# Patient Record
Sex: FEMALE | Race: WHITE | Employment: UNEMPLOYED | URBAN - METROPOLITAN AREA
[De-identification: names, ages, dates, MRNs, and addresses within clinical notes are randomized per-mention and may not be internally consistent; named-entity substitution may affect disease eponyms.]

---

## 2017-01-06 ENCOUNTER — ANTI-COAG VISIT (OUTPATIENT)
Dept: PHARMACY | Facility: CLINIC | Age: 47
End: 2017-01-06

## 2017-01-06 DIAGNOSIS — D68.51 FACTOR V LEIDEN (HCC): ICD-10-CM

## 2017-01-06 LAB — INR BLD: 2.8

## 2017-02-03 ENCOUNTER — ANTI-COAG VISIT (OUTPATIENT)
Dept: PHARMACY | Facility: CLINIC | Age: 47
End: 2017-02-03

## 2017-02-03 DIAGNOSIS — D68.51 FACTOR V LEIDEN (HCC): ICD-10-CM

## 2017-02-03 LAB — INR BLD: 2.4

## 2017-02-03 RX ORDER — DULOXETIN HYDROCHLORIDE 30 MG/1
30 CAPSULE, DELAYED RELEASE ORAL DAILY
COMMUNITY
End: 2017-09-14 | Stop reason: ALTCHOICE

## 2017-02-03 RX ORDER — BUDESONIDE AND FORMOTEROL FUMARATE DIHYDRATE 160; 4.5 UG/1; UG/1
2 AEROSOL RESPIRATORY (INHALATION) 2 TIMES DAILY
COMMUNITY
End: 2017-07-19 | Stop reason: ALTCHOICE

## 2017-02-17 ENCOUNTER — ANTI-COAG VISIT (OUTPATIENT)
Dept: PHARMACY | Facility: CLINIC | Age: 47
End: 2017-02-17

## 2017-02-17 DIAGNOSIS — D68.51 FACTOR V LEIDEN (HCC): ICD-10-CM

## 2017-02-17 LAB — INR BLD: 2.7

## 2017-03-17 ENCOUNTER — ANTI-COAG VISIT (OUTPATIENT)
Dept: PHARMACY | Facility: CLINIC | Age: 47
End: 2017-03-17

## 2017-03-17 DIAGNOSIS — D68.51 FACTOR V LEIDEN (HCC): ICD-10-CM

## 2017-03-17 LAB — INR BLD: 2.2

## 2017-03-31 ENCOUNTER — HOSPITAL ENCOUNTER (OUTPATIENT)
Dept: ULTRASOUND IMAGING | Age: 47
Discharge: HOME | End: 2017-03-31
Attending: NURSE PRACTITIONER | Admitting: NURSE PRACTITIONER

## 2017-03-31 DIAGNOSIS — R10.12 ABDOMINAL PAIN, LEFT UPPER QUADRANT: ICD-10-CM

## 2017-04-24 ENCOUNTER — ANTI-COAG VISIT (OUTPATIENT)
Dept: PHARMACY | Facility: CLINIC | Age: 47
End: 2017-04-24

## 2017-04-24 DIAGNOSIS — D68.51 FACTOR V LEIDEN (HCC): ICD-10-CM

## 2017-04-24 LAB — INR BLD: 2.1

## 2017-05-22 ENCOUNTER — ANTI-COAG VISIT (OUTPATIENT)
Dept: PHARMACY | Facility: CLINIC | Age: 47
End: 2017-05-22

## 2017-05-22 DIAGNOSIS — D68.51 FACTOR V LEIDEN (HCC): ICD-10-CM

## 2017-05-22 LAB — INR BLD: 1.8

## 2017-06-23 ENCOUNTER — TELEPHONE (OUTPATIENT)
Dept: PHARMACY | Facility: CLINIC | Age: 47
End: 2017-06-23

## 2017-07-07 ENCOUNTER — ANTI-COAG VISIT (OUTPATIENT)
Dept: PHARMACY | Facility: CLINIC | Age: 47
End: 2017-07-07

## 2017-07-07 DIAGNOSIS — D68.51 FACTOR V LEIDEN (HCC): ICD-10-CM

## 2017-07-07 LAB — INR BLD: 3.9

## 2017-07-19 ENCOUNTER — ANTI-COAG VISIT (OUTPATIENT)
Dept: PHARMACY | Facility: CLINIC | Age: 47
End: 2017-07-19

## 2017-07-19 LAB — INR BLD: 1.8

## 2017-07-19 RX ORDER — FLUTICASONE FUROATE AND VILANTEROL 200; 25 UG/1; UG/1
1 POWDER RESPIRATORY (INHALATION) DAILY
COMMUNITY
End: 2017-09-14 | Stop reason: SDUPTHER

## 2017-08-08 ENCOUNTER — TELEPHONE (OUTPATIENT)
Dept: PHARMACY | Facility: CLINIC | Age: 47
End: 2017-08-08

## 2017-08-10 ENCOUNTER — ANTI-COAG VISIT (OUTPATIENT)
Dept: INTERNAL MEDICINE CLINIC | Age: 47
End: 2017-08-10

## 2017-08-10 ENCOUNTER — OFFICE VISIT (OUTPATIENT)
Dept: INTERNAL MEDICINE CLINIC | Age: 47
End: 2017-08-10

## 2017-08-10 VITALS
HEIGHT: 66 IN | OXYGEN SATURATION: 98 % | BODY MASS INDEX: 32.3 KG/M2 | HEART RATE: 75 BPM | DIASTOLIC BLOOD PRESSURE: 86 MMHG | TEMPERATURE: 98.4 F | WEIGHT: 201 LBS | SYSTOLIC BLOOD PRESSURE: 158 MMHG

## 2017-08-10 DIAGNOSIS — J45.909 UNCOMPLICATED ASTHMA, UNSPECIFIED ASTHMA SEVERITY: ICD-10-CM

## 2017-08-10 DIAGNOSIS — D68.51 FACTOR V LEIDEN (HCC): ICD-10-CM

## 2017-08-10 DIAGNOSIS — E78.5 HYPERLIPIDEMIA, UNSPECIFIED HYPERLIPIDEMIA TYPE: ICD-10-CM

## 2017-08-10 DIAGNOSIS — F17.200 SMOKING: ICD-10-CM

## 2017-08-10 DIAGNOSIS — K21.9 GASTROESOPHAGEAL REFLUX DISEASE WITHOUT ESOPHAGITIS: ICD-10-CM

## 2017-08-10 DIAGNOSIS — M19.039 ARTHRITIS, WRIST: Primary | ICD-10-CM

## 2017-08-10 DIAGNOSIS — F41.8 ANXIETY WITH DEPRESSION: ICD-10-CM

## 2017-08-10 LAB
CHOLESTEROL, TOTAL: 154 MG/DL (ref 0–199)
HCT VFR BLD CALC: 41.7 % (ref 36–48)
HDLC SERPL-MCNC: 52 MG/DL (ref 40–60)
HEMOGLOBIN: 13.9 G/DL (ref 12–16)
INR BLD: 2.91 (ref 0.85–1.15)
LDL CHOLESTEROL CALCULATED: 86 MG/DL
MCH RBC QN AUTO: 30.3 PG (ref 26–34)
MCHC RBC AUTO-ENTMCNC: 33.4 G/DL (ref 31–36)
MCV RBC AUTO: 90.9 FL (ref 80–100)
PDW BLD-RTO: 15.2 % (ref 12.4–15.4)
PLATELET # BLD: 192 K/UL (ref 135–450)
PMV BLD AUTO: 9.1 FL (ref 5–10.5)
PROTHROMBIN TIME: 32.9 SEC (ref 9.6–13)
RBC # BLD: 4.58 M/UL (ref 4–5.2)
TRIGL SERPL-MCNC: 79 MG/DL (ref 0–150)
VLDLC SERPL CALC-MCNC: 16 MG/DL
WBC # BLD: 6.2 K/UL (ref 4–11)

## 2017-08-10 PROCEDURE — 99204 OFFICE O/P NEW MOD 45 MIN: CPT | Performed by: INTERNAL MEDICINE

## 2017-08-10 RX ORDER — OMEPRAZOLE 20 MG/1
20 CAPSULE, DELAYED RELEASE ORAL DAILY
Qty: 30 CAPSULE | Refills: 5 | Status: SHIPPED | OUTPATIENT
Start: 2017-08-10 | End: 2017-09-14 | Stop reason: SDUPTHER

## 2017-08-10 RX ORDER — VARENICLINE TARTRATE 25 MG
KIT ORAL
Qty: 1 EACH | Refills: 0 | Status: SHIPPED | OUTPATIENT
Start: 2017-08-10 | End: 2017-09-14 | Stop reason: ALTCHOICE

## 2017-08-10 RX ORDER — ALBUTEROL SULFATE 90 UG/1
2 AEROSOL, METERED RESPIRATORY (INHALATION) EVERY 6 HOURS PRN
COMMUNITY
End: 2017-09-14 | Stop reason: SDUPTHER

## 2017-08-10 ASSESSMENT — ENCOUNTER SYMPTOMS
TROUBLE SWALLOWING: 0
WHEEZING: 0
SORE THROAT: 0
SHORTNESS OF BREATH: 0
ABDOMINAL PAIN: 0
NAUSEA: 0
VOMITING: 0
DIARRHEA: 0

## 2017-08-15 ENCOUNTER — ANTI-COAG VISIT (OUTPATIENT)
Dept: PHARMACY | Facility: CLINIC | Age: 47
End: 2017-08-15

## 2017-08-15 ENCOUNTER — OFFICE VISIT (OUTPATIENT)
Dept: ORTHOPEDIC SURGERY | Age: 47
End: 2017-08-15

## 2017-08-15 VITALS
HEIGHT: 66 IN | TEMPERATURE: 98.3 F | BODY MASS INDEX: 32.3 KG/M2 | HEART RATE: 56 BPM | WEIGHT: 201 LBS | DIASTOLIC BLOOD PRESSURE: 80 MMHG | SYSTOLIC BLOOD PRESSURE: 147 MMHG

## 2017-08-15 DIAGNOSIS — G56.21 CUBITAL TUNNEL SYNDROME, RIGHT: ICD-10-CM

## 2017-08-15 DIAGNOSIS — M18.11 ARTHRITIS OF CARPOMETACARPAL (CMC) JOINT OF RIGHT THUMB: Primary | ICD-10-CM

## 2017-08-15 DIAGNOSIS — M17.12 PRIMARY OSTEOARTHRITIS OF LEFT KNEE: ICD-10-CM

## 2017-08-15 DIAGNOSIS — D68.51 FACTOR V LEIDEN (HCC): ICD-10-CM

## 2017-08-15 LAB — INR BLD: 3.1

## 2017-08-15 PROCEDURE — L3908 WHO COCK-UP NONMOLDE PRE OTS: HCPCS | Performed by: ORTHOPAEDIC SURGERY

## 2017-08-15 PROCEDURE — L3762 EO RIGID W/O JOINTS PRE OTS: HCPCS | Performed by: ORTHOPAEDIC SURGERY

## 2017-08-15 PROCEDURE — 99244 OFF/OP CNSLTJ NEW/EST MOD 40: CPT | Performed by: ORTHOPAEDIC SURGERY

## 2017-08-15 PROCEDURE — 20600 DRAIN/INJ JOINT/BURSA W/O US: CPT | Performed by: ORTHOPAEDIC SURGERY

## 2017-09-07 ENCOUNTER — TELEPHONE (OUTPATIENT)
Dept: PHARMACY | Facility: CLINIC | Age: 47
End: 2017-09-07

## 2017-09-14 ENCOUNTER — OFFICE VISIT (OUTPATIENT)
Dept: INTERNAL MEDICINE CLINIC | Age: 47
End: 2017-09-14

## 2017-09-14 VITALS
WEIGHT: 207 LBS | HEART RATE: 61 BPM | DIASTOLIC BLOOD PRESSURE: 78 MMHG | SYSTOLIC BLOOD PRESSURE: 138 MMHG | BODY MASS INDEX: 33.41 KG/M2 | OXYGEN SATURATION: 99 % | TEMPERATURE: 99 F

## 2017-09-14 DIAGNOSIS — R09.81 SINUS CONGESTION: ICD-10-CM

## 2017-09-14 DIAGNOSIS — Z13.1 DIABETES MELLITUS SCREENING: ICD-10-CM

## 2017-09-14 DIAGNOSIS — F32.A DEPRESSION, UNSPECIFIED DEPRESSION TYPE: ICD-10-CM

## 2017-09-14 DIAGNOSIS — F17.200 SMOKING ADDICTION: ICD-10-CM

## 2017-09-14 DIAGNOSIS — K21.9 GASTROESOPHAGEAL REFLUX DISEASE WITHOUT ESOPHAGITIS: ICD-10-CM

## 2017-09-14 DIAGNOSIS — J45.909 UNCOMPLICATED ASTHMA, UNSPECIFIED ASTHMA SEVERITY: ICD-10-CM

## 2017-09-14 DIAGNOSIS — D68.51 FACTOR V LEIDEN MUTATION (HCC): Primary | ICD-10-CM

## 2017-09-14 DIAGNOSIS — Z12.39 BREAST CANCER SCREENING: ICD-10-CM

## 2017-09-14 LAB
ESTIMATED AVERAGE GLUCOSE: 111.2 MG/DL
HBA1C MFR BLD: 5.5 %

## 2017-09-14 PROCEDURE — 99214 OFFICE O/P EST MOD 30 MIN: CPT | Performed by: INTERNAL MEDICINE

## 2017-09-14 RX ORDER — ALBUTEROL SULFATE 90 UG/1
2 AEROSOL, METERED RESPIRATORY (INHALATION) EVERY 6 HOURS PRN
Qty: 1 INHALER | Refills: 5 | Status: SHIPPED | OUTPATIENT
Start: 2017-09-14 | End: 2018-09-20 | Stop reason: SDUPTHER

## 2017-09-14 RX ORDER — DULOXETIN HYDROCHLORIDE 60 MG/1
60 CAPSULE, DELAYED RELEASE ORAL DAILY
Qty: 30 CAPSULE | Refills: 3 | Status: SHIPPED | OUTPATIENT
Start: 2017-09-14 | End: 2018-01-10 | Stop reason: SDUPTHER

## 2017-09-14 RX ORDER — WARFARIN SODIUM 7.5 MG/1
7.5 TABLET ORAL DAILY
Qty: 30 TABLET | Refills: 5 | Status: SHIPPED | OUTPATIENT
Start: 2017-09-14 | End: 2018-08-07 | Stop reason: SDUPTHER

## 2017-09-14 RX ORDER — FLUTICASONE FUROATE AND VILANTEROL 200; 25 UG/1; UG/1
1 POWDER RESPIRATORY (INHALATION) DAILY
Qty: 1 EACH | Refills: 5 | Status: SHIPPED | OUTPATIENT
Start: 2017-09-14 | End: 2018-10-06 | Stop reason: SDUPTHER

## 2017-09-14 RX ORDER — FLUTICASONE PROPIONATE 50 MCG
1 SPRAY, SUSPENSION (ML) NASAL 2 TIMES DAILY
Qty: 1 BOTTLE | Refills: 0 | Status: SHIPPED | OUTPATIENT
Start: 2017-09-14 | End: 2018-05-01 | Stop reason: SDUPTHER

## 2017-09-14 RX ORDER — OMEPRAZOLE 20 MG/1
20 CAPSULE, DELAYED RELEASE ORAL DAILY
Qty: 30 CAPSULE | Refills: 5 | Status: SHIPPED | OUTPATIENT
Start: 2017-09-14 | End: 2020-09-10

## 2017-09-14 RX ORDER — POLYETHYLENE GLYCOL 3350 17 G
4 POWDER IN PACKET (EA) ORAL PRN
Qty: 100 EACH | Refills: 3 | Status: SHIPPED | OUTPATIENT
Start: 2017-09-14 | End: 2018-12-17

## 2017-09-14 ASSESSMENT — ENCOUNTER SYMPTOMS
WHEEZING: 0
VOMITING: 0
SHORTNESS OF BREATH: 1
NAUSEA: 0
ABDOMINAL PAIN: 0
SORE THROAT: 0
DIARRHEA: 0
TROUBLE SWALLOWING: 0
RHINORRHEA: 1

## 2017-09-15 ENCOUNTER — TELEPHONE (OUTPATIENT)
Dept: PHARMACY | Facility: CLINIC | Age: 47
End: 2017-09-15

## 2017-09-15 ENCOUNTER — TELEPHONE (OUTPATIENT)
Dept: INTERNAL MEDICINE CLINIC | Age: 47
End: 2017-09-15

## 2017-09-19 ENCOUNTER — TELEPHONE (OUTPATIENT)
Dept: PHARMACY | Facility: CLINIC | Age: 47
End: 2017-09-19

## 2017-10-01 ENCOUNTER — HOSPITAL ENCOUNTER (OUTPATIENT)
Dept: OTHER | Age: 47
Discharge: HOME | End: 2017-10-31
Attending: NURSE PRACTITIONER | Admitting: NURSE PRACTITIONER

## 2017-10-24 ENCOUNTER — ANTI-COAG VISIT (OUTPATIENT)
Dept: PHARMACY | Facility: CLINIC | Age: 47
End: 2017-10-24

## 2017-10-24 DIAGNOSIS — D68.51 FACTOR V LEIDEN (HCC): ICD-10-CM

## 2017-10-24 LAB — INR BLD: 3.5

## 2017-10-24 NOTE — PROGRESS NOTES
Ms. Ange Enciso is a 56 y.o.  female with history of Factor V Leiden who presents today for anticoagulation monitoring and adjustment. Patient verifies current dosing regimen. Patient denies s/s bleeding/bruising/swelling/SOB. No blood in urine or stool. No dietary changes. No changes in medication/OTC agents/Herbals. No change in alcohol use. No missed doses. No Procedures scheduled in the future at this time. Lab Results   Component Value Date    INR 3.5 10/24/2017    INR 3.1 08/15/2017    INR 2.91 (H) 08/10/2017       Patient appears well. No changes, no problems. Coumadin dose: HOLD Warfarin Today Only. Then decrease dose . .... NEW DOSE : Take 7.5 mg daily Except 3.75 mg on Mondays, Wednesday and Fridays. Recheck INR in 2 weeks. Patient reminded to call the Anticoagulation Clinic with any medication changes. Patient given instructions utilizing the teach back method. After visit summary printed and reviewed with patient. Medications reviewed and Warfarin dose updated.

## 2017-11-01 ENCOUNTER — HOSPITAL ENCOUNTER (OUTPATIENT)
Dept: OTHER | Age: 47
Discharge: HOME | End: 2017-11-30
Attending: NURSE PRACTITIONER | Admitting: NURSE PRACTITIONER

## 2018-01-10 DIAGNOSIS — F32.A DEPRESSION, UNSPECIFIED DEPRESSION TYPE: ICD-10-CM

## 2018-01-10 RX ORDER — DULOXETIN HYDROCHLORIDE 60 MG/1
CAPSULE, DELAYED RELEASE ORAL
Qty: 30 CAPSULE | Refills: 2 | Status: SHIPPED | OUTPATIENT
Start: 2018-01-10 | End: 2018-09-08 | Stop reason: SDUPTHER

## 2018-04-26 ENCOUNTER — TELEPHONE (OUTPATIENT)
Dept: PHARMACY | Facility: CLINIC | Age: 48
End: 2018-04-26

## 2018-05-01 DIAGNOSIS — R09.81 SINUS CONGESTION: ICD-10-CM

## 2018-05-02 RX ORDER — FLUTICASONE PROPIONATE 50 MCG
SPRAY, SUSPENSION (ML) NASAL
Qty: 1 BOTTLE | Refills: 5 | Status: SHIPPED | OUTPATIENT
Start: 2018-05-02 | End: 2018-12-17

## 2018-06-06 ENCOUNTER — TELEPHONE (OUTPATIENT)
Dept: PHARMACY | Facility: CLINIC | Age: 48
End: 2018-06-06

## 2018-07-13 ENCOUNTER — ANTI-COAG VISIT (OUTPATIENT)
Dept: PHARMACY | Facility: CLINIC | Age: 48
End: 2018-07-13

## 2018-07-16 DIAGNOSIS — R09.81 SINUS CONGESTION: ICD-10-CM

## 2018-07-18 RX ORDER — FLUTICASONE PROPIONATE 50 MCG
SPRAY, SUSPENSION (ML) NASAL
Qty: 1 BOTTLE | Refills: 5 | Status: SHIPPED | OUTPATIENT
Start: 2018-07-18 | End: 2018-12-14

## 2018-08-07 DIAGNOSIS — D68.51 FACTOR V LEIDEN MUTATION (HCC): ICD-10-CM

## 2018-08-07 RX ORDER — WARFARIN SODIUM 7.5 MG/1
TABLET ORAL
Qty: 21 TABLET | Refills: 4 | Status: SHIPPED | OUTPATIENT
Start: 2018-08-07 | End: 2018-12-14

## 2018-08-10 DIAGNOSIS — D68.51 FACTOR V LEIDEN MUTATION (HCC): ICD-10-CM

## 2018-08-10 RX ORDER — WARFARIN SODIUM 7.5 MG/1
TABLET ORAL
Qty: 21 TABLET | Refills: 4 | Status: SHIPPED | OUTPATIENT
Start: 2018-08-10 | End: 2018-12-17 | Stop reason: ALTCHOICE

## 2018-09-08 DIAGNOSIS — F32.A DEPRESSION, UNSPECIFIED DEPRESSION TYPE: ICD-10-CM

## 2018-09-08 RX ORDER — DULOXETIN HYDROCHLORIDE 60 MG/1
CAPSULE, DELAYED RELEASE ORAL
Qty: 30 CAPSULE | Refills: 11 | Status: SHIPPED | OUTPATIENT
Start: 2018-09-08 | End: 2018-09-10 | Stop reason: SDUPTHER

## 2018-09-10 DIAGNOSIS — F32.A DEPRESSION, UNSPECIFIED DEPRESSION TYPE: ICD-10-CM

## 2018-09-11 RX ORDER — DULOXETIN HYDROCHLORIDE 60 MG/1
CAPSULE, DELAYED RELEASE ORAL
Qty: 30 CAPSULE | Refills: 11 | Status: SHIPPED | OUTPATIENT
Start: 2018-09-11 | End: 2019-09-09 | Stop reason: SDUPTHER

## 2018-09-20 DIAGNOSIS — J45.909 UNCOMPLICATED ASTHMA: ICD-10-CM

## 2018-10-06 DIAGNOSIS — J45.909 UNCOMPLICATED ASTHMA: ICD-10-CM

## 2018-12-14 ENCOUNTER — HOSPITAL ENCOUNTER (EMERGENCY)
Age: 48
Discharge: HOME | End: 2018-12-14
Payer: COMMERCIAL

## 2018-12-14 ENCOUNTER — APPOINTMENT (OUTPATIENT)
Dept: GENERAL RADIOLOGY | Age: 48
End: 2018-12-14
Payer: COMMERCIAL

## 2018-12-14 VITALS
DIASTOLIC BLOOD PRESSURE: 100 MMHG | TEMPERATURE: 97.8 F | OXYGEN SATURATION: 98 % | RESPIRATION RATE: 16 BRPM | BODY MASS INDEX: 35.64 KG/M2 | SYSTOLIC BLOOD PRESSURE: 151 MMHG | HEIGHT: 67 IN | WEIGHT: 227.07 LBS | HEART RATE: 74 BPM

## 2018-12-14 DIAGNOSIS — J06.9 UPPER RESPIRATORY TRACT INFECTION, UNSPECIFIED TYPE: Primary | ICD-10-CM

## 2018-12-14 LAB
INR BLD: 4.09 (ref 0.86–1.14)
PROTHROMBIN TIME: 46.6 SEC (ref 9.8–13)
RAPID INFLUENZA  B AGN: NEGATIVE
RAPID INFLUENZA A AGN: NEGATIVE

## 2018-12-14 PROCEDURE — 94640 AIRWAY INHALATION TREATMENT: CPT

## 2018-12-14 PROCEDURE — 87804 INFLUENZA ASSAY W/OPTIC: CPT

## 2018-12-14 PROCEDURE — 71046 X-RAY EXAM CHEST 2 VIEWS: CPT

## 2018-12-14 PROCEDURE — 6370000000 HC RX 637 (ALT 250 FOR IP): Performed by: NURSE PRACTITIONER

## 2018-12-14 PROCEDURE — 99284 EMERGENCY DEPT VISIT MOD MDM: CPT

## 2018-12-14 PROCEDURE — 94760 N-INVAS EAR/PLS OXIMETRY 1: CPT

## 2018-12-14 PROCEDURE — 85610 PROTHROMBIN TIME: CPT

## 2018-12-14 PROCEDURE — 94664 DEMO&/EVAL PT USE INHALER: CPT

## 2018-12-14 RX ORDER — AMOXICILLIN 250 MG/1
500 CAPSULE ORAL 3 TIMES DAILY
Qty: 42 CAPSULE | Refills: 0 | Status: SHIPPED | OUTPATIENT
Start: 2018-12-14 | End: 2018-12-21

## 2018-12-14 RX ORDER — PREDNISONE 20 MG/1
60 TABLET ORAL ONCE
Status: COMPLETED | OUTPATIENT
Start: 2018-12-14 | End: 2018-12-14

## 2018-12-14 RX ORDER — PREDNISONE 20 MG/1
TABLET ORAL
Qty: 18 TABLET | Refills: 0 | Status: SHIPPED | OUTPATIENT
Start: 2018-12-14 | End: 2018-12-24

## 2018-12-14 RX ORDER — IPRATROPIUM BROMIDE AND ALBUTEROL SULFATE 2.5; .5 MG/3ML; MG/3ML
1 SOLUTION RESPIRATORY (INHALATION) ONCE
Status: COMPLETED | OUTPATIENT
Start: 2018-12-14 | End: 2018-12-14

## 2018-12-14 RX ADMIN — IPRATROPIUM BROMIDE AND ALBUTEROL SULFATE 1 AMPULE: .5; 3 SOLUTION RESPIRATORY (INHALATION) at 12:58

## 2018-12-14 RX ADMIN — PREDNISONE 60 MG: 20 TABLET ORAL at 13:45

## 2018-12-14 ASSESSMENT — ENCOUNTER SYMPTOMS
ABDOMINAL PAIN: 0
SHORTNESS OF BREATH: 1
DIARRHEA: 1
COUGH: 1

## 2018-12-14 NOTE — ED NOTES
Dc instructions completed with pt. Pt verbalized understanding. rx x2. Pt was ambulatory to exit.       Yonatan Williamson RN  12/14/18 1400

## 2018-12-14 NOTE — ED PROVIDER NOTES
Father      Family Status   Relation Status    Mother Alive    Father         SOCIAL HISTORY      reports that she has been smoking Cigarettes. She has been smoking about 0.50 packs per day. She has never used smokeless tobacco. She reports that she uses drugs, including Marijuana, about 14 times per week. She reports that she does not drink alcohol. PHYSICAL EXAM    (up to 7 for level 4, 8 or more for level 5)     ED Triage Vitals   BP Temp Temp Source Pulse Resp SpO2 Height Weight   18 1216 18 1215 18 1215 18 1216 18 1216 18 1216 18 1216 18 1216   (!) 162/91 97.8 °F (36.6 °C) Oral 77 18 96 % 5' 7\" (1.702 m) 227 lb 1.2 oz (103 kg)       Physical Exam   Constitutional: She is oriented to person, place, and time. Vital signs are normal. She appears well-developed and well-nourished. She is cooperative. Non-toxic appearance. She does not have a sickly appearance. She does not appear ill. No distress. HENT:   Head: Normocephalic. Nose: Nose normal.   Mouth/Throat: Oropharynx is clear and moist. No oropharyngeal exudate. Eyes: Pupils are equal, round, and reactive to light. Neck: Normal range of motion. Cardiovascular: Normal rate and regular rhythm. Pulmonary/Chest: Effort normal. She has wheezes. Abdominal: Soft. Bowel sounds are normal. She exhibits no mass. There is no tenderness. There is no rebound and no guarding. Neurological: She is alert and oriented to person, place, and time. Skin: Skin is warm and dry. She is not diaphoretic. Psychiatric: She has a normal mood and affect. Nursing note and vitals reviewed.         DIAGNOSTIC RESULTS     RADIOLOGY:   Non-plain film images such as CT, Ultrasound and MRI are read by the radiologist. Plain radiographic images are visualized and preliminarily interpreted by AGUSTIN Vazquez CNP with the below findings:        Interpretation per the Radiologist below, if available at the time of this note:    XR CHEST STANDARD (2 VW)   Final Result   Retrocardiac atelectasis or pneumonitis. LABS:  Labs Reviewed   PROTIME-INR - Abnormal; Notable for the following:        Result Value    Protime 46.6 (*)     INR 4.09 (*)     All other components within normal limits    Narrative:     Performed at:  [facility]  [address] [place], [place] [address]   Phone [phone]   RAPID INFLUENZA A/B ANTIGENS    Narrative:     Performed at:  [facility]  [address] [place], [place] [address]   Phone [phone]       All other labs were within normal range or not returned as of this dictation. EMERGENCY DEPARTMENT COURSE and DIFFERENTIAL DIAGNOSIS/MDM:   Vitals:    Vitals:    12/14/18 1215 12/14/18 1216 12/14/18 1258 12/14/18 1452   BP:  (!) 162/91  (!) 151/100   Pulse:  77  74   Resp:  18 18 16   Temp: 97.8 °F (36.6 °C)      TempSrc: Oral      SpO2:  96% 97% 98%   Weight:  227 lb 1.2 oz (103 kg)     Height:  5' 7\" (1.702 m)       Medications   predniSONE (DELTASONE) tablet 60 mg (60 mg Oral Given 12/14/18 1345)   ipratropium-albuterol (DUONEB) nebulizer solution 1 ampule (1 ampule Inhalation Given 12/14/18 1258)       MDM  Patient and evaluated per myself. Dr. Denisse Osullivan present available for consultation as needed. Clinically no evidence of distress. No evidence of meningismus. No evidence of otitis media or externa, exudative pharyngitis. Scattered wheezing throughout lung fields without evidence of fever, tachycardia, hypoxia or tachypnea. Influenza A and B -. Chest x-ray indicating changes consistent with pneumonitis. No consolidation otherwise. INR: 4.09.      1420: I spoke with Dr. Vasquez Weathers. We discussed the patient ED visit, an INR level. He recommends having the hold the Coumadin for 2 days.  Have the patient be seen at their office/clinic on Monday to determine long-term management for INR monitoring and

## 2019-02-21 DIAGNOSIS — D68.51 FACTOR V LEIDEN MUTATION (HCC): ICD-10-CM

## 2019-02-22 RX ORDER — WARFARIN SODIUM 7.5 MG/1
TABLET ORAL
Qty: 21 TABLET | Refills: 3 | Status: SHIPPED | OUTPATIENT
Start: 2019-02-22 | End: 2019-06-09 | Stop reason: SDUPTHER

## 2019-03-22 DIAGNOSIS — J45.909 UNCOMPLICATED ASTHMA: ICD-10-CM

## 2019-06-09 DIAGNOSIS — D68.51 FACTOR V LEIDEN MUTATION (HCC): ICD-10-CM

## 2019-06-10 RX ORDER — WARFARIN SODIUM 7.5 MG/1
TABLET ORAL
Qty: 21 TABLET | Refills: 2 | Status: SHIPPED | OUTPATIENT
Start: 2019-06-10 | End: 2020-09-10

## 2019-06-28 DIAGNOSIS — D68.51 FACTOR V LEIDEN MUTATION (HCC): ICD-10-CM

## 2019-06-28 RX ORDER — WARFARIN SODIUM 7.5 MG/1
TABLET ORAL
Qty: 21 TABLET | Refills: 2 | Status: SHIPPED | OUTPATIENT
Start: 2019-06-28 | End: 2020-04-22

## 2019-07-13 DIAGNOSIS — K21.9 GASTROESOPHAGEAL REFLUX DISEASE WITHOUT ESOPHAGITIS: ICD-10-CM

## 2019-07-16 RX ORDER — OMEPRAZOLE 20 MG/1
CAPSULE, DELAYED RELEASE ORAL
Qty: 30 CAPSULE | Refills: 10 | Status: SHIPPED | OUTPATIENT
Start: 2019-07-16 | End: 2020-06-10

## 2019-08-13 DIAGNOSIS — J45.909 UNCOMPLICATED ASTHMA: ICD-10-CM

## 2019-08-13 RX ORDER — ALBUTEROL SULFATE 90 UG/1
AEROSOL, METERED RESPIRATORY (INHALATION)
Qty: 1 INHALER | Refills: 3 | Status: SHIPPED | OUTPATIENT
Start: 2019-08-13 | End: 2020-02-17

## 2020-09-10 ENCOUNTER — HOSPITAL ENCOUNTER (INPATIENT)
Age: 50
LOS: 24 days | Discharge: NURSING, REHAB OR LONG-TERM CARE | DRG: 130 | End: 2020-10-04
Attending: EMERGENCY MEDICINE | Admitting: STUDENT IN AN ORGANIZED HEALTH CARE EDUCATION/TRAINING PROGRAM
Payer: COMMERCIAL

## 2020-09-10 ENCOUNTER — APPOINTMENT (OUTPATIENT)
Dept: CT IMAGING | Age: 50
DRG: 130 | End: 2020-09-10
Payer: COMMERCIAL

## 2020-09-10 PROBLEM — I50.9 ACUTE CHF (CONGESTIVE HEART FAILURE) (HCC): Status: ACTIVE | Noted: 2020-09-10

## 2020-09-10 PROBLEM — J18.9 CAP (COMMUNITY ACQUIRED PNEUMONIA): Status: ACTIVE | Noted: 2020-09-10

## 2020-09-10 PROBLEM — I26.99 PULMONARY EMBOLUS (HCC): Status: ACTIVE | Noted: 2020-09-10

## 2020-09-10 PROBLEM — E87.20 LACTIC ACIDOSIS: Status: ACTIVE | Noted: 2020-09-10

## 2020-09-10 PROBLEM — I26.99 BILATERAL PULMONARY EMBOLISM (HCC): Status: ACTIVE | Noted: 2020-09-10

## 2020-09-10 LAB
A/G RATIO: 1.1 (ref 1.1–2.2)
ALBUMIN SERPL-MCNC: 3.5 G/DL (ref 3.4–5)
ALP BLD-CCNC: 123 U/L (ref 40–129)
ALT SERPL-CCNC: 374 U/L (ref 10–40)
ANION GAP SERPL CALCULATED.3IONS-SCNC: 16 MMOL/L (ref 3–16)
APTT: 27.9 SEC (ref 24.2–36.2)
AST SERPL-CCNC: 171 U/L (ref 15–37)
BACTERIA: ABNORMAL /HPF
BASOPHILS ABSOLUTE: 0.1 K/UL (ref 0–0.2)
BASOPHILS RELATIVE PERCENT: 0.7 %
BILIRUB SERPL-MCNC: 1 MG/DL (ref 0–1)
BILIRUBIN URINE: ABNORMAL
BLOOD, URINE: ABNORMAL
BUN BLDV-MCNC: 28 MG/DL (ref 7–20)
CALCIUM SERPL-MCNC: 8.5 MG/DL (ref 8.3–10.6)
CHLORIDE BLD-SCNC: 100 MMOL/L (ref 99–110)
CLARITY: ABNORMAL
CO2: 20 MMOL/L (ref 21–32)
COLOR: ABNORMAL
COMMENT UA: ABNORMAL
CREAT SERPL-MCNC: 0.9 MG/DL (ref 0.6–1.1)
EOSINOPHILS ABSOLUTE: 0 K/UL (ref 0–0.6)
EOSINOPHILS RELATIVE PERCENT: 0.4 %
EPITHELIAL CELLS, UA: ABNORMAL /HPF (ref 0–5)
GFR AFRICAN AMERICAN: >60
GFR NON-AFRICAN AMERICAN: >60
GLOBULIN: 3.1 G/DL
GLUCOSE BLD-MCNC: 106 MG/DL (ref 70–99)
GLUCOSE URINE: NEGATIVE MG/DL
HCT VFR BLD CALC: 42.3 % (ref 36–48)
HEMOGLOBIN: 13 G/DL (ref 12–16)
INR BLD: 2.21 (ref 0.86–1.14)
KETONES, URINE: NEGATIVE MG/DL
LACTIC ACID: 4.2 MMOL/L (ref 0.4–2)
LEUKOCYTE ESTERASE, URINE: NEGATIVE
LYMPHOCYTES ABSOLUTE: 2 K/UL (ref 1–5.1)
LYMPHOCYTES RELATIVE PERCENT: 16.8 %
MCH RBC QN AUTO: 26.4 PG (ref 26–34)
MCHC RBC AUTO-ENTMCNC: 30.6 G/DL (ref 31–36)
MCV RBC AUTO: 86.2 FL (ref 80–100)
MICROSCOPIC EXAMINATION: YES
MONOCYTES ABSOLUTE: 0.8 K/UL (ref 0–1.3)
MONOCYTES RELATIVE PERCENT: 6.4 %
NEUTROPHILS ABSOLUTE: 9 K/UL (ref 1.7–7.7)
NEUTROPHILS RELATIVE PERCENT: 75.7 %
NITRITE, URINE: NEGATIVE
PDW BLD-RTO: 19.1 % (ref 12.4–15.4)
PH UA: 5.5 (ref 5–8)
PLATELET # BLD: 272 K/UL (ref 135–450)
PMV BLD AUTO: 9.3 FL (ref 5–10.5)
POTASSIUM REFLEX MAGNESIUM: 4.1 MMOL/L (ref 3.5–5.1)
PRO-BNP: 4851 PG/ML (ref 0–124)
PROTEIN UA: >=300 MG/DL
PROTHROMBIN TIME: 25.8 SEC (ref 10–13.2)
RBC # BLD: 4.9 M/UL (ref 4–5.2)
RBC UA: ABNORMAL /HPF (ref 0–4)
SODIUM BLD-SCNC: 136 MMOL/L (ref 136–145)
SPECIFIC GRAVITY UA: >1.03 (ref 1–1.03)
TOTAL PROTEIN: 6.6 G/DL (ref 6.4–8.2)
TROPONIN: <0.01 NG/ML
TSH REFLEX FT4: 3.79 UIU/ML (ref 0.27–4.2)
URINE REFLEX TO CULTURE: ABNORMAL
URINE TYPE: ABNORMAL
UROBILINOGEN, URINE: 1 E.U./DL
WBC # BLD: 11.9 K/UL (ref 4–11)
WBC UA: ABNORMAL /HPF (ref 0–5)
YEAST: PRESENT /HPF

## 2020-09-10 PROCEDURE — 2500000003 HC RX 250 WO HCPCS: Performed by: EMERGENCY MEDICINE

## 2020-09-10 PROCEDURE — 85730 THROMBOPLASTIN TIME PARTIAL: CPT

## 2020-09-10 PROCEDURE — 80053 COMPREHEN METABOLIC PANEL: CPT

## 2020-09-10 PROCEDURE — 2500000003 HC RX 250 WO HCPCS: Performed by: STUDENT IN AN ORGANIZED HEALTH CARE EDUCATION/TRAINING PROGRAM

## 2020-09-10 PROCEDURE — 2580000003 HC RX 258: Performed by: EMERGENCY MEDICINE

## 2020-09-10 PROCEDURE — 94761 N-INVAS EAR/PLS OXIMETRY MLT: CPT

## 2020-09-10 PROCEDURE — 96365 THER/PROPH/DIAG IV INF INIT: CPT

## 2020-09-10 PROCEDURE — 36415 COLL VENOUS BLD VENIPUNCTURE: CPT

## 2020-09-10 PROCEDURE — 6360000004 HC RX CONTRAST MEDICATION: Performed by: EMERGENCY MEDICINE

## 2020-09-10 PROCEDURE — 84443 ASSAY THYROID STIM HORMONE: CPT

## 2020-09-10 PROCEDURE — 87186 SC STD MICRODIL/AGAR DIL: CPT

## 2020-09-10 PROCEDURE — 36556 INSERT NON-TUNNEL CV CATH: CPT

## 2020-09-10 PROCEDURE — 83605 ASSAY OF LACTIC ACID: CPT

## 2020-09-10 PROCEDURE — 99291 CRITICAL CARE FIRST HOUR: CPT

## 2020-09-10 PROCEDURE — 83880 ASSAY OF NATRIURETIC PEPTIDE: CPT

## 2020-09-10 PROCEDURE — 6360000002 HC RX W HCPCS: Performed by: EMERGENCY MEDICINE

## 2020-09-10 PROCEDURE — 2000000000 HC ICU R&B

## 2020-09-10 PROCEDURE — 93005 ELECTROCARDIOGRAM TRACING: CPT | Performed by: EMERGENCY MEDICINE

## 2020-09-10 PROCEDURE — 85610 PROTHROMBIN TIME: CPT

## 2020-09-10 PROCEDURE — 81001 URINALYSIS AUTO W/SCOPE: CPT

## 2020-09-10 PROCEDURE — 85025 COMPLETE CBC W/AUTO DIFF WBC: CPT

## 2020-09-10 PROCEDURE — 96375 TX/PRO/DX INJ NEW DRUG ADDON: CPT

## 2020-09-10 PROCEDURE — 96376 TX/PRO/DX INJ SAME DRUG ADON: CPT

## 2020-09-10 PROCEDURE — 84484 ASSAY OF TROPONIN QUANT: CPT

## 2020-09-10 PROCEDURE — 87040 BLOOD CULTURE FOR BACTERIA: CPT

## 2020-09-10 PROCEDURE — 87449 NOS EACH ORGANISM AG IA: CPT

## 2020-09-10 PROCEDURE — 71260 CT THORAX DX C+: CPT

## 2020-09-10 PROCEDURE — 87150 DNA/RNA AMPLIFIED PROBE: CPT

## 2020-09-10 RX ORDER — METOPROLOL TARTRATE 5 MG/5ML
5 INJECTION INTRAVENOUS ONCE
Status: COMPLETED | OUTPATIENT
Start: 2020-09-10 | End: 2020-09-10

## 2020-09-10 RX ORDER — HEPARIN SODIUM 1000 [USP'U]/ML
40 INJECTION, SOLUTION INTRAVENOUS; SUBCUTANEOUS PRN
Status: DISCONTINUED | OUTPATIENT
Start: 2020-09-10 | End: 2020-09-10

## 2020-09-10 RX ORDER — FUROSEMIDE 10 MG/ML
40 INJECTION INTRAMUSCULAR; INTRAVENOUS ONCE
Status: COMPLETED | OUTPATIENT
Start: 2020-09-10 | End: 2020-09-10

## 2020-09-10 RX ORDER — DILTIAZEM HYDROCHLORIDE 5 MG/ML
20 INJECTION INTRAVENOUS ONCE
Status: COMPLETED | OUTPATIENT
Start: 2020-09-11 | End: 2020-09-10

## 2020-09-10 RX ORDER — 0.9 % SODIUM CHLORIDE 0.9 %
1000 INTRAVENOUS SOLUTION INTRAVENOUS ONCE
Status: COMPLETED | OUTPATIENT
Start: 2020-09-10 | End: 2020-09-10

## 2020-09-10 RX ORDER — HEPARIN SODIUM 10000 [USP'U]/100ML
9.9 INJECTION, SOLUTION INTRAVENOUS CONTINUOUS
Status: DISCONTINUED | OUTPATIENT
Start: 2020-09-10 | End: 2020-09-11 | Stop reason: ALTCHOICE

## 2020-09-10 RX ORDER — HEPARIN SODIUM 1000 [USP'U]/ML
80 INJECTION, SOLUTION INTRAVENOUS; SUBCUTANEOUS PRN
Status: DISCONTINUED | OUTPATIENT
Start: 2020-09-10 | End: 2020-09-10

## 2020-09-10 RX ORDER — DILTIAZEM HYDROCHLORIDE 5 MG/ML
10 INJECTION INTRAVENOUS ONCE
Status: DISCONTINUED | OUTPATIENT
Start: 2020-09-10 | End: 2020-09-10

## 2020-09-10 RX ORDER — HEPARIN SODIUM 1000 [USP'U]/ML
80 INJECTION, SOLUTION INTRAVENOUS; SUBCUTANEOUS ONCE
Status: DISCONTINUED | OUTPATIENT
Start: 2020-09-10 | End: 2020-09-10

## 2020-09-10 RX ORDER — DILTIAZEM HYDROCHLORIDE 5 MG/ML
5 INJECTION INTRAVENOUS ONCE
Status: COMPLETED | OUTPATIENT
Start: 2020-09-10 | End: 2020-09-10

## 2020-09-10 RX ORDER — FLUCONAZOLE 100 MG/1
200 TABLET ORAL ONCE
Status: DISCONTINUED | OUTPATIENT
Start: 2020-09-10 | End: 2020-09-10

## 2020-09-10 RX ADMIN — CEFTRIAXONE 1 G: 1 INJECTION, POWDER, FOR SOLUTION INTRAMUSCULAR; INTRAVENOUS at 23:36

## 2020-09-10 RX ADMIN — FUROSEMIDE 40 MG: 10 INJECTION, SOLUTION INTRAMUSCULAR; INTRAVENOUS at 23:34

## 2020-09-10 RX ADMIN — DILTIAZEM HYDROCHLORIDE 20 MG: 5 INJECTION INTRAVENOUS at 23:55

## 2020-09-10 RX ADMIN — METOPROLOL TARTRATE 5 MG: 5 INJECTION INTRAVENOUS at 20:58

## 2020-09-10 RX ADMIN — DILTIAZEM HYDROCHLORIDE 5 MG: 5 INJECTION INTRAVENOUS at 23:33

## 2020-09-10 RX ADMIN — METOPROLOL TARTRATE 5 MG: 5 INJECTION INTRAVENOUS at 21:32

## 2020-09-10 RX ADMIN — IOPAMIDOL 75 ML: 755 INJECTION, SOLUTION INTRAVENOUS at 21:54

## 2020-09-10 RX ADMIN — SODIUM CHLORIDE 1000 ML: 9 INJECTION, SOLUTION INTRAVENOUS at 20:58

## 2020-09-10 RX ADMIN — DILTIAZEM HYDROCHLORIDE 5 MG/HR: 5 INJECTION INTRAVENOUS at 23:52

## 2020-09-10 ASSESSMENT — PAIN SCALES - GENERAL: PAINLEVEL_OUTOF10: 0

## 2020-09-11 ENCOUNTER — APPOINTMENT (OUTPATIENT)
Dept: GENERAL RADIOLOGY | Age: 50
DRG: 130 | End: 2020-09-11
Payer: COMMERCIAL

## 2020-09-11 ENCOUNTER — APPOINTMENT (OUTPATIENT)
Dept: CT IMAGING | Age: 50
DRG: 130 | End: 2020-09-11
Payer: COMMERCIAL

## 2020-09-11 PROBLEM — R56.9 SEIZURES (HCC): Status: ACTIVE | Noted: 2020-09-11

## 2020-09-11 PROBLEM — I46.9 CARDIAC ARREST (HCC): Status: ACTIVE | Noted: 2020-09-11

## 2020-09-11 PROBLEM — J96.01 ACUTE RESPIRATORY FAILURE WITH HYPOXIA (HCC): Status: ACTIVE | Noted: 2020-09-11

## 2020-09-11 LAB
ALBUMIN SERPL-MCNC: 2.5 G/DL (ref 3.4–5)
ANION GAP SERPL CALCULATED.3IONS-SCNC: 13 MMOL/L (ref 3–16)
ANION GAP SERPL CALCULATED.3IONS-SCNC: 24 MMOL/L (ref 3–16)
ANION GAP SERPL CALCULATED.3IONS-SCNC: 24 MMOL/L (ref 3–16)
APTT: 117.5 SEC (ref 24.2–36.2)
APTT: 46.5 SEC (ref 24.2–36.2)
APTT: 69.7 SEC (ref 24.2–36.2)
BASE EXCESS ARTERIAL: -11.2 MMOL/L (ref -3–3)
BASE EXCESS ARTERIAL: -19 (ref -3–3)
BASE EXCESS ARTERIAL: -20 (ref -3–3)
BASOPHILS ABSOLUTE: 0 K/UL (ref 0–0.2)
BASOPHILS ABSOLUTE: 0.1 K/UL (ref 0–0.2)
BASOPHILS ABSOLUTE: 0.1 K/UL (ref 0–0.2)
BASOPHILS RELATIVE PERCENT: 0.2 %
BASOPHILS RELATIVE PERCENT: 0.4 %
BASOPHILS RELATIVE PERCENT: 0.7 %
BUN BLDV-MCNC: 27 MG/DL (ref 7–20)
BUN BLDV-MCNC: 33 MG/DL (ref 7–20)
BUN BLDV-MCNC: 43 MG/DL (ref 7–20)
C3 COMPLEMENT: 54.9 MG/DL (ref 90–180)
C4 COMPLEMENT: 5.7 MG/DL (ref 10–40)
CALCIUM IONIZED: 0.79 MMOL/L (ref 1.12–1.32)
CALCIUM SERPL-MCNC: 7.2 MG/DL (ref 8.3–10.6)
CALCIUM SERPL-MCNC: 7.4 MG/DL (ref 8.3–10.6)
CALCIUM SERPL-MCNC: 7.8 MG/DL (ref 8.3–10.6)
CARBOXYHEMOGLOBIN ARTERIAL: 1 % (ref 0–1.5)
CHLORIDE BLD-SCNC: 102 MMOL/L (ref 99–110)
CHLORIDE BLD-SCNC: 104 MMOL/L (ref 99–110)
CHLORIDE BLD-SCNC: 104 MMOL/L (ref 99–110)
CO2: 11 MMOL/L (ref 21–32)
CO2: 20 MMOL/L (ref 21–32)
CO2: 7 MMOL/L (ref 21–32)
CREAT SERPL-MCNC: 1.1 MG/DL (ref 0.6–1.1)
CREAT SERPL-MCNC: 1.3 MG/DL (ref 0.6–1.1)
CREAT SERPL-MCNC: 2.1 MG/DL (ref 0.6–1.1)
EKG ATRIAL RATE: 167 BPM
EKG DIAGNOSIS: NORMAL
EKG P-R INTERVAL: 120 MS
EKG Q-T INTERVAL: 292 MS
EKG QRS DURATION: 72 MS
EKG QTC CALCULATION (BAZETT): 487 MS
EKG R AXIS: 93 DEGREES
EKG T AXIS: -79 DEGREES
EKG VENTRICULAR RATE: 167 BPM
EOSINOPHILS ABSOLUTE: 0 K/UL (ref 0–0.6)
EOSINOPHILS RELATIVE PERCENT: 0 %
EOSINOPHILS RELATIVE PERCENT: 0 %
EOSINOPHILS RELATIVE PERCENT: 0.3 %
FIBRINOGEN: 106 MG/DL (ref 200–397)
FIBRINOGEN: 117 MG/DL (ref 200–397)
FIBRINOGEN: 131 MG/DL (ref 200–397)
GFR AFRICAN AMERICAN: 30
GFR AFRICAN AMERICAN: 53
GFR AFRICAN AMERICAN: >60
GFR NON-AFRICAN AMERICAN: 25
GFR NON-AFRICAN AMERICAN: 43
GFR NON-AFRICAN AMERICAN: 53
GLUCOSE BLD-MCNC: 112 MG/DL (ref 70–99)
GLUCOSE BLD-MCNC: 119 MG/DL (ref 70–99)
GLUCOSE BLD-MCNC: 126 MG/DL (ref 70–99)
GLUCOSE BLD-MCNC: 55 MG/DL (ref 70–99)
GLUCOSE BLD-MCNC: 75 MG/DL (ref 70–99)
GLUCOSE BLD-MCNC: 90 MG/DL (ref 70–99)
GLUCOSE BLD-MCNC: 93 MG/DL (ref 70–99)
HAPTOGLOBIN: 29 MG/DL (ref 30–200)
HAPTOGLOBIN: 34 MG/DL (ref 30–200)
HAPTOGLOBIN: 57 MG/DL (ref 30–200)
HCO3 ARTERIAL: 10.5 MMOL/L (ref 21–29)
HCO3 ARTERIAL: 13 MMOL/L (ref 21–29)
HCO3 ARTERIAL: 8.9 MMOL/L (ref 21–29)
HCT VFR BLD CALC: 34.8 % (ref 36–48)
HCT VFR BLD CALC: 39.7 % (ref 36–48)
HCT VFR BLD CALC: 40.9 % (ref 36–48)
HEMOGLOBIN, ART, EXTENDED: 12.5 G/DL (ref 12–16)
HEMOGLOBIN: 10.7 G/DL (ref 12–16)
HEMOGLOBIN: 12.1 G/DL (ref 12–16)
HEMOGLOBIN: 12.3 G/DL (ref 12–16)
INR BLD: 2.88 (ref 0.86–1.14)
INR BLD: 3.23 (ref 0.86–1.14)
INR BLD: 4.18 (ref 0.86–1.14)
L. PNEUMOPHILA SEROGP 1 UR AG: NORMAL
LACTIC ACID: 10.9 MMOL/L (ref 0.4–2)
LACTIC ACID: 2.6 MMOL/L (ref 0.4–2)
LACTIC ACID: 3.3 MMOL/L (ref 0.4–2)
LACTIC ACID: 6.8 MMOL/L (ref 0.4–2)
LV EF: 33 %
LVEF MODALITY: NORMAL
LYMPHOCYTES ABSOLUTE: 0.8 K/UL (ref 1–5.1)
LYMPHOCYTES ABSOLUTE: 1.2 K/UL (ref 1–5.1)
LYMPHOCYTES ABSOLUTE: 2.1 K/UL (ref 1–5.1)
LYMPHOCYTES RELATIVE PERCENT: 16.1 %
LYMPHOCYTES RELATIVE PERCENT: 3.3 %
LYMPHOCYTES RELATIVE PERCENT: 5 %
MAGNESIUM: 2.2 MG/DL (ref 1.8–2.4)
MCH RBC QN AUTO: 26.4 PG (ref 26–34)
MCH RBC QN AUTO: 26.5 PG (ref 26–34)
MCH RBC QN AUTO: 26.8 PG (ref 26–34)
MCHC RBC AUTO-ENTMCNC: 30.2 G/DL (ref 31–36)
MCHC RBC AUTO-ENTMCNC: 30.4 G/DL (ref 31–36)
MCHC RBC AUTO-ENTMCNC: 30.7 G/DL (ref 31–36)
MCV RBC AUTO: 85.9 FL (ref 80–100)
MCV RBC AUTO: 87.2 FL (ref 80–100)
MCV RBC AUTO: 88.8 FL (ref 80–100)
METHEMOGLOBIN ARTERIAL: 0.4 %
MONOCYTES ABSOLUTE: 0.4 K/UL (ref 0–1.3)
MONOCYTES ABSOLUTE: 0.9 K/UL (ref 0–1.3)
MONOCYTES ABSOLUTE: 1.1 K/UL (ref 0–1.3)
MONOCYTES RELATIVE PERCENT: 3.4 %
MONOCYTES RELATIVE PERCENT: 3.7 %
MONOCYTES RELATIVE PERCENT: 4.5 %
NEUTROPHILS ABSOLUTE: 10.3 K/UL (ref 1.7–7.7)
NEUTROPHILS ABSOLUTE: 22.3 K/UL (ref 1.7–7.7)
NEUTROPHILS ABSOLUTE: 22.3 K/UL (ref 1.7–7.7)
NEUTROPHILS RELATIVE PERCENT: 79.5 %
NEUTROPHILS RELATIVE PERCENT: 90.1 %
NEUTROPHILS RELATIVE PERCENT: 92.8 %
O2 CONTENT ARTERIAL: 18 ML/DL
O2 SAT, ARTERIAL: 100 %
O2 SAT, ARTERIAL: 16 % (ref 93–100)
O2 SAT, ARTERIAL: 99 % (ref 93–100)
O2 THERAPY: ABNORMAL
PCO2 ARTERIAL: 21.3 MM HG (ref 35–45)
PCO2 ARTERIAL: 24.6 MMHG (ref 35–45)
PCO2 ARTERIAL: 36.6 MM HG (ref 35–45)
PDW BLD-RTO: 19 % (ref 12.4–15.4)
PDW BLD-RTO: 19.1 % (ref 12.4–15.4)
PDW BLD-RTO: 19.5 % (ref 12.4–15.4)
PERFORMED ON: ABNORMAL
PERFORMED ON: NORMAL
PH ARTERIAL: 7.07 (ref 7.35–7.45)
PH ARTERIAL: 7.23 (ref 7.35–7.45)
PH ARTERIAL: 7.33 (ref 7.35–7.45)
PHOSPHORUS: 5.3 MG/DL (ref 2.5–4.9)
PHOSPHORUS: 7.1 MG/DL (ref 2.5–4.9)
PLATELET # BLD: 110 K/UL (ref 135–450)
PLATELET # BLD: 133 K/UL (ref 135–450)
PLATELET # BLD: 166 K/UL (ref 135–450)
PMV BLD AUTO: 8.4 FL (ref 5–10.5)
PMV BLD AUTO: 8.4 FL (ref 5–10.5)
PMV BLD AUTO: 9.3 FL (ref 5–10.5)
PO2 ARTERIAL: 156.6 MM HG (ref 75–108)
PO2 ARTERIAL: 18.6 MM HG (ref 75–108)
PO2 ARTERIAL: 363 MMHG (ref 75–108)
POC POTASSIUM: 3.1 MMOL/L (ref 3.5–5.1)
POC SAMPLE TYPE: ABNORMAL
POC SAMPLE TYPE: ABNORMAL
POC SODIUM: 150 MMOL/L (ref 136–145)
POTASSIUM SERPL-SCNC: 4.2 MMOL/L (ref 3.5–5.1)
POTASSIUM SERPL-SCNC: 5.4 MMOL/L (ref 3.5–5.1)
POTASSIUM SERPL-SCNC: 5.6 MMOL/L (ref 3.5–5.1)
PROTHROMBIN TIME: 33.8 SEC (ref 10–13.2)
PROTHROMBIN TIME: 37.9 SEC (ref 10–13.2)
PROTHROMBIN TIME: 49.2 SEC (ref 10–13.2)
RBC # BLD: 4.06 M/UL (ref 4–5.2)
RBC # BLD: 4.55 M/UL (ref 4–5.2)
RBC # BLD: 4.6 M/UL (ref 4–5.2)
REASON FOR REJECTION: NORMAL
REJECTED TEST: NORMAL
REPORT: NORMAL
SARS-COV-2, NAAT: NOT DETECTED
SODIUM BLD-SCNC: 135 MMOL/L (ref 136–145)
SODIUM BLD-SCNC: 137 MMOL/L (ref 136–145)
SODIUM BLD-SCNC: 137 MMOL/L (ref 136–145)
STREP PNEUMONIAE ANTIGEN, URINE: NORMAL
TCO2 ARTERIAL: 10 MMOL/L
TCO2 ARTERIAL: 12 MMOL/L
TCO2 ARTERIAL: 13.8 MMOL/L
TOTAL CK: 449 U/L (ref 26–192)
TROPONIN: 0.02 NG/ML
WBC # BLD: 13 K/UL (ref 4–11)
WBC # BLD: 24 K/UL (ref 4–11)
WBC # BLD: 24.8 K/UL (ref 4–11)

## 2020-09-11 PROCEDURE — 70450 CT HEAD/BRAIN W/O DYE: CPT

## 2020-09-11 PROCEDURE — 87070 CULTURE OTHR SPECIMN AEROBIC: CPT

## 2020-09-11 PROCEDURE — 6360000002 HC RX W HCPCS: Performed by: STUDENT IN AN ORGANIZED HEALTH CARE EDUCATION/TRAINING PROGRAM

## 2020-09-11 PROCEDURE — 2700000000 HC OXYGEN THERAPY PER DAY

## 2020-09-11 PROCEDURE — 2500000003 HC RX 250 WO HCPCS: Performed by: STUDENT IN AN ORGANIZED HEALTH CARE EDUCATION/TRAINING PROGRAM

## 2020-09-11 PROCEDURE — 86160 COMPLEMENT ANTIGEN: CPT

## 2020-09-11 PROCEDURE — 71045 X-RAY EXAM CHEST 1 VIEW: CPT

## 2020-09-11 PROCEDURE — 92950 HEART/LUNG RESUSCITATION CPR: CPT

## 2020-09-11 PROCEDURE — 90935 HEMODIALYSIS ONE EVALUATION: CPT

## 2020-09-11 PROCEDURE — 85730 THROMBOPLASTIN TIME PARTIAL: CPT

## 2020-09-11 PROCEDURE — 86403 PARTICLE AGGLUT ANTBDY SCRN: CPT

## 2020-09-11 PROCEDURE — 94003 VENT MGMT INPAT SUBQ DAY: CPT

## 2020-09-11 PROCEDURE — 2580000003 HC RX 258: Performed by: EMERGENCY MEDICINE

## 2020-09-11 PROCEDURE — 5A1D70Z PERFORMANCE OF URINARY FILTRATION, INTERMITTENT, LESS THAN 6 HOURS PER DAY: ICD-10-PCS | Performed by: INTERNAL MEDICINE

## 2020-09-11 PROCEDURE — 85025 COMPLETE CBC W/AUTO DIFF WBC: CPT

## 2020-09-11 PROCEDURE — 06HM33Z INSERTION OF INFUSION DEVICE INTO RIGHT FEMORAL VEIN, PERCUTANEOUS APPROACH: ICD-10-PCS | Performed by: STUDENT IN AN ORGANIZED HEALTH CARE EDUCATION/TRAINING PROGRAM

## 2020-09-11 PROCEDURE — 83010 ASSAY OF HAPTOGLOBIN QUANT: CPT

## 2020-09-11 PROCEDURE — 2580000003 HC RX 258: Performed by: STUDENT IN AN ORGANIZED HEALTH CARE EDUCATION/TRAINING PROGRAM

## 2020-09-11 PROCEDURE — 6360000002 HC RX W HCPCS: Performed by: INTERNAL MEDICINE

## 2020-09-11 PROCEDURE — 85384 FIBRINOGEN ACTIVITY: CPT

## 2020-09-11 PROCEDURE — 87077 CULTURE AEROBIC IDENTIFY: CPT

## 2020-09-11 PROCEDURE — 82803 BLOOD GASES ANY COMBINATION: CPT

## 2020-09-11 PROCEDURE — 85610 PROTHROMBIN TIME: CPT

## 2020-09-11 PROCEDURE — 36415 COLL VENOUS BLD VENIPUNCTURE: CPT

## 2020-09-11 PROCEDURE — 94760 N-INVAS EAR/PLS OXIMETRY 1: CPT

## 2020-09-11 PROCEDURE — 0BH18EZ INSERTION OF ENDOTRACHEAL AIRWAY INTO TRACHEA, VIA NATURAL OR ARTIFICIAL OPENING ENDOSCOPIC: ICD-10-PCS | Performed by: STUDENT IN AN ORGANIZED HEALTH CARE EDUCATION/TRAINING PROGRAM

## 2020-09-11 PROCEDURE — 93306 TTE W/DOPPLER COMPLETE: CPT

## 2020-09-11 PROCEDURE — 86147 CARDIOLIPIN ANTIBODY EA IG: CPT

## 2020-09-11 PROCEDURE — 94750 HC PULMONARY COMPLIANCE STUDY: CPT

## 2020-09-11 PROCEDURE — 93010 ELECTROCARDIOGRAM REPORT: CPT | Performed by: INTERNAL MEDICINE

## 2020-09-11 PROCEDURE — 99255 IP/OBS CONSLTJ NEW/EST HI 80: CPT | Performed by: INTERNAL MEDICINE

## 2020-09-11 PROCEDURE — 2500000003 HC RX 250 WO HCPCS: Performed by: EMERGENCY MEDICINE

## 2020-09-11 PROCEDURE — 82947 ASSAY GLUCOSE BLOOD QUANT: CPT

## 2020-09-11 PROCEDURE — 6360000002 HC RX W HCPCS: Performed by: EMERGENCY MEDICINE

## 2020-09-11 PROCEDURE — 84295 ASSAY OF SERUM SODIUM: CPT

## 2020-09-11 PROCEDURE — 2000000000 HC ICU R&B

## 2020-09-11 PROCEDURE — 95819 EEG AWAKE AND ASLEEP: CPT

## 2020-09-11 PROCEDURE — 83735 ASSAY OF MAGNESIUM: CPT

## 2020-09-11 PROCEDURE — 99291 CRITICAL CARE FIRST HOUR: CPT | Performed by: INTERNAL MEDICINE

## 2020-09-11 PROCEDURE — 5A12012 PERFORMANCE OF CARDIAC OUTPUT, SINGLE, MANUAL: ICD-10-PCS | Performed by: STUDENT IN AN ORGANIZED HEALTH CARE EDUCATION/TRAINING PROGRAM

## 2020-09-11 PROCEDURE — 87205 SMEAR GRAM STAIN: CPT

## 2020-09-11 PROCEDURE — 80069 RENAL FUNCTION PANEL: CPT

## 2020-09-11 PROCEDURE — 84132 ASSAY OF SERUM POTASSIUM: CPT

## 2020-09-11 PROCEDURE — 86255 FLUORESCENT ANTIBODY SCREEN: CPT

## 2020-09-11 PROCEDURE — 2500000003 HC RX 250 WO HCPCS: Performed by: INTERNAL MEDICINE

## 2020-09-11 PROCEDURE — 84484 ASSAY OF TROPONIN QUANT: CPT

## 2020-09-11 PROCEDURE — 87186 SC STD MICRODIL/AGAR DIL: CPT

## 2020-09-11 PROCEDURE — 86706 HEP B SURFACE ANTIBODY: CPT

## 2020-09-11 PROCEDURE — 83605 ASSAY OF LACTIC ACID: CPT

## 2020-09-11 PROCEDURE — 82550 ASSAY OF CK (CPK): CPT

## 2020-09-11 PROCEDURE — 99292 CRITICAL CARE ADDL 30 MIN: CPT | Performed by: INTERNAL MEDICINE

## 2020-09-11 PROCEDURE — 6360000002 HC RX W HCPCS: Performed by: NURSE PRACTITIONER

## 2020-09-11 PROCEDURE — 2500000003 HC RX 250 WO HCPCS

## 2020-09-11 PROCEDURE — 87340 HEPATITIS B SURFACE AG IA: CPT

## 2020-09-11 PROCEDURE — 36556 INSERT NON-TUNNEL CV CATH: CPT

## 2020-09-11 PROCEDURE — 94002 VENT MGMT INPAT INIT DAY: CPT

## 2020-09-11 PROCEDURE — 5A1955Z RESPIRATORY VENTILATION, GREATER THAN 96 CONSECUTIVE HOURS: ICD-10-PCS | Performed by: INTERNAL MEDICINE

## 2020-09-11 PROCEDURE — U0002 COVID-19 LAB TEST NON-CDC: HCPCS

## 2020-09-11 PROCEDURE — 84100 ASSAY OF PHOSPHORUS: CPT

## 2020-09-11 PROCEDURE — 2580000003 HC RX 258: Performed by: INTERNAL MEDICINE

## 2020-09-11 PROCEDURE — 86038 ANTINUCLEAR ANTIBODIES: CPT

## 2020-09-11 PROCEDURE — 82330 ASSAY OF CALCIUM: CPT

## 2020-09-11 PROCEDURE — C9113 INJ PANTOPRAZOLE SODIUM, VIA: HCPCS | Performed by: INTERNAL MEDICINE

## 2020-09-11 RX ORDER — SODIUM CHLORIDE 0.9 % (FLUSH) 0.9 %
10 SYRINGE (ML) INJECTION EVERY 12 HOURS SCHEDULED
Status: DISCONTINUED | OUTPATIENT
Start: 2020-09-11 | End: 2020-09-22 | Stop reason: SDUPTHER

## 2020-09-11 RX ORDER — DEXTROSE MONOHYDRATE 50 MG/ML
100 INJECTION, SOLUTION INTRAVENOUS PRN
Status: DISCONTINUED | OUTPATIENT
Start: 2020-09-11 | End: 2020-10-04 | Stop reason: HOSPADM

## 2020-09-11 RX ORDER — HEPARIN SODIUM 10000 [USP'U]/100ML
18 INJECTION, SOLUTION INTRAVENOUS CONTINUOUS
Status: DISCONTINUED | OUTPATIENT
Start: 2020-09-11 | End: 2020-09-11 | Stop reason: SDUPTHER

## 2020-09-11 RX ORDER — PROPOFOL 10 MG/ML
12 INJECTION, EMULSION INTRAVENOUS CONTINUOUS PRN
Status: DISCONTINUED | OUTPATIENT
Start: 2020-09-11 | End: 2020-09-15

## 2020-09-11 RX ORDER — PROPOFOL 10 MG/ML
10 INJECTION, EMULSION INTRAVENOUS
Status: DISCONTINUED | OUTPATIENT
Start: 2020-09-11 | End: 2020-09-15

## 2020-09-11 RX ORDER — LORAZEPAM 2 MG/ML
2 INJECTION INTRAMUSCULAR ONCE
Status: COMPLETED | OUTPATIENT
Start: 2020-09-11 | End: 2020-09-11

## 2020-09-11 RX ORDER — POLYETHYLENE GLYCOL 3350 17 G/17G
17 POWDER, FOR SOLUTION ORAL DAILY PRN
Status: DISCONTINUED | OUTPATIENT
Start: 2020-09-11 | End: 2020-09-11

## 2020-09-11 RX ORDER — FENTANYL CITRATE 50 UG/ML
INJECTION, SOLUTION INTRAMUSCULAR; INTRAVENOUS
Status: DISPENSED
Start: 2020-09-11 | End: 2020-09-11

## 2020-09-11 RX ORDER — LISINOPRIL 5 MG/1
5 TABLET ORAL DAILY
Status: DISCONTINUED | OUTPATIENT
Start: 2020-09-11 | End: 2020-09-11

## 2020-09-11 RX ORDER — DEXMEDETOMIDINE HYDROCHLORIDE 4 UG/ML
0.2 INJECTION, SOLUTION INTRAVENOUS CONTINUOUS
Status: DISCONTINUED | OUTPATIENT
Start: 2020-09-11 | End: 2020-09-11

## 2020-09-11 RX ORDER — LEVETIRACETAM 5 MG/ML
500 INJECTION INTRAVASCULAR EVERY 12 HOURS
Status: DISCONTINUED | OUTPATIENT
Start: 2020-09-12 | End: 2020-09-11

## 2020-09-11 RX ORDER — DEXTROSE MONOHYDRATE 25 G/50ML
12.5 INJECTION, SOLUTION INTRAVENOUS PRN
Status: DISCONTINUED | OUTPATIENT
Start: 2020-09-11 | End: 2020-10-04 | Stop reason: HOSPADM

## 2020-09-11 RX ORDER — CARVEDILOL 3.12 MG/1
3.12 TABLET ORAL 2 TIMES DAILY WITH MEALS
Status: DISCONTINUED | OUTPATIENT
Start: 2020-09-11 | End: 2020-09-11

## 2020-09-11 RX ORDER — 0.9 % SODIUM CHLORIDE 0.9 %
500 INTRAVENOUS SOLUTION INTRAVENOUS ONCE
Status: COMPLETED | OUTPATIENT
Start: 2020-09-11 | End: 2020-09-11

## 2020-09-11 RX ORDER — FENTANYL CITRATE 50 UG/ML
INJECTION, SOLUTION INTRAMUSCULAR; INTRAVENOUS
Status: COMPLETED | OUTPATIENT
Start: 2020-09-11 | End: 2020-09-11

## 2020-09-11 RX ORDER — LEVETIRACETAM 10 MG/ML
1000 INJECTION INTRAVASCULAR ONCE
Status: COMPLETED | OUTPATIENT
Start: 2020-09-11 | End: 2020-09-11

## 2020-09-11 RX ORDER — ONDANSETRON 2 MG/ML
4 INJECTION INTRAMUSCULAR; INTRAVENOUS EVERY 6 HOURS PRN
Status: DISCONTINUED | OUTPATIENT
Start: 2020-09-11 | End: 2020-09-17

## 2020-09-11 RX ORDER — LEVETIRACETAM 10 MG/ML
1000 INJECTION INTRAVASCULAR EVERY 12 HOURS
Status: DISCONTINUED | OUTPATIENT
Start: 2020-09-12 | End: 2020-09-11

## 2020-09-11 RX ORDER — PANTOPRAZOLE SODIUM 40 MG/10ML
40 INJECTION, POWDER, LYOPHILIZED, FOR SOLUTION INTRAVENOUS DAILY
Status: DISCONTINUED | OUTPATIENT
Start: 2020-09-11 | End: 2020-10-04 | Stop reason: HOSPADM

## 2020-09-11 RX ORDER — FUROSEMIDE 10 MG/ML
40 INJECTION INTRAMUSCULAR; INTRAVENOUS 2 TIMES DAILY
Status: DISCONTINUED | OUTPATIENT
Start: 2020-09-11 | End: 2020-09-11

## 2020-09-11 RX ORDER — NICOTINE POLACRILEX 4 MG
15 LOZENGE BUCCAL PRN
Status: DISCONTINUED | OUTPATIENT
Start: 2020-09-11 | End: 2020-10-04 | Stop reason: HOSPADM

## 2020-09-11 RX ORDER — DEXMEDETOMIDINE HYDROCHLORIDE 4 UG/ML
INJECTION, SOLUTION INTRAVENOUS CONTINUOUS PRN
Status: COMPLETED | OUTPATIENT
Start: 2020-09-11 | End: 2020-09-11

## 2020-09-11 RX ORDER — ACETAMINOPHEN 325 MG/1
650 TABLET ORAL EVERY 6 HOURS PRN
Status: DISCONTINUED | OUTPATIENT
Start: 2020-09-11 | End: 2020-09-26 | Stop reason: SDUPTHER

## 2020-09-11 RX ORDER — CHLORHEXIDINE GLUCONATE 0.12 MG/ML
15 RINSE ORAL 2 TIMES DAILY
Status: DISCONTINUED | OUTPATIENT
Start: 2020-09-11 | End: 2020-09-19

## 2020-09-11 RX ORDER — PROPOFOL 10 MG/ML
INJECTION, EMULSION INTRAVENOUS
Status: DISPENSED
Start: 2020-09-11 | End: 2020-09-11

## 2020-09-11 RX ORDER — ACETAMINOPHEN 650 MG/1
650 SUPPOSITORY RECTAL EVERY 6 HOURS PRN
Status: DISCONTINUED | OUTPATIENT
Start: 2020-09-11 | End: 2020-09-26 | Stop reason: SDUPTHER

## 2020-09-11 RX ORDER — LORAZEPAM 2 MG/ML
2 INJECTION INTRAMUSCULAR
Status: DISCONTINUED | OUTPATIENT
Start: 2020-09-11 | End: 2020-09-15

## 2020-09-11 RX ORDER — HEPARIN SODIUM 10000 [USP'U]/100ML
8.3 INJECTION, SOLUTION INTRAVENOUS CONTINUOUS
Status: DISCONTINUED | OUTPATIENT
Start: 2020-09-11 | End: 2020-09-25 | Stop reason: SDUPTHER

## 2020-09-11 RX ORDER — SODIUM CHLORIDE 9 MG/ML
10 INJECTION INTRAVENOUS DAILY
Status: DISCONTINUED | OUTPATIENT
Start: 2020-09-11 | End: 2020-10-04 | Stop reason: HOSPADM

## 2020-09-11 RX ORDER — LORAZEPAM 2 MG/ML
INJECTION INTRAMUSCULAR
Status: DISPENSED
Start: 2020-09-11 | End: 2020-09-11

## 2020-09-11 RX ORDER — HEPARIN SODIUM 1000 [USP'U]/ML
80 INJECTION, SOLUTION INTRAVENOUS; SUBCUTANEOUS PRN
Status: DISCONTINUED | OUTPATIENT
Start: 2020-09-11 | End: 2020-09-11

## 2020-09-11 RX ORDER — SODIUM CHLORIDE 0.9 % (FLUSH) 0.9 %
10 SYRINGE (ML) INJECTION PRN
Status: DISCONTINUED | OUTPATIENT
Start: 2020-09-11 | End: 2020-09-22 | Stop reason: SDUPTHER

## 2020-09-11 RX ORDER — HEPARIN SODIUM 1000 [USP'U]/ML
40 INJECTION, SOLUTION INTRAVENOUS; SUBCUTANEOUS PRN
Status: DISCONTINUED | OUTPATIENT
Start: 2020-09-11 | End: 2020-09-11

## 2020-09-11 RX ORDER — LEVETIRACETAM 10 MG/ML
1000 INJECTION INTRAVASCULAR EVERY 12 HOURS
Status: DISCONTINUED | OUTPATIENT
Start: 2020-09-11 | End: 2020-09-22

## 2020-09-11 RX ORDER — FUROSEMIDE 10 MG/ML
80 INJECTION INTRAMUSCULAR; INTRAVENOUS ONCE
Status: COMPLETED | OUTPATIENT
Start: 2020-09-11 | End: 2020-09-11

## 2020-09-11 RX ORDER — LORAZEPAM 2 MG/ML
1 INJECTION INTRAMUSCULAR ONCE
Status: COMPLETED | OUTPATIENT
Start: 2020-09-11 | End: 2020-09-11

## 2020-09-11 RX ADMIN — PANTOPRAZOLE SODIUM 40 MG: 40 INJECTION, POWDER, FOR SOLUTION INTRAVENOUS at 10:49

## 2020-09-11 RX ADMIN — PROPOFOL 30 MCG/KG/MIN: 10 INJECTION, EMULSION INTRAVENOUS at 13:55

## 2020-09-11 RX ADMIN — LORAZEPAM 2 MG: 2 INJECTION INTRAMUSCULAR; INTRAVENOUS at 06:10

## 2020-09-11 RX ADMIN — CHLORHEXIDINE GLUCONATE 15 ML: 0.12 RINSE ORAL at 11:22

## 2020-09-11 RX ADMIN — SODIUM CHLORIDE 0.6 MCG/KG/HR: 9 INJECTION, SOLUTION INTRAVENOUS at 08:20

## 2020-09-11 RX ADMIN — SODIUM CHLORIDE, PRESERVATIVE FREE 10 ML: 5 INJECTION INTRAVENOUS at 20:51

## 2020-09-11 RX ADMIN — FENTANYL CITRATE 25 MCG: 50 INJECTION INTRAMUSCULAR; INTRAVENOUS at 01:11

## 2020-09-11 RX ADMIN — Medication 10 ML: at 10:48

## 2020-09-11 RX ADMIN — HEPARIN SODIUM 10 ML/HR: 10000 INJECTION, SOLUTION INTRAVENOUS at 18:52

## 2020-09-11 RX ADMIN — DEXTROSE MONOHYDRATE 12.5 G: 25 INJECTION, SOLUTION INTRAVENOUS at 09:00

## 2020-09-11 RX ADMIN — NOREPINEPHRINE BITARTRATE 7 MCG/MIN: 1 INJECTION INTRAVENOUS at 01:25

## 2020-09-11 RX ADMIN — AZITHROMYCIN MONOHYDRATE 500 MG: 500 INJECTION, POWDER, LYOPHILIZED, FOR SOLUTION INTRAVENOUS at 00:08

## 2020-09-11 RX ADMIN — SODIUM CHLORIDE: 9 INJECTION, SOLUTION INTRAVENOUS at 02:10

## 2020-09-11 RX ADMIN — MEROPENEM 500 MG: 500 INJECTION, POWDER, FOR SOLUTION INTRAVENOUS at 15:51

## 2020-09-11 RX ADMIN — LEVETIRACETAM 1000 MG: 10 INJECTION INTRAVENOUS at 11:00

## 2020-09-11 RX ADMIN — SODIUM BICARBONATE: 84 INJECTION, SOLUTION INTRAVENOUS at 03:09

## 2020-09-11 RX ADMIN — FUROSEMIDE 80 MG: 10 INJECTION, SOLUTION INTRAMUSCULAR; INTRAVENOUS at 12:58

## 2020-09-11 RX ADMIN — Medication 30 MCG/MIN: at 03:45

## 2020-09-11 RX ADMIN — HEPARIN SODIUM 14.9 ML/HR: 10000 INJECTION, SOLUTION INTRAVENOUS at 00:50

## 2020-09-11 RX ADMIN — LORAZEPAM 2 MG: 2 INJECTION INTRAMUSCULAR; INTRAVENOUS at 06:09

## 2020-09-11 RX ADMIN — DEXMEDETOMIDINE HYDROCHLORIDE 0.5 MCG/KG/HR: 4 INJECTION, SOLUTION INTRAVENOUS at 01:38

## 2020-09-11 RX ADMIN — LEVETIRACETAM 1000 MG: 10 INJECTION, SOLUTION INTRAVENOUS at 06:32

## 2020-09-11 RX ADMIN — SODIUM CHLORIDE 0.5 MCG/KG/HR: 9 INJECTION, SOLUTION INTRAVENOUS at 01:38

## 2020-09-11 RX ADMIN — VANCOMYCIN HYDROCHLORIDE 1250 MG: 10 INJECTION, POWDER, LYOPHILIZED, FOR SOLUTION INTRAVENOUS at 17:04

## 2020-09-11 RX ADMIN — SODIUM CHLORIDE, PRESERVATIVE FREE 10 ML: 5 INJECTION INTRAVENOUS at 10:54

## 2020-09-11 RX ADMIN — LEVETIRACETAM 1000 MG: 10 INJECTION INTRAVENOUS at 22:58

## 2020-09-11 RX ADMIN — LORAZEPAM 1 MG: 2 INJECTION INTRAMUSCULAR; INTRAVENOUS at 01:30

## 2020-09-11 RX ADMIN — SODIUM BICARBONATE: 84 INJECTION, SOLUTION INTRAVENOUS at 10:43

## 2020-09-11 RX ADMIN — PROPOFOL 25 MCG/KG/MIN: 10 INJECTION, EMULSION INTRAVENOUS at 18:49

## 2020-09-11 RX ADMIN — PROPOFOL 30 MCG/KG/MIN: 10 INJECTION, EMULSION INTRAVENOUS at 09:45

## 2020-09-11 RX ADMIN — CHLORHEXIDINE GLUCONATE 15 ML: 0.12 RINSE ORAL at 20:55

## 2020-09-11 ASSESSMENT — PULMONARY FUNCTION TESTS
PIF_VALUE: 20
PIF_VALUE: 19
PIF_VALUE: 26
PIF_VALUE: 22
PIF_VALUE: 20
PIF_VALUE: 20
PIF_VALUE: 21
PIF_VALUE: 23
PIF_VALUE: 30
PIF_VALUE: 26
PIF_VALUE: 22
PIF_VALUE: 15
PIF_VALUE: 20
PIF_VALUE: 24
PIF_VALUE: 27
PIF_VALUE: 18
PIF_VALUE: 20
PIF_VALUE: 21
PIF_VALUE: 23
PIF_VALUE: 22
PIF_VALUE: 20
PIF_VALUE: 30
PIF_VALUE: 31
PIF_VALUE: 22
PIF_VALUE: 20
PIF_VALUE: 21
PIF_VALUE: 29
PIF_VALUE: 21
PIF_VALUE: 22
PIF_VALUE: 19
PIF_VALUE: 19
PIF_VALUE: 20
PIF_VALUE: 27
PIF_VALUE: 18
PIF_VALUE: 23
PIF_VALUE: 22
PIF_VALUE: 0
PIF_VALUE: 18
PIF_VALUE: 21
PIF_VALUE: 18
PIF_VALUE: 21
PIF_VALUE: 21
PIF_VALUE: 22
PIF_VALUE: 21
PIF_VALUE: 19
PIF_VALUE: 19
PIF_VALUE: 22
PIF_VALUE: 20
PIF_VALUE: 14
PIF_VALUE: 21

## 2020-09-11 ASSESSMENT — PAIN SCALES - GENERAL
PAINLEVEL_OUTOF10: 0

## 2020-09-11 NOTE — CONSULTS
[facility]  Consult Note           Reason for Consult:  MORELIA Oliguria Hyperkalemia  Requesting Physician:  Dr. Nathaniel Darden    History Obtained From:   [facility] record    History of Present Ilness:    54 y/o WF with h/o factor V Leiden deficiency who presented with c/o SOB and and abdominal and LE swelling   She was tachycardic in ER with HR of 170 BP was not low She was started on Cardizem gtt  CTA showed evidence of PE in R middle and LLL  Also large bilateral pleural effusions and ASD in LLL  She developed PEA arrest after being admitted and required CPR  Initiated on Levophed   Cr was 0.9 mg on admission and has increased to 1.3 mg today associated with oliguria  K= increased to 5.6 meq Lactic Acid was 10.9  Currently intubated Off of Levophed  On BS ABX Has seizure like activity prior to arrest  On Heparin and HCO3 gtts now    Past Medical History:        Diagnosis Date    Anticoagulant long-term use     Arthritis     Asthma     Baker's cyst     Depression     [facility] [address]     [facility] Leiden ([facility] [address]     H/O blood clots     Heartburn     Hyperlipidemia     Osteoarthritis        Past Surgical History:    History reviewed. No pertinent surgical history. Home Medications:    No current facility-administered medications on file prior to encounter.       Current Outpatient Medications on File Prior to Encounter   Medication Sig Dispense Refill    albuterol sulfate  (90 Base) MCG/ACT inhaler INHALE TWO PUFFS BY MOUTH EVERY 6 HOURS AS NEEDED FOR WHEEZING 18 g 0    vitamin D (ERGOCALCIFEROL) 1.25 MG (00275 UT) CAPS capsule TAKE ONE CAPSULE BY MOUTH ONCE WEEKLY 12 capsule 0    omeprazole (PRILOSEC) 20 MG delayed release capsule TAKE ONE CAPSULE BY MOUTH DAILY 90 capsule 6    warfarin (COUMADIN) 7.5 MG tablet TAKE ONE TABLET BY MOUTH DAILY EXCEPT ON MONDAY AND THURSDAY TAKE ONE-HALF TABLET BY MOUTH AS DIRECTED 21 tablet 5    atorvastatin (LIPITOR) 10 MG tablet TAKE ONE TABLET BY MOUTH DAILY 90 tablet 3    DULoxetine (CYMBALTA) 60 MG extended release capsule TAKE ONE CAPSULE BY MOUTH DAILY 30 capsule 11    BREO ELLIPTA 200-25 MCG/INH AEPB INHALE ONE DOSE BY MOUTH DAILY 1 each 11       Allergies:  Ibuprofen    Social History:    Social History     Socioeconomic History    Marital status:      Spouse name: Not on file    Number of children: Not on file    Years of education: Not on file    Highest education level: Not on file   Occupational History    Not on file   Social Needs    Financial resource strain: Not on file    Food insecurity     Worry: Not on file     Inability: Not on file    Transportation needs     Medical: Not on file     Non-medical: Not on file   Tobacco Use    Smoking status: Former Smoker     Packs/day: 0.50     Types: Cigarettes     Last attempt to quit: 9/8/2020    Smokeless tobacco: Never Used   Substance and Sexual Activity    Alcohol use: No     Alcohol/week: 0.0 standard drinks    Drug use: Yes     Frequency: 14.0 times per week     Types: Marijuana    Sexual activity: Not on file   Lifestyle    Physical activity     Days per week: Not on file     Minutes per session: Not on file    Stress: Not on file   Relationships    Social connections     Talks on phone: Not on file     Gets together: Not on file     Attends Oriental orthodox service: Not on file     Active member of club or organization: Not on file     Attends meetings of clubs or organizations: Not on file     Relationship status: Not on file    Intimate partner violence     Fear of current or ex partner: Not on file     Emotionally abused: Not on file     Physically abused: Not on file     Forced sexual activity: Not on file   Other Topics Concern    Not on file   Social History Narrative    Not on file       Family History:   Family History   Problem Relation Age of Onset    Heart Attack Father        Review of Systems:   Pertinent positives stated above in HPI. Patient intubated    Physical exam:   Constitutional:  VITALS:  BP 99/65   Pulse 76   Temp 100.3 °F (37.9 °C) (Axillary)   Resp 23   Ht 5' 7\" (1.702 m)   Wt 250 lb 3.6 oz (113.5 kg)   LMP 12/05/2018 (Exact Date)   SpO2 100%   BMI 39.19 kg/m²   Gen: intubated  Skin: mottling noted LE's  Heent: ETT in place OGT in place  Neck: no bruits or jvd noted, thyroid normal  Cardiovascular:  S1, S2 without m/r/g  Respiratory: CTA B anteriorly  Abdomen:  Obese +bs, soft, nt, nd, no hepatosplenomegaly  Ext: + lower extremity edema ext cold to touch   Psychiatric: intubated      Data/  CBC:   Lab Results   Component Value Date    WBC 24.0 09/11/2020    RBC 4.06 09/11/2020    HGB 10.7 09/11/2020    HCT 34.8 09/11/2020    MCV 85.9 09/11/2020    MCH 26.4 09/11/2020    MCHC 30.7 09/11/2020    RDW 19.0 09/11/2020     09/11/2020    MPV 8.4 09/11/2020     BMP:    Lab Results   Component Value Date     09/11/2020    K 5.4 09/11/2020    K 4.1 09/10/2020     09/11/2020    CO2 11 09/11/2020    BUN 33 09/11/2020    LABALBU 3.5 09/10/2020    CREATININE 1.3 09/11/2020    CALCIUM 7.8 09/11/2020    GFRAA 53 09/11/2020    GFRAA >60 07/25/2012    LABGLOM 43 09/11/2020    GLUCOSE 55 09/11/2020         Assessment/  1-MORELIA suspect ischemic  ATN post arrest in the setting of prolonged hypotension r/o Rhabdomyolysis  Proteinuria noted on U/A with microscopic hematuria r/o underlying GN She remains oliguric  2-Hyperkalemia in the setting of MORELIA and severe Met Acidosis  3-Met Acidosis anion gap, Lactic  4 Volume overload  5 Shock improved, cannot r/o Pneumonia  6 Bilateral PE on Heparin gtt  7 Factor V Leiden deficiency  8 Hyperphosphatemia  9 S/P PEA arrest Plan for Cardiac cath noted. Will get exposure to IV Contrast        Plan/  1-Continue IVF's with Nahco3  2-Trial of Lasix to convert to non oliguric state.  If remains oliguric, hyperkalemic and acidotic will need RRT   3-Check CPK level  4 Recheck labs this afternoon Monitor K+

## 2020-09-11 NOTE — PLAN OF CARE
Problem: Pain:  Goal: Pain level will decrease  Description: Pain level will decrease  Outcome: Ongoing  Note: Continuing to monitor pain and discomfort. Monitoring pain level on scale of 0-10. Non- pharmacological measures encouraged to reduce discomfort/pain. PRN pain meds administeration continues when/if applicable as ordered by physician. Problem: Restraint Use - Nonviolent/Non-Self-Destructive Behavior:  Goal: Absence of restraint indications  Description: Absence of restraint indications  Outcome: Ongoing  Note: Upper extremity soft wrist restraints intact. No S/S of restraint related injury . ROM performed Q2HR. Problem: Falls - Risk of:  Goal: Will remain free from falls  Description: Will remain free from falls  Outcome: Ongoing  Note: Falling star program remains in place. Call light and personal belongings within reach. Frequent visual monitoring continues. Toileting program inplace. Patient assisted in turning/repositioning at least once every 2 hours, and on a prn basis. Problem: Skin Integrity:  Goal: Will show no infection signs and symptoms  Description: Will show no infection signs and symptoms  Outcome: Ongoing  Note: Monitoring patient skin integrity for skin breakdown, turning and repositioning q2h per protocol.  P

## 2020-09-11 NOTE — CONSULTS
[address]  Cardiology Consult    Vicenta Henriquez  1970 September 11, 2020      Reason for Referral: Pulmonary embolism    CC: Increasing shortness of breath      Subjective:     History of Present Illness:    Vicenta Henriquez is a 53 y.o. patient with a PMH significant for factor V Leiden deficiency, history of deep vein thrombosis, hyperlipidemia presented with complaints of increasing shortness of breath. When I saw her today patient is on ventilator intubated. Apparently she came to [facility] with increasing shortness of breath and some chest tightness. She is also complaining of swelling of her lower extremity. She had a PEA arrest and was intubated. CTA chest showed pulmonary embolism. No other medical history is available to me  No history of any cardiac disorders available to me. Past Medical History:   has a past medical history of Anticoagulant long-term use, Arthritis, Asthma, Baker's cyst, Depression, Factor V Leiden ([facility] [address] Factor V Leiden ([facility] [address] H/O blood clots, Heartburn, Hyperlipidemia, and Osteoarthritis. Surgical History:   has no past surgical history on file. Social History:   reports that she quit smoking 3 days ago. Her smoking use included cigarettes. She smoked 0.50 packs per day. She has never used smokeless tobacco. She reports current drug use. Frequency: 14.00 times per week. Drug: Marijuana. She reports that she does not drink alcohol. Family History:  family history includes Heart Attack in her father. Home Medications:  Were reviewed and are listed in nursing record and/or below  Prior to Admission medications    Medication Sig Start Date End Date Taking?  Authorizing Provider   albuterol sulfate  (90 Base) MCG/ACT inhaler INHALE TWO PUFFS BY MOUTH EVERY 6 HOURS AS NEEDED FOR WHEEZING 8/25/20  Yes AGUSTIN Mann Ala, CNP   vitamin D (ERGOCALCIFEROL) 1.25 MG (27787 UT) CAPS capsule TAKE ONE CAPSULE BY MOUTH ONCE WEEKLY 8/13/20  Yes Nahomy Farias,    omeprazole (PRILOSEC) 20 MG delayed release capsule TAKE ONE CAPSULE BY MOUTH DAILY 6/10/20  Yes Steven Wiggins MD   warfarin (COUMADIN) 7.5 MG tablet TAKE ONE TABLET BY MOUTH DAILY EXCEPT ON MONDAY AND THURSDAY TAKE ONE-HALF TABLET BY MOUTH AS DIRECTED 4/22/20  Yes Steven Wiggins MD   atorvastatin (LIPITOR) 10 MG tablet TAKE ONE TABLET BY MOUTH DAILY 1/8/20  Yes Steven Wiggins MD   DULoxetine (CYMBALTA) 60 MG extended release capsule TAKE ONE CAPSULE BY MOUTH DAILY 9/9/19  Yes Steven Wiggins MD   BREO ELLIPTA 200-25 MCG/INH AEPB INHALE ONE DOSE BY MOUTH DAILY 3/22/19  Yes Steven Wiggins MD        CURRENT Medications:  sodium chloride flush 0.9 % injection 10 mL, 2 times per day  sodium chloride flush 0.9 % injection 10 mL, PRN  acetaminophen (TYLENOL) tablet 650 mg, Q6H PRN    Or  acetaminophen (TYLENOL) suppository 650 mg, Q6H PRN  perflutren lipid microspheres (DEFINITY) injection 1.65 mg, ONCE PRN  ondansetron (ZOFRAN) injection 4 mg, Q6H PRN  [START ON 9/12/2020] azithromycin (ZITHROMAX) 500 mg in D5W 250ml addavial, Q24H    And  cefTRIAXone (ROCEPHIN) 1 g IVPB in 50 mL D5W minibag, Q24H  LORazepam (ATIVAN) 2 MG/ML injection,   fentaNYL (SUBLIMAZE) 100 MCG/2ML injection,   propofol 1000 MG/100ML injection,   fentaNYL 10 mcg/mL infusion, Continuous  norepinephrine (LEVOPHED) 16 mg in dextrose 5% 250 mL infusion, Continuous  LORazepam (ATIVAN) injection 2 mg, Q2H PRN  [START ON 9/12/2020] levetiracetam (KEPPRA) 500 mg/100 mL IVPB, Q12H  perflutren lipid microspheres (DEFINITY) injection 1.65 mg, ONCE PRN  pantoprazole (PROTONIX) injection 40 mg, Daily    And  sodium chloride (PF) 0.9 % injection 10 mL, Daily  chlorhexidine (PERIDEX) 0.12 % solution 15 mL, BID  glucose (GLUTOSE) 40 % oral gel 15 g, PRN  dextrose 50 % IV solution, PRN  glucagon (rDNA) injection 1 mg, PRN  dextrose 5 % solution, PRN  sodium bicarbonate 150 mEq in dextrose 5 % 1,000 mL infusion, Continuous  heparin 25,000 unit in sodium chloride 0.45% 250 mL infusion, Continuous        Allergies:  Ibuprofen           Review of Systems: SEE HPI   · This is not available as she is intubated      Objective:     PHYSICAL EXAM:      Vitals:    09/11/20 0734   BP:    Pulse: 85   Resp: 25   Temp:    SpO2: 98%    Weight: 250 lb 3.6 oz (113.5 kg)       General Appearance:   Intubated response to pain   Head:  Normocephalic, without obvious abnormality, atraumatic. Eyes:  Pupils equal and round. No scleral icterus. Mouth: Moist mucosa, no pharyngeal erythema. Nose: Nares normal. No drainage or sinus tenderness. Neck: Supple, symmetrical, trachea midline. No adenopathy. No tenderness/mass/nodules. No carotid bruit or elevated JVD. Lungs:   Respiratory Effort: Normal   Auscultation: Clear to auscultation bilaterally, respirations unlabored. No wheeze, rales   Chest Wall:  No tenderness or deformity. Cardiovascular:    Pulses  Palpation: normal   Ascultation: Regular rate, S1/ S2 normal. No murmur, rub, or gallop. Distal Doppler pulses not palpable   Abdomen and Gastrointestinal:   Soft, non-tender, bowel sounds active. Liver and Spleen  Masses   Musculoskeletal: No muscle wasting  Back  Gait   Extremities:  Extremities are cool to touch with bluish discoloration bilaterally both feet       Skin: Inspection and palpation performed, no rashes or lesions. Pysch:  Not tested   Neurologic:  On ventilator sedated.        Labs     All labs have been reviewed    Lab Results   Component Value Date    WBC 24.8 09/11/2020    RBC 4.55 09/11/2020    HGB 12.1 09/11/2020    HCT 39.7 09/11/2020    MCV 87.2 09/11/2020    RDW 19.1 09/11/2020     09/11/2020     Lab Results   Component Value Date     09/11/2020    K 5.4 09/11/2020    K 4.1 09/10/2020     09/11/2020    CO2 11 09/11/2020    BUN 33 09/11/2020    CREATININE 1.3 09/11/2020    GFRAA 53 09/11/2020    GFRAA >60 07/25/2012    AGRATIO 1.1 09/10/2020    LABGLOM 43 09/11/2020    GLUCOSE 55      Findings are again suggestive of pulmonary edema with small pleural    effusions.  Pneumonia is not excluded in the appropriate clinical setting.         Findings were called by the radiology call center. Assessment and Plan     -Acute pulmonary embolism  Acute hypoxemic respiratory failure  Initial ECG suggestive of atrial flutter 2-1. Acute systolic heart failure  LV ejection fraction is reduced at 30 to 35%  Severe mitral regurgitation  Complicated medical course with multiple problems  PEA cardiac arrest.  Metabolic acidosis. Now significant issues with her coagulation with increasing INR PTT and liver function tests  Concerns for shock liver. Low urine output. Prognosis remains guarded  Will need a transesophageal echo and a left and right heart catheterization. We were going to do a cardiac catheterization by way to hold off secondary to concerns for DIC. Hold off heparin. Nephrology consultation    Thank you for allowing us to participate in the care of Jud Lund. Please do not hesitate to contact me if you have any questions.     Elias Tineo MD, MPH    [facility], [address] [facility]  Ph: [phone]  Fax: [phone]    9/11/2020 9:12 AM

## 2020-09-11 NOTE — ED PROVIDER NOTES
CHIEF COMPLAINT  Shortness of Breath (HX of asthma. Short of breath for around 1 month. Patient states she just refilled her inhaler today. )      HISTORY OF PRESENT ILLNESS  Mingo Enriquez is a 53 y.o. female who presents to the ED complaining of shortness of breath over the past month. Patient states she has been having some chest tightness but denies any chest pain. Patient states she has known history of factor V Leiden and is supposed to be taking warfarin but has been off of it for the past 3 days. Patient states she was feeling sick and did not want to take it. States she is noticed some lower extremity swelling as well as swelling in her abdomen. Denies any known history of liver disease. Denies any history of chronic alcohol use. States she does have history of asthma and got her asthma inhalers refilled today but still feeling short of breath. No fevers or chills. No nausea or vomiting. No recent travel. Denies any known sick contacts. No other complaints, modifying factors or associated symptoms. Nursing notes reviewed. Past Medical History:   Diagnosis Date    Anticoagulant long-term use     Arthritis     Asthma     Baker's cyst     Depression     Factor V Leiden ([address]     [facility] [address] [address]     H/O blood clots     Heartburn     Hyperlipidemia     Osteoarthritis      History reviewed. No pertinent surgical history.   Family History   Problem Relation Age of Onset    Heart Attack Father      Social History     Socioeconomic History    Marital status:      Spouse name: Not on file    Number of children: Not on file    Years of education: Not on file    Highest education level: Not on file   Occupational History    Not on file   Social Needs    Financial resource strain: Not on file    Food insecurity     Worry: Not on file     Inability: Not on file    Transportation needs     Medical: Not on file     Non-medical: Not on file   Tobacco Use    Smoking status: Former Smoker     Packs/day: 0.50     Types: Cigarettes     Last attempt to quit: 9/8/2020    Smokeless tobacco: Never Used   Substance and Sexual Activity    Alcohol use: No     Alcohol/week: 0.0 standard drinks    Drug use: Yes     Frequency: 14.0 times per week     Types: Marijuana    Sexual activity: Not on file   Lifestyle    Physical activity     Days per week: Not on file     Minutes per session: Not on file    Stress: Not on file   Relationships    Social connections     Talks on phone: Not on file     Gets together: Not on file     Attends Anabaptist service: Not on file     Active member of club or organization: Not on file     Attends meetings of clubs or organizations: Not on file     Relationship status: Not on file    Intimate partner violence     Fear of current or ex partner: Not on file     Emotionally abused: Not on file     Physically abused: Not on file     Forced sexual activity: Not on file   Other Topics Concern    Not on file   Social History Narrative    Not on file     Current Facility-Administered Medications   Medication Dose Route Frequency Provider Last Rate Last Dose    cefTRIAXone (ROCEPHIN) 1 g IVPB in 50 mL D5W minibag  1 g Intravenous Once Erich Severin, MD        azithromycin (ZITHROMAX) 500 mg in D5W 250ml addavial  500 mg Intravenous Once Erich Severin, MD        dilTIAZem injection 10 mg  10 mg Intravenous Once Erich Severin, MD        Followed by   [facility] dilTIAZem 125 mg in dextrose 5 % 125 mL infusion  5 mg/hr Intravenous Continuous Erich Severin, MD        heparin (porcine) injection 9,140 Units  80 Units/kg Intravenous Once Erich Severin, MD        heparin 25,000 units in dextrose 5% 250 mL infusion  18 Units/kg/hr Intravenous Continuous Erich Severin, MD        furosemide (LASIX) injection 40 mg  40 mg Intravenous Once Erich Severin, MD         Current Outpatient Medications   Medication Sig Dispense Refill    albuterol sulfate  (90 Base) MCG/ACT inhaler INHALE TWO PUFFS BY MOUTH EVERY 6 HOURS AS NEEDED FOR WHEEZING 18 g 0    vitamin D (ERGOCALCIFEROL) 1.25 MG (36091 UT) CAPS capsule TAKE ONE CAPSULE BY MOUTH ONCE WEEKLY 12 capsule 0    omeprazole (PRILOSEC) 20 MG delayed release capsule TAKE ONE CAPSULE BY MOUTH DAILY 90 capsule 6    warfarin (COUMADIN) 7.5 MG tablet TAKE ONE TABLET BY MOUTH DAILY EXCEPT ON MONDAY AND THURSDAY TAKE ONE-HALF TABLET BY MOUTH AS DIRECTED 21 tablet 5    atorvastatin (LIPITOR) 10 MG tablet TAKE ONE TABLET BY MOUTH DAILY 90 tablet 3    DULoxetine (CYMBALTA) 60 MG extended release capsule TAKE ONE CAPSULE BY MOUTH DAILY 30 capsule 11    BREO ELLIPTA 200-25 MCG/INH AEPB INHALE ONE DOSE BY MOUTH DAILY 1 each 11     Allergies   Allergen Reactions    Ibuprofen          REVIEW OF SYSTEMS  10 systems reviewed, pertinent positives per HPI otherwise noted to be negative    PHYSICAL EXAM  /84   Pulse 155   Temp 98.6 °F (37 °C) (Oral)   Resp 26   Wt 251 lb 12.3 oz (114.2 kg)   LMP 12/05/2018 (Exact Date)   SpO2 95%   BMI 39.43 kg/m²      CONSTITUTIONAL: AOx4, cooperative with exam, afebrile   HEAD: normocephalic, atraumatic   EYES: PERRL, EOMI, anicteric sclera   ENT: Moist mucous membranes, uvula midline   LUNGS: Bilateral breath sounds, fine crackles in the bases bilaterally, no wheezing, speaking in full sentences   CARDIOVASCULAR: Tachycardic, regular rhythm, normal S1/S2, no m/r/g, 2+ pulses throughout   ABDOMEN: Soft, obese abdomen, anasarca of the lower abdomen, no tenderness, no palpable masses, non-distended, +BS   NEUROLOGIC:  MAEx4, 5/5 strength throughout; fine touch sensation intact throughout; GCS 15, cranial nerves II through XII intact   MUSCULOSKELETAL:  2+ edema bilateral lower extremities, no calf tenderness bilaterally   SKIN: No rash, pallor or wounds on exposed surfaces         RADIOLOGY  X-RAYS:  I have reviewed radiologic plain film image(s).   ALL OTHER NON-PLAIN FILM IMAGES SUCH AS CT, ULTRASOUND AND MRI HAVE BEEN READ BY THE RADIOLOGIST. CT CHEST PULMONARY EMBOLISM W CONTRAST   Final Result   Addendum 1 of 1   ADDENDUM:   Findings were discussed with Dr. Loyde Cockayne at 10:49 pm on 9/10/2020. Final             EKG INTERPRETATION  Sinus tachycardia with a rate of 167, right axis deviation, , QRS 72, QTc 47, no ST elevations, nonspecific ST changes, no prior EKG on file    PROCEDURES    ED COURSE/MDM  CHF, ACS/MI, PE, pneumonia, URI, effusion, fluid overload    Patient seen and evaluated. History and physical as above. Nontoxic and afebrile. Does arrive tachycardic at 160 complaining of shortness of breath. No chest pain. Patient's blood pressure stable on arrival at 141/100. Does have anasarca bilateral lower extremities up to the mid abdomen. Does have history of factor V Leiden has been noncompliant with her warfarin over the past 3 to 5 days. Concern would be for possible PE. Patient treated with Lopressor on arrival for tachycardia as EKG shows sinus tach without any ST elevations or evidence of SVT. IV fluids started. Plan for cardiac labs, urinalysis, lactic acid, blood cultures, EKG, CT chest rule out PE and likely admission. ED Course as of Sep 10 2318   Thu Sep 10, 2020   2246 Spoke with Dr. Reema Macdonald, radiology, regarding PE study. He states that patient does have evidence of effusions bilaterally, cardiomegaly and 2 small left sided PEs. Also states she has some infiltrates in the base concerning for possible pneumonia. With patient's lactic acid of 4.2, will treat with Rocephin and azithromycin for antibiotic coverage. Also patient started on Cardizem for rate control.    [DS]   5829 TSH added on and is within normal limits at 3.79. Consult placed to hospitalist for admission. [DS]      ED Course User Index  [DS] Jennifer Montoya MD       Patient was given scripts for the following medications. I counseled patient how to take these medications.    New Prescriptions No medications on file     CRITICAL CARE TIME   Total Critical Care time was 40 minutes, excluding separately reportable procedures. There was a high probability of clinically significant/life threatening deterioration in the patient's condition which required my urgent intervention. CLINICAL IMPRESSION  1. Pneumonia due to organism    2. Tachycardia    3. Pleural effusion, bilateral    4. Elevated lactic acid level    5. Congestive heart failure, unspecified HF chronicity, unspecified heart failure type ([address]    6. Multiple subsegmental pulmonary emboli without acute cor pulmonale        Blood pressure 134/84, pulse 155, temperature 98.6 °F (37 °C), temperature source Oral, resp. rate 26, weight 251 lb 12.3 oz (114.2 kg), last menstrual period 12/05/2018, SpO2 95 %. DISPOSITION  Admitted      Disclaimer: All medical record entries made by [address] [place] [address] [address]       (Please note that this note was completed with a voice recognition program. Every attempt was made to edit the dictations, but inevitably there remain words that are mis-transcribed.)            Hira York MD  09/10/20 8228

## 2020-09-11 NOTE — CONSULTS
Patient is seen at the request of Dr. Mauricio Martinez for respiratory failure    PCP: No primary care provider on file. Please note that history is obtained from chart. Patient is unable to provide a history due to intubation and clinical condition. HISTORY OF PRESENT ILLNESS: 53y.o. year old female who was admitted on 9/11/20 for shortness of breath. She presented with a 1 month history of shortness of breath and chest tightness. She was found to be tachycardic to 170. She was diagnosed with pneumonia and bilateral pulmonary emboli and admitted to the ICU. Shortly after arrival to the ICU she suffered a PEA arrest. ROSC was achieved after 4 minutes. She was intubated. She became hypotensive and was started on levophed. PAST MEDICAL HISTORY:  Past Medical History:   Diagnosis Date    Anticoagulant long-term use     Arthritis     Asthma     Baker's cyst     Depression     [facility] [address]     Factor V Leiden ([facility] [address]     H/O blood clots     Heartburn     Hyperlipidemia     Osteoarthritis        PAST SURGICAL HISTORY:  History reviewed. No pertinent surgical history.       MEDICATIONS:  Current Facility-Administered Medications: sodium chloride flush 0.9 % injection 10 mL, 10 mL, Intravenous, 2 times per day  sodium chloride flush 0.9 % injection 10 mL, 10 mL, Intravenous, PRN  acetaminophen (TYLENOL) tablet 650 mg, 650 mg, Oral, Q6H PRN **OR** acetaminophen (TYLENOL) suppository 650 mg, 650 mg, Rectal, Q6H PRN  perflutren lipid microspheres (DEFINITY) injection 1.65 mg, 1.5 mL, Intravenous, ONCE PRN  ondansetron (ZOFRAN) injection 4 mg, 4 mg, Intravenous, Q6H PRN  [START ON 9/12/2020] azithromycin (ZITHROMAX) 500 mg in D5W 250ml addavial, 500 mg, Intravenous, Q24H **AND** cefTRIAXone (ROCEPHIN) 1 g IVPB in 50 mL D5W minibag, 1 g, Intravenous, Q24H  dilTIAZem 125 mg in dextrose 5 % 125 mL infusion, 5 mg/hr, Intravenous, Continuous  LORazepam (ATIVAN) 2 MG/ML injection, , ,   fentaNYL (SUBLIMAZE) 100 MCG/2ML injection, , ,   propofol 1000 MG/100ML injection, , ,   fentaNYL 10 mcg/mL infusion, 25 mcg/hr, Intravenous, Continuous  dexmedetomidine (PRECEDEX) 400 mcg in sodium chloride 0.9 % 100 mL infusion, 0.2 mcg/kg/hr, Intravenous, Continuous  sodium bicarbonate 50 mEq in dextrose 5 % 1,000 mL infusion, , Intravenous, Continuous  norepinephrine (LEVOPHED) 16 mg in dextrose 5% 250 mL infusion, 2 mcg/min, Intravenous, Continuous  LORazepam (ATIVAN) injection 2 mg, 2 mg, Intravenous, Q2H PRN  [START ON 9/12/2020] levetiracetam (KEPPRA) 500 mg/100 mL IVPB, 500 mg, Intravenous, Q12H  heparin 25,000 unit in sodium chloride 0.45% 250 mL infusion, 14.9 mL/hr, Intravenous, Continuous      ALLERGIES:  Patient is allergic to ibuprofen. FAMILY HISTORY:  family history includes Heart Attack in her father.     SOCIAL HISTORY:  Social History     Socioeconomic History    Marital status:      Spouse name: Not on file    Number of children: Not on file    Years of education: Not on file    Highest education level: Not on file   Occupational History    Not on file   Social Needs    Financial resource strain: Not on file    Food insecurity     Worry: Not on file     Inability: Not on file    Transportation needs     Medical: Not on file     Non-medical: Not on file   Tobacco Use    Smoking status: Former Smoker     Packs/day: 0.50     Types: Cigarettes     Last attempt to quit: 9/8/2020    Smokeless tobacco: Never Used   Substance and Sexual Activity    Alcohol use: No     Alcohol/week: 0.0 standard drinks    Drug use: Yes     Frequency: 14.0 times per week     Types: Marijuana    Sexual activity: Not on file   Lifestyle    Physical activity     Days per week: Not on file     Minutes per session: Not on file    Stress: Not on file   Relationships    Social connections     Talks on phone: Not on file     Gets together: Not on file     Attends Jainism service: Not on file     Active member of club or organization: Not on file     Attends meetings of clubs or organizations: Not on file     Relationship status: Not on file    Intimate partner violence     Fear of current or ex partner: Not on file     Emotionally abused: Not on file     Physically abused: Not on file     Forced sexual activity: Not on file   Other Topics Concern    Not on file   Social History Narrative    Not on file      reports that she quit smoking 3 days ago. Her smoking use included cigarettes. She smoked 0.50 packs per day. She has never used smokeless tobacco.    REVIEW OF SYSTEMS:  Unable to obtain due to intubation and clinical condition     Objective:   PHYSICAL EXAM:  Blood pressure 99/65, pulse 87, temperature 98.2 °F (36.8 °C), temperature source Axillary, resp. rate 27, weight 250 lb 3.6 oz (113.5 kg), last menstrual period 12/05/2018, SpO2 90 %. on VC+ 20/400/90%/5    Gen: Well developed; well nourished  Eyes: No scleral icterus. No conjunctival injection. Pupils are equal and reactive   ENT:  Oropharynx with ETT. External appearance of ears and nose normal.  Neck: Trachea midline. No obvious mass. No visible thyroid enlargement    Respiratory: Expiratory wheezes bilaterally, no accessory muscle use  Cardiovascular: Regular rate and rhythm, no appreciable murmurs. BLE edema. Gastrointestinal: Soft, non-tender. No hernia  Skin: Warm and dry. Rose rash on bilateral lower extremities. Normal texture and turgor  Lymphatic: No cervical LAD. No supraclavicular LAD. Musculoskeletal: No clubbing or joint deformity.   Bluish discoloration of toes bilaterally  Psychiatric: Intubated and sedated     Data Reviewed:   LABS:  CBC:   Recent Labs     09/10/20  2052 09/11/20  0225   WBC 11.9* 13.0*   HGB 13.0 12.3   HCT 42.3 40.9   MCV 86.2 88.8    110*     BMP:   Recent Labs     09/10/20  2052 09/11/20  0225 09/11/20  0621    135* 137   K 4.1 5.6* 5.4*    104 102   CO2 20* 7* 11*   PHOS  --   --  7.1*   BUN 28* 27* 33* CREATININE 0.9 1.1 1.3*     LIVER PROFILE:   Recent Labs     09/10/20  2052   *   *   BILITOT 1.0   ALKPHOS 123     PT/INR:   Recent Labs     09/10/20  2052   PROTIME 25.8*   INR 2.21*     APTT:   Recent Labs     09/10/20  2052   APTT 27.9     Images and reports from chest imaging were reviewed by me. My interpretation is:  CXR (9/11/20): Poorly penetrated film; retrocardiac opacity; ETT in place  Chest CT (9/10/20): No LAD; bilateral pleural effusions; possible PE in RML and LLL; areas of consolidation in LLL and lingula       ECHO (9/11/20)  Summary   Ejection fraction is visually estimated to be 30%-35%. Severe tricuspid regurgitation. moderate pulmonary HTN   There is severe diffuse hypokinesis. Diastolic filling parameters suggest grade III diastolic dysfunction. Moderate calcification of the posterior leaflet of the mitral valve. Mitral annular calcification is present. The mitral valve leaflets appear myxomatous. No evidence of mitral valve stenosis. Severe mitral regurgitation is present. Systolic flow reversal in pulmonary veins. The right ventricle is enlarged. Right ventricular systolic function is moderate to severely reduced.       Assessment:     Cardiac arrest  Bilateral pulmonary emboli  Acute kidney injury  Metabolic acidosis  Congestive heart failure  Pulmonary hypertension  Multifocal pneumonia  Septic shock  Lactic acidosis  Elevated LFTs  Thrombocytopenia  Coagulopathy  Disseminated intravascular coagulation   Acute encephalopathy     Plan:      Cardiac arrest  - In the setting of pulmonary emboli and septic shock  - Cardiology following    Bilateral pulmonary emboli  - Heparin drip on hold due to DIC and active bleeding    Acute kidney injury  - Likely due to poor forward flow due to biventricular failure  - Challenge with lasix  - Strict I/Os  - Nephrology following    Metabolic acidosis  - Bicarbonate drip    Congestive heart failure  - Challenge with lasix  - Strict I/Os    Pulmonary hypertension  - Based on echocardiogram   - Lasix   - Keep oxygen saturation>90%    Multifocal pneumonia  - Broaden antibiotics to meropenem and vancomycin   - Send sputum culture    Septic shock  - In the setting of pneumonia  - Broaden antibiotics to meropenem and vancomycin   - Levophed to keep MAP>65    Lactic acidosis  - Challenge with lasix  - Cycle every 6 hours    Elevated LFTs  - May be due to hepatic congestion versus shock liver  - Keep MAP>65  - Check labs in a.m. Thrombocytopenia  - Likely due to sepsis induced DIC  - Check labs in a.m. Coagulopathy  - On anticoagulation at home and now with DIC. Will not reverse due to bilateral pulmonary emboli    Disseminated intravascular coagulation   - She is clotting and bleeding simultaneously. Will not reverse due to bilateral pulmonary emboli  - Check coagulation profile and fibrinogen every 6 hours  - Check protein C  - Consult hematology    Acute encephalopathy   - May be due to infection, DIC or anoxia   - Antibiotics as above      Prophylaxis  DVT- heparin drip on hold  GI- protonix   VAP- peridex    I spent 100 minutes of critical care time with this patient excluding procedures.     Merritt Gruber MD  [facility] Pulmonology, Critical Care and Sleep

## 2020-09-11 NOTE — PROGRESS NOTES
0113: Called pt's emergency contact, Maxi Schilling ([phone]) and informed of pt coding twice, requiring intubation, and vasoactive medications. She stated she wants the patient to remain a full code, but asked where her  is. Oj Simon was the only emergency contact listed, but she gave the name of Franklyn Mayer as Breanne's . Oj Simon does not know his phone number. Will try to find Kwan's contact information.

## 2020-09-11 NOTE — ED NOTES
Verbal permission from patient to provide patient's spouse with patient status update via phone. Update provided and spouse call transferred to room phone with patient permission.      Cyrilla Boast, RN  09/11/20 6122

## 2020-09-11 NOTE — PROGRESS NOTES
26- received report from nightshift RN, handoff complete. 0830- Dr. Abelardo Wakefield in to see pt, updated on status, see new orders. 46- techs in room to do bilateral venous & arterial doppler studies of bilateral lower extremities as well as stat ECHO.  0900- critical care in to assess patient, Dr. Pa Alston updated on pt status, see new orders. Precedex gtt turned off per order to assess pts neuro function. Gurdeep neuro NP in room to assess pt, updated on status, updated that patient was having possible seizure like movement. 0930- call placed to patient's , updated him on pt status, he will not be able to visit with patient during the day due to work schedule, however he states he will visit later on. He has been updated on the critical condition of his wife, he is aware that she is in the ICU on the ventilator and not stable. 0940- Pt had large bowel movement, all linens and gown changed, pt repositioned, R groin central line dressing changed, holding pressure d/t site bleeding. Dr. Pa Alston aware  3783- patient's mother called and asked how she was doing. RN explained to mother that patient is critically ill and on the ventilator. RN also explained that patient has blood clots in her lungs and that she is being checked for blood clots else where. 200- Dr. Pa Alston in to re- suture pts central line. [phone]- call placed to pts  to get consent for possible right and left heart catheterization with Dr. Abelardo Wakefield.  has given verbal consent to RN over the phone. 1235- pt will not be going to cath lab at this time, however will be getting a LEVY @ bedside. 12- Dr. Art Garcia, nephrology in to see pt, updated on status, see new orders   [id number]- covid test sent d/t patient possibly getting LEVY today. Covid test sent. 1505- call placed to [facility] NP, pt can stay on propofol at this time, no need to pause it for neurology assessment. MRI has been ordered, pt is okay to go tomorrow due to being unstable. Neuro aware. [facility] 694 neurologist Dr. Shakira Sorto in to see pt, updated on status. 1600- report from micro on positive blood culture, will notify DrColin Rooney [facility] 1640- Dr. Silvia Cho in room to place vascath  1740- xray done for vas cath placement  1800- Dr. Tash Card, hematology in to see pt, updated on status, see new orders. Heparin gtt to be restarted @ low dose per MD. Pharmacy aware.     Electronically signed by Fred Montano RN on 9/11/2020 at 6:31 PM

## 2020-09-11 NOTE — ED NOTES
Dr. Daksha Rodriguez made aware of pt BP. Per him stop cardizem drip, give 500ml fluid bolus.      Yuli Solitario RN  09/11/20 1080

## 2020-09-11 NOTE — CONSULTS
Social History     Tobacco Use   Smoking Status Former Smoker    Packs/day: 0.50    Types: Cigarettes    Last attempt to quit: 9/8/2020   Smokeless Tobacco Never Used     Social History     Substance and Sexual Activity   Drug Use Yes    Frequency: 14.0 times per week    Types: Marijuana     Social History     Substance and Sexual Activity   Alcohol Use No    Alcohol/week: 0.0 standard drinks     ROS: afebrile. Unable to be obtained from the patient at this time. Chart reviewed. Exam:  Blood pressure 99/65, pulse 85, temperature 98.2 °F (36.8 °C), temperature source Axillary, resp. rate 25, weight 250 lb 3.6 oz (113.5 kg), last menstrual period 12/05/2018, SpO2 98 %. Constitutional    Vital signs: BP, HR, and RR reviewed   General depressed mental status, no distress  Eyes: fundoscopic exam difficult given inability to cooperate  Cardiovascular: + peripheral edema. Psychiatric: limited exam given encephalopathy but not agitated and no signs of hallucinations  Neurologic  Mental status:   orientation marisol due to encephalopathy. General fund of knowledge marisol due to encephalopathy. Memory marisol due to encephalopathy. Attention poor  Language marisol due to encephalopathy. Intubated. Comprehension not following any commands  CN2: corneal reflexes absent at this time. CN 3,4,6: no forced gaze deviation. A bit of roving eye movements. No forced gaze deviation. Pupils are 2 mm, round and reactive bilaterally. CN5: facial sensation marisol. CN7: face symmetric but exam limited by ET tube  CN8: hearing exam limited given encephalopathy. CN9: gag not personally tested at the moment. CN11: limited exam given encephalopathy  CN12: limited exam given encephalopathy  Strength: marisol due to encephalopathy at this time. Deep tendon reflexes: marisol at this time given diagnostic testing currently underway. Sensory: turns head toward trapezius pressure.   No response to nailbed pressure in any of her 4 limbs. Cerebellar/coordination:limited exam given encephalopathy  Tone: no increased tone or rigidity. Gait: limited exam given encephalopathy and intubated with ventilator. Labs  Glucose 106  Na 136  K 5.4  BUN 33  Cr 1.3  Mg 2.20  Ca 7.8  Lactic acid 6.8          BNP 4851    WBC 24.8K  Hg 12.1  Platelets 381  INR 0.46    UA 2+ bacteria  Blood cultures pending    Studies  CT head w/o 9/11/20, independently reviewed  Motion limited. No convincing evidence of an acute intracranial abnormality. CT chest 9/10/20  Evaluation is somewhat limited due to respiratory motion artifact.  There are    suspected pulmonary arterial filling defects within subsegmental branches to    the right middle lobe and left lower lobe.  No right heart strain.         Large bilateral pleural effusions and reflux of contrast into the IVC/hepatic    veins.  Findings most compatible with volume overload/CHF.         Patchy airspace disease in the left lower lobe adjacent to the large    effusion.  Differential considerations include atelectasis, infectious    process or, less likely, infarction. EKG 9/10/20  ST.     Impression  1. The patient presented w/ dyspnea and tachycardia, subsequently documented to demonstrate some seizure like behavior just prior to PEA arrest w/ ROSC achieved after ~1 round of ACLS. Her workup revealed 2 small probable PEs and pulmonary edema versus pneumonia. After precedex was stopped this morning, she experienced an approximate 20 minute episode of general convulsive behavior, abating on its own. I was able to witness another episode of intermittent/sporadic LLE movements, episodic (3-5 sec) RUE rhythmic twitching movements, head turning from side to side, and roving eye movements. Recommendations  1. Increased Keppra to 1000 mg BID. Give dose now. 2.  Lorazepam PRN for seizure. 3.  Propofol to be initiated per ICU team.    4.  EEG, ordered.     5. Would also recommend MRI brain w/wo when stable. If renal function deteriorates, do w/o contrast.    6.  Supportive care. Management of other acute medical issues per appropriate clinician.       Calos Houser NP  [address] [place] [address] [id number] Neurology    A copy of this note was provided for Dr Emilie Ortiz MD

## 2020-09-11 NOTE — PROGRESS NOTES
In an attempt to locate patients , the cell number listed on most recent face sheet called. Patients mother, Vero Avila, as listed in the chart  answered stating \"I was looking for her husbands number while the other nurse was speaking with my wife\". Patients , Aiyana Seaman # obtained and called. Amrik Reyes was updated on recent code blue and need for intubation.  called back stating \"I will be up to visit tomorrow after I get off work because my boss is unable to replace me\". Informed we would keep him posted of any change in status.

## 2020-09-11 NOTE — PLAN OF CARE
Problem: Pain:  Description: Pain management should include both nonpharmacologic and pharmacologic interventions. Goal: Control of acute pain  Outcome: Ongoing  Note: Pain/discomfort being managed with PRN analgesics per MD orders. Pt able to express presence and absence of pain and rate pain appropriately using numerical scale. n       Problem: Restraint Use - Nonviolent/Non-Self-Destructive Behavior:  Goal: Absence of restraint indications    Outcome: Ongoing     Problem: Restraint Use - Nonviolent/Non-Self-Destructive Behavior:  Goal: Absence of restraint-related injury  Outcome: Ongoing     Problem: Skin Integrity:  Goal: Will show no infection signs and symptoms    Outcome: Ongoing  Note: Skin assessment completed every shift. Pt assessed for incontinence, appropriate barrier cream applied prn. Pt encouraged to turn/rotate every 2 hours. Assistance provided if pt unable to do so themselves. Problem: OXYGENATION/RESPIRATORY FUNCTION  Goal: Patient will maintain patent airway  Outcome: Ongoing  Note: Assess breath sounds, oxygen requirements and saturations, and activity tolerance. Monitor intake and output and daily weights and lab values.

## 2020-09-11 NOTE — CONSULTS
Comprehensive Nutrition Assessment    Type and Reason for Visit:  Initial, Consult, Patient Education    Nutrition Recommendations/Plan:   Monitor need for nutrition education when appropriate  Start nutrition when appropriate    Nutrition Assessment:  Pt with pmh of HLD, Factor V Leiden, marijuana use 14 times per week, adm r/t sob, fatigue, increased sweeling of abd & BLE. Noted work up for possible CHF. Diet adv to 2 gm Na / 1500 ml per day. During the course of the evening, pt became more agitated & code blue was called. Noted seizure like activity per MD. Pt now intubated. Pressor support off & no propofol on board. Diet orders are now npo. Malnutrition Assessment:  Malnutrition Status:  Insufficient data    Context:  Acute Illness     Due to current CDC guidelines recommending 6-ft distancing for social isolation for COVID19 prevention, NFPE/malnutrition assessment was deferred at this time. Estimated Daily Nutrient Needs:  Energy (kcal):  2990-7301 (15-18 X abw 114 KG); Weight Used for Energy Requirements:        Protein (g):   (1.2-2 x IBW 61 kg); Weight Used for Protein Requirements:           Fluid (ml/day):  per provider; Weight Used for Fluid Requirements:         Nutrition Related Findings:  BM 9/11.  Noted +2 edema to BLE, renal labs oor      Wounds:  None       Current Nutrition Therapies:    Diet NPO Effective Now    Anthropometric Measures:  · Height: 5' 7\" (170.2 cm)  · Current Body Weight: 250 lb (113.4 kg)   · Admission Body Weight: 252 lb (114.3 kg)    · Ideal Body Weight: 135 lbs; % Ideal Body Weight 185.2 %   · BMI: 39.1  · BMI Categories: Obese Class 2 (BMI 35.0 -39.9)       Nutrition Diagnosis:   · Inadequate oral intake related to impaired respiratory function as evidenced by intubation      Nutrition Interventions:   Food and/or Nutrient Delivery:  Continue NPO(start nutrition when appropriate)  Nutrition Education/Counseling:  No recommendation at this time   Coordination of Nutrition Care:  Continued Inpatient Monitoring    Goals:  tolerate most appropriate form of nutrition       Nutrition Monitoring and Evaluation:   Food/Nutrient Intake Outcomes:  (tolerance of most appropriate form of nutrition)  Physical Signs/Symptoms Outcomes:  Biochemical Data, Constipation, Diarrhea, Fluid Status or Edema, Weight, Skin, Nutrition Focused Physical Findings, Hemodynamic Status     Discharge Planning:     Too soon to determine     Electronically signed by Nitin Gonzalez RD, LD on 9/11/20 at 11:18 AM EDT    Contact: [phone]

## 2020-09-11 NOTE — PLAN OF CARE
Nutrition Problem #1: Inadequate oral intake  Intervention: Food and/or Nutrient Delivery: Continue NPO(start nutrition when appropriate)  Nutritional Goals: tolerate most appropriate form of nutrition

## 2020-09-11 NOTE — H&P
Hospital Medicine History & Physical      PCP: No primary care provider on file. Date of Admission: 9/10/2020    Date of Service: Pt seen/examined on 9/10/2020 and Admitted to Inpatient     Chief Complaint: Fatigue, shortness of breath, extensive abdominal and leg swelling      History Of Present Illness: The patient is a 53 y.o. female with past medical history of factor V Leiden deficiency on chronic warfarin, history of blood clots, hyperlipidemia, arthritis, asthma who presents to [facility] with worsening above symptoms of fatigue, intermittent shortness of breath both at rest and on exertion, and extensive of abdominal and leg swelling. She notes that the fatigue and shortness of breath have been progressively worsening for the past month but she felt that it may have been just secondary to a viral illness, was not in contact with anyone COVID positive nor anyone sick recently. He does note that in the past week week and a half she has been developing more swelling to her legs and abdomen and it is become very unbearable. She denies any fever, chills, nausea/vomiting/diarrhea/abdominal pain, poor appetite, poor fluid intake, overindulgence of food or fluid, chest pain, back pain, headache, dizziness/syncope, dysuria, rashes. Of note, in the ED patient was significantly tachycardic, showing some combination of sinus tachycardia versus SVT, uncertain if may be underlying atrial fibrillation with rapid ventricular rate. Labs showed signs of infection as well as elevated proBNP. CTA with contrast was done on the chest and demonstrated left and right lobe PEs in certain segments, active findings suggestive of early patchy pneumonia bilaterally, and moderate bilateral pleural effusions suggestive of heart failure/volume overload.   She was given metoprolol prior to being placed on a diltiazem drip for rate control, given Lasix x1 dose, given antibiotics. Subsequent to the above interview, patient was in a CODE BLUE arrest event with pulseless electrical activity. Please see sequent CODE BLUE event note for further details. Past Medical History:        Diagnosis Date    Anticoagulant long-term use     Arthritis     Asthma     Baker's cyst     Depression     [facility] Le[facility] [address]     Factor V Leiden ([facility] [address]     H/O blood clots     Heartburn     Hyperlipidemia     Osteoarthritis        Past Surgical History:    History reviewed. No pertinent surgical history. Medications Prior to Admission:    Prior to Admission medications    Medication Sig Start Date End Date Taking? Authorizing Provider   albuterol sulfate  (90 Base) MCG/ACT inhaler INHALE TWO PUFFS BY MOUTH EVERY 6 HOURS AS NEEDED FOR WHEEZING 8/25/20  Yes Delpha Osgood, APRN - CNP   vitamin D (ERGOCALCIFEROL) 1.25 MG (44540 UT) CAPS capsule TAKE ONE CAPSULE BY MOUTH ONCE WEEKLY 8/13/20  Yes Nahomy Farias DO   omeprazole (PRILOSEC) 20 MG delayed release capsule TAKE ONE CAPSULE BY MOUTH DAILY 6/10/20  Yes Félix Gramajo MD   warfarin (COUMADIN) 7.5 MG tablet TAKE ONE TABLET BY MOUTH DAILY EXCEPT ON MONDAY AND THURSDAY TAKE ONE-HALF TABLET BY MOUTH AS DIRECTED 4/22/20  Yes Félix Gramajo MD   atorvastatin (LIPITOR) 10 MG tablet TAKE ONE TABLET BY MOUTH DAILY 1/8/20  Yes Félix Gramajo MD   DULoxetine (CYMBALTA) 60 MG extended release capsule TAKE ONE CAPSULE BY MOUTH DAILY 9/9/19  Yes Félix Gramajo MD   BREO ELLIPTA 200-25 MCG/INH AEPB INHALE ONE DOSE BY MOUTH DAILY 3/22/19  Yes Félix Gramajo MD       Allergies:  Ibuprofen    Social History:  The patient currently lives home,  is somewhat debilitated as well, not able to accompany her here to the ED    TOBACCO:   reports that she quit smoking 3 days ago. Her smoking use included cigarettes. She smoked 0.50 packs per day.  She has never used smokeless tobacco.  ETOH:   reports no history of alcohol use. Substance abuse: Negative    Family History:  Reviewed in detail and negative for DM, Early CAD, Cancer, CVA. Positive as follows:        Problem Relation Age of Onset    Heart Attack Father        REVIEW OF SYSTEMS:   noted in the HPI. All other systems reviewed and negative. PHYSICAL EXAM:    BP 99/65   Pulse 85   Temp 98.2 °F (36.8 °C) (Axillary)   Resp 25   Wt 250 lb 3.6 oz (113.5 kg)   LMP 12/05/2018 (Exact Date)   SpO2 98%   BMI 39.19 kg/m²   Initial interview in the ED. General appearance: On oxygen but in no respiratory distress, alert and oriented x4 chronically ill-appearing  HEENT Normal cephalic, atraumatic without obvious deformity. Pupils equal, round, and reactive to light. Extra ocular muscles intact. Conjunctivae/corneas clear. Moist mucous membranes  Neck: Supple, no JVD, no gross lymphadenopathy  Lungs: Crackles throughout all lung fields, no wheezes  Heart: Tachycardic rate, felt to be regular rhythm, no murmurs  Abdomen: Soft although somewhat distended abdomen, uncertain if anasarca versus fluid wave but definitely feels to be more anasarca, unable to determine bowel sounds  Extremities: 2+ bilateral lower extremity edema  Skin: No rashes, although some slight mottling noted throughout the lower extremities and her skin  Neurologic: Alert and oriented X 3, neurovascularly intact with sensory/motor intact upper extremities/lower extremities, bilaterally. Cranial nerves: II-XII intact, grossly non-focal.   Mental status: Alert, oriented, thought content appropriate.   Capillary Refill: Acceptable  < 3 seconds  Peripheral Pulses: +3 Easily felt, not easily obliterated with pressure      CTA chest:  Evaluation is somewhat limited due to respiratory motion artifact.  There are    suspected pulmonary arterial filling defects within subsegmental branches to    the right middle lobe and left lower lobe.  No right heart pulmonary embolism ([facility] [address] [I26.99] 09/10/2020    Factor V Leiden mutation [facility]) [D68.51] 11/13/2014         PHYSICIANS CERTIFICATION:    I certify that Jud Lund is expected to be hospitalized for greater than 2 midnights based on the following assessment and plan:      ASSESSMENT/PLAN:  · Post CODE BLUE event, treating patient for seizure activity after patient was resuscitated from cardiac arrest event-given Ativan 2 mg, more Ativan ordered as needed, Keppra 1000 mg loading dose given, Keppra 500 twice daily IV started for tomorrow, neurology consult for the morning. CT noncontrast of the head was done and did not show any acute findings, no bleeding. · Patient is intubated, sedated currently on Precedex, fentanyl IV infusion, Ativan  · Patient on Levophed currently for hypotension and likely post cardiac arrest, maybe septic shock versus cardiogenic. Continue Levophed, may consider adding another pressor  · Treating pneumonia with Rocephin and Zithromax, labs pending for further work-up there  · Heparin IV infusion to treat for pulmonary embolus  · Initially patient was given IV Lasix to treat for the fluid overload, currently holding all diuretics at this time, patient did receive some IV fluids after the CODE BLUE event, and decided to place patient on a bicarbonate IV infusion  · Cardiology consulted for heart failure as well as for post CODE BLUE event  · Prior to event as above, patient discussion yielded patient did want to be full code,  will soon decision making status but was unable to be available at bedside last night. DVT Prophylaxis: Heparin IV infusion  Diet: No diet orders on file  Code Status: Full Code  PT/OT Eval Status: Ambulatory    Dispo -critically ill, uncertain prognosis at this time, treating pneumonia/pulmonary emboli/seizures/post  Cardiac arrest, unable to fully treat for heart failure given patient's hypotension and need for IV fluids for bicarbonate infusion. Lonny Atkinson, DO    Thank you No primary care provider on file. for the opportunity to be involved in this patient's care. If you have any questions or concerns please feel free to contact me at [phone].

## 2020-09-11 NOTE — CONSULTS
[facility]    Consult Note      Requesting Physician: Dr Mena Him: UNobtainable chart review      HISTORY OF PRESENT ILLNESS:      Ms. Joelle Bergman  is a 53 y.o. female we are seeing in consultation for complications related to coagulopathy in a setting of a pea arrest, potential home coumadin use and possible dic  Upon chart review the pt is on warfarin but has not displayed compliance.  SHE has not had INR checked in coag clinic in a very long time-there are notes to attempt to get her in-in care everywhere I dont see labs  Per the chart she had worsening sob and had held her coumadin -but this cant be confirmed  She was found to have PE, pneumonia, mrsa in blood cultures and pleural effusions   She then went into a pea arrest intubated on pressors abx    Upon eval there plts have been 272-110=last was 133   She had an elevated inr in the 2 range at first check with a normal ptt at that time prior to heparin   HEparin was started-there was some PTT sent that came back very high-not sure if it was a lab error but more or less her ptt levels were very maranda whether that is dic or heparin related will be hard to say   THe first fibrinogen was after heparin was started and 2 levels were 106 and 117   She has not received any cryo  She was bleeding while on heparin but not before-was oozing lines  Currently not oozing   She also has some dusky appearance to her skin -in her legs   HEparin due to oozing has been stopped for now  We are asked to assist in mgmt of pe, potential dic and a concern for purpura fulminans was raised   We dont really have good data on her coumadin use /compliance but she does not have a reported history of protein c def           Past Medical History:        Diagnosis Date    Anticoagulant long-term use     Arthritis     Asthma     Baker's cyst     Depression     Factor V Leiden ([address]     Factor V Leiden ([facility] [facility]     H/O blood clots     Heartburn  Hyperlipidemia     Osteoarthritis      Past Surgical History:    History reviewed. No pertinent surgical history.     Current Medications:    Current Facility-Administered Medications: sodium chloride flush 0.9 % injection 10 mL, 10 mL, Intravenous, 2 times per day  sodium chloride flush 0.9 % injection 10 mL, 10 mL, Intravenous, PRN  acetaminophen (TYLENOL) tablet 650 mg, 650 mg, Oral, Q6H PRN **OR** acetaminophen (TYLENOL) suppository 650 mg, 650 mg, Rectal, Q6H PRN  perflutren lipid microspheres (DEFINITY) injection 1.65 mg, 1.5 mL, Intravenous, ONCE PRN  ondansetron (ZOFRAN) injection 4 mg, 4 mg, Intravenous, Q6H PRN  norepinephrine (LEVOPHED) 16 mg in dextrose 5% 250 mL infusion, 2 mcg/min, Intravenous, Continuous  LORazepam (ATIVAN) injection 2 mg, 2 mg, Intravenous, Q2H PRN  perflutren lipid microspheres (DEFINITY) injection 1.65 mg, 1.5 mL, Intravenous, ONCE PRN  pantoprazole (PROTONIX) injection 40 mg, 40 mg, Intravenous, Daily **AND** sodium chloride (PF) 0.9 % injection 10 mL, 10 mL, Intravenous, Daily  chlorhexidine (PERIDEX) 0.12 % solution 15 mL, 15 mL, Mouth/Throat, BID  glucose (GLUTOSE) 40 % oral gel 15 g, 15 g, Oral, PRN  dextrose 50 % IV solution, 12.5 g, Intravenous, PRN  glucagon (rDNA) injection 1 mg, 1 mg, Intramuscular, PRN  dextrose 5 % solution, 100 mL/hr, Intravenous, PRN  sodium bicarbonate 150 mEq in dextrose 5 % 1,000 mL infusion, , Intravenous, Continuous  propofol injection, 10 mcg/kg/min, Intravenous, Titrated  levetiracetam (KEPPRA) 1000 mg/100 mL IVPB, 1,000 mg, Intravenous, Q12H  vancomycin (VANCOCIN) 1250 mg in dextrose 5 % 250 mL IVPB, 1,250 mg, Intravenous, Once  propofol injection 120 mg, 12 mL, Intravenous, Continuous PRN  vancomycin (VANCOCIN) intermittent dosing (placeholder), , Other, RX Placeholder  [START ON 9/12/2020] meropenem (MERREM) 500 mg IVPB (mini-bag), 500 mg, Intravenous, Q12H  heparin 25,000 unit in sodium chloride 0.45% 250 mL infusion, 9.9 mL/hr, Intravenous, Continuous  Allergies:  Ibuprofen    Social History:      Social History     Socioeconomic History    Marital status:      Spouse name: Not on file    Number of children: Not on file    Years of education: Not on file    Highest education level: Not on file   Occupational History    Not on file   Social Needs    Financial resource strain: Not on file    Food insecurity     Worry: Not on file     Inability: Not on file    Transportation needs     Medical: Not on file     Non-medical: Not on file   Tobacco Use    Smoking status: Former Smoker     Packs/day: 0.50     Types: Cigarettes     Last attempt to quit: 9/8/2020    Smokeless tobacco: Never Used   Substance and Sexual Activity    Alcohol use: No     Alcohol/week: 0.0 standard drinks    Drug use: Yes     Frequency: 14.0 times per week     Types: Marijuana    Sexual activity: Not on file   Lifestyle    Physical activity     Days per week: Not on file     Minutes per session: Not on file    Stress: Not on file   Relationships    Social connections     Talks on phone: Not on file     Gets together: Not on file     Attends Evangelical service: Not on file     Active member of club or organization: Not on file     Attends meetings of clubs or organizations: Not on file     Relationship status: Not on file    Intimate partner violence     Fear of current or ex partner: Not on file     Emotionally abused: Not on file     Physically abused: Not on file     Forced sexual activity: Not on file   Other Topics Concern    Not on file   Social History Narrative    Not on file          Family History:         Problem Relation Age of Onset    Heart Attack Father      REVIEW OF SYSTEMS:    ROS per the HPI   Otherwise  10 point ROS negative     PHYSICAL EXAM:      Vitals:  BP 99/65   Pulse 75   Temp 99.3 °F (37.4 °C) (Axillary)   Resp 20   Ht 5' 7\" (1.702 m)   Wt 250 lb 3.6 oz (113.5 kg)   LMP 12/05/2018 (Exact Date)   SpO2 98%   BMI 39.19 kg/m²     CONSTITUTIONAL: intubated     HEMATOLOGIC/LYMPHATICS:  no cervical lymphadenopathy, no supraclavicular lymphadenopathy,  LUNGS:  Some rhonchi   CARDIOVASCULAR:  , regular rate and rhythm, normal S1 and S2, no S3 or S4, and no murmur noted  ABDOMEN:  No scars, normal bowel sounds, soft, non-distended, non-tender, no masses palpated, no hepatosplenomegally    y. SKIN has some dusky -appearance to her skin -some darkening patches   But faint         DATA:    PT/INR:    Recent Labs     09/10/20  2052 09/11/20  1030 09/11/20  1550   PROT 6.6  --   --    INR 2.21* 4.18* 3.23*     PTT:    Recent Labs     09/10/20  2052 09/11/20  1030 09/11/20  1550   APTT 27.9 117.5* 46.5*     CMP:    Lab Results   Component Value Date     09/11/2020    K 4.2 09/11/2020    K 4.1 09/10/2020     09/11/2020    CO2 20 09/11/2020    BUN 43 09/11/2020    PROT 6.6 09/10/2020    PROT 7.0 10/19/2011     Magnesium:    Lab Results   Component Value Date    MG 2.20 09/11/2020     Phosphorus:  No components found for: PO4  Calcium:  No components found for: CA  CBC:    Lab Results   Component Value Date    WBC 24.0 09/11/2020    RBC 4.06 09/11/2020    HGB 10.7 09/11/2020    HCT 34.8 09/11/2020    MCV 85.9 09/11/2020    RDW 19.0 09/11/2020     09/11/2020     DIFF:    Lab Results   Component Value Date    MCV 85.9 09/11/2020    RDW 19.0 09/11/2020      LDH:  @labcrnt(LDH)@  Uric Acid:  @labcrnt(URIC)@    Radiology Review: Ct Head Wo Contrast    Result Date: 9/11/2020  EXAMINATION: CT OF THE HEAD WITHOUT CONTRAST  9/11/2020 3:33 am TECHNIQUE: CT of the head was performed without the administration of intravenous contrast. Dose modulation, iterative reconstruction, and/or weight based adjustment of the mA/kV was utilized to reduce the radiation dose to as low as reasonably achievable. COMPARISON: None.  HISTORY: ORDERING SYSTEM PROVIDED HISTORY: post cardiac arrest- once pt stable TECHNOLOGIST PROVIDED HISTORY: Reason for exam:->post cardiac arrest- once pt stable Has a \"code stroke\" or \"stroke alert\" been called? ->No Is the patient pregnant?->No Reason for Exam: post cardiac arrest Acuity: Unknown Type of Exam: Ongoing FINDINGS: Motion limited study, despite repeat acquisition. BRAIN/VENTRICLES: Within the limitations of motion artifact, there is no convincing evidence for acute intracranial pathology. Gray/white matter differentiation is grossly preserved. There is remote left thalamic lacunar infarction. No definite intracranial hemorrhage, mass effect, or midline shift. ORBITS: The visualized portion of the orbits demonstrate no acute abnormality. SINUSES: The visualized paranasal sinuses and mastoid air cells demonstrate no acute abnormality. SOFT TISSUES/SKULL:  No acute abnormality of the visualized skull or soft tissues. Motion limited study. No convincing evidence for acute intracranial abnormality identified within the limitations of motion artifact. If there is persistent clinical suspicion for intracranial abnormality, repeat study should be considered. Xr Chest Portable    Result Date: 9/11/2020  EXAMINATION: ONE XRAY VIEW OF THE CHEST 9/11/2020 9:46 am COMPARISON: Prior study(s) most recent 1 hour earlier. HISTORY: ORDERING SYSTEM PROVIDED HISTORY: Check ETT placement TECHNOLOGIST PROVIDED HISTORY: Reason for exam:->Check ETT placement Reason for Exam: ETT position. pulled back Acuity: Acute Type of Exam: Initial FINDINGS: The endotracheal tube is in satisfactory position approximately 3 cm above the rahat. The heart is upper normal as are pulmonary vessels. This is accentuated with portable technique and low lung volume. The left lung base is poorly seen with portable technique and overlying monitor or defibrillator device. Right lung is clear. Satisfactory position of the endotracheal tube 3 cm above the rahat. The heart is upper normal size and vessels are borderline congested.      Xr Chest Portable    Result Date: 9/11/2020  EXAMINATION: ONE XRAY VIEW OF THE CHEST 9/11/2020 7:42 am COMPARISON: 09/11/2020 at 02:24 HISTORY: ORDERING SYSTEM PROVIDED HISTORY: post intubation/ follow up TECHNOLOGIST PROVIDED HISTORY: Reason for exam:->post intubation/ follow up Reason for Exam: worsening sob, fighting ventilation Acuity: Acute Type of Exam: Initial FINDINGS: Tip of endotracheal tube is approximately 1.3 cm proximal to the rahat. Cardiac leads project over the chest.  Pad is seen projecting over the left chest.  Diffuse bilateral heterogeneous pulmonary opacity is again seen. Costophrenic angles are not well visualized. No gross pneumothorax. Cardiac and mediastinal silhouettes are similar to prior. Endotracheal tube has tip approximately 1.3 cm proximal to the rahat and should be retracted approximately 2 cm. Findings are again suggestive of pulmonary edema with small pleural effusions. Pneumonia is not excluded in the appropriate clinical setting. Findings were called by the radiology call center. Xr Chest Portable    Result Date: 9/11/2020  EXAMINATION: ONE XRAY VIEW OF THE CHEST 9/11/2020 2:06 am COMPARISON: CT 4 hours prior HISTORY: ORDERING SYSTEM PROVIDED HISTORY: Et tube placement TECHNOLOGIST PROVIDED HISTORY: Reason for exam:->Et tube placement Reason for Exam: Et tube placement Acuity: Acute Type of Exam: Initial FINDINGS: Endotracheal tube terminates approximately 2 cm above the rahat. Cardiomegaly, pulmonary vascular congestion, and moderate bilateral effusions remain unchanged. Osseous structures and soft tissues are grossly intact. Cardiomegaly, pulmonary vascular congestion, and bilateral pleural effusions. Endotracheal tube terminates approximately 2 cm above the rahat. Ct Chest Pulmonary Embolism W Contrast    Addendum Date: 9/10/2020    ADDENDUM: Findings were discussed with Dr. Deandra Hernandez at 10:49 pm on 9/10/2020.      Result Date: 9/10/2020  EXAMINATION: CTA OF THE CHEST 9/10/2020 9:36 pm TECHNIQUE: CTA of the chest was performed after the administration of intravenous contrast.  Multiplanar reformatted images are provided for review. MIP images are provided for review. Dose modulation, iterative reconstruction, and/or weight based adjustment of the mA/kV was utilized to reduce the radiation dose to as low as reasonably achievable. COMPARISON: None. HISTORY: ORDERING SYSTEM PROVIDED HISTORY: tachy, SOB TECHNOLOGIST PROVIDED HISTORY: Reason for exam:->tachy, SOB Reason for Exam: tachy, SOB Acuity: Acute Type of Exam: Initial FINDINGS: Pulmonary Arteries: Pulmonary arteries are adequately opacified for evaluation. However, evaluation is limited due to respiratory motion artifact. There is suspected subsegmental filling defect within the right middle lobe and left lower lobe. Mediastinum: The heart is enlarged without pericardial fluid collection. No definite right heart strain. Large bilateral pleural effusions. Interlobular septal thickening. Multifocal ground-glass opacities in the left lower lobe noted. No evidence for pneumothorax. Reflux of contrast into the IVC and hepatic veins. Lungs/pleura: The lungs are without acute process. No focal consolidation or pulmonary edema. No evidence of pleural effusion or pneumothorax. Upper Abdomen: Limited images of the upper abdomen are unremarkable. Soft Tissues/Bones: No acute bone or soft tissue abnormality. Evaluation is somewhat limited due to respiratory motion artifact. There are suspected pulmonary arterial filling defects within subsegmental branches to the right middle lobe and left lower lobe. No right heart strain. Large bilateral pleural effusions and reflux of contrast into the IVC/hepatic veins. Findings most compatible with volume overload/CHF. Patchy airspace disease in the left lower lobe adjacent to the large effusion.   Differential considerations include atelectasis, infectious process or, less likely, infarction. Vl Dup Lower Extremity Arteries Bilateral    Result Date: 9/11/2020  Lower Extremities Arterial Duplex  Demographics   Patient Name       Lorena Michel   Date of Study      09/11/2020       Gender               Female   Patient Number     [id number]       Date of Birth        1970   Visit Number       [id number]        Age                  53 year(s)   Accession Number   [id number]       Room Number          2104   Corporate ID       [id number]          Sonographer          Saida Fowler RVT   Ordering Physician Vladimir Hoffman MD  Interpreting         [facility]                                      Physician            Rosario Richardson MD  Procedure Type of Study:   Extremities Arteries:Lower Extremities Arterial Duplex, VL LOWER EXTREMITY  ARTERIES DUPLEX BILATERAL. Vascular Sonographer Report  Additional Indications:Possible thrombus Impressions Right Impression Limited study. Patient was seizing during the scan and has an arterial line in the right groin that was actively bleeding at the time of the scan. The common femoral, profunda femoral and peroneal arteries could not be visualized. The majority of the waveforms are triphasic throughout the right lower extremity. There is no evidence of significant stenosis or thrombus in the visualized vessels. Left Impression Limited study. Patient was seizing during the scan. The common femoral, , mid to distal femoral, profunda femoral and peroneal arteries could not be visualized. The majority of the waveforms are triphasic throughout the left lower extremity. There is no evidence of significant stenosis or thrombus in the visualized vessels. Conclusions   Summary   Limited study. Patient was seizing during the scan. The right common femoral, profunda femoral and peroneal arteries could not  be visualized. The left common femoral, mid to distal superficial femoral,  profunda femoral and peroneal arteries could not be visualized.   There is no evidence of significant stenosis or thrombus in the visualized  vessels. Signature   ------------------------------------------------------------------  Electronically signed by Cecelia Hurst MD (Interpreting  physician) on 09/11/2020 at 12:10 PM  ------------------------------------------------------------------  Patient Status:Routine. Study [facility]  - Vascular Lab. Technical Quality:Limited visualization. Risk Factors History +---------+----+---------------------------------+ ! Diagnosis! Date! Comments                         ! +---------+----+---------------------------------+ ! Other    ! ! Factor V Leiden Clotting Disorder! +---------+----+---------------------------------+ ! Other    ! !DVT/SVT in left leg in 1999      ! +---------+----+---------------------------------+ Velocities are measured in cm/s ; Diameters are measured in mm LE Duplex Measurements +--------------++-----+-----+----------++----+-----+----------+ ! ! !Right! ! Left      !!    !     !          ! +--------------++-----+-----+----------++----+-----+----------+ ! Location      ! !PSV  ! Ratio! Wave Desc. !!PSV ! Ratio! Wave Desc.! +--------------++-----+-----+----------++----+-----+----------+ ! Prox SFA      !!97.9 ! ! Triphasic !!172 ! ! Triphasic ! +--------------++-----+-----+----------++----+-----+----------+ ! Mid SFA       !!77.4 !0.79 ! Triphasic !!    !     !          ! +--------------++-----+-----+----------++----+-----+----------+ ! Dist SFA      !!57.5 !0.74 ! Triphasic !!    !     !          ! +--------------++-----+-----+----------++----+-----+----------+ ! Prox Popliteal!!46   !0.8  ! Triphasic !!42.8!0.25 ! Triphasic ! +--------------++-----+-----+----------++----+-----+----------+ ! Dist Popliteal!!79.7 !1.73 ! Triphasic !!    !     !          ! +--------------++-----+-----+----------++----+-----+----------+ ! Mid PTA       !!65.1 !0.82 ! Triphasic !!44.4!1.04 ! Triphasic ! +--------------++-----+-----+----------++----+-----+----------+ ! Mid YAZ       !!65.1 !0.82 ! Triphasic ! !48.7!1.14 ! Triphasic ! +--------------++-----+-----+----------++----+-----+----------+    Vl Extremity Venous Bilateral    Result Date: 9/11/2020  Lower Extremities DVT Study  Demographics   Patient Name       Dheeraj Goode   Date of Study      09/11/2020       Gender               Female   Patient Number     [id number]       Date of Birth        1970   Visit Number       [id number]        Age                  53 year(s)   Accession Number   [id number]       Room Number          2104   Corporate ID       [id number]          Sonographer          Reggie Dee RVT   Ordering Physician Tanya Willingham MD  Interpreting         [facility]                                      Physician            Johanny Lin MD  Procedure Type of Study:   Veins:Lower Extremities DVT Study, VASC EXTREMITY VENOUS DUPLEX BILATERAL. Vascular Sonographer Report  Impressions Right Impression Limited study. Patient was seizing during the scan and had an arterial line in the right groin that was actively bleeding. No evidence of deep vein or superficial vein thrombosis in the visualized vessels in the right leg. Left Impression Limited study. Patient was seizing during the scan. No evidence of deep vein or superficial vein thrombosis in the visualized vessels in the left leg. Conclusions   Summary   Limited study. Patient was seizing during the scan. No evidence of deep vein or superficial vein thrombosis in the visualized  vessels in bilateral legs. Signature   ------------------------------------------------------------------  Electronically signed by Johanny Lin MD (Interpreting  physician) on 09/11/2020 at 05:31 PM  ------------------------------------------------------------------  Patient Status:Routine. Study [facility] - Vascular Lab. Technical Quality:Limited visualization.  Risk Factors History +---------+----+---------------------------------+ ! Diagnosis! Date! Comments                         ! +---------+----+---------------------------------+ ! Other    ! ! Factor V Leiden Clotting Disorder! +---------+----+---------------------------------+ ! Other    ! !DVT/SVT in left leg in 1999      ! +---------+----+---------------------------------+ Velocities are measured in cm/s ; Diameters are measured in mm Right Lower Extremities DVT Study Measurements Right 2D Measurements +--------------+----------+---------------+----------+ ! Location      ! Visualized! Compressibility! Thrombosis! +--------------+----------+---------------+----------+ ! Prox Femoral  !Yes       ! Yes            ! None      ! +--------------+----------+---------------+----------+ ! Mid Femoral   !Yes       ! Yes            ! None      ! +--------------+----------+---------------+----------+ ! Dist Femoral  !Yes       ! Yes            ! None      ! +--------------+----------+---------------+----------+ ! Popliteal     !Yes       ! Yes            ! None      ! +--------------+----------+---------------+----------+ ! GSV Below Knee! Yes       ! Yes            ! None      ! +--------------+----------+---------------+----------+ ! Soleal        !Yes       ! Yes            ! None      ! +--------------+----------+---------------+----------+ ! PTV           ! Partial   !Yes            ! None      ! +--------------+----------+---------------+----------+ ! ATV           ! Yes       ! Yes            ! None      ! +--------------+----------+---------------+----------+ ! Peroneal      !Partial   !Yes            ! None      ! +--------------+----------+---------------+----------+ ! GSV Calf      ! Yes       ! Yes            ! None      ! +--------------+----------+---------------+----------+ Right Doppler Measurements +---------+------+------+------------+ ! Location ! Signal!Reflux! Reflux (sec)! +---------+------+------+------------+ ! Popliteal!Phasic!      !            ! +---------+------+------+------------+ Left Lower Extremities DVT Study Measurements Left 2D Measurements +---------+----------+---------------+----------+ ! Location ! Visualized! Compressibility! Thrombosis! +---------+----------+---------------+----------+ ! Popliteal!Yes       ! Yes            ! None      ! +---------+----------+---------------+----------+ ! Gastroc  ! Yes       ! Yes            ! None      ! +---------+----------+---------------+----------+ ! Soleal   !Yes       ! Yes            ! None      ! +---------+----------+---------------+----------+ ! PTV      ! Partial   !Yes            ! None      ! +---------+----------+---------------+----------+ ! ATV      ! Yes       ! Yes            ! None      ! +---------+----------+---------------+----------+ ! Peroneal !Partial   !Yes            ! None      ! +---------+----------+---------------+----------+ ! GSV Calf ! Yes       ! Yes            ! None      ! +---------+----------+---------------+----------+ ! SSV      ! Partial   !Yes            ! None      ! +---------+----------+---------------+----------+ Left Doppler Measurements +---------+------+------+------------+ ! Location ! Signal!Reflux! Reflux (sec)! +---------+------+------+------------+ ! Popliteal!Phasic!      !            ! +---------+------+------+------------+        Problem List  Patient Active Problem List   Diagnosis    Factor V Leiden mutation ([facility]    Pulmonary embolus (HCC)    Lactic acidosis    CAP (community acquired pneumonia)    Acute CHF (congestive heart failure) ([address]    Bilateral pulmonary embolism (HCC)    Cardiac arrest ([address]    Seizures ([address]    Acute respiratory failure with hypoxia (HCC)    Acute kidney injury ([address]    Metabolic acidosis    Biventricular failure ([address]    Pulmonary hypertension (HCC)    Multifocal pneumonia    Septic shock (HCC)    Elevated LFTs    Thrombocytopenia (HCC)    Coagulopathy (HCC)    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) ([address]  Acute encephalopathy       IMPRESSION/RECOMMENDATIONS:  COmplicated coagulopathy in setting of PEA arrest, PE< MRSA bacteremia severe sepsis critically ill pt   Upon admit-first set of coags would reflect coumadin use as ptt normal (we dont have a fibrinogen)   THen her next set suggests low fibrinogen with worsening ptt/pt-which could coincide with dic  (and heparin effect in the ptt )   Since heparin got turned off -her ptt is improved-there may have been some accurracy issues with the lab perhaps in the ptt eval when they came back very high     THe problem is she has a PE and she had a PEA arrest which leans me to try heparin ongoing -I have recommend we restart at low dose protocol without a bolus     Shse may have dic on top of this due to sepsis etc thus with treating the underlying illness this may improve on its own-with abx supportive care-plts are at least in good shape -    IF she starts bleeding on the heparin I would like her inr and her fibrinogen to e repeated as I may opt to give cryo for fibrinogen     The inr may go up from dic or shock liver or vit k def from the coumadin/abx use     Which brings this back around to potential purpura fulminans-I do not think she has protein c def as a diagnosis for her skin issues-this is a rare issue in pts with protein c def   ONe can acquire purpura fulminans but its usually from menigococcus  If this were a true case of this-typically one should give IV Vit k , but give heparin and protein c concentrate (we dont have that here ) but could give ffp if needed    AT this point I want to try to give heparin alone at a low dose to anticoagulate the PE   If we start running into bleeding I will consider giving cryo and maybe vit k=this may be a controversial decision but hopefully she will do fine on heparin overnight  I have been asked to be paged if she bleeds on the heparin     She is critically ill and there is a potential downside either way thus will retry anticoagulation and see how this goes     Thank you for the consultation.   Will follow with you    Marisela Oliveira MD  \

## 2020-09-11 NOTE — ED TRIAGE NOTES
Patient presents to ED for shortness of breath per EMS. Patient reports hx of asthma. Denies cardiac hx, denies chest pain. Reports increased swelling of belly and lower extremities x3 weeks. On arrival patient found to have HR of 170 via monitor. MD at bedside. Patient denies chest pain, denies palpitations.

## 2020-09-11 NOTE — ED NOTES
Bed: B-10  Expected date:   Expected time:   Means of arrival:   Comments:  Medic 28. .. Tali Meneses 51., RN  09/10/20 2033

## 2020-09-11 NOTE — PROGRESS NOTES
Hospitalist Progress Note      PCP: No primary care provider on file. Date of Admission: 9/10/2020    Chief Complaint: s/p cardiac arrest    Hospital Course:     Subjective: s/p cardiac arrest,        Medications:  Reviewed    Infusion Medications    norepinephrine 2 mcg/min (09/11/20 1618)    dextrose      sodium bicarbonate infusion 75 mL/hr at 09/11/20 1043    propofol 25 mcg/kg/min (09/11/20 1556)    propofol      heparin (Porcine) Stopped (09/11/20 1258)     Scheduled Medications    sodium chloride flush  10 mL Intravenous 2 times per day    pantoprazole  40 mg Intravenous Daily    And    sodium chloride (PF)  10 mL Intravenous Daily    chlorhexidine  15 mL Mouth/Throat BID    levetiracetam  1,000 mg Intravenous Q12H    vancomycin  1,250 mg Intravenous Once    vancomycin (VANCOCIN) intermittent dosing (placeholder)   Other RX Placeholder    [START ON 9/12/2020] meropenem  500 mg Intravenous Q12H     PRN Meds: sodium chloride flush, acetaminophen **OR** acetaminophen, perflutren lipid microspheres, ondansetron, LORazepam, perflutren lipid microspheres, glucose, dextrose, glucagon (rDNA), dextrose, propofol      Intake/Output Summary (Last 24 hours) at 9/11/2020 1811  Last data filed at 9/11/2020 1613  Gross per 24 hour   Intake 3580 ml   Output 355 ml   Net 3225 ml       Exam:    BP 99/65   Pulse 73   Temp 99.3 °F (37.4 °C) (Axillary)   Resp 20   Ht 5' 7\" (1.702 m)   Wt 250 lb 3.6 oz (113.5 kg)   LMP 12/05/2018 (Exact Date)   SpO2 98%   BMI 39.19 kg/m²     General appearance: Sedated intubated appears stated age and cooperative. HEENT: Pupils equal, round, and reactive to light. Conjunctivae/corneas clear. Neck: Supple, with full range of motion. No jugular venous distention. Trachea midline. Respiratory:  Normal respiratory effort. Clear to auscultation, bilaterally without Rales/Wheezes/Rhonchi.   Cardiovascular: Regular rate and rhythm with normal S1/S2 without murmurs, rubs or gallops. Abdomen: Soft, non-tender, non-distended with normal bowel sounds. Musculoskeletal: No clubbing, cyanosis or edema bilaterally. Full range of motion without deformity. Skin: Skin color, texture, turgor normal.  No rashes or lesions. Neurologic:  Sedated/intubated. Cranial nerves: II-XII intact, grossly non-focal.  Capillary Refill: Brisk,< 3 seconds   Peripheral Pulses: +2 palpable, equal bilaterally       Labs:   Recent Labs     09/11/20 0225 09/11/20 0621 09/11/20  1030   WBC 13.0* 24.8* 24.0*   HGB 12.3 12.1 10.7*   HCT 40.9 39.7 34.8*   * 166 133*     Recent Labs     09/11/20 0225 09/11/20 0621 09/11/20  1552   * 137 137   K 5.6* 5.4* 4.2    102 104   CO2 7* 11* 20*   BUN 27* 33* 43*   CREATININE 1.1 1.3* 2.1*   CALCIUM 7.2* 7.8* 7.4*   PHOS  --  7.1* 5.3*     Recent Labs     09/10/20  2052   *   *   BILITOT 1.0   ALKPHOS 123     Recent Labs     09/10/20  2052 09/11/20  1030 09/11/20  1550   INR 2.21* 4.18* 3.23*     Recent Labs     09/10/20  2052 09/11/20  0225   TROPONINI <0.01 0.02*       Urinalysis:      Lab Results   Component Value Date    NITRU Negative 09/10/2020    WBCUA 3-5 09/10/2020    BACTERIA 2+ 09/10/2020    RBCUA 21-50 09/10/2020    BLOODU MODERATE 09/10/2020    SPECGRAV >1.030 09/10/2020    GLUCOSEU Negative 09/10/2020       Radiology:  XR CHEST PORTABLE   Final Result   Satisfactory position of the endotracheal tube 3 cm above the rahat. The   heart is upper normal size and vessels are borderline congested. VL Extremity Venous Bilateral   Final Result      VL DUP LOWER EXTREMITY ARTERIES BILATERAL   Final Result      XR CHEST PORTABLE   Final Result   Endotracheal tube has tip approximately 1.3 cm proximal to the rahat and   should be retracted approximately 2 cm. Findings are again suggestive of pulmonary edema with small pleural   effusions. Pneumonia is not excluded in the appropriate clinical setting.       Findings were called by the radiology call center. CT HEAD WO CONTRAST   Final Result   Motion limited study. No convincing evidence for acute intracranial   abnormality identified within the limitations of motion artifact. If there is persistent clinical suspicion for intracranial abnormality,   repeat study should be considered. XR CHEST PORTABLE   Final Result   Cardiomegaly, pulmonary vascular congestion, and bilateral pleural effusions. Endotracheal tube terminates approximately 2 cm above the rahat. CT CHEST PULMONARY EMBOLISM W CONTRAST   Final Result   Addendum 1 of 1   ADDENDUM:   Findings were discussed with Dr. Loyde Cockayne at 10:49 pm on 9/10/2020.          Final      MRI BRAIN WO CONTRAST    (Results Pending)   XR CHEST PORTABLE    (Results Pending)   XR CHEST PORTABLE    (Results Pending)           Assessment/Plan:    Active Hospital Problems    Diagnosis Date Noted    Cardiac arrest ([address] [I46.9] 09/11/2020    Seizures ([address] [R56.9] 09/11/2020    Acute respiratory failure with hypoxia (HCC) [J96.01] 09/11/2020    Acute kidney injury ([address] [C17.5]     Metabolic acidosis [Y92.3]     Biventricular failure (HCC) [I50.82]     Pulmonary hypertension (HCC) [I27.20]     Multifocal pneumonia [J18.9]     Septic shock (HCC) [A41.9, R65.21]     Elevated LFTs [R94.5]     Thrombocytopenia ([address] [D69.6]     Coagulopathy ([address] [D68.9]     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) ([address] [D65]     Acute encephalopathy [G93.40]     Pulmonary embolus ([address] [I26.99] 09/10/2020    Lactic acidosis [E87.2] 09/10/2020    CAP (community acquired pneumonia) [J18.9] 09/10/2020    Acute CHF (congestive heart failure) ([address] [I50.9] 09/10/2020    Bilateral pulmonary embolism ([address] [I26.99] 09/10/2020    Factor V Leiden mutation [facility]) [D68.51] 11/13/2014       Shock:  Septic vs cardiogenic  - severe mitral and tricuspid regurgitation on TTE, moderate pulmonary HTN, large bilateral pleural effusions and hepatic congestion on CT   - MRSA bacteremia, skin mottling, concern for potential coagulopathy with DIC  - cont Levophed, wean as tolerated  - cont vanc and broad spectrum with meropenem, diflucan    Cardiomypathy:  - severe diffuse hypokinesis with severely reduced rt ventricular function  - severe tricuspid regurgitation and mitral regurgitation  - grade 3 diastolic dysfunction  - trialing lasix    MRSA bacteremia:  - cont vanc and broad spectrum with meropenem, diflucan    PEA arrest:  - intubated on 9/10 shortly after admission  - cont ventilator management per pulm/critical care    Bilateral pulmonary emboli:  - 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  - heparin drip per heme/onc    Coagulopathy:  Acute and chronic  - chronically has Factor V Leiden, on coumadin  - has had oozing on heparin drip, sepsis with bacteremia --> potential DIC or shock liver or vitamin K deficiency from coumadin/antibiotic use  - heme/onc recs:  if bleeding on heparin, check INR and fibrinogen for potential cryo therapy or fibrinogen    Oliguric MORELIA:  - trial lasix in attempts to make non-oliguric  - ZENON, ANCA, complement levels, antiphospholipid antibodies per nephro  - low threshold to dialyse if     DVT Prophylaxis: heparin drip  Diet: Diet NPO Effective Now  Code Status: Full Code    PT/OT Eval Status: pending improvement    Dispo - ICU    Benigno Andino MD

## 2020-09-11 NOTE — CODE DOCUMENTATION
Pt screamed out having hard time breathing, HR dropped into the 40s. Possible seziure.  Prior to code

## 2020-09-11 NOTE — PROCEDURES
Intubation Procedure Note    Indication: impending respiratory failure    Consent: Unable to be obtained due to the emergent nature of this procedure. Medications Used: Unable to premedicate due to the emergent nature of the procedure    Procedure: The patient was placed in the appropriate position. Cricoid pressure was not required. Intubation was performed by direct laryngoscopy using a laryngoscope and a 8.0 cuffed endotracheal tube. The cuff was then inflated and the tube was secured appropriately at a distance of 24 cm to the dental ridge. Initial confirmation of placement included bilateral breath sounds. A chest x-ray to verify correct placement of the tube has been ordered but is still pending. The patient tolerated the procedure well.      Complications: None

## 2020-09-11 NOTE — PROGRESS NOTES
1602- Levophed restarted. See eMAR. 36- Dr. Hannah Weiner at bedside to place dialysis catheter. 1720- Verbal order to transduce CVP pressure through vas cath per Dr. Hannah Weiner.

## 2020-09-11 NOTE — PROGRESS NOTES
4 Eyes Skin Assessment     The patient is being assess for  Admission    I agree that 2 RN's have performed a thorough Head to Toe Skin Assessment on the patient. ALL assessment sites listed below have been assessed. Areas assessed by both nurses:   [x]   Head, Face, and Ears   [x]   Shoulders, Back, and Chest  [x]   Arms, Elbows, and Hands   [x]   Coccyx, Sacrum, and IschIum  [x]   Legs, Feet, and Heels        Does the Patient have Skin Breakdown?   No , BLE mottled and feet with purple color        Cash Prevention initiated:  Yes   Wound Care Orders initiated:  No      WOC nurse consulted for Pressure Injury (Stage 3,4, Unstageable, DTI, NWPT, and Complex wounds), New and Established Ostomies:  No      Nurse 1 eSignature: Electronically signed by Lelo Castaneda RN on 9/11/20 at 5:07 AM EDT    **SHARE this note so that the co-signing nurse is able to place an eSignature**    Nurse 2 eSignature: Electronically signed by Angelina Major RN on 9/11/20 at 5:42 AM EDT

## 2020-09-11 NOTE — PROGRESS NOTES
Aware of HF RN consult received from Dr Lalit Morrell for pt with new diagnosis CHF. Chart reviewed and events noted. Pt remains intubated at this time and is having HD initiated. HF RNs will continue to follow peripherally until appropriate time for education. HF measures have been added: daily weights, I/O, will monitor diet as she is currently NPO. HF careplan is current.   HF instructions have been added to AVS.

## 2020-09-11 NOTE — ED NOTES
ED SBAR report provider to MITCHEL, RN at bedside. Patient to be transported to Room 2104 via stretcher by this RN and Manisha López. Patient transported with bedside cardiac monitor and with IV medications infusing. IV site clean, dry, and intact. MEWS score and pain assessed as 0/10 and documented. Updated patient on plan of care. Patient contacted  and notified him of admission plan.        Kell Denney RN  09/11/20 5837

## 2020-09-11 NOTE — CODE DOCUMENTATION
Hospital Medicine Code Blue/RRT Note    Patient: Virgil Walsh  MRN: [id number]    Gender: female : 1970 Age: 53 y.o. Code Status: Full Code    Brief Note: Called urgently to bedside for acute loss of consciousness. In brief, patient was admitted for tachycardia and placed on diltiazem drip and notably had bilateral pulmonary emboli, bilateral pleural effusions suggestive of heart failure/volume overload, and findings of kidney acquired pneumonia and received antibiotics. Patient was brought up from the ED on heparin and diltiazem drips, CODE BLUE was called initially in error but I was at bedside and witnessed patient was alert but not oriented and was attempting to fight with staff in order to take off her oxygen but was clearly not reasonable nor at her original baseline mental status as I had seen her in in the ED. Attempts to calm patient down, her heart rate was still skyrocketing in the 160s showing sinus tach versus SVT. Blood pressures were notably doing fine. Attempted to give patient some oxygen but then she slumped over and started bubbling at the mouth but not bleeding or vomiting. Patient was notably very minimally responsive and not even really responsive to pain. Breathing was initially intact but concern for patient then having some shaking activity and pupils becoming dilated, gave 1 mg of IV Ativan initially. Attempted to then lower patient down and reposition her to the head of the bed. Pulses were checked thought initially to be felt but later very soon pulses were lost and patient's heart rate seemingly dropped down into the 40s to 60s. ASHKAN DELGADO was recalled and patient was already on telemetry pads and was having chest compressions done for 2 minutes. Pulse was rechecked and was not felt and was still in a pulseless electrical activity rhythm. Decision given to give 1 dose of epinephrine. And resume compressions.   I proceeded to then intubate the patient without any sedative medications, initially had intended to intubate patient with sedatives prior to the code event in and of itself. Intubated with a glide scope without much difficulty. Patient was notably felt to have a pulse after intubation. Vital signs still noted blood pressures now dropped into the low 70s with poor maps, decision to stop diltiazem drip and start Precedex, fentanyl IV infusion, Levophed. Called down to ED for ER physician Dr. Mel Augustin to perform arterial line as well as a femoral central line. Pressures were maintained on Levophed although at borderline levels. Decision made to get stat labs, notable ABG showed new acidosis as well as significant metabolic derangement with low bicarb, elevated lactic acid, and leukocytosis. Stat chest x-ray showed placement of the ET tube, cardiomegaly, pulmonary vascular congestion, bilateral pleural effusions. Once stable, took patient down for a stat CT head without contrast that showed no evidence for any intracranial abnormality and no bleeding. Patient has been maintained on heparin drip, bicarbonate infusion, sedative medications as above, antibiotics. Later while patient was intubated did note patient was showing more signs of seizure-like activity even while being sedated on Precedex. Decided to give 2 mg of IV Ativan and start Keppra loading dose 1000 mg. Neurology and cardiology have both been consulted. ACLS protocol utilized: Yes    Intubated :  yes   Central line or IV placed:  yes  Please see separate note for any procedures done.         Vitals:    Vitals:    09/11/20 0421   BP:    Pulse: 87   Resp: 27   Temp:    SpO2: 90%       Pertinent exam:    Gen: Agitated, then proceeded to lose consciousness  Neck: supple, no JVD  Heart: Tachycardic, felt to be regular  Lungs: Diminished, crackles to all fields  Abd: Soft but distended belly, possible anasarca versus fluid wave  Extrem: Bilateral lower extremity

## 2020-09-11 NOTE — CONSULTS
Clinical Pharmacy Note  Vancomycin Consult    Jud Lund is a 53 y.o. female ordered Vancomycin for MRSA bacteremia; consult received from Dr. Reyes Quan to manage therapy. Also receiving meropenem. Patient Active Problem List   Diagnosis    [facility] Le[facility]    Pulmonary embolus (HCC)    Lactic acidosis    CAP (community acquired pneumonia)    Acute CHF (congestive heart failure) (HCC)    Bilateral pulmonary embolism (HCC)    Cardiac arrest ([facility]    Seizures (HCC)    Acute respiratory failure with hypoxia (HCC)    [facility]    Metabolic acidosis    Biventricular failure (HCC)    Pulmonary hypertension (HCC)    Multifocal pneumonia    Septic shock (HCC)    Elevated LFTs    Thrombocytopenia (HCC)    Coagulopathy (HCC)    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)    Acute encephalopathy       Allergies:  Ibuprofen     Temp max:  Temp (24hrs), Av.9 °F (37.2 °C), Min:98 °F (36.7 °C), Max:100.3 °F (37.9 °C)      Recent Labs     20  0225 20  0621 20  1030   WBC 13.0* 24.8* 24.0*       Recent Labs     09/10/20  2052 20  0225 20  0621   BUN 28* 27* 33*   CREATININE 0.9 1.1 1.3*         Intake/Output Summary (Last 24 hours) at 2020 1618  Last data filed at 2020 1613  Gross per 24 hour   Intake 3580 ml   Output 205 ml   Net 3375 ml       Culture Results:  09/10/20 Blood: MRSA   09/10/20 Respiratory: pending     Ht Readings from Last 1 Encounters:   20 5' 7\" (1.702 m)        Wt Readings from Last 1 Encounters:   20 250 lb 3.6 oz (113.5 kg)         Estimated Creatinine Clearance: 68 mL/min (A) (based on SCr of 1.3 mg/dL (H)). Assessment/Plan:  Day # 1 of vancomycin. Plan for intermittent dosing based on levels due to MORELIA. Vancomycin 1250 mg IV x 1 today. Random vancomycin level in AM.    Thank you for the consult.      Stefany Cox, PharmD, [facility]

## 2020-09-11 NOTE — PROGRESS NOTES
4701: Pt in ICU and handoff report with ED, RNS. Very anxious and restless, complaining of SOB; HOB elevated and waited until pt calmed down. zitromax and 500ml NS bolus running at this time. 0055: Pt stop responding while the HOB elevated and sternal rubs done but still not responding; HR dropped from 160's to 40's. Code blue activated. RT and hospitalist at bedside. 0100: pt started to respond ; 's; RT attempted nonrebreather and NC , pt took off and screaming for SOB; Dr. Trini Bacon attempted to calm her down and put NC but unsuccessful.   0105: Pt again stop responding and PEA; chest compression started; see code notes. 0307: hasband called; plan of care updated and all questions answered  0340: Pt to CT scan with two RNs and RT.   0355: Pt back from CT and tolerated well. Incontinent of BM and cleaned. 2601: Critical care perfectserved and upated. No new orders. 0175: pt has seizure like activity; hospitalist at bedside. 2mg of Ativan given Per MD order.

## 2020-09-11 NOTE — PROGRESS NOTES
Clinical Pharmacy Note  Heparin Dosing       Lab Results   Component Value Date    APTT 27.9 09/10/2020     Lab Results   Component Value Date    HGB 13.0 09/10/2020    HCT 42.3 09/10/2020     09/10/2020    INR 2.21 09/10/2020       Plan:  No initial bolus  Initial infusion rate: 14.9 mL/hr  Next aPTT: 0700 9/11/20    Pharmacy will continue to monitor and adjust based on aPTT results.   Alyssa Diamond, PharmD

## 2020-09-11 NOTE — PROGRESS NOTES
Late entry: This RN; Gerard Rojas, RN; Mika, RN; MITCHEL, RN; Tiffanie Emmanuel, RN; Ever Salmeron, RN; Adebayo, RN; Maralee Litten, RN; Malu Ballard, RT; Dylon Viramontes RT; Riki [facility] Chente; Vale RT; and Dr. Karen Lora at bedside. 0105: Pt in PEA. CPR started. 0107: Pulse check. PEA. Compressions continued. 0108: 1mg epi given. Compressions continued. 0109: Pulse check. . ROSC achieved.     Electronically signed by Jazmine Bolden RN on 9/11/2020 at 3:16 AM

## 2020-09-11 NOTE — ED NOTES
20mg cardizem bolus given with verbal orders from Dr. Sheyla Sweeney.  Double checked by Charanjit Pantoja RN  09/10/20 6848

## 2020-09-12 ENCOUNTER — APPOINTMENT (OUTPATIENT)
Dept: GENERAL RADIOLOGY | Age: 50
DRG: 130 | End: 2020-09-12
Payer: COMMERCIAL

## 2020-09-12 LAB
A/G RATIO: 1.1 (ref 1.1–2.2)
ALBUMIN SERPL-MCNC: 2.2 G/DL (ref 3.4–5)
ALP BLD-CCNC: 108 U/L (ref 40–129)
ALT SERPL-CCNC: 1486 U/L (ref 10–40)
ANION GAP SERPL CALCULATED.3IONS-SCNC: 12 MMOL/L (ref 3–16)
ANION GAP SERPL CALCULATED.3IONS-SCNC: 4 MMOL/L (ref 3–16)
ANION GAP SERPL CALCULATED.3IONS-SCNC: 7 MMOL/L (ref 3–16)
ANTI-NUCLEAR ANTIBODY (ANA): NEGATIVE
APTT: 127.5 SEC (ref 24.2–36.2)
APTT: 42.8 SEC (ref 24.2–36.2)
APTT: 50.7 SEC (ref 24.2–36.2)
AST SERPL-CCNC: 1963 U/L (ref 15–37)
BASE EXCESS ARTERIAL: 4.6 MMOL/L (ref -3–3)
BASE EXCESS ARTERIAL: 5.5 MMOL/L (ref -3–3)
BASOPHILS ABSOLUTE: 0 K/UL (ref 0–0.2)
BASOPHILS RELATIVE PERCENT: 0.3 %
BILIRUB SERPL-MCNC: 1.3 MG/DL (ref 0–1)
BUN BLDV-MCNC: 22 MG/DL (ref 7–20)
BUN BLDV-MCNC: 26 MG/DL (ref 7–20)
BUN BLDV-MCNC: 32 MG/DL (ref 7–20)
CALCIUM IONIZED: 0.99 MMOL/L (ref 1.12–1.32)
CALCIUM IONIZED: 1.05 MMOL/L (ref 1.12–1.32)
CALCIUM SERPL-MCNC: 6.8 MG/DL (ref 8.3–10.6)
CALCIUM SERPL-MCNC: 7 MG/DL (ref 8.3–10.6)
CALCIUM SERPL-MCNC: 7.2 MG/DL (ref 8.3–10.6)
CARBOXYHEMOGLOBIN ARTERIAL: 1 % (ref 0–1.5)
CARBOXYHEMOGLOBIN ARTERIAL: 1.4 % (ref 0–1.5)
CHLORIDE BLD-SCNC: 100 MMOL/L (ref 99–110)
CHLORIDE BLD-SCNC: 101 MMOL/L (ref 99–110)
CHLORIDE BLD-SCNC: 104 MMOL/L (ref 99–110)
CO2: 24 MMOL/L (ref 21–32)
CO2: 28 MMOL/L (ref 21–32)
CO2: 29 MMOL/L (ref 21–32)
CREAT SERPL-MCNC: 1.2 MG/DL (ref 0.6–1.1)
CREAT SERPL-MCNC: 1.5 MG/DL (ref 0.6–1.1)
CREAT SERPL-MCNC: 1.8 MG/DL (ref 0.6–1.1)
EOSINOPHILS ABSOLUTE: 0 K/UL (ref 0–0.6)
EOSINOPHILS RELATIVE PERCENT: 0 %
FIBRINOGEN: 121 MG/DL (ref 200–397)
FIBRINOGEN: 121 MG/DL (ref 200–397)
FIBRINOGEN: 122 MG/DL (ref 200–397)
FIBRINOGEN: 127 MG/DL (ref 200–397)
GFR AFRICAN AMERICAN: 36
GFR AFRICAN AMERICAN: 45
GFR AFRICAN AMERICAN: 58
GFR NON-AFRICAN AMERICAN: 30
GFR NON-AFRICAN AMERICAN: 37
GFR NON-AFRICAN AMERICAN: 48
GLOBULIN: 2 G/DL
GLUCOSE BLD-MCNC: 102 MG/DL (ref 70–99)
GLUCOSE BLD-MCNC: 109 MG/DL (ref 70–99)
GLUCOSE BLD-MCNC: 117 MG/DL (ref 70–99)
GLUCOSE BLD-MCNC: 125 MG/DL (ref 70–99)
GLUCOSE BLD-MCNC: 134 MG/DL (ref 70–99)
GLUCOSE BLD-MCNC: 98 MG/DL (ref 70–99)
HAPTOGLOBIN: 28 MG/DL (ref 30–200)
HAPTOGLOBIN: 28 MG/DL (ref 30–200)
HAPTOGLOBIN: 29 MG/DL (ref 30–200)
HAPTOGLOBIN: 31 MG/DL (ref 30–200)
HBV SURFACE AB TITR SER: 9.16 MIU/ML
HCO3 ARTERIAL: 28.4 MMOL/L (ref 21–29)
HCO3 ARTERIAL: 28.6 MMOL/L (ref 21–29)
HCT VFR BLD CALC: 31.3 % (ref 36–48)
HEMOGLOBIN, ART, EXTENDED: 11.4 G/DL (ref 12–16)
HEMOGLOBIN, ART, EXTENDED: 9.4 G/DL (ref 12–16)
HEMOGLOBIN: 9.9 G/DL (ref 12–16)
HEPATITIS B SURFACE ANTIGEN INTERPRETATION: NORMAL
INR BLD: 2.26 (ref 0.86–1.14)
INR BLD: 2.55 (ref 0.86–1.14)
INR BLD: 3.18 (ref 0.86–1.14)
INR BLD: 3.24 (ref 0.86–1.14)
LACTIC ACID: 1.7 MMOL/L (ref 0.4–2)
LACTIC ACID: 2 MMOL/L (ref 0.4–2)
LYMPHOCYTES ABSOLUTE: 0.8 K/UL (ref 1–5.1)
LYMPHOCYTES RELATIVE PERCENT: 4.8 %
MAGNESIUM: 1.8 MG/DL (ref 1.8–2.4)
MAGNESIUM: 2.2 MG/DL (ref 1.8–2.4)
MCH RBC QN AUTO: 26.6 PG (ref 26–34)
MCHC RBC AUTO-ENTMCNC: 31.7 G/DL (ref 31–36)
MCV RBC AUTO: 83.8 FL (ref 80–100)
METHEMOGLOBIN ARTERIAL: 0.4 %
METHEMOGLOBIN ARTERIAL: 0.9 %
MONOCYTES ABSOLUTE: 0.5 K/UL (ref 0–1.3)
MONOCYTES RELATIVE PERCENT: 2.9 %
NEUTROPHILS ABSOLUTE: 14.7 K/UL (ref 1.7–7.7)
NEUTROPHILS RELATIVE PERCENT: 92 %
O2 CONTENT ARTERIAL: 13 ML/DL
O2 CONTENT ARTERIAL: 15 ML/DL
O2 SAT, ARTERIAL: 95.2 %
O2 SAT, ARTERIAL: 98.8 %
O2 THERAPY: ABNORMAL
O2 THERAPY: ABNORMAL
PCO2 ARTERIAL: 35.4 MMHG (ref 35–45)
PCO2 ARTERIAL: 37.8 MMHG (ref 35–45)
PDW BLD-RTO: 18.4 % (ref 12.4–15.4)
PERFORMED ON: ABNORMAL
PERFORMED ON: ABNORMAL
PERFORMED ON: NORMAL
PH ARTERIAL: 7.48 (ref 7.35–7.45)
PH ARTERIAL: 7.51 (ref 7.35–7.45)
PH VENOUS: 7.42 (ref 7.35–7.45)
PH VENOUS: 7.43 (ref 7.35–7.45)
PHOSPHORUS: 2.6 MG/DL (ref 2.5–4.9)
PHOSPHORUS: 3.6 MG/DL (ref 2.5–4.9)
PLATELET # BLD: 101 K/UL (ref 135–450)
PMV BLD AUTO: 8.8 FL (ref 5–10.5)
PO2 ARTERIAL: 100 MMHG (ref 75–108)
PO2 ARTERIAL: 75.1 MMHG (ref 75–108)
POTASSIUM SERPL-SCNC: 3.3 MMOL/L (ref 3.5–5.1)
POTASSIUM SERPL-SCNC: 3.7 MMOL/L (ref 3.5–5.1)
POTASSIUM SERPL-SCNC: 3.9 MMOL/L (ref 3.5–5.1)
PROTHROMBIN TIME: 26.4 SEC (ref 10–13.2)
PROTHROMBIN TIME: 29.9 SEC (ref 10–13.2)
PROTHROMBIN TIME: 37.3 SEC (ref 10–13.2)
PROTHROMBIN TIME: 38 SEC (ref 10–13.2)
RBC # BLD: 3.74 M/UL (ref 4–5.2)
SODIUM BLD-SCNC: 134 MMOL/L (ref 136–145)
SODIUM BLD-SCNC: 136 MMOL/L (ref 136–145)
SODIUM BLD-SCNC: 139 MMOL/L (ref 136–145)
TCO2 ARTERIAL: 29.5 MMOL/L
TCO2 ARTERIAL: 29.7 MMOL/L
TOTAL PROTEIN: 4.2 G/DL (ref 6.4–8.2)
VANCOMYCIN RANDOM: 11.2 UG/ML
VANCOMYCIN RANDOM: 11.4 UG/ML
WBC # BLD: 16 K/UL (ref 4–11)

## 2020-09-12 PROCEDURE — 6360000002 HC RX W HCPCS: Performed by: INTERNAL MEDICINE

## 2020-09-12 PROCEDURE — 94761 N-INVAS EAR/PLS OXIMETRY MLT: CPT

## 2020-09-12 PROCEDURE — 83735 ASSAY OF MAGNESIUM: CPT

## 2020-09-12 PROCEDURE — 6360000002 HC RX W HCPCS: Performed by: NURSE PRACTITIONER

## 2020-09-12 PROCEDURE — 2500000003 HC RX 250 WO HCPCS: Performed by: INTERNAL MEDICINE

## 2020-09-12 PROCEDURE — 99291 CRITICAL CARE FIRST HOUR: CPT | Performed by: INTERNAL MEDICINE

## 2020-09-12 PROCEDURE — 2580000003 HC RX 258: Performed by: INTERNAL MEDICINE

## 2020-09-12 PROCEDURE — 36592 COLLECT BLOOD FROM PICC: CPT

## 2020-09-12 PROCEDURE — 5A1D90Z PERFORMANCE OF URINARY FILTRATION, CONTINUOUS, GREATER THAN 18 HOURS PER DAY: ICD-10-PCS | Performed by: INTERNAL MEDICINE

## 2020-09-12 PROCEDURE — 99233 SBSQ HOSP IP/OBS HIGH 50: CPT | Performed by: INTERNAL MEDICINE

## 2020-09-12 PROCEDURE — 85730 THROMBOPLASTIN TIME PARTIAL: CPT

## 2020-09-12 PROCEDURE — 2700000000 HC OXYGEN THERAPY PER DAY

## 2020-09-12 PROCEDURE — 2000000000 HC ICU R&B

## 2020-09-12 PROCEDURE — 80053 COMPREHEN METABOLIC PANEL: CPT

## 2020-09-12 PROCEDURE — 83010 ASSAY OF HAPTOGLOBIN QUANT: CPT

## 2020-09-12 PROCEDURE — 94750 HC PULMONARY COMPLIANCE STUDY: CPT

## 2020-09-12 PROCEDURE — 2580000003 HC RX 258: Performed by: STUDENT IN AN ORGANIZED HEALTH CARE EDUCATION/TRAINING PROGRAM

## 2020-09-12 PROCEDURE — 85384 FIBRINOGEN ACTIVITY: CPT

## 2020-09-12 PROCEDURE — 85025 COMPLETE CBC W/AUTO DIFF WBC: CPT

## 2020-09-12 PROCEDURE — APPNB15 APP NON BILLABLE TIME 0-15 MINS: Performed by: NURSE PRACTITIONER

## 2020-09-12 PROCEDURE — 82330 ASSAY OF CALCIUM: CPT

## 2020-09-12 PROCEDURE — 94003 VENT MGMT INPAT SUBQ DAY: CPT

## 2020-09-12 PROCEDURE — 90945 DIALYSIS ONE EVALUATION: CPT

## 2020-09-12 PROCEDURE — 2500000003 HC RX 250 WO HCPCS

## 2020-09-12 PROCEDURE — 71045 X-RAY EXAM CHEST 1 VIEW: CPT

## 2020-09-12 PROCEDURE — 87040 BLOOD CULTURE FOR BACTERIA: CPT

## 2020-09-12 PROCEDURE — 80202 ASSAY OF VANCOMYCIN: CPT

## 2020-09-12 PROCEDURE — 85610 PROTHROMBIN TIME: CPT

## 2020-09-12 PROCEDURE — 83605 ASSAY OF LACTIC ACID: CPT

## 2020-09-12 PROCEDURE — 36415 COLL VENOUS BLD VENIPUNCTURE: CPT

## 2020-09-12 PROCEDURE — C9113 INJ PANTOPRAZOLE SODIUM, VIA: HCPCS | Performed by: INTERNAL MEDICINE

## 2020-09-12 PROCEDURE — 84100 ASSAY OF PHOSPHORUS: CPT

## 2020-09-12 PROCEDURE — 82803 BLOOD GASES ANY COMBINATION: CPT

## 2020-09-12 RX ORDER — MAGNESIUM SULFATE IN WATER 40 MG/ML
2 INJECTION, SOLUTION INTRAVENOUS ONCE
Status: COMPLETED | OUTPATIENT
Start: 2020-09-12 | End: 2020-09-12

## 2020-09-12 RX ORDER — CALCIUM GLUCONATE 20 MG/ML
1 INJECTION, SOLUTION INTRAVENOUS PRN
Status: DISCONTINUED | OUTPATIENT
Start: 2020-09-12 | End: 2020-09-15

## 2020-09-12 RX ORDER — MAGNESIUM SULFATE 1 G/100ML
1 INJECTION INTRAVENOUS PRN
Status: DISCONTINUED | OUTPATIENT
Start: 2020-09-12 | End: 2020-09-18

## 2020-09-12 RX ORDER — POTASSIUM CHLORIDE 29.8 MG/ML
20 INJECTION INTRAVENOUS PRN
Status: DISCONTINUED | OUTPATIENT
Start: 2020-09-12 | End: 2020-09-18

## 2020-09-12 RX ORDER — HEPARIN SODIUM 1000 [USP'U]/ML
2000 INJECTION, SOLUTION INTRAVENOUS; SUBCUTANEOUS ONCE
Status: COMPLETED | OUTPATIENT
Start: 2020-09-12 | End: 2020-09-12

## 2020-09-12 RX ADMIN — Medication 1500 ML/HR: at 23:27

## 2020-09-12 RX ADMIN — SODIUM CHLORIDE, PRESERVATIVE FREE 10 ML: 5 INJECTION INTRAVENOUS at 21:08

## 2020-09-12 RX ADMIN — VANCOMYCIN HYDROCHLORIDE 1250 MG: 10 INJECTION, POWDER, LYOPHILIZED, FOR SOLUTION INTRAVENOUS at 08:25

## 2020-09-12 RX ADMIN — POTASSIUM CHLORIDE 20 MEQ: 29.8 INJECTION, SOLUTION INTRAVENOUS at 09:03

## 2020-09-12 RX ADMIN — Medication 10 ML: at 08:29

## 2020-09-12 RX ADMIN — Medication 500 ML/HR: at 20:16

## 2020-09-12 RX ADMIN — Medication 1000 ML/HR: at 15:30

## 2020-09-12 RX ADMIN — Medication 1500 ML/HR: at 13:00

## 2020-09-12 RX ADMIN — HEPARIN SODIUM 2000 UNITS: 1000 INJECTION INTRAVENOUS; SUBCUTANEOUS at 21:45

## 2020-09-12 RX ADMIN — Medication 1500 ML/HR: at 20:01

## 2020-09-12 RX ADMIN — MEROPENEM 500 MG: 500 INJECTION, POWDER, FOR SOLUTION INTRAVENOUS at 20:39

## 2020-09-12 RX ADMIN — Medication 1500 ML/HR: at 10:14

## 2020-09-12 RX ADMIN — HEPARIN SODIUM 3.2 ML/HR: 10000 INJECTION, SOLUTION INTRAVENOUS at 08:56

## 2020-09-12 RX ADMIN — LEVETIRACETAM 1000 MG: 10 INJECTION INTRAVENOUS at 12:53

## 2020-09-12 RX ADMIN — Medication 500 ML/HR: at 10:15

## 2020-09-12 RX ADMIN — PROPOFOL 25 MCG/KG/MIN: 10 INJECTION, EMULSION INTRAVENOUS at 04:52

## 2020-09-12 RX ADMIN — LEVETIRACETAM 1000 MG: 10 INJECTION INTRAVENOUS at 23:10

## 2020-09-12 RX ADMIN — MEROPENEM 500 MG: 500 INJECTION, POWDER, FOR SOLUTION INTRAVENOUS at 17:03

## 2020-09-12 RX ADMIN — PROPOFOL 25 MCG/KG/MIN: 10 INJECTION, EMULSION INTRAVENOUS at 10:15

## 2020-09-12 RX ADMIN — PANTOPRAZOLE SODIUM 40 MG: 40 INJECTION, POWDER, FOR SOLUTION INTRAVENOUS at 09:03

## 2020-09-12 RX ADMIN — MEROPENEM 500 MG: 500 INJECTION, POWDER, FOR SOLUTION INTRAVENOUS at 04:14

## 2020-09-12 RX ADMIN — SODIUM CHLORIDE, PRESERVATIVE FREE 10 ML: 5 INJECTION INTRAVENOUS at 08:27

## 2020-09-12 RX ADMIN — CHLORHEXIDINE GLUCONATE 15 ML: 0.12 RINSE ORAL at 08:29

## 2020-09-12 RX ADMIN — Medication 1000 ML/HR: at 10:14

## 2020-09-12 RX ADMIN — CHLORHEXIDINE GLUCONATE 15 ML: 0.12 RINSE ORAL at 21:08

## 2020-09-12 RX ADMIN — POTASSIUM CHLORIDE 20 MEQ: 29.8 INJECTION, SOLUTION INTRAVENOUS at 10:08

## 2020-09-12 RX ADMIN — CALCIUM GLUCONATE 1 G: 20 INJECTION, SOLUTION INTRAVENOUS at 18:51

## 2020-09-12 RX ADMIN — PROPOFOL 25 MCG/KG/MIN: 10 INJECTION, EMULSION INTRAVENOUS at 00:22

## 2020-09-12 RX ADMIN — PROPOFOL 25 MCG/KG/MIN: 10 INJECTION, EMULSION INTRAVENOUS at 21:30

## 2020-09-12 RX ADMIN — SODIUM BICARBONATE: 84 INJECTION, SOLUTION INTRAVENOUS at 02:24

## 2020-09-12 RX ADMIN — CALCIUM GLUCONATE 1 G: 20 INJECTION, SOLUTION INTRAVENOUS at 14:42

## 2020-09-12 RX ADMIN — Medication 1000 ML/HR: at 20:16

## 2020-09-12 RX ADMIN — MAGNESIUM SULFATE IN WATER 2 G: 40 INJECTION, SOLUTION INTRAVENOUS at 08:56

## 2020-09-12 RX ADMIN — PROPOFOL 120 MG: 10 INJECTION, EMULSION INTRAVENOUS at 16:24

## 2020-09-12 RX ADMIN — Medication 1500 ML/HR: at 16:36

## 2020-09-12 ASSESSMENT — PULMONARY FUNCTION TESTS
PIF_VALUE: 22
PIF_VALUE: 20
PIF_VALUE: 21
PIF_VALUE: 18
PIF_VALUE: 20
PIF_VALUE: 19
PIF_VALUE: 21
PIF_VALUE: 20
PIF_VALUE: 22
PIF_VALUE: 21
PIF_VALUE: 20
PIF_VALUE: 21
PIF_VALUE: 19
PIF_VALUE: 21
PIF_VALUE: 20
PIF_VALUE: 19
PIF_VALUE: 19
PIF_VALUE: 22
PIF_VALUE: 21
PIF_VALUE: 20
PIF_VALUE: 19
PIF_VALUE: 20
PIF_VALUE: 21
PIF_VALUE: 19
PIF_VALUE: 20
PIF_VALUE: 29
PIF_VALUE: 21
PIF_VALUE: 19

## 2020-09-12 ASSESSMENT — PAIN SCALES - GENERAL
PAINLEVEL_OUTOF10: 0

## 2020-09-12 NOTE — PROGRESS NOTES
[facility]  [facility]  Progress Note      SUBJECTIVE:   PT sedated /intubated  She remains on heparin since last night-  She has no obvious bleeding from iV lines  INR in the 3 range   FIbrinogen at 122  PTT monitered  Cbc pending  TO me her skin looks a bit better-  NO hematuria   OBJECTIVE      Medications    Current Facility-Administered Medications: sodium chloride flush 0.9 % injection 10 mL, 10 mL, Intravenous, 2 times per day  sodium chloride flush 0.9 % injection 10 mL, 10 mL, Intravenous, PRN  acetaminophen (TYLENOL) tablet 650 mg, 650 mg, Oral, Q6H PRN **OR** acetaminophen (TYLENOL) suppository 650 mg, 650 mg, Rectal, Q6H PRN  perflutren lipid microspheres (DEFINITY) injection 1.65 mg, 1.5 mL, Intravenous, ONCE PRN  ondansetron (ZOFRAN) injection 4 mg, 4 mg, Intravenous, Q6H PRN  norepinephrine (LEVOPHED) 16 mg in dextrose 5% 250 mL infusion, 2 mcg/min, Intravenous, Continuous  LORazepam (ATIVAN) injection 2 mg, 2 mg, Intravenous, Q2H PRN  perflutren lipid microspheres (DEFINITY) injection 1.65 mg, 1.5 mL, Intravenous, ONCE PRN  pantoprazole (PROTONIX) injection 40 mg, 40 mg, Intravenous, Daily **AND** sodium chloride (PF) 0.9 % injection 10 mL, 10 mL, Intravenous, Daily  chlorhexidine (PERIDEX) 0.12 % solution 15 mL, 15 mL, Mouth/Throat, BID  glucose (GLUTOSE) 40 % oral gel 15 g, 15 g, Oral, PRN  dextrose 50 % IV solution, 12.5 g, Intravenous, PRN  glucagon (rDNA) injection 1 mg, 1 mg, Intramuscular, PRN  dextrose 5 % solution, 100 mL/hr, Intravenous, PRN  sodium bicarbonate 150 mEq in dextrose 5 % 1,000 mL infusion, , Intravenous, Continuous  propofol injection, 10 mcg/kg/min, Intravenous, Titrated  levetiracetam (KEPPRA) 1000 mg/100 mL IVPB, 1,000 mg, Intravenous, Q12H  propofol injection 120 mg, 12 mL, Intravenous, Continuous PRN  vancomycin (VANCOCIN) intermittent dosing (placeholder), , Other, RX Placeholder  meropenem (MERREM) 500 mg IVPB (mini-bag), 500 mg, Intravenous, Q12H  heparin 25,000 unit in sodium chloride 0.45% 250 mL infusion, 10 mL/hr, Intravenous, Continuous  Physical    VITALS:  /72   Pulse 67   Temp 95.4 °F (35.2 °C) (Rectal)   Resp 20   Ht 5' 7\" (1.702 m)   Wt 250 lb 3.6 oz (113.5 kg)   LMP 2018 (Exact Date)   SpO2 96%   BMI 39.19 kg/m²   TEMPERATURE:  Current - Temp: 95.4 °F (35.2 °C); Max - Temp  Av.3 °F (36.3 °C)  Min: 95.1 °F (35.1 °C)  Max: 100.3 °F (37.9 °C)  PULSE OXIMETRY RANGE: SpO2  Av.3 %  Min: 86 %  Max: 100 %  24HR INTAKE/OUTPUT:      Intake/Output Summary (Last 24 hours) at 2020 4091  Last data filed at 2020 0688  Gross per 24 hour   Intake 1718 ml   Output 3290 ml   Net -1572 ml       CONSTITUTIONAL:  Intubated sedated    HEMATOLOGIC/LYMPHATICS:  no cervical lymphadenopathy, no supraclavicular lymphadenopathy, LUNGS diminished  CARDIOVASCULAR:  , regular rate and rhythm, normal S1 and S2, no S3 or S4, and no murmur noted  ABDOMEN: soft no masses   MUSCULOSKELETAL:  There is no redness, warmth, or swelling of the joints.   EXTREMETIES:skin seems to be less dusky , less blotchy-  Compared to last night       Data      Recent Labs     20  1030   WBC 13.0* 24.8* 24.0*   HGB 12.3 12.1 10.7*   HCT 40.9 39.7 34.8*   * 166 133*   MCV 88.8 87.2 85.9        Recent Labs     20  1552   * 137 137   K 5.6* 5.4* 4.2    102 104   CO2 7* 11* 20*   PHOS  --  7.1* 5.3*   BUN 27* 33* 43*   CREATININE 1.1 1.3* 2.1*     Recent Labs     09/10/20  2052   *   *   BILITOT 1.0   ALKPHOS 123       Magnesium:    Lab Results   Component Value Date    MG 2.20 2020       Imaging Ct Head Wo Contrast    Result Date: 2020  EXAMINATION: CT OF THE HEAD WITHOUT CONTRAST  2020 3:33 am TECHNIQUE: CT of the head was performed without the administration of intravenous contrast. Dose modulation, iterative reconstruction, and/or weight based adjustment of the mA/kV was utilized to reduce the radiation dose to as low as reasonably achievable. COMPARISON: None. HISTORY: ORDERING SYSTEM PROVIDED HISTORY: post cardiac arrest- once pt stable TECHNOLOGIST PROVIDED HISTORY: Reason for exam:->post cardiac arrest- once pt stable Has a \"code stroke\" or \"stroke alert\" been called? ->No Is the patient pregnant?->No Reason for Exam: post cardiac arrest Acuity: Unknown Type of Exam: Ongoing FINDINGS: Motion limited study, despite repeat acquisition. BRAIN/VENTRICLES: Within the limitations of motion artifact, there is no convincing evidence for acute intracranial pathology. Gray/white matter differentiation is grossly preserved. There is remote left thalamic lacunar infarction. No definite intracranial hemorrhage, mass effect, or midline shift. ORBITS: The visualized portion of the orbits demonstrate no acute abnormality. SINUSES: The visualized paranasal sinuses and mastoid air cells demonstrate no acute abnormality. SOFT TISSUES/SKULL:  No acute abnormality of the visualized skull or soft tissues. Motion limited study. No convincing evidence for acute intracranial abnormality identified within the limitations of motion artifact. If there is persistent clinical suspicion for intracranial abnormality, repeat study should be considered. Xr Chest Portable    Result Date: 9/12/2020  EXAMINATION: ONE XRAY VIEW OF THE CHEST 9/12/2020 2:11 am COMPARISON: None. HISTORY: ORDERING SYSTEM PROVIDED HISTORY: respiratory failure TECHNOLOGIST PROVIDED HISTORY: Reason for exam:->respiratory failure Reason for Exam: respiratory failure Acuity: Acute Relevant Medical/Surgical History: hx asthma FINDINGS: Endotracheal tube is noted in place with the distal tip located 3.8 cm superior to the rahat. The heart is mildly to moderately enlarged with otherwise unremarkable configuration. The mediastinal contours are within normal limits.  Suspected mild to moderate bilateral pleural effusions are seen with adjacent bibasilar atelectasis and/or airspace disease. . Bones and soft tissues are unremarkable. 1. Suspected mild to moderate bilateral pleural effusions with adjacent bibasilar atelectasis and/or airspace disease. 2. Mild to moderate cardiomegaly. 3. Recommend retraction of endotracheal tube 0.7 cm. Xr Chest Portable    Result Date: 9/11/2020  EXAMINATION: ONE XRAY VIEW OF THE CHEST 9/11/2020 5:35 pm COMPARISON: 09/11/2020 HISTORY: ORDERING SYSTEM PROVIDED HISTORY: verify vas cath placement TECHNOLOGIST PROVIDED HISTORY: Reason for exam:->verify vas cath placement Reason for Exam: line placement Acuity: Acute Type of Exam: Initial FINDINGS: A right jugular triple-lumen catheter terminates over the superior aspect of the right atrium. Endotracheal tube terminates 2 cm cephalad to the rahat. The cardiac silhouette remains prominent. There are hazy opacities in the lung bases, greater on the right. Right jugular venous catheter and endotracheal tube project in normal positions. No pneumothorax. Bilateral airspace disease and possible layered pleural effusions, consistent with pulmonary edema and atelectasis. Pneumonia is a differential possibility. Xr Chest Portable    Result Date: 9/11/2020  EXAMINATION: ONE XRAY VIEW OF THE CHEST 9/11/2020 9:46 am COMPARISON: Prior study(s) most recent 1 hour earlier. HISTORY: ORDERING SYSTEM PROVIDED HISTORY: Check ETT placement TECHNOLOGIST PROVIDED HISTORY: Reason for exam:->Check ETT placement Reason for Exam: ETT position. pulled back Acuity: Acute Type of Exam: Initial FINDINGS: The endotracheal tube is in satisfactory position approximately 3 cm above the rahat. The heart is upper normal as are pulmonary vessels. This is accentuated with portable technique and low lung volume. The left lung base is poorly seen with portable technique and overlying monitor or defibrillator device. Right lung is clear.      Satisfactory position ADDENDUM: Findings were discussed with Dr. Rhoda Victoria at 10:49 pm on 9/10/2020. Result Date: 9/10/2020  EXAMINATION: CTA OF THE CHEST 9/10/2020 9:36 pm TECHNIQUE: CTA of the chest was performed after the administration of intravenous contrast.  Multiplanar reformatted images are provided for review. MIP images are provided for review. Dose modulation, iterative reconstruction, and/or weight based adjustment of the mA/kV was utilized to reduce the radiation dose to as low as reasonably achievable. COMPARISON: None. HISTORY: ORDERING SYSTEM PROVIDED HISTORY: tachy, SOB TECHNOLOGIST PROVIDED HISTORY: Reason for exam:->tachy, SOB Reason for Exam: tachy, SOB Acuity: Acute Type of Exam: Initial FINDINGS: Pulmonary Arteries: Pulmonary arteries are adequately opacified for evaluation. However, evaluation is limited due to respiratory motion artifact. There is suspected subsegmental filling defect within the right middle lobe and left lower lobe. Mediastinum: The heart is enlarged without pericardial fluid collection. No definite right heart strain. Large bilateral pleural effusions. Interlobular septal thickening. Multifocal ground-glass opacities in the left lower lobe noted. No evidence for pneumothorax. Reflux of contrast into the IVC and hepatic veins. Lungs/pleura: The lungs are without acute process. No focal consolidation or pulmonary edema. No evidence of pleural effusion or pneumothorax. Upper Abdomen: Limited images of the upper abdomen are unremarkable. Soft Tissues/Bones: No acute bone or soft tissue abnormality. Evaluation is somewhat limited due to respiratory motion artifact. There are suspected pulmonary arterial filling defects within subsegmental branches to the right middle lobe and left lower lobe. No right heart strain. Large bilateral pleural effusions and reflux of contrast into the IVC/hepatic veins. Findings most compatible with volume overload/CHF.  Patchy airspace disease in the left lower lobe adjacent to the large effusion. Differential considerations include atelectasis, infectious process or, less likely, infarction. Vl Dup Lower Extremity Arteries Bilateral    Result Date: 9/11/2020  Lower Extremities Arterial Duplex  Demographics   Patient Name       Nicole Andino   Date of Study      09/11/2020       Gender               Female   Patient Number     [id number]       Date of Birth        1970   Visit Number       [id number]        Age                  53 year(s)   Accession Number   [id number]       Room Number          2104   Corporate ID       [id number]          Sonographer          Riky Astorga RVT   Ordering Physician Arsenio Owens MD  Interpreting         [facility]                                      Physician            Umm Huber MD  Procedure Type of Study:   Extremities Arteries:Lower Extremities Arterial Duplex, VL LOWER EXTREMITY  ARTERIES DUPLEX BILATERAL. Vascular Sonographer Report  Additional Indications:Possible thrombus Impressions Right Impression Limited study. Patient was seizing during the scan and has an arterial line in the right groin that was actively bleeding at the time of the scan. The common femoral, profunda femoral and peroneal arteries could not be visualized. The majority of the waveforms are triphasic throughout the right lower extremity. There is no evidence of significant stenosis or thrombus in the visualized vessels. Left Impression Limited study. Patient was seizing during the scan. The common femoral, , mid to distal femoral, profunda femoral and peroneal arteries could not be visualized. The majority of the waveforms are triphasic throughout the left lower extremity. There is no evidence of significant stenosis or thrombus in the visualized vessels. Conclusions   Summary   Limited study. Patient was seizing during the scan. The right common femoral, profunda femoral and peroneal arteries could not  be visualized.  The +--------------++-----+-----+----------++----+-----+----------+ ! Mid PTA       !!65.1 !0.82 ! Triphasic !!44.4!1.04 ! Triphasic ! +--------------++-----+-----+----------++----+-----+----------+ ! Mid YAZ       !!65.1 !0.82 ! Triphasic ! !48.7!1.14 ! Triphasic ! +--------------++-----+-----+----------++----+-----+----------+    Vl Extremity Venous Bilateral    Result Date: 9/11/2020  Lower Extremities DVT Study  Demographics   Patient Name       Olinda Garcia   Date of Study      09/11/2020       Gender               Female   Patient Number     [id number]       Date of Birth        1970   Visit Number       [id number]        Age                  53 year(s)   Accession Number   [id number]       Room Number          2104   Corporate ID       [id number]          Sonographer          Jeni Robertson RVT   Ordering Physician Aristeo Elizabeth MD  Interpreting         [facility]                                      Physician            Levi Mon MD  Procedure Type of Study:   Veins:Lower Extremities DVT Study, VASC EXTREMITY VENOUS DUPLEX BILATERAL. Vascular Sonographer Report  Impressions Right Impression Limited study. Patient was seizing during the scan and had an arterial line in the right groin that was actively bleeding. No evidence of deep vein or superficial vein thrombosis in the visualized vessels in the right leg. Left Impression Limited study. Patient was seizing during the scan. No evidence of deep vein or superficial vein thrombosis in the visualized vessels in the left leg. Conclusions   Summary   Limited study. Patient was seizing during the scan. No evidence of deep vein or superficial vein thrombosis in the visualized  vessels in bilateral legs.    Signature   ------------------------------------------------------------------  Electronically signed by Levi Mon MD ([facility]  physician) on 09/11/2020 at 05:31 PM  ------------------------------------------------------------------  Patient Status:Routine. Study [address] [facility]. Technical Quality:Limited visualization. Risk Factors History +---------+----+---------------------------------+ ! Diagnosis! Date! Comments                         ! +---------+----+---------------------------------+ ! Other    ! ! Factor V Leiden Clotting Disorder! +---------+----+---------------------------------+ ! Other    ! !DVT/SVT in left leg in 1999      ! +---------+----+---------------------------------+ Velocities are measured in cm/s ; Diameters are measured in mm Right Lower Extremities DVT Study Measurements Right 2D Measurements +--------------+----------+---------------+----------+ ! Location      ! Visualized! Compressibility! Thrombosis! +--------------+----------+---------------+----------+ ! Prox Femoral  !Yes       ! Yes            ! None      ! +--------------+----------+---------------+----------+ ! Mid Femoral   !Yes       ! Yes            ! None      ! +--------------+----------+---------------+----------+ ! Dist Femoral  !Yes       ! Yes            ! None      ! +--------------+----------+---------------+----------+ ! Popliteal     !Yes       ! Yes            ! None      ! +--------------+----------+---------------+----------+ ! GSV Below Knee! Yes       ! Yes            ! None      ! +--------------+----------+---------------+----------+ ! Soleal        !Yes       ! Yes            ! None      ! +--------------+----------+---------------+----------+ ! PTV           ! Partial   !Yes            ! None      ! +--------------+----------+---------------+----------+ ! ATV           ! Yes       ! Yes            ! None      ! +--------------+----------+---------------+----------+ ! Peroneal      !Partial   !Yes            ! None      ! +--------------+----------+---------------+----------+ ! GSV Calf      ! Yes       ! Yes            ! None      ! +--------------+----------+---------------+----------+ Right Doppler Measurements +---------+------+------+------------+ !Location ! Signal!Reflux! Reflux (sec)! +---------+------+------+------------+ ! Popliteal!Phasic!      !            ! +---------+------+------+------------+ Left Lower Extremities DVT Study Measurements Left 2D Measurements +---------+----------+---------------+----------+ ! Location ! Visualized! Compressibility! Thrombosis! +---------+----------+---------------+----------+ ! Popliteal!Yes       ! Yes            ! None      ! +---------+----------+---------------+----------+ ! Gastroc  ! Yes       ! Yes            ! None      ! +---------+----------+---------------+----------+ ! Soleal   !Yes       ! Yes            ! None      ! +---------+----------+---------------+----------+ ! PTV      ! Partial   !Yes            ! None      ! +---------+----------+---------------+----------+ ! ATV      ! Yes       ! Yes            ! None      ! +---------+----------+---------------+----------+ ! Peroneal !Partial   !Yes            ! None      ! +---------+----------+---------------+----------+ ! GSV Calf ! Yes       ! Yes            ! None      ! +---------+----------+---------------+----------+ ! SSV      ! Partial   !Yes            ! None      ! +---------+----------+---------------+----------+ Left Doppler Measurements +---------+------+------+------------+ ! Location ! Signal!Reflux! Reflux (sec)! +---------+------+------+------------+ ! Popliteal!Phasic!      !            ! +---------+------+------+------------+      Problem List  Patient Active Problem List   Diagnosis    Factor V Leiden mutation ([address]    Pulmonary embolus (HCC)    Lactic acidosis    CAP (community acquired pneumonia)    Acute CHF (congestive heart failure) ([address]    Bilateral pulmonary embolism (HCC)    Cardiac arrest ([address]    Seizures ([address]    Acute respiratory failure with hypoxia (HCC)    Acute kidney injury ([address]    Metabolic acidosis    Biventricular failure ([address]    Pulmonary hypertension (HCC)    Multifocal pneumonia    Septic shock (HCC)    Elevated LFTs    Thrombocytopenia (HCC)    Coagulopathy (HCC)    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)    Acute encephalopathy       ASSESSMENT AND PLAN    Complicated coagulopathy in setting of acute PE/sepsis/dic vs shock liver   On admit-had an elevated INR_coumadin compliance seemed erratic but if pt was getting ill-inr may have been up from poor po intake etc   SHe currently has an inr in the 3 range-which is likley related to baseline coumadin use complicated by PEA arrest/hypotension and potentially shock liver or dic-    She also has a mildly low fibrinogen but it is over 100 (also potentially related to sepsis/dic and or shock liver)     Resumption of heparin at low dose has not led to worsening of her bleeding in the last 12 hours-I would keep this going for now   CBC pending today     There was a question of purpura fulmnians-the issue will be that her protein c will come back low as she was on coumadin which will lower it anyway  I do not think her skin looks worse thus I do not think she has this and thus I dont anticipate having to treat this (usually if this were going on one gives IV vit k with IV heparin and if available protein c concentrates -which we dont have but if needed FFP-which all seems counterintuitive -but I do not think we will go this route)     THe question will be what do we do if she starts bleeding  -I would lean to give 10 units of cryo to get her fibrinogen over 150--  -I would also lean to give IV vit k if she bleeds-first-not ffp and then see if we can continue  Heparin alone-  -if above measures would occur and she still bleeds then I would hold heparin -    Upon recovery-if she recovers--the issue will be to decide long term anticoagulation goals --  Clearly there has to be investigation as to why she was not following up   And not getting inr checked-I do not think coumadin should be the dc med at this point for various reasons- ?  Possible failure /compliance etc-will discuss this later    Need to moniter be aware of potential for HIT_but not for 5-14 days   Her plts may continue to drop in the setting of sepsis dic  I would NOT check a hit test if her plts drop in the short run  because we would likely get a false postive irish- its unlikely to develop hit this soon so I would not consider this for a few days if plts drop later--if we check now and postiive it will cause 5 days of unclear issues that would lead to other complications with other anticoagulants in the setting of both renal and liver dysfunction-it would be a challenge to pick a drug to cover for type II hit in a setting where it would not be yet clinically applicable       Magaly Major MD

## 2020-09-12 NOTE — PROGRESS NOTES
mEq, Intravenous, PRN  magnesium sulfate 1 g in dextrose 5% 100 mL IVPB, 1 g, Intravenous, PRN  calcium gluconate 1 g in sodium chloride 50 mL, 1 g, Intravenous, PRN **OR** calcium gluconate 2 g in dextrose 5 % 100 mL IVPB, 2 g, Intravenous, PRN **OR** calcium gluconate 3 g in dextrose 5 % 100 mL IVPB, 3 g, Intravenous, PRN **OR** calcium gluconate 4 g in dextrose 5 % 100 mL IVPB, 4 g, Intravenous, PRN  sodium phosphate 6 mmol in dextrose 5 % 250 mL IVPB, 6 mmol, Intravenous, PRN **OR** sodium phosphate 12 mmol in dextrose 5 % 250 mL IVPB, 12 mmol, Intravenous, PRN **OR** sodium phosphate 18 mmol in dextrose 5 % 500 mL IVPB, 18 mmol, Intravenous, PRN **OR** sodium phosphate 24 mmol in dextrose 5 % 500 mL IVPB, 24 mmol, Intravenous, PRN  vancomycin (VANCOCIN) 1250 mg in dextrose 5 % 250 mL IVPB, 1,250 mg, Intravenous, Once  sodium chloride flush 0.9 % injection 10 mL, 10 mL, Intravenous, 2 times per day  sodium chloride flush 0.9 % injection 10 mL, 10 mL, Intravenous, PRN  acetaminophen (TYLENOL) tablet 650 mg, 650 mg, Oral, Q6H PRN **OR** acetaminophen (TYLENOL) suppository 650 mg, 650 mg, Rectal, Q6H PRN  perflutren lipid microspheres (DEFINITY) injection 1.65 mg, 1.5 mL, Intravenous, ONCE PRN  ondansetron (ZOFRAN) injection 4 mg, 4 mg, Intravenous, Q6H PRN  norepinephrine (LEVOPHED) 16 mg in dextrose 5% 250 mL infusion, 2 mcg/min, Intravenous, Continuous  LORazepam (ATIVAN) injection 2 mg, 2 mg, Intravenous, Q2H PRN  perflutren lipid microspheres (DEFINITY) injection 1.65 mg, 1.5 mL, Intravenous, ONCE PRN  pantoprazole (PROTONIX) injection 40 mg, 40 mg, Intravenous, Daily **AND** sodium chloride (PF) 0.9 % injection 10 mL, 10 mL, Intravenous, Daily  chlorhexidine (PERIDEX) 0.12 % solution 15 mL, 15 mL, Mouth/Throat, BID  glucose (GLUTOSE) 40 % oral gel 15 g, 15 g, Oral, PRN  dextrose 50 % IV solution, 12.5 g, Intravenous, PRN  glucagon (rDNA) injection 1 mg, 1 mg, Intramuscular, PRN  dextrose 5 % solution, 100 mL/hr, Intravenous, PRN  sodium bicarbonate 150 mEq in dextrose 5 % 1,000 mL infusion, , Intravenous, Continuous  propofol injection, 10 mcg/kg/min, Intravenous, Titrated  levetiracetam (KEPPRA) 1000 mg/100 mL IVPB, 1,000 mg, Intravenous, Q12H  propofol injection 120 mg, 12 mL, Intravenous, Continuous PRN  vancomycin (VANCOCIN) intermittent dosing (placeholder), , Other, RX Placeholder  meropenem (MERREM) 500 mg IVPB (mini-bag), 500 mg, Intravenous, Q12H  heparin 25,000 unit in sodium chloride 0.45% 250 mL infusion, 10 mL/hr, Intravenous, Continuous    Data reviewed:  Labs:  CBC:   Recent Labs     20  1030 20  0545   WBC 24.8* 24.0* 16.0*   HGB 12.1 10.7* 9.9*   HCT 39.7 34.8* 31.3*   MCV 87.2 85.9 83.8    133* 101*     BMP:   Recent Labs     20  0545    137 136   K 5.4* 4.2 3.3*    104 100   CO2 11* 20* 24   PHOS 7.1* 5.3* 3.6   BUN 33* 43* 32*   CREATININE 1.3* 2.1* 1.8*     LIVER PROFILE:   Recent Labs     09/10/20  2052 09/12/20  0545   * 1,963*   * 1,486*   BILITOT 1.0 1.3*   ALKPHOS 123 108     PT/INR:   Recent Labs     20  0345 20  0545   PROTIME 33.8* 38.0* 37.3*   INR 2.88* 3.24* 3.18*     APTT:   Recent Labs     20  0545   APTT 46.5* 69.7* 127.5*       AB/12- 7.52/35/75 (on 40%)    Cultures:   Blood culture (9/10): MRSA  Blood culture (): pending   Sputum culture (): 4+WBCs; 1+GPC: 1+GPR  Urine strep and legionella antigens: Negative      Films:  Chest images and reports were reviewed by me. My interpretation is:  CXR (20):  Stable bilateral pleuor-parenchymal disease; ETT and HD catheter in place      Assessment:     Cardiac arrest  Bilateral pulmonary emboli  Acute kidney injury  Metabolic acidosis  Congestive heart failure  Pulmonary hypertension  Multifocal pneumonia  Septic shock  Lactic acidosis  Elevated LFTs  Thrombocytopenia  Coagulopathy  Disseminated intravascular coagulation   Acute encephalopathy   Bacteremia  Acute hypoxic respiratory failure      Plan:    Cardiac arrest  - In the setting of pulmonary emboli and septic shock  - Supportive care     Bilateral pulmonary emboli  - Heparin drip   - Hematology following      Acute kidney injury  - Likely due to poor forward flow due to biventricular failure  - Nephrology plans for CRRT today      Metabolic acidosis  - Resolved. Discontinue bicarbonate drip     Congestive heart failure  - Nephrology plans for CRRT today for fluid removal  - Cardiology also following      Pulmonary hypertension  - Based on echocardiogram   - Fluid removal with CRRT  - Keep oxygen saturation>90%     Multifocal pneumonia  - Continue meropenem and vancomycin   - Follow up sputum culture     Septic shock  - In the setting of pneumonia and bacteremia  - Continue meropenem and vancomycin   - Levophed to keep MAP>65     Lactic acidosis  - Resolved      Elevated LFTs  - Likely due to shock liver  - Keep MAP>65  - Check labs in a.m.     Thrombocytopenia  - Likely due to sepsis induced DIC  - Check daily labs      Coagulopathy  - Due to anticoagulation and DIC  - On heparin drip     Disseminated intravascular coagulation   - On heparin drip for PE  - Check coagulation profile and fibrinogen every 6 hours  - Hematology following      Acute encephalopathy   - May be due to infection, DIC or anoxia   - Antibiotics as above     Bacteremia  - Has MRSA in blood cultures  - Continue vancomycin  - ID consulted    Acute hypoxic respiratory failure  - Nephrology plans for fluid removal with CRRT  - Continue mechanical ventilation pending clinical improvement        Prophylaxis  DVT- heparin drip  GI- protonix   VAP- peridex     I spent 50 minutes of critical care time with this patient excluding procedures.     Tika Li MD  [facility]

## 2020-09-12 NOTE — PROCEDURES
[facility]           [address]                          ELECTROENCEPHALOGRAM REPORT    PATIENT NAME: Ricardo Palmer                     :        1970  MED REC NO:   [id number]                          ROOM:       2104  ACCOUNT NO:   [de-identified]                           ADMIT DATE: 09/10/2020  PROVIDER:     Gabriela Naik DO    DATE OF EE2020    REFERRING PROVIDER:  Rosana Mohs, NP    REASON FOR STUDY:  Seizure. BRIEF HISTORY AND NEUROLOGIC FINDINGS:  The patient is a 43-year-old  female being evaluated for possible seizure. She is reportedly  unresponsive during the recording. MEDICATIONS:  The patient's centrally acting medications listed include  Keppra, fentanyl and Ativan. EEG FINDINGS:  This is a 20-channel digital EEG performed utilizing  bipolar and referential montages. The patient was unresponsive  throughout the entire recording and appeared to be asleep during the  recording. The background consisted of intermixed bursts of theta and  delta frequencies with generalized background suppression with  superimposed sleep spindles throughout much of the recording. No  definite K-complexes were noted, however. There was no significant  reactivity of the background to external stimuli. No wakefulness was  noted. Photic stimulation was performed at various flash frequencies and did  not evoke any significant posterior background response. Hyperventilation exercise was not performed due to the patient's  clinical history. A 1-channel EKG rhythm strip was reviewed and showed occasional  irregular cardiac rhythm though for the most part appeared to be in  sinus rhythm. EEG DIAGNOSIS:  This EEG is abnormal due to the presence of what appears  to be a burst suppression pattern with superimposed sleep spindles and  beta activity.     CLINICAL INTERPRETATION:  Burst suppression patterns are nonspecific  though typically seen in the setting of severe encephalopathies and may  be due to significant sedation. The beta activity is also nonspecific  and typically seen in the setting of benzodiazepine and/or barbiturate  usage. No definite epileptiform activity was present during this  recording. If seizures remain a clinical concern, then a repeat EEG,  preferably without heavy sedation, could be of further benefit in the  appropriate clinical scenario. Clinical correlation is advised.         France Abbott DO    D: 09/11/2020 13:09:38       T: 09/11/2020 13:18:59     [email or web address]  Job#: [id number]     Doc#: [id number]    CC:

## 2020-09-12 NOTE — PROGRESS NOTES
0730: Handoff with GT, plan for CRRT, waiting for bags. Assessment done, VSS, lines and tubes intact  0900:  critical care rounds done, updated see new orders. 36: [facility] cardiology in to see patient, plan for LEVY/Cath next week sometime  0950:Juan AMEZCUA neurology in to see patient, no new orders, [facility] by to see patient, update this afternoon on patient status. 1200: VSS, assessment unchanged, CRRT running smoothly  1517: PTT results back no change  1540: Updated  see new orders  1600: Assessment unchanged, VSS increasing fluid removal  1800: Tolerating fluid removal.  1900: Handoff with Jeremy SORENSEN.

## 2020-09-12 NOTE — PROGRESS NOTES
4 Eyes Skin Assessment     The patient is being assess for  Shift Handoff    I agree that 2 RN's have performed a thorough Head to Toe Skin Assessment on the patient. ALL assessment sites listed below have been assessed. Areas assessed by both nurses:   [x]   Head, Face, and Ears   [x]   Shoulders, Back, and Chest  [x]   Arms, Elbows, and Hands   [x]   Coccyx, Sacrum, and IschIum  [x]   Legs, Feet, and Heels        Does the Patient have Skin Breakdown?   No;  Redness and mottled BLE         Cash Prevention initiated:  Yes   Wound Care Orders initiated:  No      WOC nurse consulted for Pressure Injury (Stage 3,4, Unstageable, DTI, NWPT, and Complex wounds), New and Established Ostomies:  No      Nurse 1 eSignature: Electronically signed by Reinier Conklin RN on 9/12/20 at 7:41 AM EDT    **SHARE this note so that the co-signing nurse is able to place an eSignature**    Nurse 2 eSignature: Electronically signed by Murtaza Redding RN on 9/12/20 at 6:58 PM EDT

## 2020-09-12 NOTE — PROGRESS NOTES
NEPHROLOGY PROGRESS NOTE    Patient: Saul Chacon MRN: [id number]     YOB: 1970  Age: 53 y.o. Sex: female    Unit: [address] [facility] ICU Room/Bed: V4W-6156/2104-01 Location: [address] [place] [address]     Admitting Physician: Kandice Fuller    Primary Care Physician: No primary care provider on file. LOS: 2 days       Reason for evaluation:   Milly      SUBJECTIVE:      The patient was seen and examined. Notes and labs reviewed. There were no complications over night. Patient's review of systems: she remains intubated, on the vent, sedated      OBJECTIVE:     Vitals:    09/12/20 0349 09/12/20 0400 09/12/20 0520 09/12/20 0554   BP:  132/72     Pulse: 65 67 71 67   Resp: 20 20 11 20   Temp:  95.1 °F (35.1 °C) 95.1 °F (35.1 °C) 95.4 °F (35.2 °C)   TempSrc:  Oral Rectal Rectal   SpO2: 98% 100% 97% 96%   Weight:       Height:           Intake and Output:      Intake/Output Summary (Last 24 hours) at 9/12/2020 0625  Last data filed at 9/12/2020 0227  Gross per 24 hour   Intake 1718 ml   Output 3290 ml   Net -1572 ml       Continuous Infusions:   norepinephrine 1 mcg/min (09/11/20 2258)    dextrose      sodium bicarbonate infusion 75 mL/hr at 09/12/20 0224    propofol 25 mcg/kg/min (09/12/20 0452)    propofol      heparin (Porcine) 10 mL/hr (09/11/20 1852)       Exam:   CONSTITUTIONAL/PSYCHIATRY: intubated, on the vent, anasarcic. Not in acute distress   RESPIRATORY: Respiratory effort: normal on the vent. Auscultation: EBBS  CARDIOVASCULAR: Auscultation: RRR. Edema: massive anasarca with edema from feet all the way up to her chest  GASTROINTESTINAL: Soft, nontender, has significant soft tissue edema  EXTREMITIES:  No cyanosis or clubbing. SKIN: Warm and dry.  Diffuse erythroderma, namely in LE      LABS:   RFP:   Recent Labs     09/11/20  0225 09/11/20  0621 09/11/20  1552   * 137 137   K 5.6* 5.4* 4.2    102 104   CO2 7* 11* 20*   BUN 27* 33* 43*   CREATININE 1.1 1.3* 2.1*   CALCIUM 7. 2* 7.8* 7.4*   MG  --  2.20  --    PHOS  --  7.1* 5.3*   GFRAA >60 53* 30*       Liver panel:  Recent Labs     09/10/20  2052   *   *       Endocrine:   @[id number](VitD,PTH,TSH,aldosterone,renin activity,cortisol,metanephrine)@    CBC:   Recent Labs     09/11/20  0225 09/11/20  0621 09/11/20  1030   WBC 13.0* 24.8* 24.0*   HGB 12.3 12.1 10.7*   HCT 40.9 39.7 34.8*   MCV 88.8 87.2 85.9   * 166 133*         ASSESSMENT and PLAN:     1. MORELIA: likely secondary to ischemia ATI. She had additional exposure to radiocontrast  Initiated on IHD on 9/11/20. UO is marginal.  She is anasarcic and massively fluid overloaded. - Will switch to CRRT and will target net fluid removal at 100mL/hour, will increase slowly to 200mL/hour if tolerated. - Will monitor UO, urine microscopy, proteinuria and determine whether further investigation is deemed necessary. Follow up on limited serology w/u (ZENON, ANCA, C', APLA)    2. Hyperkalemia/metabolic acidosis: improved    3. S/P PEA arrest 9/11/20    4. Acute hypoxic respiratory failure: secondary to 2. Also has bilateral pleural effusions, PE and possible Pnia. 5. MRSA bacteremia: on Vanco and meropenem    6. Severe CMP with reduced RV function/severe TR/MR, Grade 3 diastolic dysfunction, hepatic congestion and anasarca   - Will plan on fluid removal with CRRT per above    7. Factor V leiden deficiency with PE/coagulopathy/DIC: per hematology    8.  Elevated liver enzymes: likely ischemic injury    I spent 35minutes of critical care, reviewing the records/labs, examining the patient, writing orders and communicating the plan of care with the ICU staff        Signed By: Yasmine Saunders MD

## 2020-09-12 NOTE — PROGRESS NOTES
Progress Note    Updates  Sedated. On CRRT. No further shaking episodes. Past Medical History:   Diagnosis Date    Anticoagulant long-term use     Arthritis     Asthma     Baker's cyst     Depression     [facility]     Factor V Leiden ([facility]     H/O blood clots     Heartburn     Hyperlipidemia     Osteoarthritis      History reviewed. No pertinent surgical history.     Current Facility-Administered Medications:     prismaSATE BGK 4/2.5 dialysis solution, , Dialysis, Continuous, Alex Lamar MD    prismaSATE BGK 4/2.5 dialysis solution, , Dialysis, Continuous, Alex Lamar MD    prismaSATE BGK 4/2.5 dialysis solution, , Dialysis, Continuous, Alex Lamar MD    potassium chloride 20 mEq/50 mL IVPB (Central Line), 20 mEq, Intravenous, PRN, Alex Lamar MD    magnesium sulfate 1 g in dextrose 5% 100 mL IVPB, 1 g, Intravenous, PRN, Alex Lamar MD    calcium gluconate 1 g in sodium chloride 50 mL, 1 g, Intravenous, PRN **OR** calcium gluconate 2 g in dextrose 5 % 100 mL IVPB, 2 g, Intravenous, PRN **OR** calcium gluconate 3 g in dextrose 5 % 100 mL IVPB, 3 g, Intravenous, PRN **OR** calcium gluconate 4 g in dextrose 5 % 100 mL IVPB, 4 g, Intravenous, PRN, Alex Lamar MD    sodium phosphate 6 mmol in dextrose 5 % 250 mL IVPB, 6 mmol, Intravenous, PRN **OR** sodium phosphate 12 mmol in dextrose 5 % 250 mL IVPB, 12 mmol, Intravenous, PRN **OR** sodium phosphate 18 mmol in dextrose 5 % 500 mL IVPB, 18 mmol, Intravenous, PRN **OR** sodium phosphate 24 mmol in dextrose 5 % 500 mL IVPB, 24 mmol, Intravenous, PRN, Alex Lamar MD    vancomycin (VANCOCIN) 1250 mg in dextrose 5 % 250 mL IVPB, 1,250 mg, Intravenous, Once, Deena Ray MD, Last Rate: 166.7 mL/hr at 09/12/20 0825, 1,250 mg at 09/12/20 0825    magnesium sulfate 2 g in 50 mL IVPB premix, 2 g, Intravenous, Once, Simin E Bryson, APRN - CNP    sodium chloride flush 0.9 % injection 10 mL, 10 mL, Intravenous, 2 times per day, [facility], DO, 10 mL at 09/12/20 0827    sodium chloride flush 0.9 % injection 10 mL, 10 mL, Intravenous, PRN, [facility], DO    acetaminophen (TYLENOL) tablet 650 mg, 650 mg, Oral, Q6H PRN **OR** acetaminophen (TYLENOL) suppository 650 mg, 650 mg, Rectal, Q6H PRN, [facility], DO    perflutren lipid microspheres (DEFINITY) injection 1.65 mg, 1.5 mL, Intravenous, ONCE PRN, [facility], DO    ondansetron (ZOFRAN) injection 4 mg, 4 mg, Intravenous, Q6H PRN, [facility], DO    norepinephrine (LEVOPHED) 16 mg in dextrose 5% 250 mL infusion, 2 mcg/min, Intravenous, Continuous, [facility], DO, Last Rate: 0.9 mL/hr at 09/11/20 2258, 1 mcg/min at 09/11/20 2258    LORazepam (ATIVAN) injection 2 mg, 2 mg, Intravenous, Q2H PRN, [facility], DO, 2 mg at 09/11/20 0610    perflutren lipid microspheres (DEFINITY) injection 1.65 mg, 1.5 mL, Intravenous, ONCE PRN, Jami Cox MD    pantoprazole (PROTONIX) injection 40 mg, 40 mg, Intravenous, Daily, 40 mg at 09/11/20 1049 **AND** sodium chloride (PF) 0.9 % injection 10 mL, 10 mL, Intravenous, Daily, Zbigniew Bosch MD, 10 mL at 09/12/20 0829    chlorhexidine (PERIDEX) 0.12 % solution 15 mL, 15 mL, Mouth/Throat, BID, Zbigniew Bosch MD, 15 mL at 09/12/20 0829    glucose (GLUTOSE) 40 % oral gel 15 g, 15 g, Oral, PRN, Zbigniew Bosch MD    dextrose 50 % IV solution, 12.5 g, Intravenous, PRN, Zbigniew Bosch MD, 12.5 g at 09/11/20 0900    glucagon (rDNA) injection 1 mg, 1 mg, Intramuscular, PRN, Zbigniew Bosch MD    dextrose 5 % solution, 100 mL/hr, Intravenous, PRN, Zbigniew Bosch MD    sodium bicarbonate 150 mEq in dextrose 5 % 1,000 mL infusion, , Intravenous, Continuous, Zbigniew Bosch MD, Last Rate: 75 mL/hr at 09/12/20 0224    propofol injection, 10 mcg/kg/min, Intravenous, Titrated, Zbigniew Bosch MD, Last Rate: 17 mL/hr at 09/12/20 0452, 25 mcg/kg/min at 09/12/20 0452   levetiracetam (KEPPRA) 1000 mg/100 mL IVPB, 1,000 mg, Intravenous, Q12H, Beto Alcocer APRN - CNP, Stopped at 09/11/20 2314    propofol injection 120 mg, 12 mL, Intravenous, Continuous PRN, Daiana Jeter MD    vancomycin Blaze Arriola) intermittent dosing (placeholder), , Other, RX Placeholder, Daiana Jeter MD    meropenem (MERREM) 500 mg IVPB (mini-bag), 500 mg, Intravenous, Q12H, Daiana Jeter MD, Stopped at 09/12/20 0452    heparin 25,000 unit in sodium chloride 0.45% 250 mL infusion, 3.2 mL/hr, Intravenous, Continuous, Destinee Hummel RN, Stopped at 09/12/20 0735    Exam  Blood pressure (!) 110/54, pulse 73, temperature 96.1 °F (35.6 °C), temperature source Rectal, resp. rate 20, height 5' 7\" (1.702 m), weight 256 lb 6.3 oz (116.3 kg), last menstrual period 12/05/2018, SpO2 96 %. Constitutional    Vital signs: BP, HR, and RR reviewed   General depressed mental status, no distress  Eyes: unable to visualize the fundi. Cardiovascular: + peripheral edema. Psychiatric: limited exam given encephalopathy  Neurologic  Mental status:   orientation marisol due to encephalopathy/sedation. Attention poor  Language limited exam given encephalopathy  Comprehension not following any commands  CN2: no corneal reflexes at this time. CN 3,4,6: dysconjugate gaze. Pupils are 2-3 mm, round, reactive bilaterally. CN7: face symmetric but exam limited by ET tube  CN9: gag not personally tested at the moment. CN11: limited exam given encephalopathy  CN12: limited exam given encephalopathy  Strength: gag not personally tested at the moment. Sensory: withdraw to nailbed pressure in both feet, less so in UE. Cerebellar/coordination:limited exam given encephalopathy  Tone: no increased tone or rigidity. Gait: limited exam given encephalopathy and intubated with ventilator. ROS - unable to obtain from the patient at this time. Chart reviewed.       Labs  Glucose 125  Na 136  K 3.3  BUN 32  Cr 1.8  Mg 1.80  Lactic acid 1.7    ALT 1486  AST 1963    WBC 16.0K  Hg 9.9  Platelets 729  INR 5.95    COVID negative  UA 2+ bacteria, yeast  + blood cultures - staph aureus MRSA    Studies  EEG 9/11/20  Burst suppression w/ superimposed sleep spindles and beta. No definite epileptiform discharges. CT head w/o 9/11/20  Motion limited. No convincing evidence of an acute intracranial abnormality.       CT chest 9/10/20  Evaluation is somewhat limited due to respiratory motion artifact.  There are    suspected pulmonary arterial filling defects within subsegmental branches to    the right middle lobe and left lower lobe.  No right heart strain.         Large bilateral pleural effusions and reflux of contrast into the IVC/hepatic    veins.  Findings most compatible with volume overload/CHF.         Patchy airspace disease in the left lower lobe adjacent to the large    effusion.  Differential considerations include atelectasis, infectious    process or, less likely, infarction.       EKG 9/10/20  Atrial flutter    Impression  1. S/p PEA arrest w/ relative pickering achievement of ROSC. She had reported \"shaking behavior\" just prior to her arrest, as well as having two other separate episodes of LLE sporadic movements, head turning from side to side, roving eye movements, and intermittent almost rhythmic twitching (duratino 3-5 sec) of her RUE. These have been suppressed w/ propofol. Initial CT head w/o was w/out any acute abnormalities. EEG done yesterday while on propofol showed burst suppression, nothing definitively epileptiform. The patient is critically ill w/ multiple other acute medical issues (PE, coagulopathy, MOREILA/CRRT, cardiomyopathy, bacteremia, etc.). Recommendations  1. Keppra 1000 mg BID. 2.  MRI brain w/o pending. 3.  Continue supportive care.       Wesley Franklin NP  [address] [place] [address] [id number] Neurology    A copy of this note was provided for Dr Ester Farmer MD

## 2020-09-12 NOTE — PROGRESS NOTES
[address]  Cardiology Consult    Delmis Love  1970 September 12, 2020        CC: No complaints      Subjective:     History of Present Illness:    Delmis Love is a 53 y.o. patient with a PMH significant for factor V Leiden deficiency, history of deep vein thrombosis, hyperlipidemia presented with complaints of increasing shortness of breath. When I saw her today patient is on ventilator intubated. Apparently she came to [facility] with increasing shortness of breath and some chest tightness. She is also complaining of swelling of her lower extremity. She had a PEA arrest and was intubated. CTA chest showed pulmonary embolism. No other medical history is available to me  No history of any cardiac disorders available to me. Interval history        Past Medical History:   has a past medical history of Anticoagulant long-term use, Arthritis, Asthma, Baker's cyst, Depression, Factor V Leiden ([address] Factor V Leiden ([facility] [address] H/O blood clots, Heartburn, Hyperlipidemia, and Osteoarthritis. Surgical History:   has no past surgical history on file. Social History:   reports that she quit smoking 4 days ago. Her smoking use included cigarettes. She smoked 0.50 packs per day. She has never used smokeless tobacco. She reports current drug use. Frequency: 14.00 times per week. Drug: Marijuana. She reports that she does not drink alcohol. Family History:  family history includes Heart Attack in her father. Home Medications:  Were reviewed and are listed in nursing record and/or below  Prior to Admission medications    Medication Sig Start Date End Date Taking?  Authorizing Provider   albuterol sulfate  (90 Base) MCG/ACT inhaler INHALE TWO PUFFS BY MOUTH EVERY 6 HOURS AS NEEDED FOR WHEEZING 8/25/20  Yes Latanya Souza APRN - CNP   vitamin D (ERGOCALCIFEROL) 1.25 MG (64883 UT) CAPS capsule TAKE ONE CAPSULE BY MOUTH ONCE WEEKLY 8/13/20  Yes Nahomy Farias,    omeprazole (PRILOSEC) 20 MG delayed release capsule TAKE ONE CAPSULE BY MOUTH DAILY 6/10/20  Yes Artur Sears MD   warfarin (COUMADIN) 7.5 MG tablet TAKE ONE TABLET BY MOUTH DAILY EXCEPT ON MONDAY AND THURSDAY TAKE ONE-HALF TABLET BY MOUTH AS DIRECTED 4/22/20  Yes Artur Sears MD   atorvastatin (LIPITOR) 10 MG tablet TAKE ONE TABLET BY MOUTH DAILY 1/8/20  Yes Artur Sears MD   DULoxetine (CYMBALTA) 60 MG extended release capsule TAKE ONE CAPSULE BY MOUTH DAILY 9/9/19  Yes Artur Sears MD   BREO ELLIPTA 200-25 MCG/INH AEPB INHALE ONE DOSE BY MOUTH DAILY 3/22/19  Yes Artur Sears MD        CURRENT Medications:  Alyssa Tj 4/2.5 dialysis solution, Continuous  prismaSATE BGK 4/2.5 dialysis solution, Continuous  prismaSATE BGK 4/2.5 dialysis solution, Continuous  potassium chloride 20 mEq/50 mL IVPB (Central Line), PRN  magnesium sulfate 1 g in dextrose 5% 100 mL IVPB, PRN  calcium gluconate 1 g in sodium chloride 50 mL, PRN    Or  calcium gluconate 2 g in dextrose 5 % 100 mL IVPB, PRN    Or  calcium gluconate 3 g in dextrose 5 % 100 mL IVPB, PRN    Or  calcium gluconate 4 g in dextrose 5 % 100 mL IVPB, PRN  sodium phosphate 6 mmol in dextrose 5 % 250 mL IVPB, PRN    Or  sodium phosphate 12 mmol in dextrose 5 % 250 mL IVPB, PRN    Or  sodium phosphate 18 mmol in dextrose 5 % 500 mL IVPB, PRN    Or  sodium phosphate 24 mmol in dextrose 5 % 500 mL IVPB, PRN  vancomycin (VANCOCIN) 1250 mg in dextrose 5 % 250 mL IVPB, Once  magnesium sulfate 2 g in 50 mL IVPB premix, Once  sodium chloride flush 0.9 % injection 10 mL, 2 times per day  sodium chloride flush 0.9 % injection 10 mL, PRN  acetaminophen (TYLENOL) tablet 650 mg, Q6H PRN    Or  acetaminophen (TYLENOL) suppository 650 mg, Q6H PRN  perflutren lipid microspheres (DEFINITY) injection 1.65 mg, ONCE PRN  ondansetron (ZOFRAN) injection 4 mg, Q6H PRN  norepinephrine (LEVOPHED) 16 mg in dextrose 5% 250 mL infusion, Continuous  LORazepam (ATIVAN) injection 2 mg, Q2H PRN  perflutren lipid microspheres (DEFINITY) injection 1.65 mg, ONCE PRN  pantoprazole (PROTONIX) injection 40 mg, Daily    And  sodium chloride (PF) 0.9 % injection 10 mL, Daily  chlorhexidine (PERIDEX) 0.12 % solution 15 mL, BID  glucose (GLUTOSE) 40 % oral gel 15 g, PRN  dextrose 50 % IV solution, PRN  glucagon (rDNA) injection 1 mg, PRN  dextrose 5 % solution, PRN  sodium bicarbonate 150 mEq in dextrose 5 % 1,000 mL infusion, Continuous  propofol injection, Titrated  levetiracetam (KEPPRA) 1000 mg/100 mL IVPB, Q12H  propofol injection 120 mg, Continuous PRN  vancomycin (VANCOCIN) intermittent dosing (placeholder), RX Placeholder  meropenem (MERREM) 500 mg IVPB (mini-bag), Q12H  heparin 25,000 unit in sodium chloride 0.45% 250 mL infusion, Continuous        Allergies:  Ibuprofen           Review of Systems: SEE HPI   · Not available      Objective:     PHYSICAL EXAM:      Vitals:    09/12/20 0802   BP:    Pulse:    Resp:    Temp: 96.1 °F (35.6 °C)   SpO2:     Weight: 256 lb 6.3 oz (116.3 kg)       General Appearance:  Alert, cooperative, no distress, appears stated age. Head:  Normocephalic, without obvious abnormality, atraumatic. Eyes:  Pupils equal and round. No scleral icterus. Mouth: Moist mucosa, no pharyngeal erythema. Nose: Nares normal. No drainage or sinus tenderness. Neck: Supple, symmetrical, trachea midline. No adenopathy. No tenderness/mass/nodules. No carotid bruit or elevated JVD. Lungs:   Respiratory Effort: Normal   Auscultation: Clear to auscultation bilaterally, respirations unlabored. No wheeze, rales   Chest Wall:  No tenderness or deformity. Cardiovascular:    Pulses  Palpation: normal   Ascultation: Regular rate, S1/ S2 normal. No murmur, rub, or gallop. 2+ radial and pedal pulses, symmetric  Carotid  Femoral   Abdomen and Gastrointestinal:   Soft, non-tender, bowel sounds active.   Liver and Spleen  Masses   Musculoskeletal: No muscle wasting  Back  Gait   Extremities: Extremities normal, atraumatic. No cyanosis or edema. No cyanosis clubbing       Skin: Inspection and palpation performed, no rashes or lesions. Pysch: Normal mood and affect.  Alert and oriented to time place person   Neurologic: Normal gross motor and sensory exam.       Labs     All labs have been reviewed    Lab Results   Component Value Date    WBC 16.0 09/12/2020    RBC 3.74 09/12/2020    HGB 9.9 09/12/2020    HCT 31.3 09/12/2020    MCV 83.8 09/12/2020    RDW 18.4 09/12/2020     09/12/2020     Lab Results   Component Value Date     09/12/2020    K 3.3 09/12/2020    K 4.1 09/10/2020     09/12/2020    CO2 24 09/12/2020    BUN 32 09/12/2020    CREATININE 1.8 09/12/2020    GFRAA 36 09/12/2020    GFRAA >60 07/25/2012    AGRATIO 1.1 09/12/2020    LABGLOM 30 09/12/2020    GLUCOSE 125 09/12/2020    PROT 4.2 09/12/2020    PROT 7.0 10/19/2011    CALCIUM 7.2 09/12/2020    BILITOT 1.3 09/12/2020    ALKPHOS 108 09/12/2020    AST 1,963 09/12/2020    ALT 1,486 09/12/2020     No results found for: PTINR  Lab Results   Component Value Date    LABA1C 5.6 12/17/2018     Lab Results   Component Value Date    CKTOTAL 449 (H) 09/11/2020    TROPONINI 0.02 (H) 09/11/2020       Cardiac, Vascular and Imaging Data all Personally Reviewed in Detail by Myself      EKG: Sinus tachycardiaRightward axisST & T wave abnormality, consider inferior ischemiaAbnormal ECGNo previous ECGs available     Echocardiogram: Summary   Ejection fraction is visually estimated to be 30%-35%.   Severe tricuspid regurgitation.   moderate pulmonary HTN   There is severe diffuse hypokinesis.   Diastolic filling parameters suggest grade III diastolic dysfunction.   Moderate calcification of the posterior leaflet of the mitral valve.   Mitral annular calcification is present.   The mitral valve leaflets appear myxomatous.   No evidence of mitral valve stenosis.   Severe mitral regurgitation is present.   Systolic flow reversal in pulmonary veins.   The right ventricle is enlarged.   Right ventricular systolic function is moderate to severely reduced.        Other imaging:      CT chest  Evaluation is somewhat limited due to respiratory motion artifact.  There are    suspected pulmonary arterial filling defects within subsegmental branches to    the right middle lobe and left lower lobe.  No right heart strain.         Large bilateral pleural effusions and reflux of contrast into the IVC/hepatic    veins.  Findings most compatible with volume overload/CHF.         Patchy airspace disease in the left lower lobe adjacent to the large    effusion.  Differential considerations include atelectasis, infectious    process or, less likely, infarction.             Chest x-ray  Endotracheal tube has tip approximately 1.3 cm proximal to the rahat and    should be retracted approximately 2 cm.         Findings are again suggestive of pulmonary edema with small pleural    effusions.  Pneumonia is not excluded in the appropriate clinical setting.         Findings were called by the radiology call center. Assessment and Plan     -Acute pulmonary edema,  Acute pulmonary embolism  Acute hypoxemic respiratory failure  Initial ECG suggestive of atrial flutter  Acute systolic heart failure with ejection fraction of 30 to 35% with severe mitral regurgitation. Complicating factor with factors with acute progressive renal failure. Status post placement of dialysis catheter and she is on dialysis. Prognosis remain guarded. She will need left and right heart catheterization once stable  Hematological disturbance consult hematology. On CRRT now. Continue correction of acidosis and electrolyte imbalance. Continue ventilator support for now. I will leave anticoagulation management to hematology. Thank you for allowing us to participate in the care of Brynn Ramirez. Please do not hesitate to contact me if you have any questions.     Andrea Greenwood MD, MPH    [facility], [address] [place] [address] [address]  Ph: [phone]  Fax: [phone]    9/12/2020 9:24 AM

## 2020-09-12 NOTE — PROGRESS NOTES
Clinical Pharmacy Note  Heparin Dosing       Lab Results   Component Value Date    APTT 50.7 09/12/2020     Lab Results   Component Value Date    HGB 9.9 09/12/2020    HCT 31.3 09/12/2020     09/12/2020    INR 2.55 09/12/2020       Current Infusion Rate: 3.2 mL/hr    Plan:    Rate: continue at 3.2 mL/hr  Next aPTT: 2100 9/12/20    Pharmacy will continue to monitor and adjust based on aPTT results.

## 2020-09-12 NOTE — PLAN OF CARE
Problem: Pain:  Goal: Pain level will decrease  Description: Pain level will decrease  9/12/2020 0730 by Joslyn Chan RN  Outcome: Ongoing  Note: Continuing to monitor pain and discomfort. Monitoring pain level on scale of 0-10. Non- pharmacological measures encouraged to reduce discomfort/pain. PRN pain meds administeration continues when/if applicable as ordered by physician. Problem: Pain:  Goal: Control of acute pain  Description: Control of acute pain  9/11/2020 1754 by Charu William RN  Outcome: Ongoing  Note: Pain/discomfort being managed with PRN analgesics per MD orders. Pt able to express presence and absence of pain and rate pain appropriately using numerical scale. n       Problem: Pain:  Goal: Control of chronic pain  Description: Control of chronic pain  9/11/2020 1754 by Charu William RN  Outcome: Ongoing     Problem: Restraint Use - Nonviolent/Non-Self-Destructive Behavior:  Goal: Absence of restraint indications  Description: Absence of restraint indications  9/12/2020 0730 by Joslyn Chan RN  Outcome: Ongoing  Note: Upper extremity soft wrist restraints intact. No S/S of restraint related injury . ROM performed Q2HR. Problem: Falls - Risk of:  Goal: Will remain free from falls  Description: Will remain free from falls  9/12/2020 0730 by Joslyn Chan RN  Outcome: Ongoing  Note: Falling star program remains in place. Call light and personal belongings within reach. Frequent visual monitoring continues. Toileting program inplace. Patient assisted in turning/repositioning at least once every 2 hours, and on a prn basis. Problem: Skin Integrity:  Goal: Will show no infection signs and symptoms  Description: Will show no infection signs and symptoms  9/12/2020 0730 by Joslyn Chan RN  Outcome: Ongoing  Note: Monitoring patient skin integrity for skin breakdown, turning and repositioning q2h per protocol.       Problem: OXYGENATION/RESPIRATORY FUNCTION  Goal: Patient will maintain patent airway  9/12/2020 0730 by Tyler Hollins RN    Outcome: Ongoing  Note: Assess breath sounds, oxygen requirements and saturations, and activity tolerance. Monitor intake and output and daily weights and lab values.        Problem: HEMODYNAMIC STATUS  Goal: Patient has stable vital signs and fluid balance  9/12/2020 0730 by Tyler Hollins RN  Outcome: Ongoing     Problem: FLUID AND ELECTROLYTE IMBALANCE  Goal: Fluid and electrolyte balance are achieved/maintained  9/12/2020 0730 by Tyler Hollins RN  Outcome: Ongoing       Problem: ACTIVITY INTOLERANCE/IMPAIRED MOBILITY  Goal: Mobility/activity is maintained at optimum level for patient  9/12/2020 0730 by Tyler Hollins RN  Outcome: Ongoing

## 2020-09-12 NOTE — FLOWSHEET NOTE
Treatment time: 3 hrs     Net UF: 2.2 kg     Pre weight: 118.1 kg   Post weight: 115.3 kg   EDW: TBD     Access used: RIJ   Access function: Well     Medications or blood products given: None     Regular outpatient schedule: TBD (new MORELIA)      Summary of response to treatment: Pt tolerated tx well     Copy of dialysis treatment record placed in chart, to be scanned into EMR.       09/11/20 2142   Post-Hemodialysis Assessment   Post-Treatment Procedures Blood returned;Catheter capped, clamped and heparinized x 2 ports   Machine Disinfection Process Acid/Vinegar Clean;Bleach; Bicarb Jug Disinfection;Machine Absence of Bleach Machine; Exterior Machine Disinfection   Rinseback Volume (ml) 300 ml   Total Liters Processed (l/min) 49.1 l/min   Dialyzer Clearance Lightly streaked   Duration of Treatment (minutes) 180 minutes   Hemodialysis Intake (ml) 300 ml   Hemodialysis Output (ml) 2500 ml   NET Removed (ml) 2200 ml   Tolerated Treatment Good   Patient Response to Treatment Well    Physician Notified?  Yes

## 2020-09-12 NOTE — PROGRESS NOTES
1900:Handoff report with Lisa Simms, Daljit. HD in progress. Pt in bicarb; heparin; levo and propofol gtts. 1910: Pt's spouse at bedside and updated. 1925: Pt's spouse left and stated he will be back tomorrow to visit pt again. 2110: Evening lab drawn and sent. 2200: HD stopped at this time, 2.2L fluid removed. 2330: pt bathed; complete linen and gown change.   Electronically signed by Lelo Castaneda RN on 9/12/2020 at 1:06 AM

## 2020-09-12 NOTE — PROGRESS NOTES
Pt's temp reading 96.1 axiliary, two warm blankets in place; attempted to recheck temp but no reading; tried with different thermometer still no reading. Pt place in Ana hugger and rectal temp in place. Rectal temp 95. 1. will continue to monitor.    Electronically signed by Manoj Schultz RN on 9/12/2020 at 7:02 AM

## 2020-09-13 ENCOUNTER — APPOINTMENT (OUTPATIENT)
Dept: GENERAL RADIOLOGY | Age: 50
DRG: 130 | End: 2020-09-13
Payer: COMMERCIAL

## 2020-09-13 LAB
ALBUMIN SERPL-MCNC: 2.1 G/DL (ref 3.4–5)
ALP BLD-CCNC: 98 U/L (ref 40–129)
ALT SERPL-CCNC: 1209 U/L (ref 10–40)
ANION GAP SERPL CALCULATED.3IONS-SCNC: 5 MMOL/L (ref 3–16)
ANION GAP SERPL CALCULATED.3IONS-SCNC: 7 MMOL/L (ref 3–16)
APTT: 46.4 SEC (ref 24.2–36.2)
APTT: 59.2 SEC (ref 24.2–36.2)
APTT: 78.9 SEC (ref 24.2–36.2)
AST SERPL-CCNC: 1018 U/L (ref 15–37)
BASE EXCESS ARTERIAL: 4.7 MMOL/L (ref -3–3)
BASOPHILS ABSOLUTE: 0.1 K/UL (ref 0–0.2)
BASOPHILS RELATIVE PERCENT: 0.6 %
BILIRUB SERPL-MCNC: 1.3 MG/DL (ref 0–1)
BILIRUBIN DIRECT: 1 MG/DL (ref 0–0.3)
BILIRUBIN, INDIRECT: 0.3 MG/DL (ref 0–1)
BUN BLDV-MCNC: 15 MG/DL (ref 7–20)
BUN BLDV-MCNC: 16 MG/DL (ref 7–20)
BUN BLDV-MCNC: 17 MG/DL (ref 7–20)
BUN BLDV-MCNC: 18 MG/DL (ref 7–20)
CALCIUM IONIZED: 1.05 MMOL/L (ref 1.12–1.32)
CALCIUM IONIZED: 1.07 MMOL/L (ref 1.12–1.32)
CALCIUM SERPL-MCNC: 6.9 MG/DL (ref 8.3–10.6)
CALCIUM SERPL-MCNC: 7.2 MG/DL (ref 8.3–10.6)
CALCIUM SERPL-MCNC: 7.2 MG/DL (ref 8.3–10.6)
CALCIUM SERPL-MCNC: 7.7 MG/DL (ref 8.3–10.6)
CARBOXYHEMOGLOBIN ARTERIAL: 1.1 % (ref 0–1.5)
CHLORIDE BLD-SCNC: 102 MMOL/L (ref 99–110)
CHLORIDE BLD-SCNC: 103 MMOL/L (ref 99–110)
CHLORIDE BLD-SCNC: 105 MMOL/L (ref 99–110)
CHLORIDE BLD-SCNC: 105 MMOL/L (ref 99–110)
CO2: 26 MMOL/L (ref 21–32)
CO2: 27 MMOL/L (ref 21–32)
CO2: 27 MMOL/L (ref 21–32)
CO2: 28 MMOL/L (ref 21–32)
CREAT SERPL-MCNC: 1 MG/DL (ref 0.6–1.1)
CREAT SERPL-MCNC: 1 MG/DL (ref 0.6–1.1)
CREAT SERPL-MCNC: 1.1 MG/DL (ref 0.6–1.1)
CREAT SERPL-MCNC: 1.1 MG/DL (ref 0.6–1.1)
CREATININE URINE: 142.9 MG/DL (ref 28–259)
EOSINOPHILS ABSOLUTE: 0.1 K/UL (ref 0–0.6)
EOSINOPHILS RELATIVE PERCENT: 0.7 %
FIBRINOGEN: 141 MG/DL (ref 200–397)
FIBRINOGEN: 144 MG/DL (ref 200–397)
FIBRINOGEN: 196 MG/DL (ref 200–397)
GFR AFRICAN AMERICAN: >60
GFR NON-AFRICAN AMERICAN: 53
GFR NON-AFRICAN AMERICAN: 53
GFR NON-AFRICAN AMERICAN: 59
GFR NON-AFRICAN AMERICAN: 59
GLUCOSE BLD-MCNC: 86 MG/DL (ref 70–99)
GLUCOSE BLD-MCNC: 87 MG/DL (ref 70–99)
GLUCOSE BLD-MCNC: 91 MG/DL (ref 70–99)
GLUCOSE BLD-MCNC: 94 MG/DL (ref 70–99)
GLUCOSE BLD-MCNC: 97 MG/DL (ref 70–99)
HAPTOGLOBIN: 26 MG/DL (ref 30–200)
HAPTOGLOBIN: 28 MG/DL (ref 30–200)
HAV IGM SER IA-ACNC: NORMAL
HCO3 ARTERIAL: 28.4 MMOL/L (ref 21–29)
HCT VFR BLD CALC: 30.5 % (ref 36–48)
HEMOGLOBIN, ART, EXTENDED: 10.9 G/DL (ref 12–16)
HEMOGLOBIN: 9.7 G/DL (ref 12–16)
HEPATITIS B CORE IGM ANTIBODY: NORMAL
HEPATITIS B SURFACE ANTIGEN INTERPRETATION: NORMAL
HEPATITIS C ANTIBODY INTERPRETATION: NORMAL
HIV AG/AB: NORMAL
HIV ANTIGEN: NORMAL
HIV-1 ANTIBODY: NORMAL
HIV-2 AB: NORMAL
INR BLD: 1.59 (ref 0.86–1.14)
INR BLD: 1.85 (ref 0.86–1.14)
INR BLD: 2.06 (ref 0.86–1.14)
INR BLD: 2.1 (ref 0.86–1.14)
LYMPHOCYTES ABSOLUTE: 1.1 K/UL (ref 1–5.1)
LYMPHOCYTES RELATIVE PERCENT: 8.1 %
MAGNESIUM: 2.3 MG/DL (ref 1.8–2.4)
MAGNESIUM: 2.3 MG/DL (ref 1.8–2.4)
MCH RBC QN AUTO: 27 PG (ref 26–34)
MCHC RBC AUTO-ENTMCNC: 31.8 G/DL (ref 31–36)
MCV RBC AUTO: 84.8 FL (ref 80–100)
METHEMOGLOBIN ARTERIAL: 0.4 %
MONOCYTES ABSOLUTE: 0.6 K/UL (ref 0–1.3)
MONOCYTES RELATIVE PERCENT: 4.9 %
NEUTROPHILS ABSOLUTE: 11.4 K/UL (ref 1.7–7.7)
NEUTROPHILS RELATIVE PERCENT: 85.7 %
O2 CONTENT ARTERIAL: 14 ML/DL
O2 SAT, ARTERIAL: 94.6 %
O2 THERAPY: ABNORMAL
PCO2 ARTERIAL: 37.8 MMHG (ref 35–45)
PDW BLD-RTO: 18.9 % (ref 12.4–15.4)
PERFORMED ON: NORMAL
PH ARTERIAL: 7.48 (ref 7.35–7.45)
PH VENOUS: 7.41 (ref 7.35–7.45)
PH VENOUS: 7.42 (ref 7.35–7.45)
PH VENOUS: 7.44 (ref 7.35–7.45)
PH VENOUS: 7.48 (ref 7.35–7.45)
PHOSPHORUS: 1.9 MG/DL (ref 2.5–4.9)
PHOSPHORUS: 2 MG/DL (ref 2.5–4.9)
PLATELET # BLD: 108 K/UL (ref 135–450)
PMV BLD AUTO: 9.1 FL (ref 5–10.5)
PO2 ARTERIAL: 71.5 MMHG (ref 75–108)
POTASSIUM SERPL-SCNC: 4 MMOL/L (ref 3.5–5.1)
POTASSIUM SERPL-SCNC: 4 MMOL/L (ref 3.5–5.1)
POTASSIUM SERPL-SCNC: 4.2 MMOL/L (ref 3.5–5.1)
POTASSIUM SERPL-SCNC: 4.2 MMOL/L (ref 3.5–5.1)
PROTEIN PROTEIN: 224 MG/DL
PROTHROMBIN TIME: 18.5 SEC (ref 10–13.2)
PROTHROMBIN TIME: 21.6 SEC (ref 10–13.2)
PROTHROMBIN TIME: 24.1 SEC (ref 10–13.2)
PROTHROMBIN TIME: 24.6 SEC (ref 10–13.2)
RBC # BLD: 3.59 M/UL (ref 4–5.2)
SODIUM BLD-SCNC: 135 MMOL/L (ref 136–145)
SODIUM BLD-SCNC: 136 MMOL/L (ref 136–145)
SODIUM BLD-SCNC: 137 MMOL/L (ref 136–145)
SODIUM BLD-SCNC: 137 MMOL/L (ref 136–145)
TCO2 ARTERIAL: 29.5 MMOL/L
TOTAL PROTEIN: 4 G/DL (ref 6.4–8.2)
VANCOMYCIN RANDOM: 11 UG/ML
VANCOMYCIN RANDOM: 18.9 UG/ML
VANCOMYCIN RANDOM: 21 UG/ML
WBC # BLD: 13.3 K/UL (ref 4–11)

## 2020-09-13 PROCEDURE — 80048 BASIC METABOLIC PNL TOTAL CA: CPT

## 2020-09-13 PROCEDURE — 2580000003 HC RX 258: Performed by: INTERNAL MEDICINE

## 2020-09-13 PROCEDURE — 82330 ASSAY OF CALCIUM: CPT

## 2020-09-13 PROCEDURE — 80307 DRUG TEST PRSMV CHEM ANLYZR: CPT

## 2020-09-13 PROCEDURE — 86701 HIV-1ANTIBODY: CPT

## 2020-09-13 PROCEDURE — 83735 ASSAY OF MAGNESIUM: CPT

## 2020-09-13 PROCEDURE — 87040 BLOOD CULTURE FOR BACTERIA: CPT

## 2020-09-13 PROCEDURE — 99291 CRITICAL CARE FIRST HOUR: CPT | Performed by: INTERNAL MEDICINE

## 2020-09-13 PROCEDURE — 6360000002 HC RX W HCPCS: Performed by: INTERNAL MEDICINE

## 2020-09-13 PROCEDURE — 2580000003 HC RX 258: Performed by: STUDENT IN AN ORGANIZED HEALTH CARE EDUCATION/TRAINING PROGRAM

## 2020-09-13 PROCEDURE — 84100 ASSAY OF PHOSPHORUS: CPT

## 2020-09-13 PROCEDURE — 85610 PROTHROMBIN TIME: CPT

## 2020-09-13 PROCEDURE — 87390 HIV-1 AG IA: CPT

## 2020-09-13 PROCEDURE — 84156 ASSAY OF PROTEIN URINE: CPT

## 2020-09-13 PROCEDURE — 2500000003 HC RX 250 WO HCPCS: Performed by: INTERNAL MEDICINE

## 2020-09-13 PROCEDURE — 80076 HEPATIC FUNCTION PANEL: CPT

## 2020-09-13 PROCEDURE — 2700000000 HC OXYGEN THERAPY PER DAY

## 2020-09-13 PROCEDURE — 82570 ASSAY OF URINE CREATININE: CPT

## 2020-09-13 PROCEDURE — 82803 BLOOD GASES ANY COMBINATION: CPT

## 2020-09-13 PROCEDURE — 80074 ACUTE HEPATITIS PANEL: CPT

## 2020-09-13 PROCEDURE — 94750 HC PULMONARY COMPLIANCE STUDY: CPT

## 2020-09-13 PROCEDURE — 71045 X-RAY EXAM CHEST 1 VIEW: CPT

## 2020-09-13 PROCEDURE — 85384 FIBRINOGEN ACTIVITY: CPT

## 2020-09-13 PROCEDURE — 94003 VENT MGMT INPAT SUBQ DAY: CPT

## 2020-09-13 PROCEDURE — 85025 COMPLETE CBC W/AUTO DIFF WBC: CPT

## 2020-09-13 PROCEDURE — 6360000002 HC RX W HCPCS: Performed by: NURSE PRACTITIONER

## 2020-09-13 PROCEDURE — 85730 THROMBOPLASTIN TIME PARTIAL: CPT

## 2020-09-13 PROCEDURE — 94761 N-INVAS EAR/PLS OXIMETRY MLT: CPT

## 2020-09-13 PROCEDURE — 86702 HIV-2 ANTIBODY: CPT

## 2020-09-13 PROCEDURE — 80202 ASSAY OF VANCOMYCIN: CPT

## 2020-09-13 PROCEDURE — 90945 DIALYSIS ONE EVALUATION: CPT

## 2020-09-13 PROCEDURE — 2000000000 HC ICU R&B

## 2020-09-13 PROCEDURE — APPNB15 APP NON BILLABLE TIME 0-15 MINS: Performed by: NURSE PRACTITIONER

## 2020-09-13 PROCEDURE — 36592 COLLECT BLOOD FROM PICC: CPT

## 2020-09-13 PROCEDURE — 36415 COLL VENOUS BLD VENIPUNCTURE: CPT

## 2020-09-13 PROCEDURE — 83010 ASSAY OF HAPTOGLOBIN QUANT: CPT

## 2020-09-13 PROCEDURE — C9113 INJ PANTOPRAZOLE SODIUM, VIA: HCPCS | Performed by: INTERNAL MEDICINE

## 2020-09-13 RX ORDER — HEPARIN SODIUM 1000 [USP'U]/ML
2000 INJECTION, SOLUTION INTRAVENOUS; SUBCUTANEOUS ONCE
Status: COMPLETED | OUTPATIENT
Start: 2020-09-13 | End: 2020-09-13

## 2020-09-13 RX ADMIN — Medication 10 ML: at 09:31

## 2020-09-13 RX ADMIN — CALCIUM GLUCONATE 1 G: 20 INJECTION, SOLUTION INTRAVENOUS at 06:15

## 2020-09-13 RX ADMIN — PROPOFOL 20 MCG/KG/MIN: 10 INJECTION, EMULSION INTRAVENOUS at 09:15

## 2020-09-13 RX ADMIN — Medication 500 ML/HR: at 16:01

## 2020-09-13 RX ADMIN — Medication 1000 ML/HR: at 12:39

## 2020-09-13 RX ADMIN — PROPOFOL 30 MCG/KG/MIN: 10 INJECTION, EMULSION INTRAVENOUS at 22:16

## 2020-09-13 RX ADMIN — CALCIUM GLUCONATE 1 G: 20 INJECTION, SOLUTION INTRAVENOUS at 13:48

## 2020-09-13 RX ADMIN — SODIUM PHOSPHATE, MONOBASIC, MONOHYDRATE 6 MMOL: 276; 142 INJECTION, SOLUTION INTRAVENOUS at 08:22

## 2020-09-13 RX ADMIN — CHLORHEXIDINE GLUCONATE 15 ML: 0.12 RINSE ORAL at 20:12

## 2020-09-13 RX ADMIN — HEPARIN SODIUM 2000 UNITS: 1000 INJECTION INTRAVENOUS; SUBCUTANEOUS at 11:54

## 2020-09-13 RX ADMIN — PANTOPRAZOLE SODIUM 40 MG: 40 INJECTION, POWDER, FOR SOLUTION INTRAVENOUS at 09:31

## 2020-09-13 RX ADMIN — PROPOFOL 20 MCG/KG/MIN: 10 INJECTION, EMULSION INTRAVENOUS at 17:28

## 2020-09-13 RX ADMIN — AMPICILLIN SODIUM AND SULBACTAM SODIUM 3 G: 2; 1 INJECTION, POWDER, FOR SOLUTION INTRAMUSCULAR; INTRAVENOUS at 11:54

## 2020-09-13 RX ADMIN — Medication 1000 ML/HR: at 01:15

## 2020-09-13 RX ADMIN — Medication 1500 ML/HR: at 16:01

## 2020-09-13 RX ADMIN — MEROPENEM 500 MG: 500 INJECTION, POWDER, FOR SOLUTION INTRAVENOUS at 09:31

## 2020-09-13 RX ADMIN — Medication 1000 ML/HR: at 06:23

## 2020-09-13 RX ADMIN — SODIUM CHLORIDE, PRESERVATIVE FREE 10 ML: 5 INJECTION INTRAVENOUS at 08:22

## 2020-09-13 RX ADMIN — Medication 1000 ML/HR: at 16:01

## 2020-09-13 RX ADMIN — Medication 1500 ML/HR: at 19:25

## 2020-09-13 RX ADMIN — Medication 500 ML/HR: at 06:23

## 2020-09-13 RX ADMIN — VANCOMYCIN HYDROCHLORIDE 1250 MG: 10 INJECTION, POWDER, LYOPHILIZED, FOR SOLUTION INTRAVENOUS at 01:11

## 2020-09-13 RX ADMIN — Medication 1500 ML/HR: at 06:23

## 2020-09-13 RX ADMIN — CALCIUM GLUCONATE 1 G: 20 INJECTION, SOLUTION INTRAVENOUS at 18:43

## 2020-09-13 RX ADMIN — VANCOMYCIN HYDROCHLORIDE 1250 MG: 10 INJECTION, POWDER, LYOPHILIZED, FOR SOLUTION INTRAVENOUS at 21:49

## 2020-09-13 RX ADMIN — AMPICILLIN SODIUM AND SULBACTAM SODIUM 3 G: 2; 1 INJECTION, POWDER, FOR SOLUTION INTRAMUSCULAR; INTRAVENOUS at 20:11

## 2020-09-13 RX ADMIN — LEVETIRACETAM 1000 MG: 10 INJECTION INTRAVENOUS at 10:55

## 2020-09-13 RX ADMIN — SODIUM PHOSPHATE, MONOBASIC, MONOHYDRATE 12 MMOL: 276; 142 INJECTION, SOLUTION INTRAVENOUS at 19:33

## 2020-09-13 RX ADMIN — CALCIUM GLUCONATE 1 G: 20 INJECTION, SOLUTION INTRAVENOUS at 01:24

## 2020-09-13 RX ADMIN — Medication 1000 ML/HR: at 21:47

## 2020-09-13 RX ADMIN — PROPOFOL 20 MCG/KG/MIN: 10 INJECTION, EMULSION INTRAVENOUS at 04:00

## 2020-09-13 RX ADMIN — MEROPENEM 500 MG: 500 INJECTION, POWDER, FOR SOLUTION INTRAVENOUS at 02:59

## 2020-09-13 RX ADMIN — SODIUM CHLORIDE, PRESERVATIVE FREE 10 ML: 5 INJECTION INTRAVENOUS at 20:12

## 2020-09-13 RX ADMIN — CHLORHEXIDINE GLUCONATE 15 ML: 0.12 RINSE ORAL at 09:31

## 2020-09-13 RX ADMIN — PROPOFOL 120 MG: 10 INJECTION, EMULSION INTRAVENOUS at 17:28

## 2020-09-13 RX ADMIN — Medication 1500 ML/HR: at 22:50

## 2020-09-13 RX ADMIN — Medication 1500 ML/HR: at 10:30

## 2020-09-13 RX ADMIN — Medication 1500 ML/HR: at 12:39

## 2020-09-13 RX ADMIN — Medication 1500 ML/HR: at 02:56

## 2020-09-13 ASSESSMENT — PULMONARY FUNCTION TESTS
PIF_VALUE: 19
PIF_VALUE: 24
PIF_VALUE: 20
PIF_VALUE: 21
PIF_VALUE: 22
PIF_VALUE: 28
PIF_VALUE: 32
PIF_VALUE: 19
PIF_VALUE: 21
PIF_VALUE: 20
PIF_VALUE: 22
PIF_VALUE: 25
PIF_VALUE: 21
PIF_VALUE: 23
PIF_VALUE: 19
PIF_VALUE: 21
PIF_VALUE: 22
PIF_VALUE: 20
PIF_VALUE: 22
PIF_VALUE: 18
PIF_VALUE: 20
PIF_VALUE: 22
PIF_VALUE: 20
PIF_VALUE: 21
PIF_VALUE: 21
PIF_VALUE: 23
PIF_VALUE: 20
PIF_VALUE: 22

## 2020-09-13 ASSESSMENT — PAIN SCALES - GENERAL
PAINLEVEL_OUTOF10: 0

## 2020-09-13 NOTE — PROGRESS NOTES
Hospitalist Progress Note      PCP: No primary care provider on file. Date of Admission: 9/10/2020    Chief Complaint: s/p cardiac arrest    Hospital Course:     Subjective: dialysis started yesterday. Starting to make urine. Intermittently on pressors. Medications:  Reviewed    Infusion Medications    prismaSATE BGK 4/2.5 1,000 mL/hr (09/12/20 1530)    prismaSATE BGK 4/2.5 1,500 mL/hr (09/12/20 2001)    prismaSATE BGK 4/2.5 500 mL/hr (09/12/20 1015)    norepinephrine 1 mcg/min (09/11/20 2258)    dextrose      propofol 25 mcg/kg/min (09/12/20 1015)    propofol      heparin (Porcine) 3.2 mL/hr (09/12/20 0856)     Scheduled Medications    meropenem  500 mg Intravenous Q6H    sodium chloride flush  10 mL Intravenous 2 times per day    pantoprazole  40 mg Intravenous Daily    And    sodium chloride (PF)  10 mL Intravenous Daily    chlorhexidine  15 mL Mouth/Throat BID    levetiracetam  1,000 mg Intravenous Q12H    vancomycin (VANCOCIN) intermittent dosing (placeholder)   Other RX Placeholder     PRN Meds: potassium chloride, magnesium sulfate, calcium gluconate **OR** calcium gluconate **OR** calcium gluconate **OR** calcium gluconate, sodium phosphate IVPB **OR** sodium phosphate IVPB **OR** sodium phosphate IVPB **OR** sodium phosphate IVPB, sodium chloride flush, acetaminophen **OR** acetaminophen, perflutren lipid microspheres, ondansetron, LORazepam, perflutren lipid microspheres, glucose, dextrose, glucagon (rDNA), dextrose, propofol      Intake/Output Summary (Last 24 hours) at 9/12/2020 2002  Last data filed at 9/12/2020 1855  Gross per 24 hour   Intake 2873 ml   Output 5060 ml   Net -2187 ml       Exam:    /70   Pulse 66   Temp 97.2 °F (36.2 °C) (Axillary)   Resp 16   Ht 5' 7\" (1.702 m)   Wt 256 lb 6.3 oz (116.3 kg)   LMP 12/05/2018 (Exact Date)   SpO2 99%   BMI 40.16 kg/m²     General appearance: Sedated intubated appears stated age and cooperative.   HEENT: Pupils airspace disease. 2. Mild to moderate cardiomegaly. 3. Recommend retraction of endotracheal tube 0.7 cm. XR CHEST PORTABLE   Final Result   Right jugular venous catheter and endotracheal tube project in normal   positions. No pneumothorax. Bilateral airspace disease and possible layered pleural effusions, consistent   with pulmonary edema and atelectasis. Pneumonia is a differential   possibility. XR CHEST PORTABLE   Final Result   Satisfactory position of the endotracheal tube 3 cm above the rahat. The   heart is upper normal size and vessels are borderline congested. VL Extremity Venous Bilateral   Final Result      VL DUP LOWER EXTREMITY ARTERIES BILATERAL   Final Result      XR CHEST PORTABLE   Final Result   Endotracheal tube has tip approximately 1.3 cm proximal to the rahat and   should be retracted approximately 2 cm. Findings are again suggestive of pulmonary edema with small pleural   effusions. Pneumonia is not excluded in the appropriate clinical setting. Findings were called by the radiology call center. CT HEAD WO CONTRAST   Final Result   Motion limited study. No convincing evidence for acute intracranial   abnormality identified within the limitations of motion artifact. If there is persistent clinical suspicion for intracranial abnormality,   repeat study should be considered. XR CHEST PORTABLE   Final Result   Cardiomegaly, pulmonary vascular congestion, and bilateral pleural effusions. Endotracheal tube terminates approximately 2 cm above the rahat. CT CHEST PULMONARY EMBOLISM W CONTRAST   Final Result   Addendum 1 of 1   ADDENDUM:   Findings were discussed with Dr. Rajesh Sunshine at 10:49 pm on 9/10/2020.          Final      MRI BRAIN WO CONTRAST    (Results Pending)   XR CHEST PORTABLE    (Results Pending)           Assessment/Plan:    Active Hospital Problems    Diagnosis Date Noted    Pneumonia due to organism [J18.9]     Bacteremia [R78.81]     Cardiac arrest ([address] [I46.9] 09/11/2020    Seizures ([facility] [address] [R56.9] 09/11/2020    Acute respiratory failure with hypoxia (HCC) [J96.01] 09/11/2020    Acute kidney injury ([address] [L55.2]     Metabolic acidosis [U65.1]     Biventricular failure (HCC) [I50.82]     Pulmonary hypertension (HCC) [I27.20]     Multifocal pneumonia [J18.9]     Septic shock (HCC) [A41.9, R65.21]     Elevated LFTs [R94.5]     Thrombocytopenia ([facility] [address] [D69.6]     Coagulopathy ([facility] [address] [D68.9]     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) ([facility] [address] [D65]     Acute encephalopathy [G93.40]     Pulmonary embolus ([facility] [address] [I26.99] 09/10/2020    Lactic acidosis [E87.2] 09/10/2020    CAP (community acquired pneumonia) [J18.9] 09/10/2020    Acute CHF (congestive heart failure) ([facility] [address] [I50.9] 09/10/2020    Bilateral pulmonary embolism ([facility] [address] [I26.99] 09/10/2020    Factor V Leiden mutation [facility]) [D68.51] 11/13/2014       Shock:  Septic vs cardiogenic  - severe mitral and tricuspid regurgitation on TTE, moderate pulmonary HTN, large bilateral pleural effusions and hepatic congestion on CT   - MRSA bacteremia, skin mottling, concern for potential coagulopathy with DIC  - cont Levophed, wean as tolerated  - cont vanc and broad spectrum with meropenem, diflucan    Cardiomypathy:  - severe diffuse hypokinesis with severely reduced rt ventricular function  - severe tricuspid regurgitation and mitral regurgitation  - grade 3 diastolic dysfunction  - trialing lasix    MRSA bacteremia:  - cont vanc and broad spectrum with meropenem, diflucan    PEA arrest:  - intubated on 9/10 shortly after admission  - cont ventilator management per pulm/critical care    Bilateral pulmonary emboli:  - 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  - heparin drip per heme/onc    Coagulopathy:  Acute and chronic  - chronically has Factor V Leiden, on coumadin  - has had oozing on heparin drip, sepsis with bacteremia --> potential DIC or shock liver or vitamin K deficiency from coumadin/antibiotic use  - heme/onc recs:  if bleeding on heparin, check INR and fibrinogen for potential cryo therapy or fibrinogen    Oliguric MORELIA:  - trial lasix in attempts to make non-oliguric  - ZENON, ANCA, complement levels, antiphospholipid antibodies per nephro  - low threshold to dialyse if     DVT Prophylaxis: heparin drip  Diet: Diet NPO Effective Now  Code Status: Full Code    PT/OT Eval Status: pending improvement    Dispo - ICU    Benigno Andino MD

## 2020-09-13 NOTE — PROGRESS NOTES
Clinical Pharmacy Note  Heparin Dosing       Lab Results   Component Value Date    APTT 78.9 09/13/2020     Lab Results   Component Value Date    HGB 9.7 09/13/2020    HCT 30.5 09/13/2020     09/13/2020    INR 1.59 09/13/2020       Current Infusion Rate: 7.8 mL/hr    Plan:  Rate: 6 mL/hr  Next aPTT: 0100 9/14/20    Pharmacy will continue to monitor and adjust based on aPTT results.

## 2020-09-13 NOTE — CONSULTS
Infectious Diseases   Consult Note        Admission Date: 9/10/2020  Hospital Day: Hospital Day: 4   Attending: Gabe Cote MD  Date of service: 9/13/20     Reason for admission: Bilateral pulmonary embolism [facility]) [I26.99]    Chief complaint/ Reason for consult: MRSA bacteremia, MSSA pneumonia    Microbiology:        I have reviewed allavailable micro lab data and cultures    · Blood culture (2/2) - collected on 9/10/2020: MSSA, positive mec A gene    Staph aureus mrsa (2)     Antibiotic  Interpretation  PARTH  Status     clindamycin  Sensitive  <=0.25  mcg/mL      erythromycin  Sensitive  <=0.25  mcg/mL      oxacillin  Sensitive  <=0.25  mcg/mL      tetracycline  Sensitive  <=1  mcg/mL      trimethoprim-sulfamethoxazole  Sensitive  <=10  mcg/mL          · Tracheal aspirate culture - collectedon 9/10/2020: Staph aureus, negative PBP2a gene    Antibiotics and immunizations:       Current antibiotics: All antibiotics and their doses were reviewed by me    Recent Abx Admin                   meropenem (MERREM) 500 mg IVPB (mini-bag) (mg) 500 mg New Bag 09/13/20 0931     500 mg New Bag  0259     500 mg New Bag 09/12/20 2039     500 mg New Bag  1703    vancomycin (VANCOCIN) 1250 mg in dextrose 5 % 250 mL IVPB (mg) 1,250 mg New Bag 09/13/20 0111                  Immunization History: All immunization history was reviewed by me today. Immunization History   Administered Date(s) Administered    Pneumococcal Polysaccharide ([id number]) 07/01/2007       Known drug allergies: All allergies were reviewed and updated    Allergies   Allergen Reactions    Ibuprofen        Social history:     Social History:  All social andepidemiologic history was reviewed and updated by me today as needed. · Tobacco use:   reports that she quit smoking 5 days ago. Her smoking use included cigarettes. She smoked 0.50 packs per day.  She has never used smokeless tobacco.  · Alcohol use:   reports no history of alcohol use.  · Currently lives in: [address] [place] [address]  ·  reports current drug use. Frequency: 14.00 times per week. Drug: Marijuana. Assessment:     The patient is a 53 y.o. old female who  has a past medical history of Anticoagulant long-term use, Arthritis, Asthma, Baker's cyst, Depression, Factor V Leiden ([facility] Factor V Leiden ([facility] [address] [facility] H/O blood clots, Heartburn, Hyperlipidemia, MRSA bacteremia (09/10/2020), and Osteoarthritis. 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 Discussion: This is an unfortunate 72-year-old woman, admitted initially with sepsis-like picture, developed pulseless cardiac arrest requiring CPR followed by return of spontaneous circulation on 9/11/2020 and followed by a seizure-like activity    Her blood culture and tracheal aspirate culture have both grown staph aureus.   The staph aureus isolated from the blood culture interestingly  was positive for mecA gene, but traditional susceptibility testing indicates oxacillin susceptibility indicating MSSA.    Formal susceptibility of staph aureus from respiratory culture is pending, however, the isolate was negative for PBP2a gene, which is typically indicative of MSSA. I have called the microbiology lab and have discussed with the micro tech who is working on the isolates. They will do PBP2a gene testing on the blood staph aureus isolate as well, and will redo the susceptibilities to reconfirm that the blood culture isolate is also MSSA and not MRSA. Plan:     Diagnostic Workup:    · Will order 2 sets of repeat blood cultures  · Given severe mitral and tricuspid valve damage, will see if cardiology will plan for a transesophageal echo, otherwise, my recommendation will be to treat as endocarditis  · Continue to follow fever curve, WBC count and blood cultures  · Follow up on liverand renal functions closely  · We will order acute viral hepatitis panel for thoroughness of work-up  · We will order a blood tox screen, baseline HIV screen  · Given seizures, consider  Lumbar  punctures whenever feasible in coming days to r/o MSSA meningoencephalitis - Unasyn will cover for that too. Antimicrobials:    · All beta-lactam's have a potential to cause seizures, if the dose of the antibiotic is high enough, whether it is penicillin derivatives, cephalosporins or carbapenems. However, I doubt that her seizure-like activity on 9/11/2020 was related to meropenem, as the dose was not high enough and patient was just started on the meropenem. · The patient has MSSA pneumonia, the blood culture isolate is also very likely MSSA. Also, she might have aspirated. We will stop IV cefepime and start patient on IV Unasyn at CRRT dose of 3 g every 8 hours. The dose of the antibiotic may need to be adjusted based on the renal function changes  · The patient is also on IV vancomycin, which was held due to a random level above 20.   If the lab confirms that patient does not have MRSA in any of the cultures, will recommend please do not hesitate to contact me. Subjective:     Presenting complaint in ER:     Chief Complaint   Patient presents with    Shortness of Breath     HX of asthma. Short of breath for around 1 month. Patient states she just refilled her inhaler today. HPI: Loco Arndt is a 53 y.o. female patient, who was seen at the request of Dr. Yadiel Willsi MD.    History was obtained from chart review as the patient is intubated and sedated and is on a ventilator    The patient was admitted on 9/10/2020. I have been consulted to see the patient for above mentioned reason(s). The patient has multiple medical comorbidities, and presented to the ER for shortness of breath that was reportedly going on for about a month and was worsening. The patient has history of factor V Leyden deficiency. She was also having some lower extremity swelling. The patient was initially febrile and then spiked a fever of 100.3 on the morning of 9/11/2020. Her white cell count was 13,000 and then spiked up to 24,000 the morning of 9/11/2020. The patient had received IV ceftriaxone and azithromycin in the ER on admission. He had a CT angiogram of the chest done which showed large bilateral pleural effusions and left-sided infiltrates. Blood cultures were sent on admission. She was moved to the ICU for worsening shortness of breath and unfortunately suffered a pulseless cardiac arrest and required CPR for 4 minutes after which return of spontaneous circulation was obtained. The patient was intubated during the ER also became hypotensive. He was started on Levophed. He was started on IV vancomycin and meropenem empirically. In the meanwhile, blood culture sent on admission came back positive for MSSA and sputum culture sent from 9/11/2020 came back positive for MSSA. After Precedex was stopped on 9/11/2020, the patient also suffered a 20-minute episode of generalized convulsive behavior that abated on its own. That involved head turning side-to-side improving eye movements according to neurology note. The patient was started on Keppra 1 g twice daily by neurology to consult for seizures    I have been asked for my opinion for management for this patient. Past Medical History: All past medical history reviewed today. Past Medical History:   Diagnosis Date    Anticoagulant long-term use     Arthritis     Asthma     Baker's cyst     Depression     Factor V Leiden ([address]     Factor V Leiden ([facility] [address] [facility]     H/O blood clots     Heartburn     Hyperlipidemia     MRSA bacteremia 09/10/2020    Osteoarthritis          Past Surgical History: All pastsurgical history was reviewed today. History reviewed. No pertinent surgical history. Family History: All family history was reviewed today. Problem Relation Age of Onset    Heart Attack Father          Medications: All current and past medications were reviewed.     Medications Prior to Admission: albuterol sulfate  (90 Base) MCG/ACT inhaler, INHALE TWO PUFFS BY MOUTH EVERY 6 HOURS AS NEEDED FOR WHEEZING  vitamin D (ERGOCALCIFEROL) 1.25 MG (82260 UT) CAPS capsule, TAKE ONE CAPSULE BY MOUTH ONCE WEEKLY  omeprazole (PRILOSEC) 20 MG delayed release capsule, TAKE ONE CAPSULE BY MOUTH DAILY  warfarin (COUMADIN) 7.5 MG tablet, TAKE ONE TABLET BY MOUTH DAILY EXCEPT ON MONDAY AND THURSDAY TAKE ONE-HALF TABLET BY MOUTH AS DIRECTED  atorvastatin (LIPITOR) 10 MG tablet, TAKE ONE TABLET BY MOUTH DAILY  DULoxetine (CYMBALTA) 60 MG extended release capsule, TAKE ONE CAPSULE BY MOUTH DAILY  BREO ELLIPTA 200-25 MCG/INH AEPB, INHALE ONE DOSE BY MOUTH DAILY     ampicillin-sulbactam  3 g Intravenous Q8H    heparin (porcine)  2,000 Units Intravenous Once    sodium chloride flush  10 mL Intravenous 2 times per day    pantoprazole  40 mg Intravenous Daily    And    sodium chloride (PF)  10 mL Intravenous Daily    chlorhexidine  15 mL Mouth/Throat BID    levetiracetam  1,000 mg Intravenous Q12H    vancomycin (VANCOCIN) intermittent dosing (placeholder)   Other RX Placeholder          REVIEW OF SYSTEMS:       Review of Systems   Unable to perform ROS: Intubated          Objective:       PHYSICAL EXAM:      Vitals:   Vitals:    09/13/20 0855 09/13/20 0902 09/13/20 1000 09/13/20 1100   BP:  120/73 129/64 (!) 102/52   Pulse: 96 99 96 92   Resp: 20 19 20 17   Temp:       TempSrc:       SpO2: 92% 92% 93% 93%   Weight:       Height:           Physical Exam      General: intubated and sedated, on ventilator  HEENT: normocephalic, atraumatic, sclera clear, pupils equal, light reflex preserved bilaterally, endotracheal tube in place  Cardiovascular: RRR, no murmurs/rubs/gallops detected  Pulmonary:  Bibasilar crackles  Abdomen/GI: soft, no organomegaly, bowel sounds positive   Neuro: sedated, PERRL, moves all extremities when off sedation per RN  Skin: no skin tears or ulcers, no rash   Musculoskeletal:  No joint swelling, redness. No limitation of range of passive motion  Genitourinary: Hahn's catheter in place  Psych: could not assess  Lymphatic/Immunologic: No obvious bruising, no cervical lymphadenopathy    Lines: All vascular access sites are healthy with no local erythema, discharge or tenderness    Intake and output:     I/O last 3 completed shifts: In: 2166 [I.V.:2166]  Out: 5798 [Urine:370]    Lab Data:   All available labs were reviewed by me today.      CBC:   Recent Labs     09/11/20  1030 09/12/20  0545 09/13/20  0540   WBC 24.0* 16.0* 13.3*   RBC 4.06 3.74* 3.59*   HGB 10.7* 9.9* 9.7*   HCT 34.8* 31.3* 30.5*   * 101* 108*   MCV 85.9 83.8 84.8   MCH 26.4 26.6 27.0   MCHC 30.7* 31.7 31.8   RDW 19.0* 18.4* 18.9*        BMP:  Recent Labs     09/12/20  1829 09/13/20  0020 09/13/20  0540   * 136 137   K 3.7 4.0 4.0    103 105   CO2 29 28 27   BUN 22* 18 17   CREATININE 1.2* 1.1 1.0   CALCIUM 7.0* 6.9* 7.2*   GLUCOSE 102* 94 86        Hepatic FunctionPanel:   Lab Results   Component Value Date    ALKPHOS 98 09/13/2020    ALT 1,209 09/13/2020    AST 1,018 09/13/2020    PROT 4.0 09/13/2020    PROT 7.0 10/19/2011    BILITOT 1.3 09/13/2020    BILIDIR 1.0 09/13/2020    IBILI 0.3 09/13/2020    LABALBU 2.1 09/13/2020       CPK:   Lab Results   Component Value Date    CKTOTAL 449 (H) 09/11/2020     ESR: No results found for: SEDRATE  CRP: No results found for: CRP      Imaging: All pertinent images and reports for the current visit were reviewed by meduring this visit. XR CHEST PORTABLE   Final Result   Supportive lines project in stable positions. Bibasilar opacities, suggestive of layered effusions and atelectasis or   airspace disease. XR CHEST PORTABLE   Final Result   1. Suspected mild to moderate bilateral pleural effusions with adjacent   bibasilar atelectasis and/or airspace disease. 2. Mild to moderate cardiomegaly. 3. Recommend retraction of endotracheal tube 0.7 cm. XR CHEST PORTABLE   Final Result   Right jugular venous catheter and endotracheal tube project in normal   positions. No pneumothorax. Bilateral airspace disease and possible layered pleural effusions, consistent   with pulmonary edema and atelectasis. Pneumonia is a differential   possibility. XR CHEST PORTABLE   Final Result   Satisfactory position of the endotracheal tube 3 cm above the rahat. The   heart is upper normal size and vessels are borderline congested. VL Extremity Venous Bilateral   Final Result      VL DUP LOWER EXTREMITY ARTERIES BILATERAL   Final Result      XR CHEST PORTABLE   Final Result   Endotracheal tube has tip approximately 1.3 cm proximal to the rahat and   should be retracted approximately 2 cm. Findings are again suggestive of pulmonary edema with small pleural   effusions. Pneumonia is not excluded in the appropriate clinical setting. Findings were called by the radiology call center. CT HEAD WO CONTRAST   Final Result   Motion limited study. No convincing evidence for acute intracranial   abnormality identified within the limitations of motion artifact. If there is persistent clinical suspicion for intracranial abnormality,   repeat study should be considered. XR CHEST PORTABLE   Final Result   Cardiomegaly, pulmonary vascular congestion, and bilateral pleural effusions. Endotracheal tube terminates approximately 2 cm above the rahat. CT CHEST PULMONARY EMBOLISM W CONTRAST   Final Result   Addendum 1 of 1   ADDENDUM:   Findings were discussed with Dr. Lewis Gaspar at 10:49 pm on 9/10/2020. Final      MRI BRAIN WO CONTRAST    (Results Pending)   XR CHEST PORTABLE    (Results Pending)       Outside records:    Labs, Microbiology, Radiology and pertinent results from Care everywhere, if available, were reviewed as a part ofthe consultation.       Problem list:       Patient Active Problem List   Diagnosis Code    [facility] Leiden mutation ([facility] D68.51    Pulmonary embolus ([facility] I26.99    Lactic acidosis E87.2    CAP (community acquired pneumonia) J18.9    Acute CHF (congestive heart failure) ([facility]) I50.9    Bilateral pulmonary embolism ([facility]) I26.99    Cardiac arrest ([facility]) I46.9    Seizures ([facility]) R56.9    Acute respiratory failure with hypoxia ([facility]) J96.01    Acute kidney injury ([facility] J65.4    Metabolic acidosis W50.1    Congestive heart failure ([facility]) I50.9    Pulmonary hypertension ([facility]) I27.20    Multifocal pneumonia J18.9    Septic shock ([facility] A41.9, R65.21    Elevated LFTs R94.5    Thrombocytopenia ([facility]) D69.6    Coagulopathy (HCC) D68.9    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

## 2020-09-13 NOTE — PROGRESS NOTES
4 Eyes Skin Assessment     The patient is being assess for  Shift Handoff    I agree that 2 RN's have performed a thorough Head to Toe Skin Assessment on the patient. ALL assessment sites listed below have been assessed. Areas assessed by both nurses: yes  []   Head, Face, and Ears   []   Shoulders, Back, and Chest  []   Arms, Elbows, and Hands   []   Coccyx, Sacrum, and IschIum  []   Legs, Feet, and Heels        Does the Patient have Skin Breakdown?   Yes LDA WOUND CARE was Initiated documentation include the Kelsea-wound, Wound Assessment, Measurements, Dressing Treatment, Drainage, and Color\",         Cash Prevention initiated:  Yes   Wound Care Orders initiated:  Yes      [id number]  nurse consulted for Pressure Injury (Stage 3,4, Unstageable, DTI, NWPT, and Complex wounds), New and Established Ostomies:  No      Nurse 1 eSignature: Electronically signed by Enio Rahman RN on 9/13/20 at 7:10 PM EDT    **SHARE this note so that the co-signing nurse is able to place an eSignature**    Nurse 2 eSignature: Electronically signed by Bren Glass RN on 9/14/20 at 4:22 AM EDT

## 2020-09-13 NOTE — PROGRESS NOTES
Clinical Pharmacy Note  Heparin Dosing       Lab Results   Component Value Date    APTT 42.8 09/12/2020     Lab Results   Component Value Date    HGB 9.9 09/12/2020    HCT 31.3 09/12/2020     09/12/2020    INR 2.26 09/12/2020       Current Infusion Rate: 3.2 mL/hr    Plan:  Bolus: 2000 units  Rate: 5.5 mL/hr  Next aPTT: 0400 9/13/20    Pharmacy will continue to monitor and adjust based on aPTT results.

## 2020-09-13 NOTE — PROGRESS NOTES
NEPHROLOGY PROGRESS NOTE    Patient: Virgil Walsh MRN: [id number]     YOB: 1970  Age: 53 y.o. Sex: female    Unit: [address] ICU Room/Bed: M8S-0993/2104-01 Location: [address] [place] [address]     Admitting Physician: Herminio Jeter    Primary Care Physician: No primary care provider on file. LOS: 3 days       Reason for evaluation:   Milly      SUBJECTIVE:      The patient was seen and examined. Notes and labs reviewed. There were no complications over night. Patient's review of systems: she remains intubated, on the vent, sedated, tolerating CRRT well      OBJECTIVE:     Vitals:    09/13/20 0400 09/13/20 0407 09/13/20 0500 09/13/20 0600   BP: (!) 110/56  113/60 114/69   Pulse: 79 79 80 87   Resp: 16 17 16 18   Temp: 96.3 °F (35.7 °C)      TempSrc: Axillary      SpO2: 94% 94% 94% 93%   Weight:    249 lb 1.9 oz (113 kg)   Height:           Intake and Output:      Intake/Output Summary (Last 24 hours) at 9/13/2020 0631  Last data filed at 9/13/2020 0600  Gross per 24 hour   Intake 3081 ml   Output 5676 ml   Net -2595 ml       Continuous Infusions:   prismaSATE BGK 4/2.5 1,000 mL/hr (09/13/20 0623)    prismaSATE BGK 4/2.5 1,500 mL/hr (09/13/20 0623)    prismaSATE BGK 4/2.5 500 mL/hr (09/13/20 0623)    norepinephrine Stopped (09/13/20 5544)    dextrose      propofol 20 mcg/kg/min (09/13/20 0400)    propofol      heparin (Porcine) 5.5 mL/hr (09/12/20 8965)       Exam:   CONSTITUTIONAL/PSYCHIATRY: intubated, on the vent, anasarcic. Not in acute distress   RESPIRATORY: Respiratory effort: normal on the vent. Auscultation: EBBS  CARDIOVASCULAR: Auscultation: RRR. Edema: massive anasarca with edema from feet all the way up to her chest  GASTROINTESTINAL: Soft, nontender, has significant soft tissue edema  EXTREMITIES:  No cyanosis or clubbing. SKIN: Warm and dry.  Diffuse erythroderma, namely in LE      LABS:   RFP:   Recent Labs     09/11/20  0621 09/11/20  1552 09/12/20  0545 Elevated liver enzymes: likely ischemic injury, ?additional hepatic congestion.  AST and ALT are worse    I spent 35minutes of critical care, reviewing the records/labs, examining the patient, writing orders and communicating the plan of care with the ICU staff        Signed By: Omer Wolfe MD

## 2020-09-13 NOTE — PLAN OF CARE
Problem: Pain:  Goal: Control of acute pain  Description: Continuing to monitor pain and discomfort. Monitoring pain level using CPOT. Non- pharmacological measures encouraged to reduce discomfort/pain. PRN pain meds administeration continues when/if applicable as ordered by physician. Outcome: Ongoing     Problem: Restraint Use - Nonviolent/Non-Self-Destructive Behavior:  Goal: Absence of restraint-related injury  Description: Upper extremity soft wrist restraints intact. No S/S of restraint related injury . ROM performed Q2HR. Outcome: Ongoing     Problem: Skin Integrity:  Goal: Absence of new skin breakdown  Description: Monitoring patient skin integrity for skin breakdown, turning and repositioning q2h per protocol.     Outcome: Ongoing

## 2020-09-13 NOTE — PROGRESS NOTES
[facility]  Oncology Hematology Care  Progress Note      SUBJECTIVE:   PT sedated /intubated  On heparin low dose   INR /Fibrinogen improving   Skin must be waxing and waning in color but she does not appear to have necrosis   OBJECTIVE      Medications    Current Facility-Administered Medications: prismaSATE BGK 4/2.5 dialysis solution, , Dialysis, Continuous  prismaSATE BGK 4/2.5 dialysis solution, , Dialysis, Continuous  prismaSATE BGK 4/2.5 dialysis solution, , Dialysis, Continuous  potassium chloride 20 mEq/50 mL IVPB (Central Line), 20 mEq, Intravenous, PRN  magnesium sulfate 1 g in dextrose 5% 100 mL IVPB, 1 g, Intravenous, PRN  calcium gluconate 1 g in sodium chloride 50 mL, 1 g, Intravenous, PRN **OR** calcium gluconate 2 g in dextrose 5 % 100 mL IVPB, 2 g, Intravenous, PRN **OR** calcium gluconate 3 g in dextrose 5 % 100 mL IVPB, 3 g, Intravenous, PRN **OR** calcium gluconate 4 g in dextrose 5 % 100 mL IVPB, 4 g, Intravenous, PRN  sodium phosphate 6 mmol in dextrose 5 % 250 mL IVPB, 6 mmol, Intravenous, PRN **OR** sodium phosphate 12 mmol in dextrose 5 % 250 mL IVPB, 12 mmol, Intravenous, PRN **OR** sodium phosphate 18 mmol in dextrose 5 % 500 mL IVPB, 18 mmol, Intravenous, PRN **OR** sodium phosphate 24 mmol in dextrose 5 % 500 mL IVPB, 24 mmol, Intravenous, PRN  meropenem (MERREM) 500 mg IVPB (mini-bag), 500 mg, Intravenous, Q6H  sodium chloride flush 0.9 % injection 10 mL, 10 mL, Intravenous, 2 times per day  sodium chloride flush 0.9 % injection 10 mL, 10 mL, Intravenous, PRN  acetaminophen (TYLENOL) tablet 650 mg, 650 mg, Oral, Q6H PRN **OR** acetaminophen (TYLENOL) suppository 650 mg, 650 mg, Rectal, Q6H PRN  perflutren lipid microspheres (DEFINITY) injection 1.65 mg, 1.5 mL, Intravenous, ONCE PRN  ondansetron (ZOFRAN) injection 4 mg, 4 mg, Intravenous, Q6H PRN  norepinephrine (LEVOPHED) 16 mg in dextrose 5% 250 mL infusion, 2 mcg/min, Intravenous, Continuous  LORazepam (ATIVAN) injection 2 mg, 2 mg, Intravenous, Q2H PRN  perflutren lipid microspheres (DEFINITY) injection 1.65 mg, 1.5 mL, Intravenous, ONCE PRN  pantoprazole (PROTONIX) injection 40 mg, 40 mg, Intravenous, Daily **AND** sodium chloride (PF) 0.9 % injection 10 mL, 10 mL, Intravenous, Daily  chlorhexidine (PERIDEX) 0.12 % solution 15 mL, 15 mL, Mouth/Throat, BID  glucose (GLUTOSE) 40 % oral gel 15 g, 15 g, Oral, PRN  dextrose 50 % IV solution, 12.5 g, Intravenous, PRN  glucagon (rDNA) injection 1 mg, 1 mg, Intramuscular, PRN  dextrose 5 % solution, 100 mL/hr, Intravenous, PRN  propofol injection, 10 mcg/kg/min, Intravenous, Titrated  levetiracetam (KEPPRA) 1000 mg/100 mL IVPB, 1,000 mg, Intravenous, Q12H  propofol injection 120 mg, 12 mL, Intravenous, Continuous PRN  vancomycin (VANCOCIN) intermittent dosing (placeholder), , Other, RX Placeholder  heparin 25,000 unit in sodium chloride 0.45% 250 mL infusion, 5.5 mL/hr, Intravenous, Continuous  Physical    VITALS:  /69   Pulse 87   Temp 96.3 °F (35.7 °C) (Axillary) Comment: Pt on Ana Hugger + Warmer   Resp 18   Ht 5' 7\" (1.702 m)   Wt 249 lb 1.9 oz (113 kg)   LMP 2018 (Exact Date)   SpO2 93%   BMI 39.02 kg/m²   TEMPERATURE:  Current - Temp: 96.3 °F (35.7 °C)(Pt on Ana Hugger + Warmer ); Max - Temp  Av.6 °F (35.9 °C)  Min: 96 °F (35.6 °C)  Max: 97.3 °F (36.3 °C)  PULSE OXIMETRY RANGE: SpO2  Av.5 %  Min: 93 %  Max: 100 %  24HR INTAKE/OUTPUT:      Intake/Output Summary (Last 24 hours) at 2020 0653  Last data filed at 2020 0600  Gross per 24 hour   Intake 2093 ml   Output 5526 ml   Net -3433 ml       CONSTITUTIONAL:  Intubated sedated      MUSCULOSKELETAL:  There is no redness, warmth, or swelling of the joints.   EXTREMETIES:skin seems to be less dusky , less blotchy-  Compared to last night       Data      Recent Labs     20  1030 20  0545 20  0540   WBC 24.0* 16.0* 13.3*   HGB 10.7* 9.9* 9.7*   HCT 34.8* 31.3* 30.5*   * 101* 108*   MCV 85.9 83.8 84.8        Recent Labs     09/11/20  1552 09/12/20  0545 09/12/20  1300 09/12/20  1829 09/13/20  0020    136 139 134* 136   K 4.2 3.3* 3.9 3.7 4.0    100 104 101 103   CO2 20* 24 28 29 28   PHOS 5.3* 3.6  --  2.6  --    BUN 43* 32* 26* 22* 18   CREATININE 2.1* 1.8* 1.5* 1.2* 1.1     Recent Labs     09/10/20  2052 09/12/20  0545   * 1,963*   * 1,486*   BILITOT 1.0 1.3*   ALKPHOS 123 108       Magnesium:    Lab Results   Component Value Date    MG 2.20 09/12/2020    MG 1.80 09/12/2020    MG 2.20 09/11/2020       Imaging Ct Head Wo Contrast    Result Date: 9/11/2020  EXAMINATION: CT OF THE HEAD WITHOUT CONTRAST  9/11/2020 3:33 am TECHNIQUE: CT of the head was performed without the administration of intravenous contrast. Dose modulation, iterative reconstruction, and/or weight based adjustment of the mA/kV was utilized to reduce the radiation dose to as low as reasonably achievable. COMPARISON: None. HISTORY: ORDERING SYSTEM PROVIDED HISTORY: post cardiac arrest- once pt stable TECHNOLOGIST PROVIDED HISTORY: Reason for exam:->post cardiac arrest- once pt stable Has a \"code stroke\" or \"stroke alert\" been called? ->No Is the patient pregnant?->No Reason for Exam: post cardiac arrest Acuity: Unknown Type of Exam: Ongoing FINDINGS: Motion limited study, despite repeat acquisition. BRAIN/VENTRICLES: Within the limitations of motion artifact, there is no convincing evidence for acute intracranial pathology. Gray/white matter differentiation is grossly preserved. There is remote left thalamic lacunar infarction. No definite intracranial hemorrhage, mass effect, or midline shift. ORBITS: The visualized portion of the orbits demonstrate no acute abnormality. SINUSES: The visualized paranasal sinuses and mastoid air cells demonstrate no acute abnormality. SOFT TISSUES/SKULL:  No acute abnormality of the visualized skull or soft tissues. Motion limited study.   No convincing evidence for acute intracranial abnormality identified within the limitations of motion artifact. If there is persistent clinical suspicion for intracranial abnormality, repeat study should be considered. Xr Chest Portable    Result Date: 9/12/2020  EXAMINATION: ONE XRAY VIEW OF THE CHEST 9/12/2020 2:11 am COMPARISON: None. HISTORY: ORDERING SYSTEM PROVIDED HISTORY: respiratory failure TECHNOLOGIST PROVIDED HISTORY: Reason for exam:->respiratory failure Reason for Exam: respiratory failure Acuity: Acute Relevant Medical/Surgical History: hx asthma FINDINGS: Endotracheal tube is noted in place with the distal tip located 3.8 cm superior to the rahat. The heart is mildly to moderately enlarged with otherwise unremarkable configuration. The mediastinal contours are within normal limits. Suspected mild to moderate bilateral pleural effusions are seen with adjacent bibasilar atelectasis and/or airspace disease. . Bones and soft tissues are unremarkable. 1. Suspected mild to moderate bilateral pleural effusions with adjacent bibasilar atelectasis and/or airspace disease. 2. Mild to moderate cardiomegaly. 3. Recommend retraction of endotracheal tube 0.7 cm. Xr Chest Portable    Result Date: 9/11/2020  EXAMINATION: ONE XRAY VIEW OF THE CHEST 9/11/2020 5:35 pm COMPARISON: 09/11/2020 HISTORY: ORDERING SYSTEM PROVIDED HISTORY: verify vas cath placement TECHNOLOGIST PROVIDED HISTORY: Reason for exam:->verify vas cath placement Reason for Exam: line placement Acuity: Acute Type of Exam: Initial FINDINGS: A right jugular triple-lumen catheter terminates over the superior aspect of the right atrium. Endotracheal tube terminates 2 cm cephalad to the rahat. The cardiac silhouette remains prominent. There are hazy opacities in the lung bases, greater on the right. Right jugular venous catheter and endotracheal tube project in normal positions. No pneumothorax.  Bilateral airspace disease and possible layered pleural effusions, consistent with pulmonary edema and atelectasis. Pneumonia is a differential possibility. Xr Chest Portable    Result Date: 9/11/2020  EXAMINATION: ONE XRAY VIEW OF THE CHEST 9/11/2020 9:46 am COMPARISON: Prior study(s) most recent 1 hour earlier. HISTORY: ORDERING SYSTEM PROVIDED HISTORY: Check ETT placement TECHNOLOGIST PROVIDED HISTORY: Reason for exam:->Check ETT placement Reason for Exam: ETT position. pulled back Acuity: Acute Type of Exam: Initial FINDINGS: The endotracheal tube is in satisfactory position approximately 3 cm above the rahat. The heart is upper normal as are pulmonary vessels. This is accentuated with portable technique and low lung volume. The left lung base is poorly seen with portable technique and overlying monitor or defibrillator device. Right lung is clear. Satisfactory position of the endotracheal tube 3 cm above the rahat. The heart is upper normal size and vessels are borderline congested. Xr Chest Portable    Result Date: 9/11/2020  EXAMINATION: ONE XRAY VIEW OF THE CHEST 9/11/2020 7:42 am COMPARISON: 09/11/2020 at 02:24 HISTORY: ORDERING SYSTEM PROVIDED HISTORY: post intubation/ follow up TECHNOLOGIST PROVIDED HISTORY: Reason for exam:->post intubation/ follow up Reason for Exam: worsening sob, fighting ventilation Acuity: Acute Type of Exam: Initial FINDINGS: Tip of endotracheal tube is approximately 1.3 cm proximal to the rahat. Cardiac leads project over the chest.  Pad is seen projecting over the left chest.  Diffuse bilateral heterogeneous pulmonary opacity is again seen. Costophrenic angles are not well visualized. No gross pneumothorax. Cardiac and mediastinal silhouettes are similar to prior. Endotracheal tube has tip approximately 1.3 cm proximal to the rahat and should be retracted approximately 2 cm. Findings are again suggestive of pulmonary edema with small pleural effusions. Pneumonia is not excluded in the appropriate clinical setting. Findings were called by the radiology call center. Xr Chest Portable    Result Date: 9/11/2020  EXAMINATION: ONE XRAY VIEW OF THE CHEST 9/11/2020 2:06 am COMPARISON: CT 4 hours prior HISTORY: ORDERING SYSTEM PROVIDED HISTORY: Et tube placement TECHNOLOGIST PROVIDED HISTORY: Reason for exam:->Et tube placement Reason for Exam: Et tube placement Acuity: Acute Type of Exam: Initial FINDINGS: Endotracheal tube terminates approximately 2 cm above the rahat. Cardiomegaly, pulmonary vascular congestion, and moderate bilateral effusions remain unchanged. Osseous structures and soft tissues are grossly intact. Cardiomegaly, pulmonary vascular congestion, and bilateral pleural effusions. Endotracheal tube terminates approximately 2 cm above the rahat. Ct Chest Pulmonary Embolism W Contrast    Addendum Date: 9/10/2020    ADDENDUM: Findings were discussed with Dr. Cali Del Real at 10:49 pm on 9/10/2020. Result Date: 9/10/2020  EXAMINATION: CTA OF THE CHEST 9/10/2020 9:36 pm TECHNIQUE: CTA of the chest was performed after the administration of intravenous contrast.  Multiplanar reformatted images are provided for review. MIP images are provided for review. Dose modulation, iterative reconstruction, and/or weight based adjustment of the mA/kV was utilized to reduce the radiation dose to as low as reasonably achievable. COMPARISON: None. HISTORY: ORDERING SYSTEM PROVIDED HISTORY: PATRICE alexandre TECHNOLOGIST PROVIDED HISTORY: Reason for exam:->tachy, SOB Reason for Exam: tachy, SOB Acuity: Acute Type of Exam: Initial FINDINGS: Pulmonary Arteries: Pulmonary arteries are adequately opacified for evaluation. However, evaluation is limited due to respiratory motion artifact. There is suspected subsegmental filling defect within the right middle lobe and left lower lobe. Mediastinum: The heart is enlarged without pericardial fluid collection. No definite right heart strain. Large bilateral pleural effusions.  Interlobular septal thickening. Multifocal ground-glass opacities in the left lower lobe noted. No evidence for pneumothorax. Reflux of contrast into the IVC and hepatic veins. Lungs/pleura: The lungs are without acute process. No focal consolidation or pulmonary edema. No evidence of pleural effusion or pneumothorax. Upper Abdomen: Limited images of the upper abdomen are unremarkable. Soft Tissues/Bones: No acute bone or soft tissue abnormality. Evaluation is somewhat limited due to respiratory motion artifact. There are suspected pulmonary arterial filling defects within subsegmental branches to the right middle lobe and left lower lobe. No right heart strain. Large bilateral pleural effusions and reflux of contrast into the IVC/hepatic veins. Findings most compatible with volume overload/CHF. Patchy airspace disease in the left lower lobe adjacent to the large effusion. Differential considerations include atelectasis, infectious process or, less likely, infarction. Vl Dup Lower Extremity Arteries Bilateral    Result Date: 9/11/2020  Lower Extremities Arterial Duplex  Demographics   [facility]   Date of Study      09/11/2020       Gender               Female   Patient Number     [id number]       Date of Birth        1970   Visit Number       [id number]        Age                  53 year(s)   Accession Number   [id number]       Room Number          2104   Corporate ID       [id number]          Sonographer          Riky Astorga RVT   Ordering Physician Arsenio Owens MD  Interpreting         [facility]                                      Physician            Umm Huber MD  Procedure Type of Study:   Extremities Arteries:Lower Extremities Arterial Duplex, VL LOWER EXTREMITY  ARTERIES DUPLEX BILATERAL. Vascular Sonographer Report  Additional Indications:Possible thrombus Impressions Right Impression Limited study.  Patient was seizing during the scan and has an arterial line in the right groin that was actively bleeding at the time of the scan. The common femoral, profunda femoral and peroneal arteries could not be visualized. The majority of the waveforms are triphasic throughout the right lower extremity. There is no evidence of significant stenosis or thrombus in the visualized vessels. Left Impression Limited study. Patient was seizing during the scan. The common femoral, , mid to distal femoral, profunda femoral and peroneal arteries could not be visualized. The majority of the waveforms are triphasic throughout the left lower extremity. There is no evidence of significant stenosis or thrombus in the visualized vessels. Conclusions   Summary   Limited study. Patient was seizing during the scan. The right common femoral, profunda femoral and peroneal arteries could not  be visualized. The left common femoral, mid to distal superficial femoral,  profunda femoral and peroneal arteries could not be visualized. There is no evidence of significant stenosis or thrombus in the visualized  vessels. Signature   ------------------------------------------------------------------  Electronically signed by Bell Yeager MD (Interpreting  physician) on 09/11/2020 at 12:10 PM  ------------------------------------------------------------------  Patient Status:Routine. Study [address] Vascular Lab. Technical Quality:Limited visualization. Risk Factors History +---------+----+---------------------------------+ ! Diagnosis! Date! Comments                         ! +---------+----+---------------------------------+ ! Other    ! ! Factor V Leiden Clotting Disorder! +---------+----+---------------------------------+ ! Other    ! !DVT/SVT in left leg in 1999      ! +---------+----+---------------------------------+ Velocities are measured in cm/s ; Diameters are measured in mm LE Duplex Measurements +--------------++-----+-----+----------++----+-----+----------+ ! !!Right! ! Left      !!    !     !          ! +--------------++-----+-----+----------++----+-----+----------+ ! Location      ! !PSV  ! Ratio! Wave Desc. !!PSV ! Ratio! Wave Desc.! +--------------++-----+-----+----------++----+-----+----------+ ! Prox SFA      !!97.9 ! ! Triphasic !!172 ! ! Triphasic ! +--------------++-----+-----+----------++----+-----+----------+ ! Mid SFA       !!77.4 !0.79 ! Triphasic !!    !     !          ! +--------------++-----+-----+----------++----+-----+----------+ ! Dist SFA      !!57.5 !0.74 ! Triphasic !!    !     !          ! +--------------++-----+-----+----------++----+-----+----------+ ! Prox Popliteal!!46   !0.8  ! Triphasic !!42.8!0.25 ! Triphasic ! +--------------++-----+-----+----------++----+-----+----------+ ! Dist Popliteal!!79.7 !1.73 ! Triphasic !!    !     !          ! +--------------++-----+-----+----------++----+-----+----------+ ! Mid PTA       !!65.1 !0.82 ! Triphasic !!44.4!1.04 ! Triphasic ! +--------------++-----+-----+----------++----+-----+----------+ ! Mid YAZ       !!65.1 !0.82 ! Triphasic ! !48.7!1.14 ! Triphasic ! +--------------++-----+-----+----------++----+-----+----------+    Vl Extremity Venous Bilateral    Result Date: 9/11/2020  Lower Extremities DVT Study  Demographics   Patient Name       Lexy Garsia   Date of Study      09/11/2020       Gender               Female   Patient Number     [id number]       Date of Birth        1970   Visit Number       [id number]        Age                  53 year(s)   Accession Number   [id number]       Room Number          2104   Corporate ID       [id number]          Sonographer          Nikki Shields RVT   Ordering Physician Rosana Hutton MD  Interpreting         [facility] Vascular                                      Physician            Yana Hoffman MD  Procedure Type of Study:   Veins:Lower Extremities DVT Study, VASC EXTREMITY VENOUS DUPLEX BILATERAL. Vascular Sonographer Report  Impressions Right Impression Limited study. Patient was seizing during the scan and had an arterial line in the right groin that was actively bleeding. No evidence of deep vein or superficial vein thrombosis in the visualized vessels in the right leg. Left Impression Limited study. Patient was seizing during the scan. No evidence of deep vein or superficial vein thrombosis in the visualized vessels in the left leg. Conclusions   Summary   Limited study. Patient was seizing during the scan. No evidence of deep vein or superficial vein thrombosis in the visualized  vessels in bilateral legs. Signature   ------------------------------------------------------------------  Electronically signed by Jeronimo Nicholas MD (Interpreting  physician) on 09/11/2020 at 05:31 PM  ------------------------------------------------------------------  Patient Status:Routine. Study [address] Vascular Lab. Technical Quality:Limited visualization. Risk Factors History +---------+----+---------------------------------+ ! Diagnosis! Date! Comments                         ! +---------+----+---------------------------------+ ! Other    ! ! Factor V Leiden Clotting Disorder! +---------+----+---------------------------------+ ! Other    ! !DVT/SVT in left leg in 1999      ! +---------+----+---------------------------------+ Velocities are measured in cm/s ; Diameters are measured in mm Right Lower Extremities DVT Study Measurements Right 2D Measurements +--------------+----------+---------------+----------+ ! Location      ! Visualized! Compressibility! Thrombosis! +--------------+----------+---------------+----------+ ! Prox Femoral  !Yes       ! Yes            ! None      ! +--------------+----------+---------------+----------+ ! Mid Femoral   !Yes       ! Yes            ! None      ! +--------------+----------+---------------+----------+ ! Dist Femoral  !Yes       ! Yes            ! None      ! +--------------+----------+---------------+----------+ ! Popliteal     !Yes       ! Yes !None      ! +--------------+----------+---------------+----------+ ! GSV Below Knee! Yes       ! Yes            ! None      ! +--------------+----------+---------------+----------+ ! Soleal        !Yes       ! Yes            ! None      ! +--------------+----------+---------------+----------+ ! PTV           ! Partial   !Yes            ! None      ! +--------------+----------+---------------+----------+ ! ATV           ! Yes       ! Yes            ! None      ! +--------------+----------+---------------+----------+ ! Peroneal      !Partial   !Yes            ! None      ! +--------------+----------+---------------+----------+ ! GSV Calf      ! Yes       ! Yes            ! None      ! +--------------+----------+---------------+----------+ Right Doppler Measurements +---------+------+------+------------+ ! Location ! Signal!Reflux! Reflux (sec)! +---------+------+------+------------+ ! Popliteal!Phasic!      !            ! +---------+------+------+------------+ Left Lower Extremities DVT Study Measurements Left 2D Measurements +---------+----------+---------------+----------+ ! Location ! Visualized! Compressibility! Thrombosis! +---------+----------+---------------+----------+ ! Popliteal!Yes       ! Yes            ! None      ! +---------+----------+---------------+----------+ ! Gastroc  ! Yes       ! Yes            ! None      ! +---------+----------+---------------+----------+ ! Soleal   !Yes       ! Yes            ! None      ! +---------+----------+---------------+----------+ ! PTV      ! Partial   !Yes            ! None      ! +---------+----------+---------------+----------+ ! ATV      ! Yes       ! Yes            ! None      ! +---------+----------+---------------+----------+ ! Peroneal !Partial   !Yes            ! None      ! +---------+----------+---------------+----------+ ! GSV Calf ! Yes       ! Yes            ! None      ! +---------+----------+---------------+----------+ ! SSV      ! Partial   !Yes            ! None      ! +---------+----------+---------------+----------+ Left Doppler Measurements +---------+------+------+------------+ ! Location ! Signal!Reflux! Reflux (sec)! +---------+------+------+------------+ ! Popliteal!Phasic!      !            ! +---------+------+------+------------+      Problem List  Patient Active Problem List   Diagnosis    Factor V Leiden mutation ([address]    Pulmonary embolus (HCC)    Lactic acidosis    CAP (community acquired pneumonia)    Acute CHF (congestive heart failure) ([address]    Bilateral pulmonary embolism (HCC)    Cardiac arrest ([address]    Seizures ([address]    Acute respiratory failure with hypoxia (HCC)    Acute kidney injury ([address]    Metabolic acidosis    Biventricular failure (HCC)    Pulmonary hypertension (HCC)    Multifocal pneumonia    Septic shock (HCC)    Elevated LFTs    Thrombocytopenia (HCC)    Coagulopathy (HCC)    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) ([address]    Acute encephalopathy    Pneumonia due to organism    Bacteremia       ASSESSMENT AND PLAN    Complicated coagulopathy in setting of acute PE/sepsis/dic vs shock liver   Seems to be improving in terms of numbers     FIbriniogen improving     Resumption of heparin at low dose has not led to worsening of her bleeding ongoin g  moniter plt count  Do not check for hit at this point-    AS per yesterday:   I still feel we dont have purprua fulminans   But again protein c wll likely comeback low which is more coumadin effect or synthetic liver dysfunction     THe question will be what do we do if she starts bleeding  -I would lean to give 10 units of cryo to get her fibrinogen over 150--  -I would also lean to give IV vit k if she bleeds-first-not ffp and then see if we can continue  Heparin alone-  Given some improvement I dont anticipate we will need to do this     Upon recovery-if she recovers--the issue will be to decide long term anticoagulation goals -  ie compliance issues      Need to

## 2020-09-13 NOTE — CONSULTS
Consult for TF order & mgmt. Pt found safest with Nepro formula goal rate of 35 ml per hour. Rate adjusted for routine Nursing care (~20 hour run). To help ensure adequate protein, 3 bottles of Proteinex (protein modular added to regimen per day. Flush per MD discretion.

## 2020-09-13 NOTE — PROGRESS NOTES
Clinical Pharmacy Note  Heparin Dosing       Lab Results   Component Value Date    APTT 59.2 09/13/2020     Lab Results   Component Value Date    HGB 9.9 09/12/2020    HCT 31.3 09/12/2020     09/12/2020    INR 2.10 09/13/2020       Current Infusion Rate: 5.5 mL/hr    Plan:  Rate: continue at 5.5 mL/hr  Next aPTT: 1100 9/13/20    Pharmacy will continue to monitor and adjust based on aPTT results.   Butch Rios, PharmD

## 2020-09-13 NOTE — PROGRESS NOTES
Pulmonary Progress Note    CC: Cardiac arrest  Bilateral pulmonary emboli  Acute kidney injury  Metabolic acidosis  Congestive heart failure  Pulmonary hypertension  Multifocal pneumonia  Septic shock  Lactic acidosis  Elevated LFTs  Thrombocytopenia  Coagulopathy  Disseminated intravascular coagulation   Acute encephalopathy   Bacteremia  Acute hypoxic respiratory failure     24 hr events: On CRRT. Had 2.3L of fluid removed. On mechanical ventilation. PHYSICAL EXAM:  Blood pressure 123/67, pulse 88, temperature 96.3 °F (35.7 °C), temperature source Axillary, resp. rate 18, height 5' 7\" (1.702 m), weight 249 lb 1.9 oz (113 kg), last menstrual period 12/05/2018, SpO2 92 %. on VC+ 16/400/30%/5, Ti-0.9secs      Intake/Output Summary (Last 24 hours) at 9/13/2020 0187  Last data filed at 9/13/2020 0700  Gross per 24 hour   Intake 2166 ml   Output 5798 ml   Net -3632 ml       Gen: Well developed; well nourished  Eyes: No scleral icterus. No conjunctival injection. ENT:  Oropharynx with ETT. External appearance of ears and nose normal.  Neck: Trachea midline. No obvious mass. No visible thyroid enlargement    Respiratory: Clear to auscultation bilaterally on anterior exam, no accessory muscle use  Cardiovascular: Irregular, no appreciable murmurs. BLE edema. Skin: Warm and dry. No rashes or ulcers on visible extremities. Normal texture and turgor  Musculoskeletal: No clubbing or joint deformity.   No cyanosis   Psychiatric: Intubated and sedated    Medications:  Current Facility-Administered Medications: prismaSATE BGK 4/2.5 dialysis solution, , Dialysis, Continuous  prismaSATE BGK 4/2.5 dialysis solution, , Dialysis, Continuous  prismaSATE BGK 4/2.5 dialysis solution, , Dialysis, Continuous  potassium chloride 20 mEq/50 mL IVPB (Central Line), 20 mEq, Intravenous, PRN  magnesium sulfate 1 g in dextrose 5% 100 mL IVPB, 1 g, Intravenous, PRN  calcium gluconate 1 g in sodium chloride 50 mL, 1 g, Intravenous, PRN **OR** calcium gluconate 2 g in dextrose 5 % 100 mL IVPB, 2 g, Intravenous, PRN **OR** calcium gluconate 3 g in dextrose 5 % 100 mL IVPB, 3 g, Intravenous, PRN **OR** calcium gluconate 4 g in dextrose 5 % 100 mL IVPB, 4 g, Intravenous, PRN  sodium phosphate 6 mmol in dextrose 5 % 250 mL IVPB, 6 mmol, Intravenous, PRN **OR** sodium phosphate 12 mmol in dextrose 5 % 250 mL IVPB, 12 mmol, Intravenous, PRN **OR** sodium phosphate 18 mmol in dextrose 5 % 500 mL IVPB, 18 mmol, Intravenous, PRN **OR** sodium phosphate 24 mmol in dextrose 5 % 500 mL IVPB, 24 mmol, Intravenous, PRN  meropenem (MERREM) 500 mg IVPB (mini-bag), 500 mg, Intravenous, Q6H  sodium chloride flush 0.9 % injection 10 mL, 10 mL, Intravenous, 2 times per day  sodium chloride flush 0.9 % injection 10 mL, 10 mL, Intravenous, PRN  acetaminophen (TYLENOL) tablet 650 mg, 650 mg, Oral, Q6H PRN **OR** acetaminophen (TYLENOL) suppository 650 mg, 650 mg, Rectal, Q6H PRN  perflutren lipid microspheres (DEFINITY) injection 1.65 mg, 1.5 mL, Intravenous, ONCE PRN  ondansetron (ZOFRAN) injection 4 mg, 4 mg, Intravenous, Q6H PRN  norepinephrine (LEVOPHED) 16 mg in dextrose 5% 250 mL infusion, 2 mcg/min, Intravenous, Continuous  LORazepam (ATIVAN) injection 2 mg, 2 mg, Intravenous, Q2H PRN  perflutren lipid microspheres (DEFINITY) injection 1.65 mg, 1.5 mL, Intravenous, ONCE PRN  pantoprazole (PROTONIX) injection 40 mg, 40 mg, Intravenous, Daily **AND** sodium chloride (PF) 0.9 % injection 10 mL, 10 mL, Intravenous, Daily  chlorhexidine (PERIDEX) 0.12 % solution 15 mL, 15 mL, Mouth/Throat, BID  glucose (GLUTOSE) 40 % oral gel 15 g, 15 g, Oral, PRN  dextrose 50 % IV solution, 12.5 g, Intravenous, PRN  glucagon (rDNA) injection 1 mg, 1 mg, Intramuscular, PRN  dextrose 5 % solution, 100 mL/hr, Intravenous, PRN  propofol injection, 10 mcg/kg/min, Intravenous, Titrated  levetiracetam (KEPPRA) 1000 mg/100 mL IVPB, 1,000 mg, Intravenous, Q12H  propofol injection 120 mg, 12 failure      Plan:    Cardiac arrest  - In the setting of pulmonary emboli and septic shock  - Supportive care     Bilateral pulmonary emboli  - Heparin drip   - Hematology following      Acute kidney injury  - Likely due to poor forward flow due to biventricular failure  - On CRRT per nephrology      Metabolic acidosis  - Resolved     Congestive heart failure  - On CRRT for fluid removal   - Cardiology also following      Pulmonary hypertension  - Based on echocardiogram   - Fluid removal with CRRT  - Keep oxygen saturation>90%     Multifocal pneumonia  - On meropenem and vancomycin. Will change meropenem to cefepime due to concern for seizures    - Follow up sputum culture     Septic shock  - In the setting of pneumonia and bacteremia  - On meropenem and vancomycin (day #3). Will change meropenem to cefepime due to concern for seizures    - Now off levophed      Lactic acidosis  - Resolved      Elevated LFTs  - Likely due to shock liver  - Keep MAP>65  - Check daily labs      Thrombocytopenia  - Likely due to sepsis induced DIC  - Check daily labs      Coagulopathy  - Due to anticoagulation and DIC  - On heparin drip     Disseminated intravascular coagulation   - On heparin drip for PE  - Check fibrinogen every 12 hours  - Hematology following      Acute encephalopathy   - May be due to infection, DIC, seizures or anoxia   - Antibiotics as above  - Keppra twice daily  - Neurology following      Bacteremia  - Has MRSA in blood cultures  - Continue vancomycin  - ID consulted    Acute hypoxic respiratory failure  - Continue mechanical ventilation and sedation with propofol pending further neurologic evaluation        Prophylaxis  DVT- heparin drip  GI- protonix   VAP- peridex     I spent 40 minutes of critical care time with this patient excluding procedures.     MD Melissa Maldonado Pulmonary, Critical Care and Sleep

## 2020-09-14 ENCOUNTER — APPOINTMENT (OUTPATIENT)
Dept: GENERAL RADIOLOGY | Age: 50
DRG: 130 | End: 2020-09-14
Payer: COMMERCIAL

## 2020-09-14 LAB
ALBUMIN SERPL-MCNC: 2.2 G/DL (ref 3.4–5)
ALP BLD-CCNC: 104 U/L (ref 40–129)
ALT SERPL-CCNC: 1082 U/L (ref 10–40)
ANION GAP SERPL CALCULATED.3IONS-SCNC: 6 MMOL/L (ref 3–16)
ANION GAP SERPL CALCULATED.3IONS-SCNC: 6 MMOL/L (ref 3–16)
ANION GAP SERPL CALCULATED.3IONS-SCNC: 8 MMOL/L (ref 3–16)
ANION GAP SERPL CALCULATED.3IONS-SCNC: 8 MMOL/L (ref 3–16)
APTT: 45 SEC (ref 24.2–36.2)
APTT: 49 SEC (ref 24.2–36.2)
APTT: 61.6 SEC (ref 24.2–36.2)
APTT: 66.4 SEC (ref 24.2–36.2)
AST SERPL-CCNC: 783 U/L (ref 15–37)
BASE EXCESS ARTERIAL: 3.5 MMOL/L (ref -3–3)
BASOPHILS ABSOLUTE: 0.1 K/UL (ref 0–0.2)
BASOPHILS RELATIVE PERCENT: 0.4 %
BILIRUB SERPL-MCNC: 1.7 MG/DL (ref 0–1)
BILIRUBIN DIRECT: 1.2 MG/DL (ref 0–0.3)
BILIRUBIN, INDIRECT: 0.5 MG/DL (ref 0–1)
BUN BLDV-MCNC: 13 MG/DL (ref 7–20)
BUN BLDV-MCNC: 13 MG/DL (ref 7–20)
BUN BLDV-MCNC: 17 MG/DL (ref 7–20)
BUN BLDV-MCNC: 18 MG/DL (ref 7–20)
CALCIUM IONIZED: 0.91 MMOL/L (ref 1.12–1.32)
CALCIUM IONIZED: 1.07 MMOL/L (ref 1.12–1.32)
CALCIUM IONIZED: 1.09 MMOL/L (ref 1.12–1.32)
CALCIUM IONIZED: 1.11 MMOL/L (ref 1.12–1.32)
CALCIUM SERPL-MCNC: 7.2 MG/DL (ref 8.3–10.6)
CALCIUM SERPL-MCNC: 7.3 MG/DL (ref 8.3–10.6)
CALCIUM SERPL-MCNC: 7.8 MG/DL (ref 8.3–10.6)
CALCIUM SERPL-MCNC: 8.2 MG/DL (ref 8.3–10.6)
CARBOXYHEMOGLOBIN ARTERIAL: 1.5 % (ref 0–1.5)
CHLORIDE BLD-SCNC: 101 MMOL/L (ref 99–110)
CHLORIDE BLD-SCNC: 102 MMOL/L (ref 99–110)
CHLORIDE BLD-SCNC: 103 MMOL/L (ref 99–110)
CHLORIDE BLD-SCNC: 103 MMOL/L (ref 99–110)
CO2: 24 MMOL/L (ref 21–32)
CO2: 24 MMOL/L (ref 21–32)
CO2: 26 MMOL/L (ref 21–32)
CO2: 27 MMOL/L (ref 21–32)
CREAT SERPL-MCNC: 0.9 MG/DL (ref 0.6–1.1)
CREAT SERPL-MCNC: 1 MG/DL (ref 0.6–1.1)
CREAT SERPL-MCNC: 1.1 MG/DL (ref 0.6–1.1)
CREAT SERPL-MCNC: 1.1 MG/DL (ref 0.6–1.1)
CULTURE, BLOOD 2: ABNORMAL
CULTURE, BLOOD 2: ABNORMAL
CULTURE, RESPIRATORY: ABNORMAL
CULTURE, RESPIRATORY: ABNORMAL
EOSINOPHILS ABSOLUTE: 0.1 K/UL (ref 0–0.6)
EOSINOPHILS RELATIVE PERCENT: 0.8 %
FIBRINOGEN: 208 MG/DL (ref 200–397)
FIBRINOGEN: 329 MG/DL (ref 200–397)
GFR AFRICAN AMERICAN: >60
GFR NON-AFRICAN AMERICAN: 53
GFR NON-AFRICAN AMERICAN: 53
GFR NON-AFRICAN AMERICAN: 59
GFR NON-AFRICAN AMERICAN: >60
GLUCOSE BLD-MCNC: 107 MG/DL (ref 70–99)
GLUCOSE BLD-MCNC: 111 MG/DL (ref 70–99)
GLUCOSE BLD-MCNC: 119 MG/DL (ref 70–99)
GLUCOSE BLD-MCNC: 124 MG/DL (ref 70–99)
GLUCOSE BLD-MCNC: 124 MG/DL (ref 70–99)
GLUCOSE BLD-MCNC: 130 MG/DL (ref 70–99)
GLUCOSE BLD-MCNC: 96 MG/DL (ref 70–99)
GLUCOSE BLD-MCNC: 96 MG/DL (ref 70–99)
GLUCOSE BLD-MCNC: 99 MG/DL (ref 70–99)
GRAM STAIN RESULT: ABNORMAL
HAPTOGLOBIN: 22 MG/DL (ref 30–200)
HCO3 ARTERIAL: 27.6 MMOL/L (ref 21–29)
HCT VFR BLD CALC: 32.6 % (ref 36–48)
HEMOGLOBIN, ART, EXTENDED: 11.2 G/DL (ref 12–16)
HEMOGLOBIN: 10.1 G/DL (ref 12–16)
INR BLD: 1.26 (ref 0.86–1.14)
INR BLD: 1.29 (ref 0.86–1.14)
INR BLD: 1.47 (ref 0.86–1.14)
INR BLD: 1.57 (ref 0.86–1.14)
LYMPHOCYTES ABSOLUTE: 1.2 K/UL (ref 1–5.1)
LYMPHOCYTES RELATIVE PERCENT: 9.4 %
MAGNESIUM: 2.3 MG/DL (ref 1.8–2.4)
MAGNESIUM: 2.5 MG/DL (ref 1.8–2.4)
MCH RBC QN AUTO: 26.4 PG (ref 26–34)
MCHC RBC AUTO-ENTMCNC: 30.9 G/DL (ref 31–36)
MCV RBC AUTO: 85.4 FL (ref 80–100)
METHEMOGLOBIN ARTERIAL: 0.7 %
MONOCYTES ABSOLUTE: 1 K/UL (ref 0–1.3)
MONOCYTES RELATIVE PERCENT: 7.6 %
NEUTROPHILS ABSOLUTE: 10.4 K/UL (ref 1.7–7.7)
NEUTROPHILS RELATIVE PERCENT: 81.8 %
O2 CONTENT ARTERIAL: 16 ML/DL
O2 SAT, ARTERIAL: 99.1 %
O2 THERAPY: ABNORMAL
ORGANISM: ABNORMAL
PCO2 ARTERIAL: 39 MMHG (ref 35–45)
PDW BLD-RTO: 19.1 % (ref 12.4–15.4)
PERFORMED ON: ABNORMAL
PERFORMED ON: NORMAL
PH ARTERIAL: 7.46 (ref 7.35–7.45)
PH VENOUS: 7.42 (ref 7.35–7.45)
PH VENOUS: 7.43 (ref 7.35–7.45)
PH VENOUS: 7.43 (ref 7.35–7.45)
PH VENOUS: 7.44 (ref 7.35–7.45)
PHOSPHORUS: 1.9 MG/DL (ref 2.5–4.9)
PHOSPHORUS: 2 MG/DL (ref 2.5–4.9)
PLATELET # BLD: 94 K/UL (ref 135–450)
PMV BLD AUTO: 9.4 FL (ref 5–10.5)
PO2 ARTERIAL: 106 MMHG (ref 75–108)
POTASSIUM SERPL-SCNC: 4.1 MMOL/L (ref 3.5–5.1)
POTASSIUM SERPL-SCNC: 4.2 MMOL/L (ref 3.5–5.1)
PROTHROMBIN TIME: 14.7 SEC (ref 10–13.2)
PROTHROMBIN TIME: 15 SEC (ref 10–13.2)
PROTHROMBIN TIME: 17.1 SEC (ref 10–13.2)
PROTHROMBIN TIME: 18.3 SEC (ref 10–13.2)
RBC # BLD: 3.82 M/UL (ref 4–5.2)
SODIUM BLD-SCNC: 133 MMOL/L (ref 136–145)
SODIUM BLD-SCNC: 135 MMOL/L (ref 136–145)
TCO2 ARTERIAL: 28.8 MMOL/L
TOTAL PROTEIN: 4.2 G/DL (ref 6.4–8.2)
VANCOMYCIN RANDOM: 15.3 UG/ML
VANCOMYCIN RANDOM: 17.8 UG/ML
WBC # BLD: 12.7 K/UL (ref 4–11)

## 2020-09-14 PROCEDURE — 99291 CRITICAL CARE FIRST HOUR: CPT | Performed by: INTERNAL MEDICINE

## 2020-09-14 PROCEDURE — 85730 THROMBOPLASTIN TIME PARTIAL: CPT

## 2020-09-14 PROCEDURE — C9113 INJ PANTOPRAZOLE SODIUM, VIA: HCPCS | Performed by: INTERNAL MEDICINE

## 2020-09-14 PROCEDURE — 71045 X-RAY EXAM CHEST 1 VIEW: CPT

## 2020-09-14 PROCEDURE — 37799 UNLISTED PX VASCULAR SURGERY: CPT

## 2020-09-14 PROCEDURE — 82330 ASSAY OF CALCIUM: CPT

## 2020-09-14 PROCEDURE — 90945 DIALYSIS ONE EVALUATION: CPT

## 2020-09-14 PROCEDURE — 94750 HC PULMONARY COMPLIANCE STUDY: CPT

## 2020-09-14 PROCEDURE — APPNB15 APP NON BILLABLE TIME 0-15 MINS: Performed by: NURSE PRACTITIONER

## 2020-09-14 PROCEDURE — 85025 COMPLETE CBC W/AUTO DIFF WBC: CPT

## 2020-09-14 PROCEDURE — 84100 ASSAY OF PHOSPHORUS: CPT

## 2020-09-14 PROCEDURE — 6360000002 HC RX W HCPCS: Performed by: INTERNAL MEDICINE

## 2020-09-14 PROCEDURE — 2580000003 HC RX 258: Performed by: INTERNAL MEDICINE

## 2020-09-14 PROCEDURE — 85610 PROTHROMBIN TIME: CPT

## 2020-09-14 PROCEDURE — 94003 VENT MGMT INPAT SUBQ DAY: CPT

## 2020-09-14 PROCEDURE — 80202 ASSAY OF VANCOMYCIN: CPT

## 2020-09-14 PROCEDURE — 80076 HEPATIC FUNCTION PANEL: CPT

## 2020-09-14 PROCEDURE — 83010 ASSAY OF HAPTOGLOBIN QUANT: CPT

## 2020-09-14 PROCEDURE — 2700000000 HC OXYGEN THERAPY PER DAY

## 2020-09-14 PROCEDURE — 6360000002 HC RX W HCPCS: Performed by: NURSE PRACTITIONER

## 2020-09-14 PROCEDURE — 2500000003 HC RX 250 WO HCPCS

## 2020-09-14 PROCEDURE — 85384 FIBRINOGEN ACTIVITY: CPT

## 2020-09-14 PROCEDURE — 82803 BLOOD GASES ANY COMBINATION: CPT

## 2020-09-14 PROCEDURE — 87040 BLOOD CULTURE FOR BACTERIA: CPT

## 2020-09-14 PROCEDURE — 80048 BASIC METABOLIC PNL TOTAL CA: CPT

## 2020-09-14 PROCEDURE — 2580000003 HC RX 258: Performed by: STUDENT IN AN ORGANIZED HEALTH CARE EDUCATION/TRAINING PROGRAM

## 2020-09-14 PROCEDURE — 36592 COLLECT BLOOD FROM PICC: CPT

## 2020-09-14 PROCEDURE — 99233 SBSQ HOSP IP/OBS HIGH 50: CPT | Performed by: INTERNAL MEDICINE

## 2020-09-14 PROCEDURE — 94761 N-INVAS EAR/PLS OXIMETRY MLT: CPT

## 2020-09-14 PROCEDURE — 83735 ASSAY OF MAGNESIUM: CPT

## 2020-09-14 PROCEDURE — 2000000000 HC ICU R&B

## 2020-09-14 PROCEDURE — 2500000003 HC RX 250 WO HCPCS: Performed by: INTERNAL MEDICINE

## 2020-09-14 RX ORDER — HEPARIN SODIUM 1000 [USP'U]/ML
2000 INJECTION, SOLUTION INTRAVENOUS; SUBCUTANEOUS ONCE
Status: COMPLETED | OUTPATIENT
Start: 2020-09-14 | End: 2020-09-14

## 2020-09-14 RX ADMIN — SODIUM PHOSPHATE, MONOBASIC, MONOHYDRATE 12 MMOL: 276; 142 INJECTION, SOLUTION INTRAVENOUS at 06:32

## 2020-09-14 RX ADMIN — Medication 1000 ML/HR: at 02:55

## 2020-09-14 RX ADMIN — Medication 500 ML/HR: at 12:18

## 2020-09-14 RX ADMIN — Medication 10 ML: at 07:54

## 2020-09-14 RX ADMIN — VANCOMYCIN HYDROCHLORIDE 1250 MG: 10 INJECTION, POWDER, LYOPHILIZED, FOR SOLUTION INTRAVENOUS at 15:10

## 2020-09-14 RX ADMIN — Medication 1500 ML/HR: at 02:11

## 2020-09-14 RX ADMIN — LEVETIRACETAM 1000 MG: 10 INJECTION INTRAVENOUS at 00:00

## 2020-09-14 RX ADMIN — SODIUM CHLORIDE, PRESERVATIVE FREE 10 ML: 5 INJECTION INTRAVENOUS at 20:07

## 2020-09-14 RX ADMIN — Medication 1500 ML/HR: at 09:00

## 2020-09-14 RX ADMIN — Medication 500 ML/HR: at 02:13

## 2020-09-14 RX ADMIN — HEPARIN SODIUM 6 ML/HR: 10000 INJECTION, SOLUTION INTRAVENOUS at 08:26

## 2020-09-14 RX ADMIN — AMPICILLIN SODIUM AND SULBACTAM SODIUM 3 G: 2; 1 INJECTION, POWDER, FOR SOLUTION INTRAMUSCULAR; INTRAVENOUS at 12:09

## 2020-09-14 RX ADMIN — HEPARIN SODIUM 2000 UNITS: 1000 INJECTION INTRAVENOUS; SUBCUTANEOUS at 14:58

## 2020-09-14 RX ADMIN — CALCIUM GLUCONATE 1 G: 20 INJECTION, SOLUTION INTRAVENOUS at 20:24

## 2020-09-14 RX ADMIN — PROPOFOL 20 MCG/KG/MIN: 10 INJECTION, EMULSION INTRAVENOUS at 15:00

## 2020-09-14 RX ADMIN — Medication 1000 ML/HR: at 23:23

## 2020-09-14 RX ADMIN — CALCIUM GLUCONATE 1 G: 20 INJECTION, SOLUTION INTRAVENOUS at 01:19

## 2020-09-14 RX ADMIN — Medication 1500 ML/HR: at 05:35

## 2020-09-14 RX ADMIN — Medication 1500 ML/HR: at 18:56

## 2020-09-14 RX ADMIN — PROPOFOL 20 MCG/KG/MIN: 10 INJECTION, EMULSION INTRAVENOUS at 21:08

## 2020-09-14 RX ADMIN — PANTOPRAZOLE SODIUM 40 MG: 40 INJECTION, POWDER, FOR SOLUTION INTRAVENOUS at 07:55

## 2020-09-14 RX ADMIN — CALCIUM GLUCONATE 1 G: 20 INJECTION, SOLUTION INTRAVENOUS at 12:31

## 2020-09-14 RX ADMIN — LEVETIRACETAM 1000 MG: 10 INJECTION INTRAVENOUS at 23:05

## 2020-09-14 RX ADMIN — CHLORHEXIDINE GLUCONATE 15 ML: 0.12 RINSE ORAL at 08:02

## 2020-09-14 RX ADMIN — Medication 1000 ML/HR: at 12:44

## 2020-09-14 RX ADMIN — Medication 1000 ML/HR: at 18:08

## 2020-09-14 RX ADMIN — Medication 1000 ML/HR: at 07:53

## 2020-09-14 RX ADMIN — Medication 1500 ML/HR: at 12:18

## 2020-09-14 RX ADMIN — CHLORHEXIDINE GLUCONATE 15 ML: 0.12 RINSE ORAL at 20:08

## 2020-09-14 RX ADMIN — SODIUM CHLORIDE, PRESERVATIVE FREE 10 ML: 5 INJECTION INTRAVENOUS at 07:54

## 2020-09-14 RX ADMIN — AMPICILLIN SODIUM AND SULBACTAM SODIUM 3 G: 2; 1 INJECTION, POWDER, FOR SOLUTION INTRAMUSCULAR; INTRAVENOUS at 04:05

## 2020-09-14 RX ADMIN — LEVETIRACETAM 1000 MG: 10 INJECTION INTRAVENOUS at 10:34

## 2020-09-14 RX ADMIN — CALCIUM GLUCONATE 1 G: 20 INJECTION, SOLUTION INTRAVENOUS at 06:05

## 2020-09-14 RX ADMIN — Medication 500 ML/HR: at 22:41

## 2020-09-14 RX ADMIN — AMPICILLIN SODIUM AND SULBACTAM SODIUM 3 G: 2; 1 INJECTION, POWDER, FOR SOLUTION INTRAMUSCULAR; INTRAVENOUS at 20:07

## 2020-09-14 RX ADMIN — PROPOFOL 30 MCG/KG/MIN: 10 INJECTION, EMULSION INTRAVENOUS at 03:57

## 2020-09-14 RX ADMIN — PROPOFOL 20 MCG/KG/MIN: 10 INJECTION, EMULSION INTRAVENOUS at 08:26

## 2020-09-14 RX ADMIN — Medication 1500 ML/HR: at 15:41

## 2020-09-14 RX ADMIN — Medication 1500 ML/HR: at 22:21

## 2020-09-14 ASSESSMENT — PULMONARY FUNCTION TESTS
PIF_VALUE: 29
PIF_VALUE: 20
PIF_VALUE: 20
PIF_VALUE: 21
PIF_VALUE: 21
PIF_VALUE: 22
PIF_VALUE: 19
PIF_VALUE: 27
PIF_VALUE: 20
PIF_VALUE: 23
PIF_VALUE: 22
PIF_VALUE: 20
PIF_VALUE: 20
PIF_VALUE: 21
PIF_VALUE: 21
PIF_VALUE: 40
PIF_VALUE: 21
PIF_VALUE: 20
PIF_VALUE: 22
PIF_VALUE: 20
PIF_VALUE: 21
PIF_VALUE: 23
PIF_VALUE: 21
PIF_VALUE: 20
PIF_VALUE: 19

## 2020-09-14 ASSESSMENT — PAIN SCALES - GENERAL
PAINLEVEL_OUTOF10: 0

## 2020-09-14 NOTE — PROGRESS NOTES
Hospitalist Progress Note      PCP: No primary care provider on file. Date of Admission: 9/10/2020    Chief Complaint:   Chief Complaint   Patient presents with    Shortness of Breath     HX of asthma. Short of breath for around 1 month. Patient states she just refilled her inhaler today. Hospital Course:  53 y.o. female with past medical history of factor V Leiden deficiency on chronic warfarin, history of blood clots, hyperlipidemia, arthritis, asthma who presents to [facility] with worsening above symptoms of fatigue, intermittent shortness of breath both at rest and on exertion, and extensive of abdominal and leg swelling. In the ED patient was significantly tachycardic, showing some combination of sinus tachycardia versus SVT, uncertain if may be underlying atrial fibrillation with rapid ventricular rate. Labs showed signs of infection as well as elevated proBNP. CTA with contrast was done on the chest and demonstrated left and right lobe PEs in certain segments, active findings suggestive of early patchy pneumonia bilaterally, and moderate bilateral pleural effusions suggestive of heart failure/volume overload. PEA arrest shortly after admission. Subjective: Intubated, unable to complete ROS. Off pressors. Myoclonus off sedation.      Medications:  Reviewed    Infusion Medications    prismaSATE BGK 4/2.5 1,000 mL/hr (09/14/20 0255)    prismaSATE BGK 4/2.5 1,500 mL/hr (09/14/20 0535)    prismaSATE BGK 4/2.5 500 mL/hr (09/14/20 7073)    dextrose      propofol 20 mcg/kg/min (09/14/20 9010)    propofol      heparin (Porcine) 6 mL/hr (09/13/20 2522)     Scheduled Medications    ampicillin-sulbactam  3 g Intravenous Q8H    sodium chloride flush  10 mL Intravenous 2 times per day    pantoprazole  40 mg Intravenous Daily    And    sodium chloride (PF)  10 mL Intravenous Daily    chlorhexidine  15 mL Mouth/Throat BID    levetiracetam  1,000 mg Intravenous Q12H    vancomycin (VANCOCIN) intermittent dosing (placeholder)   Other RX Placeholder     PRN Meds: potassium chloride, magnesium sulfate, calcium gluconate **OR** calcium gluconate **OR** calcium gluconate **OR** calcium gluconate, sodium phosphate IVPB **OR** sodium phosphate IVPB **OR** sodium phosphate IVPB **OR** sodium phosphate IVPB, sodium chloride flush, acetaminophen **OR** acetaminophen, perflutren lipid microspheres, ondansetron, LORazepam, perflutren lipid microspheres, glucose, dextrose, glucagon (rDNA), dextrose, propofol      Intake/Output Summary (Last 24 hours) at 9/14/2020 0747  Last data filed at 9/14/2020 0700  Gross per 24 hour   Intake 2835 ml   Output 8070 ml   Net -5235 ml       Physical Exam Performed:    /85   Pulse 78   Temp 97.6 °F (36.4 °C) (Rectal)   Resp 16   Ht 5' 7\" (1.702 m)   Wt 231 lb 7.7 oz (105 kg)   LMP 12/05/2018 (Exact Date)   SpO2 97%   BMI 36.26 kg/m²     General appearance: No apparent distress, appears stated age  HEENT: Pupils equal, round, and reactive to light. Conjunctivae/corneas clear. Neck: Supple, with full range of motion. Trachea midline. Respiratory:  Normal respiratory effort. Coarse breath sounds bilaterally   Cardiovascular: Regular rate and rhythm with normal S1/S2 without murmurs, rubs or gallops. Abdomen: Soft, non-tender, non-distended with normal bowel sounds. Musculoskeletal: No clubbing, cyanosis or edema bilaterally. Full range of motion without deformity. Skin: Skin color, texture, turgor normal.  No rashes or lesions.   Neurologic:  Intubated, sedated  Psychiatric: Alert and oriented, thought content appropriate, normal insight  Capillary Refill: Brisk,< 3 seconds   Peripheral Pulses: +2 palpable, equal bilaterally       Labs:   Recent Labs     09/12/20  0545 09/13/20  0540 09/14/20  0455   WBC 16.0* 13.3* 12.7*   HGB 9.9* 9.7* 10.1*   HCT 31.3* 30.5* 32.6*   * 108* 94*     Recent Labs     09/13/20  0540  09/13/20  1815 09/14/20  0015 09/14/20  0455      < > 135* 135* 135*   K 4.0   < > 4.2 4.1 4.1      < > 102 102 103   CO2 27   < > 26 27 26   BUN 17   < > 16 13 13   CREATININE 1.0   < > 1.1 0.9 1.0   CALCIUM 7.2*   < > 7.7* 7.2* 7.3*   PHOS 2.0*  --  1.9*  --  1.9*    < > = values in this interval not displayed. Recent Labs     09/12/20  0545 09/13/20  0540 09/14/20  0455   AST 1,963* 1,018* 783*   ALT 1,486* 1,209* 1,082*   BILIDIR  --  1.0* 1.2*   BILITOT 1.3* 1.3* 1.7*   ALKPHOS 108 98 104     Recent Labs     09/13/20  1815 09/14/20  0015 09/14/20  0455   INR 1.59* 1.57* 1.47*     Recent Labs     09/11/20  1300   CKTOTAL 449*       Urinalysis:      Lab Results   Component Value Date    NITRU Negative 09/10/2020    WBCUA 3-5 09/10/2020    BACTERIA 2+ 09/10/2020    RBCUA 21-50 09/10/2020    BLOODU MODERATE 09/10/2020    SPECGRAV >1.030 09/10/2020    GLUCOSEU Negative 09/10/2020       Radiology:  XR CHEST PORTABLE   Final Result   Low-lying endotracheal tube. Recommend retracting 1-2 cm. No substantial change in layering effusions and basilar opacities. The findings were sent to the Radiology Results [address] at 3:53   am on 9/14/2020to be communicated to a licensed caregiver. XR CHEST PORTABLE   Preliminary Result   1. Interval placement of an NG tube, likely within the stomach. 2. Stable appropriate positions of endotracheal tube and right-sided dialysis   catheter. 3. No significant change in appearance of small to moderate bilateral pleural   effusions and bibasilar airspace disease. XR CHEST PORTABLE   Final Result   Supportive lines project in stable positions. Bibasilar opacities, suggestive of layered effusions and atelectasis or   airspace disease. XR CHEST PORTABLE   Final Result   1. Suspected mild to moderate bilateral pleural effusions with adjacent   bibasilar atelectasis and/or airspace disease. 2. Mild to moderate cardiomegaly.    3. Recommend retraction of endotracheal tube 0.7 cm. XR CHEST PORTABLE   Final Result   Right jugular venous catheter and endotracheal tube project in normal   positions. No pneumothorax. Bilateral airspace disease and possible layered pleural effusions, consistent   with pulmonary edema and atelectasis. Pneumonia is a differential   possibility. XR CHEST PORTABLE   Final Result   Satisfactory position of the endotracheal tube 3 cm above the rahat. The   heart is upper normal size and vessels are borderline congested. VL Extremity Venous Bilateral   Final Result      VL DUP LOWER EXTREMITY ARTERIES BILATERAL   Final Result      XR CHEST PORTABLE   Final Result   Endotracheal tube has tip approximately 1.3 cm proximal to the rahat and   should be retracted approximately 2 cm. Findings are again suggestive of pulmonary edema with small pleural   effusions. Pneumonia is not excluded in the appropriate clinical setting. Findings were called by the radiology call center. CT HEAD WO CONTRAST   Final Result   Motion limited study. No convincing evidence for acute intracranial   abnormality identified within the limitations of motion artifact. If there is persistent clinical suspicion for intracranial abnormality,   repeat study should be considered. XR CHEST PORTABLE   Final Result   Cardiomegaly, pulmonary vascular congestion, and bilateral pleural effusions. Endotracheal tube terminates approximately 2 cm above the rahat. CT CHEST PULMONARY EMBOLISM W CONTRAST   Final Result   Addendum 1 of 1   ADDENDUM:   Findings were discussed with Dr. Michael Nolan at 10:49 pm on 9/10/2020.          Final      MRI BRAIN WO CONTRAST    (Results Pending)   XR CHEST PORTABLE    (Results Pending)         Assessment/Plan:    Active Hospital Problems    Diagnosis    Pleural effusion, bilateral [J90]    Elevated lactic acid level [R79.89]    Bacteremia due to methicillin susceptible Staphylococcus aureus (MSSA) [R78.81]    Pneumonia due to methicillin susceptible Staphylococcus aureus (MSSA) ([facility] [address] [J15.211]    Anemia [D64.9]    Acute renal failure (HCC) [N17.9]    Bilateral pleural effusion [J90]    Mitral valve insufficiency [I34.0]    Tricuspid valve insufficiency [I07.1]    Class 2 obesity due to excess calories with body mass index (BMI) of 39.0 to 39.9 in adult [E66.09, Z68.39]    Pneumonia due to organism [J18.9]    Bacteremia [R78.81]    Cardiac arrest ([facility] [address] [I46.9]    Seizures (HCC) [R56.9]    Acute respiratory failure with hypoxia (HCC) [J96.01]    Acute kidney injury ([facility] [address] [N31.8]    Metabolic acidosis [A35.3]    Congestive heart failure (HCC) [I50.9]    Pulmonary hypertension (HCC) [I27.20]    Multifocal pneumonia [J18.9]    Septic shock (HCC) [A41.9, R65.21]    Elevated LFTs [R94.5]    Thrombocytopenia (HCC) [D69.6]    Coagulopathy (HCC) [D68.9]    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) [D65]    Acute encephalopathy [G93.40]    Pulmonary embolus (HCC) [I26.99]    Lactic acidosis [E87.2]    CAP (community acquired pneumonia) [J18.9]    Acute CHF (congestive heart failure) (HCC) [I50.9]    Bilateral pulmonary embolism (HCC) [I26.99]    Factor V Leiden mutation ([facility] [address] [D68.51]     Shock, septic vs cardiogenic  Severe mitral and tricuspid regurgitation on TTE, moderate pulmonary HTN, large bilateral pleural effusions and hepatic congestion on CT. MSSA bacteremia. Off pressors  - ID consulted  - cardiology following    MSSA bacteremia, PNA  Lab clarifying susceptibilities.    - ID following, switched to Unasyn, continue vanc pending final susceptibilities   - treat as endocarditis    Cardiomyopathy, acute on chronic systolic HF  Severe diffuse hypokinesis with severely reduced rt ventricular function, severe tricuspid regurgitation and mitral regurgitation, grade 3 diastolic dysfunction  - cardiology following  - RHC/LHC when able    S/p PEA arrest  Intubated 9/10 shortly after admission  - vent management per CC    Bilateral PE  Ho DVT/PE, on coumadin chronically  - heparin drip    Coagulapathy  Hx Factor V Leiden on coumadin. Has had oozing on heparin drip, sepsis with bacteremia, DIC versus shock liver versus vitamin K deficiency  -Heme-onc following, fibrinogen and INR improving, no need for vit K or cryo now    Oliguric/anuric MORELIA  Suboptimal response to Lasix. -CRRT started  -Checking ZENON (neg), ANCA, complement (c3 low), antiphospholipid antibodies  -Nephrology following    Seizure like activity  Prior to PEA arrest, episodic rhythmic twitching. EEG negative  - neuro following  - keppra 1000 mg BID  - MRI brain  - EEG on lighter sedation. With myoclonus off sedation may need cEEG    Obesity  Body mass index is 36.26 kg/m². Counseled on effects of weight on overall health and chronic medical conditions and discussed weight loss. DVT Prophylaxis: heparin  Diet: DIET TUBE FEED CONTINUOUS/CYCLIC NPO; Renal Formula; Nasogastric; Continuous; 20; 35  Code Status: Full Code    PT/OT Eval Status: deferred    Dispo - pending    Sterling Diamond MD    This note was transcribed using [id number] [employer]. Please disregard any translational errors.

## 2020-09-14 NOTE — PROGRESS NOTES
[address]  Cardiology Consult    Tosha Caal  1970 September 13, 2020        CC: No complaints      Subjective:     History of Present Illness:    Tosha Caal is a 53 y.o. patient with a PMH significant for factor V Leiden deficiency, history of deep vein thrombosis, hyperlipidemia presented with complaints of increasing shortness of breath. When I saw her today patient is on ventilator intubated. Apparently she came to [facility] with increasing shortness of breath and some chest tightness. She is also complaining of swelling of her lower extremity. She had a PEA arrest and was intubated. CTA chest showed pulmonary embolism. No other medical history is available to me  No history of any cardiac disorders available to me. Interval history        Past Medical History:   has a past medical history of Anticoagulant long-term use, Arthritis, Asthma, Baker's cyst, Depression, Factor V Leiden ([address] Factor V Leiden ([facility] [address] H/O blood clots, Heartburn, Hyperlipidemia, MRSA bacteremia, and Osteoarthritis. Surgical History:   has no past surgical history on file. Social History:   reports that she quit smoking 5 days ago. Her smoking use included cigarettes. She smoked 0.50 packs per day. She has never used smokeless tobacco. She reports current drug use. Frequency: 14.00 times per week. Drug: Marijuana. She reports that she does not drink alcohol. Family History:  family history includes Heart Attack in her father. Home Medications:  Were reviewed and are listed in nursing record and/or below  Prior to Admission medications    Medication Sig Start Date End Date Taking?  Authorizing Provider   albuterol sulfate  (90 Base) MCG/ACT inhaler INHALE TWO PUFFS BY MOUTH EVERY 6 HOURS AS NEEDED FOR WHEEZING 8/25/20  Yes AGUSTIN Owens CNP   vitamin D (ERGOCALCIFEROL) 1.25 MG (50477 UT) CAPS capsule TAKE ONE CAPSULE BY MOUTH ONCE WEEKLY 8/13/20  Yes Everette Sims,  and sensory exam.       Labs     All labs have been reviewed    Lab Results   Component Value Date    WBC 13.3 09/13/2020    RBC 3.59 09/13/2020    HGB 9.7 09/13/2020    HCT 30.5 09/13/2020    MCV 84.8 09/13/2020    RDW 18.9 09/13/2020     09/13/2020     Lab Results   Component Value Date     09/13/2020    K 4.2 09/13/2020    K 4.1 09/10/2020     09/13/2020    CO2 26 09/13/2020    BUN 16 09/13/2020    CREATININE 1.1 09/13/2020    GFRAA >60 09/13/2020    GFRAA >60 07/25/2012    AGRATIO 1.1 09/12/2020    LABGLOM 53 09/13/2020    GLUCOSE 87 09/13/2020    PROT 4.0 09/13/2020    PROT 7.0 10/19/2011    CALCIUM 7.7 09/13/2020    BILITOT 1.3 09/13/2020    ALKPHOS 98 09/13/2020    AST 1,018 09/13/2020    ALT 1,209 09/13/2020     No results found for: PTINR  Lab Results   Component Value Date    LABA1C 5.6 12/17/2018     Lab Results   Component Value Date    CKTOTAL 449 (H) 09/11/2020    TROPONINI 0.02 (H) 09/11/2020       Cardiac, Vascular and Imaging Data all Personally Reviewed in Detail by Myself      EKG: Sinus tachycardiaRightward axisST & T wave abnormality, consider inferior ischemiaAbnormal ECGNo previous ECGs available     Echocardiogram: Summary   Ejection fraction is visually estimated to be 30%-35%.   Severe tricuspid regurgitation.   moderate pulmonary HTN   There is severe diffuse hypokinesis.   Diastolic filling parameters suggest grade III diastolic dysfunction.   Moderate calcification of the posterior leaflet of the mitral valve.   Mitral annular calcification is present.   The mitral valve leaflets appear myxomatous.   No evidence of mitral valve stenosis.   Severe mitral regurgitation is present.   Systolic flow reversal in pulmonary veins.   The right ventricle is enlarged.   Right ventricular systolic function is moderate to severely reduced.        Other imaging:      CT chest  Evaluation is somewhat limited due to respiratory motion artifact.  There are    suspected pulmonary arterial filling defects within subsegmental branches to    the right middle lobe and left lower lobe.  No right heart strain.         Large bilateral pleural effusions and reflux of contrast into the IVC/hepatic    veins.  Findings most compatible with volume overload/CHF.         Patchy airspace disease in the left lower lobe adjacent to the large    effusion.  Differential considerations include atelectasis, infectious    process or, less likely, infarction.             Chest x-ray  Endotracheal tube has tip approximately 1.3 cm proximal to the rahat and    should be retracted approximately 2 cm.         Findings are again suggestive of pulmonary edema with small pleural    effusions.  Pneumonia is not excluded in the appropriate clinical setting.         Findings were called by the radiology call center. Assessment and Plan     -Acute pulmonary edema,  Acute pulmonary embolism  Acute hypoxemic respiratory failure  Initial ECG suggestive of atrial flutter  Acute systolic heart failure with ejection fraction of 30 to 35% with severe mitral regurgitation. Complicating factor with factors with acute progressive renal failure. Status post placement of dialysis catheter and she is on dialysis. Prognosis remain guarded. She will need left and right heart catheterization once stable  Hematological disturbance consult hematology. On CRRT now. Continue correction of acidosis and electrolyte imbalance. Continue ventilator support for now. I will leave anticoagulation management to hematology. Thank you for allowing us to participate in the care of Eusebio Baker. Please do not hesitate to contact me if you have any questions.     Gracia Rick MD, MPH    [facility], [facility]  Ph: [phone]  Fax: [phone]    9/13/2020 8:20 PM

## 2020-09-14 NOTE — PROGRESS NOTES
Pulmonary Progress Note    CC:  Follow up cardiac arrest, Pulmonary emboli, Shock    Subjective:  Remains on the ventilator with FIO2 at 30% PEEP of 5  Off pressors   Myoclonus when off sedation   EEG with burst suppression       Intake/Output Summary (Last 24 hours) at 9/14/2020 0706  Last data filed at 9/14/2020 0700  Gross per 24 hour   Intake 2835 ml   Output 8070 ml   Net -5235 ml         PHYSICAL EXAM:  Blood pressure 134/85, pulse 80, temperature 97.6 °F (36.4 °C), temperature source Rectal, resp. rate 14, height 5' 7\" (1.702 m), weight 231 lb 7.7 oz (105 kg), last menstrual period 12/05/2018, SpO2 98 %.'  Gen: No distress. Sedated, moves head to her name  Eyes: PERRL. No sclera icterus. No conjunctival injection. ENT: No discharge. Pharynx with ETT and NG  Neck: Trachea midline. No obvious mass. Resp: Rhonchi, bloody secretions   CV: Regular rate. Regular rhythm. No murmur or rub. GI: Non-tender. Non-distended. No hernia. Skin: Warm, dry, normal texture and turgor. No nodule on exposed extremities. Lymph: No cervical LAD. No supraclavicular LAD. M/S: No cyanosis. No clubbing. No joint deformity. Neuro:  Moves head to verbal, grimaces to noxious stimuli   Ext:   ++ edema    Medications:    Scheduled Meds:   ampicillin-sulbactam  3 g Intravenous Q8H    sodium chloride flush  10 mL Intravenous 2 times per day    pantoprazole  40 mg Intravenous Daily    And    sodium chloride (PF)  10 mL Intravenous Daily    chlorhexidine  15 mL Mouth/Throat BID    levetiracetam  1,000 mg Intravenous Q12H    vancomycin (VANCOCIN) intermittent dosing (placeholder)   Other RX Placeholder       Continuous Infusions:   prismaSATE BGK 4/2.5 1,000 mL/hr (09/14/20 0255)    prismaSATE BGK 4/2.5 1,500 mL/hr (09/14/20 0535)    prismaSATE BGK 4/2.5 500 mL/hr (09/14/20 0213)    dextrose      propofol 20 mcg/kg/min (09/14/20 0510)    propofol      heparin (Porcine) 6 mL/hr (09/13/20 1902)       PRN Meds:  potassium chloride, magnesium sulfate, calcium gluconate **OR** calcium gluconate **OR** calcium gluconate **OR** calcium gluconate, sodium phosphate IVPB **OR** sodium phosphate IVPB **OR** sodium phosphate IVPB **OR** sodium phosphate IVPB, sodium chloride flush, acetaminophen **OR** acetaminophen, perflutren lipid microspheres, ondansetron, LORazepam, perflutren lipid microspheres, glucose, dextrose, glucagon (rDNA), dextrose, propofol    Labs:  CBC:   Recent Labs     20   WBC 16.0* 13.3* 12.7*   HGB 9.9* 9.7* 10.1*   HCT 31.3* 30.5* 32.6*   MCV 83.8 84.8 85.4   * 108* 94*     BMP:   Recent Labs     20      < > 135* 135* 135*   K 4.0   < > 4.2 4.1 4.1      < > 102 102 103   CO2 27   < > 26 27 26   PHOS 2.0*  --  1.9*  --  1.9*   BUN 17   < > 16 13 13   CREATININE 1.0   < > 1.1 0.9 1.0    < > = values in this interval not displayed. LIVER PROFILE:   Recent Labs     20   AST 1,963* 1,018* 783*   ALT 1,486* 1,209* 1,082*   BILIDIR  --  1.0* 1.2*   BILITOT 1.3* 1.3* 1.7*   ALKPHOS 108 98 104     PT/INR:   Recent Labs     205 20   PROTIME 18.5* 18.3* 17.1*   INR 1.59* 1.57* 1.47*     APTT:   Recent Labs     20  1108 20  001   APTT 46.4* 78.9* 61.6*     UA:No results for input(s): NITRITE, COLORU, PHUR, LABCAST, WBCUA, RBCUA, MUCUS, TRICHOMONAS, YEAST, BACTERIA, CLARITYU, SPECGRAV, LEUKOCYTESUR, UROBILINOGEN, BILIRUBINUR, BLOODU, GLUCOSEU, AMORPHOUS in the last 72 hours. Invalid input(s): Hayley Ballesteros  No results for input(s): PH, PCO2, PO2 in the last 72 hours. Films:  Chest imaging reports were reviewed and imaging was reviewed by me and showed ETT, Vascath and NG in place. ETT just above rahat.   Bilateral pleural effusions    AB.45/39/106    Cultures:  Sputum: MSSA  Blood:  MRSA     I reviewed the labs and images listed above    Assessment:   · Cardiac Arrest:  PEA  · Cardiogenic Shock  · Metabolic Encephalopathy   · Bilateral Pulmonary Emboli  · Staph Pneumonia   · MRSA bacteremia   · MORELIA with RRT   · Bilateral Pleural Effusions       Plan:  · Full vent support, vent day #3  · Sedation for myoclonus. May need to stop sedation completely to get a better assessment of the neurological function   · MRI today if CRRT stops. Needs volume off due to CXR findings   · Retract ETT by 1 cm   · Antibiotics per ID  · GI/DVT prophylaxis:  Protonix, Heparin  · Glycemic control  · Enteral feeding        Critical care time of 31 minutes. This does not include procedural time. Please see procedural notes for details.     Mingo Perry DO  [facility]

## 2020-09-14 NOTE — PROGRESS NOTES
Comprehensive Nutrition Assessment    Type and Reason for Visit:  Reassess    Nutrition Recommendations/Plan:   Continue TF regimen (Nepro formula, goal rate of 35 ml per hour + 3 bottles of Proteinex per feeding tube per day. Flush per provider)  Monitor propofol dose for need to adjust TF regimen    Nutrition Assessment:  Follow-up. Pt remains intubated. Pressor support remains off but Propofol on board, for seizure activity, at 13.6 adding 359 kcals per day. CRRT started for fluid removal with plan to transition to intermittent HD. Neuro status currently being evaluated. TF started, Nepro formula goal rate of 35 ml per hour with 3 bottles of Proteinex per feeding tube per day. Flush noted at 30 ml every 4 hours. Pt tolerating current regimen. Malnutrition Assessment:  Malnutrition Status:  Insufficient data    Context:  Acute Illness       Estimated Daily Nutrient Needs:  Energy (kcal):  8157-1524 (15-18 X abw 114 KG); Weight Used for Energy Requirements:        Protein (g):   (1.2-2 x IBW 61 kg); Weight Used for Protein Requirements:           Fluid (ml/day):  per provider; Weight Used for Fluid Requirements:         Nutrition Related Findings:  loose BMs per rectal tube. Noted pt -5 liters. Wounds:  None       Current Nutrition Therapies:    DIET TUBE FEED CONTINUOUS/CYCLIC NPO; Renal Formula; Nasogastric; Continuous; 20; 35  Current Tube Feeding (TF) Orders:  · Feeding Route: Nasogastric  · Formula: Renal(Nepro formula, goal rate of 35 ml per hour)  · Schedule: Continuous  · Additives/Modulars: Protein(3 bottles of Proteinex per feeding tube per day.  Do not mix directly with TF.)  · Water Flushes: per provider  · Current TF & Flush Orders Provides: 700 ml TV / 4281 (1260 (TF) + 312 (Proteinex) / 135 (57 (TF) + 78 (Proteinex) / 509 ml free water per day      Anthropometric Measures:  · Height: 5' 7\" (170.2 cm)  · Current Body Weight: 231 lb (104.8 kg)   · Admission Body Weight: 252 lb (114.3 kg)     · Ideal Body Weight: 135 lbs; % Ideal Body Weight 171.1 %   · BMI: 36.2  · BMI Categories: Obese Class 2 (BMI 35.0 -39.9)       Nutrition Diagnosis:   · Inadequate oral intake related to impaired respiratory function as evidenced by intubation      Nutrition Interventions:   Food and/or Nutrient Delivery:  Continue Current Tube Feeding  Nutrition Education/Counseling:  No recommendation at this time   Coordination of Nutrition Care:  Continued Inpatient Monitoring    Goals:  tolerate most appropriate form of nutrition       Nutrition Monitoring and Evaluation:   Behavioral-Environmental Outcomes:      Food/Nutrient Intake Outcomes:  Enteral Nutrition Intake/Tolerance  Physical Signs/Symptoms Outcomes:  Biochemical Data, Constipation, Diarrhea, Nausea or Vomiting, Fluid Status or Edema, Weight, Skin, Nutrition Focused Physical Findings     Discharge Planning:     Too soon to determine     Electronically signed by Lori Betancourt RD, LD on 9/14/20 at 1:42 PM EDT    Contact: [phone]

## 2020-09-14 NOTE — ADT AUTH CERT
*cardiology note         Assessment and Plan         -Acute pulmonary edema,    Acute pulmonary embolism    Acute hypoxemic respiratory failure    Initial ECG suggestive of atrial flutter    Acute systolic heart failure with ejection fraction of 30 to 35% with severe mitral regurgitation. Complicating factor with factors with acute progressive renal failure. Status post placement of dialysis catheter and she is on dialysis. Prognosis remain guarded.  She will need left and right heart catheterization once stable    Hematological disturbance consult hematology.         On CRRT now. Continue correction of acidosis and electrolyte imbalance.         Continue ventilator support for now. I will leave anticoagulation management to hematology.           *IM NOTE**  Shock:  Septic vs cardiogenic    - severe mitral and tricuspid regurgitation on TTE, moderate pulmonary HTN, large bilateral pleural effusions and hepatic congestion on CT    - MRSA bacteremia, skin mottling, concern for potential coagulopathy with DIC    - s/p Levophed    - vanc and meropennem started initialled.  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    - heparin drip per heme/onc         Coagulopathy:  Acute and chronic    - chronically has Factor V Leiden, on coumadin    - has had oozing on heparin drip, sepsis with bacteremia --> potential DIC or shock liver or vitamin K deficiency from coumadin/antibiotic use    - heme/onc recs:  if bleeding on heparin, check INR and fibrinogen for potential cryo therapy or fibrinogen         Oliguric/Anuric MORELIA:    - suboptimal response to lasix in attempts to make non-oliguric    - CRRT started    - ZENON, ANCA, complement levels, antiphospholipid antibodies per nephro         DVT Prophylaxis: heparin drip    Diet: DIET TUBE FEED CONTINUOUS/CYCLIC NPO; Renal Formula; Nasogastric; Continuous; 20; 35    Code Status: Full Code         PT/OT Eval Status: pending improvement         Dispo - ICU          *ID NOTE         Plan:         Diagnostic Workup:         · Will order 2 sets of repeat blood cultures    · Given severe mitral and tricuspid valve damage, will see if cardiology will plan for a transesophageal echo, otherwise, my recommendation will be to treat as endocarditis    · Continue to follow fever curve, WBC count and blood cultures    · Follow up on liverand renal functions closely    · We will order acute viral hepatitis panel for thoroughness of work-up    · We will order a blood tox screen, baseline HIV screen    · Given seizures, consider  Lumbar  punctures whenever feasible in coming days to r/o MSSA meningoencephalitis - Unasyn will cover for that too.         Antimicrobials:         · All beta-lactam's have a potential to cause seizures, if the dose of the antibiotic is high enough, whether it is penicillin derivatives, cephalosporins or carbapenems.  However, I doubt that her seizure-like activity on 9/11/2020 was related to meropenem, as the dose was not high enough and patient was just started on the meropenem. · The patient has MSSA pneumonia, the blood culture isolate is also very likely MSSA.  Also, she might have aspirated.  We will stop IV cefepime and start patient on IV Unasyn at CRRT dose of 3 g every 8 hours.  The dose of the antibiotic may need to be adjusted based on the renal function changes    · The patient is also on IV vancomycin, which was held due to a random level above 20.  If the lab confirms that patient does not have MRSA in any of the cultures, will recommend stopping IV vancomycin altogether and continuing only Unasyn    · Hold off on PICC line or a tunneled central line placement until cleared by ID    · Endotracheal tube care and pulmonary toileting    · Ventilator support to maintain oxygen saturation above 92%    · Anticoagulation and factor V Leyden deficiency -hematology managing    · Pressure ulcer prevention precautions    · Nephrology note reviewed    · Critically ill patient.  High risk of morbidity and mortality    · Continue close monitoring in ICU setting              Drug Monitoring:         · Continue serial monitoring for antibiotic toxicity as follows: Vancomycin level, CBC, CMP    · Continue to watch for following: new or worsening fever, hypotension, hives, lip swelling and redness or purulence at vascular access sites.         I/v access Management:         · Continue to monitor i.v access sites for erythema, induration, discharge or tenderness. · As always, continue efforts to minimizetubes/lines/drains as clinically appropriate to reduce chances of line associated infections.            *PULMONOLOGY NOTE*         Plan:         Cardiac arrest    - In the setting of pulmonary emboli and septic shock    - Supportive care         Bilateral pulmonary emboli    - Heparin drip    - Hematology following         Acute kidney injury    - Likely due to poor forward flow due to biventricular failure    - On CRRT per nephrology         Metabolic acidosis    - Resolved         Congestive heart failure    - On CRRT for fluid removal    - Cardiology also following         Pulmonary hypertension    - Based on echocardiogram    - Fluid removal with CRRT    - Keep oxygen saturation>90%         Multifocal pneumonia    - On meropenem and vancomycin.  Will change meropenem to cefepime due to concern for seizures      - Follow up sputum culture         Septic shock    - In the setting of pneumonia and bacteremia    - On meropenem and vancomycin (day #3).  Will change meropenem to cefepime due to concern for seizures      - Now off levophed         Lactic acidosis    - Resolved         Elevated LFTs    - Likely due to shock liver    - Keep MAP>65    - Check daily labs         Thrombocytopenia    - Likely due to sepsis induced DIC    - Check daily labs         Coagulopathy    - Due to anticoagulation and DIC    - On heparin drip         Disseminated intravascular coagulation    - On heparin drip for PE    - Check fibrinogen every 12 hours    - Hematology following         Acute encephalopathy    - May be due to infection, DIC, seizures or anoxia    - Antibiotics as above    - Keppra twice daily    - Neurology following         Bacteremia    - Has MRSA in blood cultures    - Continue vancomycin    - ID consulted         Acute hypoxic respiratory failure    - Continue mechanical ventilation and sedation with propofol pending further neurologic evaluation              Prophylaxis    DVT- heparin drip    GI- protonix    VAP- peridex          Hem/onc note**         ASSESSMENT AND PLAN      Complicated coagulopathy in setting of acute PE/sepsis/dic vs shock liver    Seems to be improving in terms of numbers         FIbriniogen improving         Resumption of heparin at low dose has not led to worsening of her bleeding ongoin g    moniter plt count    Do not check for hit at this point-         AS per yesterday:    I still feel we dont have purprua fulminans    But again protein c wll likely comeback low which is more coumadin effect or synthetic liver dysfunction         THe question will be what do we do if she starts bleeding    -I would lean to give 10 units of cryo to get her fibrinogen over 150--    -I would also lean to give IV vit k if she bleeds-first-not ffp and then see if we can continue  Heparin alone-    Given some improvement I dont anticipate we will need to do this         Upon recovery-if she recovers--the issue will be to decide long term anticoagulation goals -    ie compliance issues              Need to moniter be aware of potential for HIT_but not for 5-14 days    See note from yesterday    If she drops wed or after then could consider HIT type 2 but not before then             *nephrology note    ASSESSMENT and PLAN:         1. MORELIA: likely secondary to ischemia ATI. She had additional exposure to radiocontrast    Initiated on IHD on 9/11/20. Transitioned to CRRT on 9/12/20    She is anasarcic and massively fluid overloaded. She is oligoanuric. Wt peaked at 256Lbs and down to 249Lbs today. She is at >20% above her target weight. ZENON is neg, C3 is low at 54                - Continue CRRT with target net fluid removal at -200mL/hour.                - She will likely not respond to lasix drip. Hence, fluid removal with CRRT is more reliable.                - Will monitor UO, urine microscopy, proteinuria and determine whether further investigation is deemed necessary. Follow up on limited serology w/u (ANCA, APLA)                - Check UPCR         2. Hyperkalemia/metabolic acidosis: improved         3. S/P PEA arrest 9/11/20         4. Acute hypoxic respiratory failure: secondary to 2. Also has bilateral pleural effusions, PE and possible Pnia.         5. MRSA bacteremia: on Vanco and meropenem         6. Severe CMP with reduced RV function/severe TR/MR, Grade 3 diastolic dysfunction, hepatic congestion and anasarca                - Plan on fluid removal with CRRT per above         7. Factor V leiden deficiency with PE/coagulopathy/DIC: per hematology         8. Elevated liver enzymes: likely ischemic injury, ?additional hepatic congestion.  AST and ALT are worse                    Pulmonary Embolism - Care Day 3 (9/12/2020) by Evin Montenegro RN         Review Status  Review Entered    Completed  9/14/2020 10:28        Criteria Review       Care Day: 3 Care Date: 9/12/2020 Level of Care: ICU    Guideline Day 2    Clinical Status    (X) * Hemodynamic stability    ( ) * Oxygenation stable or improved    9/14/2020 10:28 AM EDT by Bobby Mccauley remains on vent    ( ) * Dyspnea absent or improved    9/14/2020 10:28 AM EDT by Astrid Loop      on vent    (X) * Tachypnea absent or improved    9/14/2020 10:28 AM EDT by Astrid Loop      resp rate 20    (X) * PTT in therapeutic range or not indicated [Q]    9/14/2020 10:28 AM EDT by Astrid Loop      42. 8    Activity    ( ) * Limited ambulation    Interventions    (X) Possible PTT    9/14/2020 10:28 AM EDT by Astrid Loop      42.8    (X) Pulse oximetry    9/14/2020 10:28 AM EDT by Astrid Loop      continous    (X) Possible cardiac monitoring    9/14/2020 10:28 AM EDT by Astrid Loop      telemetry    Medications    (X) Anticoagulants [I]    9/14/2020 10:28 AM EDT by Astrid Loop      heparin drip    * Milestone    Additional Notes    09/12/2020 care day 3    VS T 98.2 P 78 R 20 BP 98/58 spo2 96% on vent FiO2 40%       Labs    Potassium: 3.3 (L)    BUN: 32 (H)    Creatinine: 1.8 (H)    GFR Non-: 30 (A)    GFR : 36 (A)    Glucose: 125 (H)    Calcium: 7.2 (L)    Total Protein: 4.2 (L)    Albumin: 2.2 (L)    ALT: 1,486 (H)    AST: 1,963 (H)    Bilirubin: 1.3 (H)    WBC: 16.0 (H)    RBC: 3.74 (L)    Hemoglobin Quant: 9.9 (L)    Hematocrit: 31.3 (L)          Meds: heparin 2000 units IV BOLUS x1, keppra 1000mg IV Q12hrs, magnesium sulfate 2gm IV x1, meropenem 500mg IV Q6hrs, protonix 40mg IV daily, vnacomycin 1250mg IV x1, heparin drip IV @ 5.5ml/hr, diprivan drip IV @ 10mcg/khg/min, calcium gluconate 1gm IV PRN x2, potassium chloride 20meq IV PRN x2,          IM NOTE         Shock:  Septic vs cardiogenic    - severe mitral and tricuspid regurgitation on TTE, moderate pulmonary HTN, large bilateral pleural effusions and hepatic congestion on CT    - MRSA bacteremia, skin mottling, concern for potential coagulopathy with DIC    - cont Levophed, wean as tolerated    - cont vanc and broad spectrum with meropenem, diflucan         Cardiomypathy:    - severe diffuse hypokinesis with severely reduced rt ventricular function    - severe tricuspid regurgitation and mitral regurgitation    - grade 3 diastolic dysfunction    - trialing lasix         MRSA bacteremia:    - cont vanc and broad spectrum with meropenem, diflucan         PEA arrest:    - intubated on 9/10 shortly after admission    - cont ventilator management per pulm/critical care         Bilateral pulmonary emboli:    - 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    - heparin drip per heme/onc         Coagulopathy:  Acute and chronic    - chronically has Factor V Leiden, on coumadin    - has had oozing on heparin drip, sepsis with bacteremia --> potential DIC or shock liver or vitamin K deficiency from coumadin/antibiotic use    - heme/onc recs:  if bleeding on heparin, check INR and fibrinogen for potential cryo therapy or fibrinogen         Oliguric MORELIA:    - trial lasix in attempts to make non-oliguric    - ZENON, ANCA, complement levels, antiphospholipid antibodies per nephro    - low threshold to dialyse if         DVT Prophylaxis: heparin drip    Diet: Diet NPO Effective Now    Code Status: Full Code         PT/OT Eval Status: pending improvement         Dispo - ICU            *pulmonology note**    CC: Cardiac arrest    Bilateral pulmonary emboli    Acute kidney injury    Metabolic acidosis    Congestive heart failure    Pulmonary hypertension    Multifocal pneumonia    Septic shock    Lactic acidosis    Elevated LFTs    Thrombocytopenia    Coagulopathy    Disseminated intravascular coagulation    Acute encephalopathy     Bacteremia    Acute hypoxic respiratory failure         24 hr events:    Received HD yesterday with removal of 2.2L of fluid. On levophed. Hypothermic and is on maribel hugger. On mechanical ventilation.  Had 940cc of urine output in the past 24 hours.              Plan:         Cardiac arrest    - In the setting of pulmonary emboli and septic shock    - Supportive care         Bilateral pulmonary emboli    - Heparin drip    - Hematology following         Acute kidney injury    - Likely due to poor forward flow due to biventricular failure    - Nephrology plans for CRRT today         Metabolic acidosis    - Resolved. Discontinue bicarbonate drip         Congestive heart failure    - Nephrology plans for CRRT today for fluid removal    - Cardiology also following         Pulmonary hypertension    - Based on echocardiogram    - Fluid removal with CRRT    - Keep oxygen saturation>90%         Multifocal pneumonia    - Continue meropenem and vancomycin    - Follow up sputum culture         Septic shock    - In the setting of pneumonia and bacteremia    - Continue meropenem and vancomycin    - Levophed to keep MAP>65         Lactic acidosis    - Resolved         Elevated LFTs    - Likely due to shock liver    - Keep MAP>65    - Check labs in a.m.         Thrombocytopenia    - Likely due to sepsis induced DIC    - Check daily labs         Coagulopathy    - Due to anticoagulation and DIC    - On heparin drip         Disseminated intravascular coagulation    - On heparin drip for PE    - Check coagulation profile and fibrinogen every 6 hours    - Hematology following         Acute encephalopathy    - May be due to infection, DIC or anoxia    - Antibiotics as above         Bacteremia    - Has MRSA in blood cultures    - Continue vancomycin    - ID consulted         Acute hypoxic respiratory failure    - Nephrology plans for fluid removal with CRRT    - Continue mechanical ventilation pending clinical improvement              Prophylaxis    DVT- heparin drip    GI- protonix    VAP- peridex            **nephrology note*         ASSESSMENT and PLAN:         1. MORELIA: likely secondary to ischemia ATI.  She had additional exposure to radiocontrast    Initiated on IHD on 9/11/20. UO is marginal.    She is anasarcic and massively fluid overloaded.                - Will switch to CRRT and will target net fluid removal at 100mL/hour, will increase slowly to 200mL/hour if tolerated.                - Will monitor UO, urine microscopy, proteinuria and determine whether further investigation is deemed necessary. Follow up on limited serology w/u (ZENON, ANCA, C', APLA)         2. Hyperkalemia/metabolic acidosis: improved         3. S/P PEA arrest 9/11/20         4. Acute hypoxic respiratory failure: secondary to 2. Also has bilateral pleural effusions, PE and possible Pnia.         5. MRSA bacteremia: on Vanco and meropenem         6. Severe CMP with reduced RV function/severe TR/MR, Grade 3 diastolic dysfunction, hepatic congestion and anasarca                - Will plan on fluid removal with CRRT per above         7.  Factor V leiden deficiency with PE/coagulopathy/DIC: per hematology         8. Elevated liver enzymes: likely ischemic injury           Pulmonary Embolism - Care Day 2 (9/11/2020) by Elisa Salomon RN         Review Status  Review Entered    Completed  9/14/2020 10:26        Criteria Review       Care Day: 2 Care Date: 9/11/2020 Level of Care: ICU    Guideline Day 2    Clinical Status    ( ) * Hemodynamic stability    9/14/2020 10:26 AM EDT by Dennis Jasso      BP 72/45     ( ) * Oxygenation stable or improved    9/14/2020 10:26 AM EDT by Dennis Jasso      on vent FiO2 90%    ( ) * Dyspnea absent or improved    9/14/2020 10:26 AM EDT by Dennis Jasso      on vent    (X) * Tachypnea absent or improved    9/14/2020 10:26 AM EDT by Dennisivon Jasso      resp rate 37    ( ) * PTT in therapeutic range or not indicated [Q]    Activity    ( ) * Limited ambulation    Interventions    (X) Possible PTT    (X) Pulse oximetry    9/14/2020 10:26 AM EDT by Dennis Jasso      continuous    (X) Possible cardiac monitoring    9/14/2020 10:26 AM EDT by Salena Wood      telemetry    Medications    (X) Anticoagulants [I]    9/14/2020 10:26 AM EDT by Salena Wood      heparin drip    * Milestone    Additional Notes    09/11/2020 care day 2 - ICU    VS T 100.3 P 167 R 39 BP 71/27 spo2 86% on vent FiO2 90%       Labs    WBC: 24.0 (H)    Hemoglobin Quant: 10.7 (L)    Hematocrit: 34.8 (L)    MCHC: 30.7 (L)    RDW: 19.0 (H)    Platelet Count: 878 (L)          Meds: 500mL NS IV BOLUS x1, zithromax 500mg IV x1, lasix 80mg IV x1, keppra 1000mg IV Q12hrs, keppra 1000mg IV x1, ativan 1mg IV x1, ativan 2mg IV x1, meropenem 500mg IV x1, protonix 40mg IV daily, vancomycin 1250mg IV x1, precedex drip IV @ 0.2mcg/kg/hr, heparin drip IV @ 6ml/hr, levophed drip IV @ 2mcg/min, diprivan drip IV @ 10mcg/kg/min, D5 +150meq sodium bicarbonate IV @ 75ml/hr, D50% 12.5gm IV PRN x1, ativan 2mg IV PRN x1,       hem/onc consult**    IMPRESSION/RECOMMENDATIONS:    COmplicated coagulopathy in setting of PEA arrest, PE< MRSA bacteremia severe sepsis critically ill pt    Upon admit-first set of coags would reflect coumadin use as ptt normal (we dont have a fibrinogen)    THen her next set suggests low fibrinogen with worsening ptt/pt-which could coincide with dic  (and heparin effect in the ptt )    Since heparin got turned off -her ptt is improved-there may have been some accurracy issues with the lab perhaps in the ptt eval when they came back very high         THe problem is she has a PE and she had a PEA arrest which leans me to try heparin ongoing -I have recommend we restart at low dose protocol without a bolus         Shse may have dic on top of this due to sepsis etc thus with treating the underlying illness this may improve on its own-with abx supportive care-plts are at least in good shape -         IF she starts bleeding on the heparin I would like her inr and her fibrinogen to e repeated as I may opt to give cryo for fibrinogen      The inr may go up from dic or shock liver or vit k def from the coumadin/abx use         Which brings this back around to potential purpura fulminans-I do not think she has protein c def as a diagnosis for her skin issues-this is a rare issue in pts with protein c def    ONe can acquire purpura fulminans but its usually from menigococcus    If this were a true case of this-typically one should give IV Vit k , but give heparin and protein c concentrate (we dont have that here ) but could give ffp if needed         AT this point I want to try to give heparin alone at a low dose to anticoagulate the PE    If we start running into bleeding I will consider giving cryo and maybe vit k=this may be a controversial decision but hopefully she will do fine on heparin overnight    I have been asked to be paged if she bleeds on the heparin         She is critically ill and there is a potential downside either way thus will retry anticoagulation and see how this goes               **IM NOTE         Shock:  Septic vs cardiogenic    - severe mitral and tricuspid regurgitation on TTE, moderate pulmonary HTN, large bilateral pleural effusions and hepatic congestion on CT    - MRSA bacteremia, skin mottling, concern for potential coagulopathy with DIC    - cont Levophed, wean as tolerated    - cont vanc and broad spectrum with meropenem, diflucan         Cardiomypathy:    - severe diffuse hypokinesis with severely reduced rt ventricular function    - severe tricuspid regurgitation and mitral regurgitation    - grade 3 diastolic dysfunction    - trialing lasix         MRSA bacteremia:    - cont vanc and broad spectrum with meropenem, diflucan         PEA arrest:    - intubated on 9/10 shortly after admission    - cont ventilator management per pulm/critical care         Bilateral pulmonary emboli:    - 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    - heparin drip per heme/onc         Coagulopathy:  Acute and chronic    - chronically has Factor V Leiden, on coumadin    - has had oozing on heparin drip, sepsis with bacteremia --> potential DIC or shock liver or vitamin K deficiency from coumadin/antibiotic use    - heme/onc recs:  if bleeding on heparin, check INR and fibrinogen for potential cryo therapy or fibrinogen         Oliguric MORELIA:    - trial lasix in attempts to make non-oliguric    - ZENON, ANCA, complement levels, antiphospholipid antibodies per nephro    - low threshold to dialyse if         DVT Prophylaxis: heparin drip    Diet: Diet NPO Effective Now    Code Status: Full Code         PT/OT Eval Status: pending improvement         Dispo - ICU               Nephrology note**    Plan/    1-Continue IVF's with Nahco3    2-Trial of Lasix to convert to non oliguric state. If remains oliguric, hyperkalemic and acidotic will need RRT    3-Check CPK level    4 Recheck labs this afternoon Monitor K+ closely    5 Check ZENON, ANCA Complements and Anti phospholipid AB's              Thank you for the consultation. Please do not hesitate to call with questions. D/W RN         Elo Echeverria MD, FACP         Addendum    5:35 pm    Pt progress reviewed    Labs reviewed    Remains oliguric tae[ite Lasix challenge    Restarted on Levophed Now at 2 mcg only    K+ better              PLan HD today     fluid removal with HD            neurology note**    Assessment    - Spells- She had some seizure like behavior just prior to PEA arrest w/ ROSC achieved after about 1 round of ACLS.  After precedex was stopped this morning, she had about a 20 minutes episode of general convulsive behavior, abating on own.  NP was able to witness another episode of intermittent/sporatic LLE movements, episodic (3-5 sec) RUE rhythmic twitching movements, head turning side to side, and roving eye movements.  She is now on propofol.  There is concern for seizures vs other.  Spot EEG (NP talked with EEG reader) that showed burst suppression.  When talked with ICU physician there was no plan to take her off propofol over the weekend.  If she has any abnormal activity concerning for seizure over the weekend, she may need to be transferred for cEEG.  When she is taken off propofol, there would be more concern for her having a seizure.  Currently she is on keppra 1 g BID.  Can use ativan PRN for seizure. When able, would recommend MRI B W/WO.           Recommendations    - continue keppra 1 g BID    - MRI B W/WO when able    - lorazepam PRN for seizure    - CNP note, to which this attestation is attached, contains remainder of assessment details & recommendations.            pulmonology note*    Plan:         Cardiac arrest    - In the setting of pulmonary emboli and septic shock    - Cardiology following         Bilateral pulmonary emboli    - Heparin drip on hold due to DIC and active bleeding         Acute kidney injury    - Likely due to poor forward flow due to biventricular failure    - Challenge with lasix    - Strict I/Os    - Nephrology following         Metabolic acidosis    - Bicarbonate drip         Congestive heart failure    - Challenge with lasix    - Strict I/Os         Pulmonary hypertension    - Based on echocardiogram    - Lasix    - Keep oxygen saturation>90%         Multifocal pneumonia    - Broaden antibiotics to meropenem and vancomycin    - Send sputum culture         Septic shock    - In the setting of pneumonia    - Broaden antibiotics to meropenem and vancomycin    - Levophed to keep MAP>65         Lactic acidosis    - Challenge with lasix    - Cycle every 6 hours         Elevated LFTs    - May be due to hepatic congestion versus shock liver    - Keep MAP>65    - Check labs in a.m.         Thrombocytopenia    - Likely due to sepsis induced DIC    - Check labs in a.m.         Coagulopathy    - On anticoagulation at home and now with DIC.  Will not reverse due to bilateral pulmonary emboli         Disseminated intravascular coagulation    - She is clotting and bleeding simultaneously.  Will not reverse due to bilateral pulmonary emboli    - Check coagulation profile and fibrinogen every 6 hours    - Check protein C    - Consult hematology         Acute encephalopathy    - May be due to infection, DIC or anoxia    - Antibiotics as above              Prophylaxis    DVT- heparin drip on hold    GI- protonix    VAP- peridex

## 2020-09-14 NOTE — PROGRESS NOTES
Clinical Pharmacy Note  Heparin Dosing       Lab Results   Component Value Date    APTT 61.6 09/14/2020     Lab Results   Component Value Date    HGB 9.7 09/13/2020    HCT 30.5 09/13/2020     09/13/2020    INR 1.57 09/14/2020       Current Infusion Rate: 6 mL/hr    Plan:  Rate: continue at 6 mL/hr  Next aPTT: 0800 9/14/20    Pharmacy will continue to monitor and adjust based on aPTT results.   Michelle Taylor, PharmD

## 2020-09-14 NOTE — PROGRESS NOTES
Hospitalist Progress Note      PCP: No primary care provider on file. Date of Admission: 9/10/2020    Chief Complaint: s/p cardiac arrest    Hospital Course:     Subjective: Still limited urine output. Remains weaned off pressors. Medications:  Reviewed    Infusion Medications    prismaSATE BGK 4/2.5 1,000 mL/hr (09/13/20 2147)    prismaSATE BGK 4/2.5 1,500 mL/hr (09/13/20 2250)    prismaSATE BGK 4/2.5 500 mL/hr (09/13/20 1601)    dextrose      propofol 30 mcg/kg/min (09/13/20 2216)    propofol      heparin (Porcine) 6 mL/hr (09/13/20 1902)     Scheduled Medications    ampicillin-sulbactam  3 g Intravenous Q8H    sodium chloride flush  10 mL Intravenous 2 times per day    pantoprazole  40 mg Intravenous Daily    And    sodium chloride (PF)  10 mL Intravenous Daily    chlorhexidine  15 mL Mouth/Throat BID    levetiracetam  1,000 mg Intravenous Q12H    vancomycin (VANCOCIN) intermittent dosing (placeholder)   Other RX Placeholder     PRN Meds: potassium chloride, magnesium sulfate, calcium gluconate **OR** calcium gluconate **OR** calcium gluconate **OR** calcium gluconate, sodium phosphate IVPB **OR** sodium phosphate IVPB **OR** sodium phosphate IVPB **OR** sodium phosphate IVPB, sodium chloride flush, acetaminophen **OR** acetaminophen, perflutren lipid microspheres, ondansetron, LORazepam, perflutren lipid microspheres, glucose, dextrose, glucagon (rDNA), dextrose, propofol      Intake/Output Summary (Last 24 hours) at 9/14/2020 0042  Last data filed at 9/14/2020 0000  Gross per 24 hour   Intake 2612 ml   Output 7425 ml   Net -4813 ml       Exam:    /73   Pulse 92   Temp 98 °F (36.7 °C) (Rectal)   Resp 17   Ht 5' 7\" (1.702 m)   Wt 249 lb 1.9 oz (113 kg)   LMP 12/05/2018 (Exact Date)   SpO2 95%   BMI 39.02 kg/m²     General appearance: Sedated intubated appears stated age and cooperative. HEENT: Pupils equal, round, and reactive to light.  Conjunctivae/corneas clear.  Neck: Supple, with full range of motion. No jugular venous distention. Trachea midline. Respiratory:  Normal respiratory effort. Clear to auscultation, bilaterally without Rales/Wheezes/Rhonchi. Cardiovascular: Regular rate and rhythm with normal S1/S2 without murmurs, rubs or gallops. Abdomen: Soft, non-tender, non-distended with normal bowel sounds. Musculoskeletal: No clubbing, cyanosis or edema bilaterally. Full range of motion without deformity. Skin: Skin color, texture, turgor normal.  No rashes or lesions. Neurologic:  Sedated/intubated. Cranial nerves: II-XII intact, grossly non-focal.  Capillary Refill: Brisk,< 3 seconds   Peripheral Pulses: +2 palpable, equal bilaterally       Labs:   Recent Labs     09/11/20  1030 09/12/20  0545 09/13/20  0540   WBC 24.0* 16.0* 13.3*   HGB 10.7* 9.9* 9.7*   HCT 34.8* 31.3* 30.5*   * 101* 108*     Recent Labs     09/12/20  1829  09/13/20  0540 09/13/20  1235 09/13/20  1815 09/14/20  0015   *   < > 137 137 135* 135*   K 3.7   < > 4.0 4.2 4.2 4.1      < > 105 105 102 102   CO2 29   < > 27 27 26 27   BUN 22*   < > 17 15 16 13   CREATININE 1.2*   < > 1.0 1.0 1.1 0.9   CALCIUM 7.0*   < > 7.2* 7.2* 7.7* 7.2*   PHOS 2.6  --  2.0*  --  1.9*  --     < > = values in this interval not displayed. Recent Labs     09/12/20  0545 09/13/20  0540   AST 1,963* 1,018*   ALT 1,486* 1,209*   BILIDIR  --  1.0*   BILITOT 1.3* 1.3*   ALKPHOS 108 98     Recent Labs     09/13/20  0540 09/13/20  1235 09/13/20  1815   INR 1.85* 2.06* 1.59*     Recent Labs     09/11/20  0225 09/11/20  1300   CKTOTAL  --  449*   TROPONINI 0.02*  --        Urinalysis:      Lab Results   Component Value Date    NITRU Negative 09/10/2020    WBCUA 3-5 09/10/2020    BACTERIA 2+ 09/10/2020    RBCUA 21-50 09/10/2020    BLOODU MODERATE 09/10/2020    SPECGRAV >1.030 09/10/2020    GLUCOSEU Negative 09/10/2020       Radiology:  XR CHEST PORTABLE   Preliminary Result   1.  Interval placement of an NG tube, likely within the stomach. 2. Stable appropriate positions of endotracheal tube and right-sided dialysis   catheter. 3. No significant change in appearance of small to moderate bilateral pleural   effusions and bibasilar airspace disease. XR CHEST PORTABLE   Final Result   Supportive lines project in stable positions. Bibasilar opacities, suggestive of layered effusions and atelectasis or   airspace disease. XR CHEST PORTABLE   Final Result   1. Suspected mild to moderate bilateral pleural effusions with adjacent   bibasilar atelectasis and/or airspace disease. 2. Mild to moderate cardiomegaly. 3. Recommend retraction of endotracheal tube 0.7 cm. XR CHEST PORTABLE   Final Result   Right jugular venous catheter and endotracheal tube project in normal   positions. No pneumothorax. Bilateral airspace disease and possible layered pleural effusions, consistent   with pulmonary edema and atelectasis. Pneumonia is a differential   possibility. XR CHEST PORTABLE   Final Result   Satisfactory position of the endotracheal tube 3 cm above the rahat. The   heart is upper normal size and vessels are borderline congested. VL Extremity Venous Bilateral   Final Result      VL DUP LOWER EXTREMITY ARTERIES BILATERAL   Final Result      XR CHEST PORTABLE   Final Result   Endotracheal tube has tip approximately 1.3 cm proximal to the rahat and   should be retracted approximately 2 cm. Findings are again suggestive of pulmonary edema with small pleural   effusions. Pneumonia is not excluded in the appropriate clinical setting. Findings were called by the radiology call center. CT HEAD WO CONTRAST   Final Result   Motion limited study. No convincing evidence for acute intracranial   abnormality identified within the limitations of motion artifact.       If there is persistent clinical suspicion for intracranial abnormality,   repeat study should be considered. XR CHEST PORTABLE   Final Result   Cardiomegaly, pulmonary vascular congestion, and bilateral pleural effusions. Endotracheal tube terminates approximately 2 cm above the rahat. CT CHEST PULMONARY EMBOLISM W CONTRAST   Final Result   Addendum 1 of 1   ADDENDUM:   Findings were discussed with Dr. Sunil Amezquita at 10:49 pm on 9/10/2020.          Final      MRI BRAIN WO CONTRAST    (Results Pending)   XR CHEST PORTABLE    (Results Pending)   XR CHEST PORTABLE    (Results Pending)           Assessment/Plan:    Active Hospital Problems    Diagnosis Date Noted    Pleural effusion, bilateral [J90]     Elevated lactic acid level [R79.89]     Bacteremia due to methicillin susceptible Staphylococcus aureus (MSSA) [R78.81]     Pneumonia due to methicillin susceptible Staphylococcus aureus (MSSA) (HCC) [J15.211]     Anemia [D64.9]     Acute renal failure ([facility] [N17.9]     Bilateral pleural effusion [J90]     Mitral valve insufficiency [I34.0]     Tricuspid valve insufficiency [I07.1]     Class 2 obesity due to excess calories with body mass index (BMI) of 39.0 to 39.9 in adult [E66.09, Z68.39]     Pneumonia due to organism [J18.9]     Bacteremia [R78.81]     Cardiac arrest ([facility] [I46.9] 09/11/2020    Seizures ([facility] [R56.9] 09/11/2020    Acute respiratory failure with hypoxia (HCC) [J96.01] 09/11/2020    Acute kidney injury ([facility] [U06.7]     Metabolic acidosis [L77.5]     Congestive heart failure (HCC) [I50.9]     Pulmonary hypertension (HCC) [I27.20]     Multifocal pneumonia [J18.9]     Septic shock (HCC) [A41.9, R65.21]     Elevated LFTs [R94.5]     Thrombocytopenia (HCC) [D69.6]     Coagulopathy (HCC) [D68.9]     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) [D65]     Acute encephalopathy [G93.40]     Pulmonary embolus (HCC) [I26.99] 09/10/2020    Lactic acidosis [E87.2] 09/10/2020    CAP (community acquired pneumonia) [J18.9] 09/10/2020    Acute CHF (congestive heart failure) ([facility] [I50.9] 09/10/2020    Bilateral pulmonary embolism ([facility] [I26.99] 09/10/2020    Factor V Leiden mutation [facility]) [D68.51] 11/13/2014       Shock:  Septic vs cardiogenic  - severe mitral and tricuspid regurgitation on TTE, moderate pulmonary HTN, large bilateral pleural effusions and hepatic congestion on CT   - MRSA bacteremia, skin mottling, concern for potential coagulopathy with DIC  - s/p Levophed  - vanc and meropennem started initialled.   ID managin adjustment currently    Cardiomypathy:  - severe diffuse hypokinesis with severely reduced rt ventricular function  - severe tricuspid regurgitation and mitral regurgitation  - grade 3 diastolic dysfunction  - trialing lasix    MRSA bacteremia:  - cont antibiotic management per ID    PEA arrest:  - intubated on 9/10 shortly after admission  - cont ventilator management per pulm/critical care    Bilateral pulmonary emboli:  - 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  - heparin drip per heme/onc    Coagulopathy:  Acute and chronic  - chronically has Factor V Leiden, on coumadin  - has had oozing on heparin drip, sepsis with bacteremia --> potential DIC or shock liver or vitamin K deficiency from coumadin/antibiotic use  - heme/onc recs:  if bleeding on heparin, check INR and fibrinogen for potential cryo therapy or fibrinogen    Oliguric/Anuric MORELIA:  - suboptimal response to lasix in attempts to make non-oliguric  - CRRT started  - ZENON, ANCA, complement levels, antiphospholipid antibodies per nephro    DVT Prophylaxis: heparin drip  Diet: DIET TUBE FEED CONTINUOUS/CYCLIC NPO; Renal Formula; Nasogastric; Continuous; 20; 35  Code Status: Full Code    PT/OT Eval Status: pending improvement    Dispo - ICU    Marielena Clark MD

## 2020-09-14 NOTE — PROGRESS NOTES
Progress Note    Updates  Intubated, sedated. On CRRT. Reportedly continues to have some myoclonus when off sedation. Past Medical History:   Diagnosis Date    Anticoagulant long-term use     Arthritis     Asthma     Baker's cyst     Depression     Factor V Leiden ([facility]     Factor V Leiden ([facility]     H/O blood clots     Heartburn     Hyperlipidemia     MRSA bacteremia 09/10/2020    Osteoarthritis      History reviewed. No pertinent surgical history.     Current Facility-Administered Medications:     ampicillin-sulbactam (UNASYN) 3 g ivpb minibag, 3 g, Intravenous, Q8H, Jeremie Canela MD, Stopped at 09/14/20 0440    prismaSATE BGK 4/2.5 dialysis solution, , Dialysis, Continuous, Mejia Atkinson MD, Last Rate: 1,000 mL/hr at 09/14/20 0753, 1,000 mL/hr at 09/14/20 0753    prismaSATE BGK 4/2.5 dialysis solution, , Dialysis, Continuous, Mejia Atkinson MD, Last Rate: 1,500 mL/hr at 09/14/20 0900, 1,500 mL/hr at 09/14/20 0900    prismaSATE BGK 4/2.5 dialysis solution, , Dialysis, Continuous, Mejia Atkinson MD, Last Rate: 500 mL/hr at 09/14/20 0213, 500 mL/hr at 09/14/20 0213    potassium chloride 20 mEq/50 mL IVPB (Central Line), 20 mEq, Intravenous, PRN, Mejia Atkinson MD, Last Rate: 50 mL/hr at 09/12/20 1008, 20 mEq at 09/12/20 1008    magnesium sulfate 1 g in dextrose 5% 100 mL IVPB, 1 g, Intravenous, PRN, Mejia Atkinson MD    calcium gluconate 1 g in sodium chloride 50 mL, 1 g, Intravenous, PRN, 1 g at 09/14/20 0605 **OR** calcium gluconate 2 g in dextrose 5 % 100 mL IVPB, 2 g, Intravenous, PRN **OR** calcium gluconate 3 g in dextrose 5 % 100 mL IVPB, 3 g, Intravenous, PRN **OR** calcium gluconate 4 g in dextrose 5 % 100 mL IVPB, 4 g, Intravenous, PRN, Mejia Atkinson MD    sodium phosphate 6 mmol in dextrose 5 % 250 mL IVPB, 6 mmol, Intravenous, PRN, Stopped at 09/13/20 1051 **OR** sodium phosphate 12 mmol in dextrose 5 % 250 mL IVPB, 12 mmol, Intravenous, PRN, Stopped at APRN - CNP, Stopped at 09/14/20 1050    propofol injection 120 mg, 12 mL, Intravenous, Continuous PRN, Lyubov Perez MD, 120 mg at 09/13/20 1728    vancomycin (VANCOCIN) intermittent dosing (placeholder), , Other, RX Placeholder, Lyubov Perez MD    heparin 25,000 unit in sodium chloride 0.45% 250 mL infusion, 6 mL/hr, Intravenous, Continuous, Vilma Gonzalez RN, Last Rate: 6 mL/hr at 09/14/20 0826, 6 mL/hr at 09/14/20 0826    Exam  Blood pressure 120/68, pulse 85, temperature 97 °F (36.1 °C), temperature source Rectal, resp. rate 19, height 5' 7\" (1.702 m), weight 231 lb 7.7 oz (105 kg), last menstrual period 12/05/2018, SpO2 95 %. Constitutional                          Vital signs: BP, HR, and RR reviewed            General depressed mental status, no distress. Moves head from side to side. Appears to be biting ventilator tube from time to time. Eyes: unable to visualize the fundi. Cardiovascular: + peripheral edema. Psychiatric: limited exam given encephalopathy  Neurologic  Mental status:   orientation marisol due to encephalopathy/sedation. Attention poor  Language limited exam given encephalopathy  Comprehension not following any commands  CN2: no corneal reflexes at this time. CN 3,4,6: dysconjugate gaze. Pupils are 2-3 mm, round, reactive bilaterally. CN7: face symmetric but exam limited by ET tube  CN9: gag present per RN. CN11: limited exam given encephalopathy  CN12: limited exam given encephalopathy  Strength: unable to assess at this time. Sensory: withdraw to nailbed pressure in both feet, less so in UE. Grimace to trapezius pressure. Cerebellar/coordination:limited exam given encephalopathy  Tone: no increased tone or rigidity. Gait: limited exam given encephalopathy and intubated with ventilator. ROS - unable to obtain from the patient at this time. Chart reviewed. Afebrile.      Labs  Glucose 107  Na 135  K 4.1  BUN 13  Cr 1.0  Mg 2.30    ALT 1082  AST movements, and intermittent almost rhythmic twitching (duration 3-5 sec) of her RUE. Initial CT head was w/out any acute findings. EEG w/ burst suppression though on sedation at the time. Medically complex patient - on CRRT, PE/coaguloapthy, bacteremia/?endocarditis, PNA. Recommendations  1. MRI brain w/o, hopefully today. 2.  Continue Keppra 1000 mg BID. 3.  Would attempt to wean sedation as tolerated. Would recommend EEG w/ lighter sedation or preferably off.    4.  If she continues w/ myoclonic jerking off propofol, there may be a role for cvEEG. 5.  ID covering for possible CNS infection. 6.  Additional medical management per appropriate clinician.       Anna Aponte NP  [address] [place] [address] [id number] Neurology    A copy of this note was provided for Dr Jorgito Scott MD

## 2020-09-14 NOTE — PROGRESS NOTES
Clinical Pharmacy Note  Heparin Dosing       Lab Results   Component Value Date    APTT 45.0 09/14/2020     Lab Results   Component Value Date    HGB 10.1 09/14/2020    HCT 32.6 09/14/2020    PLT 94 09/14/2020    INR 1.29 09/14/2020       Current Infusion Rate: 6 mL/hr    Plan:  Bolus: 2000 units  Rate: 7.3 mL/hr  Next aPTT: 2100 9/14    Pharmacy will continue to monitor and adjust based on aPTT results.   Prem Boyle RPh 9/14/2020 2:58 PM

## 2020-09-14 NOTE — PLAN OF CARE
Nutrition Problem #1: Inadequate oral intake  Intervention: Food and/or Nutrient Delivery: Continue Current Tube Feeding  Nutritional Goals: tolerate most appropriate form of nutrition

## 2020-09-14 NOTE — PLAN OF CARE
Problem: Restraint Use - Nonviolent/Non-Self-Destructive Behavior:  Goal: Absence of restraint-related injury  Description: Upper extremity soft wrist restraints intact. No S/S of restraint related injury . ROM performed Q2HR. Outcome: Ongoing     Problem: Skin Integrity:  Goal: Absence of new skin breakdown  Description: Monitoring patient skin integrity for skin breakdown, turning and repositioning q2h per protocol.    Outcome: Ongoing

## 2020-09-14 NOTE — PROGRESS NOTES
NEPHROLOGY PROGRESS NOTE    Patient: Ronen Holder MRN: [id number]     YOB: 1970  Age: 53 y.o. Sex: female    Unit: [address] [facility] 2 ICU Room/Bed: Y1P-0360/2104-01 Location: [address] [place] [address]     Admitting Physician: Brett Chacon    Primary Care Physician: No primary care provider on file. LOS: 4 days       Reason for evaluation:   Milly      SUBJECTIVE:      The patient was seen and examined. Notes and labs reviewed. There were no complications over night. Patient's review of systems: she remains intubated, on the vent, sedated, tolerating CRRT well  UF tolerating 200 ml/hr  Having seizure like activity    OBJECTIVE:     Vitals:    09/14/20 0742 09/14/20 0800 09/14/20 0900 09/14/20 1000   BP:  137/83 131/75 112/67   Pulse: 78 79 81 81   Resp: 16 16 16 16   Temp:  97 °F (36.1 °C)     TempSrc:  Rectal     SpO2: 97% 97% 96% 97%   Weight:       Height:           Intake and Output:      Intake/Output Summary (Last 24 hours) at 9/14/2020 1102  Last data filed at 9/14/2020 1000  Gross per 24 hour   Intake 2913.6 ml   Output 8033 ml   Net -5119.4 ml       Continuous Infusions:   prismaSATE BGK 4/2.5 1,000 mL/hr (09/14/20 0753)    prismaSATE BGK 4/2.5 1,500 mL/hr (09/14/20 0900)    prismaSATE BGK 4/2.5 500 mL/hr (09/14/20 0213)    dextrose      propofol 20 mcg/kg/min (09/14/20 0826)    propofol      heparin (Porcine) 6 mL/hr (09/14/20 3395)       Exam:   CONSTITUTIONAL/PSYCHIATRY: intubated, on the vent  RESPIRATORY: on the vent. Auscultation: EBBS  CARDIOVASCULAR: Auscultation: RRR. Edema: 1+  GASTROINTESTINAL: Soft, nontender, has significant soft tissue edema  EXTREMITIES:  No cyanosis or clubbing. SKIN: Warm and dry.       LABS:   RFP:   Recent Labs     09/13/20  0540  09/13/20  1815 09/14/20  0015 09/14/20  0455      < > 135* 135* 135*   K 4.0   < > 4.2 4.1 4.1      < > 102 102 103   CO2 27   < > 26 27 26   BUN 17   < > 16 13 13   CREATININE 1.0   < > 1.1 0.9 1.0 CALCIUM 7.2*   < > 7.7* 7.2* 7.3*   MG 2.30  --  2.30  --  2.30   PHOS 2.0*  --  1.9*  --  1.9*   GFRAA >60   < > >60 >60 >60    < > = values in this interval not displayed. Liver panel:  Recent Labs     09/12/20  0545 09/13/20  0540 09/14/20  0455   AST 1,963* 1,018* 783*   ALT 1,486* 1,209* 1,082*       Endocrine:   @[id number](VitD,PTH,TSH,aldosterone,renin activity,cortisol,metanephrine)@    CBC:   Recent Labs     09/12/20 0545 09/13/20  0540 09/14/20  0455   WBC 16.0* 13.3* 12.7*   HGB 9.9* 9.7* 10.1*   HCT 31.3* 30.5* 32.6*   MCV 83.8 84.8 85.4   * 108* 94*         ASSESSMENT and PLAN:     1. MORELIA: likely secondary to ischemia ATI. She had additional exposure to radiocontrast  Initiated on IHD on 9/11/20. Transitioned to CRRT on 9/12/20  Tolerating UF goal 200 ml/hr Wt down to 231 lbs  Electrolytes replaced per protocol (Phos)    ZENON is neg, C3 is low at 54   - Will monitor UO, urine microscopy, proteinuria and determine whether further investigation is deemed necessary. Follow up on limited serology w/u (ANCA, APLA)  Continue CRRT today until system goes down, will transition to iHD then    2. Hyperkalemia/metabolic acidosis: improved    3. S/P PEA arrest 9/11/20    4. Acute hypoxic respiratory failure: secondary to 2. Also has bilateral pleural effusions, PE and possible Pnia. 5. MRSA bacteremia: on Vancomycin Pharmacy dosing    6. Severe CMP with reduced RV function/severe TR/MR, Grade 3 diastolic dysfunction, hepatic congestion and anasarca   - Plan on fluid removal with CRRT per above    7. Factor V leiden deficiency with PE/coagulopathy/DIC: per hematology    8. Elevated liver enzymes: likely ischemic injury, ?additional hepatic congestion.  AST and ALT improving today    8 Hypophosphatemia replaced        Signed By: Emily Driver MD

## 2020-09-14 NOTE — PROGRESS NOTES
ADDENDUM:    In follow-up of my request to microbiology lab from yesterday, I received a call from [facility] micro lab today that patient's blood culture isolate of staph aureus is still indicating presence of mecA gene on [employer] and traditional susceptibility testing keeps on  showing oxacillin susceptibility. They think that the blood culture staph aureus isolate likely does have mec A gene but is just not expressing it. Dr. Citlali Hartley is back today on ID service and will be taking over the care. Discussed above with him.       Cleo Osorio MD, MPH  9/14/2020, 11:30 AM  [facility]   [address] [place] [address], [address] [place] [address]  Office: [phone]  Fax: [phone]  Clinic days:  Tuesday & Thursday

## 2020-09-14 NOTE — PROGRESS NOTES
4 Eyes Skin Assessment     The patient is being assess for  Shift Handoff    I agree that 2 RN's have performed a thorough Head to Toe Skin Assessment on the patient. ALL assessment sites listed below have been assessed. Areas assessed by both nurses: Yes  [x]   Head, Face, and Ears   [x]   Shoulders, Back, and Chest  [x]   Arms, Elbows, and Hands   [x]   Coccyx, Sacrum, and IschIum  [x]   Legs, Feet, and Heels        Does the Patient have Skin Breakdown?   No         Cash Prevention initiated:  Yes   Wound Care Orders initiated:  NA      [facility] nurse consulted for Pressure Injury (Stage 3,4, Unstageable, DTI, NWPT, and Complex wounds), New and Established Ostomies:  NA      Nurse 1 eSignature: Electronically signed by Nestor Croft RN on 9/14/20 at 7:16 AM EDT    **SHARE this note so that the co-signing nurse is able to place an eSignature**    Nurse 2 eSignature: Electronically signed by Rosamaria Nelson RN on 9/14/20 at 7:25 AM EDT

## 2020-09-14 NOTE — PROGRESS NOTES
Clinical Pharmacy Note  Vancomycin Consult    Pop Tracey is a 53 y.o. female ordered Vancomycin for MRSA bacteremia; consult received from Dr. Rossi Pollock to manage therapy. Also receiving Unasyn.     Patient Active Problem List   Diagnosis    [facility] Leiden mutation ([facility]    Pulmonary embolus (HCC)    Lactic acidosis    CAP (community acquired pneumonia)    Acute CHF (congestive heart failure) ([facility]    Bilateral pulmonary embolism (HCC)    Cardiac arrest ([facility]    Seizures ([facility]    Acute respiratory failure with hypoxia (HCC)    Acute kidney injury ([facility]    Metabolic acidosis    Congestive heart failure (HCC)    Pulmonary hypertension (HCC)    Multifocal pneumonia    Septic shock (HCC)    Elevated LFTs    Thrombocytopenia (HCC)    Coagulopathy (HCC)    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)    Acute encephalopathy    Pneumonia due to organism    Bacteremia    Pleural effusion, bilateral    Elevated lactic acid level    Bacteremia due to methicillin susceptible Staphylococcus aureus (MSSA)    Pneumonia due to methicillin susceptible Staphylococcus aureus (MSSA) (HCC)    Anemia    Acute renal failure (HCC)    Bilateral pleural effusion    Mitral valve insufficiency    Tricuspid valve insufficiency    Class 2 obesity due to excess calories with body mass index (BMI) of 39.0 to 39.9 in adult    Cardiogenic shock (HCC)    Staphylococcal pneumonia (HCC)       Allergies:  Ibuprofen     Temp max:  Temp (24hrs), Av.8 °F (36.6 °C), Min:97 °F (36.1 °C), Max:98.6 °F (37 °C)      Recent Labs     20  0545 20  0540 20  0455   WBC 16.0* 13.3* 12.7*       Recent Labs     20  0015 20  0455 20  1211   BUN 13 13 17   CREATININE 0.9 1.0 1.1         Intake/Output Summary (Last 24 hours) at 2020 1358  Last data filed at 2020 1300  Gross per 24 hour   Intake 3060.1 ml   Output 8710 ml   Net -5649.9 ml       Culture Results:  09/10/20 Blood: MRSA   09/11/20 Respiratory: Staph aureus   09/12/20 Blood: No growth to date     Ht Readings from Last 1 Encounters:   09/14/20 5' 7\" (1.702 m)        Wt Readings from Last 1 Encounters:   09/14/20 231 lb 7.7 oz (105 kg)       Assessment:  Day # 4 of vancomycin. Current Regimen: Intermittent dosing based on levels due to CRRT  Random level: 15.3 mg/L. Plan:  Vancomycin 1250 mg IV x 1 today. Random vancomycin level in AM. Plan to d/c CRRT when set expires or goes down per nephrology. Thank you for the consult.      Najma Francis, PharmD, [email or web address]

## 2020-09-14 NOTE — PROGRESS NOTES
Clinical Pharmacy Note  Heparin Dosing       Lab Results   Component Value Date    APTT 49.0 09/14/2020     Lab Results   Component Value Date    HGB 10.1 09/14/2020    HCT 32.6 09/14/2020    PLT 94 09/14/2020    INR 1.47 09/14/2020       Current Infusion Rate: 6 mL/hr    Plan:  Rate: continue at 6 mL/hr  Next aPTT: 1400 9/14/20    Pharmacy will continue to monitor and adjust based on aPTT results.   Janett Sheppard RPh 9/14/2020 8:43 AM

## 2020-09-14 NOTE — PROGRESS NOTES
or tenderness  Neurologic: reflexes normal and symmetric, no cranial nerve deficit  Lines:  IJ+   Arizmendi     Data Review:    Lab Results   Component Value Date    WBC 12.7 (H) 09/14/2020    HGB 10.1 (L) 09/14/2020    HCT 32.6 (L) 09/14/2020    MCV 85.4 09/14/2020    PLT 94 (L) 09/14/2020     Lab Results   Component Value Date    CREATININE 1.0 09/14/2020    BUN 13 09/14/2020     (L) 09/14/2020    K 4.1 09/14/2020     09/14/2020    CO2 26 09/14/2020       Hepatic Function Panel:   Lab Results   Component Value Date    ALKPHOS 104 09/14/2020    ALT 1,082 09/14/2020     09/14/2020    PROT 4.2 09/14/2020    PROT 7.0 10/19/2011    BILITOT 1.7 09/14/2020    BILIDIR 1.2 09/14/2020    IBILI 0.5 09/14/2020    LABALBU 2.2 09/14/2020       UA:  Lab Results   Component Value Date    COLORU DK YELLOW 09/10/2020    CLARITYU CLOUDY 09/10/2020    GLUCOSEU Negative 09/10/2020    BILIRUBINUR SMALL 09/10/2020    KETUA Negative 09/10/2020    SPECGRAV >1.030 09/10/2020    BLOODU MODERATE 09/10/2020    PHUR 5.5 09/10/2020    PROTEINU >=300 09/10/2020    UROBILINOGEN 1.0 09/10/2020    NITRU Negative 09/10/2020    LEUKOCYTESUR Negative 09/10/2020    LABMICR YES 09/10/2020    URINETYPE Cleancatch 09/10/2020      Urine Microscopic:   Lab Results   Component Value Date    BACTERIA 2+ 09/10/2020    COMU see below 09/10/2020    HYALCAST 3 03/16/2016    WBCUA 3-5 09/10/2020    RBCUA 21-50 09/10/2020    EPIU 11-20 09/10/2020     Vanco  17.8       WBC  24.8  DOWN TO 12.7    Ref Range & Units  09/13/20 1303   HIV Ag/Ab  Non-reactive  Non-Reactive    HIV-1 Antibody  Non-reactive  Non-Reactive    HIV ANTIGEN  Non-reactive  Non-Reactive    HIV-2 Ab  Non-reactive  Non-Reactive    Resulting Agency   [facility]      Ref Range & Units  09/13/20 1303   Hep A IgM  Non-reactive  Non-reactive    Hep B Core Ab, IgM  Non-reactive  Non-reactive    Hep B S Ag Interp  Non-reactive  Non-reactive    Hep C Ab Interp  Non-reactive  Non-reactive Resulting Agency   [address]        TTE :  9/10     Summary   Ejection fraction is visually estimated to be 30%-35%. Severe tricuspid regurgitation. moderate pulmonary HTN   There is severe diffuse hypokinesis. Diastolic filling parameters suggest grade III diastolic dysfunction. Moderate calcification of the posterior leaflet of the mitral valve. Mitral annular calcification is present. The mitral valve leaflets appear myxomatous. No evidence of mitral valve stenosis. Severe mitral regurgitation is present. Systolic flow reversal in pulmonary veins. The right ventricle is enlarged. Right ventricular systolic function is moderate to severely reduced. Signature      ------------------------------------------------------------------   Electronically signed by Nestor Billy MD (Interpreting   physician) on 09/11/2020 at 10:45 AM   ------------------------------------------------------------------    MICRO: cultures reviewed and updated by me     20 [id number]        Order Status: Completed  Specimen: Sputum, Suctioned  Updated: 09/14/20 0531     CULTURE, RESPIRATORY  Normal respiratory benjamin absentAbnormal       Gram Stain Result  4+ WBC's (Polymorphonuclear)   2+ Epithelial Cells   1+ Gram positive cocci   1+ Gram positive rods     Organism  Staphylococcus aureusAbnormal       CULTURE, RESPIRATORY  --     Light growth   PBP2= Negative    Narrative:      ORDER#: [id number]                          ORDERED BY: LISA BRIGHT   SOURCE: Sputum Suctioned                   COLLECTED:  09/11/20 12:02   [facility]. :                      RECEIVED :  09/11/20 12:02   Performed at:   [address] [place] [address]   Phone [phone]    Culture, Blood 1 [[id number]]   Collected: 09/12/20 7868    Order Status: Completed  Specimen: Blood  Updated: 09/13/20 1915     Blood Culture, Routine  No Growth to date.  Any change in status will be called. Narrative:      ORDER#: [id number]                          ORDERED BY: Mohsen Ribera   SOURCE: Blood                              COLLECTED:  09/12/20 16:57   [facility]. :                      RECEIVED :  09/12/20 16:57   If child <=2 yrs old please draw pediatric bottle. ~Blood Culture #1   Performed at:   [facility]   [address] [place] [address] [facility] [address]   Phone [phone]    Culture, Blood 1 [[id number]]   Collected: 09/13/20 1815    Order Status: Sent  Specimen: Blood  Updated: 09/13/20 1831    Culture, Blood 2 [[id number]]   Collected: 09/12/20 1300    Order Status: Completed  Specimen: Blood  Updated: 09/13/20 1515     Culture, Blood 2  No Growth to date.  Any change in status will be called. Narrative:      ORDER#: [id number]                          ORDERED BY: Mohsen Ribera   SOURCE: Blood                              COLLECTED:  09/12/20 13:00   [facility]. :                      RECEIVED :  09/12/20 13:06   If child <=2 yrs old please draw pediatric bottle. ~Blood Culture #2   Performed at:   [facility]   [address] [place] [address] [facility] [address]   Phone [phone]    Culture, Blood 2 [[id number]]  (Abnormal)  Collected: 09/10/20 2314    Order Status: Completed  Specimen: Blood  Updated: 09/13/20 1344     Culture, Blood 2  --Abnormal       Gram stain Aerobic bottle:   Gram positive cocci in clusters   resembling Staphylococcus   Information to follow   Abnormal       Organism  Staph aureus MRSA DNA DetectedAbnormal       Culture, Blood 2  --Abnormal       CONTACT PRECAUTIONS INDICATED   See additional report for complete BCID panel.    Isolated one of two sets   Abnormal       Organism  Staphylococcus aureusAbnormal       Culture, Blood 2  --     POSITIVE for   Sensitivity to follow   Repeating sensitivity    Narrative:      ORDER#: [id number]                          ORDERED BY: JENISE LONNIE   SOURCE: Blood                              COLLECTED:  09/10/20 23:14   [facility] :                      RECEIVED :  09/10/20 23:18   CALL Riaz Overton  [id number] tel. [phone],   ., 09/13/2020 06:47, by [facility]   Microbiology results called to and read back by Danielle Lewis,   09/11/2020 16:00, by MUM   Microbiology results called to and read back by FRANCHESCA Quinones, 09/11/2020   15:59, by Alex Cárdenas   If child <=2 yrs old please draw pediatric bottle. ~Blood Culture #2   Performed at:   [facility]   [address] [facility] [employer] [address]   Phone [phone]    Culture, Blood 2 [[id number]]      Order Status: No result  Specimen: Peripheral, site unknown from Blood     Culture, Blood 1 [[id number]]   Collected: 09/10/20 2303    Order Status: Completed  Specimen: Blood  Updated: 09/12/20 0115     Blood Culture, Routine  No Growth to date.  Any change in status will be called. Narrative:      ORDER#: [id number]                          ORDERED BY: Raul Martinez   SOURCE: Blood                              COLLECTED:  09/10/20 23:03   [facility]. :                      RECEIVED :  09/10/20 23:07   If child <=2 yrs old please draw pediatric bottle. ~Blood Culture #1   Performed at:   [facility]   [address], [place] [employer] [address]   Phone [phone]    Culture, Blood, PCR ID Panel Results Report [[id number]]   Collected: 09/10/20 2314    Order Status: Completed  Updated: 09/11/20 1600     Report  SEE IMAGE    Narrative:      CALL Louis Cotto tel. [phone],   Microbiology results called to and read back by Danielle Lewis,   09/11/2020 16:00, by FARZANEHX   Microbiology results called to and read back by FRANCHESCA Quinones, 09/11/2020   15:59, by Alex Cárdenas   Referred out by:   [place] [address] [address] [facility], [place] [employer] [address]   Phone [phone]    Strep Pneumoniae Antigen [[id number]]   Collected: 09/10/20 2207    Order Status: Completed  Specimen: Urine, clean catch  Updated: 09/11/20 1042     STREP PNEUMONIAE ANTIGEN, URINE  --     Presumptive Negative   Presumptive negative suggests no current or recent   pneumococcal infection. Infection due to Strep pneumoniae   cannot be ruled out since the antigen present in the sample   may be below the detection limit of the test.   Normal Range:Presumptive Negative    Narrative:      ORDER#: [id number]                          ORDERED BY: Paddy Mayes   SOURCE: Urine Clean Catch                  COLLECTED:  09/10/20 22:07   ANTIBIOTICS [facility]. :                      RECEIVED :  09/11/20 01:43   Performed at:   [address], [place]   Phone [phone]    Legionella antigen, urine [[id number]]   Collected: 09/10/20 2207    Order Status: Completed  Specimen: Urine, clean catch  Updated: 09/11/20 1042     L. pneumophila Serogp 1 Ur Ag  --     Presumptive Negative   No Legionella pneumophila serogroup 1 antigens detected. A negative result does not exclude infection with   Legionella pneumophila serogroup 1 nor does it rule out   other microbial-caused respiratory infections or   disease caused by other serogroups of   Legionella pneumophila. Normal Range: Presumptive Negative    Narrative:      ORDER#: [id number]                          ORDERED BY: Paddy Mayes   SOURCE: Urine Clean Catch                  COLLECTED:  09/10/20 22:07   ANTIBIOTICS AT [facility]. :                      RECEIVED :  09/11/20 01:43   Performed at:   [address], [place] [address]   Phone [phone]      Susceptibility     Staphylococcus aureus (1)     Antibiotic  Interpretation  PARTH  Status     clindamycin  Sensitive  <=0.25  mcg/mL      erythromycin  Sensitive  <=0.25  mcg/mL      oxacillin  Sensitive  <=0.25  mcg/mL tetracycline  Sensitive  <=1  mcg/mL      trimethoprim-sulfamethoxazole  Sensitive  <=10  mcg/mL          IMAGING:    XR CHEST PORTABLE   Final Result   Low-lying endotracheal tube. Recommend retracting 1-2 cm. No substantial change in layering effusions and basilar opacities. The findings were sent to the Radiology Results [address] at 3:53   am on 9/14/2020to be communicated to a licensed caregiver. XR CHEST PORTABLE   Preliminary Result   1. Interval placement of an NG tube, likely within the stomach. 2. Stable appropriate positions of endotracheal tube and right-sided dialysis   catheter. 3. No significant change in appearance of small to moderate bilateral pleural   effusions and bibasilar airspace disease. XR CHEST PORTABLE   Final Result   Supportive lines project in stable positions. Bibasilar opacities, suggestive of layered effusions and atelectasis or   airspace disease. XR CHEST PORTABLE   Final Result   1. Suspected mild to moderate bilateral pleural effusions with adjacent   bibasilar atelectasis and/or airspace disease. 2. Mild to moderate cardiomegaly. 3. Recommend retraction of endotracheal tube 0.7 cm. XR CHEST PORTABLE   Final Result   Right jugular venous catheter and endotracheal tube project in normal   positions. No pneumothorax. Bilateral airspace disease and possible layered pleural effusions, consistent   with pulmonary edema and atelectasis. Pneumonia is a differential   possibility. XR CHEST PORTABLE   Final Result   Satisfactory position of the endotracheal tube 3 cm above the rahat. The   heart is upper normal size and vessels are borderline congested.          VL Extremity Venous Bilateral   Final Result      VL DUP LOWER EXTREMITY ARTERIES BILATERAL   Final Result      XR CHEST PORTABLE   Final Result   Endotracheal tube has tip approximately 1.3 cm proximal to the rahat and   should be retracted approximately 2 cm. Findings are again suggestive of pulmonary edema with small pleural   effusions. Pneumonia is not excluded in the appropriate clinical setting. Findings were called by the radiology call center. CT HEAD WO CONTRAST   Final Result   Motion limited study. No convincing evidence for acute intracranial   abnormality identified within the limitations of motion artifact. If there is persistent clinical suspicion for intracranial abnormality,   repeat study should be considered. XR CHEST PORTABLE   Final Result   Cardiomegaly, pulmonary vascular congestion, and bilateral pleural effusions. Endotracheal tube terminates approximately 2 cm above the rahat. CT CHEST PULMONARY EMBOLISM W CONTRAST   Final Result   Addendum 1 of 1   ADDENDUM:   Findings were discussed with Dr. Loyde Cockayne at 10:49 pm on 9/10/2020.          Final      MRI BRAIN WO CONTRAST    (Results Pending)   XR CHEST PORTABLE    (Results Pending)         All the pertinent images and reports for the current Hospitalization were reviewed by me     Scheduled Meds:   ampicillin-sulbactam  3 g Intravenous Q8H    sodium chloride flush  10 mL Intravenous 2 times per day    pantoprazole  40 mg Intravenous Daily    And    sodium chloride (PF)  10 mL Intravenous Daily    chlorhexidine  15 mL Mouth/Throat BID    levetiracetam  1,000 mg Intravenous Q12H    vancomycin (VANCOCIN) intermittent dosing (placeholder)   Other RX Placeholder       Continuous Infusions:   prismaSATE BGK 4/2.5 1,000 mL/hr (09/14/20 0753)    prismaSATE BGK 4/2.5 1,500 mL/hr (09/14/20 0535)    prismaSATE BGK 4/2.5 500 mL/hr (09/14/20 0213)    dextrose      propofol 20 mcg/kg/min (09/14/20 0510)    propofol      heparin (Porcine) 6 mL/hr (09/13/20 1902)       PRN Meds:  potassium chloride, magnesium sulfate, calcium gluconate **OR** calcium gluconate **OR** calcium gluconate **OR** calcium gluconate, sodium phosphate IVPB **OR** sodium phosphate IVPB **OR** sodium phosphate IVPB **OR** sodium phosphate IVPB, sodium chloride flush, acetaminophen **OR** acetaminophen, perflutren lipid microspheres, ondansetron, LORazepam, perflutren lipid microspheres, glucose, dextrose, glucagon (rDNA), dextrose, propofol      Assessment:     Patient Active Problem List   Diagnosis    [facility] Leiden mutation [facility] [address] [facility]    Pulmonary embolus ([address]    Lactic acidosis    CAP (community acquired pneumonia)    Acute CHF (congestive heart failure) ([address]    Bilateral pulmonary embolism (HCC)    Cardiac arrest ([address]    Seizures (HCC)    Acute respiratory failure with hypoxia (HCC)    Acute kidney injury ([address]    Metabolic acidosis    Congestive heart failure (HCC)    Pulmonary hypertension (HCC)    Multifocal pneumonia    Septic shock (HCC)    Elevated LFTs    Thrombocytopenia (HCC)    Coagulopathy (HCC)    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) ([address]    Acute encephalopathy    Pneumonia due to organism    Bacteremia    Pleural effusion, bilateral    Elevated lactic acid level    Bacteremia due to methicillin susceptible Staphylococcus aureus (MSSA)    Pneumonia due to methicillin susceptible Staphylococcus aureus (MSSA) (HCC)    Anemia    Acute renal failure (HCC)    Bilateral pleural effusion    Mitral valve insufficiency    Tricuspid valve insufficiency    Class 2 obesity due to excess calories with body mass index (BMI) of 39.0 to 39.9 in adult     Septic shock   Shock liver  Cardiac arrest   Pulmonary embolism  Encephalopathy   Possible seizure episode  Resp failure now on the ventilator  WBC elevation   MRSA bacteremia  Sputum cx reported as MSSA interestingly and Micro lab doing additional testing  TTE is abnormal with mitral valve calcification and myxomatous changes with Mitral regurgitation  Factor V leiden def  MORELIA on CRRT     She remains critically ill in shock and resp failure from on going bacteremia and PNA and MORELIA    WBC slowly trend down on IV abx and Micro repeat sensitivities and repeat blood cx requested will need LEVY at some point to assess her valves        Labs, Microbiology, Radiology and all the pertinent results from current hospitalization and  care every where were reviewed  by me as a part of the evaluation   Plan:   1. Cont IV Vancomycin  By levels   2. Aim for trough 15 -20  3. Cont IV Unasyn for now concern for aspiration event with Code blue  4. Heparin infusion for PE  5. Blood cx repeat pending   6. Will need LEVY at some point given the abnormal TTE     Discussed with patient/Family and Nursing staff   · Patient is critically ill and has a potentially life threatening infection that poses threat to life/bodily function. · There is potential for worsening infection/ sudden clinically significant or life-threatening deterioration in the patient's condition without appropriate antimicrobial therapy. · Complex medical decision making process was involved to select appropriate antimicrobial agents to reverse the cause of patient's severe infection/ illness. · Antimicrobial therapy requires intensive monitoring for toxicity and frequent dose adjustments to prevent toxicity and permanent end-organ dysfunction      Thanks for allowing me to participate in your patient's care and please call me with any questions or concerns.     Josie Villalta MD  Infectious Disease  [facility] Physician  Phone: [phone]   Fax : [phone]

## 2020-09-14 NOTE — PROGRESS NOTES
Call from [facility] [place] [facility] radiology; Recommend retracting ETT 1-2 cm. This RN related the information to Ken Cramer RCP.      Electronically signed by Lio Rader RN on 9/14/2020 at 4:25 AM

## 2020-09-14 NOTE — PROGRESS NOTES
[facility]  Oncology Hematology Care  Progress Note      SUBJECTIVE:   Pt intubated sedated  NO change in skin exam   OBJECTIVE      Medications    Current Facility-Administered Medications: ampicillin-sulbactam (UNASYN) 3 g ivpb minibag, 3 g, Intravenous, Q8H  prismaSATE BGK 4/2.5 dialysis solution, , Dialysis, Continuous  prismaSATE BGK 4/2.5 dialysis solution, , Dialysis, Continuous  prismaSATE BGK 4/2.5 dialysis solution, , Dialysis, Continuous  potassium chloride 20 mEq/50 mL IVPB (Central Line), 20 mEq, Intravenous, PRN  magnesium sulfate 1 g in dextrose 5% 100 mL IVPB, 1 g, Intravenous, PRN  calcium gluconate 1 g in sodium chloride 50 mL, 1 g, Intravenous, PRN **OR** calcium gluconate 2 g in dextrose 5 % 100 mL IVPB, 2 g, Intravenous, PRN **OR** calcium gluconate 3 g in dextrose 5 % 100 mL IVPB, 3 g, Intravenous, PRN **OR** calcium gluconate 4 g in dextrose 5 % 100 mL IVPB, 4 g, Intravenous, PRN  sodium phosphate 6 mmol in dextrose 5 % 250 mL IVPB, 6 mmol, Intravenous, PRN **OR** sodium phosphate 12 mmol in dextrose 5 % 250 mL IVPB, 12 mmol, Intravenous, PRN **OR** sodium phosphate 18 mmol in dextrose 5 % 500 mL IVPB, 18 mmol, Intravenous, PRN **OR** sodium phosphate 24 mmol in dextrose 5 % 500 mL IVPB, 24 mmol, Intravenous, PRN  sodium chloride flush 0.9 % injection 10 mL, 10 mL, Intravenous, 2 times per day  sodium chloride flush 0.9 % injection 10 mL, 10 mL, Intravenous, PRN  acetaminophen (TYLENOL) tablet 650 mg, 650 mg, Oral, Q6H PRN **OR** acetaminophen (TYLENOL) suppository 650 mg, 650 mg, Rectal, Q6H PRN  perflutren lipid microspheres (DEFINITY) injection 1.65 mg, 1.5 mL, Intravenous, ONCE PRN  ondansetron (ZOFRAN) injection 4 mg, 4 mg, Intravenous, Q6H PRN  LORazepam (ATIVAN) injection 2 mg, 2 mg, Intravenous, Q2H PRN  perflutren lipid microspheres (DEFINITY) injection 1.65 mg, 1.5 mL, Intravenous, ONCE PRN  pantoprazole (PROTONIX) injection 40 mg, 40 mg, Intravenous, Daily **AND** sodium chloride (PF) 0.9 % injection 10 mL, 10 mL, Intravenous, Daily  chlorhexidine (PERIDEX) 0.12 % solution 15 mL, 15 mL, Mouth/Throat, BID  glucose (GLUTOSE) 40 % oral gel 15 g, 15 g, Oral, PRN  dextrose 50 % IV solution, 12.5 g, Intravenous, PRN  glucagon (rDNA) injection 1 mg, 1 mg, Intramuscular, PRN  dextrose 5 % solution, 100 mL/hr, Intravenous, PRN  propofol injection, 10 mcg/kg/min, Intravenous, Titrated  levetiracetam (KEPPRA) 1000 mg/100 mL IVPB, 1,000 mg, Intravenous, Q12H  propofol injection 120 mg, 12 mL, Intravenous, Continuous PRN  vancomycin (VANCOCIN) intermittent dosing (placeholder), , Other, RX Placeholder  heparin 25,000 unit in sodium chloride 0.45% 250 mL infusion, 6 mL/hr, Intravenous, Continuous  Physical    VITALS:  /85   Pulse 80   Temp 97.6 °F (36.4 °C) (Rectal)   Resp 14   Ht 5' 7\" (1.702 m)   Wt 231 lb 7.7 oz (105 kg)   LMP 2018 (Exact Date)   SpO2 98%   BMI 36.26 kg/m²   TEMPERATURE:  Current - Temp: 97.6 °F (36.4 °C); Max - Temp  Av.9 °F (36.6 °C)  Min: 97.4 °F (36.3 °C)  Max: 98.7 °F (37.1 °C)  PULSE OXIMETRY RANGE: SpO2  Av.8 %  Min: 92 %  Max: 100 %  24HR INTAKE/OUTPUT:      Intake/Output Summary (Last 24 hours) at 2020 0709  Last data filed at 2020 0700  Gross per 24 hour   Intake 2835 ml   Output 8070 ml   Net -5235 ml       CONSTITUTIONAL:  Intubated sedated      MUSCULOSKELETAL:  There is no redness, warmth, or swelling of the joints.   EXTREMETIES:skin seems to be less dusky , less blotchy-   ongoin g      Data      Recent Labs     20  0545 20  0540 20  0455   WBC 16.0* 13.3* 12.7*   HGB 9.9* 9.7* 10.1*   HCT 31.3* 30.5* 32.6*   * 108* 94*   MCV 83.8 84.8 85.4        Recent Labs     20  0540  20  1815 20  0015 20  0455      < > 135* 135* 135*   K 4.0   < > 4.2 4.1 4.1      < > 102 102 103   CO2 27   < > 26 27 26   PHOS 2.0*  --  1.9*  --  1.9*   BUN 17   < > 16 13 13   CREATININE 1.0   < > 1.1 0.9 1.0    < > = values in this interval not displayed. Recent Labs     09/12/20  0545 09/13/20  0540 09/14/20  0455   AST 1,963* 1,018* 783*   ALT 1,486* 1,209* 1,082*   BILIDIR  --  1.0* 1.2*   BILITOT 1.3* 1.3* 1.7*   ALKPHOS 108 98 104       Magnesium:    Lab Results   Component Value Date    MG 2.30 09/14/2020    MG 2.30 09/13/2020    MG 2.30 09/13/2020       Imaging Ct Head Wo Contrast    Result Date: 9/11/2020  EXAMINATION: CT OF THE HEAD WITHOUT CONTRAST  9/11/2020 3:33 am TECHNIQUE: CT of the head was performed without the administration of intravenous contrast. Dose modulation, iterative reconstruction, and/or weight based adjustment of the mA/kV was utilized to reduce the radiation dose to as low as reasonably achievable. COMPARISON: None. HISTORY: ORDERING SYSTEM PROVIDED HISTORY: post cardiac arrest- once pt stable TECHNOLOGIST PROVIDED HISTORY: Reason for exam:->post cardiac arrest- once pt stable Has a \"code stroke\" or \"stroke alert\" been called? ->No Is the patient pregnant?->No Reason for Exam: post cardiac arrest Acuity: Unknown Type of Exam: Ongoing FINDINGS: Motion limited study, despite repeat acquisition. BRAIN/VENTRICLES: Within the limitations of motion artifact, there is no convincing evidence for acute intracranial pathology. Gray/white matter differentiation is grossly preserved. There is remote left thalamic lacunar infarction. No definite intracranial hemorrhage, mass effect, or midline shift. ORBITS: The visualized portion of the orbits demonstrate no acute abnormality. SINUSES: The visualized paranasal sinuses and mastoid air cells demonstrate no acute abnormality. SOFT TISSUES/SKULL:  No acute abnormality of the visualized skull or soft tissues. Motion limited study. No convincing evidence for acute intracranial abnormality identified within the limitations of motion artifact.  If there is persistent clinical suspicion for intracranial abnormality, repeat study should be considered. Xr Chest Portable    Result Date: 9/12/2020  EXAMINATION: ONE XRAY VIEW OF THE CHEST 9/12/2020 2:11 am COMPARISON: None. HISTORY: ORDERING SYSTEM PROVIDED HISTORY: respiratory failure TECHNOLOGIST PROVIDED HISTORY: Reason for exam:->respiratory failure Reason for Exam: respiratory failure Acuity: Acute Relevant Medical/Surgical History: hx asthma FINDINGS: Endotracheal tube is noted in place with the distal tip located 3.8 cm superior to the rahat. The heart is mildly to moderately enlarged with otherwise unremarkable configuration. The mediastinal contours are within normal limits. Suspected mild to moderate bilateral pleural effusions are seen with adjacent bibasilar atelectasis and/or airspace disease. . Bones and soft tissues are unremarkable. 1. Suspected mild to moderate bilateral pleural effusions with adjacent bibasilar atelectasis and/or airspace disease. 2. Mild to moderate cardiomegaly. 3. Recommend retraction of endotracheal tube 0.7 cm. Xr Chest Portable    Result Date: 9/11/2020  EXAMINATION: ONE XRAY VIEW OF THE CHEST 9/11/2020 5:35 pm COMPARISON: 09/11/2020 HISTORY: ORDERING SYSTEM PROVIDED HISTORY: verify vas cath placement TECHNOLOGIST PROVIDED HISTORY: Reason for exam:->verify vas cath placement Reason for Exam: line placement Acuity: Acute Type of Exam: Initial FINDINGS: A right jugular triple-lumen catheter terminates over the superior aspect of the right atrium. Endotracheal tube terminates 2 cm cephalad to the rahat. The cardiac silhouette remains prominent. There are hazy opacities in the lung bases, greater on the right. Right jugular venous catheter and endotracheal tube project in normal positions. No pneumothorax. Bilateral airspace disease and possible layered pleural effusions, consistent with pulmonary edema and atelectasis. Pneumonia is a differential possibility.      Xr Chest Portable    Result Date: 9/11/2020  EXAMINATION: ONE XRAY VIEW OF THE CHEST 9/11/2020 9:46 am COMPARISON: Prior study(s) most recent 1 hour earlier. HISTORY: ORDERING SYSTEM PROVIDED HISTORY: Check ETT placement TECHNOLOGIST PROVIDED HISTORY: Reason for exam:->Check ETT placement Reason for Exam: ETT position. pulled back Acuity: Acute Type of Exam: Initial FINDINGS: The endotracheal tube is in satisfactory position approximately 3 cm above the rahat. The heart is upper normal as are pulmonary vessels. This is accentuated with portable technique and low lung volume. The left lung base is poorly seen with portable technique and overlying monitor or defibrillator device. Right lung is clear. Satisfactory position of the endotracheal tube 3 cm above the rahat. The heart is upper normal size and vessels are borderline congested. Xr Chest Portable    Result Date: 9/11/2020  EXAMINATION: ONE XRAY VIEW OF THE CHEST 9/11/2020 7:42 am COMPARISON: 09/11/2020 at 02:24 HISTORY: ORDERING SYSTEM PROVIDED HISTORY: post intubation/ follow up TECHNOLOGIST PROVIDED HISTORY: Reason for exam:->post intubation/ follow up Reason for Exam: worsening sob, fighting ventilation Acuity: Acute Type of Exam: Initial FINDINGS: Tip of endotracheal tube is approximately 1.3 cm proximal to the rahat. Cardiac leads project over the chest.  Pad is seen projecting over the left chest.  Diffuse bilateral heterogeneous pulmonary opacity is again seen. Costophrenic angles are not well visualized. No gross pneumothorax. Cardiac and mediastinal silhouettes are similar to prior. Endotracheal tube has tip approximately 1.3 cm proximal to the rahat and should be retracted approximately 2 cm. Findings are again suggestive of pulmonary edema with small pleural effusions. Pneumonia is not excluded in the appropriate clinical setting. Findings were called by the radiology call center.      Xr Chest Portable    Result Date: 9/11/2020  EXAMINATION: ONE XRAY VIEW OF THE CHEST 9/11/2020 2:06 am COMPARISON: CT 4 hours prior HISTORY: ORDERING SYSTEM PROVIDED HISTORY: Et tube placement TECHNOLOGIST PROVIDED HISTORY: Reason for exam:->Et tube placement Reason for Exam: Et tube placement Acuity: Acute Type of Exam: Initial FINDINGS: Endotracheal tube terminates approximately 2 cm above the rahat. Cardiomegaly, pulmonary vascular congestion, and moderate bilateral effusions remain unchanged. Osseous structures and soft tissues are grossly intact. Cardiomegaly, pulmonary vascular congestion, and bilateral pleural effusions. Endotracheal tube terminates approximately 2 cm above the rahat. Ct Chest Pulmonary Embolism W Contrast    Addendum Date: 9/10/2020    ADDENDUM: Findings were discussed with Dr. Cali Del Real at 10:49 pm on 9/10/2020. Result Date: 9/10/2020  EXAMINATION: CTA OF THE CHEST 9/10/2020 9:36 pm TECHNIQUE: CTA of the chest was performed after the administration of intravenous contrast.  Multiplanar reformatted images are provided for review. MIP images are provided for review. Dose modulation, iterative reconstruction, and/or weight based adjustment of the mA/kV was utilized to reduce the radiation dose to as low as reasonably achievable. COMPARISON: None. HISTORY: ORDERING SYSTEM PROVIDED HISTORY: PATRICE alexandre TECHNOLOGIST PROVIDED HISTORY: Reason for exam:->tachy, SOB Reason for Exam: tachy, SOB Acuity: Acute Type of Exam: Initial FINDINGS: Pulmonary Arteries: Pulmonary arteries are adequately opacified for evaluation. However, evaluation is limited due to respiratory motion artifact. There is suspected subsegmental filling defect within the right middle lobe and left lower lobe. Mediastinum: The heart is enlarged without pericardial fluid collection. No definite right heart strain. Large bilateral pleural effusions. Interlobular septal thickening. Multifocal ground-glass opacities in the left lower lobe noted. No evidence for pneumothorax. Reflux of contrast into the IVC and hepatic veins.  Lungs/pleura: The lungs are without acute process. No focal consolidation or pulmonary edema. No evidence of pleural effusion or pneumothorax. Upper Abdomen: Limited images of the upper abdomen are unremarkable. Soft Tissues/Bones: No acute bone or soft tissue abnormality. Evaluation is somewhat limited due to respiratory motion artifact. There are suspected pulmonary arterial filling defects within subsegmental branches to the right middle lobe and left lower lobe. No right heart strain. Large bilateral pleural effusions and reflux of contrast into the IVC/hepatic veins. Findings most compatible with volume overload/CHF. Patchy airspace disease in the left lower lobe adjacent to the large effusion. Differential considerations include atelectasis, infectious process or, less likely, infarction. Vl Dup Lower Extremity Arteries Bilateral    Result Date: 9/11/2020  Lower Extremities Arterial Duplex  Demographics   Patient Name       Lexy Garsia   Date of Study      09/11/2020       Gender               Female   Patient Number     [id number]       Date of Birth        1970   Visit Number       [id number]        Age                  53 year(s)   Accession Number   [id number]       Room Number          2104   Corporate ID       [id number]          Sonographer          Nikki Shields RVT   Ordering Physician Rosana Hutton MD  Interpreting         [facility]                                      Physician            Yana Hoffman MD  Procedure Type of Study:   Extremities Arteries:Lower Extremities Arterial Duplex, VL LOWER EXTREMITY  ARTERIES DUPLEX BILATERAL. Vascular Sonographer Report  Additional Indications:Possible thrombus Impressions Right Impression Limited study. Patient was seizing during the scan and has an arterial line in the right groin that was actively bleeding at the time of the scan. The common femoral, profunda femoral and peroneal arteries could not be visualized.  The majority of the waveforms are triphasic throughout the right lower extremity. There is no evidence of significant stenosis or thrombus in the visualized vessels. Left Impression Limited study. Patient was seizing during the scan. The common femoral, , mid to distal femoral, profunda femoral and peroneal arteries could not be visualized. The majority of the waveforms are triphasic throughout the left lower extremity. There is no evidence of significant stenosis or thrombus in the visualized vessels. Conclusions   Summary   Limited study. Patient was seizing during the scan. The right common femoral, profunda femoral and peroneal arteries could not  be visualized. The left common femoral, mid to distal superficial femoral,  profunda femoral and peroneal arteries could not be visualized. There is no evidence of significant stenosis or thrombus in the visualized  vessels. Signature   ------------------------------------------------------------------  Electronically signed by Marifer Stokes MD (Interpreting  physician) on 09/11/2020 at 12:10 PM  ------------------------------------------------------------------  Patient Status:Routine. Study [address] V[facility] Lab. Technical Quality:Limited visualization. Risk Factors History +---------+----+---------------------------------+ ! Diagnosis! Date! Comments                         ! +---------+----+---------------------------------+ ! Other    ! ! Factor V Leiden Clotting Disorder! +---------+----+---------------------------------+ ! Other    ! !DVT/SVT in left leg in 1999      ! +---------+----+---------------------------------+ Velocities are measured in cm/s ; Diameters are measured in mm LE Duplex Measurements +--------------++-----+-----+----------++----+-----+----------+ ! ! !Right! ! Left      !!    !     !          ! +--------------++-----+-----+----------++----+-----+----------+ ! Location      ! !PSV  ! Ratio! Wave Desc. !!PSV ! Ratio! Wave Desc.! visualized vessels in the right leg. Left Impression Limited study. Patient was seizing during the scan. No evidence of deep vein or superficial vein thrombosis in the visualized vessels in the left leg. Conclusions   Summary   Limited study. Patient was seizing during the scan. No evidence of deep vein or superficial vein thrombosis in the visualized  vessels in bilateral legs. Signature   ------------------------------------------------------------------  Electronically signed by Marifer Stokes MD (Interpreting  physician) on 09/11/2020 at 05:31 PM  ------------------------------------------------------------------  Patient Status:Routine. Study [address] Vascular Lab. Technical Quality:Limited visualization. Risk Factors History +---------+----+---------------------------------+ ! Diagnosis! Date! Comments                         ! +---------+----+---------------------------------+ ! Other    ! ! Factor V Leiden Clotting Disorder! +---------+----+---------------------------------+ ! Other    ! !DVT/SVT in left leg in 1999      ! +---------+----+---------------------------------+ Velocities are measured in cm/s ; Diameters are measured in mm Right Lower Extremities DVT Study Measurements Right 2D Measurements +--------------+----------+---------------+----------+ ! Location      ! Visualized! Compressibility! Thrombosis! +--------------+----------+---------------+----------+ ! Prox Femoral  !Yes       ! Yes            ! None      ! +--------------+----------+---------------+----------+ ! Mid Femoral   !Yes       ! Yes            ! None      ! +--------------+----------+---------------+----------+ ! Dist Femoral  !Yes       ! Yes            ! None      ! +--------------+----------+---------------+----------+ ! Popliteal     !Yes       ! Yes            ! None      ! +--------------+----------+---------------+----------+ ! GSV Below Knee! Yes       ! Yes            ! None      ! +--------------+----------+---------------+----------+ ! Soleal        !Yes       ! Yes            ! None      ! +--------------+----------+---------------+----------+ ! PTV           ! Partial   !Yes            ! None      ! +--------------+----------+---------------+----------+ ! ATV           ! Yes       ! Yes            ! None      ! +--------------+----------+---------------+----------+ ! Peroneal      !Partial   !Yes            ! None      ! +--------------+----------+---------------+----------+ ! GSV Calf      ! Yes       ! Yes            ! None      ! +--------------+----------+---------------+----------+ Right Doppler Measurements +---------+------+------+------------+ ! Location ! Signal!Reflux! Reflux (sec)! +---------+------+------+------------+ ! Popliteal!Phasic!      !            ! +---------+------+------+------------+ Left Lower Extremities DVT Study Measurements Left 2D Measurements +---------+----------+---------------+----------+ ! Location ! Visualized! Compressibility! Thrombosis! +---------+----------+---------------+----------+ ! Popliteal!Yes       ! Yes            ! None      ! +---------+----------+---------------+----------+ ! Gastroc  ! Yes       ! Yes            ! None      ! +---------+----------+---------------+----------+ ! Soleal   !Yes       ! Yes            ! None      ! +---------+----------+---------------+----------+ ! PTV      ! Partial   !Yes            ! None      ! +---------+----------+---------------+----------+ ! ATV      ! Yes       ! Yes            ! None      ! +---------+----------+---------------+----------+ ! Peroneal !Partial   !Yes            ! None      ! +---------+----------+---------------+----------+ ! GSV Calf ! Yes       ! Yes            ! None      ! +---------+----------+---------------+----------+ ! SSV      ! Partial   !Yes            ! None      ! +---------+----------+---------------+----------+ Left Doppler Measurements +---------+------+------+------------+ ! Location ! Signal!Reflux! Reflux (sec)!

## 2020-09-14 NOTE — PROGRESS NOTES
4 Eyes Skin Assessment     The patient is being assess for  Shift Handoff    I agree that 2 RN's have performed a thorough Head to Toe Skin Assessment on the patient. ALL assessment sites listed below have been assessed. Areas assessed by both nurses: Nathalie/Estella  [x]   Head, Face, and Ears   [x]   Shoulders, Back, and Chest  [x]   Arms, Elbows, and Hands   [x]   Coccyx, Sacrum, and IschIum  [x]   Legs, Feet, and Heels        Does the Patient have Skin Breakdown?   No         Cash Prevention initiated:  Yes   Wound Care Orders initiated:  NA      [facility] nurse consulted for Pressure Injury (Stage 3,4, Unstageable, DTI, NWPT, and Complex wounds), New and Established Ostomies:  NA      Nurse 1 eSignature: Electronically signed by Jackson Peralta RN on 9/14/20 at 4:16 PM EDT    **SHARE this note so that the co-signing nurse is able to place an eSignature**    Nurse 2 eSignature: Electronically signed by Richy Hensley RN on 9/14/20 at 7:37 PM EDT

## 2020-09-15 ENCOUNTER — APPOINTMENT (OUTPATIENT)
Dept: GENERAL RADIOLOGY | Age: 50
DRG: 130 | End: 2020-09-15
Payer: COMMERCIAL

## 2020-09-15 ENCOUNTER — APPOINTMENT (OUTPATIENT)
Dept: MRI IMAGING | Age: 50
DRG: 130 | End: 2020-09-15
Payer: COMMERCIAL

## 2020-09-15 LAB
ALBUMIN SERPL-MCNC: 2.9 G/DL (ref 3.4–5)
ALP BLD-CCNC: 143 U/L (ref 40–129)
ALT SERPL-CCNC: 1037 U/L (ref 10–40)
ANCA IFA: NORMAL
ANION GAP SERPL CALCULATED.3IONS-SCNC: 7 MMOL/L (ref 3–16)
ANION GAP SERPL CALCULATED.3IONS-SCNC: 8 MMOL/L (ref 3–16)
APTT: 62.1 SEC (ref 24.2–36.2)
AST SERPL-CCNC: 494 U/L (ref 15–37)
BASE EXCESS ARTERIAL: 1.9 MMOL/L (ref -3–3)
BASOPHILS ABSOLUTE: 0.2 K/UL (ref 0–0.2)
BASOPHILS RELATIVE PERCENT: 1.2 %
BILIRUB SERPL-MCNC: 1.4 MG/DL (ref 0–1)
BILIRUBIN DIRECT: 1.1 MG/DL (ref 0–0.3)
BILIRUBIN, INDIRECT: 0.3 MG/DL (ref 0–1)
BLOOD CULTURE, ROUTINE: NORMAL
BUN BLDV-MCNC: 18 MG/DL (ref 7–20)
BUN BLDV-MCNC: 20 MG/DL (ref 7–20)
CALCIUM IONIZED: 1.11 MMOL/L (ref 1.12–1.32)
CALCIUM IONIZED: 1.14 MMOL/L (ref 1.12–1.32)
CALCIUM SERPL-MCNC: 8.2 MG/DL (ref 8.3–10.6)
CALCIUM SERPL-MCNC: 8.3 MG/DL (ref 8.3–10.6)
CARBOXYHEMOGLOBIN ARTERIAL: 1.5 % (ref 0–1.5)
CHLORIDE BLD-SCNC: 103 MMOL/L (ref 99–110)
CHLORIDE BLD-SCNC: 103 MMOL/L (ref 99–110)
CO2: 24 MMOL/L (ref 21–32)
CO2: 24 MMOL/L (ref 21–32)
CREAT SERPL-MCNC: 1 MG/DL (ref 0.6–1.1)
CREAT SERPL-MCNC: 1.1 MG/DL (ref 0.6–1.1)
EOSINOPHILS ABSOLUTE: 0.1 K/UL (ref 0–0.6)
EOSINOPHILS RELATIVE PERCENT: 0.9 %
FIBRINOGEN: 404 MG/DL (ref 200–397)
FIBRINOGEN: 467 MG/DL (ref 200–397)
GFR AFRICAN AMERICAN: >60
GFR AFRICAN AMERICAN: >60
GFR NON-AFRICAN AMERICAN: 53
GFR NON-AFRICAN AMERICAN: 59
GLUCOSE BLD-MCNC: 109 MG/DL (ref 70–99)
GLUCOSE BLD-MCNC: 112 MG/DL (ref 70–99)
GLUCOSE BLD-MCNC: 116 MG/DL (ref 70–99)
GLUCOSE BLD-MCNC: 118 MG/DL (ref 70–99)
GLUCOSE BLD-MCNC: 85 MG/DL (ref 70–99)
GLUCOSE BLD-MCNC: 86 MG/DL (ref 70–99)
HAPTOGLOBIN: 15 MG/DL (ref 30–200)
HCO3 ARTERIAL: 25.9 MMOL/L (ref 21–29)
HCT VFR BLD CALC: 38.4 % (ref 36–48)
HEMOGLOBIN, ART, EXTENDED: 12.1 G/DL (ref 12–16)
HEMOGLOBIN: 12 G/DL (ref 12–16)
INR BLD: 1.1 (ref 0.86–1.14)
INR BLD: 1.15 (ref 0.86–1.14)
INR BLD: 1.17 (ref 0.86–1.14)
INR BLD: 1.22 (ref 0.86–1.14)
LYMPHOCYTES ABSOLUTE: 1 K/UL (ref 1–5.1)
LYMPHOCYTES RELATIVE PERCENT: 6.4 %
MAGNESIUM: 2.5 MG/DL (ref 1.8–2.4)
MCH RBC QN AUTO: 26.3 PG (ref 26–34)
MCHC RBC AUTO-ENTMCNC: 31.2 G/DL (ref 31–36)
MCV RBC AUTO: 84.3 FL (ref 80–100)
METHEMOGLOBIN ARTERIAL: 0.6 %
MONOCYTES ABSOLUTE: 1 K/UL (ref 0–1.3)
MONOCYTES RELATIVE PERCENT: 6.7 %
NEUTROPHILS ABSOLUTE: 13.2 K/UL (ref 1.7–7.7)
NEUTROPHILS RELATIVE PERCENT: 84.8 %
O2 CONTENT ARTERIAL: 16 ML/DL
O2 SAT, ARTERIAL: 96.8 %
O2 THERAPY: NORMAL
PCO2 ARTERIAL: 37.6 MMHG (ref 35–45)
PDW BLD-RTO: 18.9 % (ref 12.4–15.4)
PERFORMED ON: ABNORMAL
PERFORMED ON: ABNORMAL
PERFORMED ON: NORMAL
PERFORMED ON: NORMAL
PH ARTERIAL: 7.45 (ref 7.35–7.45)
PH VENOUS: 7.44 (ref 7.35–7.45)
PH VENOUS: 7.45 (ref 7.35–7.45)
PHOSPHORUS: 2 MG/DL (ref 2.5–4.9)
PHOSPHORUS: 2 MG/DL (ref 2.5–4.9)
PLATELET # BLD: 110 K/UL (ref 135–450)
PMV BLD AUTO: 9.7 FL (ref 5–10.5)
PO2 ARTERIAL: 78.6 MMHG (ref 75–108)
POTASSIUM SERPL-SCNC: 4.1 MMOL/L (ref 3.5–5.1)
POTASSIUM SERPL-SCNC: 4.1 MMOL/L (ref 3.5–5.1)
PROTHROMBIN TIME: 12.8 SEC (ref 10–13.2)
PROTHROMBIN TIME: 13.4 SEC (ref 10–13.2)
PROTHROMBIN TIME: 13.6 SEC (ref 10–13.2)
PROTHROMBIN TIME: 14.2 SEC (ref 10–13.2)
RBC # BLD: 4.56 M/UL (ref 4–5.2)
SODIUM BLD-SCNC: 134 MMOL/L (ref 136–145)
SODIUM BLD-SCNC: 135 MMOL/L (ref 136–145)
TCO2 ARTERIAL: 27.1 MMOL/L
TOTAL PROTEIN: 6 G/DL (ref 6.4–8.2)
VANCOMYCIN RANDOM: 15.4 UG/ML
WBC # BLD: 15.6 K/UL (ref 4–11)

## 2020-09-15 PROCEDURE — 2500000003 HC RX 250 WO HCPCS: Performed by: INTERNAL MEDICINE

## 2020-09-15 PROCEDURE — 85610 PROTHROMBIN TIME: CPT

## 2020-09-15 PROCEDURE — 80048 BASIC METABOLIC PNL TOTAL CA: CPT

## 2020-09-15 PROCEDURE — 83010 ASSAY OF HAPTOGLOBIN QUANT: CPT

## 2020-09-15 PROCEDURE — 82803 BLOOD GASES ANY COMBINATION: CPT

## 2020-09-15 PROCEDURE — 80076 HEPATIC FUNCTION PANEL: CPT

## 2020-09-15 PROCEDURE — 71045 X-RAY EXAM CHEST 1 VIEW: CPT

## 2020-09-15 PROCEDURE — 84100 ASSAY OF PHOSPHORUS: CPT

## 2020-09-15 PROCEDURE — 2580000003 HC RX 258: Performed by: INTERNAL MEDICINE

## 2020-09-15 PROCEDURE — 94761 N-INVAS EAR/PLS OXIMETRY MLT: CPT

## 2020-09-15 PROCEDURE — 83735 ASSAY OF MAGNESIUM: CPT

## 2020-09-15 PROCEDURE — 85025 COMPLETE CBC W/AUTO DIFF WBC: CPT

## 2020-09-15 PROCEDURE — 70551 MRI BRAIN STEM W/O DYE: CPT

## 2020-09-15 PROCEDURE — 6360000002 HC RX W HCPCS: Performed by: INTERNAL MEDICINE

## 2020-09-15 PROCEDURE — 2580000003 HC RX 258: Performed by: STUDENT IN AN ORGANIZED HEALTH CARE EDUCATION/TRAINING PROGRAM

## 2020-09-15 PROCEDURE — APPNB15 APP NON BILLABLE TIME 0-15 MINS: Performed by: NURSE PRACTITIONER

## 2020-09-15 PROCEDURE — 6360000002 HC RX W HCPCS: Performed by: NURSE PRACTITIONER

## 2020-09-15 PROCEDURE — 99291 CRITICAL CARE FIRST HOUR: CPT | Performed by: INTERNAL MEDICINE

## 2020-09-15 PROCEDURE — 99232 SBSQ HOSP IP/OBS MODERATE 35: CPT | Performed by: INTERNAL MEDICINE

## 2020-09-15 PROCEDURE — 85384 FIBRINOGEN ACTIVITY: CPT

## 2020-09-15 PROCEDURE — 94003 VENT MGMT INPAT SUBQ DAY: CPT

## 2020-09-15 PROCEDURE — 99233 SBSQ HOSP IP/OBS HIGH 50: CPT | Performed by: INTERNAL MEDICINE

## 2020-09-15 PROCEDURE — 2000000000 HC ICU R&B

## 2020-09-15 PROCEDURE — 2700000000 HC OXYGEN THERAPY PER DAY

## 2020-09-15 PROCEDURE — 80202 ASSAY OF VANCOMYCIN: CPT

## 2020-09-15 PROCEDURE — 2500000003 HC RX 250 WO HCPCS

## 2020-09-15 PROCEDURE — 6360000002 HC RX W HCPCS

## 2020-09-15 PROCEDURE — 82330 ASSAY OF CALCIUM: CPT

## 2020-09-15 PROCEDURE — 85730 THROMBOPLASTIN TIME PARTIAL: CPT

## 2020-09-15 PROCEDURE — C9113 INJ PANTOPRAZOLE SODIUM, VIA: HCPCS | Performed by: INTERNAL MEDICINE

## 2020-09-15 PROCEDURE — 36415 COLL VENOUS BLD VENIPUNCTURE: CPT

## 2020-09-15 PROCEDURE — 90945 DIALYSIS ONE EVALUATION: CPT

## 2020-09-15 PROCEDURE — 94750 HC PULMONARY COMPLIANCE STUDY: CPT

## 2020-09-15 RX ORDER — FENTANYL CITRATE 50 UG/ML
50 INJECTION, SOLUTION INTRAMUSCULAR; INTRAVENOUS
Status: DISCONTINUED | OUTPATIENT
Start: 2020-09-15 | End: 2020-09-26

## 2020-09-15 RX ORDER — LORAZEPAM 2 MG/ML
INJECTION INTRAMUSCULAR
Status: COMPLETED
Start: 2020-09-15 | End: 2020-09-15

## 2020-09-15 RX ORDER — LORAZEPAM 2 MG/ML
2 INJECTION INTRAMUSCULAR
Status: DISCONTINUED | OUTPATIENT
Start: 2020-09-15 | End: 2020-09-22

## 2020-09-15 RX ADMIN — VANCOMYCIN HYDROCHLORIDE 1250 MG: 10 INJECTION, POWDER, LYOPHILIZED, FOR SOLUTION INTRAVENOUS at 13:08

## 2020-09-15 RX ADMIN — Medication 1500 ML/HR: at 01:37

## 2020-09-15 RX ADMIN — SODIUM CHLORIDE, PRESERVATIVE FREE 10 ML: 5 INJECTION INTRAVENOUS at 07:56

## 2020-09-15 RX ADMIN — LORAZEPAM 2 MG: 2 INJECTION INTRAMUSCULAR; INTRAVENOUS at 10:09

## 2020-09-15 RX ADMIN — LORAZEPAM 2 MG: 2 INJECTION INTRAMUSCULAR; INTRAVENOUS at 16:34

## 2020-09-15 RX ADMIN — Medication 500 ML/HR: at 08:56

## 2020-09-15 RX ADMIN — CHLORHEXIDINE GLUCONATE 15 ML: 0.12 RINSE ORAL at 20:25

## 2020-09-15 RX ADMIN — SODIUM PHOSPHATE, MONOBASIC, MONOHYDRATE 6 MMOL: 276; 142 INJECTION, SOLUTION INTRAVENOUS at 02:53

## 2020-09-15 RX ADMIN — Medication 10 ML: at 07:56

## 2020-09-15 RX ADMIN — PROPOFOL 25 MCG/KG/MIN: 10 INJECTION, EMULSION INTRAVENOUS at 02:57

## 2020-09-15 RX ADMIN — SODIUM PHOSPHATE, MONOBASIC, MONOHYDRATE 6 MMOL: 276; 142 INJECTION, SOLUTION INTRAVENOUS at 08:06

## 2020-09-15 RX ADMIN — AMPICILLIN SODIUM AND SULBACTAM SODIUM 3 G: 2; 1 INJECTION, POWDER, FOR SOLUTION INTRAMUSCULAR; INTRAVENOUS at 04:05

## 2020-09-15 RX ADMIN — FENTANYL CITRATE 50 MCG: 50 INJECTION, SOLUTION INTRAMUSCULAR; INTRAVENOUS at 10:06

## 2020-09-15 RX ADMIN — SODIUM CHLORIDE, PRESERVATIVE FREE 10 ML: 5 INJECTION INTRAVENOUS at 20:25

## 2020-09-15 RX ADMIN — LEVETIRACETAM 1000 MG: 10 INJECTION INTRAVENOUS at 11:24

## 2020-09-15 RX ADMIN — CHLORHEXIDINE GLUCONATE 15 ML: 0.12 RINSE ORAL at 07:55

## 2020-09-15 RX ADMIN — PANTOPRAZOLE SODIUM 40 MG: 40 INJECTION, POWDER, FOR SOLUTION INTRAVENOUS at 07:56

## 2020-09-15 RX ADMIN — LORAZEPAM 2 MG: 2 INJECTION INTRAMUSCULAR; INTRAVENOUS at 20:30

## 2020-09-15 RX ADMIN — LORAZEPAM 2 MG: 2 INJECTION INTRAMUSCULAR; INTRAVENOUS at 13:45

## 2020-09-15 RX ADMIN — CALCIUM GLUCONATE 1 G: 20 INJECTION, SOLUTION INTRAVENOUS at 08:15

## 2020-09-15 RX ADMIN — Medication 1500 ML/HR: at 05:00

## 2020-09-15 RX ADMIN — LEVETIRACETAM 1000 MG: 10 INJECTION INTRAVENOUS at 23:12

## 2020-09-15 RX ADMIN — FENTANYL CITRATE 50 MCG: 50 INJECTION, SOLUTION INTRAMUSCULAR; INTRAVENOUS at 13:44

## 2020-09-15 RX ADMIN — PROPOFOL 20 MCG/KG/MIN: 10 INJECTION, EMULSION INTRAVENOUS at 08:15

## 2020-09-15 RX ADMIN — AMPICILLIN SODIUM AND SULBACTAM SODIUM 1.5 G: 1; .5 INJECTION, POWDER, FOR SOLUTION INTRAMUSCULAR; INTRAVENOUS at 11:38

## 2020-09-15 RX ADMIN — Medication 1000 ML/HR: at 04:30

## 2020-09-15 RX ADMIN — HEPARIN SODIUM 7.3 ML/HR: 10000 INJECTION, SOLUTION INTRAVENOUS at 22:10

## 2020-09-15 RX ADMIN — Medication 1500 ML/HR: at 08:23

## 2020-09-15 ASSESSMENT — PULMONARY FUNCTION TESTS
PIF_VALUE: 21
PIF_VALUE: 19
PIF_VALUE: 22
PIF_VALUE: 21
PIF_VALUE: 24
PIF_VALUE: 21
PIF_VALUE: 20
PIF_VALUE: 19
PIF_VALUE: 18
PIF_VALUE: 20
PIF_VALUE: 19
PIF_VALUE: 23
PIF_VALUE: 20
PIF_VALUE: 16
PIF_VALUE: 20
PIF_VALUE: 16
PIF_VALUE: 25
PIF_VALUE: 21
PIF_VALUE: 20
PIF_VALUE: 19
PIF_VALUE: 17
PIF_VALUE: 21
PIF_VALUE: 21
PIF_VALUE: 20
PIF_VALUE: 15
PIF_VALUE: 20
PIF_VALUE: 21
PIF_VALUE: 21
PIF_VALUE: 20
PIF_VALUE: 21
PIF_VALUE: 20
PIF_VALUE: 21
PIF_VALUE: 15

## 2020-09-15 ASSESSMENT — PAIN SCALES - GENERAL
PAINLEVEL_OUTOF10: 0

## 2020-09-15 NOTE — PROGRESS NOTES
Results:  09/10/20 Blood: MRSA   09/11/20 Respiratory: MSSA  09/12/20 Blood: No growth to date   09/13/20 Blood: No growth to date  09/14/20 Blood: No growth to date    Ht Readings from Last 1 Encounters:   09/14/20 5' 7\" (1.702 m)        Wt Readings from Last 1 Encounters:   09/15/20 219 lb 9.3 oz (99.6 kg)       Assessment:  Day # 5 of vancomycin. Current Regimen: Intermittent dosing based on levels due to MORELIA. Random level with AM labs: 15.4 mg/L. CRRT stopped at 0945. Nephrology will evaluate for HD tomorrow. Plan:  Vancomycin 1250 mg IV x 1 today. Random vancomycin level in AM.    Thank you for the consult.      Hiren Castro, PharmD, [place]

## 2020-09-15 NOTE — PROGRESS NOTES
Clinical Pharmacy Note  Heparin Dosing       Lab Results   Component Value Date    APTT 66.4 09/14/2020     Lab Results   Component Value Date    HGB 10.1 09/14/2020    HCT 32.6 09/14/2020    PLT 94 09/14/2020    INR 1.26 09/14/2020       Current Infusion Rate: 7.3 mL/hr    Plan:  Rate: continue at 7.3 mL/hr  Next aPTT: 0500 9/15/20    Pharmacy will continue to monitor and adjust based on aPTT results.   Fernando Jordan, JeromeD

## 2020-09-15 NOTE — PROGRESS NOTES
Pulmonary Progress Note    CC:  Follow up cardiac arrest, Pulmonary emboli, Shock    Subjective:  Remains on the ventilator with FIO2 at 30% PEEP of 5  Slightly more awake, not following commands       Intake/Output Summary (Last 24 hours) at 9/15/2020 0957  Last data filed at 9/15/2020 0906  Gross per 24 hour   Intake 3020.7 ml   Output 7991 ml   Net -4970.3 ml         PHYSICAL EXAM:  Blood pressure (!) 153/77, pulse 97, temperature 98 °F (36.7 °C), temperature source Axillary, resp. rate 20, height 5' 7\" (1.702 m), weight 219 lb 9.3 oz (99.6 kg), last menstrual period 12/05/2018, SpO2 98 %.'  Gen: No distress. Lethargic, moves head to her name  Eyes: PERRL. No sclera icterus. No conjunctival injection. ENT: No discharge. Pharynx with ETT and NG  Neck: Trachea midline. No obvious mass. Resp: Clearer lungs, less secretions, strong cough  CV: Regular rate. Regular rhythm. No murmur or rub. GI: Non-tender. Non-distended. No hernia. Skin: Warm, dry, normal texture and turgor. No nodule on exposed extremities. Lymph: No cervical LAD. No supraclavicular LAD. M/S: No cyanosis. No clubbing. No joint deformity. Neuro:  Moves head to verbal, grimaces to noxious stimuli   Ext:   ++ edema    Medications:    Scheduled Meds:   ampicillin-sulbactam  3 g Intravenous Q8H    sodium chloride flush  10 mL Intravenous 2 times per day    pantoprazole  40 mg Intravenous Daily    And    sodium chloride (PF)  10 mL Intravenous Daily    chlorhexidine  15 mL Mouth/Throat BID    levetiracetam  1,000 mg Intravenous Q12H    vancomycin (VANCOCIN) intermittent dosing (placeholder)   Other RX Placeholder       Continuous Infusions:   prismaSATE BGK 4/2.5 1,000 mL/hr (09/15/20 0430)    prismaSATE BGK 4/2.5 1,500 mL/hr (09/15/20 0823)    prismaSATE BGK 4/2.5 500 mL/hr (09/15/20 0856)    dextrose      propofol 10 mcg/kg/min (09/15/20 0946)    propofol      heparin (Porcine) 7.3 mL/hr (09/14/20 6959)       PRN Meds:  potassium chloride, magnesium sulfate, calcium gluconate **OR** calcium gluconate **OR** calcium gluconate **OR** calcium gluconate, sodium phosphate IVPB **OR** sodium phosphate IVPB **OR** sodium phosphate IVPB **OR** sodium phosphate IVPB, sodium chloride flush, acetaminophen **OR** acetaminophen, perflutren lipid microspheres, ondansetron, LORazepam, perflutren lipid microspheres, glucose, dextrose, glucagon (rDNA), dextrose, propofol    Labs:  CBC:   Recent Labs     20  0540 20  0455 09/15/20  0606   WBC 13.3* 12.7* 15.6*   HGB 9.7* 10.1* 12.0   HCT 30.5* 32.6* 38.4   MCV 84.8 85.4 84.3   * 94* 110*     BMP:   Recent Labs     20  1804 09/15/20  0033 09/15/20  0606   * 135* 134*   K 4.1 4.1 4.1    103 103   CO2 24 24 24   PHOS 2.0* 2.0* 2.0*   BUN 18 18 20   CREATININE 1.1 1.0 1.1     LIVER PROFILE:   Recent Labs     20  0540 20  0455 09/15/20  0606   AST 1,018* 783* 494*   ALT 1,209* 1,082* 1,037*   BILIDIR 1.0* 1.2* 1.1*   BILITOT 1.3* 1.7* 1.4*   ALKPHOS 98 104 143*     PT/INR:   Recent Labs     20  1804 09/15/20  0033 09/15/20  0453   PROTIME 14.7* 13.6* 14.2*   INR 1.26* 1.17* 1.22*     APTT:   Recent Labs     20  1414 09/14/20  2112 09/15/20  0453   APTT 45.0* 66.4* 62.1*     UA:No results for input(s): NITRITE, COLORU, PHUR, LABCAST, WBCUA, RBCUA, MUCUS, TRICHOMONAS, YEAST, BACTERIA, CLARITYU, SPECGRAV, LEUKOCYTESUR, UROBILINOGEN, BILIRUBINUR, BLOODU, GLUCOSEU, AMORPHOUS in the last 72 hours. Invalid input(s): Abdiel Charlton  No results for input(s): PH, PCO2, PO2 in the last 72 hours. Films:  Chest imaging reports were reviewed and imaging was reviewed by me and showed ETT, Vascath and NG in place. ETT just above rahat.  Decrease in size of effusions bilaterally     AB.45/38/79    Cultures:  Sputum:  MSSA  Blood:  MRSA     I reviewed the labs and images listed above    Assessment:   · Cardiac Arrest:  PEA  · Cardiogenic Shock  · Metabolic Encephalopathy   · Bilateral Pulmonary Emboli  · Staph Pneumonia   · MRSA bacteremia   · MORELIA with RRT   · Bilateral Pleural Effusions       Plan:  · Full vent support, vent day #4  · Trial off sedation today. PRN sedation moving forward (fent/ativan)  · MRI Brain today  · Dc arterial line  · RRT per Nephrology   · Antibiotics per ID  · GI/DVT prophylaxis:  Protonix, Heparin  · Glycemic control  · Enteral feeding        Critical care time of 31 minutes. This does not include procedural time. Please see procedural notes for details.     DO Carole Mixon Pulmonary

## 2020-09-15 NOTE — PROGRESS NOTES
Clinical Pharmacy Note  Heparin Dosing       Lab Results   Component Value Date    APTT 62.1 09/15/2020     Lab Results   Component Value Date    HGB 12.0 09/15/2020    HCT 38.4 09/15/2020     09/15/2020    INR 1.22 09/15/2020       Current Infusion Rate: 7.3 mL/hr    Plan:  Rate: continue at 7.3 mL/hr  Next aPTT: 0600 9/16/20    Pharmacy will continue to monitor and adjust based on aPTT results.   Perla Blanchard, JeromeD

## 2020-09-15 NOTE — PROGRESS NOTES
Progress Note    Updates  No significant events. About the same clinically. Intubated/sedated. Past Medical History:   Diagnosis Date    Anticoagulant long-term use     Arthritis     Asthma     Baker's cyst     Depression     [facility] [address] [address]     [facility] Leiden [address] [address]     H/O blood clots     Heartburn     Hyperlipidemia     MRSA bacteremia 09/10/2020    Osteoarthritis      History reviewed. No pertinent surgical history.     Current Facility-Administered Medications:     fentaNYL (SUBLIMAZE) injection 50 mcg, 50 mcg, Intravenous, Q2H PRN, Opal Parmar APRN - CNP, 50 mcg at 09/15/20 1006    ampicillin-sulbactam (UNASYN) 1.5 g IVPB minibag, 1.5 g, Intravenous, Q24H, Jeremie Canela MD    LORazepam (ATIVAN) injection 2 mg, 2 mg, Intravenous, Q2H PRN, AGUSTIN Yin - CNP, 2 mg at 09/15/20 1009    Katherene Section 4/2.5 dialysis solution, , Dialysis, Continuous, Vasquez White MD, Last Rate: 1,000 mL/hr at 09/15/20 0430, 1,000 mL/hr at 09/15/20 0430    prismaSATE BGK 4/2.5 dialysis solution, , Dialysis, Continuous, Vasquez White MD, Last Rate: 1,500 mL/hr at 09/15/20 0823, 1,500 mL/hr at 09/15/20 [address] 4/2.5 dialysis solution, , Dialysis, Continuous, Vasquez White MD, Last Rate: 500 mL/hr at 09/15/20 0856, 500 mL/hr at 09/15/20 0856    potassium chloride 20 mEq/50 mL IVPB (Central Line), 20 mEq, Intravenous, PRN, Vasquez White MD, Last Rate: 50 mL/hr at 09/12/20 1008, 20 mEq at 09/12/20 1008    magnesium sulfate 1 g in dextrose 5% 100 mL IVPB, 1 g, Intravenous, PRN, Vasquez White MD    calcium gluconate 1 g in sodium chloride 50 mL, 1 g, Intravenous, PRN, 1 g at 09/15/20 0815 **OR** calcium gluconate 2 g in dextrose 5 % 100 mL IVPB, 2 g, Intravenous, PRN **OR** calcium gluconate 3 g in dextrose 5 % 100 mL IVPB, 3 g, Intravenous, PRN **OR** calcium gluconate 4 g in dextrose 5 % 100 mL IVPB, 4 g, Intravenous, PRN, Vasquez White MD    sodium phosphate 6 mmol in dextrose 5 % 250 mL IVPB, 6 mmol, Intravenous, PRN, Last Rate: 125 mL/hr at 09/15/20 0806, 6 mmol at 09/15/20 0806 **OR** sodium phosphate 12 mmol in dextrose 5 % 250 mL IVPB, 12 mmol, Intravenous, PRN, Stopped at 09/14/20 1052 **OR** sodium phosphate 18 mmol in dextrose 5 % 500 mL IVPB, 18 mmol, Intravenous, PRN **OR** sodium phosphate 24 mmol in dextrose 5 % 500 mL IVPB, 24 mmol, Intravenous, PRN, Lianne Mehta MD    sodium chloride flush 0.9 % injection 10 mL, 10 mL, Intravenous, 2 times per day, Lonny Jarvis DO, 10 mL at 09/15/20 0756    sodium chloride flush 0.9 % injection 10 mL, 10 mL, Intravenous, PRN, Lonny Jarvis DO    acetaminophen (TYLENOL) tablet 650 mg, 650 mg, Oral, Q6H PRN **OR** acetaminophen (TYLENOL) suppository 650 mg, 650 mg, Rectal, Q6H PRN, Lonny Jarvis DO    ondansetron (ZOFRAN) injection 4 mg, 4 mg, Intravenous, Q6H PRN, Lonnyboubacar Jarvis DO    pantoprazole (PROTONIX) injection 40 mg, 40 mg, Intravenous, Daily, 40 mg at 09/15/20 0756 **AND** sodium chloride (PF) 0.9 % injection 10 mL, 10 mL, Intravenous, Daily, Vicky Hloder MD, 10 mL at 09/15/20 0756    chlorhexidine (PERIDEX) 0.12 % solution 15 mL, 15 mL, Mouth/Throat, BID, Vicky Holder MD, 15 mL at 09/15/20 0755    glucose (GLUTOSE) 40 % oral gel 15 g, 15 g, Oral, PRN, Vicky Holder MD    dextrose 50 % IV solution, 12.5 g, Intravenous, PRN, Vicky Holder MD, 12.5 g at 09/11/20 0900    glucagon (rDNA) injection 1 mg, 1 mg, Intramuscular, PRN, Vicky Holder MD    dextrose 5 % solution, 100 mL/hr, Intravenous, PRN, Vicky Holder MD    levetiracetam (KEPPRA) 1000 mg/100 mL IVPB, 1,000 mg, Intravenous, Q12H, AGUSTIN Bowie - CNP, Stopped at 09/14/20 2320    vancomycin (VANCOCIN) intermittent dosing (placeholder), , Other, Rock Mitchell MD    heparin 25,000 unit in sodium chloride 0.45% 250 mL infusion, 7.3 mL/hr, Intravenous, Continuous, Ashlee Pelletier Wade RN, Last Rate: 7.3 mL/hr at 09/14/20 1459, 7.3 mL/hr at 09/14/20 1459    Exam  Blood pressure (!) 156/88, pulse 111, temperature 98 °F (36.7 °C), temperature source Axillary, resp. rate 18, height 5' 7\" (1.702 m), weight 219 lb 9.3 oz (99.6 kg), last menstrual period 12/05/2018, SpO2 96 %. Constitutional                          Vital signs: BP, HR, and RR reviewed            General depressed mental status, no distress. Moves head from side to side. Appears to be biting ventilator tube from time to time. Eyes: unable to visualize the fundi.    Cardiovascular: + peripheral edema.    Psychiatric: limited exam given encephalopathy  Neurologic  Mental status:   orientation marisol due to encephalopathy/sedation.    Attention poor  Language limited exam given encephalopathy  Comprehension not following any commands  CN2: no corneal reflexes at this time.    CN 3,4,6: dysconjugate gaze.  Pupils are 2-3 mm, round, reactive bilaterally.    CN7: face symmetric but exam limited by ET tube  CN9: gag present per RN. CN11: limited exam given encephalopathy  CN12: limited exam given encephalopathy  Strength: unable to assess at this time. Sensory: withdraw to nailbed pressure in both feet, less so in UE.  Grimace to trapezius pressure. Cerebellar/coordination:limited exam given encephalopathy  Tone: no increased tone or rigidity.    Gait: limited exam given encephalopathy and intubated with ventilator. ROS - unable to obtain from the patient at this time. Chart reviewed. Afebrile. Labs  Glucose 118  Na 134  K 4.1  BUN 20  Cr 1.1  Mg 2.50    ALT 1037      WBC 15.6K  Hg 12.0  Platelets 308    COVID negative  HIV negative  Hepatitis A/B/C negative. Initial blood cultures +. Repeat pending.      UA 2+ bacteria, yeast.     Studies  EEG 9/11/20  Burst suppression w/ superimposed sleep spindles and beta.    No definite epileptiform discharges.      CT head w/o 9/11/20  Motion limited.    No convincing evidence of an acute intracranial abnormality.       CT chest 9/10/20  Evaluation is somewhat limited due to respiratory motion artifact.  There are    suspected pulmonary arterial filling defects within subsegmental branches to    the right middle lobe and left lower lobe.  No right heart strain.         Large bilateral pleural effusions and reflux of contrast into the IVC/hepatic    veins.  Findings most compatible with volume overload/CHF.      Patchy airspace disease in the left lower lobe adjacent to the large    effusion.  Differential considerations include atelectasis, infectious    process or, less likely, infarction.       EKG 9/10/20  Atrial flutter     TTE 9/11/20    Ejection fraction is visually estimated to be 30%-35%.    Severe tricuspid regurgitation moderate pulmonary HTN    There is severe diffuse hypokinesis.    Diastolic filling parameters suggest grade III diastolic dysfunction.    Moderate calcification of the posterior leaflet of the mitral valve.    Mitral annular calcification is present.    The mitral valve leaflets appear myxomatous.    No evidence of mitral valve stenosis.    Severe mitral regurgitation is present.    Systolic flow reversal in pulmonary veins.    The right ventricle is enlarged.    Right ventricular systolic function is moderate to severely reduced. Impression  1. S/p PEA arrest w/ relative pickering achievement of ROSC. Reportedly continues to experience some myoclonic jerking when off propofol. Currently sedated.       These spells were previously described as LLE sporadic movements, head turning from side to side, roving eye movements, and intermittent almost rhythmic twitching (duration 3-5 sec) of her RUE.     Initial CT head was w/out any acute findings. EEG w/ burst suppression though on sedation at the time.       Medically complex patient - on CRRT, PE/coaguloapthy, bacteremia/?endocarditis, PNA. Recommendations  1.   MRI brain w/o scheduled

## 2020-09-15 NOTE — PLAN OF CARE
Problem: Restraint Use - Nonviolent/Non-Self-Destructive Behavior:  Goal: Absence of restraint indications  Description: Absence of restraint indications  Outcome: Ongoing     Problem: Restraint Use - Nonviolent/Non-Self-Destructive Behavior:  Goal: Absence of restraint-related injury  Description: Absence of restraint-related injury  Outcome: Ongoing     Problem: Skin Integrity:  Goal: Will show no infection signs and symptoms  Description: Will show no infection signs and symptoms  Outcome: Ongoing     Problem: Skin Integrity:  Goal: Absence of new skin breakdown  Description: Absence of new skin breakdown  Outcome: Ongoing     Problem: Nutrition  Goal: Optimal nutrition therapy  Outcome: Ongoing     Problem: OXYGENATION/RESPIRATORY FUNCTION  Goal: Patient will maintain patent airway  Outcome: Ongoing     Problem: FLUID AND ELECTROLYTE IMBALANCE  Goal: Fluid and electrolyte balance are achieved/maintained  Outcome: Ongoing

## 2020-09-15 NOTE — PROGRESS NOTES
car  [facility]  1970    September 15, 2020        CC: No complaints      Subjective:     History of Present Illness:    Belia Wood is a 53 y.o. patient with a PMH significant for factor V Leiden deficiency, history of deep vein thrombosis, hyperlipidemia presented with complaints of increasing shortness of breath. When I saw her today patient is on ventilator intubated. Apparently she came to [facility] with increasing shortness of breath and some chest tightness. She is also complaining of swelling of her lower extremity. She had a PEA arrest and was intubated. CTA chest showed pulmonary embolism. No other medical history is available to me  No history of any cardiac disorders available to me. Interval history        Past Medical History:   has a past medical history of Anticoagulant long-term use, Arthritis, Asthma, Baker's cyst, Depression, Factor V Leiden ([address] Factor V Leiden ([facility] [address] H/O blood clots, Heartburn, Hyperlipidemia, MRSA bacteremia, and Osteoarthritis. Surgical History:   has no past surgical history on file. Social History:   reports that she quit smoking 7 days ago. Her smoking use included cigarettes. She smoked 0.50 packs per day. She has never used smokeless tobacco. She reports current drug use. Frequency: 14.00 times per week. Drug: Marijuana. She reports that she does not drink alcohol. Family History:  family history includes Heart Attack in her father. Home Medications:  Were reviewed and are listed in nursing record and/or below  Prior to Admission medications    Medication Sig Start Date End Date Taking?  Authorizing Provider   albuterol sulfate  (90 Base) MCG/ACT inhaler INHALE TWO PUFFS BY MOUTH EVERY 6 HOURS AS NEEDED FOR WHEEZING 8/25/20  Yes Nani Primrose, APRN - CNP   vitamin D (ERGOCALCIFEROL) 1.25 MG (09295 UT) CAPS capsule TAKE ONE CAPSULE BY MOUTH ONCE WEEKLY 8/13/20  Yes Caitlyn Bautista, DO omeprazole (PRILOSEC) 20 MG delayed release capsule TAKE ONE CAPSULE BY MOUTH DAILY 6/10/20  Yes Negro Galicia MD   warfarin (COUMADIN) 7.5 MG tablet TAKE ONE TABLET BY MOUTH DAILY EXCEPT ON MONDAY AND THURSDAY TAKE ONE-HALF TABLET BY MOUTH AS DIRECTED 4/22/20  Yes Negro Galicia MD   atorvastatin (LIPITOR) 10 MG tablet TAKE ONE TABLET BY MOUTH DAILY 1/8/20  Yes Negro Galicia MD   DULoxetine (CYMBALTA) 60 MG extended release capsule TAKE ONE CAPSULE BY MOUTH DAILY 9/9/19  Yes Negro Galicia MD   BREO ELLIPTA 200-25 MCG/INH AEPB INHALE ONE DOSE BY MOUTH DAILY 3/22/19  Yes Negro Galicia MD        CURRENT Medications:  fentaNYL (SUBLIMAZE) injection 50 mcg, Q2H PRN  ampicillin-sulbactam (UNASYN) 1.5 g IVPB minibag, Q24H  LORazepam (ATIVAN) injection 2 mg, Q2H PRN  vancomycin (VANCOCIN) 1250 mg in dextrose 5 % 250 mL IVPB, Once  potassium chloride 20 mEq/50 mL IVPB (Central Line), PRN  magnesium sulfate 1 g in dextrose 5% 100 mL IVPB, PRN  calcium gluconate 2 g in dextrose 5 % 100 mL IVPB, PRN    Or  calcium gluconate 3 g in dextrose 5 % 100 mL IVPB, PRN    Or  calcium gluconate 4 g in dextrose 5 % 100 mL IVPB, PRN  sodium phosphate 6 mmol in dextrose 5 % 250 mL IVPB, PRN    Or  sodium phosphate 12 mmol in dextrose 5 % 250 mL IVPB, PRN    Or  sodium phosphate 18 mmol in dextrose 5 % 500 mL IVPB, PRN    Or  sodium phosphate 24 mmol in dextrose 5 % 500 mL IVPB, PRN  sodium chloride flush 0.9 % injection 10 mL, 2 times per day  sodium chloride flush 0.9 % injection 10 mL, PRN  acetaminophen (TYLENOL) tablet 650 mg, Q6H PRN    Or  acetaminophen (TYLENOL) suppository 650 mg, Q6H PRN  ondansetron (ZOFRAN) injection 4 mg, Q6H PRN  pantoprazole (PROTONIX) injection 40 mg, Daily    And  sodium chloride (PF) 0.9 % injection 10 mL, Daily  chlorhexidine (PERIDEX) 0.12 % solution 15 mL, BID  glucose (GLUTOSE) 40 % oral gel 15 g, PRN  dextrose 50 % IV solution, PRN  glucagon (rDNA) injection 1 mg, PRN  dextrose 5 % solution, 09/15/2020    K 4.1 09/10/2020     09/15/2020    CO2 24 09/15/2020    BUN 20 09/15/2020    CREATININE 1.1 09/15/2020    GFRAA >60 09/15/2020    GFRAA >60 07/25/2012    AGRATIO 1.1 09/12/2020    LABGLOM 53 09/15/2020    GLUCOSE 118 09/15/2020    PROT 6.0 09/15/2020    PROT 7.0 10/19/2011    CALCIUM 8.2 09/15/2020    BILITOT 1.4 09/15/2020    ALKPHOS 143 09/15/2020     09/15/2020    ALT 1,037 09/15/2020     No results found for: PTINR  Lab Results   Component Value Date    LABA1C 5.6 12/17/2018     Lab Results   Component Value Date    CKTOTAL 449 (H) 09/11/2020    TROPONINI 0.02 (H) 09/11/2020       Cardiac, Vascular and Imaging Data all Personally Reviewed in Detail by Myself      EKG: Sinus tachycardiaRightward axisST & T wave abnormality, consider inferior ischemiaAbnormal ECGNo previous ECGs available     Echocardiogram: Summary   Ejection fraction is visually estimated to be 30%-35%.   Severe tricuspid regurgitation.   moderate pulmonary HTN   There is severe diffuse hypokinesis.   Diastolic filling parameters suggest grade III diastolic dysfunction.   Moderate calcification of the posterior leaflet of the mitral valve.   Mitral annular calcification is present.   The mitral valve leaflets appear myxomatous.   No evidence of mitral valve stenosis.   Severe mitral regurgitation is present.   Systolic flow reversal in pulmonary veins.   The right ventricle is enlarged.   Right ventricular systolic function is moderate to severely reduced.        Other imaging:      CT chest  Evaluation is somewhat limited due to respiratory motion artifact.  There are    suspected pulmonary arterial filling defects within subsegmental branches to    the right middle lobe and left lower lobe.  No right heart strain.         Large bilateral pleural effusions and reflux of contrast into the IVC/hepatic    veins.  Findings most compatible with volume overload/CHF.         Patchy airspace disease in the left lower lobe adjacent to the large    effusion.  Differential considerations include atelectasis, infectious    process or, less likely, infarction.             Chest x-ray  Endotracheal tube has tip approximately 1.3 cm proximal to the rahat and    should be retracted approximately 2 cm.         Findings are again suggestive of pulmonary edema with small pleural    effusions.  Pneumonia is not excluded in the appropriate clinical setting.         Findings were called by the radiology call center. Assessment and Plan     Cardiac arrest PEA  Possible cardiogenic shock  -Acute pulmonary edema,  Acute pulmonary embolism  Acute hypoxemic respiratory failure  Initial ECG suggestive of atrial flutter  Acute systolic heart failure with ejection fraction of 30 to 35% with severe mitral regurgitation. Complicating factor with factors with acute progressive renal failure. Status post CRRT    Continue ventilator support for now. I will leave anticoagulation management to hematology. Neurology consultation for possible seizure activity and encephalopathy    Cardiac work-up and cardiac catheterization once stable probably would prefer once she is extubated. Thank you for allowing us to participate in the care of Marleni Day. Please do not hesitate to contact me if you have any questions.     Elver Person MD, MPH    [place] [address]  Ph: [phone]  Fax: [phone]    9/15/2020 1:48 PM

## 2020-09-15 NOTE — PROGRESS NOTES
NEPHROLOGY PROGRESS NOTE    Patient: Pop Tracey MRN: [id number]     YOB: 1970  Age: 53 y.o. Sex: female    Unit: [address] ICU Room/Bed: [id number]/2104-01 Location: [address] [place] [address]     Admitting Physician: Chato Winters    Primary Care Physician: No primary care provider on file. LOS: 5 days       Reason for evaluation:   Milly      SUBJECTIVE:      The patient was seen and examined. Notes and labs reviewed. There were no complications over night. Patient's review of systems: she remains intubated, on the vent  Off of sedation  CRRT stopped this AM  Wt down to 219 lbs    OBJECTIVE:     Vitals:    09/15/20 1005 09/15/20 1010 09/15/20 1030 09/15/20 1100   BP: (!) 181/90 (!) 149/87 (!) 156/88 (!) 145/110   Pulse: 93 98 111 110   Resp: 22 23 18 19   Temp:       TempSrc:       SpO2: 96% 95% 96% 95%   Weight:       Height:           Intake and Output:      Intake/Output Summary (Last 24 hours) at 9/15/2020 1119  Last data filed at 9/15/2020 0906  Gross per 24 hour   Intake 2674.7 ml   Output 7202 ml   Net -4527.3 ml       Continuous Infusions:   prismaSATE BGK 4/2.5 1,000 mL/hr (09/15/20 0430)    prismaSATE BGK 4/2.5 1,500 mL/hr (09/15/20 0823)    prismaSATE BGK 4/2.5 500 mL/hr (09/15/20 0856)    dextrose      heparin (Porcine) 7.3 mL/hr (09/14/20 1459)       Exam:   CONSTITUTIONAL/PSYCHIATRY: intubated, on the vent  RESPIRATORY: on the vent. Auscultation: EBBS  CARDIOVASCULAR: Auscultation: RRR. Edema: 1+ improving  GASTROINTESTINAL: Soft, nontender, Hahn in place  EXTREMITIES:  No cyanosis or clubbing. SKIN: Warm and dry.   R IJ Vasc cath in place      LABS:   RFP:   Recent Labs     09/14/20  0455  09/14/20  1804 09/15/20  0033 09/15/20  0606   *   < > 133* 135* 134*   K 4.1   < > 4.1 4.1 4.1      < > 101 103 103   CO2 26   < > 24 24 24   BUN 13   < > 18 18 20   CREATININE 1.0   < > 1.1 1.0 1.1   CALCIUM 7.3*   < > 8.2* 8.3 8.2*   MG 2.30  --  2.50*  --  2.50* PHOS 1.9*  --  2.0* 2.0* 2.0*   GFRAA >60   < > >60 >60 >60    < > = values in this interval not displayed. Liver panel:  Recent Labs     09/13/20  0540 09/14/20  0455 09/15/20  0606   AST 1,018* 783* 494*   ALT 1,209* 1,082* 1,037*       Endocrine:   @[id number](VitD,PTH,TSH,aldosterone,renin activity,cortisol,metanephrine)@    CBC:   Recent Labs     09/13/20  0540 09/14/20  0455 09/15/20  0606   WBC 13.3* 12.7* 15.6*   HGB 9.7* 10.1* 12.0   HCT 30.5* 32.6* 38.4   MCV 84.8 85.4 84.3   * 94* 110*         ASSESSMENT and PLAN:     1. MORELIA: likely secondary to ischemia ATI. She had additional exposure to radiocontrast  Initiated on IHD on 9/11/20. Transitioned to CRRT on 9/12/20  CRRT stopped this AM  Hemodynamically stable  Will do iHD   ZENON is neg, C3 is low at 54   - Will monitor UO, urine microscopy, proteinuria and determine whether further investigation is deemed necessary. Follow up on limited serology w/u (ANCA, APLA)      2. Hyperkalemia/metabolic acidosis: resolved    3. S/P PEA arrest 9/11/20    4. Acute hypoxic respiratory failure: secondary to 2. Also has bilateral pleural effusions, PE and possible Pnia. on Heparin gtt    5. MRSA bacteremia: on Vancomycin Pharmacy dosing     6. Severe CMP with reduced RV function/severe TR/MR, Grade 3 diastolic dysfunction, hepatic congestion and anasarca   - wt down significantly with CRRT     7. Factor V leiden deficiency with PE/coagulopathy/DIC: per hematology    8. Elevated liver enzymes: likely ischemic injury, ?additional hepatic congestion.  LFTs stable    8 Hypophosphatemia replace        Signed By: Luana Dick MD

## 2020-09-15 NOTE — PROGRESS NOTES
Hospitalist Progress Note      PCP: No primary care provider on file. Date of Admission: 9/10/2020    Chief Complaint:   Chief Complaint   Patient presents with    Shortness of Breath     HX of asthma. Short of breath for around 1 month. Patient states she just refilled her inhaler today. Hospital Course:  53 y.o. female with past medical history of factor V Leiden deficiency on chronic warfarin, history of blood clots, hyperlipidemia, arthritis, asthma who presents to [facility] with worsening above symptoms of fatigue, intermittent shortness of breath both at rest and on exertion, and extensive of abdominal and leg swelling. In the ED patient was significantly tachycardic, showing some combination of sinus tachycardia versus SVT, uncertain if may be underlying atrial fibrillation with rapid ventricular rate. Labs showed signs of infection as well as elevated proBNP. CTA with contrast was done on the chest and demonstrated left and right lobe PEs in certain segments, active findings suggestive of early patchy pneumonia bilaterally, and moderate bilateral pleural effusions suggestive of heart failure/volume overload. PEA arrest shortly after admission. Subjective: Intubated, unable to complete ROS. Off propofol, per nursing was more awake but required ativan, fentanyl for HTN, tachycardia when off sedation. No further myoclonus. Called her mother to update her on patient's status.      Medications:  Reviewed    Infusion Medications    prismaSATE BGK 4/2.5 1,000 mL/hr (09/15/20 0430)    prismaSATE BGK 4/2.5 1,500 mL/hr (09/15/20 0500)    prismaSATE BGK 4/2.5 500 mL/hr (09/14/20 3231)    dextrose      propofol 20 mcg/kg/min (09/15/20 5202)    propofol      heparin (Porcine) 7.3 mL/hr (09/14/20 0548)     Scheduled Medications    ampicillin-sulbactam  3 g Intravenous Q8H    sodium chloride flush  10 mL Intravenous 2 times per day    pantoprazole  40 mg Intravenous Daily    And    HCT 30.5* 32.6* 38.4   * 94* 110*     Recent Labs     09/14/20  1804 09/15/20  0033 09/15/20  0606   * 135* 134*   K 4.1 4.1 4.1    103 103   CO2 24 24 24   BUN 18 18 20   CREATININE 1.1 1.0 1.1   CALCIUM 8.2* 8.3 8.2*   PHOS 2.0* 2.0* 2.0*     Recent Labs     09/13/20  0540 09/14/20  0455 09/15/20  0606   AST 1,018* 783* 494*   ALT 1,209* 1,082* 1,037*   BILIDIR 1.0* 1.2* 1.1*   BILITOT 1.3* 1.7* 1.4*   ALKPHOS 98 104 143*     Recent Labs     09/14/20  1804 09/15/20  0033 09/15/20  0453   INR 1.26* 1.17* 1.22*     No results for input(s): CKTOTAL, TROPONINI in the last 72 hours. Urinalysis:      Lab Results   Component Value Date    NITRU Negative 09/10/2020    WBCUA 3-5 09/10/2020    BACTERIA 2+ 09/10/2020    RBCUA 21-50 09/10/2020    BLOODU MODERATE 09/10/2020    SPECGRAV >1.030 09/10/2020    GLUCOSEU Negative 09/10/2020       Radiology:  XR CHEST PORTABLE   Final Result   Decreased layering effusions with improved basilar aeration. Appropriate   life support device positioning. XR CHEST PORTABLE   Final Result   Low-lying endotracheal tube. Recommend retracting 1-2 cm. No substantial change in layering effusions and basilar opacities. The findings were sent to the Radiology Results [address] at 3:53   am on 9/14/2020to be communicated to a licensed caregiver. XR CHEST PORTABLE   Preliminary Result   1. Interval placement of an NG tube, likely within the stomach. 2. Stable appropriate positions of endotracheal tube and right-sided dialysis   catheter. 3. No significant change in appearance of small to moderate bilateral pleural   effusions and bibasilar airspace disease. XR CHEST PORTABLE   Final Result   Supportive lines project in stable positions. Bibasilar opacities, suggestive of layered effusions and atelectasis or   airspace disease. XR CHEST PORTABLE   Final Result   1.  Suspected mild to moderate bilateral pleural effusions with adjacent   bibasilar atelectasis and/or airspace disease. 2. Mild to moderate cardiomegaly. 3. Recommend retraction of endotracheal tube 0.7 cm. XR CHEST PORTABLE   Final Result   Right jugular venous catheter and endotracheal tube project in normal   positions. No pneumothorax. Bilateral airspace disease and possible layered pleural effusions, consistent   with pulmonary edema and atelectasis. Pneumonia is a differential   possibility. XR CHEST PORTABLE   Final Result   Satisfactory position of the endotracheal tube 3 cm above the rahat. The   heart is upper normal size and vessels are borderline congested. VL Extremity Venous Bilateral   Final Result      VL DUP LOWER EXTREMITY ARTERIES BILATERAL   Final Result      XR CHEST PORTABLE   Final Result   Endotracheal tube has tip approximately 1.3 cm proximal to the rahat and   should be retracted approximately 2 cm. Findings are again suggestive of pulmonary edema with small pleural   effusions. Pneumonia is not excluded in the appropriate clinical setting. Findings were called by the radiology call center. CT HEAD WO CONTRAST   Final Result   Motion limited study. No convincing evidence for acute intracranial   abnormality identified within the limitations of motion artifact. If there is persistent clinical suspicion for intracranial abnormality,   repeat study should be considered. XR CHEST PORTABLE   Final Result   Cardiomegaly, pulmonary vascular congestion, and bilateral pleural effusions. Endotracheal tube terminates approximately 2 cm above the rahat. CT CHEST PULMONARY EMBOLISM W CONTRAST   Final Result   Addendum 1 of 1   ADDENDUM:   Findings were discussed with Dr. Rajesh Sunshine at 10:49 pm on 9/10/2020.          Final      MRI BRAIN WO CONTRAST    (Results Pending)         Assessment/Plan:    Active Hospital Problems    Diagnosis    Cardiogenic shock ([address] [R57.0]    Staphylococcal pneumonia (HCC) [J15.20]    Pleural effusion, bilateral [J90]    Elevated lactic acid level [R79.89]    Bacteremia due to methicillin susceptible Staphylococcus aureus (MSSA) [R78.81]    Pneumonia due to methicillin susceptible Staphylococcus aureus (MSSA) (HCC) [J15.211]    Anemia [D64.9]    Acute renal failure (HCC) [N17.9]    Bilateral pleural effusion [J90]    Mitral valve insufficiency [I34.0]    Tricuspid valve insufficiency [I07.1]    Class 2 obesity due to excess calories with body mass index (BMI) of 39.0 to 39.9 in adult [E66.09, Z68.39]    Pneumonia due to organism [J18.9]    Bacteremia [R78.81]    Cardiac arrest ([address] [I46.9]    Seizures ([address] [R56.9]    Acute respiratory failure with hypoxia (HCC) [J96.01]    Acute kidney injury ([address] [T90.8]    Metabolic acidosis [J28.4]    Congestive heart failure (HCC) [I50.9]    Pulmonary hypertension (HCC) [I27.20]    Multifocal pneumonia [J18.9]    Septic shock (HCC) [A41.9, R65.21]    Elevated LFTs [R94.5]    Thrombocytopenia (HCC) [D69.6]    Coagulopathy (HCC) [D68.9]    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) [D65]    Acute encephalopathy [G93.40]    Pulmonary embolus (HCC) [I26.99]    Lactic acidosis [E87.2]    CAP (community acquired pneumonia) [J18.9]    Acute CHF (congestive heart failure) (HCC) [I50.9]    Bilateral pulmonary embolism (HCC) [I26.99]    Factor V Leiden mutation ([address] [D68.51]     Shock, septic vs cardiogenic  Severe mitral and tricuspid regurgitation on TTE, moderate pulmonary HTN, large bilateral pleural effusions and hepatic congestion on CT. MRSA bacteremia. Off pressors  - ID consulted  - cardiology following    MRSA bacteremia, PNA  Lab clarifying susceptibilities.  mecA positive  - ID following, switched to Unasyn, continue vanc   - treat as endocarditis with prolonged abx course    Cardiomyopathy, acute on chronic systolic HF  Severe diffuse hypokinesis with severely reduced rt ventricular function, severe tricuspid regurgitation and mitral regurgitation, grade 3 diastolic dysfunction  - cardiology following  - RHC/LHC when able    S/p PEA arrest  Intubated 9/10 shortly after admission  - vent management per CC    Bilateral PE  Ho DVT/PE, on coumadin chronically  - heparin drip    Coagulapathy  Hx Factor V Leiden on coumadin. INR therapeutic at 2.2 on admission. Has had oozing on heparin drip, sepsis with bacteremia, DIC versus shock liver versus vitamin K deficiency  -Heme-onc following, fibrinogen and INR improving, no need for vit K or cryo now    Oliguric/anuric MORELIA  Suboptimal response to Lasix. -CRRT started  -Checking ZENON (neg), ANCA, complement (c3 low), antiphospholipid antibodies  -Nephrology following    Seizure like activity  Prior to PEA arrest, episodic rhythmic twitching. EEG negative  - neuro following  - keppra 1000 mg BID  - MRI brain  - EEG on lighter sedation. With myoclonus off sedation may need cEEG    Obesity  Body mass index is 34.39 kg/m². Counseled on effects of weight on overall health and chronic medical conditions and discussed weight loss. DVT Prophylaxis: heparin  Diet: DIET TUBE FEED CONTINUOUS/CYCLIC NPO; Renal Formula; Nasogastric; Continuous; 20; 35  Code Status: Full Code    PT/OT Eval Status: deferred    Dispo - pending    Maria R Vital MD    This note was transcribed using [id number] [employer]. Please disregard any translational errors.

## 2020-09-15 NOTE — PROGRESS NOTES
4 Eyes Skin Assessment     The patient is being assess for  Shift Handoff    I agree that 2 RN's have performed a thorough Head to Toe Skin Assessment on the patient. ALL assessment sites listed below have been assessed.        Areas assessed by both nurses:  [x]   Head, Face, and Ears   [x]   Shoulders, Back, and Chest  [x]   Arms, Elbows, and Hands   [x]   Coccyx, Sacrum, and IschIum  [x]   Legs, Feet, and Heels        Does the Patient have Skin Breakdown?   yes        Cash Prevention initiated:  Yes   Wound Care Orders initiated:  No      [facility] nurse consulted for Pressure Injury (Stage 3,4, Unstageable, DTI, NWPT, and Complex wounds), New and Established Ostomies:  No      Nurse 1 eSignature: Electronically signed by Cornelio Owusu RN on 9/15/20 at 7:07 AM EDT    **SHARE this note so that the co-signing nurse is able to place an eSignature**    Nurse 2 eSignature: Electronically signed by Ildefonso Martinez RN on 9/15/20 at 6:56 PM EDT

## 2020-09-15 NOTE — PROGRESS NOTES
[facility]  Oncology Hematology Care  Progress Note      SUBJECTIVE:   Pt intubated sedated  NO change in skin exam   OBJECTIVE      Medications    Current Facility-Administered Medications: ampicillin-sulbactam (UNASYN) 3 g ivpb minibag, 3 g, Intravenous, Q8H  prismaSATE BGK 4/2.5 dialysis solution, , Dialysis, Continuous  prismaSATE BGK 4/2.5 dialysis solution, , Dialysis, Continuous  prismaSATE BGK 4/2.5 dialysis solution, , Dialysis, Continuous  potassium chloride 20 mEq/50 mL IVPB (Central Line), 20 mEq, Intravenous, PRN  magnesium sulfate 1 g in dextrose 5% 100 mL IVPB, 1 g, Intravenous, PRN  calcium gluconate 1 g in sodium chloride 50 mL, 1 g, Intravenous, PRN **OR** calcium gluconate 2 g in dextrose 5 % 100 mL IVPB, 2 g, Intravenous, PRN **OR** calcium gluconate 3 g in dextrose 5 % 100 mL IVPB, 3 g, Intravenous, PRN **OR** calcium gluconate 4 g in dextrose 5 % 100 mL IVPB, 4 g, Intravenous, PRN  sodium phosphate 6 mmol in dextrose 5 % 250 mL IVPB, 6 mmol, Intravenous, PRN **OR** sodium phosphate 12 mmol in dextrose 5 % 250 mL IVPB, 12 mmol, Intravenous, PRN **OR** sodium phosphate 18 mmol in dextrose 5 % 500 mL IVPB, 18 mmol, Intravenous, PRN **OR** sodium phosphate 24 mmol in dextrose 5 % 500 mL IVPB, 24 mmol, Intravenous, PRN  sodium chloride flush 0.9 % injection 10 mL, 10 mL, Intravenous, 2 times per day  sodium chloride flush 0.9 % injection 10 mL, 10 mL, Intravenous, PRN  acetaminophen (TYLENOL) tablet 650 mg, 650 mg, Oral, Q6H PRN **OR** acetaminophen (TYLENOL) suppository 650 mg, 650 mg, Rectal, Q6H PRN  perflutren lipid microspheres (DEFINITY) injection 1.65 mg, 1.5 mL, Intravenous, ONCE PRN  ondansetron (ZOFRAN) injection 4 mg, 4 mg, Intravenous, Q6H PRN  LORazepam (ATIVAN) injection 2 mg, 2 mg, Intravenous, Q2H PRN  perflutren lipid microspheres (DEFINITY) injection 1.65 mg, 1.5 mL, Intravenous, ONCE PRN  pantoprazole (PROTONIX) injection 40 mg, 40 mg, Intravenous, Daily **AND** sodium chloride (PF) 0.9 % injection 10 mL, 10 mL, Intravenous, Daily  chlorhexidine (PERIDEX) 0.12 % solution 15 mL, 15 mL, Mouth/Throat, BID  glucose (GLUTOSE) 40 % oral gel 15 g, 15 g, Oral, PRN  dextrose 50 % IV solution, 12.5 g, Intravenous, PRN  glucagon (rDNA) injection 1 mg, 1 mg, Intramuscular, PRN  dextrose 5 % solution, 100 mL/hr, Intravenous, PRN  propofol injection, 10 mcg/kg/min, Intravenous, Titrated  levetiracetam (KEPPRA) 1000 mg/100 mL IVPB, 1,000 mg, Intravenous, Q12H  propofol injection 120 mg, 12 mL, Intravenous, Continuous PRN  vancomycin (VANCOCIN) intermittent dosing (placeholder), , Other, RX Placeholder  heparin 25,000 unit in sodium chloride 0.45% 250 mL infusion, 7.3 mL/hr, Intravenous, Continuous  Physical    VITALS:  BP (!) 153/77   Pulse 90   Temp 98 °F (36.7 °C) (Axillary)   Resp 18   Ht 5' 7\" (1.702 m)   Wt 219 lb 9.3 oz (99.6 kg)   LMP 2018 (Exact Date)   SpO2 98%   BMI 34.39 kg/m²   TEMPERATURE:  Current - Temp: 98 °F (36.7 °C); Max - Temp  Av.8 °F (36.6 °C)  Min: 97 °F (36.1 °C)  Max: 98.6 °F (37 °C)  PULSE OXIMETRY RANGE: SpO2  Av %  Min: 95 %  Max: 100 %  24HR INTAKE/OUTPUT:      Intake/Output Summary (Last 24 hours) at 9/15/2020 0755  Last data filed at 9/15/2020 0723  Gross per 24 hour   Intake 2955.6 ml   Output 8107 ml   Net -5151.4 ml       CONSTITUTIONAL:  Intubated sedated        MUSCULOSKELETAL:  There is no redness, warmth, or swelling of the joints.   EXTREMETIES:some mild mottling  Data      Recent Labs     20  0540 20  0455 09/15/20  0606   WBC 13.3* 12.7* 15.6*   HGB 9.7* 10.1* 12.0   HCT 30.5* 32.6* 38.4   * 94* 110*   MCV 84.8 85.4 84.3        Recent Labs     20  1804 09/15/20  0033 09/15/20  0606   * 135* 134*   K 4.1 4.1 4.1    103 103   CO2 24 24 24   PHOS 2.0* 2.0* 2.0*   BUN 18 18 20   CREATININE 1.1 1.0 1.1     Recent Labs     20  0540 20  0455 09/15/20  0606   AST 1,018* 783* 494*   ALT 1,209* 1,082* 1,037* BILIDIR 1.0* 1.2* 1.1*   BILITOT 1.3* 1.7* 1.4*   ALKPHOS 98 104 143*       Magnesium:    Lab Results   Component Value Date    MG 2.50 09/15/2020    MG 2.50 09/14/2020    MG 2.30 09/14/2020       Imaging Ct Head Wo Contrast    Result Date: 9/11/2020  EXAMINATION: CT OF THE HEAD WITHOUT CONTRAST  9/11/2020 3:33 am TECHNIQUE: CT of the head was performed without the administration of intravenous contrast. Dose modulation, iterative reconstruction, and/or weight based adjustment of the mA/kV was utilized to reduce the radiation dose to as low as reasonably achievable. COMPARISON: None. HISTORY: ORDERING SYSTEM PROVIDED HISTORY: post cardiac arrest- once pt stable TECHNOLOGIST PROVIDED HISTORY: Reason for exam:->post cardiac arrest- once pt stable Has a \"code stroke\" or \"stroke alert\" been called? ->No Is the patient pregnant?->No Reason for Exam: post cardiac arrest Acuity: Unknown Type of Exam: Ongoing FINDINGS: Motion limited study, despite repeat acquisition. BRAIN/VENTRICLES: Within the limitations of motion artifact, there is no convincing evidence for acute intracranial pathology. Gray/white matter differentiation is grossly preserved. There is remote left thalamic lacunar infarction. No definite intracranial hemorrhage, mass effect, or midline shift. ORBITS: The visualized portion of the orbits demonstrate no acute abnormality. SINUSES: The visualized paranasal sinuses and mastoid air cells demonstrate no acute abnormality. SOFT TISSUES/SKULL:  No acute abnormality of the visualized skull or soft tissues. Motion limited study. No convincing evidence for acute intracranial abnormality identified within the limitations of motion artifact. If there is persistent clinical suspicion for intracranial abnormality, repeat study should be considered. Xr Chest Portable    Result Date: 9/12/2020  EXAMINATION: ONE XRAY VIEW OF THE CHEST 9/12/2020 2:11 am COMPARISON: None.  HISTORY: ORDERING SYSTEM PROVIDED HISTORY: respiratory failure TECHNOLOGIST PROVIDED HISTORY: Reason for exam:->respiratory failure Reason for Exam: respiratory failure Acuity: Acute Relevant Medical/Surgical History: hx asthma FINDINGS: Endotracheal tube is noted in place with the distal tip located 3.8 cm superior to the rahat. The heart is mildly to moderately enlarged with otherwise unremarkable configuration. The mediastinal contours are within normal limits. Suspected mild to moderate bilateral pleural effusions are seen with adjacent bibasilar atelectasis and/or airspace disease. . Bones and soft tissues are unremarkable. 1. Suspected mild to moderate bilateral pleural effusions with adjacent bibasilar atelectasis and/or airspace disease. 2. Mild to moderate cardiomegaly. 3. Recommend retraction of endotracheal tube 0.7 cm. Xr Chest Portable    Result Date: 9/11/2020  EXAMINATION: ONE XRAY VIEW OF THE CHEST 9/11/2020 5:35 pm COMPARISON: 09/11/2020 HISTORY: ORDERING SYSTEM PROVIDED HISTORY: verify vas cath placement TECHNOLOGIST PROVIDED HISTORY: Reason for exam:->verify vas cath placement Reason for Exam: line placement Acuity: Acute Type of Exam: Initial FINDINGS: A right jugular triple-lumen catheter terminates over the superior aspect of the right atrium. Endotracheal tube terminates 2 cm cephalad to the rahat. The cardiac silhouette remains prominent. There are hazy opacities in the lung bases, greater on the right. Right jugular venous catheter and endotracheal tube project in normal positions. No pneumothorax. Bilateral airspace disease and possible layered pleural effusions, consistent with pulmonary edema and atelectasis. Pneumonia is a differential possibility. Xr Chest Portable    Result Date: 9/11/2020  EXAMINATION: ONE XRAY VIEW OF THE CHEST 9/11/2020 9:46 am COMPARISON: Prior study(s) most recent 1 hour earlier.  HISTORY: ORDERING SYSTEM PROVIDED HISTORY: Check ETT placement TECHNOLOGIST PROVIDED HISTORY: Reason for exam:->Check ETT placement Reason for Exam: ETT position. pulled back Acuity: Acute Type of Exam: Initial FINDINGS: The endotracheal tube is in satisfactory position approximately 3 cm above the rahat. The heart is upper normal as are pulmonary vessels. This is accentuated with portable technique and low lung volume. The left lung base is poorly seen with portable technique and overlying monitor or defibrillator device. Right lung is clear. Satisfactory position of the endotracheal tube 3 cm above the rahat. The heart is upper normal size and vessels are borderline congested. Xr Chest Portable    Result Date: 9/11/2020  EXAMINATION: ONE XRAY VIEW OF THE CHEST 9/11/2020 7:42 am COMPARISON: 09/11/2020 at 02:24 HISTORY: ORDERING SYSTEM PROVIDED HISTORY: post intubation/ follow up TECHNOLOGIST PROVIDED HISTORY: Reason for exam:->post intubation/ follow up Reason for Exam: worsening sob, fighting ventilation Acuity: Acute Type of Exam: Initial FINDINGS: Tip of endotracheal tube is approximately 1.3 cm proximal to the rahat. Cardiac leads project over the chest.  Pad is seen projecting over the left chest.  Diffuse bilateral heterogeneous pulmonary opacity is again seen. Costophrenic angles are not well visualized. No gross pneumothorax. Cardiac and mediastinal silhouettes are similar to prior. Endotracheal tube has tip approximately 1.3 cm proximal to the rahat and should be retracted approximately 2 cm. Findings are again suggestive of pulmonary edema with small pleural effusions. Pneumonia is not excluded in the appropriate clinical setting. Findings were called by the radiology call center.      Xr Chest Portable    Result Date: 9/11/2020  EXAMINATION: ONE XRAY VIEW OF THE CHEST 9/11/2020 2:06 am COMPARISON: CT 4 hours prior HISTORY: ORDERING SYSTEM PROVIDED HISTORY: Et tube placement TECHNOLOGIST PROVIDED HISTORY: Reason for exam:->Et tube placement Reason for Exam: Et tube placement Acuity: Acute Type of Exam: Initial FINDINGS: Endotracheal tube terminates approximately 2 cm above the rahat. Cardiomegaly, pulmonary vascular congestion, and moderate bilateral effusions remain unchanged. Osseous structures and soft tissues are grossly intact. Cardiomegaly, pulmonary vascular congestion, and bilateral pleural effusions. Endotracheal tube terminates approximately 2 cm above the rahat. Ct Chest Pulmonary Embolism W Contrast    Addendum Date: 9/10/2020    ADDENDUM: Findings were discussed with Dr. Macho Lizarraga at 10:49 pm on 9/10/2020. Result Date: 9/10/2020  EXAMINATION: CTA OF THE CHEST 9/10/2020 9:36 pm TECHNIQUE: CTA of the chest was performed after the administration of intravenous contrast.  Multiplanar reformatted images are provided for review. MIP images are provided for review. Dose modulation, iterative reconstruction, and/or weight based adjustment of the mA/kV was utilized to reduce the radiation dose to as low as reasonably achievable. COMPARISON: None. HISTORY: ORDERING SYSTEM PROVIDED HISTORY: PATRICE alexandre TECHNOLOGIST PROVIDED HISTORY: Reason for exam:->tachy, SOB Reason for Exam: tachy, SOB Acuity: Acute Type of Exam: Initial FINDINGS: Pulmonary Arteries: Pulmonary arteries are adequately opacified for evaluation. However, evaluation is limited due to respiratory motion artifact. There is suspected subsegmental filling defect within the right middle lobe and left lower lobe. Mediastinum: The heart is enlarged without pericardial fluid collection. No definite right heart strain. Large bilateral pleural effusions. Interlobular septal thickening. Multifocal ground-glass opacities in the left lower lobe noted. No evidence for pneumothorax. Reflux of contrast into the IVC and hepatic veins. Lungs/pleura: The lungs are without acute process. No focal consolidation or pulmonary edema. No evidence of pleural effusion or pneumothorax.  Upper Abdomen: Limited images of the upper abdomen are unremarkable. Soft Tissues/Bones: No acute bone or soft tissue abnormality. Evaluation is somewhat limited due to respiratory motion artifact. There are suspected pulmonary arterial filling defects within subsegmental branches to the right middle lobe and left lower lobe. No right heart strain. Large bilateral pleural effusions and reflux of contrast into the IVC/hepatic veins. Findings most compatible with volume overload/CHF. Patchy airspace disease in the left lower lobe adjacent to the large effusion. Differential considerations include atelectasis, infectious process or, less likely, infarction. Vl Dup Lower Extremity Arteries Bilateral    Result Date: 9/11/2020  Lower Extremities Arterial Duplex  Demographics   Patient Name       Savannah Graver   Date of Study      09/11/2020       Gender               Female   Patient Number     [id number]       Date of Birth        1970   Visit Number       [id number]        Age                  53 year(s)   Accession Number   [id number]       Room Number          2104   Corporate ID       [id number]          Sonographer          Tom Leonard RVT   Ordering Physician Sofia Lloyd MD  Interpreting         [facility]                                      Physician            Ivone Nunez MD  Procedure Type of Study:   Extremities Arteries:Lower Extremities Arterial Duplex, VL LOWER EXTREMITY  ARTERIES DUPLEX BILATERAL. Vascular Sonographer Report  Additional Indications:Possible thrombus Impressions Right Impression Limited study. Patient was seizing during the scan and has an arterial line in the right groin that was actively bleeding at the time of the scan. The common femoral, profunda femoral and peroneal arteries could not be visualized. The majority of the waveforms are triphasic throughout the right lower extremity. There is no evidence of significant stenosis or thrombus in the visualized vessels. Left Impression Limited study. Patient was seizing during the scan. The common femoral, , mid to distal femoral, profunda femoral and peroneal arteries could not be visualized. The majority of the waveforms are triphasic throughout the left lower extremity. There is no evidence of significant stenosis or thrombus in the visualized vessels. Conclusions   Summary   Limited study. Patient was seizing during the scan. The right common femoral, profunda femoral and peroneal arteries could not  be visualized. The left common femoral, mid to distal superficial femoral,  profunda femoral and peroneal arteries could not be visualized. There is no evidence of significant stenosis or thrombus in the visualized  vessels. Signature   ------------------------------------------------------------------  Electronically signed by Cynthia Jimenes MD (Interpreting  physician) on 09/11/2020 at 12:10 PM  ------------------------------------------------------------------  Patient Status:Routine. Study [address] Vascular Lab. Technical Quality:Limited visualization. Risk Factors History +---------+----+---------------------------------+ ! Diagnosis! Date! Comments                         ! +---------+----+---------------------------------+ ! Other    ! ! Factor V Leiden Clotting Disorder! +---------+----+---------------------------------+ ! Other    ! !DVT/SVT in left leg in 1999      ! +---------+----+---------------------------------+ Velocities are measured in cm/s ; Diameters are measured in mm LE Duplex Measurements +--------------++-----+-----+----------++----+-----+----------+ ! ! !Right! ! Left      !!    !     !          ! +--------------++-----+-----+----------++----+-----+----------+ ! Location      ! !PSV  ! Ratio! Wave Desc. !!PSV ! Ratio! Wave Desc.! +--------------++-----+-----+----------++----+-----+----------+ ! Prox SFA      !!97.9 ! ! Triphasic !!172 ! ! Triphasic ! +--------------++-----+-----+----------++----+-----+----------+ ! Mid SFA       !!77.4 !0.79 ! Triphasic !!    !     !          ! +--------------++-----+-----+----------++----+-----+----------+ ! Dist SFA      !!57.5 !0.74 ! Triphasic !!    !     !          ! +--------------++-----+-----+----------++----+-----+----------+ ! Prox Popliteal!!46   !0.8  ! Triphasic !!42.8!0.25 ! Triphasic ! +--------------++-----+-----+----------++----+-----+----------+ ! Dist Popliteal!!79.7 !1.73 ! Triphasic !!    !     !          ! +--------------++-----+-----+----------++----+-----+----------+ ! Mid PTA       !!65.1 !0.82 ! Triphasic !!44.4!1.04 ! Triphasic ! +--------------++-----+-----+----------++----+-----+----------+ ! Mid YAZ       !!65.1 !0.82 ! Triphasic ! !48.7!1.14 ! Triphasic ! +--------------++-----+-----+----------++----+-----+----------+    Vl Extremity Venous Bilateral    Result Date: 9/11/2020  Lower Extremities DVT Study  Demographics   Patient Name       Sammi Raines   Date of Study      09/11/2020       Gender               Female   Patient Number     [id number]       Date of Birth        1970   Visit Number       [id number]        Age                  53 year(s)   Accession Number   [id number]       Room Number          2104   Corporate ID       [id number]          Sonographer          Kenia Ferrari RVT   Ordering Physician Mo Lopez MD  Interpreting         [facility]                                      Physician            Suma Bradley MD  Procedure Type of Study:   Veins:Lower Extremities DVT Study, VASC EXTREMITY VENOUS DUPLEX BILATERAL. Vascular Sonographer Report  Impressions Right Impression Limited study. Patient was seizing during the scan and had an arterial line in the right groin that was actively bleeding. No evidence of deep vein or superficial vein thrombosis in the visualized vessels in the right leg. Left Impression Limited study. Patient was seizing during the scan.  No evidence of deep vein or superficial vein thrombosis in the visualized vessels in the left leg. Conclusions   Summary   Limited study. Patient was seizing during the scan. No evidence of deep vein or superficial vein thrombosis in the visualized  vessels in bilateral legs. Signature   ------------------------------------------------------------------  Electronically signed by Suma Bradley MD (Interpreting  physician) on 09/11/2020 at 05:31 PM  ------------------------------------------------------------------  Patient Status:Routine. Study [place] [id number] - Vascular Lab. Technical Quality:Limited visualization. Risk Factors History +---------+----+---------------------------------+ ! Diagnosis! Date! Comments                         ! +---------+----+---------------------------------+ ! Other    ! ! Factor V Leiden Clotting Disorder! +---------+----+---------------------------------+ ! Other    ! !DVT/SVT in left leg in 1999      ! +---------+----+---------------------------------+ Velocities are measured in cm/s ; Diameters are measured in mm Right Lower Extremities DVT Study Measurements Right 2D Measurements +--------------+----------+---------------+----------+ ! Location      ! Visualized! Compressibility! Thrombosis! +--------------+----------+---------------+----------+ ! Prox Femoral  !Yes       ! Yes            ! None      ! +--------------+----------+---------------+----------+ ! Mid Femoral   !Yes       ! Yes            ! None      ! +--------------+----------+---------------+----------+ ! Dist Femoral  !Yes       ! Yes            ! None      ! +--------------+----------+---------------+----------+ ! Popliteal     !Yes       ! Yes            ! None      ! +--------------+----------+---------------+----------+ ! GSV Below Knee! Yes       ! Yes            ! None      ! +--------------+----------+---------------+----------+ ! Soleal        !Yes       ! Yes            ! None      ! +--------------+----------+---------------+----------+ ! PTV !Partial   !Yes            ! None      ! +--------------+----------+---------------+----------+ ! ATV           ! Yes       ! Yes            ! None      ! +--------------+----------+---------------+----------+ ! Peroneal      !Partial   !Yes            ! None      ! +--------------+----------+---------------+----------+ ! GSV Calf      ! Yes       ! Yes            ! None      ! +--------------+----------+---------------+----------+ Right Doppler Measurements +---------+------+------+------------+ ! Location ! Signal!Reflux! Reflux (sec)! +---------+------+------+------------+ ! Popliteal!Phasic!      !            ! +---------+------+------+------------+ Left Lower Extremities DVT Study Measurements Left 2D Measurements +---------+----------+---------------+----------+ ! Location ! Visualized! Compressibility! Thrombosis! +---------+----------+---------------+----------+ ! Popliteal!Yes       ! Yes            ! None      ! +---------+----------+---------------+----------+ ! Gastroc  ! Yes       ! Yes            ! None      ! +---------+----------+---------------+----------+ ! Soleal   !Yes       ! Yes            ! None      ! +---------+----------+---------------+----------+ ! PTV      ! Partial   !Yes            ! None      ! +---------+----------+---------------+----------+ ! ATV      ! Yes       ! Yes            ! None      ! +---------+----------+---------------+----------+ ! Peroneal !Partial   !Yes            ! None      ! +---------+----------+---------------+----------+ ! GSV Calf ! Yes       ! Yes            ! None      ! +---------+----------+---------------+----------+ ! SSV      ! Partial   !Yes            ! None      ! +---------+----------+---------------+----------+ Left Doppler Measurements +---------+------+------+------------+ ! Location ! Signal!Reflux! Reflux (sec)! +---------+------+------+------------+ ! Popliteal!Phasic!      !            ! +---------+------+------+------------+      Problem List  Patient Active Problem List Diagnosis    Factor V Leiden mutation ([facility]    Pulmonary embolus (HCC)    Lactic acidosis    CAP (community acquired pneumonia)    Acute CHF (congestive heart failure) (HCC)    Bilateral pulmonary embolism (HCC)    Cardiac arrest (HCC)    Seizures (HCC)    Acute respiratory failure with hypoxia (HCC)    Acute kidney injury ([facility]    Metabolic acidosis    Congestive heart failure (HCC)    Pulmonary hypertension (HCC)    Multifocal pneumonia    Septic shock (HCC)    Elevated LFTs    Thrombocytopenia (HCC)    Coagulopathy (HCC)    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)    Acute encephalopathy    Pneumonia due to organism    Bacteremia    Pleural effusion, bilateral    Elevated lactic acid level    Bacteremia due to methicillin susceptible Staphylococcus aureus (MSSA)    Pneumonia due to methicillin susceptible Staphylococcus aureus (MSSA) (HCC)    Anemia    Acute renal failure (HCC)    Bilateral pleural effusion    Mitral valve insufficiency    Tricuspid valve insufficiency    Class 2 obesity due to excess calories with body mass index (BMI) of 39.0 to 39.9 in adult    Cardiogenic shock (HCC)    Staphylococcal pneumonia (HCC)       ASSESSMENT AND PLAN    Complicated coagulopathy in setting of acute PE/sepsis/dic vs shock liver   Stable   Her fibrinogen/inr are improving thus dont anticipate a need for cryo or vit k at this point -dic improved    On heparin for now -transitioning to alternative depends on recovery of her kidneys etc  noother new recs       Need to moniter be aware of potential for HIT_but not for 5-14 days from Friday  plts holding for now relatlively speaking given all she is dealing with           Brando Mendez MD

## 2020-09-15 NOTE — PROGRESS NOTES
0945 CRRT stopped; returning blood at this time. 189ml blood returned. Vas cath heparin locked. 1000 Rounds with critical care team. New orders rec'd.    1100 Dr. Ana Stern at bedside. Will reassess tomorrow for HD.    1144 Phone call rec'd from pt's . Updates given at this time; all questions answered. 1344 Pt being transferred down to MRI. Beginning to have jerking/tremors in bilateral lower extremities. PRN medications given per protocol, see MAR.    1500 Back from MRI scan. 1524 Call rec'd from [facility] [place] [facility] regarding MRI results. Phone call transferred to Melvi Rose NP.     1630 Perfect serve call placed to Neurology to update on MRI results. One Laureano Sears NP called back and was given updates on MRI results. 9907 Pt's  called and is on his way to visit. Supervisor notified and has okay'd pt's  to visit at this time. Tim Trujillo will notify security. 1900 Report given to Ronel Lorenzo RN.      Electronically signed by Prashant Tracy RN on 9/15/2020

## 2020-09-16 LAB
ALBUMIN SERPL-MCNC: 2.6 G/DL (ref 3.4–5)
ALP BLD-CCNC: 129 U/L (ref 40–129)
ALT SERPL-CCNC: 554 U/L (ref 10–40)
ANION GAP SERPL CALCULATED.3IONS-SCNC: 10 MMOL/L (ref 3–16)
ANTICARDIOLIPIN IGG ANTIBODY: 0 GPL (ref 0–14)
APTT: 67.1 SEC (ref 24.2–36.2)
AST SERPL-CCNC: 220 U/L (ref 15–37)
BASOPHILS ABSOLUTE: 0.1 K/UL (ref 0–0.2)
BASOPHILS RELATIVE PERCENT: 0.7 %
BILIRUB SERPL-MCNC: 1.1 MG/DL (ref 0–1)
BILIRUBIN DIRECT: 0.9 MG/DL (ref 0–0.3)
BILIRUBIN, INDIRECT: 0.2 MG/DL (ref 0–1)
BLOOD CULTURE, ROUTINE: NORMAL
BUN BLDV-MCNC: 47 MG/DL (ref 7–20)
CALCIUM SERPL-MCNC: 8.4 MG/DL (ref 8.3–10.6)
CARDIOLIPIN AB IGM: 15 MPL (ref 0–12)
CHLORIDE BLD-SCNC: 100 MMOL/L (ref 99–110)
CO2: 23 MMOL/L (ref 21–32)
CREAT SERPL-MCNC: 2.3 MG/DL (ref 0.6–1.1)
CULTURE, BLOOD 2: NORMAL
EKG ATRIAL RATE: 322 BPM
EKG DIAGNOSIS: NORMAL
EKG Q-T INTERVAL: 288 MS
EKG QRS DURATION: 74 MS
EKG QTC CALCULATION (BAZETT): 491 MS
EKG R AXIS: 66 DEGREES
EKG T AXIS: 243 DEGREES
EKG VENTRICULAR RATE: 175 BPM
EOSINOPHILS ABSOLUTE: 0.2 K/UL (ref 0–0.6)
EOSINOPHILS RELATIVE PERCENT: 0.9 %
FIBRINOGEN: 446 MG/DL (ref 200–397)
FIBRINOGEN: 499 MG/DL (ref 200–397)
GFR AFRICAN AMERICAN: 27
GFR NON-AFRICAN AMERICAN: 23
GLUCOSE BLD-MCNC: 105 MG/DL (ref 70–99)
GLUCOSE BLD-MCNC: 112 MG/DL (ref 70–99)
GLUCOSE BLD-MCNC: 130 MG/DL (ref 70–99)
GLUCOSE BLD-MCNC: 171 MG/DL (ref 70–99)
HAPTOGLOBIN: 37 MG/DL (ref 30–200)
HCT VFR BLD CALC: 31.8 % (ref 36–48)
HEMOGLOBIN: 10.3 G/DL (ref 12–16)
INR BLD: 1.03 (ref 0.86–1.14)
INR BLD: 1.06 (ref 0.86–1.14)
INR BLD: 1.08 (ref 0.86–1.14)
LYMPHOCYTES ABSOLUTE: 0.9 K/UL (ref 1–5.1)
LYMPHOCYTES RELATIVE PERCENT: 4.6 %
MAGNESIUM: 2.4 MG/DL (ref 1.8–2.4)
MCH RBC QN AUTO: 27.1 PG (ref 26–34)
MCHC RBC AUTO-ENTMCNC: 32.3 G/DL (ref 31–36)
MCV RBC AUTO: 83.9 FL (ref 80–100)
MONOCYTES ABSOLUTE: 1.2 K/UL (ref 0–1.3)
MONOCYTES RELATIVE PERCENT: 6.2 %
NEUTROPHILS ABSOLUTE: 17.4 K/UL (ref 1.7–7.7)
NEUTROPHILS RELATIVE PERCENT: 87.6 %
PDW BLD-RTO: 18.9 % (ref 12.4–15.4)
PERFORMED ON: ABNORMAL
PHOSPHORUS: 3.3 MG/DL (ref 2.5–4.9)
PLATELET # BLD: 114 K/UL (ref 135–450)
PMV BLD AUTO: 9.6 FL (ref 5–10.5)
POTASSIUM SERPL-SCNC: 4.1 MMOL/L (ref 3.5–5.1)
PROTHROMBIN TIME: 11.9 SEC (ref 10–13.2)
PROTHROMBIN TIME: 12.3 SEC (ref 10–13.2)
PROTHROMBIN TIME: 12.5 SEC (ref 10–13.2)
RBC # BLD: 3.79 M/UL (ref 4–5.2)
SODIUM BLD-SCNC: 133 MMOL/L (ref 136–145)
TOTAL PROTEIN: 5.3 G/DL (ref 6.4–8.2)
VANCOMYCIN RANDOM: 32.6 UG/ML
WBC # BLD: 19.8 K/UL (ref 4–11)

## 2020-09-16 PROCEDURE — C9254 INJECTION, LACOSAMIDE: HCPCS | Performed by: NURSE PRACTITIONER

## 2020-09-16 PROCEDURE — 80048 BASIC METABOLIC PNL TOTAL CA: CPT

## 2020-09-16 PROCEDURE — 93321 DOPPLER ECHO F-UP/LMTD STD: CPT

## 2020-09-16 PROCEDURE — 6360000002 HC RX W HCPCS: Performed by: INTERNAL MEDICINE

## 2020-09-16 PROCEDURE — 6360000002 HC RX W HCPCS

## 2020-09-16 PROCEDURE — 2700000000 HC OXYGEN THERAPY PER DAY

## 2020-09-16 PROCEDURE — 6370000000 HC RX 637 (ALT 250 FOR IP): Performed by: NURSE PRACTITIONER

## 2020-09-16 PROCEDURE — 6360000002 HC RX W HCPCS: Performed by: NURSE PRACTITIONER

## 2020-09-16 PROCEDURE — 2580000003 HC RX 258: Performed by: INTERNAL MEDICINE

## 2020-09-16 PROCEDURE — 94003 VENT MGMT INPAT SUBQ DAY: CPT

## 2020-09-16 PROCEDURE — 5A2204Z RESTORATION OF CARDIAC RHYTHM, SINGLE: ICD-10-PCS | Performed by: INTERNAL MEDICINE

## 2020-09-16 PROCEDURE — 2500000003 HC RX 250 WO HCPCS: Performed by: INTERNAL MEDICINE

## 2020-09-16 PROCEDURE — 99291 CRITICAL CARE FIRST HOUR: CPT | Performed by: INTERNAL MEDICINE

## 2020-09-16 PROCEDURE — 80076 HEPATIC FUNCTION PANEL: CPT

## 2020-09-16 PROCEDURE — 93010 ELECTROCARDIOGRAM REPORT: CPT | Performed by: INTERNAL MEDICINE

## 2020-09-16 PROCEDURE — 83735 ASSAY OF MAGNESIUM: CPT

## 2020-09-16 PROCEDURE — 85610 PROTHROMBIN TIME: CPT

## 2020-09-16 PROCEDURE — 94750 HC PULMONARY COMPLIANCE STUDY: CPT

## 2020-09-16 PROCEDURE — 2580000003 HC RX 258: Performed by: NURSE PRACTITIONER

## 2020-09-16 PROCEDURE — 2000000000 HC ICU R&B

## 2020-09-16 PROCEDURE — 93005 ELECTROCARDIOGRAM TRACING: CPT | Performed by: NURSE PRACTITIONER

## 2020-09-16 PROCEDURE — 85025 COMPLETE CBC W/AUTO DIFF WBC: CPT

## 2020-09-16 PROCEDURE — C9113 INJ PANTOPRAZOLE SODIUM, VIA: HCPCS | Performed by: INTERNAL MEDICINE

## 2020-09-16 PROCEDURE — 85384 FIBRINOGEN ACTIVITY: CPT

## 2020-09-16 PROCEDURE — 36592 COLLECT BLOOD FROM PICC: CPT

## 2020-09-16 PROCEDURE — 2500000003 HC RX 250 WO HCPCS

## 2020-09-16 PROCEDURE — APPNB15 APP NON BILLABLE TIME 0-15 MINS: Performed by: NURSE PRACTITIONER

## 2020-09-16 PROCEDURE — 93312 ECHO TRANSESOPHAGEAL: CPT

## 2020-09-16 PROCEDURE — 94761 N-INVAS EAR/PLS OXIMETRY MLT: CPT

## 2020-09-16 PROCEDURE — 99233 SBSQ HOSP IP/OBS HIGH 50: CPT | Performed by: INTERNAL MEDICINE

## 2020-09-16 PROCEDURE — 93325 DOPPLER ECHO COLOR FLOW MAPG: CPT

## 2020-09-16 PROCEDURE — 83010 ASSAY OF HAPTOGLOBIN QUANT: CPT

## 2020-09-16 PROCEDURE — 84100 ASSAY OF PHOSPHORUS: CPT

## 2020-09-16 PROCEDURE — 80202 ASSAY OF VANCOMYCIN: CPT

## 2020-09-16 PROCEDURE — 2580000003 HC RX 258: Performed by: STUDENT IN AN ORGANIZED HEALTH CARE EDUCATION/TRAINING PROGRAM

## 2020-09-16 PROCEDURE — 95819 EEG AWAKE AND ASLEEP: CPT

## 2020-09-16 PROCEDURE — 85730 THROMBOPLASTIN TIME PARTIAL: CPT

## 2020-09-16 RX ORDER — MIDAZOLAM HYDROCHLORIDE 1 MG/ML
INJECTION INTRAMUSCULAR; INTRAVENOUS
Status: COMPLETED
Start: 2020-09-16 | End: 2020-09-16

## 2020-09-16 RX ORDER — DILTIAZEM HYDROCHLORIDE 5 MG/ML
10 INJECTION INTRAVENOUS ONCE
Status: COMPLETED | OUTPATIENT
Start: 2020-09-16 | End: 2020-09-16

## 2020-09-16 RX ORDER — MIDAZOLAM HYDROCHLORIDE 1 MG/ML
4 INJECTION INTRAMUSCULAR; INTRAVENOUS ONCE
Status: COMPLETED | OUTPATIENT
Start: 2020-09-16 | End: 2020-09-16

## 2020-09-16 RX ORDER — METOPROLOL TARTRATE 5 MG/5ML
INJECTION INTRAVENOUS
Status: COMPLETED
Start: 2020-09-16 | End: 2020-09-16

## 2020-09-16 RX ORDER — FENTANYL CITRATE 50 UG/ML
100 INJECTION, SOLUTION INTRAMUSCULAR; INTRAVENOUS ONCE
Status: COMPLETED | OUTPATIENT
Start: 2020-09-16 | End: 2020-09-16

## 2020-09-16 RX ORDER — FUROSEMIDE 10 MG/ML
80 INJECTION INTRAMUSCULAR; INTRAVENOUS 2 TIMES DAILY
Status: DISCONTINUED | OUTPATIENT
Start: 2020-09-16 | End: 2020-09-22

## 2020-09-16 RX ORDER — LACTOBACILLUS RHAMNOSUS GG 10B CELL
2 CAPSULE ORAL 2 TIMES DAILY WITH MEALS
Status: DISCONTINUED | OUTPATIENT
Start: 2020-09-16 | End: 2020-10-04 | Stop reason: HOSPADM

## 2020-09-16 RX ORDER — METOPROLOL TARTRATE 5 MG/5ML
10 INJECTION INTRAVENOUS ONCE
Status: COMPLETED | OUTPATIENT
Start: 2020-09-16 | End: 2020-09-16

## 2020-09-16 RX ADMIN — PANTOPRAZOLE SODIUM 40 MG: 40 INJECTION, POWDER, FOR SOLUTION INTRAVENOUS at 07:47

## 2020-09-16 RX ADMIN — AMIODARONE HYDROCHLORIDE 0.5 MG/MIN: 50 INJECTION, SOLUTION INTRAVENOUS at 20:50

## 2020-09-16 RX ADMIN — MIDAZOLAM HYDROCHLORIDE 4 MG: 1 INJECTION INTRAMUSCULAR; INTRAVENOUS at 14:25

## 2020-09-16 RX ADMIN — FUROSEMIDE 80 MG: 10 INJECTION, SOLUTION INTRAMUSCULAR; INTRAVENOUS at 17:21

## 2020-09-16 RX ADMIN — Medication 2 CAPSULE: at 17:21

## 2020-09-16 RX ADMIN — AMIODARONE HYDROCHLORIDE 150 MG: 50 INJECTION, SOLUTION INTRAVENOUS at 13:14

## 2020-09-16 RX ADMIN — CHLORHEXIDINE GLUCONATE 15 ML: 0.12 RINSE ORAL at 07:50

## 2020-09-16 RX ADMIN — FENTANYL CITRATE 50 MCG: 50 INJECTION, SOLUTION INTRAMUSCULAR; INTRAVENOUS at 12:27

## 2020-09-16 RX ADMIN — FENTANYL CITRATE 100 MCG: 50 INJECTION, SOLUTION INTRAMUSCULAR; INTRAVENOUS at 14:25

## 2020-09-16 RX ADMIN — MIDAZOLAM 4 MG: 1 INJECTION INTRAMUSCULAR; INTRAVENOUS at 14:25

## 2020-09-16 RX ADMIN — DEXTROSE MONOHYDRATE 100 MG: 50 INJECTION, SOLUTION INTRAVENOUS at 21:00

## 2020-09-16 RX ADMIN — LEVETIRACETAM 1000 MG: 10 INJECTION INTRAVENOUS at 22:45

## 2020-09-16 RX ADMIN — SODIUM CHLORIDE, PRESERVATIVE FREE 10 ML: 5 INJECTION INTRAVENOUS at 20:56

## 2020-09-16 RX ADMIN — Medication 2 CAPSULE: at 11:33

## 2020-09-16 RX ADMIN — AMPICILLIN SODIUM AND SULBACTAM SODIUM 1.5 G: 1; .5 INJECTION, POWDER, FOR SOLUTION INTRAMUSCULAR; INTRAVENOUS at 12:28

## 2020-09-16 RX ADMIN — AMIODARONE HYDROCHLORIDE 1 MG/MIN: 50 INJECTION, SOLUTION INTRAVENOUS at 13:29

## 2020-09-16 RX ADMIN — MUPIROCIN: 20 OINTMENT TOPICAL at 20:55

## 2020-09-16 RX ADMIN — SODIUM CHLORIDE, PRESERVATIVE FREE 10 ML: 5 INJECTION INTRAVENOUS at 07:48

## 2020-09-16 RX ADMIN — DILTIAZEM HYDROCHLORIDE 5 MG/HR: 5 INJECTION INTRAVENOUS at 18:43

## 2020-09-16 RX ADMIN — DILTIAZEM HYDROCHLORIDE 10 MG: 5 INJECTION INTRAVENOUS at 18:28

## 2020-09-16 RX ADMIN — PIPERACILLIN AND TAZOBACTAM 2.25 G: 2; .25 INJECTION, POWDER, LYOPHILIZED, FOR SOLUTION INTRAVENOUS at 22:46

## 2020-09-16 RX ADMIN — LORAZEPAM 2 MG: 2 INJECTION INTRAMUSCULAR; INTRAVENOUS at 18:58

## 2020-09-16 RX ADMIN — MUPIROCIN: 20 OINTMENT TOPICAL at 11:33

## 2020-09-16 RX ADMIN — Medication 10 ML: at 07:47

## 2020-09-16 RX ADMIN — LORAZEPAM 2 MG: 2 INJECTION INTRAMUSCULAR; INTRAVENOUS at 11:31

## 2020-09-16 RX ADMIN — FUROSEMIDE 80 MG: 10 INJECTION, SOLUTION INTRAMUSCULAR; INTRAVENOUS at 12:28

## 2020-09-16 RX ADMIN — FENTANYL CITRATE 50 MCG: 50 INJECTION, SOLUTION INTRAMUSCULAR; INTRAVENOUS at 00:28

## 2020-09-16 RX ADMIN — CHLORHEXIDINE GLUCONATE 15 ML: 0.12 RINSE ORAL at 20:56

## 2020-09-16 RX ADMIN — METOPROLOL TARTRATE 10 MG: 5 INJECTION INTRAVENOUS at 14:37

## 2020-09-16 RX ADMIN — LEVETIRACETAM 1000 MG: 10 INJECTION INTRAVENOUS at 11:26

## 2020-09-16 RX ADMIN — DEXTROSE MONOHYDRATE 1980 MG PE: 50 INJECTION, SOLUTION INTRAVENOUS at 16:45

## 2020-09-16 RX ADMIN — DEXTROSE MONOHYDRATE 200 MG: 50 INJECTION, SOLUTION INTRAVENOUS at 16:46

## 2020-09-16 ASSESSMENT — PULMONARY FUNCTION TESTS
PIF_VALUE: 22
PIF_VALUE: 28
PIF_VALUE: 27
PIF_VALUE: 18
PIF_VALUE: 15
PIF_VALUE: 19
PIF_VALUE: 17
PIF_VALUE: 20
PIF_VALUE: 15
PIF_VALUE: 27
PIF_VALUE: 17
PIF_VALUE: 36
PIF_VALUE: 20
PIF_VALUE: 21
PIF_VALUE: 20
PIF_VALUE: 16
PIF_VALUE: 18
PIF_VALUE: 23
PIF_VALUE: 26
PIF_VALUE: 21
PIF_VALUE: 22
PIF_VALUE: 18
PIF_VALUE: 19
PIF_VALUE: 28
PIF_VALUE: 16
PIF_VALUE: 22
PIF_VALUE: 23
PIF_VALUE: 22
PIF_VALUE: 18
PIF_VALUE: 16
PIF_VALUE: 19
PIF_VALUE: 18

## 2020-09-16 ASSESSMENT — PAIN SCALES - GENERAL
PAINLEVEL_OUTOF10: 0
PAINLEVEL_OUTOF10: 0
PAINLEVEL_OUTOF10: 4
PAINLEVEL_OUTOF10: 0
PAINLEVEL_OUTOF10: 0

## 2020-09-16 NOTE — PROGRESS NOTES
[facility]  Oncology Hematology Care  Progress Note      SUBJECTIVE:   Pt intubated sedated  NO change in skin exam   OBJECTIVE      Medications    Current Facility-Administered Medications: lactobacillus (CULTURELLE) capsule 2 capsule, 2 capsule, Oral, BID WC  mupirocin (BACTROBAN) 2 % ointment, , Nasal, BID  furosemide (LASIX) injection 80 mg, 80 mg, Intravenous, BID  [COMPLETED] amiodarone (CORDARONE) 150 mg in dextrose 5 % 100 mL bolus, 150 mg, Intravenous, Once **FOLLOWED BY** amiodarone (CORDARONE) 450 mg in dextrose 5 % 250 mL infusion, 1 mg/min, Intravenous, Continuous **FOLLOWED BY** amiodarone (CORDARONE) 450 mg in dextrose 5 % 250 mL infusion, 0.5 mg/min, Intravenous, Continuous  midazolam (VERSED) 2 MG/2ML injection, , ,   midazolam (VERSED) injection 4 mg, 4 mg, Intravenous, Once  fentaNYL (SUBLIMAZE) injection 100 mcg, 100 mcg, Intravenous, Once  lacosamide (VIMPAT) 200 mg in dextrose 5 % 70 mL IVPB, 200 mg, Intravenous, Once  lacosamide (VIMPAT) 100 mg in dextrose 5 % 60 mL IVPB, 100 mg, Intravenous, BID  fosphenytoin (CEREBYX) 1,980 mg PE in dextrose 5 % 100 mL IVPB (loading dose), 20 mg PE/kg, Intravenous, Once  fentaNYL (SUBLIMAZE) injection 50 mcg, 50 mcg, Intravenous, Q2H PRN  ampicillin-sulbactam (UNASYN) 1.5 g IVPB minibag, 1.5 g, Intravenous, Q24H  LORazepam (ATIVAN) injection 2 mg, 2 mg, Intravenous, Q2H PRN  potassium chloride 20 mEq/50 mL IVPB (Central Line), 20 mEq, Intravenous, PRN  magnesium sulfate 1 g in dextrose 5% 100 mL IVPB, 1 g, Intravenous, PRN  [DISCONTINUED] calcium gluconate 1 g in sodium chloride 50 mL, 1 g, Intravenous, PRN **OR** calcium gluconate 2 g in dextrose 5 % 100 mL IVPB, 2 g, Intravenous, PRN **OR** calcium gluconate 3 g in dextrose 5 % 100 mL IVPB, 3 g, Intravenous, PRN **OR** calcium gluconate 4 g in dextrose 5 % 100 mL IVPB, 4 g, Intravenous, PRN  sodium phosphate 6 mmol in dextrose 5 % 250 mL IVPB, 6 mmol, Intravenous, PRN **OR** sodium phosphate 12 mmol in dextrose 5 % 250 mL IVPB, 12 mmol, Intravenous, PRN **OR** sodium phosphate 18 mmol in dextrose 5 % 500 mL IVPB, 18 mmol, Intravenous, PRN **OR** sodium phosphate 24 mmol in dextrose 5 % 500 mL IVPB, 24 mmol, Intravenous, PRN  sodium chloride flush 0.9 % injection 10 mL, 10 mL, Intravenous, 2 times per day  sodium chloride flush 0.9 % injection 10 mL, 10 mL, Intravenous, PRN  acetaminophen (TYLENOL) tablet 650 mg, 650 mg, Oral, Q6H PRN **OR** acetaminophen (TYLENOL) suppository 650 mg, 650 mg, Rectal, Q6H PRN  ondansetron (ZOFRAN) injection 4 mg, 4 mg, Intravenous, Q6H PRN  pantoprazole (PROTONIX) injection 40 mg, 40 mg, Intravenous, Daily **AND** sodium chloride (PF) 0.9 % injection 10 mL, 10 mL, Intravenous, Daily  chlorhexidine (PERIDEX) 0.12 % solution 15 mL, 15 mL, Mouth/Throat, BID  glucose (GLUTOSE) 40 % oral gel 15 g, 15 g, Oral, PRN  dextrose 50 % IV solution, 12.5 g, Intravenous, PRN  glucagon (rDNA) injection 1 mg, 1 mg, Intramuscular, PRN  dextrose 5 % solution, 100 mL/hr, Intravenous, PRN  levetiracetam (KEPPRA) 1000 mg/100 mL IVPB, 1,000 mg, Intravenous, Q12H  vancomycin (VANCOCIN) intermittent dosing (placeholder), , Other, RX Placeholder  heparin 25,000 unit in sodium chloride 0.45% 250 mL infusion, 7.3 mL/hr, Intravenous, Continuous  Physical    VITALS:  /65   Pulse 167   Temp 99 °F (37.2 °C) (Axillary)   Resp 19   Ht 5' 7\" (1.702 m)   Wt 218 lb 4.1 oz (99 kg)   LMP 2018 (Exact Date)   SpO2 98%   BMI 34.18 kg/m²   TEMPERATURE:  Current - Temp: 99 °F (37.2 °C);  Max - Temp  Av.7 °F (37.1 °C)  Min: 98.5 °F (36.9 °C)  Max: 99 °F (37.2 °C)  PULSE OXIMETRY RANGE: SpO2  Av.2 %  Min: 93 %  Max: 98 %  24HR INTAKE/OUTPUT:      Intake/Output Summary (Last 24 hours) at 2020 1621  Last data filed at 2020 1319  Gross per 24 hour   Intake 1902 ml   Output 915 ml   Net 987 ml       CONSTITUTIONAL:  Intubated sedated        MUSCULOSKELETAL:  There is no redness, warmth, or swelling of the joints. EXTREMETIES:some mild mottling  Data      Recent Labs     09/14/20  0455 09/15/20  0606 09/16/20  0510   WBC 12.7* 15.6* 19.8*   HGB 10.1* 12.0 10.3*   HCT 32.6* 38.4 31.8*   PLT 94* 110* 114*   MCV 85.4 84.3 83.9        Recent Labs     09/15/20  0033 09/15/20  0606 09/16/20  0510   * 134* 133*   K 4.1 4.1 4.1    103 100   CO2 24 24 23   PHOS 2.0* 2.0* 3.3   BUN 18 20 47*   CREATININE 1.0 1.1 2.3*     Recent Labs     09/14/20  0455 09/15/20  0606 09/16/20  0510   * 494* 220*   ALT 1,082* 1,037* 554*   BILIDIR 1.2* 1.1* 0.9*   BILITOT 1.7* 1.4* 1.1*   ALKPHOS 104 143* 129       Magnesium:    Lab Results   Component Value Date    MG 2.40 09/16/2020    MG 2.50 09/15/2020    MG 2.50 09/14/2020       Imaging Ct Head Wo Contrast    Result Date: 9/11/2020  EXAMINATION: CT OF THE HEAD WITHOUT CONTRAST  9/11/2020 3:33 am TECHNIQUE: CT of the head was performed without the administration of intravenous contrast. Dose modulation, iterative reconstruction, and/or weight based adjustment of the mA/kV was utilized to reduce the radiation dose to as low as reasonably achievable. COMPARISON: None. HISTORY: ORDERING SYSTEM PROVIDED HISTORY: post cardiac arrest- once pt stable TECHNOLOGIST PROVIDED HISTORY: Reason for exam:->post cardiac arrest- once pt stable Has a \"code stroke\" or \"stroke alert\" been called? ->No Is the patient pregnant?->No Reason for Exam: post cardiac arrest Acuity: Unknown Type of Exam: Ongoing FINDINGS: Motion limited study, despite repeat acquisition. BRAIN/VENTRICLES: Within the limitations of motion artifact, there is no convincing evidence for acute intracranial pathology. Gray/white matter differentiation is grossly preserved. There is remote left thalamic lacunar infarction. No definite intracranial hemorrhage, mass effect, or midline shift. ORBITS: The visualized portion of the orbits demonstrate no acute abnormality.  SINUSES: The visualized paranasal sinuses and mastoid air cells demonstrate no acute abnormality. SOFT TISSUES/SKULL:  No acute abnormality of the visualized skull or soft tissues. Motion limited study. No convincing evidence for acute intracranial abnormality identified within the limitations of motion artifact. If there is persistent clinical suspicion for intracranial abnormality, repeat study should be considered. Xr Chest Portable    Result Date: 9/12/2020  EXAMINATION: ONE XRAY VIEW OF THE CHEST 9/12/2020 2:11 am COMPARISON: None. HISTORY: ORDERING SYSTEM PROVIDED HISTORY: respiratory failure TECHNOLOGIST PROVIDED HISTORY: Reason for exam:->respiratory failure Reason for Exam: respiratory failure Acuity: Acute Relevant Medical/Surgical History: hx asthma FINDINGS: Endotracheal tube is noted in place with the distal tip located 3.8 cm superior to the rahat. The heart is mildly to moderately enlarged with otherwise unremarkable configuration. The mediastinal contours are within normal limits. Suspected mild to moderate bilateral pleural effusions are seen with adjacent bibasilar atelectasis and/or airspace disease. . Bones and soft tissues are unremarkable. 1. Suspected mild to moderate bilateral pleural effusions with adjacent bibasilar atelectasis and/or airspace disease. 2. Mild to moderate cardiomegaly. 3. Recommend retraction of endotracheal tube 0.7 cm. Xr Chest Portable    Result Date: 9/11/2020  EXAMINATION: ONE XRAY VIEW OF THE CHEST 9/11/2020 5:35 pm COMPARISON: 09/11/2020 HISTORY: ORDERING SYSTEM PROVIDED HISTORY: verify vas cath placement TECHNOLOGIST PROVIDED HISTORY: Reason for exam:->verify vas cath placement Reason for Exam: line placement Acuity: Acute Type of Exam: Initial FINDINGS: A right jugular triple-lumen catheter terminates over the superior aspect of the right atrium. Endotracheal tube terminates 2 cm cephalad to the rahat. The cardiac silhouette remains prominent.   There are hazy opacities in the lung bases, greater on the right. Right jugular venous catheter and endotracheal tube project in normal positions. No pneumothorax. Bilateral airspace disease and possible layered pleural effusions, consistent with pulmonary edema and atelectasis. Pneumonia is a differential possibility. Xr Chest Portable    Result Date: 9/11/2020  EXAMINATION: ONE XRAY VIEW OF THE CHEST 9/11/2020 9:46 am COMPARISON: Prior study(s) most recent 1 hour earlier. HISTORY: ORDERING SYSTEM PROVIDED HISTORY: Check ETT placement TECHNOLOGIST PROVIDED HISTORY: Reason for exam:->Check ETT placement Reason for Exam: ETT position. pulled back Acuity: Acute Type of Exam: Initial FINDINGS: The endotracheal tube is in satisfactory position approximately 3 cm above the rahat. The heart is upper normal as are pulmonary vessels. This is accentuated with portable technique and low lung volume. The left lung base is poorly seen with portable technique and overlying monitor or defibrillator device. Right lung is clear. Satisfactory position of the endotracheal tube 3 cm above the rahat. The heart is upper normal size and vessels are borderline congested. Xr Chest Portable    Result Date: 9/11/2020  EXAMINATION: ONE XRAY VIEW OF THE CHEST 9/11/2020 7:42 am COMPARISON: 09/11/2020 at 02:24 HISTORY: ORDERING SYSTEM PROVIDED HISTORY: post intubation/ follow up TECHNOLOGIST PROVIDED HISTORY: Reason for exam:->post intubation/ follow up Reason for Exam: worsening sob, fighting ventilation Acuity: Acute Type of Exam: Initial FINDINGS: Tip of endotracheal tube is approximately 1.3 cm proximal to the rahat. Cardiac leads project over the chest.  Pad is seen projecting over the left chest.  Diffuse bilateral heterogeneous pulmonary opacity is again seen. Costophrenic angles are not well visualized. No gross pneumothorax. Cardiac and mediastinal silhouettes are similar to prior.      Endotracheal tube has tip approximately 1.3 cm proximal to the rahat and should be retracted approximately 2 cm. Findings are again suggestive of pulmonary edema with small pleural effusions. Pneumonia is not excluded in the appropriate clinical setting. Findings were called by the radiology call center. Xr Chest Portable    Result Date: 9/11/2020  EXAMINATION: ONE XRAY VIEW OF THE CHEST 9/11/2020 2:06 am COMPARISON: CT 4 hours prior HISTORY: ORDERING SYSTEM PROVIDED HISTORY: Et tube placement TECHNOLOGIST PROVIDED HISTORY: Reason for exam:->Et tube placement Reason for Exam: Et tube placement Acuity: Acute Type of Exam: Initial FINDINGS: Endotracheal tube terminates approximately 2 cm above the rahat. Cardiomegaly, pulmonary vascular congestion, and moderate bilateral effusions remain unchanged. Osseous structures and soft tissues are grossly intact. Cardiomegaly, pulmonary vascular congestion, and bilateral pleural effusions. Endotracheal tube terminates approximately 2 cm above the rahat. Ct Chest Pulmonary Embolism W Contrast    Addendum Date: 9/10/2020    ADDENDUM: Findings were discussed with Dr. Irina Montenegro at 10:49 pm on 9/10/2020. Result Date: 9/10/2020  EXAMINATION: CTA OF THE CHEST 9/10/2020 9:36 pm TECHNIQUE: CTA of the chest was performed after the administration of intravenous contrast.  Multiplanar reformatted images are provided for review. MIP images are provided for review. Dose modulation, iterative reconstruction, and/or weight based adjustment of the mA/kV was utilized to reduce the radiation dose to as low as reasonably achievable. COMPARISON: None. HISTORY: ORDERING SYSTEM PROVIDED HISTORY: PATRICE alexandre TECHNOLOGIST PROVIDED HISTORY: Reason for exam:->tachy, SOB Reason for Exam: tachy, SOB Acuity: Acute Type of Exam: Initial FINDINGS: Pulmonary Arteries: Pulmonary arteries are adequately opacified for evaluation. However, evaluation is limited due to respiratory motion artifact.   There is suspected subsegmental filling defect +---------+----+---------------------------------+ Velocities are measured in cm/s ; Diameters are measured in mm LE Duplex Measurements +--------------++-----+-----+----------++----+-----+----------+ ! ! !Right! ! Left      !!    !     !          ! +--------------++-----+-----+----------++----+-----+----------+ ! Location      ! !PSV  ! Ratio! Wave Desc. !!PSV ! Ratio! Wave Desc.! +--------------++-----+-----+----------++----+-----+----------+ ! Prox SFA      !!97.9 ! ! Triphasic !!172 ! ! Triphasic ! +--------------++-----+-----+----------++----+-----+----------+ ! Mid SFA       !!77.4 !0.79 ! Triphasic !!    !     !          ! +--------------++-----+-----+----------++----+-----+----------+ ! Dist SFA      !!57.5 !0.74 ! Triphasic !!    !     !          ! +--------------++-----+-----+----------++----+-----+----------+ ! Prox Popliteal!!46   !0.8  ! Triphasic !!42.8!0.25 ! Triphasic ! +--------------++-----+-----+----------++----+-----+----------+ ! Dist Popliteal!!79.7 !1.73 ! Triphasic !!    !     !          ! +--------------++-----+-----+----------++----+-----+----------+ ! Mid PTA       !!65.1 !0.82 ! Triphasic !!44.4!1.04 ! Triphasic ! +--------------++-----+-----+----------++----+-----+----------+ ! Mid YAZ       !!65.1 !0.82 ! Triphasic ! !48.7!1.14 ! Triphasic ! +--------------++-----+-----+----------++----+-----+----------+    Vl Extremity Venous Bilateral    Result Date: 9/11/2020  Lower Extremities DVT Study  Demographics   Patient Name       Erasmo Wheeler   Date of Study      09/11/2020       Gender               Female   Patient Number     [id number]       Date of Birth        1970   Visit Number       [id number]        Age                  53 year(s)   Accession Number   [id number]       Room Number          2104   Corporate ID       [id number]          Sonographer          Marcelo Maynard RVT   Ordering Physician Celina Sethi MD  Interpreting         [facility] Vascular Physician            Cecelia Hurst MD  Procedure Type of Study:   Veins:Lower Extremities DVT Study, VASC EXTREMITY VENOUS DUPLEX BILATERAL. Vascular Sonographer Report  Impressions Right Impression Limited study. Patient was seizing during the scan and had an arterial line in the right groin that was actively bleeding. No evidence of deep vein or superficial vein thrombosis in the visualized vessels in the right leg. Left Impression Limited study. Patient was seizing during the scan. No evidence of deep vein or superficial vein thrombosis in the visualized vessels in the left leg. Conclusions   Summary   Limited study. Patient was seizing during the scan. No evidence of deep vein or superficial vein thrombosis in the visualized  vessels in bilateral legs. Signature   ------------------------------------------------------------------  Electronically signed by Cecelia Hurst MD (Interpreting  physician) on 09/11/2020 at 05:31 PM  ------------------------------------------------------------------  Patient Status:Routine. Study [address] Vascular Lab. Technical Quality:Limited visualization. Risk Factors History +---------+----+---------------------------------+ ! Diagnosis! Date! Comments                         ! +---------+----+---------------------------------+ ! Other    ! ! Factor V Leiden Clotting Disorder! +---------+----+---------------------------------+ ! Other    ! !DVT/SVT in left leg in 1999      ! +---------+----+---------------------------------+ Velocities are measured in cm/s ; Diameters are measured in mm Right Lower Extremities DVT Study Measurements Right 2D Measurements +--------------+----------+---------------+----------+ ! Location      ! Visualized! Compressibility! Thrombosis! +--------------+----------+---------------+----------+ ! Prox Femoral  !Yes       ! Yes            ! None      ! +--------------+----------+---------------+----------+ ! Mid Femoral   !Yes       ! Yes !None      ! +--------------+----------+---------------+----------+ ! Dist Femoral  !Yes       ! Yes            ! None      ! +--------------+----------+---------------+----------+ ! Popliteal     !Yes       ! Yes            ! None      ! +--------------+----------+---------------+----------+ ! GSV Below Knee! Yes       ! Yes            ! None      ! +--------------+----------+---------------+----------+ ! Soleal        !Yes       ! Yes            ! None      ! +--------------+----------+---------------+----------+ ! PTV           ! Partial   !Yes            ! None      ! +--------------+----------+---------------+----------+ ! ATV           ! Yes       ! Yes            ! None      ! +--------------+----------+---------------+----------+ ! Peroneal      !Partial   !Yes            ! None      ! +--------------+----------+---------------+----------+ ! GSV Calf      ! Yes       ! Yes            ! None      ! +--------------+----------+---------------+----------+ Right Doppler Measurements +---------+------+------+------------+ ! Location ! Signal!Reflux! Reflux (sec)! +---------+------+------+------------+ ! Popliteal!Phasic!      !            ! +---------+------+------+------------+ Left Lower Extremities DVT Study Measurements Left 2D Measurements +---------+----------+---------------+----------+ ! Location ! Visualized! Compressibility! Thrombosis! +---------+----------+---------------+----------+ ! Popliteal!Yes       ! Yes            ! None      ! +---------+----------+---------------+----------+ ! Gastroc  ! Yes       ! Yes            ! None      ! +---------+----------+---------------+----------+ ! Soleal   !Yes       ! Yes            ! None      ! +---------+----------+---------------+----------+ ! PTV      ! Partial   !Yes            ! None      ! +---------+----------+---------------+----------+ ! ATV      ! Yes       ! Yes            ! None      ! +---------+----------+---------------+----------+ ! Peroneal !Partial   !Yes            ! None      ! +---------+----------+---------------+----------+ ! GSV Calf ! Yes       ! Yes            ! None      ! +---------+----------+---------------+----------+ ! SSV      ! Partial   !Yes            ! None      ! +---------+----------+---------------+----------+ Left Doppler Measurements +---------+------+------+------------+ ! Location ! Signal!Reflux! Reflux (sec)! +---------+------+------+------------+ ! Popliteal!Phasic!      !            ! +---------+------+------+------------+      Problem List  Patient Active Problem List   Diagnosis    Factor V Leiden mutation ([facility]    Pulmonary embolus (HCC)    Lactic acidosis    CAP (community acquired pneumonia)    Acute CH[facility]congestive heart failure) ([facility]    Bilateral pulmonary embolism (HCC)    Cardiac arrest ([facility]    Seizures ([facility])    Acute respiratory failure with hypoxia ([facility])    Acute kidney injury ([facility]    Metabolic acidosis    Congestive heart failure ([facility])    Pulmonary hypertension (HCC)    Multifocal pneumonia    Septic shock ([facility])    Elevated LFTs    Thrombocytopenia (HCC)    Coagulopathy (HCC)    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) ([facility])    Acute encephalopathy    Pneumonia due to organism    Bacteremia    Pleural effusion, bilateral    Elevated lactic acid level    Bacteremia due to methicillin susceptible Staphylococcus aureus (MSSA)    Pneumonia due to methicillin susceptible Staphylococcus aureus (MSSA) ([facility])    Anemia    Acute renal failure ([facility])    Bilateral pleural effusion    Mitral valve insufficiency    Tricuspid valve insufficiency    Class 2 obesity due to excess calories with body mass index (BMI) of 39.0 to 39.9 in adult    Cardiogenic shock (HCC)    Staphylococcal pneumonia ([facility]       ASSESSMENT AND PLAN    Complicated coagulopathy now stable    PE on heparin   PT mri noted-  Neurology can comment on riks of hemorrhagic transformation -I have not stopped heparin for now  plts stable  Has a prolonged recovery it seems -per review of pulm notes -mentioning trach/peg  I would lean to lovenox shots if that is the case upon dc or peg can send noac through peg         Jenny Au MD

## 2020-09-16 NOTE — PLAN OF CARE
Problem: Restraint Use - Nonviolent/Non-Self-Destructive Behavior:  Goal: Absence of restraint indications  Description: Absence of restraint indications  Outcome: Met This Shift     Problem: Restraint Use - Nonviolent/Non-Self-Destructive Behavior:  Goal: Absence of restraint-related injury  Description: Absence of restraint-related injury  Outcome: Met This Shift     Problem: Pain:  Goal: Control of acute pain  Description: Continuing to monitor pain and discomfort. Monitoring pain level using CPOT pain scale. Non- pharmacological measures in place to reduce discomfort/pain. PRN pain meds administeration continues as ordered by physician. Outcome: Ongoing     Problem: Falls - Risk of:  Goal: Will remain free from falls  Description: Falling star program remains in place. Frequent visual monitoring continues. Toileting program inplace. Patient assisted in turning/repositioning at least once every 2 hours, and on a prn basis.    Outcome: Ongoing

## 2020-09-16 NOTE — PROGRESS NOTES
4 Eyes Skin Assessment     The patient is being assess for  Shift Handoff    I agree that 2 RN's have performed a thorough Head to Toe Skin Assessment on the patient. ALL assessment sites listed below have been assessed. Areas assessed by both nurses: Yes  [x]   Head, Face, and Ears   [x]   Shoulders, Back, and Chest  [x]   Arms, Elbows, and Hands   [x]   Coccyx, Sacrum, and IschIum  [x]   Legs, Feet, and Heels        Does the Patient have Skin Breakdown?   No         Cash Prevention initiated:  Yes   Wound Care Orders initiated:  NA      [facility] nurse consulted for Pressure Injury (Stage 3,4, Unstageable, DTI, NWPT, and Complex wounds), New and Established Ostomies:  NA      Nurse 1 eSignature: Electronically signed by Paradise Perez RN on 9/16/20 at 7:16 AM EDT    **SHARE this note so that the co-signing nurse is able to place an eSignature**    Nurse 2 eSignature: Electronically signed by Arash Schmitz RN on 9/16/20 at 7:02 PM EDT

## 2020-09-16 NOTE — PROGRESS NOTES
Pulmonary Progress Note    CC:  Follow up cardiac arrest, Pulmonary emboli, Shock    Subjective:  Remains on the ventilator with FIO2 at 30% PEEP of 5  Slightly more awake, not following commands   Moving lower legs but not arms       Intake/Output Summary (Last 24 hours) at 9/16/2020 0816  Last data filed at 9/16/2020 0756  Gross per 24 hour   Intake 2492.7 ml   Output 1265 ml   Net 1227.7 ml         PHYSICAL EXAM:  Blood pressure 134/85, pulse 107, temperature 99 °F (37.2 °C), temperature source Axillary, resp. rate 18, height 5' 7\" (1.702 m), weight 218 lb 4.1 oz (99 kg), last menstrual period 12/05/2018, SpO2 96 %.'  Gen: No distress. Moves her head and opens eyes to name  Eyes: PERRL. No sclera icterus. No conjunctival injection. ENT: No discharge. Pharynx with ETT and NG  Neck: Trachea midline. No obvious mass. Resp: Clearer lungs, more bloody secretions   CV: Regular rate. Regular rhythm. No murmur or rub. GI: Non-tender. Non-distended. No hernia. Skin: Warm, dry, normal texture and turgor. No nodule on exposed extremities. Lymph: No cervical LAD. No supraclavicular LAD. M/S: No cyanosis. No clubbing. No joint deformity. Neuro:  Moves head to verbal, grimaces to noxious stimuli, flaccid in extremities, not following commands   Ext:   ++ edema    Medications:    Scheduled Meds:   ampicillin-sulbactam  1.5 g Intravenous Q24H    sodium chloride flush  10 mL Intravenous 2 times per day    pantoprazole  40 mg Intravenous Daily    And    sodium chloride (PF)  10 mL Intravenous Daily    chlorhexidine  15 mL Mouth/Throat BID    levetiracetam  1,000 mg Intravenous Q12H    vancomycin (VANCOCIN) intermittent dosing (placeholder)   Other RX Placeholder       Continuous Infusions:   dextrose      heparin (Porcine) 7.3 mL/hr (09/15/20 2210)       PRN Meds:  fentanNYL, LORazepam, potassium chloride, magnesium sulfate, [DISCONTINUED] calcium gluconate **OR** calcium gluconate **OR** calcium gluconate **OR** calcium gluconate, sodium phosphate IVPB **OR** sodium phosphate IVPB **OR** sodium phosphate IVPB **OR** sodium phosphate IVPB, sodium chloride flush, acetaminophen **OR** acetaminophen, ondansetron, glucose, dextrose, glucagon (rDNA), dextrose    Labs:  CBC:   Recent Labs     20  0455 09/15/20  0606 20  0510   WBC 12.7* 15.6* 19.8*   HGB 10.1* 12.0 10.3*   HCT 32.6* 38.4 31.8*   MCV 85.4 84.3 83.9   PLT 94* 110* 114*     BMP:   Recent Labs     09/15/20  0033 09/15/20  0606 20  0510   * 134* 133*   K 4.1 4.1 4.1    103 100   CO2 24 24 23   PHOS 2.0* 2.0* 3.3   BUN 18 20 47*   CREATININE 1.0 1.1 2.3*     LIVER PROFILE:   Recent Labs     20  0455 09/15/20  0606 20  0510   * 494* 220*   ALT 1,082* 1,037* 554*   BILIDIR 1.2* 1.1* 0.9*   BILITOT 1.7* 1.4* 1.1*   ALKPHOS 104 143* 129     PT/INR:   Recent Labs     09/15/20  1830 20  0045 20  0528   PROTIME 12.8 12.5 12.3   INR 1.10 1.08 1.06     APTT:   Recent Labs     20  2112 09/15/20  0453 20  0528   APTT 66.4* 62.1* 67.1*     UA:No results for input(s): NITRITE, COLORU, PHUR, LABCAST, WBCUA, RBCUA, MUCUS, TRICHOMONAS, YEAST, BACTERIA, CLARITYU, SPECGRAV, LEUKOCYTESUR, UROBILINOGEN, BILIRUBINUR, BLOODU, GLUCOSEU, AMORPHOUS in the last 72 hours. Invalid input(s): Avani Kahn  No results for input(s): PH, PCO2, PO2 in the last 72 hours. Films:  Chest imaging reports were reviewed and imaging was reviewed by me and showed ETT, Vascath and NG in place. ETT just above rahat.  Decrease in size of effusions bilaterally     AB.45//79    Cultures:  Sputum:  MSSA  Blood:  MRSA     I reviewed the labs and images listed above    Assessment:   · Cardiac Arrest:  PEA  · Cardiogenic Shock  · Metabolic Encephalopathy   · Bilateral Pulmonary Emboli  · Staph Pneumonia   · MRSA bacteremia   · MORELIA with RRT   · Bilateral Pleural Effusions   · Embolic phenomenon       Plan:  · Full vent support, vent day #5  · Daily ABG   · PRN sedation moving forward (fent/ativan)  · Antibiotics per ID  · GI/DVT prophylaxis:  Protonix, Heparin gtt  · Glycemic control  · Enteral feeding on hold for LEVY  · LEVY today   · Need to confirm no contra-indication to heparin gtt in lieu of embolic phenomenon. Not sure if risk for bleeding     Probably will need trach and peg if decision is to continue with full therapy     Critical care time of 31 minutes. This does not include procedural time. Please see procedural notes for details.     Sloane John, DO [facility]

## 2020-09-16 NOTE — PROGRESS NOTES
NEPHROLOGY PROGRESS NOTE    Patient: Mitzy Matthew MRN: [id number]     YOB: 1970  Age: 53 y.o. Sex: female    Unit: [address] ICU Room/Bed: [id number]/2104-01 Location: [address] [place] [address]     Admitting Physician: Geeta Dailey    Primary Care Physician: No primary care provider on file. LOS: 6 days       Reason for evaluation:   Milly      SUBJECTIVE:      The patient was seen and examined. Notes and labs reviewed. There were no complications over night. Patient's review of systems: she remains intubated, on the vent  Off of sedation  CRRT stopped 9/15/20  Has had some UOP this AM  MRI findings noted      OBJECTIVE:     Vitals:    09/16/20 0749 09/16/20 0900 09/16/20 1000 09/16/20 1100   BP: 134/85 (!) 137/90 (!) 144/79 (!) 142/82   Pulse: 107 100 109 108   Resp: 18 18 21 19   Temp: 99 °F (37.2 °C)      TempSrc: Axillary      SpO2: 96% 97% 96% 96%   Weight:       Height:           Intake and Output:      Intake/Output Summary (Last 24 hours) at 9/16/2020 1214  Last data filed at 9/16/2020 0756  Gross per 24 hour   Intake 1852 ml   Output 915 ml   Net 937 ml       Continuous Infusions:   dextrose      heparin (Porcine) 7.3 mL/hr (09/15/20 2210)       Exam:   CONSTITUTIONAL/PSYCHIATRY: intubated, on the vent  RESPIRATORY: on the vent. Auscultation: EBBS  CARDIOVASCULAR: Auscultation: RRR mildly tach  ycardic . Edema: 1+ improving  GASTROINTESTINAL: Soft, nontender, Hahn in place  EXTREMITIES:  No cyanosis or clubbing. SKIN: Warm and dry.   R IJ Vasc cath in place      LABS:   RFP:   Recent Labs     09/14/20  1804 09/15/20  0033 09/15/20  0606 09/16/20  0510   * 135* 134* 133*   K 4.1 4.1 4.1 4.1    103 103 100   CO2 24 24 24 23   BUN 18 18 20 47*   CREATININE 1.1 1.0 1.1 2.3*   CALCIUM 8.2* 8.3 8.2* 8.4   MG 2.50*  --  2.50* 2.40   PHOS 2.0* 2.0* 2.0* 3.3   GFRAA >60 >60 >60 27*       Liver panel:  Recent Labs     09/14/20  0455 09/15/20  0606 09/16/20  0510   * 494* 220*   ALT 1,082* 1,037* 554*       Endocrine:   @[id number](VitD,PTH,TSH,aldosterone,renin activity,cortisol,metanephrine)@    CBC:   Recent Labs     09/14/20  0455 09/15/20  0606 09/16/20  0510   WBC 12.7* 15.6* 19.8*   HGB 10.1* 12.0 10.3*   HCT 32.6* 38.4 31.8*   MCV 85.4 84.3 83.9   PLT 94* 110* 114*         ASSESSMENT and PLAN:     1. MORELIA: likely secondary to ischemia ATI. She had additional exposure to radiocontrast  Initiated on IHD on 9/11/20. Transitioned to CRRT on 9/12/20  CRRT stopped 9/15/20  Hemodynamically stable Cr rising and remains oliguric Hold off on HD today Will start IV Lasix   ZENON is neg, C3 is low at 54   - Will monitor UO, urine microscopy, proteinuria and determine whether further investigation is deemed necessary. Follow up on limited serology w/u (ANCA, APLA)    2. Hyperkalemia/metabolic acidosis: resolved    3. S/P PEA arrest 9/11/20    4. Acute hypoxic respiratory failure: secondary to 2. Also has bilateral pleural effusions, PE and possible Pnia. on Heparin gtt    5. MRSA bacteremia: on Vancomycin Pharmacy dosing MRI  Shows bilateral acute sub acute infarcts Endocarditis needs to be ruled out    6. Severe CMP with reduced RV function/severe TR/MR, Grade 3 diastolic dysfunction, hepatic congestion and anasarca   - wt down significantly with CRRT      7. Factor V leiden deficiency with PE/coagulopathy/DIC: per hematology    8. Elevated liver enzymes: likely ischemic injury, ?additional hepatic congestion.  Enzymes improving    8 Hypophosphatemia corrected        Signed By: Gari Hammans, MD

## 2020-09-16 NOTE — ADT AUTH CERT
lactic acid level [R79.89]    · Bacteremia due to methicillin susceptible Staphylococcus aureus (MSSA) [R78.81]    · Pneumonia due to methicillin susceptible Staphylococcus aureus (MSSA) ([facility]) [J15.211]    · Anemia [D64.9]    · Acute renal failure (HCC) [N17.9]    · Bilateral pleural effusion [J90]    · Mitral valve insufficiency [I34.0]    · Tricuspid valve insufficiency [I07.1]    · Class 2 obesity due to excess calories with body mass index (BMI) of 39.0 to 39.9 in adult [E66.09, Z68.39]    · Pneumonia due to organism [J18.9]    · Bacteremia [R78.81]    · Cardiac arrest ([address] [I46.9]    · Seizures ([address] [R56.9]    · Acute respiratory failure with hypoxia ([facility]) [J96.01]    · Acute kidney injury ([address] [V74.3]    · Metabolic acidosis [C12.6]    · Congestive heart failure ([facility]) [I50.9]    · Pulmonary hypertension ([address] [I27.20]    · Multifocal pneumonia [J18.9]    · Septic shock ([address] [A41.9, R65.21]    · Elevated LFTs [R94.5]    · Thrombocytopenia ([address] [D69.6]    · Coagulopathy ([address] [D68.9]    · 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) ([address] [D65]    · Acute encephalopathy [G93.40]    · Pulmonary embolus ([facility]) [I26.99]    · Lactic acidosis [E87.2]    · CAP (community acquired pneumonia) [J18.9]    · Acute CHF (congestive heart failure) ([address] [I50.9]    · Bilateral pulmonary embolism ([address] [I26.99]    · Factor V Leiden mutation ([address] [D68.51]       Shock, septic vs cardiogenic    Severe mitral and tricuspid regurgitation on TTE, moderate pulmonary HTN, large bilateral pleural effusions and hepatic congestion on CT. MRSA bacteremia. Off pressors    - ID consulted    - cardiology following         MRSA bacteremia, PNA    Lab clarifying susceptibilities.  mecA positive    - ID following, switched to Unasyn, continue vanc    - treat as endocarditis with prolonged abx course         Cardiomyopathy, acute on chronic systolic HF    Severe diffuse hypokinesis with severely reduced rt ventricular Keppra 1000 mg BID.      4.  Will need repeat EEG monitoring at some point, preferably off sedation.      5.  Continue supportive care.         Per Nephrology PN:    ASSESSMENT and PLAN:         1. MORELIA: likely secondary to ischemia ATI. She had additional exposure to radiocontrast    Initiated on IHD on 9/11/20. Transitioned to CRRT on 9/12/20    CRRT stopped this AM    Hemodynamically stable    Will do iHD    ZENON is neg, C3 is low at 54                - Will monitor UO, urine microscopy, proteinuria and determine whether further investigation is deemed necessary. Follow up on limited serology w/u (ANCA, APLA)              2. Hyperkalemia/metabolic acidosis: resolved         3. S/P PEA arrest 9/11/20         4. Acute hypoxic respiratory failure: secondary to 2. Also has bilateral pleural effusions, PE and possible Pnia. on Heparin gtt         5. MRSA bacteremia: on Vancomycin Pharmacy dosing         6. Severe CMP with reduced RV function/severe TR/MR, Grade 3 diastolic dysfunction, hepatic congestion and anasarca                - wt down significantly with CRRT         7. Factor V leiden deficiency with PE/coagulopathy/DIC: per hematology         8. Elevated liver enzymes: likely ischemic injury, ?additional hepatic congestion.  LFTs stable         8 Hypophosphatemia replace       Per iD PN:    Assessment:         Patient Active Problem List    Diagnosis    · Factor V Leiden mutation ([facility]    · Pulmonary embolus (HCC)    · Lactic acidosis    · CAP (community acquired pneumonia)    · Acute CHF (congestive heart failure) (HCC)    · Bilateral pulmonary embolism (HCC)    · Cardiac arrest (HCC)    · Seizures (HCC)    · Acute respiratory failure with hypoxia (HCC)    · Acute kidney injury ([facility]    · Metabolic acidosis    · Congestive heart failure (HCC)    · Pulmonary hypertension (HCC)    · Multifocal pneumonia    · Septic shock (HCC)    · Elevated LFTs    · Thrombocytopenia (HCC)    · Coagulopathy (HCC)    · 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)    · Acute encephalopathy    · Pneumonia due to organism    · Bacteremia    · Pleural effusion, bilateral    · Elevated lactic acid level    · Bacteremia due to methicillin susceptible Staphylococcus aureus (MSSA)    · Pneumonia due to methicillin susceptible Staphylococcus aureus (MSSA) (HCC)    · Anemia    · Acute renal failure (HCC)    · Bilateral pleural effusion    · Mitral valve insufficiency    · Tricuspid valve insufficiency    · Class 2 obesity due to excess calories with body mass index (BMI) of 39.0 to 39.9 in adult    · Cardiogenic shock (HCC)    · Staphylococcal pneumonia (HCC)       Septic shock    Shock liver    Cardiac arrest    Pulmonary embolism    Encephalopathy    Possible seizure episode    Resp failure now on the ventilator    WBC elevation    MRSA bacteremia    Sputum cx reported as MSSA interestingly and Micro lab did additional testing    TTE is abnormal with mitral valve calcification and myxomatous changes with Mitral regurgitation    Factor V leiden def    MORELIA on CRRT         She remains critically ill in shock and resp failure from on going bacteremia and PNA and MORELIA         WBC slowly trend down on IV abx and Micro repeat sensitivities and repeat blood cx requested will need LEVY at some point to assess her valves           MRI brain now reported to have multiple CVA possible embolic in origin and given severe Mitral regurgitation with MRSA bacteremia this is consistent with Endocarditis will need further evaluation of Mitral valve and Cardiology following closely              Labs, Microbiology, Radiology and all the pertinent results from current hospitalization and  care every where were reviewed  by me as a part of the evaluation    Plan:    1. Cont IV Vancomycin  By levels aim for level 15    2. Aim for trough 15 -20    3.  Cont IV Unasyn for now concern for aspiration event with Code blue  If WBC continue to rise will change to IV Cefepime or Meropenem    4. WBC remains elevated will trend    6. Will need LEVY at some point given the abnormal TTE to check the mitral valve will d/w Cardiology    7. MRI brain abnormal from CVA multiple likely embolic           Discussed with patient/Family and Nursing staff    · Patient is critically ill and has a potentially life threatening infection that poses threat to life/bodily function. · There is potential for worsening infection/ sudden clinically significant or life-threatening deterioration in the patient's condition without appropriate antimicrobial therapy. · Complex medical decision making process was involved to select appropriate antimicrobial agents to reverse the cause of patient's severe infection/ illness.     · Antimicrobial therapy requires intensive monitoring for toxicity and frequent dose adjustments to prevent toxicity and permanent end-organ dysfunction           Pulmonary Embolism - Care Day 5 (9/14/2020) by Ramila Winters RN         Review Status  Review Entered    Completed  9/16/2020 09:33        Criteria Review       Care Day: 5 Care Date: 9/14/2020 Level of Care: ICU    Guideline Day 3    Level Of Care    (X) ICU or floor    9/16/2020 9:33 AM EDT by Marni Snider      ICU    Clinical Status    ( ) * No bleeding or evidence of new emboli    ( ) * Oxygenation at baseline or improved    9/16/2020 9:33 AM EDT by Marni Snider      95-97% on vent    ( ) * Dangerous arrhythmia absent    ( ) * Pain absent or managed    Activity    ( ) * Ambulatory    Interventions    (X) Pulse oximetry    Medications    (X) Anticoagulants [I]    9/16/2020 9:33 AM EDT by Marni Snider      heparin gtt continuous    * Milestone    Additional Notes    9/14  Day 5       Pt remains in ICU       Vitals: t: 36.1 p: 80 rr: 19 bp: 116/102 spo2: 96-98% on 30% fio2 on vent       Abn labs:  na: 135, inr: 1.57, wbc: 12.7, hgb: 10.1, plt: 94, abg's: pH: 7.458, phos: 1.9, alt: 1082, ast: mitral and tricuspid regurgitation on TTE, moderate pulmonary HTN, large bilateral pleural effusions and hepatic congestion on CT    - MRSA bacteremia, skin mottling, concern for potential coagulopathy with DIC    - s/p Levophed    - vanc and meropennem started initialled.  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    - heparin drip per heme/onc         Coagulopathy:  Acute and chronic    - chronically has Factor V Leiden, on coumadin    - has had oozing on heparin drip, sepsis with bacteremia --> potential DIC or shock liver or vitamin K deficiency from coumadin/antibiotic use    - heme/onc recs:  if bleeding on heparin, check INR and fibrinogen for potential cryo therapy or fibrinogen         Oliguric/Anuric MORELIA:    - suboptimal response to lasix in attempts to make non-oliguric    - CRRT started    - ZENON, ANCA, complement levels, antiphospholipid antibodies per nephro         Per Hem/Onc PN:  ASSESSMENT AND PLAN      Complicated coagulopathy in setting of acute PE/sepsis/dic vs shock liver    Stable    Her fibrinogen/inr are improving thus dont anticipate a need for cryo or vit k at this point    On heparin for now -transitioning to alternative depends on recovery of her kidneys etc          Need to moniter be aware of potential for HIT_but not for 5-14 days    Again would not look for this until wed at the earliest but again plts could drop from infection /dic    No clinical signs of this right now         Per Nephrology PN:  ASSESSMENT and PLAN:         1. MORELIA: likely secondary to ischemia ATI. She had additional exposure to radiocontrast    Initiated on IHD on 9/11/20. Transitioned to CRRT on 9/12/20    Tolerating UF goal 200 ml/hr Wt down to 231 lbs    Electrolytes replaced per protocol (Phos)         ZENON is neg, C3 is low at 54                - Will monitor UO, urine microscopy, proteinuria and determine whether further investigation is deemed necessary. Follow up on limited serology w/u (ANCA, APLA)    Continue CRRT today until system goes down, will transition to iHD then         2. Hyperkalemia/metabolic acidosis: improved         3. S/P PEA arrest 9/11/20         4. Acute hypoxic respiratory failure: secondary to 2. Also has bilateral pleural effusions, PE and possible Pnia.         5. MRSA bacteremia: on Vancomycin Pharmacy dosing         6. Severe CMP with reduced RV function/severe TR/MR, Grade 3 diastolic dysfunction, hepatic congestion and anasarca                - Plan on fluid removal with CRRT per above         7. Factor V leiden deficiency with PE/coagulopathy/DIC: per hematology         8. Elevated liver enzymes: likely ischemic injury, ?additional hepatic congestion. AST and ALT improving today         8 Hypophosphatemia replaced         Per Neurology PN:  Impression    1.  S/p PEA arrest w/ relative pickering achievement of ROSC.  Reportedly continues to experiencing some myoclonic jerking when off propofol.           These spells were previously described as LLE sporadic movements, head turning from side to side, roving eye movements, and intermittent almost rhythmic twitching (duration 3-5 sec) of her RUE.         Initial CT head was w/out any acute findings.   EEG w/ burst suppression though on sedation at the time.           Medically complex patient - on CRRT, PE/coaguloapthy, bacteremia/?endocarditis, PNA.           Recommendations    1.  MRI brain w/o, hopefully today.      2.  Continue Keppra 1000 mg BID.      3.  Would attempt to wean sedation as tolerated.  Would recommend EEG w/ lighter sedation or preferably off.      4.  If she continues w/ myoclonic jerking off propofol, there may be a role for cvEEG.       5.  ID covering for possible CNS infection.      6.  Additional medical management per appropriate clinician.           Per Pulmonary PN:    Assessment:    · Cardiac Arrest:  PEA    · Cardiogenic Shock    · Metabolic Encephalopathy    · Bilateral Pulmonary Emboli    · Staph Pneumonia    · MRSA bacteremia    · MORELIA with RRT    · Bilateral Pleural Effusions              Plan:    · Full vent support, vent day #3    · Sedation for myoclonus.  May need to stop sedation completely to get a better assessment of the neurological function    · MRI today if CRRT stops.  Needs volume off due to CXR findings    · Retract ETT by 1 cm    · Antibiotics per ID    · GI/DVT prophylaxis:  Protonix, Heparin    · Glycemic control    · Enteral feeding       Per ID PN:  Assessment:         Patient Active Problem List    Diagnosis    · [facility] ([facility]    · Pulmonary embolus (HCC)    · Lactic acidosis    · CAP (community acquired pneumonia)    · Acute CHF (congestive heart failure) (HCC)    · Bilateral pulmonary embolism (HCC)    · Cardiac arrest (HCC)    · Seizures (HCC)    · Acute respiratory failure with hypoxia (HCC)    · Acute kidney injury (HCC)    · Metabolic acidosis    · Congestive heart failure (HCC)    · Pulmonary hypertension (HCC)    · Multifocal pneumonia    · Septic shock (HCC)    · Elevated LFTs    · Thrombocytopenia (HCC)    · Coagulopathy (HCC)    · 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)    · Acute encephalopathy    · Pneumonia due to organism    · Bacteremia    · Pleural effusion, bilateral    · Elevated lactic acid level    · Bacteremia due to methicillin susceptible Staphylococcus aureus (MSSA)    · Pneumonia due to methicillin susceptible Staphylococcus aureus (MSSA) (HCC)    · Anemia    · Acute renal failure (HCC)    · Bilateral pleural effusion    · Mitral valve insufficiency    · Tricuspid valve insufficiency    · Class 2 obesity due to excess calories with body mass index (BMI) of 39.0 to 39.9 in adult       Septic shock    Shock liver    Cardiac arrest    Pulmonary embolism    Encephalopathy    Possible seizure episode    Resp failure now on the ventilator    WBC elevation    MRSA bacteremia    Sputum cx reported as MSSA interestingly and Micro lab doing additional testing    TTE is abnormal with mitral valve calcification and myxomatous changes with Mitral regurgitation    Factor V leiden def    MORELIA on CRRT         She remains critically ill in shock and resp failure from on going bacteremia and PNA and MORELIA         WBC slowly trend down on IV abx and Micro repeat sensitivities and repeat blood cx requested will need LEVY at some point to assess her valves                Labs, Microbiology, Radiology and all the pertinent results from current hospitalization and  care every where were reviewed  by me as a part of the evaluation    Plan:    1. Cont IV Vancomycin  By levels    2. Aim for trough 15 -20    3. Cont IV Unasyn for now concern for aspiration event with Code blue    4. Heparin infusion for PE    5. Blood cx repeat pending    6. Will need LEVY at some point given the abnormal TTE         Discussed with patient/Family and Nursing staff    · Patient is critically ill and has a potentially life threatening infection that poses threat to life/bodily function. · There is potential for worsening infection/ sudden clinically significant or life-threatening deterioration in the patient's condition without appropriate antimicrobial therapy. · Complex medical decision making process was involved to select appropriate antimicrobial agents to reverse the cause of patient's severe infection/ illness.     · Antimicrobial therapy requires intensive monitoring for toxicity and frequent dose adjustments to prevent toxicity and permanent end-organ dysfunction

## 2020-09-16 NOTE — PROGRESS NOTES
Clinical Pharmacy Note  Vancomycin Consult    Refugia Bolus is a 53 y.o. female ordered Vancomycin for MRSA bacteremia; consult received from Dr. Erica Banuelos to manage therapy. Also receiving Unasyn.     Patient Active Problem List   Diagnosis    Factor V Leiden mutation ([facility]    Pulmonary embolus (HCC)    Lactic acidosis    CAP (community acquired pneumonia)    Acute CHF (congestive heart failure) ([facility]    Bilateral pulmonary embolism (HCC)    Cardiac arrest ([facility]    Seizures ([facility]    Acute respiratory failure with hypoxia (HCC)    Acute kidney injury ([facility]    Metabolic acidosis    Congestive heart failure (HCC)    Pulmonary hypertension (HCC)    Multifocal pneumonia    Septic shock (HCC)    Elevated LFTs    Thrombocytopenia (HCC)    Coagulopathy (HCC)    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)    Acute encephalopathy    Pneumonia due to organism    Bacteremia    Pleural effusion, bilateral    Elevated lactic acid level    Bacteremia due to methicillin susceptible Staphylococcus aureus (MSSA)    Pneumonia due to methicillin susceptible Staphylococcus aureus (MSSA) (HCC)    Anemia    Acute renal failure (HCC)    Bilateral pleural effusion    Mitral valve insufficiency    Tricuspid valve insufficiency    Class 2 obesity due to excess calories with body mass index (BMI) of 39.0 to 39.9 in adult    Cardiogenic shock (HCC)    Staphylococcal pneumonia (HCC)       Allergies:  Ibuprofen     Temp max:  Temp (24hrs), Av.7 °F (37.1 °C), Min:97.9 °F (36.6 °C), Max:99.1 °F (37.3 °C)      Recent Labs     20  0455 09/15/20  0606 20  0510   WBC 12.7* 15.6* 19.8*       Recent Labs     09/15/20  0033 09/15/20  0606 20  0510   BUN 18 20 47*   CREATININE 1.0 1.1 2.3*         Intake/Output Summary (Last 24 hours) at 2020 1138  Last data filed at 2020 0756  Gross per 24 hour   Intake 1991 ml   Output 934 ml   Net 1057 ml       Culture Results:  09/10/20 Blood: MRSA   09/11/20 Respiratory: MSSA  09/12/20 Blood: No growth to date   09/13/20 Blood: No growth to date  09/14/20 Blood: No growth to date    Ht Readings from Last 1 Encounters:   09/14/20 5' 7\" (1.702 m)        Wt Readings from Last 1 Encounters:   09/16/20 218 lb 4.1 oz (99 kg)       Assessment:  Day # 6 of vancomycin. Current Regimen: Intermittent dosing based on levels due to MORELIA. Random level with AM labs: 32.6 mg/L. Plan:  Hold vancomycin today. Repeat random level tomorrow with AM labs. Thank you for the consult.      Gavi Multani, PharmD, [employer]

## 2020-09-16 NOTE — PROGRESS NOTES
Hospitalist Progress Note      PCP: No primary care provider on file. Date of Admission: 9/10/2020    Chief Complaint:   Chief Complaint   Patient presents with    Shortness of Breath     HX of asthma. Short of breath for around 1 month. Patient states she just refilled her inhaler today. Hospital Course:  53 y.o. female with past medical history of factor V Leiden deficiency on chronic warfarin, history of blood clots, hyperlipidemia, arthritis, asthma who presents to [facility] with worsening above symptoms of fatigue, intermittent shortness of breath both at rest and on exertion, and extensive of abdominal and leg swelling. In the ED patient was significantly tachycardic, showing some combination of sinus tachycardia versus SVT, uncertain if may be underlying atrial fibrillation with rapid ventricular rate. Labs showed signs of infection as well as elevated proBNP. CTA with contrast was done on the chest and demonstrated left and right lobe PEs in certain segments, active findings suggestive of early patchy pneumonia bilaterally, and moderate bilateral pleural effusions suggestive of heart failure/volume overload. PEA arrest shortly after admission. Subjective: Intubated, unable to complete ROS. Off propofol, not having purposeful movements.      Medications:  Reviewed    Infusion Medications    dextrose      heparin (Porcine) 7.3 mL/hr (09/15/20 2210)     Scheduled Medications    ampicillin-sulbactam  1.5 g Intravenous Q24H    sodium chloride flush  10 mL Intravenous 2 times per day    pantoprazole  40 mg Intravenous Daily    And    sodium chloride (PF)  10 mL Intravenous Daily    chlorhexidine  15 mL Mouth/Throat BID    levetiracetam  1,000 mg Intravenous Q12H    vancomycin (VANCOCIN) intermittent dosing (placeholder)   Other RX Placeholder     PRN Meds: fentanNYL, LORazepam, potassium chloride, magnesium sulfate, [DISCONTINUED] calcium gluconate **OR** calcium gluconate BILITOT 1.7* 1.4* 1.1*   ALKPHOS 104 143* 129     Recent Labs     09/15/20  1830 09/16/20  0045 09/16/20  0528   INR 1.10 1.08 1.06     No results for input(s): Nicole Ayala in the last 72 hours. Urinalysis:      Lab Results   Component Value Date    NITRU Negative 09/10/2020    WBCUA 3-5 09/10/2020    BACTERIA 2+ 09/10/2020    RBCUA 21-50 09/10/2020    BLOODU MODERATE 09/10/2020    SPECGRAV >1.030 09/10/2020    GLUCOSEU Negative 09/10/2020       Radiology:  MRI BRAIN WO CONTRAST   Final Result   Addendum 1 of 1   ADDENDUM:   Results were reported to NP Mc [place] at 3:26 p.m. on September 15,    2020. Final      XR CHEST PORTABLE   Final Result   Decreased layering effusions with improved basilar aeration. Appropriate   life support device positioning. XR CHEST PORTABLE   Final Result   Low-lying endotracheal tube. Recommend retracting 1-2 cm. No substantial change in layering effusions and basilar opacities. The findings were sent to the Radiology Results [address] at 3:53   am on 9/14/2020to be communicated to a licensed caregiver. XR CHEST PORTABLE   Final Result   1. Interval placement of an NG tube, likely within the stomach. 2. Stable appropriate positions of endotracheal tube and right-sided dialysis   catheter. 3. No significant change in appearance of small to moderate bilateral pleural   effusions and bibasilar airspace disease. XR CHEST PORTABLE   Final Result   Supportive lines project in stable positions. Bibasilar opacities, suggestive of layered effusions and atelectasis or   airspace disease. XR CHEST PORTABLE   Final Result   1. Suspected mild to moderate bilateral pleural effusions with adjacent   bibasilar atelectasis and/or airspace disease. 2. Mild to moderate cardiomegaly. 3. Recommend retraction of endotracheal tube 0.7 cm.          XR CHEST PORTABLE   Final Result   Right jugular venous catheter and endotracheal tube project in normal   positions. No pneumothorax. Bilateral airspace disease and possible layered pleural effusions, consistent   with pulmonary edema and atelectasis. Pneumonia is a differential   possibility. XR CHEST PORTABLE   Final Result   Satisfactory position of the endotracheal tube 3 cm above the rahat. The   heart is upper normal size and vessels are borderline congested. VL Extremity Venous Bilateral   Final Result      VL DUP LOWER EXTREMITY ARTERIES BILATERAL   Final Result      XR CHEST PORTABLE   Final Result   Endotracheal tube has tip approximately 1.3 cm proximal to the rahat and   should be retracted approximately 2 cm. Findings are again suggestive of pulmonary edema with small pleural   effusions. Pneumonia is not excluded in the appropriate clinical setting. Findings were called by the radiology call center. CT HEAD WO CONTRAST   Final Result   Motion limited study. No convincing evidence for acute intracranial   abnormality identified within the limitations of motion artifact. If there is persistent clinical suspicion for intracranial abnormality,   repeat study should be considered. XR CHEST PORTABLE   Final Result   Cardiomegaly, pulmonary vascular congestion, and bilateral pleural effusions. Endotracheal tube terminates approximately 2 cm above the rahat. CT CHEST PULMONARY EMBOLISM W CONTRAST   Final Result   Addendum 1 of 1   ADDENDUM:   Findings were discussed with Dr. Michael Nolan at 10:49 pm on 9/10/2020.          Final            Assessment/Plan:    Active Hospital Problems    Diagnosis    Cardiogenic shock ([address] [R57.0]    Staphylococcal pneumonia ([address] [J15.20]    Pleural effusion, bilateral [J90]    Elevated lactic acid level [R79.89]    Bacteremia due to methicillin susceptible Staphylococcus aureus (MSSA) [R78.81]    Pneumonia due to methicillin susceptible Staphylococcus aureus (MSSA) ([facility] [J15.211]    Anemia [D64.9]    Acute renal failure (HCC) [N17.9]    Bilateral pleural effusion [J90]    Mitral valve insufficiency [I34.0]    Tricuspid valve insufficiency [I07.1]    Class 2 obesity due to excess calories with body mass index (BMI) of 39.0 to 39.9 in adult [E66.09, Z68.39]    Pneumonia due to organism [J18.9]    Bacteremia [R78.81]    Cardiac arrest ([facility] [facility] [I46.9]    Seizures ([facility] [facility] [R56.9]    Acute respiratory failure with hypoxia (HCC) [J96.01]    Acute kidney injury ([address] [U82.2]    Metabolic acidosis [Q72.6]    Congestive heart failure (HCC) [I50.9]    Pulmonary hypertension (HCC) [I27.20]    Multifocal pneumonia [J18.9]    Septic shock (HCC) [A41.9, R65.21]    Elevated LFTs [R94.5]    Thrombocytopenia (HCC) [D69.6]    Coagulopathy (HCC) [D68.9]    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) [D65]    Acute encephalopathy [G93.40]    Pulmonary embolus (HCC) [I26.99]    Lactic acidosis [E87.2]    CAP (community acquired pneumonia) [J18.9]    Acute CHF (congestive heart failure) (HCC) [I50.9]    Bilateral pulmonary embolism (HCC) [I26.99]    Factor V Leiden mutation ([facility] [address] [D68.51]     Shock, septic vs cardiogenic  Severe mitral and tricuspid regurgitation on TTE, moderate pulmonary HTN, large bilateral pleural effusions and hepatic congestion on CT. MRSA bacteremia. Off pressors  - ID consulted  - cardiology following    MRSA bacteremia, PNA  Lab clarifying susceptibilities.  mecA positive  - ID following, switched to Unasyn, continue vanc   - TTE today    Acute CVA  MRI with scattered acute to subacute infarcts, likely embolic  - neuro following  - workup for endocarditis as above, LEVY    Cardiomyopathy, acute on chronic systolic HF  Severe diffuse hypokinesis with severely reduced rt ventricular function, severe tricuspid regurgitation and mitral regurgitation, grade 3 diastolic dysfunction  - cardiology following  - RHC/LHC when

## 2020-09-16 NOTE — PROGRESS NOTES
8967 TF stopped    72 [facility], NP at bedside. Updates given. No new orders at this time. 1037 Updates given to pt's Mother. All questions answered at this time. 1140 Updates given to pt's . All questions answered at this time. 1247 Pt's HR increased an sustained >170. ROXANN Fritz notified. Rec'd verbal order for EKG. 1253 EKG obtained and results called to ROXANN Fritz. New orders rec'd for amiodarone bolus and gtt. See MAR    1300 EEG at bedside. [facility] heart called and notified of pt's irregular rhythm. [address] at bedside for LEVY. Awaiting EEG to complete test.     1425 4 mg versed given and 50 mcg of fentanyl given per Dr. Gio Dixon verbal order    1430 Zoll pads placed and pt cardioverted at [address] per Dr. Gio Dixon. 1431 pt cardioverted at 200J per Dr. Gio Dixon. 1434 pt cardioverted at 200J per Dr. Gio Dixon. 1435 50 mcg of fentanyl given per Dr. Gio Dixon verbal order    1437 10 mg labetalol given per DR Gio Dixon verbal order, See MAR.     1440 pt cardioverted at [address] per Dr. Gio Dixon. 1441 pt cardioverted at [address] per Dr. Gio Dixon. [place] [address] phone call from Faustina Kuo NP. New orders rec'd after reviewing EEG.     1708 Paged cardiology regarding pt's HR in 150's-160's despite amio bolus/gtt and one time push of labetalol. Updated Dr Emmy Diego and rec'd telephone order for 10 mg bolus of cardizem and continuous cardizem gtt at 5 mg/hr. 1859 Pt writhing around in bed, PRN medications given, see MAR.     1901 Report and bedside handoff with Clarisa Kline, RN.     Electronically signed by Chay Anderson RN on 9/16/2020 at 7:01 PM

## 2020-09-16 NOTE — PROGRESS NOTES
Clinical Pharmacy Note  Heparin Dosing       Lab Results   Component Value Date    APTT 67.1 09/16/2020     Lab Results   Component Value Date    HGB 10.3 09/16/2020    HCT 31.8 09/16/2020     09/16/2020    INR 1.06 09/16/2020       Current Infusion Rate: 7.3 mL/hr    Plan:  Rate: continue at 7.3 mL/hr  Next aPTT: 0600 9/17/20    Pharmacy will continue to monitor and adjust based on aPTT results.   Margit Claude, PharmD

## 2020-09-16 NOTE — PROGRESS NOTES
Infectious Disease Follow up Notes  Admit Date: 9/10/2020  Hospital Day: 7    Antibiotics :   IV Zosyn   IV Vancomycin     CHIEF COMPLAINT:   Septic shock  Staph aureus bacteremia  MORELIA  SHOCK liver  Lactic acidosis   Pulmonary embolism  Cardiac arrest  CVA likely Embolic        Subjective interval History :  53 y.o. Remains intubated sedated on the vent and WBC rising and concern for seizures per EEG and LEVY completed today. Creat remains elevated off CRRT     Past Medical History:    Past Medical History:   Diagnosis Date    Anticoagulant long-term use     Arthritis     Asthma     Baker's cyst     Depression     [facility] [address]     [facility] V Leiden ([facility] [address] [facility]     H/O blood clots     Heartburn     Hyperlipidemia     MRSA bacteremia 09/10/2020    Osteoarthritis        Past Surgical History:    History reviewed. No pertinent surgical history.     Current Medications:    Outpatient Medications Marked as Taking for the 9/10/20 encounter [facility] Encounter)   Medication Sig Dispense Refill    albuterol sulfate  (90 Base) MCG/ACT inhaler INHALE TWO PUFFS BY MOUTH EVERY 6 HOURS AS NEEDED FOR WHEEZING 18 g 0    vitamin D (ERGOCALCIFEROL) 1.25 MG (21389 UT) CAPS capsule TAKE ONE CAPSULE BY MOUTH ONCE WEEKLY 12 capsule 0    omeprazole (PRILOSEC) 20 MG delayed release capsule TAKE ONE CAPSULE BY MOUTH DAILY 90 capsule 6    warfarin (COUMADIN) 7.5 MG tablet TAKE ONE TABLET BY MOUTH DAILY EXCEPT ON MONDAY AND THURSDAY TAKE ONE-HALF TABLET BY MOUTH AS DIRECTED 21 tablet 5    atorvastatin (LIPITOR) 10 MG tablet TAKE ONE TABLET BY MOUTH DAILY 90 tablet 3    DULoxetine (CYMBALTA) 60 MG extended release capsule TAKE ONE CAPSULE BY MOUTH DAILY 30 capsule 11    BREO ELLIPTA 200-25 MCG/INH AEPB INHALE ONE DOSE BY MOUTH DAILY 1 each 11       Allergies:  Ibuprofen    Immunizations :   Immunization History   Administered Date(s) normal and symmetric, no cranial nerve deficit  Lines:  IJ+   Arizmendi   Rt femoral line    Data Review:    Lab Results   Component Value Date    WBC 19.8 (H) 09/16/2020    HGB 10.3 (L) 09/16/2020    HCT 31.8 (L) 09/16/2020    MCV 83.9 09/16/2020     (L) 09/16/2020     Lab Results   Component Value Date    CREATININE 2.3 (H) 09/16/2020    BUN 47 (H) 09/16/2020     (L) 09/16/2020    K 4.1 09/16/2020     09/16/2020    CO2 23 09/16/2020       Hepatic Function Panel:   Lab Results   Component Value Date    ALKPHOS 129 09/16/2020     09/16/2020     09/16/2020    PROT 5.3 09/16/2020    PROT 7.0 10/19/2011    BILITOT 1.1 09/16/2020    BILIDIR 0.9 09/16/2020    IBILI 0.2 09/16/2020    LABALBU 2.6 09/16/2020       UA:  Lab Results   Component Value Date    COLORU DK YELLOW 09/10/2020    CLARITYU CLOUDY 09/10/2020    GLUCOSEU Negative 09/10/2020    BILIRUBINUR SMALL 09/10/2020    KETUA Negative 09/10/2020    SPECGRAV >1.030 09/10/2020    BLOODU MODERATE 09/10/2020    PHUR 5.5 09/10/2020    PROTEINU >=300 09/10/2020    UROBILINOGEN 1.0 09/10/2020    NITRU Negative 09/10/2020    LEUKOCYTESUR Negative 09/10/2020    LABMICR YES 09/10/2020    URINETYPE Cleancatch 09/10/2020      Urine Microscopic:   Lab Results   Component Value Date    BACTERIA 2+ 09/10/2020    COMU see below 09/10/2020    HYALCAST 3 03/16/2016    WBCUA 3-5 09/10/2020    RBCUA 21-50 09/10/2020    EPIU 11-20 09/10/2020     Vanco  32.6     Creat  2.3        WBC  19.8         Ref Range & Units  09/13/20 1303   HIV Ag/Ab  Non-reactive  Non-Reactive    HIV-1 Antibody  Non-reactive  Non-Reactive    HIV ANTIGEN  Non-reactive  Non-Reactive    HIV-2 Ab  Non-reactive  Non-Reactive    Resulting Agency   [facility]      Ref Range & Units  09/13/20 1303   Hep A IgM  Non-reactive  Non-reactive    Hep B Core Ab, IgM  Non-reactive  Non-reactive    Hep B S Ag Interp  Non-reactive  Non-reactive    Hep C Ab Interp  Non-reactive  Non-reactive Resulting Agency   [address]        TTE :  9/10     Summary   Ejection fraction is visually estimated to be 30%-35%. Severe tricuspid regurgitation. moderate pulmonary HTN   There is severe diffuse hypokinesis. Diastolic filling parameters suggest grade III diastolic dysfunction. Moderate calcification of the posterior leaflet of the mitral valve. Mitral annular calcification is present. The mitral valve leaflets appear myxomatous. No evidence of mitral valve stenosis. Severe mitral regurgitation is present. Systolic flow reversal in pulmonary veins. The right ventricle is enlarged. Right ventricular systolic function is moderate to severely reduced. Signature      ------------------------------------------------------------------   Electronically signed by Dang Langston MD (Interpreting   physician) on 09/11/2020 at 10:45 AM   ------------------------------------------------------------------    MICRO: cultures reviewed and updated by me     [id number]        Order Status: Completed  Specimen: Sputum, Suctioned  Updated: 09/14/20 0531     CULTURE, RESPIRATORY  Normal respiratory benjamin absentAbnormal       Gram Stain Result  4+ WBC's (Polymorphonuclear)   2+ Epithelial Cells   1+ Gram positive cocci   1+ Gram positive rods     Organism  Staphylococcus aureusAbnormal       CULTURE, RESPIRATORY  --     Light growth   PBP2= Negative    Narrative:      ORDER#: [id number]                          ORDERED BY: LISA BRIGHT   SOURCE: Sputum Suctioned                   COLLECTED:  09/11/20 12:02   ANTIBIOTICS AT [facility]. :                      RECEIVED :  09/11/20 12:02   Performed at:   [address] [place] [address]   Phone [phone]    Culture, Blood 1 [[id number]]   Collected: 09/12/20 0714    Order Status: Completed  Specimen: Blood  Updated: 09/13/20 1915     Blood Culture, Routine  No Growth to date.  Any change in status will be called. Narrative:      ORDER#: [id number]                          ORDERED BY: Chance Gibbs   SOURCE: Blood                              COLLECTED:  09/12/20 16:57   ANTIBIOTICS AT [facility]. :                      RECEIVED :  09/12/20 16:57   If child <=2 yrs old please draw pediatric bottle. ~Blood Culture #1   Performed at:   [facility]   [address] [place], [place] [address]   Phone [phone]    Culture, Blood 1 [[id number]]   Collected: 09/13/20 1815    Order Status: Sent  Specimen: Blood  Updated: 09/13/20 1831    Culture, Blood 2 [[id number]]   Collected: 09/12/20 1300    Order Status: Completed  Specimen: Blood  Updated: 09/13/20 1515     Culture, Blood 2  No Growth to date.  Any change in status will be called. Narrative:      ORDER#: [id number]                          ORDERED BY: Chance Gibbs   SOURCE: Blood                              COLLECTED:  09/12/20 13:00   ANTIBIOTICS AT [facility]. :                      RECEIVED :  09/12/20 13:06   If child <=2 yrs old please draw pediatric bottle. ~Blood Culture #2   Performed at:   [facility]   [address] [place], [place] [address]   Phone [phone]    Culture, Blood 2 [[id number]]  (Abnormal)  Collected: 09/10/20 2314    Order Status: Completed  Specimen: Blood  Updated: 09/13/20 1344     Culture, Blood 2  --Abnormal       Gram stain Aerobic bottle:   Gram positive cocci in clusters   resembling Staphylococcus   Information to follow   Abnormal       Organism  Staph aureus MRSA DNA DetectedAbnormal       Culture, Blood 2  --Abnormal       CONTACT PRECAUTIONS INDICATED   See additional report for complete BCID panel.    Isolated one of two sets   Abnormal       Organism  Staphylococcus aureusAbnormal       Culture, Blood 2  --     POSITIVE for   Sensitivity to follow   Repeating sensitivity    Narrative:      ORDER#: [id number]                          ORDERED BY: JENISE LONNIE   SOURCE: Blood                              COLLECTED:  09/10/20 23:14   ANTIBIOTICS AT [facility]. :                      RECEIVED :  09/10/20 23:18   CALL Janeen Mateo  [id number] tel. [phone],   ., 09/13/2020 06:47, by [facility]   Microbiology results called to and read back by Klarissa Michaels,   09/11/2020 16:00, by OSZURI   Microbiology results called to and read back by FRANCHESCA [place], 09/11/2020   15:59, by Libby Javier   If child <=2 yrs old please draw pediatric bottle. ~Blood Culture #2   Performed at:   [facility]   [address] [place] [address]   Phone [phone]    Culture, Blood 2 [[id number]]      Order Status: No result  Specimen: Peripheral, site unknown from Blood     Culture, Blood 1 [[id number]]   Collected: 09/10/20 2303    Order Status: Completed  Specimen: Blood  Updated: 09/12/20 0115     Blood Culture, Routine  No Growth to date.  Any change in status will be called. Narrative:      ORDER#: [id number]                          ORDERED BY: Reed Whitmore   SOURCE: Blood                              COLLECTED:  09/10/20 23:03   ANTIBIOTICS AT [facility]. :                      RECEIVED :  09/10/20 23:07   If child <=2 yrs old please draw pediatric bottle. ~Blood Culture #1   Performed at:   [facility]   [address], [facility] [place] [address]   Phone [phone]    Culture, Blood, PCR ID Panel Results Report [[id number]]   Collected: 09/10/20 2314    Order Status: Completed  Updated: 09/11/20 1600     Report  SEE IMAGE    Narrative:      CALL Jc Dallas tel. [phone],   Microbiology results called to and read back by Klarissa Michaels,   09/11/2020 16:00, by RAQUEL   Microbiology results called to and read back by FRANCHESCA [place], 09/11/2020   15:59, by Libby Javier   Referred out by:   [address] [facility] [place] [address]   Phone [phone]    Strep Pneumoniae Antigen [[id number]]   Collected: 09/10/20 2207    Order Status: Completed  Specimen: Urine, clean catch  Updated: 09/11/20 1042     STREP PNEUMONIAE ANTIGEN, URINE  --     Presumptive Negative   Presumptive negative suggests no current or recent   pneumococcal infection. Infection due to Strep pneumoniae   cannot be ruled out since the antigen present in the sample   may be below the detection limit of the test.   Normal Range:Presumptive Negative    Narrative:      ORDER#: [id number]                          ORDERED BY: Lilian Miguel   SOURCE: Urine Clean Catch                  COLLECTED:  09/10/20 22:07   ANTIBIOTICS AT [facility]. :                      RECEIVED :  09/11/20 01:43   Performed at:   [address] [facility] [address]   Phone [phone]    Legionella antigen, urine [[id number]]   Collected: 09/10/20 2207    Order Status: Completed  Specimen: Urine, clean catch  Updated: 09/11/20 1042     L. pneumophila Serogp 1 Ur Ag  --     Presumptive Negative   No Legionella pneumophila serogroup 1 antigens detected. A negative result does not exclude infection with   Legionella pneumophila serogroup 1 nor does it rule out   other microbial-caused respiratory infections or   disease caused by other serogroups of   Legionella pneumophila. Normal Range: Presumptive Negative    Narrative:      ORDER#: [id number]                          ORDERED BY: Lilian Miguel   SOURCE: Urine Clean Catch                  COLLECTED:  09/10/20 22:07   ANTIBIOTICS AT [facility]. :                      RECEIVED :  09/11/20 01:43   Performed at:   [address] [facility] [address]   Phone [phone]      Susceptibility     Staphylococcus aureus (1)     Antibiotic  Interpretation  PARTH  Status     clindamycin  Sensitive  <=0.25  mcg/mL      erythromycin  Sensitive  <=0.25  mcg/mL      oxacillin  Sensitive  <=0.25  mcg/mL tetracycline  Sensitive  <=1  mcg/mL      trimethoprim-sulfamethoxazole  Sensitive  <=10  mcg/mL          IMAGING:    MRI BRAIN WO CONTRAST   Final Result   Addendum 1 of 1   ADDENDUM:   Results were reported to NP Hedy Jackson at 3:26 p.m. on September 15,    2020. Final      XR CHEST PORTABLE   Final Result   Decreased layering effusions with improved basilar aeration. Appropriate   life support device positioning. XR CHEST PORTABLE   Final Result   Low-lying endotracheal tube. Recommend retracting 1-2 cm. No substantial change in layering effusions and basilar opacities. The findings were sent to the Radiology Results [address] at 3:53   am on 9/14/2020to be communicated to a licensed caregiver. XR CHEST PORTABLE   Final Result   1. Interval placement of an NG tube, likely within the stomach. 2. Stable appropriate positions of endotracheal tube and right-sided dialysis   catheter. 3. No significant change in appearance of small to moderate bilateral pleural   effusions and bibasilar airspace disease. XR CHEST PORTABLE   Final Result   Supportive lines project in stable positions. Bibasilar opacities, suggestive of layered effusions and atelectasis or   airspace disease. XR CHEST PORTABLE   Final Result   1. Suspected mild to moderate bilateral pleural effusions with adjacent   bibasilar atelectasis and/or airspace disease. 2. Mild to moderate cardiomegaly. 3. Recommend retraction of endotracheal tube 0.7 cm. XR CHEST PORTABLE   Final Result   Right jugular venous catheter and endotracheal tube project in normal   positions. No pneumothorax. Bilateral airspace disease and possible layered pleural effusions, consistent   with pulmonary edema and atelectasis. Pneumonia is a differential   possibility. XR CHEST PORTABLE   Final Result   Satisfactory position of the endotracheal tube 3 cm above the rahat.   The heart is upper normal size and vessels are borderline congested. VL Extremity Venous Bilateral   Final Result      VL DUP LOWER EXTREMITY ARTERIES BILATERAL   Final Result      XR CHEST PORTABLE   Final Result   Endotracheal tube has tip approximately 1.3 cm proximal to the rahat and   should be retracted approximately 2 cm. Findings are again suggestive of pulmonary edema with small pleural   effusions. Pneumonia is not excluded in the appropriate clinical setting. Findings were called by the radiology call center. CT HEAD WO CONTRAST   Final Result   Motion limited study. No convincing evidence for acute intracranial   abnormality identified within the limitations of motion artifact. If there is persistent clinical suspicion for intracranial abnormality,   repeat study should be considered. XR CHEST PORTABLE   Final Result   Cardiomegaly, pulmonary vascular congestion, and bilateral pleural effusions. Endotracheal tube terminates approximately 2 cm above the rahat. CT CHEST PULMONARY EMBOLISM W CONTRAST   Final Result   Addendum 1 of 1   ADDENDUM:   Findings were discussed with Dr. Tony Rivas at 10:49 pm on 9/10/2020.          Final            All the pertinent images and reports for the current Hospitalization were reviewed by me     Scheduled Meds:   lactobacillus  2 capsule Oral BID WC    mupirocin   Nasal BID    furosemide  80 mg Intravenous BID    amiodarone  150 mg Intravenous Once    ampicillin-sulbactam  1.5 g Intravenous Q24H    sodium chloride flush  10 mL Intravenous 2 times per day    pantoprazole  40 mg Intravenous Daily    And    sodium chloride (PF)  10 mL Intravenous Daily    chlorhexidine  15 mL Mouth/Throat BID    levetiracetam  1,000 mg Intravenous Q12H    vancomycin (VANCOCIN) intermittent dosing (placeholder)   Other RX Placeholder       Continuous Infusions:   amiodarone      Followed by   Creston Sicard amiodarone      dextrose      did additional testing  TTE is abnormal with mitral valve calcification and myxomatous changes with Mitral regurgitation  Factor V leiden def  MORELIA     She remains critically ill in shock and resp failure    WBC slowly trend down on IV abx and Micro repeat sensitivities and repeat blood cx ngtd'   S/P LEVY  NO Vegetation but has Mitral regurgitation     MRI brain now reported to have multiple CVA possible embolic in origin and given severe Mitral regurgitation with MRSA bacteremia this is concerning for   Endocarditis     WBC up and will adjust IV abx today        Labs, Microbiology, Radiology and all the pertinent results from current hospitalization and  care every where were reviewed  by me as a part of the evaluation   Plan:   1. Cont IV Vancomycin  Level elevated due to MORELIA     2. Aim for trough 15 -20  3. D/c  Unasyn - IV Zosyn x 2.25 gm x Q 8 hrs -AVOID CEFEPIME/MEROPENEM given the concern for seizures   4. WBC remains elevated will trend   5/ s/p LEVY results noted    7. MRI brain abnormal from CVA multiple likely embolic      Discussed with patient/Family and Nursing staff   · Patient is critically ill and has a potentially life threatening infection that poses threat to life/bodily function. · There is potential for worsening infection/ sudden clinically significant or life-threatening deterioration in the patient's condition without appropriate antimicrobial therapy. · Complex medical decision making process was involved to select appropriate antimicrobial agents to reverse the cause of patient's severe infection/ illness. · Antimicrobial therapy requires intensive monitoring for toxicity and frequent dose adjustments to prevent toxicity and permanent end-organ dysfunction      Thanks for allowing me to participate in your patient's care and please call me with any questions or concerns.     Vladimir Amaro MD  Infectious Disease  [facility]) Physician  Phone: [phone]   Fax : [phone]

## 2020-09-16 NOTE — PROGRESS NOTES
Progress Note    Updates  No significant events overnight. Intubated. Tolerating being off sedation. Infrequent myoclonic jerking, primarily of her LE. Past Medical History:   Diagnosis Date    Anticoagulant long-term use     Arthritis     Asthma     Baker's cyst     Depression     [facility] [address] [facility]     [facility] [address] [facility]     H/O blood clots     Heartburn     Hyperlipidemia     MRSA bacteremia 09/10/2020    Osteoarthritis      History reviewed. No pertinent surgical history.     Current Facility-Administered Medications:     fentaNYL (SUBLIMAZE) injection 50 mcg, 50 mcg, Intravenous, Q2H PRN, Chase Edwards APRN - CNP, 50 mcg at 09/16/20 0028    ampicillin-sulbactam (UNASYN) 1.5 g IVPB minibag, 1.5 g, Intravenous, Q24H, Jeremie Canela MD, Stopped at 09/15/20 1208    LORazepam (ATIVAN) injection 2 mg, 2 mg, Intravenous, Q2H PRN, Chase Edwards APRN - CNP, 2 mg at 09/15/20 2030    potassium chloride 20 mEq/50 mL IVPB (Central Line), 20 mEq, Intravenous, PRN, Stacy Kennedy MD, Last Rate: 50 mL/hr at 09/12/20 1008, 20 mEq at 09/12/20 1008    magnesium sulfate 1 g in dextrose 5% 100 mL IVPB, 1 g, Intravenous, PRN, Stacy Kennedy MD    [DISCONTINUED] calcium gluconate 1 g in sodium chloride 50 mL, 1 g, Intravenous, PRN, 1 g at 09/15/20 0815 **OR** calcium gluconate 2 g in dextrose 5 % 100 mL IVPB, 2 g, Intravenous, PRN **OR** calcium gluconate 3 g in dextrose 5 % 100 mL IVPB, 3 g, Intravenous, PRN **OR** calcium gluconate 4 g in dextrose 5 % 100 mL IVPB, 4 g, Intravenous, PRN, Stacy Kennedy MD    sodium phosphate 6 mmol in dextrose 5 % 250 mL IVPB, 6 mmol, Intravenous, PRN, Stopped at 09/15/20 1006 **OR** sodium phosphate 12 mmol in dextrose 5 % 250 mL IVPB, 12 mmol, Intravenous, PRN, Stopped at 09/14/20 1052 **OR** sodium phosphate 18 mmol in dextrose 5 % 500 mL IVPB, 18 mmol, Intravenous, PRN **OR** sodium phosphate 24 mmol in dextrose 5 % 500 mL IVPB, 24 mmol, status though seems to open eyes w/ repeated stimulus. Eyes: unable to visualize the fundi.    Cardiovascular: + peripheral edema.    Psychiatric: limited exam given encephalopathy  Neurologic  Mental status:   orientation marisol due to encephalopathy/sedation.    Attention poor  Language limited exam given encephalopathy  Comprehension not following any commands  CN2: corneal reflexes present. CN 3,4,6: dysconjugate gaze.  Pupils are 2-3 mm, round, reactive bilaterally.    CN7: face symmetric but exam limited by ET tube  CN9: gag present per RN.     CN11: limited exam given encephalopathy  CN12: limited exam given encephalopathy  Strength: unable to assess at this time.    Sensory: withdraw to nailbed pressure in both feet, less so in UE.  Grimace to trapezius pressure.   Cerebellar/coordination:limited exam given encephalopathy  Tone: no increased tone or rigidity.    Gait: limited exam given encephalopathy and intubated with ventilator. ROS - unable to obtain from the patient at this time.  Chart reviewed. Nguyen Dempsey. Labs  Glucose 112  Na 133  K 4.1  BUN 47  Cr 2.3  Mg 2.40          WBC 19.8K  Hg 10.3  Platelets 080    COVID negative  HIV negative  Hepatitis A/B/C negative.    Initial blood cultures +.  Repeat negative.       UA 2+ bacteria, yeast.     Studies  EEG 9/11/20  Burst suppression w/ superimposed sleep spindles and beta. MRI brain w/o 9/15/20, independently reviewed  Scattered acute to subacute infarctions in the bilateral cerebral hemispheres    and right cerebellar hemisphere, most pronounced in the left occipital lobe,    likely embolic.         Small old lacunar infarcts in the left thalamus and right cerebellar    hemisphere.         Mild chronic microvascular disease.         CT chest 9/110/20  Evaluation is somewhat limited due to respiratory motion artifact.  There are    suspected pulmonary arterial filling defects within subsegmental branches to    the right middle lobe and left lower lobe.  No right heart strain.         Large bilateral pleural effusions and reflux of contrast into the IVC/hepatic    veins.  Findings most compatible with volume overload/CHF.         Patchy airspace disease in the left lower lobe adjacent to the large    effusion.  Differential considerations include atelectasis, infectious    process or, less likely, infarction. TTE 9/11/20   Ejection fraction is visually estimated to be 30%-35%. Severe tricuspid regurgitation. moderate pulmonary HTN   There is severe diffuse hypokinesis. Diastolic filling parameters suggest grade III diastolic dysfunction. Moderate calcification of the posterior leaflet of the mitral valve. Mitral annular calcification is present. The mitral valve leaflets appear myxomatous. No evidence of mitral valve stenosis. Severe mitral regurgitation is present. Systolic flow reversal in pulmonary veins. The right ventricle is enlarged. Right ventricular systolic function is moderate to severely reduced. EKG 9/10/20  Atrial flutter. Impression  1. S/p PEA arrest.  Remains intubated. Off sedation since yesterday. Much less myoclonic jerking has been observed.       Initial EEG w/ burst suppression though on sedation at the time. Follow up MRI brain done yesterday w/ embolic stroke(?r/t cardioembolic, coagulopathy, or septic). Her ongoing encephalopathy is likely multifactorial.      Medically complex patient - PE/coagulapathy, bacteremia/?endocarditis, PNA, atrial flutter. Recommendations  1. Will repeat EEG off sedation. 2.  Plan is for LEVY this afternoon. 3.  Consider neurovascular imaging (MRA or CTA head/neck) to assess for mycotic aneurysm. 4.  Anticoagulation in the setting of possible septic emboli is not ideal and carries a higher risk of hemorrhagic transformation, though the benefit seems to outweigh the risk at this point given other indications for anticoagulation. Acute stroke in general carries risk of hemorrhagic conversion w/ early anticoagulation, though the stroke burden is relatively low and she has tolerated thus far. 5.  Continue supportive care and monitor for neurologic progress.       Michelle Celestin NP  [address] [place] [address] [id number] Neurology    A copy of this note was provided for Dr Mika Wright MD

## 2020-09-17 LAB
ALBUMIN SERPL-MCNC: 2.5 G/DL (ref 3.4–5)
ALP BLD-CCNC: 124 U/L (ref 40–129)
ALT SERPL-CCNC: 390 U/L (ref 10–40)
ANION GAP SERPL CALCULATED.3IONS-SCNC: 12 MMOL/L (ref 3–16)
APTT: 74 SEC (ref 24.2–36.2)
APTT: 77 SEC (ref 24.2–36.2)
APTT: 80.6 SEC (ref 24.2–36.2)
AST SERPL-CCNC: 121 U/L (ref 15–37)
BILIRUB SERPL-MCNC: 0.9 MG/DL (ref 0–1)
BILIRUBIN DIRECT: 0.7 MG/DL (ref 0–0.3)
BILIRUBIN, INDIRECT: 0.2 MG/DL (ref 0–1)
BLOOD CULTURE, ROUTINE: NORMAL
BUN BLDV-MCNC: 75 MG/DL (ref 7–20)
CALCIUM SERPL-MCNC: 8.5 MG/DL (ref 8.3–10.6)
CHLORIDE BLD-SCNC: 99 MMOL/L (ref 99–110)
CO2: 25 MMOL/L (ref 21–32)
CREAT SERPL-MCNC: 3.2 MG/DL (ref 0.6–1.1)
FIBRINOGEN: 457 MG/DL (ref 200–397)
FIBRINOGEN: 512 MG/DL (ref 200–397)
GFR AFRICAN AMERICAN: 19
GFR NON-AFRICAN AMERICAN: 15
GLUCOSE BLD-MCNC: 119 MG/DL (ref 70–99)
GLUCOSE BLD-MCNC: 125 MG/DL (ref 70–99)
GLUCOSE BLD-MCNC: 134 MG/DL (ref 70–99)
GLUCOSE BLD-MCNC: 145 MG/DL (ref 70–99)
GLUCOSE BLD-MCNC: 146 MG/DL (ref 70–99)
GLUCOSE BLD-MCNC: 220 MG/DL (ref 70–99)
HAPTOGLOBIN: 69 MG/DL (ref 30–200)
HCT VFR BLD CALC: 28.9 % (ref 36–48)
HCT VFR BLD CALC: 30.7 % (ref 36–48)
HEMOGLOBIN: 9.1 G/DL (ref 12–16)
HEMOGLOBIN: 9.6 G/DL (ref 12–16)
INR BLD: 1.06 (ref 0.86–1.14)
INR BLD: 1.1 (ref 0.86–1.14)
INR BLD: 1.13 (ref 0.86–1.14)
INR BLD: 1.17 (ref 0.86–1.14)
MAGNESIUM: 2.4 MG/DL (ref 1.8–2.4)
MCH RBC QN AUTO: 26.5 PG (ref 26–34)
MCHC RBC AUTO-ENTMCNC: 31.4 G/DL (ref 31–36)
MCV RBC AUTO: 84.3 FL (ref 80–100)
PDW BLD-RTO: 19.5 % (ref 12.4–15.4)
PERFORMED ON: ABNORMAL
PHOSPHORUS: 5.5 MG/DL (ref 2.5–4.9)
PLATELET # BLD: 126 K/UL (ref 135–450)
PMV BLD AUTO: 9.6 FL (ref 5–10.5)
POTASSIUM SERPL-SCNC: 3.9 MMOL/L (ref 3.5–5.1)
PROTHROMBIN TIME: 12.3 SEC (ref 10–13.2)
PROTHROMBIN TIME: 12.8 SEC (ref 10–13.2)
PROTHROMBIN TIME: 13.1 SEC (ref 10–13.2)
PROTHROMBIN TIME: 13.6 SEC (ref 10–13.2)
RBC # BLD: 3.64 M/UL (ref 4–5.2)
SODIUM BLD-SCNC: 136 MMOL/L (ref 136–145)
TOTAL PROTEIN: 5.8 G/DL (ref 6.4–8.2)
VANCOMYCIN RANDOM: 24.7 UG/ML
WBC # BLD: 14.7 K/UL (ref 4–11)

## 2020-09-17 PROCEDURE — 99291 CRITICAL CARE FIRST HOUR: CPT | Performed by: INTERNAL MEDICINE

## 2020-09-17 PROCEDURE — 6360000002 HC RX W HCPCS: Performed by: INTERNAL MEDICINE

## 2020-09-17 PROCEDURE — 95819 EEG AWAKE AND ASLEEP: CPT

## 2020-09-17 PROCEDURE — 2580000003 HC RX 258: Performed by: NURSE PRACTITIONER

## 2020-09-17 PROCEDURE — 2700000000 HC OXYGEN THERAPY PER DAY

## 2020-09-17 PROCEDURE — 94760 N-INVAS EAR/PLS OXIMETRY 1: CPT

## 2020-09-17 PROCEDURE — C9113 INJ PANTOPRAZOLE SODIUM, VIA: HCPCS | Performed by: INTERNAL MEDICINE

## 2020-09-17 PROCEDURE — 94750 HC PULMONARY COMPLIANCE STUDY: CPT

## 2020-09-17 PROCEDURE — 6370000000 HC RX 637 (ALT 250 FOR IP): Performed by: NURSE PRACTITIONER

## 2020-09-17 PROCEDURE — 83010 ASSAY OF HAPTOGLOBIN QUANT: CPT

## 2020-09-17 PROCEDURE — 36592 COLLECT BLOOD FROM PICC: CPT

## 2020-09-17 PROCEDURE — 6360000002 HC RX W HCPCS: Performed by: NURSE PRACTITIONER

## 2020-09-17 PROCEDURE — 99233 SBSQ HOSP IP/OBS HIGH 50: CPT | Performed by: INTERNAL MEDICINE

## 2020-09-17 PROCEDURE — 80076 HEPATIC FUNCTION PANEL: CPT

## 2020-09-17 PROCEDURE — 80202 ASSAY OF VANCOMYCIN: CPT

## 2020-09-17 PROCEDURE — 2580000003 HC RX 258: Performed by: STUDENT IN AN ORGANIZED HEALTH CARE EDUCATION/TRAINING PROGRAM

## 2020-09-17 PROCEDURE — 85014 HEMATOCRIT: CPT

## 2020-09-17 PROCEDURE — 85018 HEMOGLOBIN: CPT

## 2020-09-17 PROCEDURE — 85610 PROTHROMBIN TIME: CPT

## 2020-09-17 PROCEDURE — 94003 VENT MGMT INPAT SUBQ DAY: CPT

## 2020-09-17 PROCEDURE — 85730 THROMBOPLASTIN TIME PARTIAL: CPT

## 2020-09-17 PROCEDURE — 2580000003 HC RX 258: Performed by: INTERNAL MEDICINE

## 2020-09-17 PROCEDURE — 85384 FIBRINOGEN ACTIVITY: CPT

## 2020-09-17 PROCEDURE — 90935 HEMODIALYSIS ONE EVALUATION: CPT

## 2020-09-17 PROCEDURE — 85027 COMPLETE CBC AUTOMATED: CPT

## 2020-09-17 PROCEDURE — 2500000003 HC RX 250 WO HCPCS: Performed by: INTERNAL MEDICINE

## 2020-09-17 PROCEDURE — 80048 BASIC METABOLIC PNL TOTAL CA: CPT

## 2020-09-17 PROCEDURE — C9254 INJECTION, LACOSAMIDE: HCPCS | Performed by: NURSE PRACTITIONER

## 2020-09-17 PROCEDURE — 84100 ASSAY OF PHOSPHORUS: CPT

## 2020-09-17 PROCEDURE — 36415 COLL VENOUS BLD VENIPUNCTURE: CPT

## 2020-09-17 PROCEDURE — 83735 ASSAY OF MAGNESIUM: CPT

## 2020-09-17 PROCEDURE — APPNB15 APP NON BILLABLE TIME 0-15 MINS: Performed by: NURSE PRACTITIONER

## 2020-09-17 PROCEDURE — 2000000000 HC ICU R&B

## 2020-09-17 PROCEDURE — 99232 SBSQ HOSP IP/OBS MODERATE 35: CPT | Performed by: INTERNAL MEDICINE

## 2020-09-17 RX ORDER — PROMETHAZINE HYDROCHLORIDE 25 MG/ML
25 INJECTION, SOLUTION INTRAMUSCULAR; INTRAVENOUS EVERY 4 HOURS PRN
Status: DISCONTINUED | OUTPATIENT
Start: 2020-09-17 | End: 2020-09-17 | Stop reason: SDUPTHER

## 2020-09-17 RX ORDER — HEPARIN SODIUM 1000 [USP'U]/ML
1000 INJECTION, SOLUTION INTRAVENOUS; SUBCUTANEOUS PRN
Status: DISCONTINUED | OUTPATIENT
Start: 2020-09-17 | End: 2020-10-04 | Stop reason: HOSPADM

## 2020-09-17 RX ORDER — ONDANSETRON 2 MG/ML
4 INJECTION INTRAMUSCULAR; INTRAVENOUS EVERY 4 HOURS PRN
Status: DISCONTINUED | OUTPATIENT
Start: 2020-09-17 | End: 2020-10-04 | Stop reason: HOSPADM

## 2020-09-17 RX ADMIN — HEPARIN SODIUM 2800 UNITS: 1000 INJECTION INTRAVENOUS; SUBCUTANEOUS at 16:14

## 2020-09-17 RX ADMIN — ONDANSETRON 4 MG: 2 INJECTION INTRAMUSCULAR; INTRAVENOUS at 23:54

## 2020-09-17 RX ADMIN — FUROSEMIDE 80 MG: 10 INJECTION, SOLUTION INTRAMUSCULAR; INTRAVENOUS at 17:06

## 2020-09-17 RX ADMIN — Medication 10 ML: at 07:47

## 2020-09-17 RX ADMIN — DILTIAZEM HYDROCHLORIDE 10 MG/HR: 5 INJECTION INTRAVENOUS at 04:12

## 2020-09-17 RX ADMIN — MUPIROCIN: 20 OINTMENT TOPICAL at 21:01

## 2020-09-17 RX ADMIN — LORAZEPAM 2 MG: 2 INJECTION INTRAMUSCULAR; INTRAVENOUS at 22:55

## 2020-09-17 RX ADMIN — SODIUM CHLORIDE, PRESERVATIVE FREE 10 ML: 5 INJECTION INTRAVENOUS at 07:48

## 2020-09-17 RX ADMIN — LORAZEPAM 2 MG: 2 INJECTION INTRAMUSCULAR; INTRAVENOUS at 02:21

## 2020-09-17 RX ADMIN — DEXTROSE MONOHYDRATE 100 MG: 50 INJECTION, SOLUTION INTRAVENOUS at 08:58

## 2020-09-17 RX ADMIN — PIPERACILLIN AND TAZOBACTAM 2.25 G: 2; .25 INJECTION, POWDER, LYOPHILIZED, FOR SOLUTION INTRAVENOUS at 06:33

## 2020-09-17 RX ADMIN — FENTANYL CITRATE 50 MCG: 50 INJECTION, SOLUTION INTRAMUSCULAR; INTRAVENOUS at 12:29

## 2020-09-17 RX ADMIN — FENTANYL CITRATE 50 MCG: 50 INJECTION, SOLUTION INTRAMUSCULAR; INTRAVENOUS at 03:16

## 2020-09-17 RX ADMIN — AMIODARONE HYDROCHLORIDE 0.5 MG/MIN: 50 INJECTION, SOLUTION INTRAVENOUS at 12:05

## 2020-09-17 RX ADMIN — DEXTROSE MONOHYDRATE 200 MG: 50 INJECTION, SOLUTION INTRAVENOUS at 21:36

## 2020-09-17 RX ADMIN — CHLORHEXIDINE GLUCONATE 15 ML: 0.12 RINSE ORAL at 08:01

## 2020-09-17 RX ADMIN — MUPIROCIN: 20 OINTMENT TOPICAL at 07:58

## 2020-09-17 RX ADMIN — PIPERACILLIN AND TAZOBACTAM 2.25 G: 2; .25 INJECTION, POWDER, LYOPHILIZED, FOR SOLUTION INTRAVENOUS at 16:56

## 2020-09-17 RX ADMIN — CHLORHEXIDINE GLUCONATE 15 ML: 0.12 RINSE ORAL at 21:03

## 2020-09-17 RX ADMIN — PANTOPRAZOLE SODIUM 40 MG: 40 INJECTION, POWDER, FOR SOLUTION INTRAVENOUS at 07:46

## 2020-09-17 RX ADMIN — LEVETIRACETAM 1000 MG: 10 INJECTION INTRAVENOUS at 12:03

## 2020-09-17 RX ADMIN — FENTANYL CITRATE 50 MCG: 50 INJECTION, SOLUTION INTRAMUSCULAR; INTRAVENOUS at 17:44

## 2020-09-17 RX ADMIN — LORAZEPAM 2 MG: 2 INJECTION INTRAMUSCULAR; INTRAVENOUS at 17:06

## 2020-09-17 RX ADMIN — DILTIAZEM HYDROCHLORIDE 15 MG/HR: 5 INJECTION INTRAVENOUS at 13:49

## 2020-09-17 RX ADMIN — DILTIAZEM HYDROCHLORIDE 15 MG/HR: 5 INJECTION INTRAVENOUS at 22:17

## 2020-09-17 RX ADMIN — Medication 2 CAPSULE: at 07:46

## 2020-09-17 RX ADMIN — PIPERACILLIN AND TAZOBACTAM 2.25 G: 2; .25 INJECTION, POWDER, LYOPHILIZED, FOR SOLUTION INTRAVENOUS at 21:02

## 2020-09-17 RX ADMIN — SODIUM CHLORIDE, PRESERVATIVE FREE 10 ML: 5 INJECTION INTRAVENOUS at 21:02

## 2020-09-17 RX ADMIN — HEPARIN SODIUM 6.5 ML/HR: 10000 INJECTION, SOLUTION INTRAVENOUS at 08:06

## 2020-09-17 RX ADMIN — FUROSEMIDE 80 MG: 10 INJECTION, SOLUTION INTRAMUSCULAR; INTRAVENOUS at 08:00

## 2020-09-17 RX ADMIN — Medication 2 CAPSULE: at 17:06

## 2020-09-17 RX ADMIN — LEVETIRACETAM 1000 MG: 10 INJECTION INTRAVENOUS at 22:48

## 2020-09-17 ASSESSMENT — PULMONARY FUNCTION TESTS
PIF_VALUE: 19
PIF_VALUE: 19
PIF_VALUE: 20
PIF_VALUE: 19
PIF_VALUE: 15
PIF_VALUE: 19
PIF_VALUE: 17
PIF_VALUE: 17
PIF_VALUE: 18
PIF_VALUE: 14
PIF_VALUE: 19
PIF_VALUE: 14
PIF_VALUE: 16
PIF_VALUE: 15
PIF_VALUE: 24
PIF_VALUE: 15
PIF_VALUE: 12
PIF_VALUE: 19
PIF_VALUE: 17
PIF_VALUE: 21
PIF_VALUE: 17
PIF_VALUE: 19
PIF_VALUE: 18
PIF_VALUE: 22
PIF_VALUE: 18
PIF_VALUE: 17
PIF_VALUE: 24
PIF_VALUE: 19
PIF_VALUE: 20
PIF_VALUE: 18
PIF_VALUE: 17
PIF_VALUE: 18
PIF_VALUE: 17
PIF_VALUE: 19
PIF_VALUE: 15

## 2020-09-17 ASSESSMENT — PAIN SCALES - GENERAL
PAINLEVEL_OUTOF10: 0

## 2020-09-17 NOTE — PROGRESS NOTES
Progress Note    Updates  Repeat EEG yesterday w/ epileptiform discharges. No myoclonic activity documented nor readily evident on exam.    No significant changes in neurologic status. Past Medical History:   Diagnosis Date    Anticoagulant long-term use     Arthritis     Asthma     Baker's cyst     Depression     [facility] [facility]     Factor V Leiden ([facility]     H/O blood clots     Heartburn     Hyperlipidemia     MRSA bacteremia 09/10/2020    Osteoarthritis      History reviewed. No pertinent surgical history.     Current Facility-Administered Medications:     lactobacillus (CULTURELLE) capsule 2 capsule, 2 capsule, Oral, BID WC, AGUSTIN Gardner - CNP, 2 capsule at 20 0746    mupirocin (BACTROBAN) 2 % ointment, , Nasal, BID, AGUSTIN Gardner - CNP    furosemide (LASIX) injection 80 mg, 80 mg, Intravenous, BID, Leslie Ball MD, 80 mg at 20 0800    [COMPLETED] amiodarone (CORDARONE) 150 mg in dextrose 5 % 100 mL bolus, 150 mg, Intravenous, Once, Stopped at 20 1324 **FOLLOWED BY** [] amiodarone (CORDARONE) 450 mg in dextrose 5 % 250 mL infusion, 1 mg/min, Intravenous, Continuous, Last Rate: 33.3 mL/hr at 20 1329, 1 mg/min at 20 1329 **FOLLOWED BY** amiodarone (CORDARONE) 450 mg in dextrose 5 % 250 mL infusion, 0.5 mg/min, Intravenous, Continuous, Simin Massey APRN - CNP, Last Rate: 16.7 mL/hr at 20 1205, 0.5 mg/min at 20 1205    lacosamide (VIMPAT) 100 mg in dextrose 5 % 60 mL IVPB, 100 mg, Intravenous, BID, Cherelle Vanegas APRN - CNP, Stopped at 20 0928    [COMPLETED] dilTIAZem injection 10 mg, 10 mg, Intravenous, Once, 10 mg at 20 1828 **FOLLOWED BY** dilTIAZem 125 mg in dextrose 5 % 125 mL infusion, 5 mg/hr, Intravenous, Continuous, Connor Yoder MD, Last Rate: 15 mL/hr at 20, 15 mg/hr at 20    piperacillin-tazobactam (ZOSYN) 2.25 g in dextrose 5 % 50 mL IVPB (mini-bag), 2.25 g, Intravenous, Bharat Sage MD, Stopped at 09/17/20 0703    fentaNYL (SUBLIMAZE) injection 50 mcg, 50 mcg, Intravenous, Q2H PRN, Conception AGUSTIN Urban - CNP, 50 mcg at 09/17/20 0316    LORazepam (ATIVAN) injection 2 mg, 2 mg, Intravenous, Q2H PRN, Conception AGUSTIN Urban - CNP, 2 mg at 09/17/20 0221    potassium chloride 20 mEq/50 mL IVPB (Central Line), 20 mEq, Intravenous, PRN, Kulwant Hill MD, Last Rate: 50 mL/hr at 09/12/20 1008, 20 mEq at 09/12/20 1008    magnesium sulfate 1 g in dextrose 5% 100 mL IVPB, 1 g, Intravenous, PRN, Kulwant Hill MD    [DISCONTINUED] calcium gluconate 1 g in sodium chloride 50 mL, 1 g, Intravenous, PRN, 1 g at 09/15/20 0815 **OR** calcium gluconate 2 g in dextrose 5 % 100 mL IVPB, 2 g, Intravenous, PRN **OR** calcium gluconate 3 g in dextrose 5 % 100 mL IVPB, 3 g, Intravenous, PRN **OR** calcium gluconate 4 g in dextrose 5 % 100 mL IVPB, 4 g, Intravenous, PRN, Kulwant Hill MD    sodium phosphate 6 mmol in dextrose 5 % 250 mL IVPB, 6 mmol, Intravenous, PRN, Stopped at 09/15/20 1006 **OR** sodium phosphate 12 mmol in dextrose 5 % 250 mL IVPB, 12 mmol, Intravenous, PRN, Stopped at 09/14/20 1052 **OR** sodium phosphate 18 mmol in dextrose 5 % 500 mL IVPB, 18 mmol, Intravenous, PRN **OR** sodium phosphate 24 mmol in dextrose 5 % 500 mL IVPB, 24 mmol, Intravenous, PRN, Kulwant Hill MD    sodium chloride flush 0.9 % injection 10 mL, 10 mL, Intravenous, 2 times per day, Lonny Jarvis DO, 10 mL at 09/17/20 0748    sodium chloride flush 0.9 % injection 10 mL, 10 mL, Intravenous, PRN, Lonny Jarvis DO    acetaminophen (TYLENOL) tablet 650 mg, 650 mg, Oral, Q6H PRN **OR** acetaminophen (TYLENOL) suppository 650 mg, 650 mg, Rectal, Q6H PRN, Lonny YEISON Jarvis, DO    ondansetron (ZOFRAN) injection 4 mg, 4 mg, Intravenous, Q6H PRN, Lonny YEISON Jarvis, DO    pantoprazole (PROTONIX) injection 40 mg, 40 mg, Intravenous, Daily, 40 mg at 09/17/20 0746 **AND** sodium chloride (PF) 0.9 % injection 10 mL, 10 mL, Intravenous, Daily, Shantelle Potts MD, 10 mL at 09/17/20 0747    chlorhexidine (PERIDEX) 0.12 % solution 15 mL, 15 mL, Mouth/Throat, BID, Shantelle Potts MD, 15 mL at 09/17/20 0801    glucose (GLUTOSE) 40 % oral gel 15 g, 15 g, Oral, PRN, Shantelle Potts MD    dextrose 50 % IV solution, 12.5 g, Intravenous, PRN, Shantelle Potts MD, 12.5 g at 09/11/20 0900    glucagon (rDNA) injection 1 mg, 1 mg, Intramuscular, PRN, Shantelle Potts MD    dextrose 5 % solution, 100 mL/hr, Intravenous, PRN, Shantelle Potts MD    levetiracetam (KEPPRA) 1000 mg/100 mL IVPB, 1,000 mg, Intravenous, Q12H, AGUSTIN Rodriguez - CNP, Last Rate: 0 mL/hr at 09/16/20 2333, 1,000 mg at 09/17/20 1203    vancomycin (VANCOCIN) intermittent dosing (placeholder), , Other, RX Placeholder, Shantelle Potts MD    heparin 25,000 unit in sodium chloride 0.45% 250 mL infusion, 6.5 mL/hr, Intravenous, Continuous, Ashutosh Chaudhary MD, Last Rate: 6.5 mL/hr at 09/17/20 0806, 6.5 mL/hr at 09/17/20 0806    Exam  Blood pressure 111/74, pulse 127, temperature 98.3 °F (36.8 °C), temperature source Axillary, resp. rate 19, height 5' 7\" (1.702 m), weight 217 lb 2.5 oz (98.5 kg), last menstrual period 12/05/2018, SpO2 95 %. Constitutional                          Vital signs: BP, HR, and RR reviewed            General depressed mental status though seems to open eyes w/ repeated stimulus. Eyes: unable to visualize the fundi.    Cardiovascular: + peripheral edema.    Psychiatric: limited exam given encephalopathy  Neurologic  Mental status:   orientation marisol due to encephalopathy/sedation.    Attention poor  Language limited exam given encephalopathy  Comprehension not following any commands  CN2: corneal reflexes present. CN 3,4,6: dysconjugate gaze.  Pupils are 2-3 mm, round, reactive bilaterally.  No forced gaze deviation. CN7: face symmetric but exam limited by ET tube  CN9: gag present per RN.     CN11: limited exam given encephalopathy  CN12: limited exam given encephalopathy  Strength: unable to assess at this time.    Sensory: withdraw to nailbed pressure in both feet, less so in UE.  Grimace to trapezius pressure.   Cerebellar/coordination:limited exam given encephalopathy  Tone: no increased tone or rigidity.    Gait: limited exam given encephalopathy and intubated with ventilator. ROS - unable to obtain from the patient at this time.  Chart reviewed. Jorge Doe. Labs  Glucose 134  Na 136  K 3.9  BUN 75  Cr 3.2  Mg 2.40          WBC 14.7K  Hg 9.6  Platelets 379    COVID negative  HIV negative  Hepatitis A/B/C negative.    Initial blood cultures +.  Repeat negative.       Respiratory culture +  UA 2+ bacteria, yeast.     Studies  EEG 9/16/20  Occasional bursts of bi-hemispheric generalized periodic epileptiform discharges. Severe background slowing and disorganization. MRI brain w/o 9/15/20  1. Scattered acute to subacute infarctions in the bilateral cerebral hemispheres    and right cerebellar hemisphere, most pronounced in the left occipital lobe,    likely embolic. 2. Small old lacunar infarcts in the left thalamus and right cerebellar    hemisphere. LEVY 9/16/20  Overall LV systolic function moderately reduced. Mild to moderate mitral regurgitation. No significant aortic valve abnormalities. No WINNIE thrombus. EKG 9/16/20  Atrial flutter. Impression  1. S/p PEA arrest.  Remains intubated, off sedation. Repeat EEG yesterday suggestive of possible status epilepticus. Preliminary read from EEG this morning showed improvement (discussed w/ reading physician; formal documentation to follow). Her encephalopathy persists.       MRI brain done yesterday w/ embolic stroke (?r/t cardioembolic, coagulopathy, or septic). Follow up LEVY w/out any pertinent findings.   Cardioverted 5x for atrial flutter though unsuccessful.      Medically complex patient - PE/coagulapathy, bacteremia/?endocarditis, PNA, previously on CRRT now HD. Recommendations  1. Continue LEV 1000/1000, /200.   2.  Additional medical management ongoing. 3.  Will continue to follow exam.  cvEEG may still be considered. I again attempted to call the patient's  via the number provided and it continued to ring busy multiple times. I also tried to call the patient's mother and it went to voicemail.       Ricardo Duncan NP  [address] [place] [address] [address] Neurology    A copy of this note was provided for Dr Salomon Huber MD

## 2020-09-17 NOTE — PROCEDURES
[address]                          ELECTROENCEPHALOGRAM REPORT    PATIENT NAME: Kalen Knowles                     :        1970  MED REC NO:   [id number]                          ROOM:       2104  ACCOUNT NO:   [de-identified]                           ADMIT DATE: 09/10/2020  PROVIDER:     Ruth Monet DO    DATE OF EE2020    REFERRING PROVIDER:  Taz Lubin NP    REASON FOR STUDY:  Follow up to initially EEG with burst suppression on  sedation. BRIEF HISTORY AND NEUROLOGIC FINDINGS:  The patient is a 71-year-old  female being evaluated for reported seizure and encephalopathy. MEDICATIONS:  The patient's centrally acting medications listed include  Ativan and Keppra. EEG FINDINGS:  This is a 20-channel digital EEG performed utilizing  bipolar and referential montages. The patient was unresponsive  throughout the recording. This EEG consisted of predominant generalized  synchronous and asynchronous delta activity with superimposed alpha and  beta rhythms as well as some theta frequencies. Occasional bursts of  activity predominantly consisting of generalized periodic epileptiform  discharges were noted during the recording. This was most prominent in  the parasagittal and midline regions. There was no evidence of  reactivity to extraneous stimuli. Photic stimulation was performed at various flash frequencies and did  not evoke a posterior driving response. Hyperventilation exercise was  not performed due to the patient's clinical history. A 1-channel EKG rhythm strip was reviewed and showed significant  tachycardia with heart rates approaching 150 beats per minute.     EEG DIAGNOSIS:  This EEG is significantly abnormal due to the presence  of occasional bursts of bihemispheric generalized periodic epileptiform  discharges as well as severe background slowing and disorganization. CLINICAL INTERPRETATION:  The generalized periodic epileptiform  discharges are nonspecific and suggestive of a generalized  process of cortical irritability. This is a finding which is often seen  in the setting of status epilepticus. The severe background slowing and  disorganization is nonspecific and consistent with a severe  encephalopathy. This may be seen in multiple settings including toxic,  metabolic, or degenerative conditions as well as with medication effect  and postictal state. An incidental note is made of a tachycardic heart  rate with heart rates approaching 150 beats per minute. If seizures  remain a clinical concern, then prolonged inpatient continuous video EEG  monitoring may be of further benefit. Clinical correlation is highly  advised.         Oscar Rios DO    D: 09/16/2020 14:53:02       T: 09/16/2020 14:59:19     [id number]  Job#: [id number]     Doc#: [id number]    CC:

## 2020-09-17 NOTE — PROGRESS NOTES
Pulmonary Progress Note    CC:  Follow up cardiac arrest, Pulmonary emboli, Shock    Subjective:  Remains on the ventilator with FIO2 at 30% PEEP of 5  EEG with findings suggesting possible status  Vimpat added while still on keppra  AFlutter with RVR yesterday and started on amiodarone and diltiazem   LEVY without thrombus   Cardioverted 5 times yesterday to no avail    Intake/Output Summary (Last 24 hours) at 9/17/2020 0712  Last data filed at 9/17/2020 0600  Gross per 24 hour   Intake 2623 ml   Output 2370 ml   Net 253 ml         PHYSICAL EXAM:  Blood pressure 135/70, pulse 124, temperature 98.4 °F (36.9 °C), temperature source Oral, resp. rate 17, height 5' 7\" (1.702 m), weight 217 lb 2.5 oz (98.5 kg), last menstrual period 12/05/2018, SpO2 97 %.'  Gen: No distress. Moves her head and opens eyes to name, not following commands   Eyes: Wandering eye movements   ENT: No discharge. Pharynx with ETT and NG  Neck: Trachea midline. No obvious mass. Resp: Clearer lungs, less secretions   CV: Tachycardic, irregular. No murmur or rub. GI: Non-tender. Non-distended. No hernia. Skin: Warm, dry, normal texture and turgor. No nodule on exposed extremities. Lymph: No cervical LAD. No supraclavicular LAD. M/S: No cyanosis. No clubbing. No joint deformity. Neuro:  Moves head to verbal, grimaces to noxious stimuli, less flacid in extremities   Ext:   ++ edema    Medications:    Scheduled Meds:   lactobacillus  2 capsule Oral BID WC    mupirocin   Nasal BID    furosemide  80 mg Intravenous BID    lacosamide (VIMPAT) IVPB  100 mg Intravenous BID    piperacillin-tazobactam  2.25 g Intravenous Q8H    sodium chloride flush  10 mL Intravenous 2 times per day    pantoprazole  40 mg Intravenous Daily    And    sodium chloride (PF)  10 mL Intravenous Daily    chlorhexidine  15 mL Mouth/Throat BID    levetiracetam  1,000 mg Intravenous Q12H    vancomycin (VANCOCIN) intermittent dosing (placeholder)   Other RX Placeholder       Continuous Infusions:   amiodarone 0.5 mg/min (09/16/20 2050)    dilTIAZem 15 mg/hr (09/17/20 4819)    dextrose      heparin (Porcine) 7.3 mL/hr (09/15/20 2210)       PRN Meds:  fentanNYL, LORazepam, potassium chloride, magnesium sulfate, [DISCONTINUED] calcium gluconate **OR** calcium gluconate **OR** calcium gluconate **OR** calcium gluconate, sodium phosphate IVPB **OR** sodium phosphate IVPB **OR** sodium phosphate IVPB **OR** sodium phosphate IVPB, sodium chloride flush, acetaminophen **OR** acetaminophen, ondansetron, glucose, dextrose, glucagon (rDNA), dextrose    Labs:  CBC:   Recent Labs     09/15/20  0606 09/16/20  0510 09/17/20  0440   WBC 15.6* 19.8* 14.7*   HGB 12.0 10.3* 9.6*   HCT 38.4 31.8* 30.7*   MCV 84.3 83.9 84.3   * 114* 126*     BMP:   Recent Labs     09/15/20  0606 09/16/20  0510 09/17/20  0440   * 133* 136   K 4.1 4.1 3.9    100 99   CO2 24 23 25   PHOS 2.0* 3.3 5.5*   BUN 20 47* 75*   CREATININE 1.1 2.3* 3.2*     LIVER PROFILE:   Recent Labs     09/15/20  0606 09/16/20  0510 09/17/20  0440   * 220* 121*   ALT 1,037* 554* 390*   BILIDIR 1.1* 0.9* 0.7*   BILITOT 1.4* 1.1* 0.9   ALKPHOS 143* 129 124     PT/INR:   Recent Labs     09/16/20  1730 09/17/20  0010 09/17/20  0440   PROTIME 11.9 12.8 12.3   INR 1.03 1.10 1.06     APTT:   Recent Labs     09/15/20  0453 09/16/20  0528 09/17/20  0440   APTT 62.1* 67.1* 77.0*     UA:No results for input(s): NITRITE, COLORU, PHUR, LABCAST, WBCUA, RBCUA, MUCUS, TRICHOMONAS, YEAST, BACTERIA, CLARITYU, SPECGRAV, LEUKOCYTESUR, UROBILINOGEN, BILIRUBINUR, BLOODU, GLUCOSEU, AMORPHOUS in the last 72 hours. Invalid input(s): Donell Marshall  No results for input(s): PH, PCO2, PO2 in the last 72 hours. Films:  Chest imaging reports were reviewed and imaging was reviewed by me and showed ETT, Vascath and NG in place. ETT just above rahat.  Decrease in size of effusions bilaterally

## 2020-09-17 NOTE — PROGRESS NOTES
[facility]  1970 September 17, 2020        CC: On ventilator      Subjective:     History of Present Illness: Today's events noted. Patient still in atrial flutter atrial fibrillation. Attempted to cardiovert could not cardiovert after delivering 2 shocks at 200 J. Mingo Enriquez is a 53 y.o. patient with a PMH significant for factor V Leiden deficiency, history of deep vein thrombosis, hyperlipidemia presented with complaints of increasing shortness of breath. When I saw her today patient is on ventilator intubated. Apparently she came to [facility] with increasing shortness of breath and some chest tightness. She is also complaining of swelling of her lower extremity. She had a PEA arrest and was intubated. CTA chest showed pulmonary embolism. No other medical history is available to me  No history of any cardiac disorders available to me. Past Medical History:   has a past medical history of Anticoagulant long-term use, Arthritis, Asthma, Baker's cyst, Depression, Factor V Leiden ([address] Factor V Leiden ([address] H/O blood clots, Heartburn, Hyperlipidemia, MRSA bacteremia, and Osteoarthritis. Surgical History:   has no past surgical history on file. Social History:   reports that she quit smoking 9 days ago. Her smoking use included cigarettes. She smoked 0.50 packs per day. She has never used smokeless tobacco. She reports current drug use. Frequency: 14.00 times per week. Drug: Marijuana. She reports that she does not drink alcohol. Family History:  family history includes Heart Attack in her father. Home Medications:  Were reviewed and are listed in nursing record and/or below  Prior to Admission medications    Medication Sig Start Date End Date Taking?  Authorizing Provider   albuterol sulfate  (90 Base) MCG/ACT inhaler INHALE TWO PUFFS BY MOUTH EVERY 6 HOURS AS NEEDED FOR WHEEZING 8/25/20  Yes Adan Leigh Patrickmadominic Lucero, APRN - CNP   vitamin D (ERGOCALCIFEROL) 1.25 MG (75123 UT) CAPS capsule TAKE ONE CAPSULE BY MOUTH ONCE WEEKLY 8/13/20  Yes Nahomy Farias DO   omeprazole (PRILOSEC) 20 MG delayed release capsule TAKE ONE CAPSULE BY MOUTH DAILY 6/10/20  Yes Trevon Freed MD   warfarin (COUMADIN) 7.5 MG tablet TAKE ONE TABLET BY MOUTH DAILY EXCEPT ON MONDAY AND THURSDAY TAKE ONE-HALF TABLET BY MOUTH AS DIRECTED 4/22/20  Yes Trevon Freed MD   atorvastatin (LIPITOR) 10 MG tablet TAKE ONE TABLET BY MOUTH DAILY 1/8/20  Yes Trevon Freed MD   DULoxetine (CYMBALTA) 60 MG extended release capsule TAKE ONE CAPSULE BY MOUTH DAILY 9/9/19  Yes Trevon Freed MD   BREO ELLIPTA 200-25 MCG/INH AEPB INHALE ONE DOSE BY MOUTH DAILY 3/22/19  Yes Trevon Freed MD        CURRENT Medications:  lacosamide (VIMPAT) 200 mg in dextrose 5 % 70 mL IVPB, BID  heparin (porcine) injection 1,000 Units, PRN  lactobacillus (CULTURELLE) capsule 2 capsule, BID WC  mupirocin (BACTROBAN) 2 % ointment, BID  furosemide (LASIX) injection 80 mg, BID  dilTIAZem 125 mg in dextrose 5 % 125 mL infusion, Continuous  piperacillin-tazobactam (ZOSYN) 2.25 g in dextrose 5 % 50 mL IVPB (mini-bag), Q8H  fentaNYL (SUBLIMAZE) injection 50 mcg, Q2H PRN  LORazepam (ATIVAN) injection 2 mg, Q2H PRN  potassium chloride 20 mEq/50 mL IVPB (Central Line), PRN  magnesium sulfate 1 g in dextrose 5% 100 mL IVPB, PRN  calcium gluconate 2 g in dextrose 5 % 100 mL IVPB, PRN    Or  calcium gluconate 3 g in dextrose 5 % 100 mL IVPB, PRN    Or  calcium gluconate 4 g in dextrose 5 % 100 mL IVPB, PRN  sodium phosphate 6 mmol in dextrose 5 % 250 mL IVPB, PRN    Or  sodium phosphate 12 mmol in dextrose 5 % 250 mL IVPB, PRN    Or  sodium phosphate 18 mmol in dextrose 5 % 500 mL IVPB, PRN    Or  sodium phosphate 24 mmol in dextrose 5 % 500 mL IVPB, PRN  sodium chloride flush 0.9 % injection 10 mL, 2 times per day  sodium chloride flush 0.9 % injection 10 mL, PRN  acetaminophen (TYLENOL) tablet 650 mg, Q6H PRN    Or  acetaminophen (TYLENOL) suppository 650 mg, Q6H PRN  ondansetron (ZOFRAN) injection 4 mg, Q6H PRN  pantoprazole (PROTONIX) injection 40 mg, Daily    And  sodium chloride (PF) 0.9 % injection 10 mL, Daily  chlorhexidine (PERIDEX) 0.12 % solution 15 mL, BID  glucose (GLUTOSE) 40 % oral gel 15 g, PRN  dextrose 50 % IV solution, PRN  glucagon (rDNA) injection 1 mg, PRN  dextrose 5 % solution, PRN  levetiracetam (KEPPRA) 1000 mg/100 mL IVPB, Q12H  vancomycin (VANCOCIN) intermittent dosing (placeholder), RX Placeholder  heparin 25,000 unit in sodium chloride 0.45% 250 mL infusion, Continuous        Allergies:  Ibuprofen           Review of Systems: SEE HPI   · Not available      Objective:     PHYSICAL EXAM:      Vitals:    09/17/20 1500   BP: 117/85   Pulse: 125   Resp: 24   Temp:    SpO2: 90%    Weight: 217 lb 2.5 oz (98.5 kg)       General Appearance:   Awake on the ventilator   Head:  Normocephalic, without obvious abnormality, atraumatic. Eyes:  Pupils equal and round. No scleral icterus. Mouth: Moist mucosa, no pharyngeal erythema. Nose: Nares normal. No drainage or sinus tenderness. Neck: Supple, symmetrical, trachea midline. No adenopathy. No tenderness/mass/nodules. No carotid bruit or elevated JVD. Lungs:   Respiratory Effort: Normal   Auscultation: Clear to auscultation bilaterally, respirations unlabored. No wheeze, rales   Chest Wall:  No tenderness or deformity. Cardiovascular:    Pulses  Palpation: normal   Ascultation: Regular rate, S1/ S2 normal. No murmur, rub, or gallop. 2+ radial and pedal pulses, symmetric  Carotid  Femoral   Abdomen and Gastrointestinal:   Soft, non-tender, bowel sounds active. Liver and Spleen  Masses   Musculoskeletal: No muscle wasting  Back  Gait   Extremities: Extremities normal, atraumatic. No cyanosis or edema.  No cyanosis clubbing               Neurologic:  Unable to test      Labs     All labs have been reviewed    Lab Results   Component Value Date right heart strain.         Large bilateral pleural effusions and reflux of contrast into the IVC/hepatic    veins.  Findings most compatible with volume overload/CHF.         Patchy airspace disease in the left lower lobe adjacent to the large    effusion.  Differential considerations include atelectasis, infectious    process or, less likely, infarction.             Chest x-ray  Endotracheal tube has tip approximately 1.3 cm proximal to the rahat and    should be retracted approximately 2 cm.         Findings are again suggestive of pulmonary edema with small pleural    effusions.  Pneumonia is not excluded in the appropriate clinical setting.         Findings were called by the radiology call center. Assessment and Plan     -Atrial fibrillation with rapid ventricular response. Continue intravenous Cardizem  Continue intravenous amiodarone  Attempted cardioversion. Cardiac arrest PEA  Possible cardiogenic shock  -Acute pulmonary edema,  Acute pulmonary embolism  Acute hypoxemic respiratory failure  Initial ECG suggestive of atrial flutter  Transesophageal echo completed did not show severe mitral regurgitation  LV ejection fraction moderately depressed. Status post CRRT    Continue ventilator support for now. Neurology consultation for possible seizure activity and encephalopathy    Thank you for allowing us to participate in the care of Ranjana Garcia. Please do not hesitate to contact me if you have any questions.     Param Rabago MD, MPH    [address] [facility]  Ph: [phone]  Fax: [phone]    9/17/2020 3:30 PM

## 2020-09-17 NOTE — PROGRESS NOTES
1 each 11       Allergies:  Ibuprofen    Immunizations :   Immunization History   Administered Date(s) Administered    Pneumococcal Polysaccharide ([id number]) 2007       Social History:   Social History     Tobacco Use    Smoking status: Former Smoker     Packs/day: 0.50     Types: Cigarettes     Last attempt to quit: 2020     Years since quittin.0    Smokeless tobacco: Never Used   Substance Use Topics    Alcohol use: No     Alcohol/week: 0.0 standard drinks    Drug use: Yes     Frequency: 14.0 times per week     Types: Marijuana     Social History     Tobacco Use   Smoking Status Former Smoker    Packs/day: 0.50    Types: Cigarettes    Last attempt to quit: 2020    Years since quittin.0   Smokeless Tobacco Never Used      Family History   Problem Relation Age of Onset    Heart Attack Father          REVIEW OF SYSTEMS:    Not possible intubated state         PHYSICAL EXAM:      Vitals:    /64   Pulse 136   Temp 98.3 °F (36.8 °C) (Axillary)   Resp 20   Ht 5' 7\" (1.702 m)   Wt 217 lb 2.5 oz (98.5 kg)   LMP 2018 (Exact Date)   SpO2 94%   BMI 34.01 kg/m²     General Appearance: intubated sedated  on the vent in some  acute distress, ++ pallor, no icterus   Skin: warm and dry, no rash or erythema  Head: normocephalic and atraumatic  Eyes: pupils equal, round, and reactive to light, conjunctivae normal  ENT: tympanic membrane, external ear and ear canal normal bilaterally, nose without deformity, nasal mucosa and turbinates normal without polyps  Neck: supple and non-tender without mass, no thyromegaly  no cervical lymphadenopathy  Pulmonary/Chest: coarse crepts  no wheezes, rales or rhonchi, normal air movement, in some respiratory distress  Cardiovascular:  S1 and S2, Mitral murmur, rubs, clicks, or gallops, no carotid bruits  Abdomen: soft, non-tender, non-distended, normal bowel sounds, no masses or organomegaly  Extremities: no cyanosis, clubbing or Hep B S Ag Interp  Non-reactive  Non-reactive    Hep C Ab Interp  Non-reactive  Non-reactive    Resulting Agency   [address]        TTE :  9/10     Summary   Ejection fraction is visually estimated to be 30%-35%. Severe tricuspid regurgitation. moderate pulmonary HTN   There is severe diffuse hypokinesis. Diastolic filling parameters suggest grade III diastolic dysfunction. Moderate calcification of the posterior leaflet of the mitral valve. Mitral annular calcification is present. The mitral valve leaflets appear myxomatous. No evidence of mitral valve stenosis. Severe mitral regurgitation is present. Systolic flow reversal in pulmonary veins. The right ventricle is enlarged. Right ventricular systolic function is moderate to severely reduced. Signature      ------------------------------------------------------------------   Electronically signed by Cuba Tijerina MD (Interpreting   physician) on 09/11/2020 at 10:45 AM   ------------------------------------------------------------------    MICRO: cultures reviewed and updated by me     20 1208        Order Status: Completed  Specimen: Sputum, Suctioned  Updated: 09/14/20 8874     CULTURE, RESPIRATORY  Normal respiratory benjamin absentAbnormal       Gram Stain Result  4+ WBC's (Polymorphonuclear)   2+ Epithelial Cells   1+ Gram positive cocci   1+ Gram positive rods     Organism  Staphylococcus aureusAbnormal       CULTURE, RESPIRATORY  --     Light growth   PBP2= Negative    Narrative:      ORDER#: [id number]                          ORDERED BY: LISA BRIGHT   SOURCE: Sputum Suctioned                   COLLECTED:  09/11/20 12:02   [facility] AT [facility]. :                      RECEIVED :  09/11/20 12:02   Performed at:   [facility] [address]   Phone [phone]    Culture, Blood 1 [[id number]]   Collected: 09/12/20 6742    Order Status: Completed  Specimen: Blood Updated: 09/13/20 1915     Blood Culture, Routine  No Growth to date.  Any change in status will be called. Narrative:      ORDER#: [id number]                          ORDERED BY: Bethena Leyden   SOURCE: Blood                              COLLECTED:  09/12/20 16:57   [facility] [place]. :                      RECEIVED :  09/12/20 16:57   If child <=2 yrs old please draw pediatric bottle. ~Blood Culture #1   Performed at:   [address], [place] [address]   Phone [phone]    Culture, Blood 1 [[id number]]   Collected: 09/13/20 1815    Order Status: Sent  Specimen: Blood  Updated: 09/13/20 1831    Culture, Blood 2 [[id number]]   Collected: 09/12/20 1300    Order Status: Completed  Specimen: Blood  Updated: 09/13/20 1515     Culture, Blood 2  No Growth to date.  Any change in status will be called. Narrative:      ORDER#: [id number]                          ORDERED BY: Bethena Leyden   SOURCE: Blood                              COLLECTED:  09/12/20 13:00   [facility] [place]. :                      RECEIVED :  09/12/20 13:06   If child <=2 yrs old please draw pediatric bottle. ~Blood Culture #2   Performed at:   [address], [place] [address]   Phone [phone]    Culture, Blood 2 [[id number]]  (Abnormal)  Collected: 09/10/20 2314    Order Status: Completed  Specimen: Blood  Updated: 09/13/20 1344     Culture, Blood 2  --Abnormal       Gram stain Aerobic bottle:   Gram positive cocci in clusters   resembling Staphylococcus   Information to follow   Abnormal       Organism  Staph aureus MRSA DNA DetectedAbnormal       Culture, Blood 2  --Abnormal       CONTACT PRECAUTIONS INDICATED   See additional report for complete BCID panel.    Isolated one of two sets   Abnormal       Organism  Staphylococcus aureusAbnormal       Culture, Blood 2  --     POSITIVE for   Sensitivity to follow   Repeating sensitivity    Narrative:      ORDER#: [id number]                          ORDERED BY: Norma Gomez   SOURCE: Blood                              COLLECTED:  09/10/20 23:14   [facility]. :                      RECEIVED :  09/10/20 23:18   CALL Wendy Caicedo  [id number] tel. [phone],   ., 09/13/2020 06:47, by [facility]   Microbiology results called to and read back by Chloe Carroll,   09/11/2020 16:00, by [email or web address]   Microbiology results called to and read back by FRANCHESCA Rock, 09/11/2020   15:59, by Jenny Rivas   If child <=2 yrs old please draw pediatric bottle. ~Blood Culture #2   Performed at:   [facility]   [address] [facility], [place] [address]   Phone [phone]    Culture, Blood 2 [[id number]]      Order Status: No result  Specimen: Peripheral, site unknown from Blood     Culture, Blood 1 [[id number]]   Collected: 09/10/20 2303    Order Status: Completed  Specimen: Blood  Updated: 09/12/20 0115     Blood Culture, Routine  No Growth to date.  Any change in status will be called. Narrative:      ORDER#: [id number]                          ORDERED BY: Norma Gomez   SOURCE: Blood                              COLLECTED:  09/10/20 23:03   [facility] AT [facility]. :                      RECEIVED :  09/10/20 23:07   If child <=2 yrs old please draw pediatric bottle. ~Blood Culture #1   Performed at:   [facility]   [address] [facility], [place] [address]   Phone [phone]    Culture, Blood, PCR ID Panel Results Report [[id number]]   Collected: 09/10/20 2314    Order Status: Completed  Updated: 09/11/20 1600     Report  SEE IMAGE    Narrative:      CALL Keira Jeanna tel. [phone],   Microbiology results called to and read back by Chloe Carroll,   09/11/2020 16:00, by MILANA   Microbiology results called to and read back by FRANCHESCA Rock, 09/11/2020   15:59, by NELSON   Referred out by:   [facility] erythromycin  Sensitive  <=0.25  mcg/mL      oxacillin  Sensitive  <=0.25  mcg/mL      tetracycline  Sensitive  <=1  mcg/mL      trimethoprim-sulfamethoxazole  Sensitive  <=10  mcg/mL          IMAGING:    MRI BRAIN WO CONTRAST   Final Result   Addendum 1 of 1   ADDENDUM:   Results were reported to GOLDIE Ferreira at 3:26 p.m. on September 15,    2020. Final      XR CHEST PORTABLE   Final Result   Decreased layering effusions with improved basilar aeration. Appropriate   life support device positioning. XR CHEST PORTABLE   Final Result   Low-lying endotracheal tube. Recommend retracting 1-2 cm. No substantial change in layering effusions and basilar opacities. The findings were sent to the Radiology Results [address] at 3:53   am on 9/14/2020to be communicated to a licensed caregiver. XR CHEST PORTABLE   Final Result   1. Interval placement of an NG tube, likely within the stomach. 2. Stable appropriate positions of endotracheal tube and right-sided dialysis   catheter. 3. No significant change in appearance of small to moderate bilateral pleural   effusions and bibasilar airspace disease. XR CHEST PORTABLE   Final Result   Supportive lines project in stable positions. Bibasilar opacities, suggestive of layered effusions and atelectasis or   airspace disease. XR CHEST PORTABLE   Final Result   1. Suspected mild to moderate bilateral pleural effusions with adjacent   bibasilar atelectasis and/or airspace disease. 2. Mild to moderate cardiomegaly. 3. Recommend retraction of endotracheal tube 0.7 cm. XR CHEST PORTABLE   Final Result   Right jugular venous catheter and endotracheal tube project in normal   positions. No pneumothorax. Bilateral airspace disease and possible layered pleural effusions, consistent   with pulmonary edema and atelectasis. Pneumonia is a differential   possibility.          XR CHEST PORTABLE   Final Result   Satisfactory position of the endotracheal tube 3 cm above the rahat. The   heart is upper normal size and vessels are borderline congested. VL Extremity Venous Bilateral   Final Result      VL DUP LOWER EXTREMITY ARTERIES BILATERAL   Final Result      XR CHEST PORTABLE   Final Result   Endotracheal tube has tip approximately 1.3 cm proximal to the rahat and   should be retracted approximately 2 cm. Findings are again suggestive of pulmonary edema with small pleural   effusions. Pneumonia is not excluded in the appropriate clinical setting. Findings were called by the radiology call center. CT HEAD WO CONTRAST   Final Result   Motion limited study. No convincing evidence for acute intracranial   abnormality identified within the limitations of motion artifact. If there is persistent clinical suspicion for intracranial abnormality,   repeat study should be considered. XR CHEST PORTABLE   Final Result   Cardiomegaly, pulmonary vascular congestion, and bilateral pleural effusions. Endotracheal tube terminates approximately 2 cm above the rahat. CT CHEST PULMONARY EMBOLISM W CONTRAST   Final Result   Addendum 1 of 1   ADDENDUM:   Findings were discussed with Dr. Robert Flores at 10:49 pm on 9/10/2020.          Final            All the pertinent images and reports for the current Hospitalization were reviewed by me     Scheduled Meds:   lactobacillus  2 capsule Oral BID WC    mupirocin   Nasal BID    furosemide  80 mg Intravenous BID    lacosamide (VIMPAT) IVPB  100 mg Intravenous BID    piperacillin-tazobactam  2.25 g Intravenous Q8H    sodium chloride flush  10 mL Intravenous 2 times per day    pantoprazole  40 mg Intravenous Daily    And    sodium chloride (PF)  10 mL Intravenous Daily    chlorhexidine  15 mL Mouth/Throat BID    levetiracetam  1,000 mg Intravenous Q12H    vancomycin (VANCOCIN) intermittent dosing (placeholder)   Other RX Placeholder       Continuous Infusions:   amiodarone 0.5 mg/min (09/16/20 2050)    dilTIAZem 15 mg/hr (09/17/20 9390)    dextrose      heparin (Porcine) 6.5 mL/hr (09/17/20 0806)       PRN Meds:  fentanNYL, LORazepam, potassium chloride, magnesium sulfate, [DISCONTINUED] calcium gluconate **OR** calcium gluconate **OR** calcium gluconate **OR** calcium gluconate, sodium phosphate IVPB **OR** sodium phosphate IVPB **OR** sodium phosphate IVPB **OR** sodium phosphate IVPB, sodium chloride flush, acetaminophen **OR** acetaminophen, ondansetron, glucose, dextrose, glucagon (rDNA), dextrose      Assessment:     Patient Active Problem List   Diagnosis    Factor V Leiden mutation ([facility]    Pulmonary embolus (HCC)    Lactic acidosis    CAP (community acquired pneumonia)    Acute CHF (congestive heart failure) ([facility]    Bilateral pulmonary embolism (HCC)    Cardiac arrest ([facility]    Seizures ([facility]    Acute respiratory failure with hypoxia (HCC)    Acute kidney injury ([facility]    Metabolic acidosis    Congestive heart failure (HCC)    Pulmonary hypertension (HCC)    Multifocal pneumonia    Septic shock (HCC)    Elevated LFTs    Thrombocytopenia (HCC)    Coagulopathy (HCC)    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)    Acute encephalopathy    Pneumonia due to organism    Bacteremia    Pleural effusion, bilateral    Elevated lactic acid level    Bacteremia due to methicillin susceptible Staphylococcus aureus (MSSA)    Pneumonia due to methicillin susceptible Staphylococcus aureus (MSSA) (HCC)    Anemia    Acute renal failure (HCC)    Bilateral pleural effusion    Mitral valve insufficiency    Tricuspid valve insufficiency    Class 2 obesity due to excess calories with body mass index (BMI) of 39.0 to 39.9 in adult    Cardiogenic shock (HCC)    Staphylococcal pneumonia (HCC)     Septic shock   Shock liver  Cardiac arrest   Pulmonary embolism  Encephalopathy   Possible seizure episode  Resp failure now on the ventilator  WBC elevation   MRSA bacteremia  Sputum cx reported as MSSA interestingly and Micro lab did additional testing  TTE is abnormal with mitral valve calcification and myxomatous changes with Mitral regurgitation  Factor V leiden def  MOR[place]IA     She remains critically ill in ICU from resp failure, CVA, and A fib with bacteremia     WBC slowly trend down on IV abx and Micro repeat sensitivities and repeat blood cx ngtd'       S/P LEVY  NO Vegetation but has Mitral regurgitation     MRI brain now reported to have multiple CVA possible embolic in origin and given severe Mitral regurgitation with MRSA bacteremia this is concerning for   Endocarditis     WBC trend down a bit with addition of IV Zosyn and A fib cardiology attempted cardioversion but still unsuccessful and she is on HD for MORELIA         Labs, Microbiology, Radiology and all the pertinent results from current hospitalization and  care every where were reviewed  by me as a part of the evaluation   Plan:   1. Cont IV Vancomycin  Level elevated due to MORELIA     2. Aim for trough 15 -20  3. Cont  IV Zosyn x 2.25 gm x Q 8 hrs -AVOID CEFEPIME/MEROPENEM given the concern for seizures   4. WBC remains elevated will trend   5/ s/p LEVY results noted no vegetation   6. Now on intermittent HD for MORELIA   7. MRI brain abnormal from CVA multiple likely embolic      Discussed with patient/Family and Nursing staff   · Patient is critically ill and has a potentially life threatening infection that poses threat to life/bodily function. · There is potential for worsening infection/ sudden clinically significant or life-threatening deterioration in the patient's condition without appropriate antimicrobial therapy. · Complex medical decision making process was involved to select appropriate antimicrobial agents to reverse the cause of patient's severe infection/ illness.   · Antimicrobial therapy requires intensive monitoring for toxicity

## 2020-09-17 NOTE — PROGRESS NOTES
Clinical Pharmacy Note  Heparin Dosing       Lab Results   Component Value Date    APTT 74.0 09/17/2020     Lab Results   Component Value Date    HGB 9.6 09/17/2020    HCT 30.7 09/17/2020     09/17/2020    INR 1.06 09/17/2020       Current Infusion Rate: 6.5 mL/hr    Plan:  Rate: 6.5 mL/hr  Next aPTT: 09/17/20 2130    Pharmacy will continue to monitor and adjust based on aPTT results.   Edi Richardson, [facility]

## 2020-09-17 NOTE — PROGRESS NOTES
Comprehensive Nutrition Assessment    Type and Reason for Visit:  Reassess    Nutrition Recommendations/Plan:   Modify TF regimen to ensure adequate nutrition, now that Propofol is off, to Nepro goal rate of 55 ml per hour + 1 bottle of Proteinex per feeding tube per day. Flush per provider. Nutrition Assessment:  Follow-up. Pt remains intubated, but propofol now off. Noted no pressor support as well. Pt continues with need for routine HD. Tolerating Nepro at current goal rate of 35 ml per hour + 3 bottles of proteinex per feeding tube per day. Flushes continue at 30 ml every 4 hours. With propofol now off, will adjust TF to ensure that est needs are met. Noted wt loss trend, likely r/t  fluid shifts. Malnutrition Assessment:  Malnutrition Status:  Insufficient data    Context:  Acute Illness       Estimated Daily Nutrient Needs:  Energy (kcal):  7878-1804 (adj for HD & obesity); Weight Used for Energy Requirements:        Protein (g):   (1.2-2 x IBW 61 kg (adj for obesity & HD); Weight Used for Protein Requirements:           Fluid (ml/day):  per provider; Weight Used for Fluid Requirements:         Nutrition Related Findings:  BM per rectal tube. Noted +1 edema to BUE & +2 edema to BLE      Wounds:  None       Current Nutrition Therapies:    DIET TUBE FEED CONTINUOUS/CYCLIC NPO; Renal Formula; Nasogastric; Continuous; 20; 35  Current Tube Feeding (TF) Orders:  · Feeding Route: Nasogastric  · Formula: Renal(Nepro with new goal rate of 55 ml per hour + 1 bottle of Proteinex per feeding tube per day)  · Schedule: Continuous  · Additives/Modulars: Protein(1 bottle of Proteinex per feeding tube per day.  Do not mix directly with TF.)  · Water Flushes: per provider  · Current TF & Flush Orders Provides: 1100 ml TV / 2084 kcals (1980 (TF) + 104 (Proteinex) / 115 gms pro (89 (TF) + 26 (Proteinex) / 800 ml free water per day      Anthropometric Measures:  · Height: 5' 7\" (170.2 cm)  · Current Body Weight:

## 2020-09-17 NOTE — PROGRESS NOTES
0820 EEG at bedside    Tami Arenas 411, NP at bedside. No new orders at this time    1020 Rounds with critical care team.     1155 Updates given to pt's . Requesting phone call from Neurology for updates. This RN confirmed pt's phone number as [phone].    [phone] to Calos Houser NP and confirmed pt's husbands phone number. [address] unable to get through to pt's  on the phone number that is given. Will attempt to get a hold of pt's mother, who is also listed in emergency contact. 1225 Dr Kalina Barraza at bedside. Rec'd verbal order for cardioversion. 1229 PRN fentanyl given in preparation for cardioversion    1232 Pads placed on patient and leads hooked up to zoll. Jewels set to 200 per MD Kalina Barraza. Charge and shock administered. Pt continues in A.Fib/Flutter. [address] to 200 per MD Kalina Barraza. Charge and shock administered. Pt continues in A.Fib/Flutter. 80 HD RN at bedside setting up for treatment. 1 Pt's mother called and was given updates by this RN. All questions answered. [phone] Pt's  called and was given updates. 1900 Report and bedside handoff with Araseli Corrigan RN .     Electronically signed by Tameka Aviles RN on 9/17/2020

## 2020-09-17 NOTE — PLAN OF CARE
Problem: Pain:  Goal: Control of acute pain  Description: Continuing to monitor pain and discomfort. Monitoring pain level using CPOT pain scale. Non- pharmacological measures utilized to reduce discomfort/pain. PRN pain meds administeration continues as ordered by physician. Outcome: Ongoing     Problem: Falls - Risk of:  Goal: Will remain free from falls  Description: Falling star program remains in place. Frequent visual monitoring continues. Toileting program inplace. Patient assisted in turning/repositioning at least once every 2 hours, and on a prn basis. Outcome: Ongoing     Problem: Skin Integrity:  Goal: Absence of new skin breakdown  Description: Monitoring patient skin integrity for skin breakdown, turning and repositioning q2h per protocol.      Outcome: Ongoing     Problem: Restraint Use - Nonviolent/Non-Self-Destructive Behavior:  Goal: Absence of restraint indications  Description: Absence of restraint indications  Outcome: Completed     Problem: Restraint Use - Nonviolent/Non-Self-Destructive Behavior:  Goal: Absence of restraint-related injury  Description: Absence of restraint-related injury  Outcome: Completed

## 2020-09-17 NOTE — FLOWSHEET NOTE
09/17/20 1310 09/17/20 1610   Treatment   Time On 1310  --    Time Off  --  1610   Treatment Goal 0  --    Observations & Evaluations   Level of Consciousness 2 2   Vital Signs   /84 126/69   Temp 98 °F (36.7 °C) 97.4 °F (36.3 °C)   Pulse  --  124   Weight 217 lb 2.5 oz (98.5 kg) 216 lb 14.9 oz (98.4 kg)   Weight Method Bed scale Bed scale   Percent Weight Change 0 -0.1   Pain Assessment   Pain Assessment Faces Faces   Pain Level 0 0   Technical Checks   RO Machine Log Sheet Completed Yes  --    Machine Alarm Self Test Completed  --    Machine Autotest Completed  --    Air Foam Detector Tested  --    Extracorporeal Circuit Tested for Integrity Yes  --    Treatment Initiation   Dialyze Hours 3  --    Treatment  Initiation Universal Precautions maintained;Lines secured to patient; Connections secured;Prime given;Venous Parameters set; Arterial Parameters set; Air foam detector engaged; Hemosafe Device; Saline line double clamped; Hemo-Safe Applied;Dialyzer;F160  --    During Hemodialysis Assessment   Blood Flow Rate (ml/min) 350 ml/min  --    Ultrafiltration Rate (ml/hr) 300 ml/hr  --    Arterial Pressure (mmHg) -130 mmHg  --    Venous Pressure (mmHg) 150  --    TMP 30  --      --    Access Visible Yes  --    Dialysis Bath   K+ (Potassium) 4  --    Ca+ (Calcium) 2.25  --    Na+ (Sodium) 140  --    HCO3 (Bicarb) 30  --    Post-Hemodialysis Assessment   Post-Treatment Procedures  --  Blood returned;Catheter capped, clamped and heparinized x 2 ports   Machine Disinfection Process  --  Acid/Vinegar Clean;Heat Disinfect; Exterior Machine Disinfection   Rinseback Volume (ml)  --  400 ml   Total Liters Processed (l/min)  --  58.5 l/min   Dialyzer Clearance  --  Lightly streaked   Duration of Treatment (minutes)  --  180 minutes   Hemodialysis Intake (ml)  --  800 ml   Hemodialysis Output (ml)  --  879 ml   NET Removed (ml)  --  79 ml   Tolerated Treatment  --  Good

## 2020-09-17 NOTE — PROGRESS NOTES
4 Eyes Skin Assessment     The patient is being assess for  Shift Handoff    I agree that 2 RN's have performed a thorough Head to Toe Skin Assessment on the patient. ALL assessment sites listed below have been assessed. Areas assessed by both nurses: oscar/mc/Nena  [x]   Head, Face, and Ears   [x]   Shoulders, Back, and Chest  [x]   Arms, Elbows, and Hands   [x]   Coccyx, Sacrum, and IschIum  [x]   Legs, Feet, and Heels        Does the Patient have Skin Breakdown?   No   Bruising noted around R groin to inner thigh        Cash Prevention initiated:  Yes   Wound Care Orders initiated:  NA      [facility] nurse consulted for Pressure Injury (Stage 3,4, Unstageable, DTI, NWPT, and Complex wounds), New and Established Ostomies:  NA      Nurse 1 eSignature: Electronically signed by Andria Drake RN on 9/17/20 at 7:38 PM EDT    **SHARE this note so that the co-signing nurse is able to place an eSignature**    Nurse 2 eSignature: Electronically signed by Chelsea Ba RN on 9/17/20 at 8:31 PM EDT

## 2020-09-17 NOTE — PROGRESS NOTES
Hospitalist Progress Note      PCP: No primary care provider on file. Date of Admission: 9/10/2020    Chief Complaint:   Chief Complaint   Patient presents with    Shortness of Breath     HX of asthma. Short of breath for around 1 month. Patient states she just refilled her inhaler today. Hospital Course:  53 y.o. female with past medical history of factor V Leiden deficiency on chronic warfarin, history of blood clots, hyperlipidemia, arthritis, asthma who presents to [facility] with worsening above symptoms of fatigue, intermittent shortness of breath both at rest and on exertion, and extensive of abdominal and leg swelling. In the ED patient was significantly tachycardic, showing some combination of sinus tachycardia versus SVT, uncertain if may be underlying atrial fibrillation with rapid ventricular rate. Labs showed signs of infection as well as elevated proBNP. CTA with contrast was done on the chest and demonstrated left and right lobe PEs in certain segments, active findings suggestive of early patchy pneumonia bilaterally, and moderate bilateral pleural effusions suggestive of heart failure/volume overload. PEA arrest shortly after admission. TTE with EF 30- 35%. Severe diffuse hypokinesis with severely reduced rt ventricular function, severe tricuspid regurgitation and mitral regurgitation, grade 3 diastolic dysfunction. Found to have MRSA PNA and bacteremia. Developed shock liver and coagulopathy which are improving. Concern for seizure like activity, initial EEG negative but repeat with possible status, added vimpat to keppra and repeat EEG improved. Acute to subacute infarcts on MRI. Developed a fib RVR 9/16 that has not responded to cardioversion (x5), on amio and dilt IV. Subjective: Intubated, unable to complete ROS. A fib/flutter yesterday, cardioverted then started on amio then dilt. TTE without thrombus. EEG 9/16 concerning for seizures.  Repeat today improved per neuro. Called  and updated him on her status. Medications:  Reviewed    Infusion Medications    amiodarone 0.5 mg/min (09/16/20 2050)    dilTIAZem 15 mg/hr (09/17/20 5186)    dextrose      heparin (Porcine) 7.3 mL/hr (09/15/20 2210)     Scheduled Medications    lactobacillus  2 capsule Oral BID WC    mupirocin   Nasal BID    furosemide  80 mg Intravenous BID    lacosamide (VIMPAT) IVPB  100 mg Intravenous BID    piperacillin-tazobactam  2.25 g Intravenous Q8H    sodium chloride flush  10 mL Intravenous 2 times per day    pantoprazole  40 mg Intravenous Daily    And    sodium chloride (PF)  10 mL Intravenous Daily    chlorhexidine  15 mL Mouth/Throat BID    levetiracetam  1,000 mg Intravenous Q12H    vancomycin (VANCOCIN) intermittent dosing (placeholder)   Other RX Placeholder     PRN Meds: fentanNYL, LORazepam, potassium chloride, magnesium sulfate, [DISCONTINUED] calcium gluconate **OR** calcium gluconate **OR** calcium gluconate **OR** calcium gluconate, sodium phosphate IVPB **OR** sodium phosphate IVPB **OR** sodium phosphate IVPB **OR** sodium phosphate IVPB, sodium chloride flush, acetaminophen **OR** acetaminophen, ondansetron, glucose, dextrose, glucagon (rDNA), dextrose      Intake/Output Summary (Last 24 hours) at 9/17/2020 0748  Last data filed at 9/17/2020 0600  Gross per 24 hour   Intake 2623 ml   Output 2370 ml   Net 253 ml       Physical Exam Performed:    /73   Pulse 112   Temp 98.4 °F (36.9 °C) (Oral)   Resp 19   Ht 5' 7\" (1.702 m)   Wt 217 lb 2.5 oz (98.5 kg)   LMP 12/05/2018 (Exact Date)   SpO2 96%   BMI 34.01 kg/m²     General appearance: No apparent distress, appears stated age  HEENT: Pupils equal, round, and reactive to light. Conjunctivae/corneas clear. Neck: Supple, with full range of motion. Trachea midline. Respiratory:  Normal respiratory effort.  Coarse breath sounds bilaterally   Cardiovascular: Regular rate and rhythm with normal S1/S2 without murmurs, rubs or gallops. Abdomen: Soft, non-tender, non-distended with normal bowel sounds. Musculoskeletal: No clubbing, cyanosis or edema bilaterally. Full range of motion without deformity. Skin: Skin color, texture, turgor normal.  No rashes or lesions. Neurologic:  Intubated, sedated  Psychiatric: Intubated, sedated  Capillary Refill: Brisk,< 3 seconds   Peripheral Pulses: +2 palpable, equal bilaterally       Labs:   Recent Labs     09/15/20  0606 09/16/20  0510 09/17/20  0440   WBC 15.6* 19.8* 14.7*   HGB 12.0 10.3* 9.6*   HCT 38.4 31.8* 30.7*   * 114* 126*     Recent Labs     09/15/20  0606 09/16/20  0510 09/17/20  0440   * 133* 136   K 4.1 4.1 3.9    100 99   CO2 24 23 25   BUN 20 47* 75*   CREATININE 1.1 2.3* 3.2*   CALCIUM 8.2* 8.4 8.5   PHOS 2.0* 3.3 5.5*     Recent Labs     09/15/20  0606 09/16/20  0510 09/17/20  0440   * 220* 121*   ALT 1,037* 554* 390*   BILIDIR 1.1* 0.9* 0.7*   BILITOT 1.4* 1.1* 0.9   ALKPHOS 143* 129 124     Recent Labs     09/16/20  1730 09/17/20  0010 09/17/20  0440   INR 1.03 1.10 1.06     No results for input(s): Earlis Cook in the last 72 hours. Urinalysis:      Lab Results   Component Value Date    NITRU Negative 09/10/2020    WBCUA 3-5 09/10/2020    BACTERIA 2+ 09/10/2020    RBCUA 21-50 09/10/2020    BLOODU MODERATE 09/10/2020    SPECGRAV >1.030 09/10/2020    GLUCOSEU Negative 09/10/2020       Radiology:  MRI BRAIN WO CONTRAST   Final Result   Addendum 1 of 1   ADDENDUM:   Results were reported to GOLDIE Garcia at 3:26 p.m. on September 15,    2020. Final      XR CHEST PORTABLE   Final Result   Decreased layering effusions with improved basilar aeration. Appropriate   life support device positioning. XR CHEST PORTABLE   Final Result   Low-lying endotracheal tube. Recommend retracting 1-2 cm. No substantial change in layering effusions and basilar opacities.       The findings were sent to the Radiology Results Communication Center at 3:53   am on 9/14/2020to be communicated to a licensed caregiver. XR CHEST PORTABLE   Final Result   1. Interval placement of an NG tube, likely within the stomach. 2. Stable appropriate positions of endotracheal tube and right-sided dialysis   catheter. 3. No significant change in appearance of small to moderate bilateral pleural   effusions and bibasilar airspace disease. XR CHEST PORTABLE   Final Result   Supportive lines project in stable positions. Bibasilar opacities, suggestive of layered effusions and atelectasis or   airspace disease. XR CHEST PORTABLE   Final Result   1. Suspected mild to moderate bilateral pleural effusions with adjacent   bibasilar atelectasis and/or airspace disease. 2. Mild to moderate cardiomegaly. 3. Recommend retraction of endotracheal tube 0.7 cm. XR CHEST PORTABLE   Final Result   Right jugular venous catheter and endotracheal tube project in normal   positions. No pneumothorax. Bilateral airspace disease and possible layered pleural effusions, consistent   with pulmonary edema and atelectasis. Pneumonia is a differential   possibility. XR CHEST PORTABLE   Final Result   Satisfactory position of the endotracheal tube 3 cm above the rahat. The   heart is upper normal size and vessels are borderline congested. VL Extremity Venous Bilateral   Final Result      VL DUP LOWER EXTREMITY ARTERIES BILATERAL   Final Result      XR CHEST PORTABLE   Final Result   Endotracheal tube has tip approximately 1.3 cm proximal to the rahat and   should be retracted approximately 2 cm. Findings are again suggestive of pulmonary edema with small pleural   effusions. Pneumonia is not excluded in the appropriate clinical setting. Findings were called by the radiology call center. CT HEAD WO CONTRAST   Final Result   Motion limited study.   No convincing evidence for acute intracranial   abnormality identified within the limitations of motion artifact. If there is persistent clinical suspicion for intracranial abnormality,   repeat study should be considered. XR CHEST PORTABLE   Final Result   Cardiomegaly, pulmonary vascular congestion, and bilateral pleural effusions. Endotracheal tube terminates approximately 2 cm above the rahat. CT CHEST PULMONARY EMBOLISM W CONTRAST   Final Result   Addendum 1 of 1   ADDENDUM:   Findings were discussed with Dr. Jessica Melgarkeeper at 10:49 pm on 9/10/2020.          Final            Assessment/Plan:    Active Hospital Problems    Diagnosis    Cardiogenic shock ([address] [R57.0]    Staphylococcal pneumonia ([address] [J15.20]    Pleural effusion, bilateral [J90]    Elevated lactic acid level [R79.89]    Bacteremia due to methicillin susceptible Staphylococcus aureus (MSSA) [R78.81]    Pneumonia due to methicillin susceptible Staphylococcus aureus (MSSA) (HCC) [J15.211]    Anemia [D64.9]    Acute renal failure (HCC) [N17.9]    Bilateral pleural effusion [J90]    Mitral valve insufficiency [I34.0]    Tricuspid valve insufficiency [I07.1]    Class 2 obesity due to excess calories with body mass index (BMI) of 39.0 to 39.9 in adult [E66.09, Z68.39]    Pneumonia due to organism [J18.9]    Bacteremia [R78.81]    Cardiac arrest ([address] [I46.9]    Seizures ([address] [R56.9]    Acute respiratory failure with hypoxia ([address] [J96.01]    Acute kidney injury ([address] [X77.2]    Metabolic acidosis [Y37.4]    Congestive heart failure (HCC) [I50.9]    Pulmonary hypertension (HCC) [I27.20]    Multifocal pneumonia [J18.9]    Septic shock ([address] [A41.9, R65.21]    Elevated LFTs [R94.5]    Thrombocytopenia ([address] [D69.6]    Coagulopathy ([address] [D68.9]    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) ([address] [D65]    Acute encephalopathy [G93.40]    Pulmonary embolus (HCC) [I26.99]    Lactic acidosis [E87.2]    CAP (community chronically  - heparin drip    Oliguric/anuric MORELIA  Suboptimal response to Lasix. -CRRT started, switched to HD  -Checking ZENON (neg), ANCA, complement (c3 low), antiphospholipid antibodies  -Nephrology following    Elevated LFTs  Likely shock liver, improving  - daily labs    Obesity  Body mass index is 34.01 kg/m². Counseled on effects of weight on overall health and chronic medical conditions and discussed weight loss. DVT Prophylaxis: heparin  Diet: DIET TUBE FEED CONTINUOUS/CYCLIC NPO; Renal Formula; Nasogastric; Continuous; 20; 35  Code Status: Full Code    PT/OT Eval Status: deferred    Dispo - pending    Lenka Sellers MD    This note was transcribed using [id number] [email or web address]. Please disregard any translational errors. room air

## 2020-09-17 NOTE — PROGRESS NOTES
NEPHROLOGY PROGRESS NOTE    Patient: Elizabeth Hanson MRN: [id number]     YOB: 1970  Age: 53 y.o. Sex: female    Unit: [address] ICU Room/Bed: J8K-4531/2104-01 Location: [address] [place] [address]     Admitting Physician: Gilbert Rodriguez    Primary Care Physician: No primary care provider on file. LOS: 7 days       Reason for evaluation:   Milly      SUBJECTIVE:      The patient was seen and examined. Notes and labs reviewed. There were no complications over night. Patient's review of systems: she remains intubated, on the vent  Off of sedation  CRRT stopped 9/15/20  Started on Lasix yesterday UOP increased 2100 ml/24 hours  MRI findings noted  EEG findings noted  In Afib on Amio and Cardizem gtts      OBJECTIVE:     Vitals:    09/17/20 0747 09/17/20 0800 09/17/20 0835 09/17/20 0900   BP:  131/66  112/64   Pulse: 106 113  116   Resp: 19 18 17   Temp:       TempSrc:       SpO2: 96% 95%  95%   Weight:       Height:   5' 7\" (1.702 m)        Intake and Output:      Intake/Output Summary (Last 24 hours) at 9/17/2020 1138  Last data filed at 9/17/2020 0952  Gross per 24 hour   Intake 2683 ml   Output 2800 ml   Net -117 ml       Continuous Infusions:   amiodarone 0.5 mg/min (09/16/20 2050)    dilTIAZem 15 mg/hr (09/17/20 6954)    dextrose      heparin (Porcine) 6.5 mL/hr (09/17/20 0806)       Exam:   CONSTITUTIONAL/PSYCHIATRY: intubated, on the vent  RESPIRATORY: on the vent. Auscultation: EBBS  CARDIOVASCULAR: irregular tachycardic  ycardic . Edema: trace improving  GASTROINTESTINAL: Soft, nontender, Hahn in place  EXTREMITIES:  No cyanosis or clubbing. SKIN: Warm and dry.   R IJ Vasc cath in place      LABS:   RFP:   Recent Labs     09/15/20  0606 09/16/20  0510 09/17/20  0440   * 133* 136   K 4.1 4.1 3.9    100 99   CO2 24 23 25   BUN 20 47* 75*   CREATININE 1.1 2.3* 3.2*   CALCIUM 8.2* 8.4 8.5   MG 2.50* 2.40 2.40   PHOS 2.0* 3.3 5.5*   GFRAA >60 27* 19*       Liver panel:  Recent Labs     09/15/20  0606 09/16/20  0510 09/17/20  0440   * 220* 121*   ALT 1,037* 554* 390*       Endocrine:   @[id number](VitD,PTH,TSH,aldosterone,renin activity,cortisol,metanephrine)@    CBC:   Recent Labs     09/15/20  0606 09/16/20  0510 09/17/20  0440   WBC 15.6* 19.8* 14.7*   HGB 12.0 10.3* 9.6*   HCT 38.4 31.8* 30.7*   MCV 84.3 83.9 84.3   * 114* 126*         ASSESSMENT and PLAN:     1. MORELIA: likely secondary to ischemia ATI. She had additional exposure to radiocontrast  Initiated on IHD on 9/11/20. Transitioned to CRRT on 9/12/20  CRRT stopped 9/15/20   started IV Lasix , non oliguric now UOP 2100 ml/24 hours However Cr rebounding still, 3.2 mg BUN 75 mg   ZENON is neg, C3 is low at 54   - Will monitor UO, urine microscopy, proteinuria and determine whether further investigation is deemed necessary. Follow up on limited serology w/u (ANCA, APLA)  Plan HD today     2. Hyperkalemia/metabolic acidosis: resolved    3. S/P PEA arrest 9/11/20    4. Acute hypoxic respiratory failure: secondary to 2. Also has bilateral pleural effusions, PE and possible Pnia. on Heparin gtt    5. MRSA bacteremia: on Vancomycin Pharmacy dosing MRI  Shows bilateral acute sub acute infarcts Endocarditis needs to be ruled out    6. Severe CMP with reduced RV function/severe TR/MR, Grade 3 diastolic dysfunction, hepatic congestion and anasarca   - wt down significantly with CRRT  Now non  oliguric Making good urine with Lasix     7. Factor V leiden deficiency with PE/coagulopathy/DIC: per hematology    8. Elevated liver enzymes: likely ischemic injury, ?additional hepatic congestion.  Enzymes improving    8 Hypophosphatemia corrected    9 EEG findings noted May need cvEEG        Signed By: Elizabeth Nogueira MD

## 2020-09-17 NOTE — PROGRESS NOTES
Clinical Pharmacy Note  Vancomycin Consult    Pop Tracey is a 53 y.o. female ordered Vancomycin for MRSA bacteremia; consult received from Dr. Rossi Pollock to manage therapy. Also receiving Unasyn.     Patient Active Problem List   Diagnosis    Factor V Leiden mutation ([facility]    Pulmonary embolus (HCC)    Lactic acidosis    CAP (community acquired pneumonia)    Acute CHF (congestive heart failure) ([facility]    Bilateral pulmonary embolism (HCC)    Cardiac arrest ([facility]    Seizures ([facility]    Acute respiratory failure with hypoxia (HCC)    Acute kidney injury ([facility]    Metabolic acidosis    Congestive heart failure (HCC)    Pulmonary hypertension (HCC)    Multifocal pneumonia    Septic shock (HCC)    Elevated LFTs    Thrombocytopenia (HCC)    Coagulopathy (HCC)    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)    Acute encephalopathy    Pneumonia due to organism    Bacteremia    Pleural effusion, bilateral    Elevated lactic acid level    Bacteremia due to methicillin susceptible Staphylococcus aureus (MSSA)    Pneumonia due to methicillin susceptible Staphylococcus aureus (MSSA) (HCC)    Anemia    Acute renal failure (HCC)    Bilateral pleural effusion    Mitral valve insufficiency    Tricuspid valve insufficiency    Class 2 obesity due to excess calories with body mass index (BMI) of 39.0 to 39.9 in adult    Cardiogenic shock (HCC)    Staphylococcal pneumonia (HCC)       Allergies:  Ibuprofen     Temp max:  Temp (24hrs), Av.5 °F (36.9 °C), Min:98.3 °F (36.8 °C), Max:98.7 °F (37.1 °C)      Recent Labs     09/15/20  0606 20  0510 20  0440   WBC 15.6* 19.8* 14.7*       Recent Labs     09/15/20  0606 20  0510 20  0440   BUN 20 47* 75*   CREATININE 1.1 2.3* 3.2*         Intake/Output Summary (Last 24 hours) at 2020 1037  Last data filed at 2020 0952  Gross per 24 hour   Intake 2683 ml   Output 2800 ml   Net -117 ml       Culture Results:  09/10/20 Blood: MRSA   09/11/20 Respiratory: MSSA  09/12/20 Blood: No growth to date   09/13/20 Blood: No growth to date  09/14/20 Blood: No growth to date    Ht Readings from Last 1 Encounters:   09/17/20 5' 7\" (1.702 m)        Wt Readings from Last 1 Encounters:   09/17/20 217 lb 2.5 oz (98.5 kg)       Assessment:  Day # 7 of vancomycin. Current Regimen: Intermittent dosing based on levels due to MORELIA. Random level with AM labs: 24.7 mg/L. CRRT stopped 9/15      Plan:  Hold vancomycin today. Repeat random level tomorrow with AM labs. Thank you for the consult.      Rohini Hightower, PharmD, [employer]

## 2020-09-17 NOTE — PROGRESS NOTES
4 Eyes Skin Assessment     The patient is being assess for  Shift Handoff    I agree that 2 RN's have performed a thorough Head to Toe Skin Assessment on the patient. ALL assessment sites listed below have been assessed. Areas assessed by both nurses: Yes  [x]   Head, Face, and Ears   [x]   Shoulders, Back, and Chest  [x]   Arms, Elbows, and Hands   [x]   Coccyx, Sacrum, and IschIum  [x]   Legs, Feet, and Heels        Does the Patient have Skin Breakdown?   No         Cash Prevention initiated:  Yes   Wound Care Orders initiated:  NA      [facility] nurse consulted for Pressure Injury (Stage 3,4, Unstageable, DTI, NWPT, and Complex wounds), New and Established Ostomies:  NA      Nurse 1 eSignature: Electronically signed by Panchito Merchant RN on 9/17/20 at 7:17 AM EDT    **SHARE this note so that the co-signing nurse is able to place an eSignature**    Nurse 2 eSignature: Electronically signed by Hoang Donald RN on 9/17/20 at 7:34 AM EDT

## 2020-09-17 NOTE — PLAN OF CARE
Nutrition Problem #1: Inadequate oral intake  Intervention: Food and/or Nutrient Delivery: Modify Tube Feeding  Nutritional Goals: tolerate most appropriate form of nutrition

## 2020-09-18 ENCOUNTER — APPOINTMENT (OUTPATIENT)
Dept: GENERAL RADIOLOGY | Age: 50
DRG: 130 | End: 2020-09-18
Payer: COMMERCIAL

## 2020-09-18 LAB
ALBUMIN SERPL-MCNC: 2.6 G/DL (ref 3.4–5)
ALP BLD-CCNC: 112 U/L (ref 40–129)
ALT SERPL-CCNC: 258 U/L (ref 10–40)
ANION GAP SERPL CALCULATED.3IONS-SCNC: 10 MMOL/L (ref 3–16)
APTT: 44.8 SEC (ref 24.2–36.2)
AST SERPL-CCNC: 73 U/L (ref 15–37)
BILIRUB SERPL-MCNC: 0.9 MG/DL (ref 0–1)
BILIRUBIN DIRECT: 0.7 MG/DL (ref 0–0.3)
BILIRUBIN, INDIRECT: 0.2 MG/DL (ref 0–1)
BUN BLDV-MCNC: 49 MG/DL (ref 7–20)
CALCIUM SERPL-MCNC: 8.3 MG/DL (ref 8.3–10.6)
CHLORIDE BLD-SCNC: 102 MMOL/L (ref 99–110)
CO2: 26 MMOL/L (ref 21–32)
CREAT SERPL-MCNC: 2.4 MG/DL (ref 0.6–1.1)
CULTURE, BLOOD 2: NORMAL
FIBRINOGEN: 412 MG/DL (ref 200–397)
FIBRINOGEN: 437 MG/DL (ref 200–397)
GFR AFRICAN AMERICAN: 26
GFR NON-AFRICAN AMERICAN: 21
GLUCOSE BLD-MCNC: 129 MG/DL (ref 70–99)
GLUCOSE BLD-MCNC: 141 MG/DL (ref 70–99)
GLUCOSE BLD-MCNC: 146 MG/DL (ref 70–99)
GLUCOSE BLD-MCNC: 150 MG/DL (ref 70–99)
GLUCOSE BLD-MCNC: 156 MG/DL (ref 70–99)
GLUCOSE BLD-MCNC: 156 MG/DL (ref 70–99)
HAPTOGLOBIN: 74 MG/DL (ref 30–200)
HCT VFR BLD CALC: 26.9 % (ref 36–48)
HEMOGLOBIN: 8.4 G/DL (ref 12–16)
INR BLD: 1.23 (ref 0.86–1.14)
MAGNESIUM: 1.9 MG/DL (ref 1.8–2.4)
MCH RBC QN AUTO: 26.2 PG (ref 26–34)
MCHC RBC AUTO-ENTMCNC: 31.1 G/DL (ref 31–36)
MCV RBC AUTO: 84.3 FL (ref 80–100)
PDW BLD-RTO: 19.4 % (ref 12.4–15.4)
PERFORMED ON: ABNORMAL
PHOSPHORUS: 3.6 MG/DL (ref 2.5–4.9)
PLATELET # BLD: 133 K/UL (ref 135–450)
PMV BLD AUTO: 9.3 FL (ref 5–10.5)
POTASSIUM SERPL-SCNC: 3.8 MMOL/L (ref 3.5–5.1)
PROTHROMBIN TIME: 14.3 SEC (ref 10–13.2)
RBC # BLD: 3.19 M/UL (ref 4–5.2)
SODIUM BLD-SCNC: 138 MMOL/L (ref 136–145)
TOTAL PROTEIN: 5.4 G/DL (ref 6.4–8.2)
VANCOMYCIN RANDOM: 15.2 UG/ML
WBC # BLD: 14 K/UL (ref 4–11)

## 2020-09-18 PROCEDURE — 85027 COMPLETE CBC AUTOMATED: CPT

## 2020-09-18 PROCEDURE — 2580000003 HC RX 258: Performed by: NURSE PRACTITIONER

## 2020-09-18 PROCEDURE — 6360000002 HC RX W HCPCS: Performed by: INTERNAL MEDICINE

## 2020-09-18 PROCEDURE — 80048 BASIC METABOLIC PNL TOTAL CA: CPT

## 2020-09-18 PROCEDURE — 6370000000 HC RX 637 (ALT 250 FOR IP): Performed by: NURSE PRACTITIONER

## 2020-09-18 PROCEDURE — 2580000003 HC RX 258: Performed by: STUDENT IN AN ORGANIZED HEALTH CARE EDUCATION/TRAINING PROGRAM

## 2020-09-18 PROCEDURE — C9254 INJECTION, LACOSAMIDE: HCPCS | Performed by: NURSE PRACTITIONER

## 2020-09-18 PROCEDURE — 99233 SBSQ HOSP IP/OBS HIGH 50: CPT | Performed by: INTERNAL MEDICINE

## 2020-09-18 PROCEDURE — 94003 VENT MGMT INPAT SUBQ DAY: CPT

## 2020-09-18 PROCEDURE — 6360000002 HC RX W HCPCS: Performed by: NURSE PRACTITIONER

## 2020-09-18 PROCEDURE — 99232 SBSQ HOSP IP/OBS MODERATE 35: CPT | Performed by: INTERNAL MEDICINE

## 2020-09-18 PROCEDURE — 94760 N-INVAS EAR/PLS OXIMETRY 1: CPT

## 2020-09-18 PROCEDURE — 2580000003 HC RX 258: Performed by: INTERNAL MEDICINE

## 2020-09-18 PROCEDURE — 83735 ASSAY OF MAGNESIUM: CPT

## 2020-09-18 PROCEDURE — 2700000000 HC OXYGEN THERAPY PER DAY

## 2020-09-18 PROCEDURE — 83010 ASSAY OF HAPTOGLOBIN QUANT: CPT

## 2020-09-18 PROCEDURE — 80076 HEPATIC FUNCTION PANEL: CPT

## 2020-09-18 PROCEDURE — 84100 ASSAY OF PHOSPHORUS: CPT

## 2020-09-18 PROCEDURE — 93926 LOWER EXTREMITY STUDY: CPT

## 2020-09-18 PROCEDURE — 71045 X-RAY EXAM CHEST 1 VIEW: CPT

## 2020-09-18 PROCEDURE — 85384 FIBRINOGEN ACTIVITY: CPT

## 2020-09-18 PROCEDURE — 36592 COLLECT BLOOD FROM PICC: CPT

## 2020-09-18 PROCEDURE — C9113 INJ PANTOPRAZOLE SODIUM, VIA: HCPCS | Performed by: INTERNAL MEDICINE

## 2020-09-18 PROCEDURE — 80202 ASSAY OF VANCOMYCIN: CPT

## 2020-09-18 PROCEDURE — 99291 CRITICAL CARE FIRST HOUR: CPT | Performed by: INTERNAL MEDICINE

## 2020-09-18 PROCEDURE — 94750 HC PULMONARY COMPLIANCE STUDY: CPT

## 2020-09-18 PROCEDURE — 85610 PROTHROMBIN TIME: CPT

## 2020-09-18 PROCEDURE — 2500000003 HC RX 250 WO HCPCS: Performed by: INTERNAL MEDICINE

## 2020-09-18 PROCEDURE — 85730 THROMBOPLASTIN TIME PARTIAL: CPT

## 2020-09-18 PROCEDURE — 2000000000 HC ICU R&B

## 2020-09-18 RX ORDER — HEPARIN SODIUM 1000 [USP'U]/ML
2000 INJECTION, SOLUTION INTRAVENOUS; SUBCUTANEOUS ONCE
Status: COMPLETED | OUTPATIENT
Start: 2020-09-18 | End: 2020-09-18

## 2020-09-18 RX ORDER — DIGOXIN 0.25 MG/ML
125 INJECTION INTRAMUSCULAR; INTRAVENOUS ONCE
Status: DISCONTINUED | OUTPATIENT
Start: 2020-09-18 | End: 2020-09-18

## 2020-09-18 RX ORDER — DIGOXIN 0.25 MG/ML
250 INJECTION INTRAMUSCULAR; INTRAVENOUS ONCE
Status: COMPLETED | OUTPATIENT
Start: 2020-09-18 | End: 2020-09-18

## 2020-09-18 RX ADMIN — PROMETHAZINE HYDROCHLORIDE 25 MG: 25 INJECTION INTRAMUSCULAR; INTRAVENOUS at 03:25

## 2020-09-18 RX ADMIN — DILTIAZEM HYDROCHLORIDE 15 MG/HR: 5 INJECTION INTRAVENOUS at 15:01

## 2020-09-18 RX ADMIN — LEVETIRACETAM 1000 MG: 10 INJECTION INTRAVENOUS at 10:57

## 2020-09-18 RX ADMIN — MUPIROCIN: 20 OINTMENT TOPICAL at 21:18

## 2020-09-18 RX ADMIN — Medication 10 ML: at 07:49

## 2020-09-18 RX ADMIN — PIPERACILLIN AND TAZOBACTAM 2.25 G: 2; .25 INJECTION, POWDER, LYOPHILIZED, FOR SOLUTION INTRAVENOUS at 05:37

## 2020-09-18 RX ADMIN — AMIODARONE HYDROCHLORIDE 0.5 MG/MIN: 50 INJECTION, SOLUTION INTRAVENOUS at 09:52

## 2020-09-18 RX ADMIN — SODIUM CHLORIDE, PRESERVATIVE FREE 10 ML: 5 INJECTION INTRAVENOUS at 07:46

## 2020-09-18 RX ADMIN — PIPERACILLIN AND TAZOBACTAM 2.25 G: 2; .25 INJECTION, POWDER, LYOPHILIZED, FOR SOLUTION INTRAVENOUS at 15:10

## 2020-09-18 RX ADMIN — CHLORHEXIDINE GLUCONATE 15 ML: 0.12 RINSE ORAL at 07:46

## 2020-09-18 RX ADMIN — MUPIROCIN: 20 OINTMENT TOPICAL at 07:50

## 2020-09-18 RX ADMIN — HEPARIN SODIUM 2000 UNITS: 1000 INJECTION INTRAVENOUS; SUBCUTANEOUS at 05:41

## 2020-09-18 RX ADMIN — DIGOXIN 250 MCG: 0.25 INJECTION INTRAMUSCULAR; INTRAVENOUS at 09:52

## 2020-09-18 RX ADMIN — LORAZEPAM 2 MG: 2 INJECTION INTRAMUSCULAR; INTRAVENOUS at 05:19

## 2020-09-18 RX ADMIN — ONDANSETRON 4 MG: 2 INJECTION INTRAMUSCULAR; INTRAVENOUS at 05:18

## 2020-09-18 RX ADMIN — FENTANYL CITRATE 50 MCG: 50 INJECTION, SOLUTION INTRAMUSCULAR; INTRAVENOUS at 02:05

## 2020-09-18 RX ADMIN — SODIUM CHLORIDE, PRESERVATIVE FREE 10 ML: 5 INJECTION INTRAVENOUS at 21:13

## 2020-09-18 RX ADMIN — PIPERACILLIN AND TAZOBACTAM 2.25 G: 2; .25 INJECTION, POWDER, LYOPHILIZED, FOR SOLUTION INTRAVENOUS at 23:00

## 2020-09-18 RX ADMIN — FUROSEMIDE 80 MG: 10 INJECTION, SOLUTION INTRAMUSCULAR; INTRAVENOUS at 07:49

## 2020-09-18 RX ADMIN — DILTIAZEM HYDROCHLORIDE 15 MG/HR: 5 INJECTION INTRAVENOUS at 06:19

## 2020-09-18 RX ADMIN — DILTIAZEM HYDROCHLORIDE 15 MG/HR: 5 INJECTION INTRAVENOUS at 23:11

## 2020-09-18 RX ADMIN — Medication 2 CAPSULE: at 07:48

## 2020-09-18 RX ADMIN — DEXTROSE MONOHYDRATE 200 MG: 50 INJECTION, SOLUTION INTRAVENOUS at 08:14

## 2020-09-18 RX ADMIN — LEVETIRACETAM 1000 MG: 10 INJECTION INTRAVENOUS at 23:09

## 2020-09-18 RX ADMIN — PANTOPRAZOLE SODIUM 40 MG: 40 INJECTION, POWDER, FOR SOLUTION INTRAVENOUS at 07:49

## 2020-09-18 RX ADMIN — DEXTROSE MONOHYDRATE 200 MG: 50 INJECTION, SOLUTION INTRAVENOUS at 21:12

## 2020-09-18 RX ADMIN — FUROSEMIDE 80 MG: 10 INJECTION, SOLUTION INTRAMUSCULAR; INTRAVENOUS at 17:18

## 2020-09-18 RX ADMIN — Medication 2 CAPSULE: at 17:18

## 2020-09-18 RX ADMIN — AMIODARONE HYDROCHLORIDE 0.5 MG/MIN: 50 INJECTION, SOLUTION INTRAVENOUS at 23:10

## 2020-09-18 RX ADMIN — CHLORHEXIDINE GLUCONATE 15 ML: 0.12 RINSE ORAL at 21:12

## 2020-09-18 ASSESSMENT — PULMONARY FUNCTION TESTS
PIF_VALUE: 22
PIF_VALUE: 18
PIF_VALUE: 13
PIF_VALUE: 21
PIF_VALUE: 18
PIF_VALUE: 21
PIF_VALUE: 20
PIF_VALUE: 20
PIF_VALUE: 19
PIF_VALUE: 19
PIF_VALUE: 20
PIF_VALUE: 19
PIF_VALUE: 18
PIF_VALUE: 20
PIF_VALUE: 18
PIF_VALUE: 17
PIF_VALUE: 18
PIF_VALUE: 18
PIF_VALUE: 20
PIF_VALUE: 13
PIF_VALUE: 17
PIF_VALUE: 16
PIF_VALUE: 19
PIF_VALUE: 16
PIF_VALUE: 23
PIF_VALUE: 18
PIF_VALUE: 17
PIF_VALUE: 12
PIF_VALUE: 17
PIF_VALUE: 18
PIF_VALUE: 18
PIF_VALUE: 20
PIF_VALUE: 16
PIF_VALUE: 18
PIF_VALUE: 14
PIF_VALUE: 14
PIF_VALUE: 18

## 2020-09-18 ASSESSMENT — PAIN SCALES - GENERAL: PAINLEVEL_OUTOF10: 0

## 2020-09-18 NOTE — PROGRESS NOTES
Clinical Pharmacy Note  Heparin Dosing       Lab Results   Component Value Date    APTT 80.6 09/17/2020     Lab Results   Component Value Date    HGB 9.1 09/17/2020    HCT 28.9 09/17/2020     09/17/2020    INR 1.17 09/17/2020       Current Infusion Rate: 6.5 mL/hr    Plan:  Rate: Decrease to 4.2 mL/hr  Next aPTT: 09/18/20 0430    Pharmacy will continue to monitor and adjust based on aPTT results.   Ashly Zaldivar, [facility]

## 2020-09-18 NOTE — PROGRESS NOTES
1- Report received from Guilherme, [address]. During bedside skin assessment, multiple hematomas discovered by 3 RN's near R fem access which was removed yesterday. Dayshift RN assessed last night with nightshift RN and there no no hematomas and less bruising. This RN brought an experienced nurse to bedside to assess site. 2002- Message sent to Dr. Caryle Louis to update on hematoma discovery. 2017- Shift assessment completed, see flow sheets. 2024- This RN called lab to add on PTT to labs recently sent. 2025- Dr. Zahira Cho read message. No new orders at this time. [address] called this RN to notify of APTT and rate/dose change. See MAR.     2250- TF off due to multiple episodes of emesis, see flow sheets intake and output. 2255-  PRN Ativan 2mg given by this RN for restlessness, RASS +2, and spastic movements. 2325- Message sent to Dr. Zahira Cho via perfect serve to inform him of emesis occurences. 2329- Message received from Dr. Zahira Cho. See new orders. 2354- PRN Zofran 4mg given by this RN. 2358- Reassessment completed. See flow sheets. No major changes at this time. VSS.    0205- Fentanyl given for a RASS +2.      0249- Message sent to Dr. Zahira Cho to update on hematoma growth and more episodes of emesis. MD aware NG is hooked up to low wall suction. 7758- Reassessment completed. See flow sheets. 0452-RT found ETT tube to be out a few cm. Dr. Zahira Cho notified. Chest xray ordered. 0516- PRN Ativan and Zofran given. Pt agitated and vomiting. [facility] called this RN to notify of Heparin dose/rate change and 2000u bolus. See MAR.       0024- Report given to Lauren Villalobos RN.

## 2020-09-18 NOTE — PROGRESS NOTES
Pulmonary Progress Note    CC:  Follow up cardiac arrest, Pulmonary emboli, Shock    Subjective:  Remains on the ventilator with FIO2 at 30% PEEP of 5  Hematomas in leg after CVC removed  Repeat EEG without obvious signs of seizure  Remains in afib with RVR  Had RRT yesterday   Vomiting       Intake/Output Summary (Last 24 hours) at 9/18/2020 0934  Last data filed at 9/18/2020 0917  Gross per 24 hour   Intake 2780.1 ml   Output 5839 ml   Net -3058.9 ml         PHYSICAL EXAM:  Blood pressure 127/67, pulse 118, temperature 98.5 °F (36.9 °C), temperature source Axillary, resp. rate 17, height 5' 7\" (1.702 m), weight 211 lb 3.2 oz (95.8 kg), last menstrual period 12/05/2018, SpO2 (!) 89 %.'  Gen: No distress. Moves her head and opens eyes to name, not following commands   Eyes: Wandering eye movements   ENT: No discharge. Pharynx with ETT and NG  Neck: Trachea midline. No obvious mass. Resp: Clearer lungs, less secretions   CV: Tachycardic, irregular. No murmur or rub. GI: Non-tender. Non-distended. No hernia. Skin: Right leg hematoma   Lymph: No cervical LAD. No supraclavicular LAD. M/S: No cyanosis. No clubbing. No joint deformity. Neuro:  Moves head to verbal, grimaces to noxious stimuli, less flacid in extremities   Ext:   ++ edema    Medications:    Scheduled Meds:   digoxin  250 mcg Intravenous Once    lacosamide (VIMPAT) IVPB  200 mg Intravenous BID    lactobacillus  2 capsule Oral BID WC    mupirocin   Nasal BID    furosemide  80 mg Intravenous BID    piperacillin-tazobactam  2.25 g Intravenous Q8H    sodium chloride flush  10 mL Intravenous 2 times per day    pantoprazole  40 mg Intravenous Daily    And    sodium chloride (PF)  10 mL Intravenous Daily    chlorhexidine  15 mL Mouth/Throat BID    levetiracetam  1,000 mg Intravenous Q12H    vancomycin (VANCOCIN) intermittent dosing (placeholder)   Other RX Placeholder       Continuous Infusions:   amiodarone 450mg/250ml D5W infusion      dilTIAZem 15 mg/hr (20 8890)    dextrose      [Held by provider] heparin (Porcine) Stopped (20 0802)       PRN Meds:  heparin (porcine), ondansetron, promethazine, fentanNYL, LORazepam, sodium chloride flush, acetaminophen **OR** acetaminophen, glucose, dextrose, glucagon (rDNA), dextrose    Labs:  CBC:   Recent Labs     20  0510 09/17/20  0440 09/17/20  2013 09/18/20  0426   WBC 19.8* 14.7*  --  14.0*   HGB 10.3* 9.6* 9.1* 8.4*   HCT 31.8* 30.7* 28.9* 26.9*   MCV 83.9 84.3  --  84.3   * 126*  --  133*     BMP:   Recent Labs     20  0510 09/17/20  0440 09/18/20  0426   * 136 138   K 4.1 3.9 3.8    99 102   CO2 23 25 26   PHOS 3.3 5.5* 3.6   BUN 47* 75* 49*   CREATININE 2.3* 3.2* 2.4*     LIVER PROFILE:   Recent Labs     20  0510 09/17/20  0440 09/18/20  0426   * 121* 73*   * 390* 258*   BILIDIR 0.9* 0.7* 0.7*   BILITOT 1.1* 0.9 0.9   ALKPHOS 129 124 112     PT/INR:   Recent Labs     20  15120  0020   PROTIME 13.1 13.6* 14.3*   INR 1.13 1.17* 1.23*     APTT:   Recent Labs     20  15120  0426   APTT 74.0* 80.6* 44.8*     UA:No results for input(s): NITRITE, COLORU, PHUR, LABCAST, WBCUA, RBCUA, MUCUS, TRICHOMONAS, YEAST, BACTERIA, CLARITYU, SPECGRAV, LEUKOCYTESUR, UROBILINOGEN, BILIRUBINUR, BLOODU, GLUCOSEU, AMORPHOUS in the last 72 hours. Invalid input(s): Albina Cooney  No results for input(s): PH, PCO2, PO2 in the last 72 hours. Films:  Chest imaging reports were reviewed and imaging was reviewed by me and showed ETT, Vascath and NG in place. Bilateral pleural effusions remain    AB.45//    Cultures:  Sputum:  MSSA  Blood:  MRSA     I reviewed the labs and images listed above    Assessment:   · Cardiac Arrest:  PEA  · Cardiogenic Shock, improved  · Metabolic Encephalopathy. Persistent.   Seizures and organic brain injury likely contributing   · Bilateral Pulmonary Emboli  · Staph Pneumonia   · MRSA bacteremia   · MORELIA with RRT   · Bilateral Pleural Effusions   · Right leg hematoma      Plan:  · Full vent support, vent day #7. No plans to pursue SBT due to underlying neurological status. Might be best served with trach and peg. · Daily ABG   · PRN sedation only. Try to limit due to mental status   · Antibiotics per ID  · GI/DVT prophylaxis:  Protonix, SCDS. Heparin gtt on hold due to hematoma in the right groin   · LE US regarding hematoma formation   · Amiodarone and Diltiazem infusions   · Enteral feeding on hold due to vomiting. Patient is having BMs so doubt significant ileus   · Glycemic control   · RRT Per Nephrology  · AEDs per Neurology. Consider cEEG monitoring     Trach and PEG if family is agreeable. Will wait to see the plans/recs from Neuro    Critical care time of 31 minutes. This does not include procedural time. Please see procedural notes for details.     DO Carole Golden [facility]

## 2020-09-18 NOTE — PROGRESS NOTES
4 Eyes Skin Assessment     The patient is being assess for  Shift Handoff    I agree that 2 RN's have performed a thorough Head to Toe Skin Assessment on the patient. ALL assessment sites listed below have been assessed. Areas assessed by both nurses: blanco/  [x]   Head, Face, and Ears   [x]   Shoulders, Back, and Chest  [x]   Arms, Elbows, and Hands   [x]   Coccyx, Sacrum, and IschIum  [x]   Legs, Feet, and Heels        Does the Patient have Skin Breakdown?   Yes, multiple hematomas        Cash Prevention initiated:  Yes   Wound Care Orders initiated:  NA      [facility] nurse consulted for Pressure Injury (Stage 3,4, Unstageable, DTI, NWPT, and Complex wounds), New and Established Ostomies:  NA      Nurse 1 eSignature: Electronically signed by Ang Esteban RN on 9/18/20 at 6:41 PM EDT    **SHARE this note so that the co-signing nurse is able to place an eSignature**    Nurse 2 eSignature: Electronically signed by Ana Ontiveros RN on 9/18/20 at 11:04 PM EDT

## 2020-09-18 NOTE — PROCEDURES
[address] [place] [address]                          ELECTROENCEPHALOGRAM REPORT    PATIENT NAME: Jaimie Rincon                     :        1970  MED REC NO:   [id number]                          ROOM:       2104  ACCOUNT NO:   [de-identified]                           ADMIT DATE: 09/10/2020  PROVIDER:     Altagracia Lema DO    DATE OF EE2020    REFERRING PROVIDER:  Jennifre Hauser NP    REASON FOR STUDY:  Seizure, encephalopathy. MEDICATIONS:  The patient's centrally acting medications were not  listed. EEG FINDINGS:  This is a 20-channel digital EEG performed utilizing  bipolar and referential montages. The patient was reportedly  unresponsive throughout the recording. This EEG typically consisted of  generalized, synchronous and asynchronous delta activity with some  superimposed theta frequencies at times. Occasional sleep spindles  appeared present though this could have been beta activity from  medication effect as well. There did appear to be some K-complexes at  times during the recording which were somewhat poorly formed. Rare  sharp waves were noted in the parasagittal regions, left somewhat  greater than right, during the recording. These did appear to be  epileptiform. No seizures were recorded. No discernible wakefulness  was identified during the study. Photic stimulation was performed at various flash frequencies and did  not evoke any significant posterior driving response. Hyperventilation  exercise was not performed due to the patient's clinical history. A 1-channel EKG rhythm strip was reviewed and showed significant  artifact though appeared to be fairly unremarkable without significant  cardiac dysrhythmia. There was occasional tachycardia at times with  heart rates occasionally between the 100 and 110 range.     EEG DIAGNOSIS:  This EEG is abnormal due to the presence of occasional  parasagittal sharp waves, left greater than right, as well as moderately  severe background slowing and disorganization. CLINICAL INTERPRETATION:  The background slowing and disorganization is  nonspecific and consistent with a moderately severe encephalopathy. This may be seen in multiple settings including toxic, metabolic, or  degenerative conditions as well as with medication effect and postictal  state. The focal sharp waves in the parasagittal regions are  nonspecific and suggestive of a focal area of cortical irritability. Overall, this is improved in terms of frequency and distribution of  epileptiform discharges as compared to previous study dated yesterday,  09/16/2020. If seizures remain a clinical concern, then a repeat EEG  may be of further benefit. Clinical correlation is advised.         Latosha Batista DO    D: 09/17/2020 11:09:07       T: 09/17/2020 11:17:57     [id number]  Job#: [id number]     Doc#: [id number]    CC:

## 2020-09-18 NOTE — PROGRESS NOTES
Hospitalist Progress Note      PCP: No primary care provider on file. Date of Admission: 9/10/2020    Chief Complaint:   Chief Complaint   Patient presents with    Shortness of Breath     HX of asthma. Short of breath for around 1 month. Patient states she just refilled her inhaler today. Hospital Course:  53 y.o. female with past medical history of factor V Leiden deficiency on chronic warfarin, history of blood clots, hyperlipidemia, arthritis, asthma who presents to [facility] with worsening above symptoms of fatigue, intermittent shortness of breath both at rest and on exertion, and extensive of abdominal and leg swelling. In the ED patient was significantly tachycardic, showing some combination of sinus tachycardia versus SVT, uncertain if may be underlying atrial fibrillation with rapid ventricular rate. Labs showed signs of infection as well as elevated proBNP. CTA with contrast was done on the chest and demonstrated left and right lobe PEs in certain segments, active findings suggestive of early patchy pneumonia bilaterally, and moderate bilateral pleural effusions suggestive of heart failure/volume overload. PEA arrest shortly after admission. TTE with EF 30- 35%. Severe diffuse hypokinesis with severely reduced rt ventricular function, severe tricuspid regurgitation and mitral regurgitation, grade 3 diastolic dysfunction. Found to have MRSA PNA and bacteremia. Developed shock liver and coagulopathy which are improving. Concern for seizure like activity, initial EEG negative but repeat with possible status, added vimpat to keppra and repeat EEG improved. Acute to subacute infarcts on MRI. Developed a fib RVR 9/16 that has not responded to cardioversion (x5), on amio and dilt IV. Subjective: Intubated, unable to complete ROS. R fem hematomas noted overnight, also multiple episodes of vomiting.  Withdraws from pain and seems to respond to stimuli by moving her head but not following commands and no purposeful movement. Medications:  Reviewed    Infusion Medications    dilTIAZem 15 mg/hr (09/18/20 0619)    dextrose      heparin (Porcine) Stopped (09/18/20 0802)     Scheduled Medications    lacosamide (VIMPAT) IVPB  200 mg Intravenous BID    lactobacillus  2 capsule Oral BID WC    mupirocin   Nasal BID    furosemide  80 mg Intravenous BID    piperacillin-tazobactam  2.25 g Intravenous Q8H    sodium chloride flush  10 mL Intravenous 2 times per day    pantoprazole  40 mg Intravenous Daily    And    sodium chloride (PF)  10 mL Intravenous Daily    chlorhexidine  15 mL Mouth/Throat BID    levetiracetam  1,000 mg Intravenous Q12H    vancomycin (VANCOCIN) intermittent dosing (placeholder)   Other RX Placeholder     PRN Meds: heparin (porcine), ondansetron, promethazine, fentanNYL, LORazepam, potassium chloride, magnesium sulfate, [DISCONTINUED] calcium gluconate **OR** calcium gluconate **OR** calcium gluconate **OR** calcium gluconate, sodium phosphate IVPB **OR** sodium phosphate IVPB **OR** sodium phosphate IVPB **OR** sodium phosphate IVPB, sodium chloride flush, acetaminophen **OR** acetaminophen, glucose, dextrose, glucagon (rDNA), dextrose      Intake/Output Summary (Last 24 hours) at 9/18/2020 0818  Last data filed at 9/18/2020 0725  Gross per 24 hour   Intake 2780.1 ml   Output 5489 ml   Net -2708.9 ml       Physical Exam Performed:    /67   Pulse 118   Temp 98.5 °F (36.9 °C) (Axillary)   Resp 17   Ht 5' 7\" (1.702 m)   Wt 211 lb 3.2 oz (95.8 kg)   LMP 12/05/2018 (Exact Date)   SpO2 (!) 89%   BMI 33.08 kg/m²     General appearance: No apparent distress, appears stated age  HEENT: Pupils equal, round, and reactive to light. Conjunctivae/corneas clear. Neck: Supple, with full range of motion. Trachea midline. Respiratory:  Normal respiratory effort.  Coarse breath sounds bilaterally   Cardiovascular: Regular rate and rhythm with normal S1/S2 without in the appropriate clinical setting. Findings were called by the radiology call center. CT HEAD WO CONTRAST   Final Result   Motion limited study. No convincing evidence for acute intracranial   abnormality identified within the limitations of motion artifact. If there is persistent clinical suspicion for intracranial abnormality,   repeat study should be considered. XR CHEST PORTABLE   Final Result   Cardiomegaly, pulmonary vascular congestion, and bilateral pleural effusions. Endotracheal tube terminates approximately 2 cm above the rahat. CT CHEST PULMONARY EMBOLISM W CONTRAST   Final Result   Addendum 1 of 1   ADDENDUM:   Findings were discussed with Dr. Lopez  at 10:49 pm on 9/10/2020.          Final            Assessment/Plan:    Active Hospital Problems    Diagnosis    Cardiogenic shock ([address] [R57.0]    Staphylococcal pneumonia ([address] [J15.20]    Pleural effusion, bilateral [J90]    Elevated lactic acid level [R79.89]    Bacteremia due to methicillin susceptible Staphylococcus aureus (MSSA) [R78.81]    Pneumonia due to methicillin susceptible Staphylococcus aureus (MSSA) (HCC) [J15.211]    Anemia [D64.9]    Acute renal failure (HCC) [N17.9]    Bilateral pleural effusion [J90]    Mitral valve insufficiency [I34.0]    Tricuspid valve insufficiency [I07.1]    Class 2 obesity due to excess calories with body mass index (BMI) of 39.0 to 39.9 in adult [E66.09, Z68.39]    Pneumonia due to organism [J18.9]    Bacteremia [R78.81]    Cardiac arrest ([address] [I46.9]    Seizures ([address] [R56.9]    Acute respiratory failure with hypoxia ([address] [J96.01]    Acute kidney injury ([address] [C28.6]    Metabolic acidosis [M89.3]    Congestive heart failure (HCC) [I50.9]    Pulmonary hypertension (HCC) [I27.20]    Multifocal pneumonia [J18.9]    Septic shock (HCC) [A41.9, R65.21]    Elevated LFTs [R94.5]    Thrombocytopenia ([address] [D69.6]    Coagulopathy ([address] [D68.9]    to coumadin in the past. Has had oozing on heparin drip, sepsis with bacteremia, DIC versus shock liver versus vitamin K deficiency  -Heme-onc following, fibrinogen and INR improving, no need for vit K or cryo now  - DOAC when able    R groin hematoma  After removing femoral line. Arterial duplex with normal flow, good pulses  - monitor  - off heparin drip    Bilateral PE  Ho DVT/PE, on coumadin chronically  - heparin drip-- currently off with new hematoma    Oliguric/anuric MORELIA  Suboptimal response to Lasix. -CRRT started, switched to HD  -Checking ZENON (neg), ANCA, complement (c3 low), antiphospholipid antibodies  -Nephrology following    Elevated LFTs  Likely shock liver, improving  - daily labs    Obesity  Body mass index is 33.08 kg/m². Counseled on effects of weight on overall health and chronic medical conditions and discussed weight loss. DVT Prophylaxis: heparin  Diet: DIET TUBE FEED CONTINUOUS/CYCLIC NPO; Renal Formula; Nasogastric; Continuous; 20; 55  Code Status: Full Code    PT/OT Eval Status: deferred    Dispo - pending    Chichi Murguia MD    This note was transcribed using [id number]. Please disregard any translational errors.

## 2020-09-18 NOTE — PLAN OF CARE
Problem: Pain:  Goal: Pain level will decrease  Description: Pain level will decrease  Outcome: Ongoing  Goal: Control of acute pain  Description: Control of acute pain  Outcome: Ongoing  Goal: Control of chronic pain  Description: Control of chronic pain  Outcome: Ongoing     Problem: Falls - Risk of:  Goal: Will remain free from falls  Description: Will remain free from falls  Outcome: Ongoing  Goal: Absence of physical injury  Description: Absence of physical injury  Outcome: Ongoing     Problem: Skin Integrity:  Goal: Will show no infection signs and symptoms  Description: Will show no infection signs and symptoms  Outcome: Ongoing  Goal: Absence of new skin breakdown  Description: Absence of new skin breakdown  Outcome: Ongoing     Problem: Nutrition  Goal: Optimal nutrition therapy  Outcome: Ongoing     Problem: OXYGENATION/RESPIRATORY FUNCTION  Goal: Patient will maintain patent airway  Outcome: Ongoing  Goal: Patient will achieve/maintain normal respiratory rate/effort  Description: Respiratory rate and effort will be within normal limits for the patient  Outcome: Ongoing     Problem: HEMODYNAMIC STATUS  Goal: Patient has stable vital signs and fluid balance  Outcome: Ongoing     Problem: FLUID AND ELECTROLYTE IMBALANCE  Goal: Fluid and electrolyte balance are achieved/maintained  Outcome: Ongoing     Problem: ACTIVITY INTOLERANCE/IMPAIRED MOBILITY  Goal: Mobility/activity is maintained at optimum level for patient  Outcome: Ongoing

## 2020-09-18 NOTE — PROGRESS NOTES
4 Eyes Skin Assessment     The patient is being assess for  Shift Handoff    I agree that 2 RN's have performed a thorough Head to Toe Skin Assessment on the patient. ALL assessment sites listed below have been assessed. Areas assessed by both nurses: blanco/mckayla  [x]   Head, Face, and Ears   [x]   Shoulders, Back, and Chest  [x]   Arms, Elbows, and Hands   [x]   Coccyx, Sacrum, and IschIum  [x]   Legs, Feet, and Heels        Does the Patient have Skin Breakdown? Yes, multiple hematoma formations around right groin.          Cash Prevention initiated:  Yes   Wound Care Orders initiated:  No      [facility] nurse consulted for Pressure Injury (Stage 3,4, Unstageable, DTI, NWPT, and Complex wounds), New and Established Ostomies:  No      Nurse 1 eSignature: Electronically signed by Jennifer Caicedo RN on 9/18/20 at 6:23 AM EDT    **SHARE this note so that the co-signing nurse is able to place an eSignature**    Nurse 2 eSignature: Electronically signed by Aravind Garcias RN on 9/18/20 at 8:25 AM EDT

## 2020-09-18 NOTE — PROGRESS NOTES
ATI. She had additional exposure to radiocontrast   - ZENON is neg, ANCA neg, C3 is low at 54  - Initiated on IHD on 9/11/20. Transitioned to CRRT on 9/12/20 which was stopped 9/15/20. Restarted HD 09/17/20   started IV Lasix , non oliguric now UOP 2100 ml/24 hours However Cr rebounding still, 3.2 mg BUN 75 mg   - HD TTS    2. Hyperkalemia/metabolic acidosis: resolved    3. S/P PEA arrest 9/11/20    4. Acute hypoxic respiratory failure:  - per pulmonary    5. MRSA bacteremia:   - on Vancomycin with pharmacy dosing   - LEVY shows no vegetation    6. CSDHF  - wt down significantly with CRRT  - now non-oliguric with Lasix     7. Factor V leiden deficiency with PE/coagulopathy/DIC:   - per hematology    8.  CVA  - MRI shows bilateral acute sub acute infarcts out  - neurology following  - EEG apparently better with higher keppra dose    9. atrial fibrillation  - on cardizem gtt and amiodarone gtt  - not rate controlled quite yet  critical care:  30+ min

## 2020-09-18 NOTE — PROGRESS NOTES
Progress Note    Updates  Emesis overnight. Heparin gtt stopped due to groin hematoma. Appears about the same clinically. Past Medical History:   Diagnosis Date    Anticoagulant long-term use     Arthritis     Asthma     Baker's cyst     Depression     [facility] Le[facility] ([facility] [address]     Factor V Leiden ([facility] [address]     H/O blood clots     Heartburn     Hyperlipidemia     MRSA bacteremia 09/10/2020    Osteoarthritis      History reviewed. No pertinent surgical history.     Current Facility-Administered Medications:     amiodarone (CORDARONE) 450 mg in dextrose 5 % 250 mL infusion, 0.5 mg/min, Intravenous, Continuous, Dakota Aguirre MD    digoxin (LANOXIN) injection 250 mcg, 250 mcg, Intravenous, Once, Dakota Aguirre MD    lacosamide (VIMPAT) 200 mg in dextrose 5 % 70 mL IVPB, 200 mg, Intravenous, BID, AGUSTIN Hudson CNP, Stopped at 09/18/20 0844    heparin (porcine) injection 1,000 Units, 1,000 Units, Intracatheter, PRN, Chaparro Mancuso MD, 2,800 Units at 09/17/20 1614    ondansetron (ZOFRAN) injection 4 mg, 4 mg, Intravenous, Q4H PRN, Eliane Verma MD, 4 mg at 09/18/20 0518    promethazine (PHENERGAN) in sodium chloride 0.9% 50 mL IVPB 25 mg, 25 mg, Intravenous, Q4H PRN, Eliane Verma MD, Stopped at 09/18/20 0355    lactobacillus (CULTURELLE) capsule 2 capsule, 2 capsule, Oral, BID WC, AGUSTIN Knox - CNP, 2 capsule at 09/18/20 0748    mupirocin (BACTROBAN) 2 % ointment, , Nasal, BID, AGUSTIN Bourgeois - CNP    furosemide (LASIX) injection 80 mg, 80 mg, Intravenous, BID, Chaparro Mancuso MD, 80 mg at 09/18/20 0749    [COMPLETED] dilTIAZem injection 10 mg, 10 mg, Intravenous, Once, 10 mg at 09/16/20 1828 **FOLLOWED BY** dilTIAZem 125 mg in dextrose 5 % 125 mL infusion, 5 mg/hr, Intravenous, Continuous, Jose Elias Martinez MD, Last Rate: 15 mL/hr at 09/18/20 0619, 15 mg/hr at 09/18/20 0619    piperacillin-tazobactam (ZOSYN) 2.25 g in dextrose 5 % 50 mL IVPB (mini-bag), 2.25 g, Intravenous, Q8H, Devon Lopez MD, Stopped at 09/18/20 0607    fentaNYL (SUBLIMAZE) injection 50 mcg, 50 mcg, Intravenous, Q2H PRN, AmieyleAGUSTIN Gleason - CNP, 50 mcg at 09/18/20 0205    LORazepam (ATIVAN) injection 2 mg, 2 mg, Intravenous, Q2H PRN, Faylejude Mejiaia, APRN - CNP, 2 mg at 09/18/20 0519    sodium chloride flush 0.9 % injection 10 mL, 10 mL, Intravenous, 2 times per day, Lonny Jarvis DO, 10 mL at 09/18/20 0746    sodium chloride flush 0.9 % injection 10 mL, 10 mL, Intravenous, PRN, Lonny Jarvis DO    acetaminophen (TYLENOL) tablet 650 mg, 650 mg, Oral, Q6H PRN **OR** acetaminophen (TYLENOL) suppository 650 mg, 650 mg, Rectal, Q6H PRN, Lonny Jarvis DO    pantoprazole (PROTONIX) injection 40 mg, 40 mg, Intravenous, Daily, 40 mg at 09/18/20 0749 **AND** sodium chloride (PF) 0.9 % injection 10 mL, 10 mL, Intravenous, Daily, Rashmi Driver MD, 10 mL at 09/18/20 0749    chlorhexidine (PERIDEX) 0.12 % solution 15 mL, 15 mL, Mouth/Throat, BID, Rashmi Driver MD, 15 mL at 09/18/20 0746    glucose (GLUTOSE) 40 % oral gel 15 g, 15 g, Oral, PRN, Rashmi Driver MD    dextrose 50 % IV solution, 12.5 g, Intravenous, PRN, Rashmi Driver MD, 12.5 g at 09/11/20 0900    glucagon (rDNA) injection 1 mg, 1 mg, Intramuscular, PRN, Rashmi Driver MD    dextrose 5 % solution, 100 mL/hr, Intravenous, PRN, Rashmi Driver MD    levetiracetam (KEPPRA) 1000 mg/100 mL IVPB, 1,000 mg, Intravenous, Q12H, AGUSTIN Braswell CNP, Stopped at 09/17/20 2303    vancomycin (VANCOCIN) intermittent dosing (placeholder), , Other, RX Placeholder, Rashmi Driver MD    [Held by provider] heparin 25,000 unit in sodium chloride 0.45% 250 mL infusion, 6.5 mL/hr, Intravenous, Continuous, Ewa Caro MD, Stopped at 09/18/20 0802    Exam  Blood pressure 127/67, pulse 118, temperature 98.5 °F (36.9 °C), temperature source Axillary, resp.  rate 17, height 5' 7\" (1.702 m), weight 211 lb 3.2 oz (95.8 kg), last menstrual period 12/05/2018, SpO2 (!) 89 %. Constitutional                          Vital signs: BP, HR, and RR reviewed            General depressed mental status though seems to open eyes w/ repeated stimulus.  occasional head turning from side to side. Eyes: unable to visualize the fundi.    Cardiovascular: + peripheral edema.    Psychiatric: limited exam given encephalopathy  Neurologic  Mental status:   orientation marisol due to encephalopathy/sedation.    Attention poor  Language limited exam given encephalopathy  Comprehension not following any commands  CN2: corneal reflexes present.    CN 3,4,6: pupils are 2-3 mm, round, reactive bilaterally.  No forced gaze deviation. CN7: face symmetric but exam limited by ET tube  CN9: gag present. CN11: limited exam given encephalopathy  CN12: limited exam given encephalopathy  Strength: unable to assess at this time.    Sensory: withdraw to nailbed pressure in both feet, less so in UE.  Grimace to trapezius pressure, turns head in direction. Infrequent very brief myoclonic twitching most notably in her LE when present during my encounter. Cerebellar/coordination:limited exam given encephalopathy  Tone: no increased tone or rigidity.    Gait: limited exam given encephalopathy and intubated with ventilator. ROS - unable to obtain from the patient at this time.  Chart reviewed. Ulice Fraction. Labs  Glucose 146  Na 138  K 3.8  BUN 49  Cr 2.4  Mg 1.90    WBC 14.0K  Hg 8.4  Platelets 103      AST 73    COVID negative  HIV negative  Hepatitis A/B/C negative.    Initial blood cultures +.  Repeat negative.       Respiratory culture +  UA 2+ bacteria, yeast.     Studies  Follow up EEG 9/17/20  Occasional parasagittal sharp waves, L > R. Moderate to severe background slowing and disorganization. EEG 9/16/20  Occasional bursts of bi-hemispheric generalized periodic epileptiform discharges. Severe background slowing and disorganization.       MRI brain w/o 9/15/20  1. Scattered acute to subacute infarctions in the bilateral cerebral hemispheres    and right cerebellar hemisphere, most pronounced in the left occipital lobe,    likely embolic. 2. Small old lacunar infarcts in the left thalamus and right cerebellar    hemisphere. 3. Mild chronic microvascular disease. LEVY 9/16/20  Overall LV systolic function moderately reduced. Mild to moderate mitral regurgitation. No significant aortic valve abnormalities. No WINNIE thrombus.       EKG 9/16/20  Atrial flutter. Impression  1. S/p PEA arrest.  Remains intubated, off sedation. EEG two days ago showed possible status epilepticus. Improvement noted on EEG after escalating AED therapy, though w/out much clinical change as her encephalopathy persists.       MRI brain w/ embolic stroke (?r/t cardioembolic, coagulopathy, or septic). LEVY w/out any pertinent findings. She remains in atrial flutter.       Additional acute medical issues include acute PE/coagulapathy, bacteremia/?endocarditis, PNA, and renal failure/HD. Recommendations  1. Continue LEV/LCM as ordered. 2.  Supportive care and management of other acute medical issues ongoing. 3.  Will continue to follow exam and monitor for neurologic process. There may be a role for additional EEG data pending patient course. Discussed w/ attending Neurologist.      2079  Discussed plan of care from Neurology perspective w/ the patient's mother Kamilah per request.  She was grateful for the information and comfortable w/ current plan.       Faustina Kuo NP  [facility] Neurology    A copy of this note was provided for Dr Tootie Ortega MD

## 2020-09-18 NOTE — PROGRESS NOTES
card  [facility]  1970 September 18, 2020        CC: On ventilator      Subjective:     History of Present Illness: Today's events noted. Patient still in atrial flutter atrial fibrillation. Attempted to cardiovert could not cardiovert after delivering 2 shocks at 200 J. Cassidy Patiño is a 53 y.o. patient with a PMH significant for factor V Leiden deficiency, history of deep vein thrombosis, hyperlipidemia presented with complaints of increasing shortness of breath. When I saw her today patient is on ventilator intubated. Apparently she came to [facility] with increasing shortness of breath and some chest tightness. She is also complaining of swelling of her lower extremity. She had a PEA arrest and was intubated. CTA chest showed pulmonary embolism. No other medical history is available to me  No history of any cardiac disorders available to me. Past Medical History:   has a past medical history of Anticoagulant long-term use, Arthritis, Asthma, Baker's cyst, Depression, Factor V Leiden ([address] Factor V Leiden ([address] H/O blood clots, Heartburn, Hyperlipidemia, MRSA bacteremia, and Osteoarthritis. Surgical History:   has no past surgical history on file. Social History:   reports that she quit smoking 10 days ago. Her smoking use included cigarettes. She smoked 0.50 packs per day. She has never used smokeless tobacco. She reports current drug use. Frequency: 14.00 times per week. Drug: Marijuana. She reports that she does not drink alcohol. Family History:  family history includes Heart Attack in her father. Home Medications:  Were reviewed and are listed in nursing record and/or below  Prior to Admission medications    Medication Sig Start Date End Date Taking?  Authorizing Provider   albuterol sulfate  (90 Base) MCG/ACT inhaler INHALE TWO PUFFS BY MOUTH EVERY 6 HOURS AS NEEDED FOR WHEEZING 8/25/20  Yes Satya Billings Jama Nino, APRN - CNP   vitamin D (ERGOCALCIFEROL) 1.25 MG (29053 UT) CAPS capsule TAKE ONE CAPSULE BY MOUTH ONCE WEEKLY 8/13/20  Yes Nahomy Farias DO   omeprazole (PRILOSEC) 20 MG delayed release capsule TAKE ONE CAPSULE BY MOUTH DAILY 6/10/20  Yes Danisha Sam MD   warfarin (COUMADIN) 7.5 MG tablet TAKE ONE TABLET BY MOUTH DAILY EXCEPT ON MONDAY AND THURSDAY TAKE ONE-HALF TABLET BY MOUTH AS DIRECTED 4/22/20  Yes Danisha Sam MD   atorvastatin (LIPITOR) 10 MG tablet TAKE ONE TABLET BY MOUTH DAILY 1/8/20  Yes Danisha Sam MD   DULoxetine (CYMBALTA) 60 MG extended release capsule TAKE ONE CAPSULE BY MOUTH DAILY 9/9/19  Yes Danisha Sam MD   BREO ELLIPTA 200-25 MCG/INH AEPB INHALE ONE DOSE BY MOUTH DAILY 3/22/19  Yes Danisha Sam MD        CURRENT Medications:  amiodarone (CORDARONE) 450 mg in dextrose 5 % 250 mL infusion, Continuous  lacosamide (VIMPAT) 200 mg in dextrose 5 % 70 mL IVPB, BID  heparin (porcine) injection 1,000 Units, PRN  ondansetron (ZOFRAN) injection 4 mg, Q4H PRN  promethazine (PHENERGAN) in sodium chloride 0.9% 50 mL IVPB 25 mg, Q4H PRN  lactobacillus (CULTURELLE) capsule 2 capsule, BID WC  mupirocin (BACTROBAN) 2 % ointment, BID  furosemide (LASIX) injection 80 mg, BID  dilTIAZem 125 mg in dextrose 5 % 125 mL infusion, Continuous  piperacillin-tazobactam (ZOSYN) 2.25 g in dextrose 5 % 50 mL IVPB (mini-bag), Q8H  fentaNYL (SUBLIMAZE) injection 50 mcg, Q2H PRN  LORazepam (ATIVAN) injection 2 mg, Q2H PRN  sodium chloride flush 0.9 % injection 10 mL, 2 times per day  sodium chloride flush 0.9 % injection 10 mL, PRN  acetaminophen (TYLENOL) tablet 650 mg, Q6H PRN    Or  acetaminophen (TYLENOL) suppository 650 mg, Q6H PRN  pantoprazole (PROTONIX) injection 40 mg, Daily    And  sodium chloride (PF) 0.9 % injection 10 mL, Daily  chlorhexidine (PERIDEX) 0.12 % solution 15 mL, BID  glucose (GLUTOSE) 40 % oral gel 15 g, PRN  dextrose 50 % IV solution, PRN  glucagon (rDNA) injection 1 mg, PRN  dextrose 5 % solution, PRN  levetiracetam (KEPPRA) 1000 mg/100 mL IVPB, Q12H  vancomycin (VANCOCIN) intermittent dosing (placeholder), RX Placeholder  [Held by provider] heparin 25,000 unit in sodium chloride 0.45% 250 mL infusion, Continuous        Allergies:  Ibuprofen           Review of Systems: SEE HPI   · Not available      Objective:     PHYSICAL EXAM:      Vitals:    09/18/20 1230   BP: (!) 158/85   Pulse: 119   Resp: 21   Temp: 98.6 °F (37 °C)   SpO2: 94%    Weight: 211 lb 3.2 oz (95.8 kg)       General Appearance:   Awake on the ventilator   Head:  Normocephalic, without obvious abnormality, atraumatic. Eyes:  Pupils equal and round. No scleral icterus. Mouth: Moist mucosa, no pharyngeal erythema. Nose: Nares normal. No drainage or sinus tenderness. Neck: Supple, symmetrical, trachea midline. No adenopathy. No tenderness/mass/nodules. No carotid bruit or elevated JVD. Lungs:   Respiratory Effort: Normal   Auscultation: Clear to auscultation bilaterally, respirations unlabored. No wheeze, rales   Chest Wall:  No tenderness or deformity. Cardiovascular:    Pulses  Palpation: normal   Ascultation: Regular rate, S1/ S2 normal. No murmur, rub, or gallop. 2+ radial and pedal pulses, symmetric  Carotid  Femoral   Abdomen and Gastrointestinal:   Soft, non-tender, bowel sounds active. Liver and Spleen  Masses   Musculoskeletal: No muscle wasting  Back  Gait   Extremities: Extremities normal, atraumatic. No cyanosis or edema.  No cyanosis clubbing               Neurologic:  Unable to test      Labs     All labs have been reviewed    Lab Results   Component Value Date    WBC 14.0 09/18/2020    RBC 3.19 09/18/2020    HGB 8.4 09/18/2020    HCT 26.9 09/18/2020    MCV 84.3 09/18/2020    RDW 19.4 09/18/2020     09/18/2020     Lab Results   Component Value Date     09/18/2020    K 3.8 09/18/2020    K 4.1 09/10/2020     09/18/2020    CO2 26 09/18/2020    BUN 49 09/18/2020    CREATININE 2.4 09/18/2020 GFRAA 26 09/18/2020    GFRAA >60 07/25/2012    AGRATIO 1.1 09/12/2020    LABGLOM 21 09/18/2020    GLUCOSE 156 09/18/2020    PROT 5.4 09/18/2020    PROT 7.0 10/19/2011    CALCIUM 8.3 09/18/2020    BILITOT 0.9 09/18/2020    ALKPHOS 112 09/18/2020    AST 73 09/18/2020     09/18/2020     No results found for: PTINR  Lab Results   Component Value Date    LABA1C 5.6 12/17/2018     Lab Results   Component Value Date    CKTOTAL 449 (H) 09/11/2020    TROPONINI 0.02 (H) 09/11/2020       Cardiac, Vascular and Imaging Data all Personally Reviewed in Detail by Myself      EKG: Sinus tachycardiaRightward axisST & T wave abnormality, consider inferior ischemiaAbnormal ECGNo previous ECGs available     Echocardiogram: Summary   Ejection fraction is visually estimated to be 30%-35%.   Severe tricuspid regurgitation.   moderate pulmonary HTN   There is severe diffuse hypokinesis.   Diastolic filling parameters suggest grade III diastolic dysfunction.   Moderate calcification of the posterior leaflet of the mitral valve.   Mitral annular calcification is present.   The mitral valve leaflets appear myxomatous.   No evidence of mitral valve stenosis.   Severe mitral regurgitation is present.   Systolic flow reversal in pulmonary veins.   The right ventricle is enlarged.   Right ventricular systolic function is moderate to severely reduced.        Other imaging:      CT chest  Evaluation is somewhat limited due to respiratory motion artifact.  There are    suspected pulmonary arterial filling defects within subsegmental branches to    the right middle lobe and left lower lobe.  No right heart strain.         Large bilateral pleural effusions and reflux of contrast into the IVC/hepatic    veins.  Findings most compatible with volume overload/CHF.         Patchy airspace disease in the left lower lobe adjacent to the large    effusion.  Differential considerations include atelectasis, infectious    process or, less likely, infarction.             Chest x-ray  Endotracheal tube has tip approximately 1.3 cm proximal to the rahat and    should be retracted approximately 2 cm.         Findings are again suggestive of pulmonary edema with small pleural    effusions.  Pneumonia is not excluded in the appropriate clinical setting.         Findings were called by the radiology call center. Assessment and Plan     -Atrial fibrillation and atrial flutter with rapid ventricular response. Continue intravenous Cardizem  Continue intravenous amiodarone  Attempted cardioversion. But failed  Add IV digoxin. Right groin hematoma stable most likely arterial stick. Venous line removed from right groin. Cardiac arrest PEA  Possible cardiogenic shock  -Acute pulmonary edema,  Acute pulmonary embolism  Acute hypoxemic respiratory failure  Initial ECG suggestive of atrial flutter  Transesophageal echo completed did not show severe mitral regurgitation  LV ejection fraction moderately depressed. On CRRT    Continue ventilator support for now. Neurology consultation for possible seizure activity and encephalopathy    Thank you for allowing us to participate in the care of Refugia Bolus. Please do not hesitate to contact me if you have any questions.     Dakota Aguirre MD, MPH    [address]   [facility]  Ph: [phone]  Fax: [phone]    9/18/2020 1:15 PM

## 2020-09-18 NOTE — PROGRESS NOTES
Clinical Pharmacy Note  Heparin Dosing       Lab Results   Component Value Date    APTT 44.8 09/18/2020     Lab Results   Component Value Date    HGB 8.4 09/18/2020    HCT 26.9 09/18/2020     09/18/2020    INR 1.23 09/18/2020       Current Infusion Rate: 4.2 mL/hr    Plan:  Bolus: 2000 units  Rate: Increase to 6.5 mL/hr  Next aPTT: 1200  9/18/20    Pharmacy will continue to monitor and adjust based on aPTT results.     Osman Bowens, PharmD  9/18/2020 5:35 AM

## 2020-09-18 NOTE — PROGRESS NOTES
Infectious Disease Follow up Notes  Admit Date: 9/10/2020  Hospital Day: 9    Antibiotics :   IV Zosyn   IV Vancomycin     CHIEF COMPLAINT:   Septic shock  Staph aureus bacteremia  MORELIA  SHOCK liver  Lactic acidosis   Pulmonary embolism  Cardiac arrest  CVA likely Embolic        Subjective interval History :  53 y.o. Remains intubated sedated on the vent and restless with movements during exam and not following any commands and NG tube in place and noted Rt groin hematoma and remains in A fib with RVR and WBC trend down and Creat remains elevated , shock liver improving     Past Medical History:    Past Medical History:   Diagnosis Date    Anticoagulant long-term use     Arthritis     Asthma     Baker's cyst     Depression     [facility]     Factor V Leiden ([facility] [facility]     H/O blood clots     Heartburn     Hyperlipidemia     MRSA bacteremia 09/10/2020    Osteoarthritis        Past Surgical History:    History reviewed. No pertinent surgical history.     Current Medications:    Outpatient Medications Marked as Taking for the 9/10/20 encounter [facility] HOSPITAL Encounter)   Medication Sig Dispense Refill    albuterol sulfate  (90 Base) MCG/ACT inhaler INHALE TWO PUFFS BY MOUTH EVERY 6 HOURS AS NEEDED FOR WHEEZING 18 g 0    vitamin D (ERGOCALCIFEROL) 1.25 MG (33377 UT) CAPS capsule TAKE ONE CAPSULE BY MOUTH ONCE WEEKLY 12 capsule 0    omeprazole (PRILOSEC) 20 MG delayed release capsule TAKE ONE CAPSULE BY MOUTH DAILY 90 capsule 6    warfarin (COUMADIN) 7.5 MG tablet TAKE ONE TABLET BY MOUTH DAILY EXCEPT ON MONDAY AND THURSDAY TAKE ONE-HALF TABLET BY MOUTH AS DIRECTED 21 tablet 5    atorvastatin (LIPITOR) 10 MG tablet TAKE ONE TABLET BY MOUTH DAILY 90 tablet 3    DULoxetine (CYMBALTA) 60 MG extended release capsule TAKE ONE CAPSULE BY MOUTH DAILY 30 capsule 11    BREO ELLIPTA 200-25 MCG/INH AEPB INHALE ONE DOSE BY MOUTH DAILY 1 each 11       Allergies:  Ibuprofen    Immunizations :   Immunization History   Administered Date(s) Administered    Pneumococcal Polysaccharide (Yaucoousq32) 2007       Social History:   Social History     Tobacco Use    Smoking status: Former Smoker     Packs/day: 0.50     Types: Cigarettes     Last attempt to quit: 2020     Years since quittin.0    Smokeless tobacco: Never Used   Substance Use Topics    Alcohol use: No     Alcohol/week: 0.0 standard drinks    Drug use: Yes     Frequency: 14.0 times per week     Types: Marijuana     Social History     Tobacco Use   Smoking Status Former Smoker    Packs/day: 0.50    Types: Cigarettes    Last attempt to quit: 2020    Years since quittin.0   Smokeless Tobacco Never Used      Family History   Problem Relation Age of Onset    Heart Attack Father          REVIEW OF SYSTEMS:    Not possible intubated state         PHYSICAL EXAM:      Vitals:    /67   Pulse 122   Temp 98.5 °F (36.9 °C) (Axillary)   Resp 17   Ht 5' 7\" (1.702 m)   Wt 211 lb 3.2 oz (95.8 kg)   LMP 2018 (Exact Date)   SpO2 (!) 89%   BMI 33.08 kg/m²     General Appearance: intubated sedated  on the vent in some  acute distress, ++ pallor, no icterus  Obesity +  Skin: warm and dry, no rash or erythema  Head: normocephalic and atraumatic  Eyes: pupils equal, round, and reactive to light, conjunctivae normal  ENT: tympanic membrane, external ear and ear canal normal bilaterally, nose without deformity, nasal mucosa and turbinates normal without polyps  Neck: supple and non-tender without mass, no thyromegaly  no cervical lymphadenopathy  Pulmonary/Chest: coarse crepts  no wheezes, rales or rhonchi, normal air movement, in some respiratory distress  Cardiovascular:  S1 and S2, Mitral murmur, rubs, clicks, or gallops, no carotid bruits  Abdomen: soft, non-tender, non-distended, normal bowel sounds, no masses or organomegaly  Extremities: no cyanosis, Non-reactive    Hep B S Ag Interp  Non-reactive  Non-reactive    Hep C Ab Interp  Non-reactive  Non-reactive    Resulting Agency   [address]        TTE :  9/10     Summary   Ejection fraction is visually estimated to be 30%-35%. Severe tricuspid regurgitation. moderate pulmonary HTN   There is severe diffuse hypokinesis. Diastolic filling parameters suggest grade III diastolic dysfunction. Moderate calcification of the posterior leaflet of the mitral valve. Mitral annular calcification is present. The mitral valve leaflets appear myxomatous. No evidence of mitral valve stenosis. Severe mitral regurgitation is present. Systolic flow reversal in pulmonary veins. The right ventricle is enlarged. Right ventricular systolic function is moderate to severely reduced. Signature      ------------------------------------------------------------------   Electronically signed by Marleny Johnston MD (Interpreting   physician) on 09/11/2020 at 10:45 AM   ------------------------------------------------------------------    MICRO: cultures reviewed and updated by me     20 1357        Order Status: Completed  Specimen: Sputum, Suctioned  Updated: 09/14/20 0531     CULTURE, RESPIRATORY  Normal respiratory benjamin absentAbnormal       Gram Stain Result  4+ WBC's (Polymorphonuclear)   2+ Epithelial Cells   1+ Gram positive cocci   1+ Gram positive rods     Organism  Staphylococcus aureusAbnormal       CULTURE, RESPIRATORY  --     Light growth   PBP2= Negative    Narrative:      ORDER#: [id number]                          ORDERED BY: LISA BRIGHT   SOURCE: Sputum Suctioned                   COLLECTED:  09/11/20 12:02   ANTIBIOTICS AT [place]. :                      RECEIVED :  09/11/20 12:02   Performed at:   [place] [address] [address] [facility] [place] [address]   Phone [phone]    Culture, Blood 1 [[id number]]   Collected: 09/12/20 9812    Order Status: Completed Specimen: Blood  Updated: 09/13/20 1915     Blood Culture, Routine  No Growth to date.  Any change in status will be called. Narrative:      ORDER#: [id number]                          ORDERED BY: Tresa Sears   SOURCE: Blood                              COLLECTED:  09/12/20 16:57   ANTIBIOTICS AT [place]. :                      RECEIVED :  09/12/20 16:57   If child <=2 yrs old please draw pediatric bottle. ~Blood Culture #1   Performed at:   [address] [place] [address] [place] [address]   Phone [phone]    Culture, Blood 1 [[id number]]   Collected: 09/13/20 1815    Order Status: Sent  Specimen: Blood  Updated: 09/13/20 1831    Culture, Blood 2 [[id number]]   Collected: 09/12/20 1300    Order Status: Completed  Specimen: Blood  Updated: 09/13/20 1515     Culture, Blood 2  No Growth to date.  Any change in status will be called. Narrative:      ORDER#: [id number]                          ORDERED BY: Tresa Sears   SOURCE: Blood                              COLLECTED:  09/12/20 13:00   ANTIBIOTICS AT [facility]. :                      RECEIVED :  09/12/20 13:06   If child <=2 yrs old please draw pediatric bottle. ~Blood Culture #2   Performed at:   [address]  [facility] [address] [place] [address]   Phone [phone]    Culture, Blood 2 [[id number]]  (Abnormal)  Collected: 09/10/20 2314    Order Status: Completed  Specimen: Blood  Updated: 09/13/20 1344     Culture, Blood 2  --Abnormal       Gram stain Aerobic bottle:   Gram positive cocci in clusters   resembling Staphylococcus   Information to follow   Abnormal       Organism  Staph aureus MRSA DNA DetectedAbnormal       Culture, Blood 2  --Abnormal       CONTACT PRECAUTIONS INDICATED   See additional report for complete BCID panel.    Isolated one of two sets   Abnormal       Organism  Staphylococcus aureusAbnormal       Culture, Blood 2  --     POSITIVE for   Sensitivity to [facility]   [address] [place], [place] [employer] [place] [address]   Phone [phone]    Strep Pneumoniae Antigen [[id number]]   Collected: 09/10/20 2207    Order Status: Completed  Specimen: Urine, clean catch  Updated: 09/11/20 1042     STREP PNEUMONIAE ANTIGEN, URINE  --     Presumptive Negative   Presumptive negative suggests no current or recent   pneumococcal infection. Infection due to Strep pneumoniae   cannot be ruled out since the antigen present in the sample   may be below the detection limit of the test.   Normal Range:Presumptive Negative    Narrative:      ORDER#: [id number]                          ORDERED BY: Davion Church   SOURCE: Urine Clean Catch                  COLLECTED:  09/10/20 22:07   ANTIBIOTICS AT [place]. :                      RECEIVED :  09/11/20 01:43   Performed at:   [facility]   [address] [place], [place] [address]   Phone [phone]    Legionella antigen, urine [[id number]]   Collected: 09/10/20 2207    Order Status: Completed  Specimen: Urine, clean catch  Updated: 09/11/20 1042     L. pneumophila Serogp 1 Ur Ag  --     Presumptive Negative   No Legionella pneumophila serogroup 1 antigens detected. A negative result does not exclude infection with   Legionella pneumophila serogroup 1 nor does it rule out   other microbial-caused respiratory infections or   disease caused by other serogroups of   Legionella pneumophila. Normal Range: Presumptive Negative    Narrative:      ORDER#: [id number]                          ORDERED BY: Davion Church   SOURCE: Urine Clean Catch                  COLLECTED:  09/10/20 22:07   ANTIBIOTICS AT [place]. :                      RECEIVED :  09/11/20 01:43   Performed at:   [facility]   [address] [place] [address]   Phone [phone]      Susceptibility     Staphylococcus aureus (1)     Antibiotic  Interpretation  PARTH  Status     clindamycin  Sensitive <=0.25  mcg/mL      erythromycin  Sensitive  <=0.25  mcg/mL      oxacillin  Sensitive  <=0.25  mcg/mL      tetracycline  Sensitive  <=1  mcg/mL      trimethoprim-sulfamethoxazole  Sensitive  <=10  mcg/mL          IMAGING:    XR CHEST PORTABLE   Final Result   Suspect mild congestive failure with pleural effusion questioned greater on   the left. Endotracheal tube is approximately 3 cm above the rahat         MRI BRAIN WO CONTRAST   Final Result   Addendum 1 of 1   ADDENDUM:   Results were reported to NP Luis F Liner at 3:26 p.m. on September 15,    2020. Final      XR CHEST PORTABLE   Final Result   Decreased layering effusions with improved basilar aeration. Appropriate   life support device positioning. XR CHEST PORTABLE   Final Result   Low-lying endotracheal tube. Recommend retracting 1-2 cm. No substantial change in layering effusions and basilar opacities. The findings were sent to the Radiology Results [address] at 3:53   am on 9/14/2020to be communicated to a licensed caregiver. XR CHEST PORTABLE   Final Result   1. Interval placement of an NG tube, likely within the stomach. 2. Stable appropriate positions of endotracheal tube and right-sided dialysis   catheter. 3. No significant change in appearance of small to moderate bilateral pleural   effusions and bibasilar airspace disease. XR CHEST PORTABLE   Final Result   Supportive lines project in stable positions. Bibasilar opacities, suggestive of layered effusions and atelectasis or   airspace disease. XR CHEST PORTABLE   Final Result   1. Suspected mild to moderate bilateral pleural effusions with adjacent   bibasilar atelectasis and/or airspace disease. 2. Mild to moderate cardiomegaly. 3. Recommend retraction of endotracheal tube 0.7 cm. XR CHEST PORTABLE   Final Result   Right jugular venous catheter and endotracheal tube project in normal   positions.       No pneumothorax. Bilateral airspace disease and possible layered pleural effusions, consistent   with pulmonary edema and atelectasis. Pneumonia is a differential   possibility. XR CHEST PORTABLE   Final Result   Satisfactory position of the endotracheal tube 3 cm above the rahat. The   heart is upper normal size and vessels are borderline congested. VL Extremity Venous Bilateral   Final Result      VL DUP LOWER EXTREMITY ARTERIES BILATERAL   Final Result      XR CHEST PORTABLE   Final Result   Endotracheal tube has tip approximately 1.3 cm proximal to the rahat and   should be retracted approximately 2 cm. Findings are again suggestive of pulmonary edema with small pleural   effusions. Pneumonia is not excluded in the appropriate clinical setting. Findings were called by the radiology call center. CT HEAD WO CONTRAST   Final Result   Motion limited study. No convincing evidence for acute intracranial   abnormality identified within the limitations of motion artifact. If there is persistent clinical suspicion for intracranial abnormality,   repeat study should be considered. XR CHEST PORTABLE   Final Result   Cardiomegaly, pulmonary vascular congestion, and bilateral pleural effusions. Endotracheal tube terminates approximately 2 cm above the rahat. CT CHEST PULMONARY EMBOLISM W CONTRAST   Final Result   Addendum 1 of 1   ADDENDUM:   Findings were discussed with Dr. Loyde Cockayne at 10:49 pm on 9/10/2020.          Final      VL DUP LOWER EXTREMITY ARTERIES RIGHT    (Results Pending)         All the pertinent images and reports for the current Hospitalization were reviewed by me     Scheduled Meds:   lacosamide (VIMPAT) IVPB  200 mg Intravenous BID    lactobacillus  2 capsule Oral BID WC    mupirocin   Nasal BID    furosemide  80 mg Intravenous BID    piperacillin-tazobactam  2.25 g Intravenous Q8H    sodium chloride flush  10 mL Intravenous 2 times per day    pantoprazole  40 mg Intravenous Daily    And    sodium chloride (PF)  10 mL Intravenous Daily    chlorhexidine  15 mL Mouth/Throat BID    levetiracetam  1,000 mg Intravenous Q12H    vancomycin (VANCOCIN) intermittent dosing (placeholder)   Other RX Placeholder       Continuous Infusions:   amiodarone 450mg/250ml D5W infusion 0.5 mg/min (09/18/20 0952)    dilTIAZem 15 mg/hr (09/18/20 0619)    dextrose      [Held by provider] heparin (Porcine) Stopped (09/18/20 0802)       PRN Meds:  heparin (porcine), ondansetron, promethazine, fentanNYL, LORazepam, sodium chloride flush, acetaminophen **OR** acetaminophen, glucose, dextrose, glucagon (rDNA), dextrose      Assessment:     Patient Active Problem List   Diagnosis    Factor V Leiden mutation [facility] [address] [facility]    Pulmonary embolus (HCC)    Lactic acidosis    CAP (community acquired pneumonia)    Acute CHF (congestive heart failure) ([facility] [address] [facility]    Bilateral pulmonary embolism (HCC)    Cardiac arrest ([facility] [address]    Seizures (HCC)    Acute respiratory failure with hypoxia (HCC)    Acute kidney injury ([facility] [address] [facility]    Metabolic acidosis    Congestive heart failure (HCC)    Pulmonary hypertension (HCC)    Multifocal pneumonia    Septic shock (HCC)    Elevated LFTs    Thrombocytopenia (HCC)    Coagulopathy (HCC)    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)    Acute encephalopathy    Pneumonia due to organism    Bacteremia    Pleural effusion, bilateral    Elevated lactic acid level    Bacteremia due to methicillin susceptible Staphylococcus aureus (MSSA)    Pneumonia due to methicillin susceptible Staphylococcus aureus (MSSA) (HCC)    Anemia    Acute renal failure (HCC)    Bilateral pleural effusion    Mitral valve insufficiency    Tricuspid valve insufficiency    Class 2 obesity due to excess calories with body mass index (BMI) of 39.0 to 39.9 in adult    Cardiogenic shock (HCC)    Staphylococcal pneumonia (HCC)     Septic shock off pressor therapyShock liver improving    Cardiac arrest   Pulmonary embolism on anticoagulation   Encephalopathy   Possible seizure episode on anti epileptics   Resp failure now on the ventilator  WBC elevation   MRSA bacteremia follow up blood cx clearing   Sputum cx reported as MSSA interestingly and Micro lab did additional testing  TTE is abnormal with mitral valve calcification and myxomatous changes with Mitral regurgitation  Factor V leiden def  MORELIA  Now requiring HD intermittent     She remains critically ill in ICU from resp failure, CVA, and A fib with bacteremia     WBC slowly trend down on IV abx and Micro repeat sensitivities noted     S/P LEVY  NO Vegetation but has Mitral regurgitation     MRI brain now reported to have multiple CVA possible embolic in origin and given severe Mitral regurgitation with MRSA bacteremia this is concerning for   Endocarditis     WBC trend down a bit with addition of IV Zosyn and A fib cardiology attempted cardioversion but still unsuccessful and she is on HD for MORELIA         Labs, Microbiology, Radiology and all the pertinent results from current hospitalization and  care every where were reviewed  by me as a part of the evaluation   Plan:   1. Cont IV Vancomycin  Level elevated due to MORELIA   Pharmacy dosing    2. Aim for trough 15 -20  3. Cont  IV Zosyn x 2.25 gm x Q 8 hrs -AVOID CEFEPIME/MEROPENEM given the concern for seizures   4. WBC remains  Slow improvement    5/ s/p LEVY results noted no vegetation   6. Now on intermittent HD for MORELIA   7. MRI brain abnormal from CVA multiple likely embolic   8. Neurological status very concerning given CVA, seizures and MRI brain findings     Discussed with patient/Family and Nursing staff   Risk of Complications/Morbidity: High      · Illness(es)/ Infection present that pose threat to bodily function. · There is potential for severe exacerbation of infection/side effects of treatment.   · Therapy requires intensive monitoring for antimicrobial agent toxicity. Thanks for allowing me to participate in your patient's care and please call me with any questions or concerns.     Raysa Johnson MD  Infectious Disease  [facility]) Physician  Phone: [phone]   Fax : [phone]

## 2020-09-18 NOTE — CONSULTS
Aware of HF RN consult received from Dr Collins Mejía as part of HF order set. HF RNs have been following peripherally throughout hospital stay to date. Pt presented to ED via EMS with c/o SOB x 1 month, abdominal distention, and LE edema. She was tachycardic in ED with rate 170's. She reportedly had some seizure like activity and then had PEA arrest with CPR ensuing. She has been intubated for full time of admission. Pt has no prior history of HF and no established cardiologist.  Echo showed EF 30-35% with Grade III DD, mod pHTN, and severe TR and MR. Cardiology was consulted. Pt was initially treated with IV bolus Lasix but then required CRRT for several days and now is getting intermittent HD. She has diuresed 15 liters to date. Pt continued with myoclonus seizure like movement and was eval'd by neurology. MRI of brain on 9/15 showed multiple infarcts / embolic event. Even with sedation fully off, she is unable to follow commands. Cardiology attempted to DCCV x 2 9/17 without success. She currently has a significant ileus. Critical care suggested trach / peg today given patient's ongoing neurological status. HF measures have been implemented: daily weights, I/O, and diet is being monitored. She was receiving TF but this has been stopped in setting of ileus. HF careplan is current and HF instructions have been added to AVS.     HF RNs will continue to follow and will provide HF education if / when appropriate.

## 2020-09-18 NOTE — PROGRESS NOTES
No new recs today   h farhan on board  plts improving  Will eventually transition to lovenox or noac   Coumadin compliance was poor  FOr now I will sign off   Call if any questions

## 2020-09-18 NOTE — PROGRESS NOTES
0725: Handoff complete with Ronel Backers and Radha Liriano, RN's. Bedside handoff and skin assessment complete. Billy verified. 3608: Heparin gtt turned off at this time for noted hematomas in right groin. Nightshift rn noted the size had been increasing overnight. New bruising occurring outside of lines previously drawn on skin. Doctor Mila Shabazz notified of changes to heparin gtt, emesis around ett last night and uncontrolled a-fib. Electronically signed by Aj Bustillos RN on 9/18/2020 at 8:24 AM   5024: Dr. Maximiliano Plummer, Cardiologist, at bedside. Aware of increasing size of hematoma and uncontrolled a-fib. Orders for digoxin, amiodarone and a doppler study ordered. 0845: Non-interventional cardiology notified of doppler study. Electronically signed by Aj Bustillos RN on 9/18/2020 at 8:50 AM   0940: Rounds with Dr. Traci Corrigan, pulmonologist, and Vipul Mayer, pharmacist complete. SCD's placed on patient. 1040: Juan Neurology NP in room assessing patient. No new orders. 1145: Dr. Mila Shabazz, hospitalist,in room assessing patient. Aware of changes in condition. No new orders. 1420: Dr. Mittal Flank in to assess patient. No new orders. 1612: Dr. Mendieta Rounds regarding patient's continued a-fib RVR and SBP. No response.    Electronically signed by Aj Bustillos RN on 9/18/2020 at 4:18 PM

## 2020-09-19 LAB
ALBUMIN SERPL-MCNC: 2.8 G/DL (ref 3.4–5)
ALP BLD-CCNC: 111 U/L (ref 40–129)
ALT SERPL-CCNC: 200 U/L (ref 10–40)
ANION GAP SERPL CALCULATED.3IONS-SCNC: 13 MMOL/L (ref 3–16)
AST SERPL-CCNC: 51 U/L (ref 15–37)
BASE EXCESS ARTERIAL: 6.3 MMOL/L (ref -3–3)
BILIRUB SERPL-MCNC: 1.3 MG/DL (ref 0–1)
BILIRUBIN DIRECT: 0.9 MG/DL (ref 0–0.3)
BILIRUBIN, INDIRECT: 0.4 MG/DL (ref 0–1)
BUN BLDV-MCNC: 57 MG/DL (ref 7–20)
CALCIUM SERPL-MCNC: 8.6 MG/DL (ref 8.3–10.6)
CARBOXYHEMOGLOBIN ARTERIAL: 1.7 % (ref 0–1.5)
CHLORIDE BLD-SCNC: 96 MMOL/L (ref 99–110)
CO2: 29 MMOL/L (ref 21–32)
CREAT SERPL-MCNC: 3.1 MG/DL (ref 0.6–1.1)
FIBRINOGEN: 389 MG/DL (ref 200–397)
FIBRINOGEN: 446 MG/DL (ref 200–397)
GFR AFRICAN AMERICAN: 19
GFR NON-AFRICAN AMERICAN: 16
GLUCOSE BLD-MCNC: 111 MG/DL (ref 70–99)
GLUCOSE BLD-MCNC: 128 MG/DL (ref 70–99)
GLUCOSE BLD-MCNC: 136 MG/DL (ref 70–99)
GLUCOSE BLD-MCNC: 140 MG/DL (ref 70–99)
GLUCOSE BLD-MCNC: 159 MG/DL (ref 70–99)
GLUCOSE BLD-MCNC: 162 MG/DL (ref 70–99)
HAPTOGLOBIN: 99 MG/DL (ref 30–200)
HCO3 ARTERIAL: 30.9 MMOL/L (ref 21–29)
HCT VFR BLD CALC: 26.3 % (ref 36–48)
HEMOGLOBIN, ART, EXTENDED: 7.6 G/DL (ref 12–16)
HEMOGLOBIN: 8.2 G/DL (ref 12–16)
MAGNESIUM: 1.8 MG/DL (ref 1.8–2.4)
MCH RBC QN AUTO: 26 PG (ref 26–34)
MCHC RBC AUTO-ENTMCNC: 31 G/DL (ref 31–36)
MCV RBC AUTO: 83.8 FL (ref 80–100)
METHEMOGLOBIN ARTERIAL: 0.8 %
O2 CONTENT ARTERIAL: 11 ML/DL
O2 SAT, ARTERIAL: 99.8 %
O2 THERAPY: ABNORMAL
PCO2 ARTERIAL: 44.4 MMHG (ref 35–45)
PDW BLD-RTO: 19.6 % (ref 12.4–15.4)
PERFORMED ON: ABNORMAL
PH ARTERIAL: 7.45 (ref 7.35–7.45)
PHOSPHORUS: 4.1 MG/DL (ref 2.5–4.9)
PLATELET # BLD: 164 K/UL (ref 135–450)
PMV BLD AUTO: 9.8 FL (ref 5–10.5)
PO2 ARTERIAL: 116 MMHG (ref 75–108)
POTASSIUM SERPL-SCNC: 3.5 MMOL/L (ref 3.5–5.1)
RBC # BLD: 3.14 M/UL (ref 4–5.2)
SODIUM BLD-SCNC: 138 MMOL/L (ref 136–145)
TCO2 ARTERIAL: 32.3 MMOL/L
TOTAL PROTEIN: 5.8 G/DL (ref 6.4–8.2)
VANCOMYCIN RANDOM: 7.1 UG/ML
WBC # BLD: 15.1 K/UL (ref 4–11)

## 2020-09-19 PROCEDURE — 80202 ASSAY OF VANCOMYCIN: CPT

## 2020-09-19 PROCEDURE — 99291 CRITICAL CARE FIRST HOUR: CPT | Performed by: INTERNAL MEDICINE

## 2020-09-19 PROCEDURE — 85384 FIBRINOGEN ACTIVITY: CPT

## 2020-09-19 PROCEDURE — 82803 BLOOD GASES ANY COMBINATION: CPT

## 2020-09-19 PROCEDURE — 94003 VENT MGMT INPAT SUBQ DAY: CPT

## 2020-09-19 PROCEDURE — 94750 HC PULMONARY COMPLIANCE STUDY: CPT

## 2020-09-19 PROCEDURE — 6360000002 HC RX W HCPCS: Performed by: INTERNAL MEDICINE

## 2020-09-19 PROCEDURE — C9254 INJECTION, LACOSAMIDE: HCPCS | Performed by: NURSE PRACTITIONER

## 2020-09-19 PROCEDURE — 85027 COMPLETE CBC AUTOMATED: CPT

## 2020-09-19 PROCEDURE — 2000000000 HC ICU R&B

## 2020-09-19 PROCEDURE — 2580000003 HC RX 258: Performed by: NURSE PRACTITIONER

## 2020-09-19 PROCEDURE — 6370000000 HC RX 637 (ALT 250 FOR IP): Performed by: NURSE PRACTITIONER

## 2020-09-19 PROCEDURE — 2580000003 HC RX 258: Performed by: INTERNAL MEDICINE

## 2020-09-19 PROCEDURE — 2580000003 HC RX 258: Performed by: STUDENT IN AN ORGANIZED HEALTH CARE EDUCATION/TRAINING PROGRAM

## 2020-09-19 PROCEDURE — 94761 N-INVAS EAR/PLS OXIMETRY MLT: CPT

## 2020-09-19 PROCEDURE — 83010 ASSAY OF HAPTOGLOBIN QUANT: CPT

## 2020-09-19 PROCEDURE — 83735 ASSAY OF MAGNESIUM: CPT

## 2020-09-19 PROCEDURE — 80048 BASIC METABOLIC PNL TOTAL CA: CPT

## 2020-09-19 PROCEDURE — 84100 ASSAY OF PHOSPHORUS: CPT

## 2020-09-19 PROCEDURE — 2700000000 HC OXYGEN THERAPY PER DAY

## 2020-09-19 PROCEDURE — C9113 INJ PANTOPRAZOLE SODIUM, VIA: HCPCS | Performed by: INTERNAL MEDICINE

## 2020-09-19 PROCEDURE — 80076 HEPATIC FUNCTION PANEL: CPT

## 2020-09-19 PROCEDURE — 99232 SBSQ HOSP IP/OBS MODERATE 35: CPT | Performed by: INTERNAL MEDICINE

## 2020-09-19 PROCEDURE — 2500000003 HC RX 250 WO HCPCS: Performed by: INTERNAL MEDICINE

## 2020-09-19 PROCEDURE — 6360000002 HC RX W HCPCS: Performed by: NURSE PRACTITIONER

## 2020-09-19 PROCEDURE — 90935 HEMODIALYSIS ONE EVALUATION: CPT

## 2020-09-19 RX ADMIN — PIPERACILLIN AND TAZOBACTAM 2.25 G: 2; .25 INJECTION, POWDER, LYOPHILIZED, FOR SOLUTION INTRAVENOUS at 08:53

## 2020-09-19 RX ADMIN — SODIUM CHLORIDE, PRESERVATIVE FREE 10 ML: 5 INJECTION INTRAVENOUS at 20:50

## 2020-09-19 RX ADMIN — VANCOMYCIN HYDROCHLORIDE 1250 MG: 10 INJECTION, POWDER, LYOPHILIZED, FOR SOLUTION INTRAVENOUS at 09:08

## 2020-09-19 RX ADMIN — ONDANSETRON 4 MG: 2 INJECTION INTRAMUSCULAR; INTRAVENOUS at 23:20

## 2020-09-19 RX ADMIN — Medication 2 CAPSULE: at 16:00

## 2020-09-19 RX ADMIN — AMIODARONE HYDROCHLORIDE 0.5 MG/MIN: 50 INJECTION, SOLUTION INTRAVENOUS at 13:39

## 2020-09-19 RX ADMIN — HEPARIN SODIUM 2800 UNITS: 1000 INJECTION INTRAVENOUS; SUBCUTANEOUS at 08:41

## 2020-09-19 RX ADMIN — LEVETIRACETAM 1000 MG: 10 INJECTION INTRAVENOUS at 10:47

## 2020-09-19 RX ADMIN — DEXTROSE MONOHYDRATE 200 MG: 50 INJECTION, SOLUTION INTRAVENOUS at 20:48

## 2020-09-19 RX ADMIN — DILTIAZEM HYDROCHLORIDE 15 MG/HR: 5 INJECTION INTRAVENOUS at 07:22

## 2020-09-19 RX ADMIN — FUROSEMIDE 80 MG: 10 INJECTION, SOLUTION INTRAMUSCULAR; INTRAVENOUS at 18:41

## 2020-09-19 RX ADMIN — MUPIROCIN: 20 OINTMENT TOPICAL at 20:49

## 2020-09-19 RX ADMIN — PIPERACILLIN AND TAZOBACTAM 2.25 G: 2; .25 INJECTION, POWDER, LYOPHILIZED, FOR SOLUTION INTRAVENOUS at 16:00

## 2020-09-19 RX ADMIN — CHLORHEXIDINE GLUCONATE 15 ML: 0.12 RINSE ORAL at 08:55

## 2020-09-19 RX ADMIN — Medication 2 CAPSULE: at 08:58

## 2020-09-19 RX ADMIN — FUROSEMIDE 80 MG: 10 INJECTION, SOLUTION INTRAMUSCULAR; INTRAVENOUS at 08:59

## 2020-09-19 RX ADMIN — PANTOPRAZOLE SODIUM 40 MG: 40 INJECTION, POWDER, FOR SOLUTION INTRAVENOUS at 08:58

## 2020-09-19 RX ADMIN — SODIUM CHLORIDE, PRESERVATIVE FREE 10 ML: 5 INJECTION INTRAVENOUS at 08:55

## 2020-09-19 RX ADMIN — Medication 10 ML: at 08:58

## 2020-09-19 RX ADMIN — DILTIAZEM HYDROCHLORIDE 10 MG/HR: 5 INJECTION INTRAVENOUS at 14:53

## 2020-09-19 RX ADMIN — DEXTROSE MONOHYDRATE 200 MG: 50 INJECTION, SOLUTION INTRAVENOUS at 08:53

## 2020-09-19 RX ADMIN — MUPIROCIN: 20 OINTMENT TOPICAL at 08:55

## 2020-09-19 RX ADMIN — LEVETIRACETAM 1000 MG: 10 INJECTION INTRAVENOUS at 23:20

## 2020-09-19 ASSESSMENT — PAIN SCALES - GENERAL
PAINLEVEL_OUTOF10: 0

## 2020-09-19 ASSESSMENT — PULMONARY FUNCTION TESTS
PIF_VALUE: 11
PIF_VALUE: 24
PIF_VALUE: 11
PIF_VALUE: 19
PIF_VALUE: 16
PIF_VALUE: 18
PIF_VALUE: 19
PIF_VALUE: 17
PIF_VALUE: 17
PIF_VALUE: 19
PIF_VALUE: 17
PIF_VALUE: 10
PIF_VALUE: 18
PIF_VALUE: 19
PIF_VALUE: 16
PIF_VALUE: 11
PIF_VALUE: 11
PIF_VALUE: 18
PIF_VALUE: 22
PIF_VALUE: 12

## 2020-09-19 NOTE — PLAN OF CARE
Problem: Pain:  Goal: Pain level will decrease  Description: Pain level will decrease  Outcome: Ongoing  Note: Continuing to monitor pain and discomfort. Monitoring pain level on scale of 0-10. Non- pharmacological measures encouraged to reduce discomfort/pain. PRN pain meds administeration continues when/if applicable as ordered by physician. Problem: Falls - Risk of:  Goal: Will remain free from falls  Description: Will remain free from falls  Outcome: Ongoing  Note: Falling star program remains in place. Call light and personal belongings within reach. Frequent visual monitoring continues. Toileting program inplace. Patient assisted in turning/repositioning at least once every 2 hours, and on a prn basis. Problem: Skin Integrity:  Goal: Will show no infection signs and symptoms  Description: Will show no infection signs and symptoms  Outcome: Ongoing  Note: Monitoring patient skin integrity for skin breakdown, turning and repositioning q2h per protocol.       Problem: Nutrition  Goal: Optimal nutrition therapy  Outcome: Ongoing     Problem: OXYGENATION/RESPIRATORY FUNCTION  Goal: Patient will maintain patent airway  Outcome: Ongoing     Problem: FLUID AND ELECTROLYTE IMBALANCE  Goal: Fluid and electrolyte balance are achieved/maintained  Outcome: Ongoing     Problem: ACTIVITY INTOLERANCE/IMPAIRED MOBILITY  Goal: Mobility/activity is maintained at optimum level for patient  Outcome: Ongoing

## 2020-09-19 NOTE — PROGRESS NOTES
Neurology Progress Note - 09/19/20    Cc: unresponsive    Patient seen and examined. Remains intubated. Unable to provide history. Following commands now. No new events noted by nursing. Review of systems: Unable to obtain due to non-verbal state (pt intubated). Past Medical History:   Diagnosis Date    Anticoagulant long-term use     Arthritis     Asthma     Baker's cyst     Depression     [facility] [address]     Factor V Leiden ([facility] [address]     H/O blood clots     Heartburn     Hyperlipidemia     MRSA bacteremia 09/10/2020    Osteoarthritis        History reviewed. No pertinent surgical history. Patient  reports that she quit smoking 11 days ago. Her smoking use included cigarettes. She smoked 0.50 packs per day. She has never used smokeless tobacco. She reports current drug use. Frequency: 14.00 times per week. Drug: Marijuana. She reports that she does not drink alcohol. Patient's family history includes Heart Attack in her father. Patient is allergic to ibuprofen.       Current Facility-Administered Medications:     vancomycin (VANCOCIN) 1250 mg in dextrose 5 % 250 mL IVPB, 1,250 mg, Intravenous, Once, Sivakumar Ch MD, Last Rate: 166.7 mL/hr at 09/19/20 0908, 1,250 mg at 09/19/20 0908    amiodarone (CORDARONE) 450 mg in dextrose 5 % 250 mL infusion, 0.5 mg/min, Intravenous, Continuous, Alaina Smith MD, Last Rate: 16.7 mL/hr at 09/18/20 2310, 0.5 mg/min at 09/18/20 2310    lacosamide (VIMPAT) 200 mg in dextrose 5 % 70 mL IVPB, 200 mg, Intravenous, BID, Norberto German APRN - CNP, Stopped at 09/19/20 8650    heparin (porcine) injection 1,000 Units, 1,000 Units, Intracatheter, PRN, Fátima Boston MD, 2,800 Units at 09/19/20 0841    ondansetron (ZOFRAN) injection 4 mg, 4 mg, Intravenous, Q4H PRN, Efraín Wallis MD, 4 mg at 09/18/20 0518    promethazine (PHENERGAN) in sodium chloride 0.9% 50 mL IVPB 25 mg, 25 mg, Intravenous, Q4H PRN, Efraín Wallis MD, Stopped at 09/18/20 36    lactobacillus (CULTURELLE) capsule 2 capsule, 2 capsule, Oral, BID WC, Osreina Nix APRN - CNP, 2 capsule at 09/19/20 0858    mupirocin (BACTROBAN) 2 % ointment, , Nasal, BID, OsAGUSTIN Parkinson - CNP    furosemide (LASIX) injection 80 mg, 80 mg, Intravenous, BID, Deb Ramirez MD, 80 mg at 09/19/20 0859    [COMPLETED] dilTIAZem injection 10 mg, 10 mg, Intravenous, Once, 10 mg at 09/16/20 1828 **FOLLOWED BY** dilTIAZem 125 mg in dextrose 5 % 125 mL infusion, 5 mg/hr, Intravenous, Continuous, Juana Olmos MD, Last Rate: 15 mL/hr at 09/19/20 0722, 15 mg/hr at 09/19/20 0722    piperacillin-tazobactam (ZOSYN) 2.25 g in dextrose 5 % 50 mL IVPB (mini-bag), 2.25 g, Intravenous, Q8H, Thomas Cedeno MD, Stopped at 09/19/20 0942    fentaNYL (SUBLIMAZE) injection 50 mcg, 50 mcg, Intravenous, Q2H PRN, OsAGUSTIN Parkinson - CNP, 50 mcg at 09/18/20 0205    LORazepam (ATIVAN) injection 2 mg, 2 mg, Intravenous, Q2H PRN, Osreina Nix APRN - CNP, 2 mg at 09/18/20 0519    sodium chloride flush 0.9 % injection 10 mL, 10 mL, Intravenous, 2 times per day, Lonny Jarvis DO, 10 mL at 09/19/20 0855    sodium chloride flush 0.9 % injection 10 mL, 10 mL, Intravenous, PRN, Lonny YEISON Jarvis, DO    acetaminophen (TYLENOL) tablet 650 mg, 650 mg, Oral, Q6H PRN **OR** acetaminophen (TYLENOL) suppository 650 mg, 650 mg, Rectal, Q6H PRN, Lonny YEISON Jarvis, DO    pantoprazole (PROTONIX) injection 40 mg, 40 mg, Intravenous, Daily, 40 mg at 09/19/20 0858 **AND** sodium chloride (PF) 0.9 % injection 10 mL, 10 mL, Intravenous, Daily, Jeremias Wheeler MD, 10 mL at 09/19/20 0858    chlorhexidine (PERIDEX) 0.12 % solution 15 mL, 15 mL, Mouth/Throat, BID, Jeremias Wheeler MD, 15 mL at 09/19/20 0855    glucose (GLUTOSE) 40 % oral gel 15 g, 15 g, Oral, PRN, Jeremias Wheeler MD    dextrose 50 % IV solution, 12.5 g, Intravenous, PRN, Jeremias Wheeler MD, 12.5 g at 09/11/20 0900    glucagon (rDNA) injection 1 mg, 1 mg, Intramuscular, PRN, Lyubov Perez MD    dextrose 5 % solution, 100 mL/hr, Intravenous, PRN, Lyubov Perez MD    levetiracetam (KEPPRA) 1000 mg/100 mL IVPB, 1,000 mg, Intravenous, Q12H, AGUSTIN De Los Santos - CNP, Stopped at 09/18/20 2325    vancomycin (VANCOCIN) intermittent dosing (placeholder), , Other, RX Placeholder, Lyubov Perez MD    [Held by provider] heparin 25,000 unit in sodium chloride 0.45% 250 mL infusion, 6.5 mL/hr, Intravenous, Continuous, Magaly Major MD, Stopped at 09/18/20 0802    ------------------------------------------    Exam:    Vitals:  BP (!) 127/111   Pulse 115   Temp 97 °F (36.1 °C) (Axillary)   Resp 16   Ht 5' 7\" (1.702 m)   Wt 204 lb 12.9 oz (92.9 kg)   LMP 12/05/2018 (Exact Date)   SpO2 99%   BMI 32.08 kg/m²     Pt intubated. Follows commands. Grimaces to pain bilateral.  Gaze conjugate. Moves all 4 ext to command.       Recent Results (from the past 24 hour(s))   POCT Glucose    Collection Time: 09/18/20  3:13 PM   Result Value Ref Range    POC Glucose 141 (H) 70 - 99 mg/dl    Performed on ACCU-CHEK    Fibrinogen    Collection Time: 09/18/20  6:25 PM   Result Value Ref Range    Fibrinogen 437 (H) 200 - 397 mg/dL   POCT Glucose    Collection Time: 09/18/20  8:11 PM   Result Value Ref Range    POC Glucose 129 (H) 70 - 99 mg/dl    Performed on ACCU-CHEK    POCT Glucose    Collection Time: 09/19/20 12:16 AM   Result Value Ref Range    POC Glucose 136 (H) 70 - 99 mg/dl    Performed on ACCU-CHEK    Hepatic Function Panel    Collection Time: 09/19/20  3:12 AM   Result Value Ref Range    Total Protein 5.8 (L) 6.4 - 8.2 g/dL    Alb 2.8 (L) 3.4 - 5.0 g/dL    Alkaline Phosphatase 111 40 - 129 U/L     (H) 10 - 40 U/L    AST 51 (H) 15 - 37 U/L    Total Bilirubin 1.3 (H) 0.0 - 1.0 mg/dL    Bilirubin, Direct 0.9 (H) 0.0 - 0.3 mg/dL    Bilirubin, Indirect 0.4 0.0 - 1.0 mg/dL   Haptoglobin    Collection Time: 09/19/20  3:12 AM   Result Value Ref Range Haptoglobin 99.0 30.0 - 200.0 mg/dL   Basic Metabolic Panel    Collection Time: 09/19/20  3:12 AM   Result Value Ref Range    Sodium 138 136 - 145 mmol/L    Potassium 3.5 3.5 - 5.1 mmol/L    Chloride 96 (L) 99 - 110 mmol/L    CO2 29 21 - 32 mmol/L    Anion Gap 13 3 - 16    Glucose 140 (H) 70 - 99 mg/dL    BUN 57 (H) 7 - 20 mg/dL    CREATININE 3.1 (H) 0.6 - 1.1 mg/dL    GFR Non- 16 (A) >60    GFR  19 (A) >60    Calcium 8.6 8.3 - 10.6 mg/dL   Magnesium    Collection Time: 09/19/20  3:12 AM   Result Value Ref Range    Magnesium 1.80 1.80 - 2.40 mg/dL   Phosphorus    Collection Time: 09/19/20  3:12 AM   Result Value Ref Range    Phosphorus 4.1 2.5 - 4.9 mg/dL   CBC    Collection Time: 09/19/20  3:12 AM   Result Value Ref Range    WBC 15.1 (H) 4.0 - 11.0 K/uL    RBC 3.14 (L) 4.00 - 5.20 M/uL    Hemoglobin 8.2 (L) 12.0 - 16.0 g/dL    Hematocrit 26.3 (L) 36.0 - 48.0 %    MCV 83.8 80.0 - 100.0 fL    MCH 26.0 26.0 - 34.0 pg    MCHC 31.0 31.0 - 36.0 g/dL    RDW 19.6 (H) 12.4 - 15.4 %    Platelets 029 257 - 843 K/uL    MPV 9.8 5.0 - 10.5 fL   Fibrinogen    Collection Time: 09/19/20  3:12 AM   Result Value Ref Range    Fibrinogen 446 (H) 200 - 397 mg/dL   POCT Glucose    Collection Time: 09/19/20  4:49 AM   Result Value Ref Range    POC Glucose 162 (H) 70 - 99 mg/dl    Performed on ACCU-CHEK    Vancomycin, Random    Collection Time: 09/19/20  6:20 AM   Result Value Ref Range    Vancomycin Rm 7.1 ug/mL   POCT Glucose    Collection Time: 09/19/20  7:55 AM   Result Value Ref Range    POC Glucose 111 (H) 70 - 99 mg/dl    Performed on ACCU-CHEK        --  MRI brain - 9/15/2020: Multiple recent infarcts in multiple territories. ----------------------------------------      Impression:  1. PEA arrest. Remains intubated. Following commands. Concern for seizures/status epilepticus - improved, not currently seizing. 2. Encephalopathy secondary to #1. Improving. Again, now following commands.   3.

## 2020-09-19 NOTE — PROGRESS NOTES
Progress Note  Date:2020       Room:A3U-3427/2104-01  Patient Marisel Cevallos     Date of Birth:[birth year]     Age:50 y.o. Who is intubated,on vent. Subjective    Subjective   Review of Systems   Objective         Vitals Last 24 Hours:  TEMPERATURE:  Temp  Av.1 °F (36.7 °C)  Min: 97 °F (36.1 °C)  Max: 99.7 °F (37.6 °C)  RESPIRATIONS RANGE: Resp  Av.3  Min: 15  Max: 33  PULSE OXIMETRY RANGE: SpO2  Av %  Min: 84 %  Max: 100 %  PULSE RANGE: Pulse  Av.7  Min: 91  Max: 142  BLOOD PRESSURE RANGE: Systolic (71NDH), WON:086 , Min:100 , MBM:646   ; Diastolic (49ILL), UTL:00, Min:53, Max:116    I/O (24Hr):     Intake/Output Summary (Last 24 hours) at 2020 1242  Last data filed at 2020 0948  Gross per 24 hour   Intake 2066 ml   Output 5030 ml   Net -2964 ml     Objective  Labs/Imaging/Diagnostics    Labs:  CBC:  Recent Labs     20  0426 20  0312   WBC 14.7*  --  14.0* 15.1*   RBC 3.64*  --  3.19* 3.14*   HGB 9.6* 9.1* 8.4* 8.2*   HCT 30.7* 28.9* 26.9* 26.3*   MCV 84.3  --  84.3 83.8   RDW 19.5*  --  19.4* 19.6*   *  --  133* 164     CHEMISTRIES:  Recent Labs     20  04220  0312    138 138   K 3.9 3.8 3.5   CL 99 102 96*   CO2 25 26 29   BUN 75* 49* 57*   CREATININE 3.2* 2.4* 3.1*   GLUCOSE 145* 156* 140*   PHOS 5.5* 3.6 4.1   MG 2.40 1.90 1.80     PT/INR:  Recent Labs     20  1510 20  0020   PROTIME 13.1 13.6* 14.3*   INR 1.13 1.17* 1.23*     APTT:  Recent Labs     20  15120  042   APTT 74.0* 80.6* 44.8*     LIVER PROFILE:  Recent Labs     20  0440 20  0312   * 73* 51*   * 258* 200*   BILIDIR 0.7* 0.7* 0.9*   BILITOT 0.9 0.9 1.3*   ALKPHOS 124 112 111       Imaging Last 24 Hours:  Xr Chest Portable    Result Date: 2020  EXAMINATION: ONE XRAY VIEW OF THE CHEST 2020 5:26 am COMPARISON: Prior study(s) most recent 09/15/2020. HISTORY: ORDERING SYSTEM PROVIDED HISTORY: ETT movement TECHNOLOGIST PROVIDED HISTORY: Reason for exam:->ETT movement Reason for Exam: ETT movement Acuity: Acute Type of Exam: Initial FINDINGS: The heart is enlarged. Vessels are congested. The endotracheal tube remains in satisfactory position. Tip is located approximately 3 cm above the rahat. Right IJ catheter extends to the mid or lower right atrium. There is airspace disease bilaterally with suspected pleural effusion. This obscures the diaphragm greater on the left. Suspect mild congestive failure with pleural effusion questioned greater on the left. Endotracheal tube is approximately 3 cm above the rahat     Vl Dup Lower Extremity Arteries Right    Result Date: 9/18/2020  Lower Extremities Arterial Duplex  Demographics   Patient Name       Inna [facility]   Date of Study      09/18/2020       Gender               Female   Patient Number     [id number]       Date of Birth        1970   Visit Number       [id number]        Age                  53 year(s)   Accession Number   [id number]       Room Number          2104   Corporate ID       [id number]          Sonographer          Magdalena Mariano, YA                                                           CCT   Ordering Physician Karen Vee MD  Interpreting         Tami Streeter 238                                      Physician            Surg                                                           Jose Torres MD  Procedure Type of Study:   Extremities Arteries:Lower Extremities Arterial Duplex, VL LOWER EXTREMITY  ARTERIES DUPLEX RIGHT. Vascular Sonographer Report  Additional Indications:Patient has had many health issues while in the hospital. Patient on heparin, however recently stopped. Right femoral artery line removed recently. The nurse states that the hematoma has grown.  worried about pseudoaneurysm. Impressions Right Impression Multiple hematomas noted in the right groin with the largest measuring 3.6 cm by 1.7 cm. Normal, triphasic flow noted in the right common femoral and proximal femoral arteries. Normal phasic flow noted in the right common femoral and proximal femoral veins. Conclusions   Signature   ------------------------------------------------------------------  Electronically signed by Jose A Osborn MD (Interpreting  physician) on 09/18/2020 at 03:00 PM  ------------------------------------------------------------------  Patient Status:Routine. [facility] [facility]. Technical Quality:Adequate visualization. Risk Factors History +---------+----+---------------------------------+ ! Diagnosis! Date! Comments                         ! +---------+----+---------------------------------+ ! Other    ! ! Factor V Leiden Clotting Disorder! +---------+----+---------------------------------+ ! Other    ! !DVT/SVT in left leg in 1999      !  +---------+----+---------------------------------+    Assessment//Plan           Hospital Problems           Last Modified POA    Factor V Leiden mutation ([facility] [address] 9/10/2020 Yes    Pulmonary embolus ([address] 9/10/2020 Yes    Lactic acidosis 9/10/2020 Yes    CAP (community acquired pneumonia) 9/10/2020 Yes    Acute CHF (congestive heart failure) ([address] 9/10/2020 Yes    Bilateral pulmonary embolism ([address] 9/10/2020 Yes    Cardiac arrest ([address] 9/11/2020 Yes    Seizures ([address] 9/11/2020 Yes    Acute respiratory failure with hypoxia ([address] 9/11/2020 Yes    Acute kidney injury ([address] 3/60/2084 Yes    Metabolic acidosis 1/90/4264 Yes    Congestive heart failure ([address] 9/13/2020 Yes    Pulmonary hypertension ([address] 9/11/2020 Yes    Multifocal pneumonia 9/11/2020 Yes    Septic shock ([address] 9/11/2020 Yes    Elevated LFTs 9/11/2020 Yes    Thrombocytopenia ([address] 9/11/2020 Yes    Coagulopathy ([address] 9/11/2020 Yes    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) ([facility] [address] 9/11/2020 Yes    Acute encephalopathy 9/11/2020 Yes    Pneumonia due to organism 9/12/2020 Yes    Bacteremia 9/12/2020 Yes    Pleural effusion, bilateral 9/13/2020 Yes    Elevated lactic acid level 9/13/2020 Yes    Bacteremia due to methicillin susceptible Staphylococcus aureus (MSSA) 9/13/2020 Yes    Pneumonia due to methicillin susceptible Staphylococcus aureus (MSSA) ([address] 9/13/2020 Yes    Anemia 9/13/2020 Yes    Acute renal failure ([address] 9/13/2020 Yes    Bilateral pleural effusion 9/13/2020 Yes    Mitral valve insufficiency 9/13/2020 Yes    Tricuspid valve insufficiency 9/13/2020 Yes    Class 2 obesity due to excess calories with body mass index (BMI) of 39.0 to 39.9 in adult 9/13/2020 Yes    Cardiogenic shock ([address] 9/14/2020 Yes    Staphylococcal pneumonia ([facility] [address] 9/14/2020 Yes        Assessment & Plan     Remains in A.fib controlled rate. Cont cardizem,amiodarone.     Electronically signed by Yusuf Sierra MD on 9/19/20 at 12:42 PM EDT

## 2020-09-19 NOTE — FLOWSHEET NOTE
HD complete. VSS throughout treatment. Rinseback per protocol. All blood returned. Zero (0) L net removed. Neutral fluid balance achieved. RIJ NTDC functioned well, prescribed BFR achieved and maintained throughout treatment with minimal pressure alarms when patient extremely restless. No medications given. Heparin 1000 units/mL to dwell via RIJNTDC x 2 lumens. Plan next HD for Tuesday,  9.22.20. Report given. Safe patient handoff achieved.   SUHAIL TillmanN, RN       09/19/20 0545 09/19/20 0700 09/19/20 0830   Vital Signs   Temp  --   --  97 °F (36.1 °C)   Temp Source  --   --  Axillary   Pulse 110 122 129   Heart Rate Source Monitor Monitor Monitor   Resp 21 22 22   /82 123/69 (!) 134/116   BP Location Left Arm Left Arm Left Arm   BP Upper/Lower Upper Upper Upper   MAP (mmHg) 90 86 123   Patient Position Semi fowlers Semi fowlers Semi fowlers   Level of Consciousness  --   --  1   MEWS Score  --   --  5   Patient Currently in Pain  --  Other (comment)  (Patient remains extremely restless) Other (comment)  (Patient remains extremely restless)   Height and Weight   Weight 204 lb 5.9 oz (92.7 kg)  --  204 lb 12.9 oz (92.9 kg)   Weight Method Actual;Bed scale  --  Bed scale   BMI (Calculated) 32.1  --  32.1   Pain Assessment   Pain Assessment  --   --  CPOT   Pain Level  --   --  0   CPOT (Critical Patient)   CPOT (Critical Patient) Facial Expression  --   --  0   CPOT (Critical Patient) Body Movement  --   --  0   CPOT (Critical Patient) Muscle Tension  --   --  0   CPOT (Critical Patient) Compiance with Vent  --   --  0   Score CPOT (Vent)  --   --  0   CPOT (Critial Patient) Vent Status  --   --  Intubated   Oxygen Therapy   SpO2 98 % 97 % 99 %   Pulse Oximeter Device Mode Continuous Continuous Continuous   Pulse Oximeter Device Location Finger Finger Finger   O2 Device Ventilator Ventilator Ventilator   Skin Protection for O2 Device Yes Yes Yes   FiO2  40 % 40 % 40 %

## 2020-09-19 NOTE — PROGRESS NOTES
Pulmonary Progress Note    CC:  Follow up cardiac arrest, Pulmonary emboli, Shock    Subjective:  Remains on the ventilator with FIO2 at 40% PEEP of 5  Starting to follow commands and move more        Intake/Output Summary (Last 24 hours) at 9/19/2020 0748  Last data filed at 9/19/2020 0646  Gross per 24 hour   Intake 1046 ml   Output 4750 ml   Net -3704 ml         PHYSICAL EXAM:  Blood pressure 114/74, pulse 115, temperature 97.5 °F (36.4 °C), temperature source Axillary, resp. rate 15, height 5' 7\" (1.702 m), weight 204 lb 5.9 oz (92.7 kg), last menstrual period 12/05/2018, SpO2 97 %.'  Gen: More spontaneously awake  Eyes: Wandering eye movements   ENT: No discharge. Pharynx with ETT and NG  Neck: Trachea midline. No obvious mass. Resp: Few rhonchi, minimal secretions, strong cough  CV: Tachycardic, irregular. No murmur or rub. GI: Non-tender. Non-distended. No hernia. Skin: Right leg hematoma   Lymph: No cervical LAD. No supraclavicular LAD. M/S: No cyanosis. No clubbing. No joint deformity. Neuro:  Following very simple commands, less movement along the right side   Ext:   ++ edema    Medications:    Scheduled Meds:   lacosamide (VIMPAT) IVPB  200 mg Intravenous BID    lactobacillus  2 capsule Oral BID WC    mupirocin   Nasal BID    furosemide  80 mg Intravenous BID    piperacillin-tazobactam  2.25 g Intravenous Q8H    sodium chloride flush  10 mL Intravenous 2 times per day    pantoprazole  40 mg Intravenous Daily    And    sodium chloride (PF)  10 mL Intravenous Daily    chlorhexidine  15 mL Mouth/Throat BID    levetiracetam  1,000 mg Intravenous Q12H    vancomycin (VANCOCIN) intermittent dosing (placeholder)   Other RX Placeholder       Continuous Infusions:   amiodarone 450mg/250ml D5W infusion 0.5 mg/min (09/18/20 2310)    dilTIAZem 15 mg/hr (09/19/20 0722)    dextrose      [Held by provider] heparin (Porcine) Stopped (09/18/20 0802)       PRN Meds:  heparin (porcine), ondansetron, promethazine, fentanNYL, LORazepam, sodium chloride flush, acetaminophen **OR** acetaminophen, glucose, dextrose, glucagon (rDNA), dextrose    Labs:  CBC:   Recent Labs     20   WBC 14.7*  --  14.0* 15.1*   HGB 9.6* 9.1* 8.4* 8.2*   HCT 30.7* 28.9* 26.9* 26.3*   MCV 84.3  --  84.3 83.8   *  --  133* 164     BMP:   Recent Labs     20    138 138   K 3.9 3.8 3.5   CL 99 102 96*   CO2 25 26 29   PHOS 5.5* 3.6 4.1   BUN 75* 49* 57*   CREATININE 3.2* 2.4* 3.1*     LIVER PROFILE:   Recent Labs     20   * 73* 51*   * 258* 200*   BILIDIR 0.7* 0.7* 0.9*   BILITOT 0.9 0.9 1.3*   ALKPHOS 124 112 111     PT/INR:   Recent Labs     20  0020   PROTIME 13.1 13.6* 14.3*   INR 1.13 1.17* 1.23*     APTT:   Recent Labs     20   APTT 74.0* 80.6* 44.8*     UA:No results for input(s): NITRITE, COLORU, PHUR, LABCAST, WBCUA, RBCUA, MUCUS, TRICHOMONAS, YEAST, BACTERIA, CLARITYU, SPECGRAV, LEUKOCYTESUR, UROBILINOGEN, BILIRUBINUR, BLOODU, GLUCOSEU, AMORPHOUS in the last 72 hours. Invalid input(s): Donell Marshall  No results for input(s): PH, PCO2, PO2 in the last 72 hours. Films:  Chest imaging reports were reviewed and imaging was reviewed by me and showed ETT, Vascath and NG in place. Bilateral pleural effusions remain    AB.45/38/79    Cultures:  Sputum:  MSSA  Blood:  MRSA     I reviewed the labs and images listed above    Assessment:   · Cardiac Arrest:  PEA  · Cardiogenic Shock, improved  · Metabolic Encephalopathy. Persistent.   Seizures and organic brain injury likely contributing   · Bilateral Pulmonary Emboli  · Staph Pneumonia   · MRSA bacteremia   · MORELIA with RRT   · Bilateral Pleural Effusions   · Right leg hematoma      Plan:  · SBT today    · ABG in 30 minutes · PRN sedations  · Antibiotics per ID  · GI/DVT prophylaxis:  Protonix, SCDS. Heparin gtt on hold due to hematoma in the right groin   · Amiodarone and Diltiazem infusions. Defer to cardiology regarding transitioning   · Hold enteral feeding   · Glycemic control   · RRT Per Nephrology  · AEDs per Neurology. Mental status much improved today. Following commands. Critical care time of 31 minutes. This does not include procedural time. Please see procedural notes for details.     DO Carole Arellano Pulmonary

## 2020-09-19 NOTE — PROGRESS NOTES
4 Eyes Skin Assessment     The patient is being assess for  Shift Handoff    I agree that 2 RN's have performed a thorough Head to Toe Skin Assessment on the patient. ALL assessment sites listed below have been assessed. Areas assessed by both nurses:  [x]   Head, Face, and Ears   [x]   Shoulders, Back, and Chest  [x]   Arms, Elbows, and Hands   [x]   Coccyx, Sacrum, and IschIum  [x]   Legs, Feet, and Heels        Does the Patient have Skin Breakdown?   Yes, hematoma in right leg      Cash Prevention initiated:  Yes   Wound Care Orders initiated:  NA      [facility] nurse consulted for Pressure Injury (Stage 3,4, Unstageable, DTI, NWPT, and Complex wounds), New and Established Ostomies:  NA      Nurse 1 eSignature: Electronically signed by Jose Ward RN on 9/19/20 at 6:44 PM EDT    **SHARE this note so that the co-signing nurse is able to place an eSignature**    Nurse 2 eSignature: Electronically signed by Bro Raza RN on 9/19/20 at 10:16 PM EDT

## 2020-09-19 NOTE — PROGRESS NOTES
Hospitalist Progress Note      PCP: No primary care provider on file. Date of Admission: 9/10/2020    Chief Complaint:   Chief Complaint   Patient presents with    Shortness of Breath     HX of asthma. Short of breath for around 1 month. Patient states she just refilled her inhaler today. Hospital Course:  53 y.o. female with past medical history of factor V Leiden deficiency on chronic warfarin, history of blood clots, hyperlipidemia, arthritis, asthma who presents to [facility] with worsening above symptoms of fatigue, intermittent shortness of breath both at rest and on exertion, and extensive of abdominal and leg swelling. In the ED patient was significantly tachycardic, showing some combination of sinus tachycardia versus SVT, uncertain if may be underlying atrial fibrillation with rapid ventricular rate. Labs showed signs of infection as well as elevated proBNP. CTA with contrast was done on the chest and demonstrated left and right lobe PEs in certain segments, active findings suggestive of early patchy pneumonia bilaterally, and moderate bilateral pleural effusions suggestive of heart failure/volume overload. PEA arrest shortly after admission. TTE with EF 30- 35%. Severe diffuse hypokinesis with severely reduced rt ventricular function, severe tricuspid regurgitation and mitral regurgitation, grade 3 diastolic dysfunction. Found to have MRSA PNA and bacteremia. Developed shock liver and coagulopathy which are improving. Concern for seizure like activity, initial EEG negative but repeat with possible status, added vimpat to keppra and repeat EEG improved. Acute to subacute infarcts on MRI. Developed a fib RVR 9/16 that has not responded to cardioversion (x5), on amio and dilt IV. Subjective: Intubated, unable to complete ROS. Mental status much improved this morning, opens her eyes to name following commands, squeezing fingers and wiggling toes.     Called  to update him.    Medications:  Reviewed    Infusion Medications    amiodarone 450mg/250ml D5W infusion 0.5 mg/min (09/18/20 2310)    dilTIAZem 15 mg/hr (09/19/20 0722)    dextrose      [Held by provider] heparin (Porcine) Stopped (09/18/20 0802)     Scheduled Medications    lacosamide (VIMPAT) IVPB  200 mg Intravenous BID    lactobacillus  2 capsule Oral BID WC    mupirocin   Nasal BID    furosemide  80 mg Intravenous BID    piperacillin-tazobactam  2.25 g Intravenous Q8H    sodium chloride flush  10 mL Intravenous 2 times per day    pantoprazole  40 mg Intravenous Daily    And    sodium chloride (PF)  10 mL Intravenous Daily    chlorhexidine  15 mL Mouth/Throat BID    levetiracetam  1,000 mg Intravenous Q12H    vancomycin (VANCOCIN) intermittent dosing (placeholder)   Other RX Placeholder     PRN Meds: heparin (porcine), ondansetron, promethazine, fentanNYL, LORazepam, sodium chloride flush, acetaminophen **OR** acetaminophen, glucose, dextrose, glucagon (rDNA), dextrose      Intake/Output Summary (Last 24 hours) at 9/19/2020 0810  Last data filed at 9/19/2020 0646  Gross per 24 hour   Intake 1046 ml   Output 4750 ml   Net -3704 ml       Physical Exam Performed:    /87   Pulse 118   Temp 97.5 °F (36.4 °C) (Axillary)   Resp 20   Ht 5' 7\" (1.702 m)   Wt 204 lb 5.9 oz (92.7 kg)   LMP 12/05/2018 (Exact Date)   SpO2 97%   BMI 32.01 kg/m²     General appearance: No apparent distress, appears stated age  HEENT: Pupils equal, round, and reactive to light. Conjunctivae/corneas clear. Neck: Supple, with full range of motion. Trachea midline. Respiratory:  Normal respiratory effort. Coarse breath sounds bilaterally   Cardiovascular: Regular rate and rhythm with normal S1/S2 without murmurs, rubs or gallops. Abdomen: Soft, non-tender, non-distended with normal bowel sounds. Musculoskeletal: No clubbing, cyanosis or edema bilaterally.   Large R groin hematoma  Skin: Skin color, texture, turgor normal.  No rashes or lesions. Neurologic:  Intubated, sedated--neurologic status much improved and now following commands  Psychiatric: Intubated, sedated  Capillary Refill: Brisk,< 3 seconds   Peripheral Pulses: +2 palpable, equal bilaterally       Labs:   Recent Labs     09/17/20 0440 09/17/20 2013 09/18/20 0426 09/19/20  0312   WBC 14.7*  --  14.0* 15.1*   HGB 9.6* 9.1* 8.4* 8.2*   HCT 30.7* 28.9* 26.9* 26.3*   *  --  133* 164     Recent Labs     09/17/20 0440 09/18/20 0426 09/19/20  0312    138 138   K 3.9 3.8 3.5   CL 99 102 96*   CO2 25 26 29   BUN 75* 49* 57*   CREATININE 3.2* 2.4* 3.1*   CALCIUM 8.5 8.3 8.6   PHOS 5.5* 3.6 4.1     Recent Labs     09/17/20 0440 09/18/20 0426 09/19/20  0312   * 73* 51*   * 258* 200*   BILIDIR 0.7* 0.7* 0.9*   BILITOT 0.9 0.9 1.3*   ALKPHOS 124 112 111     Recent Labs     09/17/20  1510 09/17/20 2013 09/18/20  0020   INR 1.13 1.17* 1.23*     No results for input(s): Emerald Faulkner in the last 72 hours. Urinalysis:      Lab Results   Component Value Date    NITRU Negative 09/10/2020    WBCUA 3-5 09/10/2020    BACTERIA 2+ 09/10/2020    RBCUA 21-50 09/10/2020    BLOODU MODERATE 09/10/2020    SPECGRAV >1.030 09/10/2020    GLUCOSEU Negative 09/10/2020       Radiology:  VL DUP LOWER EXTREMITY ARTERIES RIGHT   Final Result      XR CHEST PORTABLE   Final Result   Suspect mild congestive failure with pleural effusion questioned greater on   the left. Endotracheal tube is approximately 3 cm above the rahat         MRI BRAIN WO CONTRAST   Final Result   Addendum 1 of 1   ADDENDUM:   Results were reported to GOLDIE Joy at 3:26 p.m. on September 15,    2020. Final      XR CHEST PORTABLE   Final Result   Decreased layering effusions with improved basilar aeration. Appropriate   life support device positioning. XR CHEST PORTABLE   Final Result   Low-lying endotracheal tube. Recommend retracting 1-2 cm.       No substantial change in layering effusions and basilar opacities. The findings were sent to the Radiology Results [address] at 3:53   am on 9/14/2020to be communicated to a licensed caregiver. XR CHEST PORTABLE   Final Result   1. Interval placement of an NG tube, likely within the stomach. 2. Stable appropriate positions of endotracheal tube and right-sided dialysis   catheter. 3. No significant change in appearance of small to moderate bilateral pleural   effusions and bibasilar airspace disease. XR CHEST PORTABLE   Final Result   Supportive lines project in stable positions. Bibasilar opacities, suggestive of layered effusions and atelectasis or   airspace disease. XR CHEST PORTABLE   Final Result   1. Suspected mild to moderate bilateral pleural effusions with adjacent   bibasilar atelectasis and/or airspace disease. 2. Mild to moderate cardiomegaly. 3. Recommend retraction of endotracheal tube 0.7 cm. XR CHEST PORTABLE   Final Result   Right jugular venous catheter and endotracheal tube project in normal   positions. No pneumothorax. Bilateral airspace disease and possible layered pleural effusions, consistent   with pulmonary edema and atelectasis. Pneumonia is a differential   possibility. XR CHEST PORTABLE   Final Result   Satisfactory position of the endotracheal tube 3 cm above the rahat. The   heart is upper normal size and vessels are borderline congested. VL Extremity Venous Bilateral   Final Result      VL DUP LOWER EXTREMITY ARTERIES BILATERAL   Final Result      XR CHEST PORTABLE   Final Result   Endotracheal tube has tip approximately 1.3 cm proximal to the rahat and   should be retracted approximately 2 cm. Findings are again suggestive of pulmonary edema with small pleural   effusions. Pneumonia is not excluded in the appropriate clinical setting. Findings were called by the radiology call center.          CT HEAD WO CONTRAST   Final Result   Motion limited study. No convincing evidence for acute intracranial   abnormality identified within the limitations of motion artifact. If there is persistent clinical suspicion for intracranial abnormality,   repeat study should be considered. XR CHEST PORTABLE   Final Result   Cardiomegaly, pulmonary vascular congestion, and bilateral pleural effusions. Endotracheal tube terminates approximately 2 cm above the rahat. CT CHEST PULMONARY EMBOLISM W CONTRAST   Final Result   Addendum 1 of 1   ADDENDUM:   Findings were discussed with Dr. Renetta Ryan at 10:49 pm on 9/10/2020.          Final            Assessment/Plan:    Active Hospital Problems    Diagnosis    Cardiogenic shock ([address] [R57.0]    Staphylococcal pneumonia ([address] [J15.20]    Pleural effusion, bilateral [J90]    Elevated lactic acid level [R79.89]    Bacteremia due to methicillin susceptible Staphylococcus aureus (MSSA) [R78.81]    Pneumonia due to methicillin susceptible Staphylococcus aureus (MSSA) (HCC) [J15.211]    Anemia [D64.9]    Acute renal failure (HCC) [N17.9]    Bilateral pleural effusion [J90]    Mitral valve insufficiency [I34.0]    Tricuspid valve insufficiency [I07.1]    Class 2 obesity due to excess calories with body mass index (BMI) of 39.0 to 39.9 in adult [E66.09, Z68.39]    Pneumonia due to organism [J18.9]    Bacteremia [R78.81]    Cardiac arrest ([address] [I46.9]    Seizures ([address] [R56.9]    Acute respiratory failure with hypoxia ([address] [J96.01]    Acute kidney injury ([address] [U76.2]    Metabolic acidosis [O24.0]    Congestive heart failure (HCC) [I50.9]    Pulmonary hypertension (HCC) [I27.20]    Multifocal pneumonia [J18.9]    Septic shock ([address] [A41.9, R65.21]    Elevated LFTs [R94.5]    Thrombocytopenia ([address] [D69.6]    Coagulopathy ([address] [D68.9]    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) ([address] [D65]    Acute encephalopathy [G93.40]   

## 2020-09-19 NOTE — PROGRESS NOTES
Clinical Pharmacy Note  Vancomycin Consult    Agnieszka Larios is a 53 y.o. female ordered Vancomycin for MRSA bacteremia; consult received from Dr. Jena Choudhury to manage therapy.      Patient Active Problem List   Diagnosis    Factor V Leiden mutation [facility]    Pulmonary embolus (HCC)    Lactic acidosis    CAP (community acquired pneumonia)    Acute CHF (congestive heart failure) ([facility]    Bilateral pulmonary embolism (HCC)    Cardiac arrest ([facility]    Seizures ([facility]    Acute respiratory failure with hypoxia (HCC)    Acute kidney injury ([facility]    Metabolic acidosis    Congestive heart failure (HCC)    Pulmonary hypertension (HCC)    Multifocal pneumonia    Septic shock (HCC)    Elevated LFTs    Thrombocytopenia (HCC)    Coagulopathy (HCC)    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)    Acute encephalopathy    Pneumonia due to organism    Bacteremia    Pleural effusion, bilateral    Elevated lactic acid level    Bacteremia due to methicillin susceptible Staphylococcus aureus (MSSA)    Pneumonia due to methicillin susceptible Staphylococcus aureus (MSSA) (HCC)    Anemia    Acute renal failure (HCC)    Bilateral pleural effusion    Mitral valve insufficiency    Tricuspid valve insufficiency    Class 2 obesity due to excess calories with body mass index (BMI) of 39.0 to 39.9 in adult    Cardiogenic shock (HCC)    Staphylococcal pneumonia (HCC)       Allergies:  Ibuprofen     Temp max:  Temp (24hrs), Av.3 °F (36.8 °C), Min:97 °F (36.1 °C), Max:99.7 °F (37.6 °C)      Recent Labs     20  0440 20  0426 20  0312   WBC 14.7* 14.0* 15.1*       Recent Labs     20  0440 20  0426 20  0312   BUN 75* 49* 57*   CREATININE 3.2* 2.4* 3.1*         Intake/Output Summary (Last 24 hours) at 2020 0834  Last data filed at 2020 0646  Gross per 24 hour   Intake 1046 ml   Output 4625 ml   Net -3579 ml       Culture Results:  09/10/20 Blood: MRSA

## 2020-09-19 NOTE — PROGRESS NOTES
NEPHROLOGY PROGRESS NOTE    Patient: Rain Cote MRN: [id number]     YOB: 1970  Age: 53 y.o. Sex: female    Unit: [address] ICU Room/Bed: O7E-7716/2104-01 Location: [address] [place] [address] 12     Admitting Physician: Mana Bryant    Primary Care Physician: No primary care provider on file. LOS: 9 days       Reason for evaluation:   Milly      SUBJECTIVE:      The patient was seen and examined. Notes and labs reviewed. There were no complications over night. HPI:  Ventilated. On HD  ROS:  In bed. Limited d/t pt factors. [facility]:  medications reviewed. OBJECTIVE:   /74   Pulse 115   Temp 97.5 °F (36.4 °C) (Axillary)   Resp 15   Ht 5' 7\" (1.702 m)   Wt 204 lb 5.9 oz (92.7 kg)   LMP 12/05/2018 (Exact Date)   SpO2 97%   BMI 32.01 kg/m²     Intake and Output:      Intake/Output Summary (Last 24 hours) at 9/19/2020 0747  Last data filed at 9/19/2020 0646  Gross per 24 hour   Intake 1046 ml   Output 4750 ml   Net -3704 ml       Exam:   CONSTITUTIONAL/PSYCHIATRY: intubated, on the vent, on HD. RESPIRATORY:On vent. +BS. CARDIOVASCULAR: Tachy, irregular, no S3. GASTROINTESTINAL: S/NT +BS  EXTREMITIES:  No edema  SKIN: Warm and dry. R IJ Vasc cath in place - accessed for HD. LABS:   RFP:   Recent Labs     09/17/20 0440 09/18/20 0426 09/19/20  0312    138 138   K 3.9 3.8 3.5   CL 99 102 96*   CO2 25 26 29   BUN 75* 49* 57*   CREATININE 3.2* 2.4* 3.1*   CALCIUM 8.5 8.3 8.6   MG 2.40 1.90 1.80   PHOS 5.5* 3.6 4.1   GFRAA 19* 26* 19*       Liver panel:  Recent Labs     09/17/20 0440 09/18/20 0426 09/19/20  0312   * 73* 51*   * 258* 200*       CBC:   Recent Labs     09/17/20 0440 09/17/20 2013 09/18/20  0426 09/19/20  0312   WBC 14.7*  --  14.0* 15.1*   HGB 9.6* 9.1* 8.4* 8.2*   HCT 30.7* 28.9* 26.9* 26.3*   MCV 84.3  --  84.3 83.8   *  --  133* 164         ASSESSMENT and PLAN:     1. MILLY  - likely secondary to ischemia ATI.  She had additional exposure to radiocontrast   - ZENON is neg, ANCA neg, C3 is low at 54  - Initiated on IHD on 9/11/20. Transitioned to CRRT on 9/12/20 which was stopped 9/15/20. Restarted HD 09/17/20   started IV Lasix , urine output is great - however Cr rebounding still.  - HD TTS. On HD. 2. Hyperkalemia/metabolic acidosis: resolved    3. S/P PEA arrest 9/11/20    4. Acute hypoxic respiratory failure:  - per pulmonary    5. MRSA bacteremia:   - on Vancomycin with pharmacy dosing   - LEVY shows no vegetation    6. CSDHF  - wt down significantly with CRRT  - now non-oliguric with Lasix     7. Factor V leiden deficiency with PE/coagulopathy/DIC:   - per hematology    8. CVA  - MRI shows bilateral acute sub acute infarcts out  - neurology following  - EEG apparently better with higher keppra dose    9. atrial fibrillation  - on cardizem gtt and amiodarone gtt  - not rate controlled quite yet    critical care:  30+ min. Discussed with Dr. Oral Noyola.

## 2020-09-20 LAB
ALBUMIN SERPL-MCNC: 3 G/DL (ref 3.4–5)
ALP BLD-CCNC: 111 U/L (ref 40–129)
ALT SERPL-CCNC: 147 U/L (ref 10–40)
ANION GAP SERPL CALCULATED.3IONS-SCNC: 15 MMOL/L (ref 3–16)
AST SERPL-CCNC: 43 U/L (ref 15–37)
BILIRUB SERPL-MCNC: 1.5 MG/DL (ref 0–1)
BILIRUBIN DIRECT: 0.9 MG/DL (ref 0–0.3)
BILIRUBIN, INDIRECT: 0.6 MG/DL (ref 0–1)
BUN BLDV-MCNC: 24 MG/DL (ref 7–20)
CALCIUM SERPL-MCNC: 8.6 MG/DL (ref 8.3–10.6)
CHLORIDE BLD-SCNC: 93 MMOL/L (ref 99–110)
CO2: 28 MMOL/L (ref 21–32)
CREAT SERPL-MCNC: 2.3 MG/DL (ref 0.6–1.1)
FIBRINOGEN: 420 MG/DL (ref 200–397)
GFR AFRICAN AMERICAN: 27
GFR NON-AFRICAN AMERICAN: 23
GLUCOSE BLD-MCNC: 118 MG/DL (ref 70–99)
GLUCOSE BLD-MCNC: 120 MG/DL (ref 70–99)
GLUCOSE BLD-MCNC: 121 MG/DL (ref 70–99)
GLUCOSE BLD-MCNC: 129 MG/DL (ref 70–99)
GLUCOSE BLD-MCNC: 130 MG/DL (ref 70–99)
GLUCOSE BLD-MCNC: 152 MG/DL (ref 70–99)
HAPTOGLOBIN: 110 MG/DL (ref 30–200)
HCT VFR BLD CALC: 26 % (ref 36–48)
HEMOGLOBIN: 8.2 G/DL (ref 12–16)
MAGNESIUM: 1.6 MG/DL (ref 1.8–2.4)
MCH RBC QN AUTO: 26.5 PG (ref 26–34)
MCHC RBC AUTO-ENTMCNC: 31.4 G/DL (ref 31–36)
MCV RBC AUTO: 84.4 FL (ref 80–100)
PDW BLD-RTO: 19.6 % (ref 12.4–15.4)
PERFORMED ON: ABNORMAL
PHOSPHORUS: 3.8 MG/DL (ref 2.5–4.9)
PLATELET # BLD: 134 K/UL (ref 135–450)
PMV BLD AUTO: 9.9 FL (ref 5–10.5)
POTASSIUM SERPL-SCNC: 3.4 MMOL/L (ref 3.5–5.1)
RBC # BLD: 3.09 M/UL (ref 4–5.2)
SODIUM BLD-SCNC: 136 MMOL/L (ref 136–145)
TOTAL PROTEIN: 6 G/DL (ref 6.4–8.2)
VANCOMYCIN RANDOM: 23 UG/ML
WBC # BLD: 11.3 K/UL (ref 4–11)

## 2020-09-20 PROCEDURE — 80202 ASSAY OF VANCOMYCIN: CPT

## 2020-09-20 PROCEDURE — 85384 FIBRINOGEN ACTIVITY: CPT

## 2020-09-20 PROCEDURE — 97162 PT EVAL MOD COMPLEX 30 MIN: CPT

## 2020-09-20 PROCEDURE — 97530 THERAPEUTIC ACTIVITIES: CPT

## 2020-09-20 PROCEDURE — 2580000003 HC RX 258: Performed by: INTERNAL MEDICINE

## 2020-09-20 PROCEDURE — 6360000002 HC RX W HCPCS: Performed by: NURSE PRACTITIONER

## 2020-09-20 PROCEDURE — 6360000002 HC RX W HCPCS: Performed by: INTERNAL MEDICINE

## 2020-09-20 PROCEDURE — 80048 BASIC METABOLIC PNL TOTAL CA: CPT

## 2020-09-20 PROCEDURE — 2500000003 HC RX 250 WO HCPCS: Performed by: INTERNAL MEDICINE

## 2020-09-20 PROCEDURE — 94761 N-INVAS EAR/PLS OXIMETRY MLT: CPT

## 2020-09-20 PROCEDURE — 2580000003 HC RX 258: Performed by: STUDENT IN AN ORGANIZED HEALTH CARE EDUCATION/TRAINING PROGRAM

## 2020-09-20 PROCEDURE — C9113 INJ PANTOPRAZOLE SODIUM, VIA: HCPCS | Performed by: INTERNAL MEDICINE

## 2020-09-20 PROCEDURE — 6370000000 HC RX 637 (ALT 250 FOR IP): Performed by: NURSE PRACTITIONER

## 2020-09-20 PROCEDURE — 2580000003 HC RX 258: Performed by: NURSE PRACTITIONER

## 2020-09-20 PROCEDURE — 99233 SBSQ HOSP IP/OBS HIGH 50: CPT | Performed by: INTERNAL MEDICINE

## 2020-09-20 PROCEDURE — 83735 ASSAY OF MAGNESIUM: CPT

## 2020-09-20 PROCEDURE — 85027 COMPLETE CBC AUTOMATED: CPT

## 2020-09-20 PROCEDURE — 2000000000 HC ICU R&B

## 2020-09-20 PROCEDURE — 97110 THERAPEUTIC EXERCISES: CPT

## 2020-09-20 PROCEDURE — 84100 ASSAY OF PHOSPHORUS: CPT

## 2020-09-20 PROCEDURE — 80076 HEPATIC FUNCTION PANEL: CPT

## 2020-09-20 PROCEDURE — 2700000000 HC OXYGEN THERAPY PER DAY

## 2020-09-20 PROCEDURE — 83010 ASSAY OF HAPTOGLOBIN QUANT: CPT

## 2020-09-20 PROCEDURE — C9254 INJECTION, LACOSAMIDE: HCPCS | Performed by: NURSE PRACTITIONER

## 2020-09-20 RX ORDER — POTASSIUM CHLORIDE 29.8 MG/ML
60 INJECTION INTRAVENOUS ONCE
Status: DISCONTINUED | OUTPATIENT
Start: 2020-09-20 | End: 2020-09-20

## 2020-09-20 RX ORDER — METOPROLOL TARTRATE 5 MG/5ML
5 INJECTION INTRAVENOUS EVERY 4 HOURS PRN
Status: DISCONTINUED | OUTPATIENT
Start: 2020-09-20 | End: 2020-09-26

## 2020-09-20 RX ORDER — DIGOXIN 0.25 MG/ML
500 INJECTION INTRAMUSCULAR; INTRAVENOUS ONCE
Status: COMPLETED | OUTPATIENT
Start: 2020-09-20 | End: 2020-09-20

## 2020-09-20 RX ORDER — MAGNESIUM SULFATE IN WATER 40 MG/ML
4 INJECTION, SOLUTION INTRAVENOUS ONCE
Status: COMPLETED | OUTPATIENT
Start: 2020-09-20 | End: 2020-09-20

## 2020-09-20 RX ORDER — POTASSIUM CHLORIDE 29.8 MG/ML
20 INJECTION INTRAVENOUS
Status: COMPLETED | OUTPATIENT
Start: 2020-09-20 | End: 2020-09-20

## 2020-09-20 RX ADMIN — PIPERACILLIN AND TAZOBACTAM 2.25 G: 2; .25 INJECTION, POWDER, LYOPHILIZED, FOR SOLUTION INTRAVENOUS at 00:53

## 2020-09-20 RX ADMIN — POTASSIUM CHLORIDE 20 MEQ: 29.8 INJECTION, SOLUTION INTRAVENOUS at 17:36

## 2020-09-20 RX ADMIN — HEPARIN SODIUM 6.5 ML/HR: 10000 INJECTION, SOLUTION INTRAVENOUS at 18:35

## 2020-09-20 RX ADMIN — LORAZEPAM 2 MG: 2 INJECTION INTRAMUSCULAR; INTRAVENOUS at 07:56

## 2020-09-20 RX ADMIN — PANTOPRAZOLE SODIUM 40 MG: 40 INJECTION, POWDER, FOR SOLUTION INTRAVENOUS at 10:34

## 2020-09-20 RX ADMIN — PIPERACILLIN AND TAZOBACTAM 2.25 G: 2; .25 INJECTION, POWDER, LYOPHILIZED, FOR SOLUTION INTRAVENOUS at 10:34

## 2020-09-20 RX ADMIN — FENTANYL CITRATE 50 MCG: 50 INJECTION, SOLUTION INTRAMUSCULAR; INTRAVENOUS at 02:16

## 2020-09-20 RX ADMIN — AMIODARONE HYDROCHLORIDE 0.5 MG/MIN: 50 INJECTION, SOLUTION INTRAVENOUS at 20:06

## 2020-09-20 RX ADMIN — LEVETIRACETAM 1000 MG: 10 INJECTION INTRAVENOUS at 12:05

## 2020-09-20 RX ADMIN — FUROSEMIDE 80 MG: 10 INJECTION, SOLUTION INTRAMUSCULAR; INTRAVENOUS at 10:34

## 2020-09-20 RX ADMIN — PIPERACILLIN AND TAZOBACTAM 2.25 G: 2; .25 INJECTION, POWDER, LYOPHILIZED, FOR SOLUTION INTRAVENOUS at 18:00

## 2020-09-20 RX ADMIN — DEXTROSE MONOHYDRATE 200 MG: 50 INJECTION, SOLUTION INTRAVENOUS at 21:20

## 2020-09-20 RX ADMIN — MUPIROCIN: 20 OINTMENT TOPICAL at 10:34

## 2020-09-20 RX ADMIN — POTASSIUM CHLORIDE 20 MEQ: 29.8 INJECTION, SOLUTION INTRAVENOUS at 15:49

## 2020-09-20 RX ADMIN — DEXTROSE MONOHYDRATE 200 MG: 50 INJECTION, SOLUTION INTRAVENOUS at 11:23

## 2020-09-20 RX ADMIN — FUROSEMIDE 80 MG: 10 INJECTION, SOLUTION INTRAMUSCULAR; INTRAVENOUS at 17:59

## 2020-09-20 RX ADMIN — MAGNESIUM SULFATE HEPTAHYDRATE 4 G: 40 INJECTION, SOLUTION INTRAVENOUS at 13:19

## 2020-09-20 RX ADMIN — Medication 2 CAPSULE: at 10:34

## 2020-09-20 RX ADMIN — SODIUM CHLORIDE, PRESERVATIVE FREE 10 ML: 5 INJECTION INTRAVENOUS at 10:35

## 2020-09-20 RX ADMIN — LEVETIRACETAM 1000 MG: 10 INJECTION INTRAVENOUS at 23:03

## 2020-09-20 RX ADMIN — Medication 10 ML: at 10:34

## 2020-09-20 RX ADMIN — POTASSIUM CHLORIDE 20 MEQ: 29.8 INJECTION, SOLUTION INTRAVENOUS at 12:50

## 2020-09-20 RX ADMIN — SODIUM CHLORIDE, PRESERVATIVE FREE 10 ML: 5 INJECTION INTRAVENOUS at 21:20

## 2020-09-20 RX ADMIN — DILTIAZEM HYDROCHLORIDE 10 MG/HR: 5 INJECTION INTRAVENOUS at 13:22

## 2020-09-20 RX ADMIN — AMIODARONE HYDROCHLORIDE 0.5 MG/MIN: 50 INJECTION, SOLUTION INTRAVENOUS at 04:34

## 2020-09-20 RX ADMIN — Medication 2 CAPSULE: at 18:00

## 2020-09-20 RX ADMIN — MUPIROCIN: 20 OINTMENT TOPICAL at 21:20

## 2020-09-20 RX ADMIN — DIGOXIN 500 MCG: 0.25 INJECTION INTRAMUSCULAR; INTRAVENOUS at 12:50

## 2020-09-20 RX ADMIN — DILTIAZEM HYDROCHLORIDE 15 MG/HR: 5 INJECTION INTRAVENOUS at 04:49

## 2020-09-20 ASSESSMENT — PAIN SCALES - GENERAL
PAINLEVEL_OUTOF10: 0
PAINLEVEL_OUTOF10: 6
PAINLEVEL_OUTOF10: 0
PAINLEVEL_OUTOF10: 6

## 2020-09-20 NOTE — PROGRESS NOTES
Hospitalist Progress Note      PCP: No primary care provider on file. Date of Admission: 9/10/2020    Chief Complaint:   Chief Complaint   Patient presents with    Shortness of Breath     HX of asthma. Short of breath for around 1 month. Patient states she just refilled her inhaler today. Hospital Course:  53 y.o. female with past medical history of factor V Leiden deficiency on chronic warfarin, history of blood clots, hyperlipidemia, arthritis, asthma who presents to [facility] with worsening above symptoms of fatigue, intermittent shortness of breath both at rest and on exertion, and extensive of abdominal and leg swelling. In the ED patient was significantly tachycardic, showing some combination of sinus tachycardia versus SVT, uncertain if may be underlying atrial fibrillation with rapid ventricular rate. Labs showed signs of infection as well as elevated proBNP. CTA with contrast was done on the chest and demonstrated left and right lobe PEs in certain segments, active findings suggestive of early patchy pneumonia bilaterally, and moderate bilateral pleural effusions suggestive of heart failure/volume overload. PEA arrest shortly after admission. TTE with EF 30- 35%. Severe diffuse hypokinesis with severely reduced rt ventricular function, severe tricuspid regurgitation and mitral regurgitation, grade 3 diastolic dysfunction. Found to have MRSA PNA and bacteremia. Developed shock liver and coagulopathy which are improving. Concern for seizure like activity, initial EEG negative but repeat with possible status, added vimpat to keppra and repeat EEG improved. Acute to subacute infarcts on MRI. Developed a fib RVR 9/16 that has not responded to cardioversion (x5), on amio and dilt IV. Neurologic status improved and she was extubated on 9/19. Subjective:  Extubated yesterday.  Opens eyes and follows some commands, knows her name but still very squirmy in bed, mental status waxes and Oriented to self, moves all extremities, mostly following commands  Psychiatric: Still altered, mental status not back to baseline  Capillary Refill: Brisk,< 3 seconds   Peripheral Pulses: +2 palpable, equal bilaterally       Labs:   Recent Labs     09/18/20 0426 09/19/20 0312 09/20/20  0535   WBC 14.0* 15.1* 11.3*   HGB 8.4* 8.2* 8.2*   HCT 26.9* 26.3* 26.0*   * 164 134*     Recent Labs     09/18/20 0426 09/19/20 0312 09/20/20  0535    138 136   K 3.8 3.5 3.4*    96* 93*   CO2 26 29 28   BUN 49* 57* 24*   CREATININE 2.4* 3.1* 2.3*   CALCIUM 8.3 8.6 8.6   PHOS 3.6 4.1 3.8     Recent Labs     09/18/20 0426 09/19/20 0312 09/20/20  0535   AST 73* 51* 43*   * 200* 147*   BILIDIR 0.7* 0.9* 0.9*   BILITOT 0.9 1.3* 1.5*   ALKPHOS 112 111 111     Recent Labs     09/17/20  1510 09/17/20 2013 09/18/20  0020   INR 1.13 1.17* 1.23*     No results for input(s): Abhisheke Yakut in the last 72 hours. Urinalysis:      Lab Results   Component Value Date    NITRU Negative 09/10/2020    WBCUA 3-5 09/10/2020    BACTERIA 2+ 09/10/2020    RBCUA 21-50 09/10/2020    BLOODU MODERATE 09/10/2020    SPECGRAV >1.030 09/10/2020    GLUCOSEU Negative 09/10/2020       Radiology:  VL DUP LOWER EXTREMITY ARTERIES RIGHT   Final Result      XR CHEST PORTABLE   Final Result   Suspect mild congestive failure with pleural effusion questioned greater on   the left. Endotracheal tube is approximately 3 cm above the rahat         MRI BRAIN WO CONTRAST   Final Result   Addendum 1 of 1   ADDENDUM:   Results were reported to NP Luis F Liner at 3:26 p.m. on September 15,    2020. Final      XR CHEST PORTABLE   Final Result   Decreased layering effusions with improved basilar aeration. Appropriate   life support device positioning. XR CHEST PORTABLE   Final Result   Low-lying endotracheal tube. Recommend retracting 1-2 cm. No substantial change in layering effusions and basilar opacities.       The findings were sent to the Radiology Results [address] at 3:53   am on 9/14/2020to be communicated to a licensed caregiver. XR CHEST PORTABLE   Final Result   1. Interval placement of an NG tube, likely within the stomach. 2. Stable appropriate positions of endotracheal tube and right-sided dialysis   catheter. 3. No significant change in appearance of small to moderate bilateral pleural   effusions and bibasilar airspace disease. XR CHEST PORTABLE   Final Result   Supportive lines project in stable positions. Bibasilar opacities, suggestive of layered effusions and atelectasis or   airspace disease. XR CHEST PORTABLE   Final Result   1. Suspected mild to moderate bilateral pleural effusions with adjacent   bibasilar atelectasis and/or airspace disease. 2. Mild to moderate cardiomegaly. 3. Recommend retraction of endotracheal tube 0.7 cm. XR CHEST PORTABLE   Final Result   Right jugular venous catheter and endotracheal tube project in normal   positions. No pneumothorax. Bilateral airspace disease and possible layered pleural effusions, consistent   with pulmonary edema and atelectasis. Pneumonia is a differential   possibility. XR CHEST PORTABLE   Final Result   Satisfactory position of the endotracheal tube 3 cm above the rahat. The   heart is upper normal size and vessels are borderline congested. VL Extremity Venous Bilateral   Final Result      VL DUP LOWER EXTREMITY ARTERIES BILATERAL   Final Result      XR CHEST PORTABLE   Final Result   Endotracheal tube has tip approximately 1.3 cm proximal to the rahat and   should be retracted approximately 2 cm. Findings are again suggestive of pulmonary edema with small pleural   effusions. Pneumonia is not excluded in the appropriate clinical setting. Findings were called by the radiology call center. CT HEAD WO CONTRAST   Final Result   Motion limited study.   No convincing evidence for acute intracranial   abnormality identified within the limitations of motion artifact. If there is persistent clinical suspicion for intracranial abnormality,   repeat study should be considered. XR CHEST PORTABLE   Final Result   Cardiomegaly, pulmonary vascular congestion, and bilateral pleural effusions. Endotracheal tube terminates approximately 2 cm above the rahat. CT CHEST PULMONARY EMBOLISM W CONTRAST   Final Result   Addendum 1 of 1   ADDENDUM:   Findings were discussed with Dr. Deandra Hernandez at 10:49 pm on 9/10/2020.          Final            Assessment/Plan:    Active Hospital Problems    Diagnosis    Cardiogenic shock ([address] [R57.0]    Staphylococcal pneumonia ([address] [J15.20]    Pleural effusion, bilateral [J90]    Elevated lactic acid level [R79.89]    Bacteremia due to methicillin susceptible Staphylococcus aureus (MSSA) [R78.81]    Pneumonia due to methicillin susceptible Staphylococcus aureus (MSSA) (HCC) [J15.211]    Anemia [D64.9]    Acute renal failure (HCC) [N17.9]    Bilateral pleural effusion [J90]    Mitral valve insufficiency [I34.0]    Tricuspid valve insufficiency [I07.1]    Class 2 obesity due to excess calories with body mass index (BMI) of 39.0 to 39.9 in adult [E66.09, Z68.39]    Pneumonia due to organism [J18.9]    Bacteremia [R78.81]    Cardiac arrest ([address] [I46.9]    Seizures ([address] [R56.9]    Acute respiratory failure with hypoxia ([address] [J96.01]    Acute kidney injury ([address] [Y48.5]    Metabolic acidosis [K89.5]    Congestive heart failure (HCC) [I50.9]    Pulmonary hypertension (HCC) [I27.20]    Multifocal pneumonia [J18.9]    Septic shock ([address] [A41.9, R65.21]    Elevated LFTs [R94.5]    Thrombocytopenia ([address] [D69.6]    Coagulopathy ([address] [D68.9]    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) ([address] [D65]    Acute encephalopathy [G93.40]    Pulmonary embolus (HCC) [I26.99]    Lactic acidosis [E87.2]    CAP (community acquired pneumonia) [J18.9]    Acute CHF (congestive heart failure) ([facility]) [I50.9]    Bilateral pulmonary embolism ([facility]) [I26.99]    Factor V Leiden mutation ([facility] [D68.51]     Shock, septic vs cardiogenic  Severe mitral and tricuspid regurgitation on TTE, moderate pulmonary HTN, large bilateral pleural effusions and hepatic congestion on CT. MRSA bacteremia. Off pressors  - ID consulted  - cardiology following    MRSA bacteremia, PNA  Lab clarifying susceptibilities. mecA positive  - ID following, continue zosyn, vanc, no cefepime or merrem with concern for seizures  - TTE, LEVY without vegetations though concerning for endocarditis given embolic CVA    Acute CVA  MRI with scattered acute to subacute infarcts, likely embolic  - neuro following  - workup for endocarditis as above, LEVY negative    Cardiomyopathy, acute on chronic systolic HF  EF 30- 89%. Severe diffuse hypokinesis with severely reduced rt ventricular function, severe tricuspid regurgitation and mitral regurgitation, grade 3 diastolic dysfunction. No prior TTE.   - cardiology following  - RHC/LHC when able    S/p PEA arrest, acute hypoxic resp failure  Intubated 9/10 shortly after admission.   - vent management per CC  -Mental status improved enough for SBP 9/19-- extubated 9/19 to 2L    Seizure like activity  Prior to PEA arrest, episodic rhythmic twitching. Initial EEG negative  - neuro following  - keppra 1000 mg BID  - MRI brain as above  - repeat EEG 9/16 with concern for seizures, possible status  - added vimpat  - repeat EEG 9/17 improved with addition of vimpat, discussed with neuro and can defer cvEEG for now  -Neurologic status significantly improved, now following commands, able to extubate though not back to baseline, possible anoxic brain injury, continue to monitor    A fib RVR  Cardioverted 9/16, started on amio drip without adequate control, added dilt.  Multiple attempts to cardiovert all failed  - cardiology following  - continue to monitor  - added digoxin    R groin hematoma  After removing femoral line. Arterial duplex with normal flow, good pulses  - monitor  - off heparin drip    Bilateral PE  Ho DVT/PE, on coumadin chronically  - heparin drip-- currently off with new hematoma    Oliguric/anuric MORELIA  Suboptimal response to Lasix. -CRRT started, switched to HD  -Checking ZENON (neg), ANCA, complement (c3 low), antiphospholipid antibodies  -Nephrology following  - UOP improved    Coagulapathy  Hx Factor V Leiden on coumadin. INR therapeutic at 2.2 on admission though hematology notes non-adherence to coumadin in the past. Has had oozing on heparin drip, sepsis with bacteremia, DIC versus shock liver versus vitamin K deficiency  -Heme-onc following, fibrinogen and INR improving, no need for vit K or cryo now  - DOAC when able    Elevated LFTs  Likely shock liver, improving  - daily labs    Obesity  Body mass index is 31.25 kg/m². Counseled on effects of weight on overall health and chronic medical conditions and discussed weight loss. DVT Prophylaxis: heparin  Diet: DIET TUBE FEED CONTINUOUS/CYCLIC NPO; Renal Formula; Nasogastric; Continuous; 20; 55  Code Status: Full Code    PT/OT Eval Status: deferred    Dispo - pending    Chato Rodriguez MD    This note was transcribed using [id number]. Please disregard any translational errors.

## 2020-09-20 NOTE — PROGRESS NOTES
Physical Therapy    Facility/Department: [facility] 7G ICU  Initial Assessment    NAME: hCuyita Moon  : 1970  MRN: [id number]    Date of Service: 2020    Discharge Recommendations:  Continue to assess pending progress   PT Equipment Recommendations  Other: Will monitor for potential equipt needs. Assessment   Body structures, Functions, Activity limitations: Decreased functional mobility ; Decreased strength;Decreased safe awareness;Decreased cognition;Decreased endurance  Assessment: 54 y/o female admit 2020 with Pneumonia, Pleural Effusion, Tachycardia, Cardiac Arrest.  MRI + Scattered Acute/Subacute Infarct B Cerebral Hemispheres and R Cerebellar Hemisphere; most pronounced L Occipital Lobe, likely embolic. CTA Chest + PE.   -2020 Pt Intubated. PMH as noted including Factor V Leiden, OA. PT initiated although unable to initiate EOB/OOB activities due to current cognitive status. Unclear d/c plan at this time; will need to monitor pt progress. Prognosis: Fair;Good  Decision Making: Medium Complexity  History: 54 y/o female admit 2020 with Pneumonia, Pleural Effusion, Tachycardia, Cardiac Arrest.  MRI + Scattered Acute/Subacute Infarct B Cerebral Hemispheres and R Cerebellar Hemisphere; most pronounced L Occipital Lobe, likely embolic. CTA Chest + PE.   -2020 Pt Intubated. PMH as noted including Factor V Leiden, OA. Exam: See above. Clinical Presentation: See above. Patient Education: Role of PT, POC, Need to call for assist.  Barriers to Learning: Cognitive. REQUIRES PT FOLLOW UP: Yes  Activity Tolerance  Activity Tolerance: Patient limited by cognitive status; Patient limited by endurance       Patient Diagnosis(es): The primary encounter diagnosis was Pneumonia due to organism.  Diagnoses of Tachycardia, Pleural effusion, bilateral, Elevated lactic acid level, Congestive heart failure, unspecified HF chronicity, unspecified heart failure type ([address] and Multiple subsegmental pulmonary emboli without acute cor pulmonale were also pertinent to this visit. has a past medical history of Anticoagulant long-term use, Arthritis, Asthma, Baker's cyst, Depression, Factor V Leiden ([address] Factor V Leiden ([facility] H/O blood clots, Heartburn, Hyperlipidemia, MRSA bacteremia, and Osteoarthritis. has no past surgical history on file. Restrictions  Restrictions/Precautions  Restrictions/Precautions: Fall Risk  Position Activity Restriction  Other position/activity restrictions: O2 2L via NC. Flexiseal.  NG Tube. COVID negative. Vision/Hearing  Vision: Within Functional Limits  Hearing: Within functional limits     Subjective  General  Chart Reviewed: Yes  Patient assessed for rehabilitation services?: Yes  Additional Pertinent Hx: 52 y/o female admit 9/11/2020 with Pneumonia, Pleural Effusion, Tachycardia, Cardiac Arrest.  MRI + Scattered Acute/Subacute Infarct B Cerebral Hemispheres and R Cerebellar Hemisphere; most pronounced L Occipital Lobe, likely embolic. CTA Chest + PE.   9/11-9/19/2020 Pt Intubated. PMH as noted including Factor V Leiden, OA. Family / Caregiver Present: No  Referring Practitioner: Dr. Chandler Simple  Diagnosis: Pneumonia, Pleural Effusion, Tachycardia, Cardiac Arrest.  Other (Comment): Pt follows few simple commands, inconsistent/delayed. Subjective  Subjective: Pt agreeable to PT Eval/Rx. Pain Screening  Patient Currently in Pain: Denies          Orientation  Orientation  Overall Orientation Status: Impaired  Orientation Level: Oriented to person;Disoriented to place; Disoriented to time;Disoriented to situation  Social/Functional History  Social/Functional History  Lives With: Spouse  Type of Home: Apartment  Home Layout: One level  Bathroom Shower/Tub: Tub/Shower unit  Bathroom Toilet: Standard  Bathroom Equipment: (No DME.)  Bathroom Accessibility: Accessible  Home Equipment: (No DME.)  ADL Assistance: Independent  Ambulation Assistance:

## 2020-09-20 NOTE — PROGRESS NOTES
[address]   Daily Progress Note    Admit Date:  9/10/2020    Subjective:   Ms. Leisa Rodriguez is a 53 y.o. female who is extubated. Objective:   /82   Pulse 144   Temp 98.7 °F (37.1 °C) (Axillary)   Resp 24   Ht 5' 7\" (1.702 m)   Wt 199 lb 8.3 oz (90.5 kg)   LMP 12/05/2018 (Exact Date)   SpO2 94%   BMI 31.25 kg/m²    I/O last 3 completed shifts: In: 2120.7 [I.V.:800.7; IV Piggyback:270]  Out: 4330 [Urine:2505; Emesis/NG output:175; Stool:600]  Wt Readings from Last 3 Encounters:   09/20/20 199 lb 8.3 oz (90.5 kg)   12/17/18 229 lb (103.9 kg)   12/14/18 227 lb 1.2 oz (103 kg)       Physical Exam:  General:  Lethargic. Skin:  Warm and dry  Neck:  JVD<8, no carotid bruits. Triple lumen. Chest: BS decreased. Cardiovascular: Irregular tachycardic., normal S1/S2, no M/R/G  Abdomen:  Soft, nontender, +bowel sounds  Extremities:  No edema. Rt groin hematoma stable. Pulses:  Equal     Neurologic: Lethargic.   Medications:    magnesium sulfate  4 g Intravenous Once    potassium chloride  20 mEq Intravenous Q1H    lacosamide (VIMPAT) IVPB  200 mg Intravenous BID    lactobacillus  2 capsule Oral BID WC    mupirocin   Nasal BID    furosemide  80 mg Intravenous BID    piperacillin-tazobactam  2.25 g Intravenous Q8H    sodium chloride flush  10 mL Intravenous 2 times per day    pantoprazole  40 mg Intravenous Daily    And    sodium chloride (PF)  10 mL Intravenous Daily    levetiracetam  1,000 mg Intravenous Q12H    vancomycin (VANCOCIN) intermittent dosing (placeholder)   Other RX Placeholder      amiodarone 450mg/250ml D5W infusion 0.5 mg/min (09/20/20 0434)    dilTIAZem 15 mg/hr (09/20/20 0449)    dextrose      heparin (Porcine) Stopped (09/18/20 0802)       Lab Data:  CBC:   WBC   Date Value Ref Range Status   09/20/2020 11.3 (H) 4.0 - 11.0 K/uL Final   09/19/2020 15.1 (H) 4.0 - 11.0 K/uL Final   09/18/2020 14.0 (H) 4.0 - 11.0 K/uL Final     Hemoglobin   Date Value Ref Range Status 1.14  Therapeutic: 2.0 - 3.0  Pros. Valve: 2.5 - 3.5  AMI: 2.0 - 3.0     09/17/2020 1.17 (H) 0.86 - 1.14 Final     Comment:     Effective 11/19/19 at 09:00am EST    Normal: 0.86 - 1.14  Therapeutic: 2.0 - 3.0  Pros. Valve: 2.5 - 3.5  AMI: 2.0 - 3.0     09/17/2020 1.13 0.86 - 1.14 Final     Comment:     Effective 11/19/19 at 09:00am EST    Normal: 0.86 - 1.14  Therapeutic: 2.0 - 3.0  Pros. Valve: 2.5 - 3.5  AMI: 2.0 - 3.0       APTT:   aPTT   Date Value Ref Range Status   09/18/2020 44.8 (H) 24.2 - 36.2 sec Final     Comment:     REVIEWED BY TECHNOLOGIST. Therapeutic range: 49.0 - 76.0 sec    Effective 11-19-19 9:00am EST  Please note reference ranges have  changed for PTT Testing.     09/17/2020 80.6 (H) 24.2 - 36.2 sec Final     Comment:     Therapeutic range: 49.0 - 76.0 sec    Effective 11-19-19 9:00am EST  Please note reference ranges have  changed for PTT Testing.     09/17/2020 74.0 (H) 24.2 - 36.2 sec Final     Comment:     Therapeutic range: 49.0 - 76.0 sec    Effective 11-19-19 9:00am EST  Please note reference ranges have  changed for PTT Testing.        BNP:  No results found for: BNP  CARDIAC ENZYMES:  Total CK   Date Value Ref Range Status   09/11/2020 449 (H) 26 - 192 U/L Final     Troponin   Date Value Ref Range Status   09/11/2020 0.02 (H) <0.01 ng/mL Final     Comment:     Methodology by Troponin T   09/10/2020 <0.01 <0.01 ng/mL Final     Comment:     Methodology by Troponin T     FASTING LIPID PANEL:  Cholesterol, Total   Date Value Ref Range Status   08/10/2017 154 0 - 199 mg/dL Final     HDL   Date Value Ref Range Status   08/10/2017 52 40 - 60 mg/dL Final   02/02/2012 35 (L) 40 - 60 mg/dl Final     Triglycerides   Date Value Ref Range Status   08/10/2017 79 0 - 150 mg/dL Final     TSH:   Lab Results   Component Value Date    TSH 1.67 02/02/2012          Diagnostics:    EKG: Reviewed    Chest XRay:  Reveiwed    Assessment:  Patient Active Problem List    Diagnosis Date Noted    Cardiogenic shock ([facility]     Staphylococcal pneumonia ([facility]     Pleural effusion, bilateral     Elevated lactic acid level     Bacteremia due to methicillin susceptible Staphylococcus aureus (MSSA)     Pneumonia due to methicillin susceptible Staphylococcus aureus (MSSA) (HCC)     Anemia     Acute renal failure (HCC)     Bilateral pleural effusion     Mitral valve insufficiency     Tricuspid valve insufficiency     Class 2 obesity due to excess calories with body mass index (BMI) of 39.0 to 39.9 in adult     Pneumonia due to organism     Bacteremia     Cardiac arrest ([facility] 09/11/2020    Seizures ([facility] 09/11/2020    Acute respiratory failure with hypoxia ([facility] 09/11/2020    Acute kidney injury ([facility]     Metabolic acidosis     Congestive heart failure (HCC)     Pulmonary hypertension (HCC)     Multifocal pneumonia     Septic shock (HCC)     Elevated LFTs     Thrombocytopenia (HCC)     Coagulopathy (HCC)     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) ([facility]     Acute encephalopathy     Pulmonary embolus ([facility] 09/10/2020    Lactic acidosis 09/10/2020    CAP (community acquired pneumonia) 09/10/2020    Acute CHF (congestive heart failure) ([facility] 09/10/2020    Bilateral pulmonary embolism ([facility] 09/10/2020    Factor V Leiden mutation ([facility] 11/13/2014        Plan:   1. Pt remains in A.fib with RVR. Add DIG IV X 1, Lopressor iv for HR greater than 110. H/H stable. Restart IV Heparin.       Consider repeat CV in am.      Signed:  Electronically signed by Skylar Chester MD on 9/20/2020 at 10:20 AM

## 2020-09-20 NOTE — PLAN OF CARE
Problem: Pain:  Goal: Pain level will decrease  Description: Pain level will decrease  Outcome: Ongoing  Note: Continuing to monitor pain and discomfort. Monitoring pain level on scale of 0-10. Non- pharmacological measures encouraged to reduce discomfort/pain. PRN pain meds administeration continues when/if applicable as ordered by physician. Problem: Falls - Risk of:  Goal: Will remain free from falls  Description: Will remain free from falls  Outcome: Ongoing  Note: Falling star program remains in place. Call light and personal belongings within reach. Frequent visual monitoring continues. Toileting program inplace. Patient assisted in turning/repositioning at least once every 2 hours, and on a prn basis. Problem: Skin Integrity:  Goal: Will show no infection signs and symptoms  Description: Will show no infection signs and symptoms  Outcome: Ongoing  Note: Monitoring patient skin integrity for skin breakdown, turning and repositioning q2h per protocol.       Problem: Nutrition  Goal: Optimal nutrition therapy  Outcome: Ongoing     Problem: OXYGENATION/RESPIRATORY FUNCTION  Goal: Patient will maintain patent airway  Outcome: Ongoing  Note:        Problem: ACTIVITY INTOLERANCE/IMPAIRED MOBILITY  Goal: Mobility/activity is maintained at optimum level for patient  Outcome: Ongoing     Problem: OXYGENATION/RESPIRATORY FUNCTION  Goal: Patient will achieve/maintain normal respiratory rate/effort  Description: Respiratory rate and effort will be within normal limits for the patient  Outcome: Ongoing     Problem: HEMODYNAMIC STATUS  Goal: Patient has stable vital signs and fluid balance  Outcome: Ongoing

## 2020-09-20 NOTE — PROGRESS NOTES
Clinical Pharmacy Note  Heparin Dosing       Lab Results   Component Value Date    APTT 44.8 09/18/2020     Lab Results   Component Value Date    HGB 8.2 09/20/2020    HCT 26.0 09/20/2020     09/20/2020    INR 1.23 09/18/2020       Current Infusion Rate: 6.5 mL/hr    Plan:   Heparin restarted at 6.5 ml/h   At 17:53  Bolus: 000 units  Rate: 6.5 mL/hr  Next aPTT: 0000    Pharmacy will continue to monitor and adjust based on aPTT results.

## 2020-09-20 NOTE — PROGRESS NOTES
RN called to pt's mother and updated. All questions answered.    Electronically signed by Rey Muro RN on 9/19/2020 at 10:17 PM

## 2020-09-20 NOTE — PROGRESS NOTES
Pulmonary Progress Note    CC:  Follow up cardiac arrest, Pulmonary emboli, Shock    Subjective:  Extubated yesterday  On 2 liters of oxygen  Heart rate still uncontrolled   Following commands      Intake/Output Summary (Last 24 hours) at 9/20/2020 0752  Last data filed at 9/20/2020 0608  Gross per 24 hour   Intake 2120.7 ml   Output 4300 ml   Net -2179.3 ml         PHYSICAL EXAM:  Blood pressure (!) 173/104, pulse 134, temperature 98.1 °F (36.7 °C), temperature source Axillary, resp. rate 28, height 5' 7\" (1.702 m), weight 199 lb 8.3 oz (90.5 kg), last menstrual period 12/05/2018, SpO2 93 %.'  Gen: Awake, impulsive   Eyes: Wandering eye movements   ENT: No discharge. Pharynx with NG  Neck: Trachea midline. No obvious mass. Resp: Diminished   CV: Tachycardic, irregular. No murmur or rub. GI: Non-tender. Non-distended. No hernia. Skin: Right leg hematoma seems to be stable   Lymph: No cervical LAD. No supraclavicular LAD. M/S: No cyanosis. No clubbing. No joint deformity.     Neuro: Moving extremities, impulsive, follows simples commands  Ext:   Less edema     Medications:    Scheduled Meds:   lacosamide (VIMPAT) IVPB  200 mg Intravenous BID    lactobacillus  2 capsule Oral BID WC    mupirocin   Nasal BID    furosemide  80 mg Intravenous BID    piperacillin-tazobactam  2.25 g Intravenous Q8H    sodium chloride flush  10 mL Intravenous 2 times per day    pantoprazole  40 mg Intravenous Daily    And    sodium chloride (PF)  10 mL Intravenous Daily    levetiracetam  1,000 mg Intravenous Q12H    vancomycin (VANCOCIN) intermittent dosing (placeholder)   Other RX Placeholder       Continuous Infusions:   amiodarone 450mg/250ml D5W infusion 0.5 mg/min (09/20/20 0434)    dilTIAZem 15 mg/hr (09/20/20 0449)    dextrose      [Held by provider] heparin (Porcine) Stopped (09/18/20 0802)       PRN Meds:  heparin (porcine), ondansetron, promethazine, fentanNYL, LORazepam, sodium chloride flush, acetaminophen **OR** acetaminophen, glucose, dextrose, glucagon (rDNA), dextrose    Labs:  CBC:   Recent Labs     20  0535   WBC 14.0* 15.1* 11.3*   HGB 8.4* 8.2* 8.2*   HCT 26.9* 26.3* 26.0*   MCV 84.3 83.8 84.4   * 164 134*     BMP:   Recent Labs     20  0535    138 136   K 3.8 3.5 3.4*    96* 93*   CO2 26 29 28   PHOS 3.6 4.1 3.8   BUN 49* 57* 24*   CREATININE 2.4* 3.1* 2.3*     LIVER PROFILE:   Recent Labs     20  0535   AST 73* 51* 43*   * 200* 147*   BILIDIR 0.7* 0.9* 0.9*   BILITOT 0.9 1.3* 1.5*   ALKPHOS 112 111 111     PT/INR:   Recent Labs     20  15120  0020   PROTIME 13.1 13.6* 14.3*   INR 1.13 1.17* 1.23*     APTT:   Recent Labs     20  1510 20  0426   APTT 74.0* 80.6* 44.8*     UA:No results for input(s): NITRITE, COLORU, PHUR, LABCAST, WBCUA, RBCUA, MUCUS, TRICHOMONAS, YEAST, BACTERIA, CLARITYU, SPECGRAV, LEUKOCYTESUR, UROBILINOGEN, BILIRUBINUR, BLOODU, GLUCOSEU, AMORPHOUS in the last 72 hours. Invalid input(s): Prachi Ivanoff  No results for input(s): PH, PCO2, PO2 in the last 72 hours. Films:  Chest imaging reports were reviewed and imaging was reviewed by me and showed ETT, Vascath and NG in place. Bilateral pleural effusions remain    AB.45/44/116    Cultures:  Sputum:  MSSA  Blood:  MRSA     I reviewed the labs and images listed above    Assessment:   · Cardiac Arrest:  PEA; extubated on   · Cardiogenic Shock, improved  · Metabolic Encephalopathy. Persistent.   Seizures and organic brain injury likely contributing   · Bilateral Pulmonary Emboli  · Staph Pneumonia   · MRSA bacteremia   · MORELIA with RRT   · Bilateral Pleural Effusions   · Right leg hematoma  · Afib with RVR, failed cardioversion last week  · Factor V Leiden       Plan:  Titrate oxygen for saturations greater than or equal to 90%  · Antibiotics per ID  · GI/DVT prophylaxis:  Protonix, SCDS. .  Protonix as substitute for home PPI   · Resume heparin gtt today. · Amiodarone and Diltiazem infusions. Defer to cardiology regarding transitioning   · Resume enteral feeding   · Glycemic control   · RRT Per Nephrology. Recommend fluid removal with next session due to effusions   · AEDs per Neurology. · PT/OT if able.   Will at least try     D/W Dr. Gray Randall,   [facility]

## 2020-09-20 NOTE — PROGRESS NOTES
NEPHROLOGY PROGRESS NOTE    Patient: Madyson Franz MRN: [id number]     YOB: 1970  Age: 53 y.o. Sex: female    Unit: [address] 2W ICU Room/Bed: [id number]/2104-01 Location: [address] [place] [address] 12     Admitting Physician: Kera Vogt    Primary Care Physician: No primary care provider on file. LOS: 10 days       Reason for evaluation:   Milly      SUBJECTIVE:      The patient was seen and examined. Notes and labs reviewed. There were no complications over night. HPI:  Extubated. Tolerated HD yesterday. ROS:  In bed. Limited d/t pt factors. [address]:  medications reviewed. OBJECTIVE:   BP (!) 173/104   Pulse 134   Temp 98.1 °F (36.7 °C) (Axillary)   Resp 28   Ht 5' 7\" (1.702 m)   Wt 199 lb 8.3 oz (90.5 kg)   LMP 12/05/2018 (Exact Date)   SpO2 93%   BMI 31.25 kg/m²     Intake and Output:      Intake/Output Summary (Last 24 hours) at 9/20/2020 0742  Last data filed at 9/20/2020 0608  Gross per 24 hour   Intake 2120.7 ml   Output 4330 ml   Net -2209.3 ml       Exam:   CONSTITUTIONAL/PSYCHIATRY: Resting in bed, in ICU. RESPIRATORY:Extubed. +bilateral BS  CARDIOVASCULAR: Tachy, irregular, no S3. GASTROINTESTINAL: S/NT +BS  EXTREMITIES:  No edema  SKIN: Warm and dry. R IJ Vasc cath in place. LABS:   RFP:   Recent Labs     09/18/20 0426 09/19/20 0312 09/20/20  0535    138 136   K 3.8 3.5 3.4*    96* 93*   CO2 26 29 28   BUN 49* 57* 24*   CREATININE 2.4* 3.1* 2.3*   CALCIUM 8.3 8.6 8.6   MG 1.90 1.80 1.60*   PHOS 3.6 4.1 3.8   GFRAA 26* 19* 27*       Liver panel:  Recent Labs     09/18/20 0426 09/19/20 0312 09/20/20  0535   AST 73* 51* 43*   * 200* 147*       CBC:   Recent Labs     09/18/20 0426 09/19/20 0312 09/20/20  0535   WBC 14.0* 15.1* 11.3*   HGB 8.4* 8.2* 8.2*   HCT 26.9* 26.3* 26.0*   MCV 84.3 83.8 84.4   * 164 134*         ASSESSMENT and PLAN:     1. MILLY  - likely secondary to ischemia ATI.  She had additional exposure to radiocontrast   - ZENON is neg, ANCA neg, C3 is low at 54  - Initiated on IHD on 9/11/20. Transitioned to CRRT on 9/12/20 which was stopped 9/15/20. Restarted HD 09/17/20   on IV Lasix , urine output is great - however Cr rebounding still.  - HD TTS. 2. Hyperkalemia/metabolic acidosis: resolved    3. S/P PEA arrest 9/11/20    4. Acute hypoxic respiratory failure:  - per pulmonary  - Extubated 9/19.    5. MRSA bacteremia:   - on Vancomycin with pharmacy dosing   - LEVY shows no vegetation    6. CSDHF  - wt down significantly with CRRT  - now non-oliguric with Lasix     7. Factor V leiden deficiency with PE/coagulopathy/DIC:   - per hematology    8. CVA  - MRI shows bilateral acute sub acute infarcts out  - neurology following  - EEG apparently better with higher keppra dose    9. atrial fibrillation  - on cardizem gtt and amiodarone gtt  - not rate controlled     Discussed with Dr. Reginald Enriquez.

## 2020-09-21 LAB
ALBUMIN SERPL-MCNC: 3 G/DL (ref 3.4–5)
ALP BLD-CCNC: 114 U/L (ref 40–129)
ALT SERPL-CCNC: 112 U/L (ref 10–40)
ANION GAP SERPL CALCULATED.3IONS-SCNC: 12 MMOL/L (ref 3–16)
APTT: 44.1 SEC (ref 24.2–36.2)
APTT: 46.4 SEC (ref 24.2–36.2)
APTT: 57.6 SEC (ref 24.2–36.2)
APTT: 68.2 SEC (ref 24.2–36.2)
AST SERPL-CCNC: 37 U/L (ref 15–37)
BILIRUB SERPL-MCNC: 1.1 MG/DL (ref 0–1)
BILIRUBIN DIRECT: 0.7 MG/DL (ref 0–0.3)
BILIRUBIN, INDIRECT: 0.4 MG/DL (ref 0–1)
BUN BLDV-MCNC: 35 MG/DL (ref 7–20)
CALCIUM SERPL-MCNC: 8.9 MG/DL (ref 8.3–10.6)
CHLORIDE BLD-SCNC: 96 MMOL/L (ref 99–110)
CO2: 31 MMOL/L (ref 21–32)
CREAT SERPL-MCNC: 3.1 MG/DL (ref 0.6–1.1)
GFR AFRICAN AMERICAN: 19
GFR NON-AFRICAN AMERICAN: 16
GLUCOSE BLD-MCNC: 100 MG/DL (ref 70–99)
GLUCOSE BLD-MCNC: 133 MG/DL (ref 70–99)
GLUCOSE BLD-MCNC: 146 MG/DL (ref 70–99)
GLUCOSE BLD-MCNC: 159 MG/DL (ref 70–99)
GLUCOSE BLD-MCNC: 163 MG/DL (ref 70–99)
GLUCOSE BLD-MCNC: 171 MG/DL (ref 70–99)
GLUCOSE BLD-MCNC: 181 MG/DL (ref 70–99)
GLUCOSE BLD-MCNC: 206 MG/DL (ref 70–99)
HAPTOGLOBIN: 106 MG/DL (ref 30–200)
HCT VFR BLD CALC: 26.8 % (ref 36–48)
HEMOGLOBIN: 8.6 G/DL (ref 12–16)
KEPPRA DOSE AMT: ABNORMAL
KEPPRA: 49.8 UG/ML (ref 6–46)
MAGNESIUM: 2.6 MG/DL (ref 1.8–2.4)
MCH RBC QN AUTO: 26.9 PG (ref 26–34)
MCHC RBC AUTO-ENTMCNC: 31.9 G/DL (ref 31–36)
MCV RBC AUTO: 84.2 FL (ref 80–100)
PDW BLD-RTO: 19.8 % (ref 12.4–15.4)
PERFORMED ON: ABNORMAL
PHOSPHORUS: 4 MG/DL (ref 2.5–4.9)
PLATELET # BLD: 204 K/UL (ref 135–450)
PMV BLD AUTO: 9.5 FL (ref 5–10.5)
POTASSIUM SERPL-SCNC: 3.5 MMOL/L (ref 3.5–5.1)
RBC # BLD: 3.18 M/UL (ref 4–5.2)
SODIUM BLD-SCNC: 139 MMOL/L (ref 136–145)
TOTAL PROTEIN: 6.2 G/DL (ref 6.4–8.2)
WBC # BLD: 10.9 K/UL (ref 4–11)

## 2020-09-21 PROCEDURE — 80048 BASIC METABOLIC PNL TOTAL CA: CPT

## 2020-09-21 PROCEDURE — 6360000002 HC RX W HCPCS: Performed by: INTERNAL MEDICINE

## 2020-09-21 PROCEDURE — 6360000002 HC RX W HCPCS: Performed by: NURSE PRACTITIONER

## 2020-09-21 PROCEDURE — 80076 HEPATIC FUNCTION PANEL: CPT

## 2020-09-21 PROCEDURE — 97530 THERAPEUTIC ACTIVITIES: CPT

## 2020-09-21 PROCEDURE — 6370000000 HC RX 637 (ALT 250 FOR IP): Performed by: INTERNAL MEDICINE

## 2020-09-21 PROCEDURE — 2580000003 HC RX 258: Performed by: STUDENT IN AN ORGANIZED HEALTH CARE EDUCATION/TRAINING PROGRAM

## 2020-09-21 PROCEDURE — 2580000003 HC RX 258: Performed by: INTERNAL MEDICINE

## 2020-09-21 PROCEDURE — APPNB15 APP NON BILLABLE TIME 0-15 MINS: Performed by: NURSE PRACTITIONER

## 2020-09-21 PROCEDURE — 99233 SBSQ HOSP IP/OBS HIGH 50: CPT | Performed by: INTERNAL MEDICINE

## 2020-09-21 PROCEDURE — 92610 EVALUATE SWALLOWING FUNCTION: CPT

## 2020-09-21 PROCEDURE — 99255 IP/OBS CONSLTJ NEW/EST HI 80: CPT | Performed by: NURSE PRACTITIONER

## 2020-09-21 PROCEDURE — 83735 ASSAY OF MAGNESIUM: CPT

## 2020-09-21 PROCEDURE — 2580000003 HC RX 258: Performed by: NURSE PRACTITIONER

## 2020-09-21 PROCEDURE — 2500000003 HC RX 250 WO HCPCS: Performed by: INTERNAL MEDICINE

## 2020-09-21 PROCEDURE — 83010 ASSAY OF HAPTOGLOBIN QUANT: CPT

## 2020-09-21 PROCEDURE — 97167 OT EVAL HIGH COMPLEX 60 MIN: CPT

## 2020-09-21 PROCEDURE — 84100 ASSAY OF PHOSPHORUS: CPT

## 2020-09-21 PROCEDURE — 80177 DRUG SCRN QUAN LEVETIRACETAM: CPT

## 2020-09-21 PROCEDURE — 85730 THROMBOPLASTIN TIME PARTIAL: CPT

## 2020-09-21 PROCEDURE — 85027 COMPLETE CBC AUTOMATED: CPT

## 2020-09-21 PROCEDURE — C9254 INJECTION, LACOSAMIDE: HCPCS | Performed by: NURSE PRACTITIONER

## 2020-09-21 PROCEDURE — 2000000000 HC ICU R&B

## 2020-09-21 PROCEDURE — 94760 N-INVAS EAR/PLS OXIMETRY 1: CPT

## 2020-09-21 PROCEDURE — 2700000000 HC OXYGEN THERAPY PER DAY

## 2020-09-21 PROCEDURE — 6370000000 HC RX 637 (ALT 250 FOR IP): Performed by: NURSE PRACTITIONER

## 2020-09-21 PROCEDURE — C9113 INJ PANTOPRAZOLE SODIUM, VIA: HCPCS | Performed by: INTERNAL MEDICINE

## 2020-09-21 RX ORDER — DEXTROSE MONOHYDRATE 25 G/50ML
12.5 INJECTION, SOLUTION INTRAVENOUS PRN
Status: DISCONTINUED | OUTPATIENT
Start: 2020-09-21 | End: 2020-09-21 | Stop reason: SDUPTHER

## 2020-09-21 RX ORDER — NICOTINE POLACRILEX 4 MG
15 LOZENGE BUCCAL PRN
Status: DISCONTINUED | OUTPATIENT
Start: 2020-09-21 | End: 2020-09-21 | Stop reason: SDUPTHER

## 2020-09-21 RX ORDER — HEPARIN SODIUM 1000 [USP'U]/ML
2000 INJECTION, SOLUTION INTRAVENOUS; SUBCUTANEOUS ONCE
Status: COMPLETED | OUTPATIENT
Start: 2020-09-21 | End: 2020-09-21

## 2020-09-21 RX ORDER — DIGOXIN 125 MCG
125 TABLET ORAL DAILY
Status: DISCONTINUED | OUTPATIENT
Start: 2020-09-21 | End: 2020-09-25

## 2020-09-21 RX ORDER — DEXTROSE MONOHYDRATE 50 MG/ML
100 INJECTION, SOLUTION INTRAVENOUS PRN
Status: DISCONTINUED | OUTPATIENT
Start: 2020-09-21 | End: 2020-09-21 | Stop reason: SDUPTHER

## 2020-09-21 RX ADMIN — INSULIN LISPRO 4 UNITS: 100 INJECTION, SOLUTION INTRAVENOUS; SUBCUTANEOUS at 18:27

## 2020-09-21 RX ADMIN — METOPROLOL TARTRATE 5 MG: 5 INJECTION INTRAVENOUS at 13:47

## 2020-09-21 RX ADMIN — PIPERACILLIN AND TAZOBACTAM 2.25 G: 2; .25 INJECTION, POWDER, LYOPHILIZED, FOR SOLUTION INTRAVENOUS at 09:57

## 2020-09-21 RX ADMIN — HEPARIN SODIUM 2000 UNITS: 1000 INJECTION INTRAVENOUS; SUBCUTANEOUS at 08:37

## 2020-09-21 RX ADMIN — FUROSEMIDE 80 MG: 10 INJECTION, SOLUTION INTRAMUSCULAR; INTRAVENOUS at 18:27

## 2020-09-21 RX ADMIN — METOPROLOL TARTRATE 25 MG: 25 TABLET, FILM COATED ORAL at 20:42

## 2020-09-21 RX ADMIN — Medication 2 CAPSULE: at 17:27

## 2020-09-21 RX ADMIN — INSULIN LISPRO 2 UNITS: 100 INJECTION, SOLUTION INTRAVENOUS; SUBCUTANEOUS at 12:36

## 2020-09-21 RX ADMIN — SODIUM CHLORIDE, PRESERVATIVE FREE 10 ML: 5 INJECTION INTRAVENOUS at 08:36

## 2020-09-21 RX ADMIN — LEVETIRACETAM 1000 MG: 10 INJECTION INTRAVENOUS at 22:33

## 2020-09-21 RX ADMIN — INSULIN LISPRO 2 UNITS: 100 INJECTION, SOLUTION INTRAVENOUS; SUBCUTANEOUS at 20:05

## 2020-09-21 RX ADMIN — AMIODARONE HYDROCHLORIDE 0.5 MG/MIN: 50 INJECTION, SOLUTION INTRAVENOUS at 11:15

## 2020-09-21 RX ADMIN — INSULIN LISPRO 2 UNITS: 100 INJECTION, SOLUTION INTRAVENOUS; SUBCUTANEOUS at 08:37

## 2020-09-21 RX ADMIN — SODIUM CHLORIDE, PRESERVATIVE FREE 10 ML: 5 INJECTION INTRAVENOUS at 20:05

## 2020-09-21 RX ADMIN — PIPERACILLIN AND TAZOBACTAM 2.25 G: 2; .25 INJECTION, POWDER, LYOPHILIZED, FOR SOLUTION INTRAVENOUS at 01:30

## 2020-09-21 RX ADMIN — DIGOXIN 125 MCG: 125 TABLET ORAL at 09:57

## 2020-09-21 RX ADMIN — Medication 10 ML: at 08:37

## 2020-09-21 RX ADMIN — DEXTROSE MONOHYDRATE 200 MG: 50 INJECTION, SOLUTION INTRAVENOUS at 11:15

## 2020-09-21 RX ADMIN — PANTOPRAZOLE SODIUM 40 MG: 40 INJECTION, POWDER, FOR SOLUTION INTRAVENOUS at 08:36

## 2020-09-21 RX ADMIN — METOPROLOL TARTRATE 25 MG: 25 TABLET, FILM COATED ORAL at 17:27

## 2020-09-21 RX ADMIN — PIPERACILLIN AND TAZOBACTAM 2.25 G: 2; .25 INJECTION, POWDER, LYOPHILIZED, FOR SOLUTION INTRAVENOUS at 17:27

## 2020-09-21 RX ADMIN — INSULIN LISPRO 2 UNITS: 100 INJECTION, SOLUTION INTRAVENOUS; SUBCUTANEOUS at 04:16

## 2020-09-21 RX ADMIN — FUROSEMIDE 80 MG: 10 INJECTION, SOLUTION INTRAMUSCULAR; INTRAVENOUS at 08:36

## 2020-09-21 RX ADMIN — Medication 2 CAPSULE: at 08:37

## 2020-09-21 RX ADMIN — LORAZEPAM 2 MG: 2 INJECTION INTRAMUSCULAR; INTRAVENOUS at 21:39

## 2020-09-21 RX ADMIN — DEXTROSE MONOHYDRATE 200 MG: 50 INJECTION, SOLUTION INTRAVENOUS at 22:01

## 2020-09-21 RX ADMIN — LEVETIRACETAM 1000 MG: 10 INJECTION INTRAVENOUS at 12:35

## 2020-09-21 RX ADMIN — HEPARIN SODIUM 2000 UNITS: 1000 INJECTION INTRAVENOUS; SUBCUTANEOUS at 20:41

## 2020-09-21 ASSESSMENT — PAIN SCALES - GENERAL
PAINLEVEL_OUTOF10: 0

## 2020-09-21 NOTE — PROGRESS NOTES
NEPHROLOGY PROGRESS NOTE    Patient: Sabrina White MRN: [id number]     YOB: 1970  Age: 53 y.o. Sex: female    Unit: [address] 2W ICU Room/Bed: [id number]/2104-01 Location: [address] [place] [address] 12     Admitting Physician: Parth Gallardo    Primary Care Physician: No primary care provider on file. LOS: 11 days       Reason for evaluation:   [place]      SUBJECTIVE:      The patient was seen and examined. Notes and labs reviewed. There were no complications over night. HPI:  Breathing comfortably. No CP.  ROS:  In bed. [address]:  medications reviewed. Mother present. OBJECTIVE:   /76   Pulse 123   Temp 98.6 °F (37 °C) (Axillary)   Resp 18   Ht 5' 7\" (1.702 m)   Wt 196 lb 3.4 oz (89 kg)   LMP 12/05/2018 (Exact Date)   SpO2 95%   BMI 30.73 kg/m²     Intake and Output:      Intake/Output Summary (Last 24 hours) at 9/21/2020 1002  Last data filed at 9/21/2020 0747  Gross per 24 hour   Intake 2204 ml   Output 3300 ml   Net -1096 ml       Exam:   CONSTITUTIONAL/PSYCHIATRY: Resting in bed, in ICU. RESPIRATORY:  deferred due to pandemic  CARDIOVASCULAR: deferred  GASTROINTESTINAL: deferred  EXTREMITIES:  No edema  SKIN: Warm and dry. R IJ Vasc cath in place. LABS:   RFP:   Recent Labs     09/19/20 0312 09/20/20  0535 09/21/20  0640    136 139   K 3.5 3.4* 3.5   CL 96* 93* 96*   CO2 29 28 31   BUN 57* 24* 35*   CREATININE 3.1* 2.3* 3.1*   CALCIUM 8.6 8.6 8.9   MG 1.80 1.60* 2.60*   PHOS 4.1 3.8 4.0   GFRAA 19* 27* 19*     Liver panel:  Recent Labs     09/19/20 0312 09/20/20  0535 09/21/20  0640   AST 51* 43* 37   * 147* 112*       CBC:   Recent Labs     09/19/20 0312 09/20/20  0535 09/21/20  0640   WBC 15.1* 11.3* 10.9   HGB 8.2* 8.2* 8.6*   HCT 26.3* 26.0* 26.8*   MCV 83.8 84.4 84.2    134* 204         ASSESSMENT and PLAN:     1. MORELIA  - likely secondary to ischemia ATI.  She had additional exposure to radiocontrast   - ZENON is neg, ANCA neg, C3 is low at 47  - Initiated on IHD on 9/11/20. Transitioned to CRRT on 9/12/20 which was stopped 9/15/20. Restarted HD 09/17/20  - on IV Lasix , urine output is good; however, Cr rebounding still.  - HD TTS. 2. Hyperkalemia/metabolic acidosis: resolved    3. S/P PEA arrest 9/11/20    4. Acute hypoxic respiratory failure:  - per pulmonary  - Extubated 9/19.    5. MRSA bacteremia:   - on Vancomycin with pharmacy dosing   - LEVY shows no vegetation    6. CSDHF  - wt down significantly with CRRT  - now non-oliguric with Lasix     7. Factor V leiden deficiency with PE/coagulopathy/DIC:   - per hematology    8.  CVA  - MRI shows bilateral acute sub acute infarcts out  - neurology following  - EEG apparently better with higher keppra dose    9. atrial fibrillation  - on cardizem gtt and amiodarone gtt  - not rate controlled

## 2020-09-21 NOTE — PROGRESS NOTES
Pulmonary Progress Note    Date of Admission: 9/10/2020   LOS: 11 days       CC:  status post arrest.      Subjective:       Over night. She is very impulsive. complains of itching. ROS:   No nausea  No Vomiting  No chest pain         PHYSICAL EXAM:   Blood pressure 138/76, pulse 123, temperature 98.6 °F (37 °C), temperature source Axillary, resp. rate 18, height 5' 7\" (1.702 m), weight 196 lb 3.4 oz (89 kg), last menstrual period 12/05/2018, SpO2 95 %.'  Gen: No distress. Eyes: PERRL. No sclera icterus. No conjunctival injection. ENT: No discharge. Pharynx clear. External appearance of ears and nose normal.  Neck: Trachea midline. No obvious mass. Resp: No accessory muscle use. No crackles. No wheezes. No rhonchi. CV: tachy rate. Regular rhythm. No murmur or rub. No edema. GI: Non-tender. Non-distended. No hernia. Skin: Warm, dry, normal texture and turgor. No nodule on exposed extremities. Lymph: No cervical LAD. No supraclavicular LAD. M/S: No cyanosis. No clubbing. No joint deformity. Neuro: Moves all four extremities. Psych: wakes. Confused.        Medications:    Scheduled Meds:   insulin lispro  0-12 Units Subcutaneous 6 times per day    digoxin  125 mcg Per NG tube Daily    lacosamide (VIMPAT) IVPB  200 mg Intravenous BID    lactobacillus  2 capsule Oral BID WC    furosemide  80 mg Intravenous BID    piperacillin-tazobactam  2.25 g Intravenous Q8H    sodium chloride flush  10 mL Intravenous 2 times per day    pantoprazole  40 mg Intravenous Daily    And    sodium chloride (PF)  10 mL Intravenous Daily    levetiracetam  1,000 mg Intravenous Q12H    vancomycin (VANCOCIN) intermittent dosing (placeholder)   Other RX Placeholder       Continuous Infusions:   amiodarone 450mg/250ml D5W infusion 0.5 mg/min (09/20/20 2006)    dilTIAZem Stopped (09/20/20 1710)    dextrose      heparin (Porcine) 7 mL/hr (09/21/20 0839)       PRN Meds:  metoprolol, heparin MIP  images are provided for review. Dose modulation, iterative reconstruction,  and/or weight based adjustment of the mA/kV was utilized to reduce the  radiation dose to as low as reasonably achievable. COMPARISON:  None. HISTORY:  ORDERING SYSTEM PROVIDED HISTORY: tachy, PATRICE  TECHNOLOGIST PROVIDED HISTORY:  Reason for exam:->tachy, SOB  Reason for Exam: tachy, SOB  Acuity: Acute  Type of Exam: Initial    FINDINGS:  Pulmonary Arteries: Pulmonary arteries are adequately opacified for  evaluation. However, evaluation is limited due to respiratory motion  artifact. There is suspected subsegmental filling defect within the right  middle lobe and left lower lobe. Mediastinum: The heart is enlarged without pericardial fluid collection. No  definite right heart strain. Large bilateral pleural effusions. Interlobular septal thickening. Multifocal ground-glass opacities in the  left lower lobe noted. No evidence for pneumothorax. Reflux of contrast  into the IVC and hepatic veins. Lungs/pleura: The lungs are without acute process. No focal consolidation or  pulmonary edema. No evidence of pleural effusion or pneumothorax. Upper Abdomen: Limited images of the upper abdomen are unremarkable. Soft Tissues/Bones: No acute bone or soft tissue abnormality. Impression Evaluation is somewhat limited due to respiratory motion artifact. There are  suspected pulmonary arterial filling defects within subsegmental branches to  the right middle lobe and left lower lobe. No right heart strain. Large bilateral pleural effusions and reflux of contrast into the IVC/hepatic  veins. Findings most compatible with volume overload/CHF. Patchy airspace disease in the left lower lobe adjacent to the large  effusion. Differential considerations include atelectasis, infectious  process or, less likely, infarction.            CXR PA/LAT:   Results for orders placed during the hospital encounter of 12/14/18 XR CHEST STANDARD (2 VW)    Narrative EXAMINATION:  TWO VIEWS OF THE CHEST    12/14/2018 12:49 pm    COMPARISON:  04/18/2007    HISTORY:  ORDERING SYSTEM PROVIDED HISTORY: cough  TECHNOLOGIST PROVIDED HISTORY:  Reason for exam:->cough  Ordering Physician Provided Reason for Exam: cough; hot flashes  Acuity: Acute  Type of Exam: Initial    FINDINGS:  Retrocardiac opacity seen on the lateral view, less well seen on the frontal  view. No evidence of pleural effusion. No evidence of pneumothorax. Cardiac and mediastinal silhouettes are unchanged. Impression Retrocardiac atelectasis or pneumonitis. CXR portable:   Results for orders placed during the hospital encounter of 09/10/20   XR CHEST PORTABLE    Narrative EXAMINATION:  ONE XRAY VIEW OF THE CHEST    9/18/2020 5:26 am    COMPARISON:  Prior study(s) most recent 09/15/2020. HISTORY:  ORDERING SYSTEM PROVIDED HISTORY: ETT movement  TECHNOLOGIST PROVIDED HISTORY:  Reason for exam:->ETT movement  Reason for Exam: ETT movement  Acuity: Acute  Type of Exam: Initial    FINDINGS:  The heart is enlarged. Vessels are congested. The endotracheal tube remains  in satisfactory position. Tip is located approximately 3 cm above the  rahat. Right IJ catheter extends to the mid or lower right atrium. There  is airspace disease bilaterally with suspected pleural effusion. This  obscures the diaphragm greater on the left. Impression Suspect mild congestive failure with pleural effusion questioned greater on  the left. Endotracheal tube is approximately 3 cm above the rahat          Assessment:             Plan:         PE  - Heparin. - complication of hematoma. Monitor  -     seizure   -neurology following.   - Keppra  - Vimpat. status post arrest  - hx infarcts on MRI  - will answer some questions.     -     A fib with RVR  - cardioverted 7 times  - per cardiolgoy      MORELIA  - HD MWF      Cardiomyopathy  - EF 30%  - Amio  - cardiology flowing. Nutrition  - DIET TUBE FEED CONTINUOUS/CYCLIC NPO; Renal Formula; Nasogastric; Continuous; 20; 55  - SLP today.   - PT OT consulted. Intake/Output Summary (Last 24 hours) at 9/21/2020 1007  Last data filed at 9/21/2020 0747  Gross per 24 hour   Intake 2204 ml   Output 3300 ml   Net -1096 ml          Access  Arterial      PICC          CVC                  This note was transcribed using [id number]. Please disregard any translational errors.       [phone]

## 2020-09-21 NOTE — PROGRESS NOTES
Hospitalist Progress Note      PCP: No primary care provider on file. Date of Admission: 9/10/2020    Chief Complaint:   Chief Complaint   Patient presents with    Shortness of Breath     HX of asthma. Short of breath for around 1 month. Patient states she just refilled her inhaler today. Hospital Course:  53 y.o. female with past medical history of factor V Leiden deficiency on chronic warfarin, history of blood clots, hyperlipidemia, arthritis, asthma who presents to [facility] with worsening above symptoms of fatigue, intermittent shortness of breath both at rest and on exertion, and extensive of abdominal and leg swelling. In the ED patient was significantly tachycardic, showing some combination of sinus tachycardia versus SVT, uncertain if may be underlying atrial fibrillation with rapid ventricular rate. Labs showed signs of infection as well as elevated proBNP. CTA with contrast was done on the chest and demonstrated left and right lobe PEs in certain segments, active findings suggestive of early patchy pneumonia bilaterally, and moderate bilateral pleural effusions suggestive of heart failure/volume overload. PEA arrest shortly after admission. TTE with EF 30- 35%. Severe diffuse hypokinesis with severely reduced rt ventricular function, severe tricuspid regurgitation and mitral regurgitation, grade 3 diastolic dysfunction. Found to have MRSA PNA and bacteremia. Developed shock liver and coagulopathy which are improving. Concern for seizure like activity, initial EEG negative but repeat with possible status, added vimpat to keppra and repeat EEG improved. Acute to subacute infarcts on MRI. Developed a fib RVR 9/16 that has not responded to cardioversion (x5), on amio and dilt IV. Neurologic status improved and she was extubated on 9/19. Subjective:      +myoclonus lower extremities. Able to follow commands. Unable to fully open her eyes.     Oriented to place and person. Medications:  Reviewed    Infusion Medications    amiodarone 450mg/250ml D5W infusion 0.5 mg/min (09/21/20 1115)    dilTIAZem Stopped (09/20/20 1710)    dextrose      heparin (Porcine) 7 mL/hr (09/21/20 0839)     Scheduled Medications    insulin lispro  0-12 Units Subcutaneous 6 times per day    digoxin  125 mcg Per NG tube Daily    lacosamide (VIMPAT) IVPB  200 mg Intravenous BID    lactobacillus  2 capsule Oral BID WC    furosemide  80 mg Intravenous BID    piperacillin-tazobactam  2.25 g Intravenous Q8H    sodium chloride flush  10 mL Intravenous 2 times per day    pantoprazole  40 mg Intravenous Daily    And    sodium chloride (PF)  10 mL Intravenous Daily    levetiracetam  1,000 mg Intravenous Q12H    vancomycin (VANCOCIN) intermittent dosing (placeholder)   Other RX Placeholder     PRN Meds: metoprolol, heparin (porcine), ondansetron, promethazine, fentanNYL, LORazepam, sodium chloride flush, acetaminophen **OR** acetaminophen, glucose, dextrose, glucagon (rDNA), dextrose      Intake/Output Summary (Last 24 hours) at 9/21/2020 1256  Last data filed at 9/21/2020 1130  Gross per 24 hour   Intake 1984 ml   Output 3450 ml   Net -1466 ml       Physical Exam Performed:    BP (!) 153/86   Pulse 127   Temp 98.6 °F (37 °C) (Axillary)   Resp 24   Ht 5' 7\" (1.702 m)   Wt 196 lb 3.4 oz (89 kg)   LMP 12/05/2018 (Exact Date)   SpO2 94%   BMI 30.73 kg/m²     General appearance: No apparent distress, appears stated age  HEENT: unable to fully open her eyes, Pupils equal, round, and reactive to light. Conjunctivae/corneas clear. Neck: Supple, with full range of motion. Trachea midline. Respiratory:  Normal respiratory effort. Coarse breath sounds bilaterally   Cardiovascular: Regular rate and rhythm with normal S1/S2 without murmurs, rubs or gallops. Abdomen: Soft, non-tender, non-distended with normal bowel sounds. Musculoskeletal: No clubbing, cyanosis or edema bilaterally.   Large R groin hematoma  Skin: Skin color, texture, turgor normal.  No rashes or lesions. Neurologic: Oriented to self, moves all extremities, mostly following commands  Psychiatric: oriented to place and person. Speech difficult to discern. Capillary Refill: Brisk,< 3 seconds   Peripheral Pulses: +2 palpable, equal bilaterally       Labs:   Recent Labs     09/19/20 0312 09/20/20  0535 09/21/20  0640   WBC 15.1* 11.3* 10.9   HGB 8.2* 8.2* 8.6*   HCT 26.3* 26.0* 26.8*    134* 204     Recent Labs     09/19/20 0312 09/20/20  0535 09/21/20  0640    136 139   K 3.5 3.4* 3.5   CL 96* 93* 96*   CO2 29 28 31   BUN 57* 24* 35*   CREATININE 3.1* 2.3* 3.1*   CALCIUM 8.6 8.6 8.9   PHOS 4.1 3.8 4.0     Recent Labs     09/19/20 0312 09/20/20  0535 09/21/20  0640   AST 51* 43* 37   * 147* 112*   BILIDIR 0.9* 0.9* 0.7*   BILITOT 1.3* 1.5* 1.1*   ALKPHOS 111 111 114     No results for input(s): INR in the last 72 hours. No results for input(s): Joyice Windsor in the last 72 hours. Urinalysis:      Lab Results   Component Value Date    NITRU Negative 09/10/2020    WBCUA 3-5 09/10/2020    BACTERIA 2+ 09/10/2020    RBCUA 21-50 09/10/2020    BLOODU MODERATE 09/10/2020    SPECGRAV >1.030 09/10/2020    GLUCOSEU Negative 09/10/2020       Radiology:  VL DUP LOWER EXTREMITY ARTERIES RIGHT   Final Result      XR CHEST PORTABLE   Final Result   Suspect mild congestive failure with pleural effusion questioned greater on   the left. Endotracheal tube is approximately 3 cm above the arhat         MRI BRAIN WO CONTRAST   Final Result   Addendum 1 of 1   ADDENDUM:   Results were reported to GOLDIE Gonzalez at 3:26 p.m. on September 15,    2020. Final      XR CHEST PORTABLE   Final Result   Decreased layering effusions with improved basilar aeration. Appropriate   life support device positioning. XR CHEST PORTABLE   Final Result   Low-lying endotracheal tube. Recommend retracting 1-2 cm.       No substantial change in layering effusions and basilar opacities. The findings were sent to the Radiology Results [address] at 3:53   am on 9/14/2020to be communicated to a licensed caregiver. XR CHEST PORTABLE   Final Result   1. Interval placement of an NG tube, likely within the stomach. 2. Stable appropriate positions of endotracheal tube and right-sided dialysis   catheter. 3. No significant change in appearance of small to moderate bilateral pleural   effusions and bibasilar airspace disease. XR CHEST PORTABLE   Final Result   Supportive lines project in stable positions. Bibasilar opacities, suggestive of layered effusions and atelectasis or   airspace disease. XR CHEST PORTABLE   Final Result   1. Suspected mild to moderate bilateral pleural effusions with adjacent   bibasilar atelectasis and/or airspace disease. 2. Mild to moderate cardiomegaly. 3. Recommend retraction of endotracheal tube 0.7 cm. XR CHEST PORTABLE   Final Result   Right jugular venous catheter and endotracheal tube project in normal   positions. No pneumothorax. Bilateral airspace disease and possible layered pleural effusions, consistent   with pulmonary edema and atelectasis. Pneumonia is a differential   possibility. XR CHEST PORTABLE   Final Result   Satisfactory position of the endotracheal tube 3 cm above the rahat. The   heart is upper normal size and vessels are borderline congested. VL Extremity Venous Bilateral   Final Result      VL DUP LOWER EXTREMITY ARTERIES BILATERAL   Final Result      XR CHEST PORTABLE   Final Result   Endotracheal tube has tip approximately 1.3 cm proximal to the rahat and   should be retracted approximately 2 cm. Findings are again suggestive of pulmonary edema with small pleural   effusions. Pneumonia is not excluded in the appropriate clinical setting. Findings were called by the radiology call center. CT HEAD WO CONTRAST   Final Result   Motion limited study. No convincing evidence for acute intracranial   abnormality identified within the limitations of motion artifact. If there is persistent clinical suspicion for intracranial abnormality,   repeat study should be considered. XR CHEST PORTABLE   Final Result   Cardiomegaly, pulmonary vascular congestion, and bilateral pleural effusions. Endotracheal tube terminates approximately 2 cm above the rahat. CT CHEST PULMONARY EMBOLISM W CONTRAST   Final Result   Addendum 1 of 1   ADDENDUM:   Findings were discussed with Dr. Michael Nolan at 10:49 pm on 9/10/2020.          Final            Assessment/Plan:    Active Hospital Problems    Diagnosis    Cardiogenic shock ([address] [R57.0]    Staphylococcal pneumonia ([address] [J15.20]    Pleural effusion, bilateral [J90]    Elevated lactic acid level [R79.89]    Bacteremia due to methicillin susceptible Staphylococcus aureus (MSSA) [R78.81]    Pneumonia due to methicillin susceptible Staphylococcus aureus (MSSA) (HCC) [J15.211]    Anemia [D64.9]    Acute renal failure (HCC) [N17.9]    Bilateral pleural effusion [J90]    Mitral valve insufficiency [I34.0]    Tricuspid valve insufficiency [I07.1]    Class 2 obesity due to excess calories with body mass index (BMI) of 39.0 to 39.9 in adult [E66.09, Z68.39]    Pneumonia due to organism [J18.9]    Bacteremia [R78.81]    Cardiac arrest ([address] [I46.9]    Seizures ([address] [R56.9]    Acute respiratory failure with hypoxia ([address] [J96.01]    Acute kidney injury ([address] [U31.9]    Metabolic acidosis [Y51.6]    Congestive heart failure (HCC) [I50.9]    Pulmonary hypertension (HCC) [I27.20]    Multifocal pneumonia [J18.9]    Septic shock ([address] [A41.9, R65.21]    Elevated LFTs [R94.5]    Thrombocytopenia (HCC) [D69.6]    Coagulopathy ([address] [D68.9]    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) ([address] [D65]    Acute following  - continue to monitor  - added digoxin  -in afib with RVR runs this am.       R groin hematoma  After removing femoral line. Arterial duplex with normal flow, good pulses  - monitor  - back on heparin drip        Bilateral PE  Ho DVT/PE, on coumadin chronically  - heparin drip-- resumed - monitor groin hematoma. Oliguric/anuric MORELIA  Suboptimal response to Lasix. -CRRT started, switched to HD  -Checking ZENON (neg), ANCA, complement (c3 low), antiphospholipid antibodies  -Nephrology following  - UOP improved      Coagulapathy  Hx Factor V Leiden on coumadin. INR therapeutic at 2.2 on admission though hematology notes non-adherence to coumadin in the past. Has had oozing on heparin drip, sepsis with bacteremia, DIC versus shock liver versus vitamin K deficiency  - Heme-onc following, fibrinogen and INR improving, no need for vit K or cryo now  - DOAC when able      Elevated LFTs  Likely shock liver, improving  - daily labs      Obesity  Body mass index is 30.73 kg/m². Counseled on effects of weight on overall health and chronic medical conditions and discussed weight loss. DVT Prophylaxis: heparin  Diet: DIET TUBE FEED CONTINUOUS/CYCLIC NPO; Renal Formula; Nasogastric; Continuous; 20; 55  Code Status: Full Code    PT/OT Eval Status: deferred    Dispo - pending. Condition remains critical. Discussed with mother at bedside.      Lilliam Shirley MD

## 2020-09-21 NOTE — PROGRESS NOTES
Clinical Pharmacy Note  Heparin Dosing       Lab Results   Component Value Date    APTT 68.2 09/21/2020     Lab Results   Component Value Date    HGB 8.6 09/21/2020    HCT 26.8 09/21/2020     09/21/2020    INR 1.23 09/18/2020       Current Infusion Rate: 7 mL/hr    Plan:  Rate: 7 mL/hr  Next aPTT: 2000 9/21/20    Pharmacy will continue to monitor and adjust based on aPTT results.

## 2020-09-21 NOTE — PROGRESS NOTES
Speech Language Pathology  Facility/Department: [address] ICU   CLINICAL BEDSIDE SWALLOW EVALUATION    NAME: Pop Tracey  : 1970  MRN: [id number]    ADMISSION DATE: 9/10/2020  ADMITTING DIAGNOSIS: has Factor V Leiden mutation ([address] Pulmonary embolus ([address] Lactic acidosis; CAP (community acquired pneumonia); Acute CHF (congestive heart failure) ([address] Bilateral pulmonary embolism ([address] Cardiac arrest ([address] Seizures ([address] Acute respiratory failure with hypoxia ([address] Acute kidney injury ([address] Metabolic acidosis; Congestive heart failure ([address] Pulmonary hypertension ([address] Multifocal pneumonia; Septic shock ([address] Elevated LFTs; Thrombocytopenia ([address] Coagulopathy ([address] 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) ([address] Acute encephalopathy; Pneumonia due to organism; Bacteremia; Pleural effusion, bilateral; Elevated lactic acid level; Bacteremia due to methicillin susceptible Staphylococcus aureus (MSSA); Pneumonia due to methicillin susceptible Staphylococcus aureus (MSSA) ([address] Anemia; Acute renal failure ([address] Bilateral pleural effusion; Mitral valve insufficiency; Tricuspid valve insufficiency; Class 2 obesity due to excess calories with body mass index (BMI) of 39.0 to 39.9 in adult; Cardiogenic shock (HCC); and Staphylococcal pneumonia ([address] on their problem list.   PMHX:Anticoagulant long-term use, Arthritis, Asthma, Baker's cyst, Depression, Factor V Leiden ([address] Factor V Leiden ([address] H/O blood clots, Heartburn, Hyperlipidemia, MRSA bacteremia, and Osteoarthritis.     ONSET DATE: 9/10/2020    Recent Chest Xray 2020  Impression    Suspect mild congestive failure with pleural effusion questioned greater on    the left.         Endotracheal tube is approximately 3 cm above the rahat      History of Present Illness  Pop Tracey is a 53y.o. year old female with past medical history significant for Factor V Leiden deficiency, HLD and asthma who initially presented to the hospital with SOB and leg swelling. She was found to have bilateral PE, MRSA bacteremia and new atrial fibrillation. She had a PEA arrest on 9/10 and was resuscitated. She had acute renal failure requiring CRRT (now on HD), seizures with multiple infarcts seen on MRI and shock ?cardiogenic given EF of 30-35%. Last week she went back into atrial fibrillation with RVR and was started on an amiodarone drip. She was cardioverted a total of 7 times without prolonged conversion. She was on a Cardizem drip which has been weaned off. She remains on amiodarone drip at 0.5mg/min. She was given multiple doses of IV digoxin over the last couple days, yesterday 500mcg which did improve her HR some per her nurse. She was extubated 9/19. Date of Eval: 9/21/2020  Evaluating Therapist: Gavino Hernández    Current Diet level:  Current Diet : NPO  Current Liquid Diet : NPO    Primary Complaint  Patient Complaint: pt requesting water; medical team concern for safe PO initiation s/p extubation and complicated medical events    Pain:  Pain Assessment  Pain Assessment: 0-10  Pain Level: 0  Patient's Stated Pain Goal: No pain    Reason for Referral  Chemo Vital was referred for a bedside swallow evaluation to assess the efficiency of her swallow function, identify signs and symptoms of aspiration and make recommendations regarding safe dietary consistencies, effective compensatory strategies, and safe eating environment. Impression  Dysphagia Diagnosis: Suspected needs further assessment; Moderate to severe oral stage dysphagia; Severe pharyngeal stage dysphagia  Dysphagia Impression :   · Severe oropharyngeal dysphagia characterized by reduced lingual strength and coordination affecting bolus control; decreased AP transit; high risk for premature bolus loss to pharynx; delayed swallow initiation; decreased laryngeal elevation; suspected reduced airway protection and reduced sensation, evidenced by delayed throat clear across all consistencies. · Recommend continue NPO. Pt may benefit from Modified Barium Swallow prior to consideration for diet/liquid advancement; however, her variable cognition and rapid fatigue are barriers to safe adequate participation in MBS at this time. · ST will monitor for additional recommendations. Dysphagia Outcome Severity Scale: Level 1: Severe dysphagia- NPO. Unable to tolerate any PO safely     Treatment Plan  Requires SLP Intervention: Yes  Duration/Frequency of Treatment: 3-5x/wk while on acute unit  D/C Recommendations: To be determined     Recommended Diet and Intervention  Diet Solids Recommendation: NPO  Liquid Consistency Recommendation: NPO  Recommended Form of Meds: Via alternative means of nutrition  Recommendations: Dysphagia treatment(potential eventual MBS)  Therapeutic Interventions: Therapeutic PO trials with SLP;Oral motor exercises; Patient/Family education;Pharyngeal exercises    Compensatory Swallowing Strategies  Oral care; may have single ice chips after oral care in fully upright position    Treatment/Goals  Dysphagia Goals: The patient will tolerate repeat BSE when able. General  Chart Reviewed: Yes  Subjective  Subjective: Pt awake but eyes remain closed. Verbally responds to simple questions, but at times delayed and confused. Oriented to name, hospital, year. Not oriented to age, situation, month. Behavior/Cognition: Alert;Confused; Requires cueing  Respiratory Status: O2 via nasual cannula  Communication Observation: (delayed simple responses; at times confused)  Follows Directions: Simple  Dentition: Edentulous(has dentures at home)  Patient Positioning: Upright in bed(supported by pillows, needs assist)  Baseline Vocal Quality: Dysphonic;Aphonic(cues for voicing but able to achieve)  Volitional Cough: Congested;Weak  Volitional Swallow: Delayed  Consistencies Administered: Dysphagia Pureed (Dysphagia I); Honey - cup;Honey - teaspoon;Nectar - teaspoon; Thin - teaspoon; Ice Chips     Vision/Hearing  Vision  Vision: (fxl for assessment needs)  Hearing  Hearing: Within functional limits    Oral Motor Deficits  Oral/Motor  Oral Motor: Exceptions to [facility]  Labial Coordination: Reduced  Lingual Strength: Reduced  Lingual Coordination: Reduced    Oral Phase Dysfunction  Oral Phase  Oral Phase: Exceptions  Oral Phase Dysfunction  Decreased Anterior to Posterior Transit: All  Suspected Premature Bolus Loss: All     Indicators of Pharyngeal Phase Dysfunction   Pharyngeal Phase  Pharyngeal Phase: Exceptions  Indicators of Pharyngeal Phase Dysfunction  Delayed Swallow: All  Decreased Laryngeal Elevation: All  Throat Clearing - Delayed: All    Prognosis  Prognosis  Prognosis for safe diet advancement: fair  Barriers to reach goals: severity of dysphagia;fatigue  Individuals consulted  Consulted and agree with results and recommendations: Patient; Family member;RN  Family member consulted: mother [address] [facility]    Education  Patient Education: results and recommendations  Patient Education Response: Needs reinforcement; No evidence of learning        Therapy Time  SLP Individual Minutes  Time In: 6132  Time Out: 4635  Minutes: 40     This note serves as a D/C Summary in the event that this patient is discharged prior to the next therapy session.     Jayant Jules MS, CCC-SLP #[id number]  Speech Language Pathologist  9/21/2020 11:28 AM

## 2020-09-21 NOTE — PROGRESS NOTES
Pt in tube feed and blood sugar in 150s. Hospitalist perfectserved and udpated. See new orders.    Electronically signed by Mckinley Eason RN on 9/21/2020 at 3:23 AM

## 2020-09-21 NOTE — PROGRESS NOTES
Physical Therapy  Facility/Department: [facility] 0K ICU  Daily Treatment Note  NAME: Chuyita Moon  : 1970  MRN: [id number]    Date of Service: 2020    Discharge Recommendations:  Continue to assess pending progress   PT Equipment Recommendations  Other: Will monitor for potential equipt needs. Assessment   Body structures, Functions, Activity limitations: Decreased functional mobility ; Decreased strength;Decreased safe awareness;Decreased cognition;Decreased endurance  Assessment: 52 y/o female admit 2020 with Pneumonia, Pleural Effusion, Tachycardia, Cardiac Arrest.  MRI + Scattered Acute/Subacute Infarct B Cerebral Hemispheres and R Cerebellar Hemisphere; most pronounced L Occipital Lobe, likely embolic. CTA Chest + PE.   -2020 Pt Intubated. PMH as noted including Factor V Leiden, OA. PT initiated although unable to initiate EOB/OOB activities due to current cognitive status. Unclear d/c plan at this time; will need to monitor pt progress. Prognosis: Fair;Good  Decision Making: Medium Complexity  History: 52 y/o female admit 2020 with Pneumonia, Pleural Effusion, Tachycardia, Cardiac Arrest.  MRI + Scattered Acute/Subacute Infarct B Cerebral Hemispheres and R Cerebellar Hemisphere; most pronounced L Occipital Lobe, likely embolic. CTA Chest + PE.   -2020 Pt Intubated. PMH as noted including Factor V Leiden, OA. Exam: See above. Clinical Presentation: See above. Patient Education: Role of PT, POC, Need to call for assist.  Barriers to Learning: Cognitive. REQUIRES PT FOLLOW UP: Yes  Activity Tolerance  Activity Tolerance: Patient limited by cognitive status; Patient limited by endurance     Patient Diagnosis(es): The primary encounter diagnosis was Pneumonia due to organism.  Diagnoses of Tachycardia, Pleural effusion, bilateral, Elevated lactic acid level, Congestive heart failure, unspecified HF chronicity, unspecified heart failure type ([facility] and Multiple assess  Comment: Defer EOB/OOB at this time due to current cognitive status. Balance  Comments: Pt dora EOB ~ 10 min with Max assist, very brief periods of Mod assist.  Exercises  Comments: Attempt B LE ROM Exs supine, increase jerking/twitching mvts. AM-PAC Score  AM-PAC Inpatient Mobility Raw Score : 6 (09/21/20 1550)  AM-PAC Inpatient T-Scale Score : 23.55 (09/21/20 1550)  Mobility Inpatient CMS 0-100% Score: 100 (09/21/20 1550)  Mobility Inpatient CMS G-Code Modifier : CN (09/21/20 1550)          Goals  Short term goals  Time Frame for Short term goals: Upon d/c acute care setting. Short term goal 1: Bed Mob Min assist.  Short term goal 2: Transfers with/without assist device CGA. Short term goal 3: Amb with/without assist device 48' CGA. Patient Goals   Patient goals : None Stated. Plan    Plan  Times per week: 3-5x week while in acute care setting.   Current Treatment Recommendations: Strengthening, Functional Mobility Training, Transfer Training, Gait Training, Stair training, Safety Education & Training, Patient/Caregiver Education & Training  Safety Devices  Type of devices: Bed alarm in place, Call light within reach, Left in bed, Nurse notified     Therapy Time   Individual Concurrent Group Co-treatment   Time In 1400         Time Out 1430         Minutes [address], PT

## 2020-09-21 NOTE — CONSULTS
See C Fredy note. Agree. Examined patient and reviewed hx with RN and Fredy NP. Plan iv amio till ~ 7 grams then reconsider CV.  metoprolol and amio to control rate now  On heparin    Cliff Hager MD

## 2020-09-21 NOTE — PLAN OF CARE
Problem: Pain:  Goal: Pain level will decrease  Description: Pain level will decrease  Note: Continuing to monitor pain and discomfort. Monitoring pain level on scale of 0-10. Non- pharmacological measures encouraged to reduce discomfort/pain. PRN pain meds administeration continues when/if applicable as ordered by physician. Problem: Falls - Risk of:  Goal: Will remain free from falls  Description: Will remain free from falls  Outcome: Ongoing  Note: Falling star program remains in place. Call light and personal belongings within reach. Frequent visual monitoring continues. Toileting program inplace. Patient assisted in turning/repositioning at least once every 2 hours, and on a prn basis. ;     Problem: Skin Integrity:  Goal: Will show no infection signs and symptoms  Description: Will show no infection signs and symptoms  Outcome: Ongoing  Note: Monitoring patient skin integrity for skin breakdown, turning and repositioning q2h per protocol.       Problem: Nutrition  Goal: Optimal nutrition therapy  Outcome: Ongoing     Problem: FLUID AND ELECTROLYTE IMBALANCE  Goal: Fluid and electrolyte balance are achieved/maintained  Outcome: Ongoing     Problem: ACTIVITY INTOLERANCE/IMPAIRED MOBILITY  Goal: Mobility/activity is maintained at optimum level for patient  Outcome: Ongoing

## 2020-09-21 NOTE — PROGRESS NOTES
Infectious Disease Follow up Notes  Admit Date: 9/10/2020  Hospital Day: 12    Antibiotics :   IV Zosyn stopped   IV Vancomycin     CHIEF COMPLAINT:   MRSA bacteremia  MORELIA  SHOCK liver  Lactic acidosis   Pulmonary embolism  Cardiac arrest  CVA likely Embolic        Subjective interval History :  53 y.o. Extubated to nasal cannula and working with PT very weak and sleeping off during conversation, remains on nasal cannula and Creat remains elevated and now HD dependent.  Tolerating IV abx ok and remains in ICU for close observation     Past Medical History:    Past Medical History:   Diagnosis Date    Anticoagulant long-term use     Arthritis     Asthma     Baker's cyst     Depression     [facility]     Factor V Leiden ([facility] [facility]     H/O blood clots     Heartburn     Hyperlipidemia     MRSA bacteremia 09/10/2020    Osteoarthritis        Past Surgical History:    No surgeries reported      Current Medications:    Outpatient Medications Marked as Taking for the 9/10/20 encounter [facility] Encounter)   Medication Sig Dispense Refill    albuterol sulfate  (90 Base) MCG/ACT inhaler INHALE TWO PUFFS BY MOUTH EVERY 6 HOURS AS NEEDED FOR WHEEZING 18 g 0    vitamin D (ERGOCALCIFEROL) 1.25 MG (38813 UT) CAPS capsule TAKE ONE CAPSULE BY MOUTH ONCE WEEKLY 12 capsule 0    omeprazole (PRILOSEC) 20 MG delayed release capsule TAKE ONE CAPSULE BY MOUTH DAILY 90 capsule 6    warfarin (COUMADIN) 7.5 MG tablet TAKE ONE TABLET BY MOUTH DAILY EXCEPT ON MONDAY AND THURSDAY TAKE ONE-HALF TABLET BY MOUTH AS DIRECTED 21 tablet 5    atorvastatin (LIPITOR) 10 MG tablet TAKE ONE TABLET BY MOUTH DAILY 90 tablet 3    DULoxetine (CYMBALTA) 60 MG extended release capsule TAKE ONE CAPSULE BY MOUTH DAILY 30 capsule 11    BREO ELLIPTA 200-25 MCG/INH AEPB INHALE ONE DOSE BY MOUTH DAILY 1 each 11       Allergies:  Ibuprofen    Immunizations : Immunization History   Administered Date(s) Administered    Pneumococcal Polysaccharide (Nontdzose65) 2007       Social History:   Social History     Tobacco Use    Smoking status: Former Smoker     Packs/day: 0.50     Types: Cigarettes     Last attempt to quit: 2020     Years since quittin.0    Smokeless tobacco: Never Used   Substance Use Topics    Alcohol use: No     Alcohol/week: 0.0 standard drinks    Drug use: Yes     Frequency: 14.0 times per week     Types: Marijuana     Social History     Tobacco Use   Smoking Status Former Smoker    Packs/day: 0.50    Types: Cigarettes    Last attempt to quit: 2020    Years since quittin.0   Smokeless Tobacco Never Used      Family History   Problem Relation Age of Onset    Heart Attack Father          REVIEW OF SYSTEMS:    Not possible intubated state         PHYSICAL EXAM:      Vitals:    /76   Pulse 123   Temp 98.6 °F (37 °C) (Axillary)   Resp 18   Ht 5' 7\" (1.702 m)   Wt 196 lb 3.4 oz (89 kg)   LMP 2018 (Exact Date)   SpO2 95%   BMI 30.73 kg/m²     General Appearance: awake on nasal cannula very tired to converse and in some  acute distress, ++ pallor, no icterus  Obesity +  Skin: warm and dry, no rash or erythema  Head: normocephalic and atraumatic  Eyes: pupils equal, round, and reactive to light, conjunctivae normal  ENT: tympanic membrane, external ear and ear canal normal bilaterally, nose without deformity, nasal mucosa and turbinates normal without polyps  Neck: supple and non-tender without mass, no thyromegaly  no cervical lymphadenopathy  Pulmonary/Chest: coarse crepts  no wheezes, rales or rhonchi, normal air movement, in some respiratory distress  Cardiovascular:  S1 and S2, Mitral murmur, rubs, clicks, or gallops, no carotid bruits  Abdomen: soft, non-tender, non-distended, normal bowel sounds, no masses or organomegaly  Extremities: no cyanosis, clubbing or edema  Musculoskeletal: normal range of motion, no joint swelling, deformity or tenderness  Neurologic: reflexes normal and symmetric, no cranial nerve deficit  Lines:  IJ+   Arizmendi     Data Review:    Lab Results   Component Value Date    WBC 10.9 09/21/2020    HGB 8.6 (L) 09/21/2020    HCT 26.8 (L) 09/21/2020    MCV 84.2 09/21/2020     09/21/2020     Lab Results   Component Value Date    CREATININE 3.1 (H) 09/21/2020    BUN 35 (H) 09/21/2020     09/21/2020    K 3.5 09/21/2020    CL 96 (L) 09/21/2020    CO2 31 09/21/2020       Hepatic Function Panel:   Lab Results   Component Value Date    ALKPHOS 114 09/21/2020     09/21/2020    AST 37 09/21/2020    PROT 6.2 09/21/2020    PROT 7.0 10/19/2011    BILITOT 1.1 09/21/2020    BILIDIR 0.7 09/21/2020    IBILI 0.4 09/21/2020    LABALBU 3.0 09/21/2020       UA:  Lab Results   Component Value Date    COLORU DK YELLOW 09/10/2020    CLARITYU CLOUDY 09/10/2020    GLUCOSEU Negative 09/10/2020    BILIRUBINUR SMALL 09/10/2020    KETUA Negative 09/10/2020    SPECGRAV >1.030 09/10/2020    BLOODU MODERATE 09/10/2020    PHUR 5.5 09/10/2020    PROTEINU >=300 09/10/2020    UROBILINOGEN 1.0 09/10/2020    NITRU Negative 09/10/2020    LEUKOCYTESUR Negative 09/10/2020    LABMICR YES 09/10/2020    URINETYPE Cleancatch 09/10/2020      Urine Microscopic:   Lab Results   Component Value Date    BACTERIA 2+ 09/10/2020    COMU see below 09/10/2020    HYALCAST 3 03/16/2016    WBCUA 3-5 09/10/2020    RBCUA 21-50 09/10/2020    EPIU 11-20 09/10/2020     Vanco 24.7     Creat  3.2          WBC  14.7          Ref Range & Units  09/13/20 1303   HIV Ag/Ab  Non-reactive  Non-Reactive    HIV-1 Antibody  Non-reactive  Non-Reactive    HIV ANTIGEN  Non-reactive  Non-Reactive    HIV-2 Ab  Non-reactive  Non-Reactive    Resulting Agency   [facility] [facility]      Ref Range & Units  09/13/20 1303   Hep A IgM  Non-reactive  Non-reactive    Hep B Core Ab, IgM  Non-reactive  Non-reactive    Hep B S Ag Interp  Non-reactive  Non-reactive    Hep C Ab Interp  Non-reactive  Non-reactive    Resulting Agency   [address]        TTE :  9/10     Summary   Ejection fraction is visually estimated to be 30%-35%. Severe tricuspid regurgitation. moderate pulmonary HTN   There is severe diffuse hypokinesis. Diastolic filling parameters suggest grade III diastolic dysfunction. Moderate calcification of the posterior leaflet of the mitral valve. Mitral annular calcification is present. The mitral valve leaflets appear myxomatous. No evidence of mitral valve stenosis. Severe mitral regurgitation is present. Systolic flow reversal in pulmonary veins. The right ventricle is enlarged. Right ventricular systolic function is moderate to severely reduced. Signature      ------------------------------------------------------------------   Electronically signed by Ashlyn Knowles MD (Interpreting   physician) on 09/11/2020 at 10:45 AM   ------------------------------------------------------------------    MICRO: cultures reviewed and updated by me     20 8620        Order Status: Completed  Specimen: Sputum, Suctioned  Updated: 09/14/20 0531     CULTURE, RESPIRATORY  Normal respiratory benjamin absentAbnormal       Gram Stain Result  4+ WBC's (Polymorphonuclear)   2+ Epithelial Cells   1+ Gram positive cocci   1+ Gram positive rods     Organism  Staphylococcus aureusAbnormal       CULTURE, RESPIRATORY  --     Light growth   PBP2= Negative    Narrative:      ORDER#: [id number]                          ORDERED BY: LISA BRIGHT   SOURCE: Sputum Suctioned                   COLLECTED:  09/11/20 12:02   ANTIBIOTICS AT [facility]. :                      RECEIVED :  09/11/20 12:02   Performed at:   [place] [address]   Phone [phone]    Culture, Blood 1 [[id number]]   Collected: 09/12/20 8691    Order Status: Completed  Specimen: Blood  Updated: 09/13/20 1915     Blood Culture, Routine  No Growth to date. Niall Donaldcat change in status will be called. Narrative:      ORDER#: [id number]                          ORDERED BY: Jami Casas   SOURCE: Blood                              COLLECTED:  09/12/20 16:57   ANTIBIOTICS AT [facility]. :                      RECEIVED :  09/12/20 16:57   If child <=2 yrs old please draw pediatric bottle. ~Blood Culture #1   Performed at:   [address] [address]   Phone [phone]    Culture, Blood 1 [[id number]]   Collected: 09/13/20 1815    Order Status: Sent  Specimen: Blood  Updated: 09/13/20 1831    Culture, Blood 2 [[id number]]   Collected: 09/12/20 1300    Order Status: Completed  Specimen: Blood  Updated: 09/13/20 1515     Culture, Blood 2  No Growth to date.  Any change in status will be called. Narrative:      ORDER#: [id number]                          ORDERED BY: Jami Casas   SOURCE: Blood                              COLLECTED:  09/12/20 13:00   ANTIBIOTICS AT [facility]. :                      RECEIVED :  09/12/20 13:06   If child <=2 yrs old please draw pediatric bottle. ~Blood Culture #2   Performed at:   [address] [address]   Phone [phone]    Culture, Blood 2 [[id number]]  (Abnormal)  Collected: 09/10/20 2314    Order Status: Completed  Specimen: Blood  Updated: 09/13/20 1344     Culture, Blood 2  --Abnormal       Gram stain Aerobic bottle:   Gram positive cocci in clusters   resembling Staphylococcus   Information to follow   Abnormal       Organism  Staph aureus MRSA DNA DetectedAbnormal       Culture, Blood 2  --Abnormal       CONTACT PRECAUTIONS INDICATED   See additional report for complete BCID panel.    Isolated one of two sets   Abnormal       Organism  Staphylococcus aureusAbnormal       Culture, Blood 2  --     POSITIVE for   Sensitivity to follow   Repeating sensitivity    Narrative:      ORDER#: [id number]                          ORDERED BY: David Connellh   SOURCE: Blood                              COLLECTED:  09/10/20 23:14   ANTIBIOTICS AT [facility]. :                      RECEIVED :  09/10/20 23:18   CALL Simone Nicki  [id number] tel. [phone],   ., 09/13/2020 06:47, by [facility]   Microbiology results called to and read back by Jorge Mcgowan,   09/11/2020 16:00, by [email or web address]   Microbiology results called to and read back by FRANCHESCA Holman, 09/11/2020   15:59, by Whit Riggs   If child <=2 yrs old please draw pediatric bottle. ~Blood Culture #2   Performed at:   [facility]   [address], [employer] [address]   Phone [phone]    Culture, Blood 2 [[id number]]      Order Status: No result  Specimen: Peripheral, site unknown from Blood     Culture, Blood 1 [[id number]]   Collected: 09/10/20 2303    Order Status: Completed  Specimen: Blood  Updated: 09/12/20 0115     Blood Culture, Routine  No Growth to date.  Any change in status will be called. Narrative:      ORDER#: [id number]                          ORDERED BY: David Connellh   SOURCE: Blood                              COLLECTED:  09/10/20 23:03   ANTIBIOTICS AT [facility]. :                      RECEIVED :  09/10/20 23:07   If child <=2 yrs old please draw pediatric bottle. ~Blood Culture #1   Performed at:   [facility]   [address], [employer] [address]   Phone [phone]    Culture, Blood, PCR ID Panel Results Report [[id number]]   Collected: 09/10/20 2314    Order Status: Completed  Updated: 09/11/20 1600     Report  SEE IMAGE    Narrative:      CALL Rachel Berry tel. [phone],   Microbiology results called to and read back by Jorge Mcgowan,   09/11/2020 16:00, by [email or web address]   Microbiology results called to and read back by FRANCHESCA Holman, 09/11/2020   15:59, by Whit Riggs   Referred out by:   [facility]   [address] [place] [address] [address] [place] [address]   Phone Sensitive  <=0.25  mcg/mL      tetracycline  Sensitive  <=1  mcg/mL      trimethoprim-sulfamethoxazole  Sensitive  <=10  mcg/mL          IMAGING:    VL DUP LOWER EXTREMITY ARTERIES RIGHT   Final Result      XR CHEST PORTABLE   Final Result   Suspect mild congestive failure with pleural effusion questioned greater on   the left. Endotracheal tube is approximately 3 cm above the rahat         MRI BRAIN WO CONTRAST   Final Result   Addendum 1 of 1   ADDENDUM:   Results were reported to GOLDIE Jackson at 3:26 p.m. on September 15,    2020. Final      XR CHEST PORTABLE   Final Result   Decreased layering effusions with improved basilar aeration. Appropriate   life support device positioning. XR CHEST PORTABLE   Final Result   Low-lying endotracheal tube. Recommend retracting 1-2 cm. No substantial change in layering effusions and basilar opacities. The findings were sent to the Radiology Results [address] at 3:53   am on 9/14/2020to be communicated to a licensed caregiver. XR CHEST PORTABLE   Final Result   1. Interval placement of an NG tube, likely within the stomach. 2. Stable appropriate positions of endotracheal tube and right-sided dialysis   catheter. 3. No significant change in appearance of small to moderate bilateral pleural   effusions and bibasilar airspace disease. XR CHEST PORTABLE   Final Result   Supportive lines project in stable positions. Bibasilar opacities, suggestive of layered effusions and atelectasis or   airspace disease. XR CHEST PORTABLE   Final Result   1. Suspected mild to moderate bilateral pleural effusions with adjacent   bibasilar atelectasis and/or airspace disease. 2. Mild to moderate cardiomegaly. 3. Recommend retraction of endotracheal tube 0.7 cm. XR CHEST PORTABLE   Final Result   Right jugular venous catheter and endotracheal tube project in normal   positions. No pneumothorax. Bilateral airspace disease and possible layered pleural effusions, consistent   with pulmonary edema and atelectasis. Pneumonia is a differential   possibility. XR CHEST PORTABLE   Final Result   Satisfactory position of the endotracheal tube 3 cm above the rahat. The   heart is upper normal size and vessels are borderline congested. VL Extremity Venous Bilateral   Final Result      VL DUP LOWER EXTREMITY ARTERIES BILATERAL   Final Result      XR CHEST PORTABLE   Final Result   Endotracheal tube has tip approximately 1.3 cm proximal to the rahat and   should be retracted approximately 2 cm. Findings are again suggestive of pulmonary edema with small pleural   effusions. Pneumonia is not excluded in the appropriate clinical setting. Findings were called by the radiology call center. CT HEAD WO CONTRAST   Final Result   Motion limited study. No convincing evidence for acute intracranial   abnormality identified within the limitations of motion artifact. If there is persistent clinical suspicion for intracranial abnormality,   repeat study should be considered. XR CHEST PORTABLE   Final Result   Cardiomegaly, pulmonary vascular congestion, and bilateral pleural effusions. Endotracheal tube terminates approximately 2 cm above the rahat. CT CHEST PULMONARY EMBOLISM W CONTRAST   Final Result   Addendum 1 of 1   ADDENDUM:   Findings were discussed with Dr. Nubia Qiu at 10:49 pm on 9/10/2020.          Final            All the pertinent images and reports for the current Hospitalization were reviewed by me     Scheduled Meds:   insulin lispro  0-12 Units Subcutaneous 6 times per day    lacosamide (VIMPAT) IVPB  200 mg Intravenous BID    lactobacillus  2 capsule Oral BID WC    furosemide  80 mg Intravenous BID    piperacillin-tazobactam  2.25 g Intravenous Q8H    sodium chloride flush  10 mL Intravenous 2 times per day    pantoprazole  40 mg Intravenous Daily    And    sodium chloride (PF)  10 mL Intravenous Daily    levetiracetam  1,000 mg Intravenous Q12H    vancomycin (VANCOCIN) intermittent dosing (placeholder)   Other RX Placeholder       Continuous Infusions:   amiodarone 450mg/250ml D5W infusion 0.5 mg/min (09/20/20 2006)    dilTIAZem Stopped (09/20/20 1710)    dextrose      heparin (Porcine) 7 mL/hr (09/21/20 0839)       PRN Meds:  metoprolol, heparin (porcine), ondansetron, promethazine, fentanNYL, LORazepam, sodium chloride flush, acetaminophen **OR** acetaminophen, glucose, dextrose, glucagon (rDNA), dextrose      Assessment:     Patient Active Problem List   Diagnosis    Factor V Leiden mutation ([address]    Pulmonary embolus (HCC)    Lactic acidosis    CAP (community acquired pneumonia)    Acute CHF (congestive heart failure) ([address]    Bilateral pulmonary embolism (HCC)    Cardiac arrest ([address]    Seizures ([address]    Acute respiratory failure with hypoxia (HCC)    Acute kidney injury ([address]    Metabolic acidosis    Congestive heart failure (HCC)    Pulmonary hypertension (HCC)    Multifocal pneumonia    Septic shock (HCC)    Elevated LFTs    Thrombocytopenia (HCC)    Coagulopathy (HCC)    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) ([address]    Acute encephalopathy    Pneumonia due to organism    Bacteremia    Pleural effusion, bilateral    Elevated lactic acid level    Bacteremia due to methicillin susceptible Staphylococcus aureus (MSSA)    Pneumonia due to methicillin susceptible Staphylococcus aureus (MSSA) (HCC)    Anemia    Acute renal failure (HCC)    Bilateral pleural effusion    Mitral valve insufficiency    Tricuspid valve insufficiency    Class 2 obesity due to excess calories with body mass index (BMI) of 39.0 to 39.9 in adult    Cardiogenic shock (HCC)    Staphylococcal pneumonia ([address]     Septic shock  Resolved     Cardiac arrest  Resuscitated  Pulmonary embolism on anticoagulation Encephalopathy improving   Possible seizure episode on anti epileptics   Resp failure now on nasal cannula  WBC elevation improved   MRSA bacteremia follow up blood cx clearing   Sputum cx reported as MSSA interestingly and Micro lab did additional testing  TTE is abnormal with mitral valve calcification and myxomatous changes with Mitral regurgitation  Factor V leiden def  MORELIA  Now requiring HD intermittent     She remained critically ill in ICU from resp failure, CVA, and A fib with bacteremia now clinically slowly improving     WBC slowly trend down on IV abx and Micro repeat sensitivities noted     S/P LEVY  NO Vegetation but has Mitral regurgitation     MRI brain now reported to have multiple CVA possible embolic in origin and given severe Mitral regurgitation with MRSA bacteremia this is concerning for   Endocarditis     WBC trend down a bit with addition of IV Zosyn and now improved will adjust IV abx today anticipate to cont IV abx for MRSA bacteremia       Labs, Microbiology, Radiology and all the pertinent results from current hospitalization and  care every where were reviewed  by me as a part of the evaluation   Plan:   1. Cont IV Vancomycin  Level elevated due to MORELIA   Pharmacy dosing    2. Aim for trough 15 -20  3. D/C  IV Zosyn TODAY with wbc trend down   4. Will coordinate IV abx with HD depending on her progress   5/ s/p LEVY results noted no vegetation   6. Now on intermittent HD for MORELIA   7. MRI brain abnormal from CVA multiple likely embolic   8. Neurological status very concerning given CVA, seizures and MRI brain findings hopefully functional status improves may need placement     Discussed with patient/Family and Nursing staff   Risk of Complications/Morbidity: High      · Illness(es)/ Infection present that pose threat to bodily function. · There is potential for severe exacerbation of infection/side effects of treatment.   · Therapy requires intensive monitoring for antimicrobial agent

## 2020-09-21 NOTE — CONSULTS
Cardiac Electrophysiology Consultation   Date: 9/21/2020  Admit Date:  9/10/2020  Admission Diagnosis: Bilateral pulmonary embolism ([facility] [address] [I26.99]     Reason for Consultation: atrial fibrillation with RVR  Consult Requesting Physician: Nan Burt MD       History of Present Illness  Marleni Day is a 53y.o. year old female with past medical history significant for Factor V Leiden deficiency, HLD and asthma who initially presented to the hospital with SOB and leg swelling. She was found to have bilateral PE, MRSA bacteremia and new atrial fibrillation. She had a PEA arrest on 9/10 and was resuscitated. She had acute renal failure requiring CRRT (now on HD), seizures with multiple infarcts seen on MRI and shock ?cardiogenic given EF of 30-35%. Last week she went back into atrial fibrillation with RVR and was started on an amiodarone drip. She was cardioverted a total of 7 times without prolonged conversion. She was on a Cardizem drip which has been weaned off. She remains on amiodarone drip at 0.5mg/min. She was given multiple doses of IV digoxin over the last couple days, yesterday 500mcg which did improve her HR some per her nurse. She was extubated yesterday. She is able to answer questions and follows some commands. She does keep her eyes closed and speech is somewhat mumbled. Denies any CP, SOB or palpitations. Past Medical History:   Diagnosis Date    Anticoagulant long-term use     Arthritis     Asthma     Baker's cyst     Depression     Factor V Leiden ([facility] [address]     Factor V Leiden ([facility] [address]     H/O blood clots     Heartburn     Hyperlipidemia     MRSA bacteremia 09/10/2020    Osteoarthritis         History reviewed. No pertinent surgical history.     Current Outpatient Medications   Medication Instructions    albuterol sulfate  (90 Base) MCG/ACT inhaler INHALE TWO PUFFS BY MOUTH EVERY 6 HOURS AS NEEDED FOR WHEEZING    atorvastatin (LIPITOR) 10 MG tablet TAKE ONE TABLET BY MOUTH DAILY    BREO ELLIPTA 200-25 MCG/INH AEPB INHALE ONE DOSE BY MOUTH DAILY    DULoxetine (CYMBALTA) 60 MG extended release capsule TAKE ONE CAPSULE BY MOUTH DAILY    omeprazole (PRILOSEC) 20 MG delayed release capsule TAKE ONE CAPSULE BY MOUTH DAILY    vitamin D (ERGOCALCIFEROL) 1.25 MG (25535 UT) CAPS capsule TAKE ONE CAPSULE BY MOUTH ONCE WEEKLY    warfarin (COUMADIN) 7.5 MG tablet TAKE ONE TABLET BY MOUTH DAILY EXCEPT ON MONDAY AND THURSDAY TAKE ONE-HALF TABLET BY MOUTH AS DIRECTED        Allergies   Allergen Reactions    Ibuprofen        Social History:   reports that she quit smoking 13 days ago. Her smoking use included cigarettes. She smoked 0.50 packs per day. She has never used smokeless tobacco. She reports current drug use. Frequency: 14.00 times per week. Drug: Marijuana. She reports that she does not drink alcohol. Family History:  family history includes Heart Attack in her father. Review of Systems:  · General: negative for fever, chills   · Ophthalmic ROS: negative for eye pain or loss of vision  · ENT ROS: negative for headaches, sore throat, nasal drainage  · Respiratory: negative for cough, sputum, SOB  · Cardiovascular: negative for chest pain, palpitations.   · Gastrointestinal: negative for abdominal pain, diarrhea, N/V  · Hematology: negative for bleeding, blood clots, bruising or jaundice  · Genito-Urinary:  negative for dysuria or incontinence  · Musculoskeletal: negative for joint swelling, muscle pain  · Neurological: positive for seizures, negative for confusion, dizziness, headaches   · Psychiatric: negative anxiety, depression  · Dermatological: negative for rash    Medications:  Scheduled Meds:   insulin lispro  0-12 Units Subcutaneous 6 times per day    digoxin  125 mcg Per NG tube Daily    lacosamide (VIMPAT) IVPB  200 mg Intravenous BID    lactobacillus  2 capsule Oral BID WC    furosemide  80 mg Intravenous BID    piperacillin-tazobactam  2.25 g Intravenous Q8H    sodium chloride flush  10 mL Intravenous 2 times per day    pantoprazole  40 mg Intravenous Daily    And    sodium chloride (PF)  10 mL Intravenous Daily    levetiracetam  1,000 mg Intravenous Q12H    vancomycin (VANCOCIN) intermittent dosing (placeholder)   Other RX Placeholder      Continuous Infusions:   amiodarone 450mg/250ml D5W infusion 0.5 mg/min (20)    dilTIAZem Stopped (20 1710)    dextrose      heparin (Porcine) 7 mL/hr (20 0839)     PRN Meds:.metoprolol, heparin (porcine), ondansetron, promethazine, fentanNYL, LORazepam, sodium chloride flush, acetaminophen **OR** acetaminophen, glucose, dextrose, glucagon (rDNA), dextrose     Physical Examination:  Vitals:    20 0747   BP: 138/76   Pulse: 123   Resp: 18   Temp: 98.6 °F (37 °C)   SpO2: 95%        Intake/Output Summary (Last 24 hours) at 2020 1021  Last data filed at 2020 0747  Gross per 24 hour   Intake 2204 ml   Output 3300 ml   Net -1096 ml     In: 1984 [I.V.:624; NG/GT:990]  Out: 2725    Wt Readings from Last 3 Encounters:   20 196 lb 3.4 oz (89 kg)   18 229 lb (103.9 kg)   18 227 lb 1.2 oz (103 kg)     Temp  Av.4 °F (36.9 °C)  Min: 97.9 °F (36.6 °C)  Max: 98.8 °F (37.1 °C)  Pulse  Av.8  Min: 95  Max: 142  BP  Min: 112/67  Max: 160/81  SpO2  Av.2 %  Min: 90 %  Max: 100 %    · Telemetry: Atrial fibrillation v-rates 120s. · Constitutional: Alert, in no acute distress. Appears stated age. · Head: Normocephalic and atraumatic. · Eyes: Conjunctivae normal. EOM are normal.   · Neck: Neck supple. No lymphadenopathy. No rigidity. No JVD present. · Cardiovascular: Irregularly irregular rhythm with fast ventricular response. No murmurs, rubs or gallops. No S3 or S4.  · Pulmonary/Chest: Diminished breath sounds bilaterally. No crackles, wheezes or rhonchi. No respiratory accessory muscle use. · Abdominal: Soft. Normal bowel sounds present.  No distension, No leaflet of the mitral valve. Mitral annular calcification is present. The mitral valve leaflets appear myxomatous. No evidence of mitral valve stenosis. Severe mitral regurgitation is present. Systolic flow reversal in pulmonary veins. The right ventricle is enlarged. Right ventricular systolic function is moderate to severely reduced. Assessment & Plan:    Persistent atrial fibrillation   - rates with fair control, improved overall compared to last week   - s/p cardioversion x7 over the last few days - did not convert for more than a minute or so   - on amiodarone drip, no other rate control meds at this time besides PRN IV metoprolol which has not been given recently   - add PO digoxin, consider adding scheduled IV or PO beta blocker given adequate BP at this time and cardiomyopathy   - CHADS2-VASc 5 (gender, HTN, HF, CVA) - currently on heparin drip     - consider DOAC when appropriate    New cardiomyopathy   - unclear etiology   - EF 30-35% on TTE, difficult to assess on LEVY    - optimize cardiac medications - beta blocker, ACEI/ARB when BP and renal function acceptable   - currently appears well compensated    PEA arrest    - respiratory driven?   - s/p extubation    Mitral regurgitation   - moderate on LEVY    Septic shock   - secondary to bacteremia, pneumonia   - care per ID    Bilateral pulmonary emboli   - on 2L O2   - care per pulmonary    Acute CVA   - ?embolic   - care per neuro      Discussed with Dr. Arian Norton.     AGUSTIN Flynn  [facility], [address] [place] [address]  Phone: [phone]  Fax: [phone]    Electronically signed by AGUSTIN Dailey - CNP on 9/21/2020 at 10:21 AM

## 2020-09-21 NOTE — PROGRESS NOTES
Clinical Pharmacy Note  Heparin Dosing       Lab Results   Component Value Date    APTT 57.6 09/21/2020     Lab Results   Component Value Date    HGB 8.2 09/20/2020    HCT 26.0 09/20/2020     09/20/2020    INR 1.23 09/18/2020       Current Infusion Rate: 6.5 mL/hr    Plan:  Rate: Continue 6.5 mL/hr  Next aPTT: 0700  9/21/20    Pharmacy will continue to monitor and adjust based on aPTT results.     Angelika Maki, PharmD  9/21/2020 1:52 AM

## 2020-09-21 NOTE — PROGRESS NOTES
strength. Pt is functioning well below baseline and will benefit from skilled therapy at this time. Prognosis: Fair  Decision Making: High Complexity  OT Education: OT Role;Plan of Care;Orientation  Barriers to Learning: cognition, jerking/twitching movements  REQUIRES OT FOLLOW UP: Yes  Activity Tolerance  Activity Tolerance: Treatment limited secondary to decreased cognition;Treatment limited secondary to medical complications (free text)  Safety Devices  Safety Devices in place: Yes  Type of devices: Left in bed;Call light within reach;Nurse notified; Bed alarm in place  Restraints  Initially in place: Yes  Restraints: L wrist restraint tied at end of session           Patient Diagnosis(es): The primary encounter diagnosis was Pneumonia due to organism. Diagnoses of Tachycardia, Pleural effusion, bilateral, Elevated lactic acid level, Congestive heart failure, unspecified HF chronicity, unspecified heart failure type ([address] and Multiple subsegmental pulmonary emboli without acute cor pulmonale were also pertinent to this visit. has a past medical history of Anticoagulant long-term use, Arthritis, Asthma, Baker's cyst, Depression, Factor V Leiden ([address] Factor V Leiden ([facility] [address] H/O blood clots, Heartburn, Hyperlipidemia, MRSA bacteremia, and Osteoarthritis. has no past surgical history on file. Restrictions  Restrictions/Precautions  Restrictions/Precautions: Fall Risk  Position Activity Restriction  Other position/activity restrictions: O2 2L via NC. Flexiseal.  NG Tube. COVID negative. Subjective   General  Chart Reviewed: Yes  Patient assessed for rehabilitation services?: Yes  Additional Pertinent Hx: 54 y/o female admit 9/11/2020 with Pneumonia, Pleural Effusion, Tachycardia, Cardiac Arrest.  MRI + Scattered Acute/Subacute Infarct B Cerebral Hemispheres and R Cerebellar Hemisphere; most pronounced L Occipital Lobe, likely embolic. CTA Chest + PE.   9/11-9/19/2020 Pt Intubated.   PMH as noted including Factor V Leiden, OA. Family / Caregiver Present: No  Referring Practitioner: Frederick Huston  Subjective  Subjective: Per RN ok to attempt EOB activity. Pt seen bedside. Pt twitching/jerking that is worse with effort. Social/Functional History  Social/Functional History  Lives With: Spouse  Type of Home: Apartment  Home Layout: One level  Bathroom Shower/Tub: Tub/Shower unit  Bathroom Toilet: Standard  Bathroom Equipment: (No DME.)  Bathroom Accessibility: Accessible  Home Equipment: (No DME.)  ADL Assistance: Independent  Ambulation Assistance: Independent(Without assist device pta.)  Transfer Assistance: Independent  Active : Yes  Additional Comments: Information obtained from pt; limited/unsure accuracy. Objective        Orientation  Overall Orientation Status: Impaired(answers to Birdia Cluck at times but does not state name when asked)  Orientation Level: Disoriented X4  Observation/Palpation  Observation: Shaking/Tremors noted B UEs/LEs. Balance  Sitting Balance: Maximum assistance(EOB ~ 10 min, jerking movements with effort, brief periods to mod A)  Functional Mobility  Functional Mobility Comments: unsafe to attempt OOB transfers at this time. ADL  Feeding: Dependent/Total(NG feeding tube)  Toileting: Dependent/Total(ott and flexiseal)  Additional Comments: Anticipate pt will require max/total assist for all ADLs based on cognition and twitching/jerking when sitting EOB        Bed mobility  Supine to Sit: 2 Person assistance;Dependent/Total  Sit to Supine: Dependent/Total;2 Person assistance  Scootin Person assistance;Dependent/Total(to scoot up in bed)  Transfers  Transfer Comments: unsafe to attempt OOB transfers 2/2 jerking/command following     Cognition  Overall Cognitive Status: Exceptions  Arousal/Alertness: Delayed responses to stimuli  Following Commands: Follows one step commands with increased time; Follows one step commands with repetition; Inconsistently

## 2020-09-21 NOTE — PROGRESS NOTES
Progress Note    Updates  Awake, following commands. Extubated. Quite mobile in bed. Some intermittent twitching is noted, particularly in her LE. Past Medical History:   Diagnosis Date    Anticoagulant long-term use     Arthritis     Asthma     Baker's cyst     Depression     Factor V Leiden ([facility] [address]     Factor V Leiden ([facility] [address]     H/O blood clots     Heartburn     Hyperlipidemia     MRSA bacteremia 09/10/2020    Osteoarthritis      History reviewed. No pertinent surgical history.      Current Facility-Administered Medications:     insulin lispro (HUMALOG) injection vial 0-12 Units, 0-12 Units, Subcutaneous, 6 times per day, Efraín Wallis MD, 2 Units at 09/21/20 0837    digoxin (LANOXIN) tablet 125 mcg, 125 mcg, Per NG tube, Daily, AGUSTIN Reynolds CNP, 125 mcg at 09/21/20 0957    metoprolol (LOPRESSOR) injection 5 mg, 5 mg, Intravenous, Q4H PRN, Cuong Sears MD    amiodarone (CORDARONE) 450 mg in dextrose 5 % 250 mL infusion, 0.5 mg/min, Intravenous, Continuous, Alaina Smith MD, Last Rate: 16.7 mL/hr at 09/21/20 1115, 0.5 mg/min at 09/21/20 1115    lacosamide (VIMPAT) 200 mg in dextrose 5 % 70 mL IVPB, 200 mg, Intravenous, BID, AGUSTIN Jerez CNP, Last Rate: 140 mL/hr at 09/21/20 1115, 200 mg at 09/21/20 1115    heparin (porcine) injection 1,000 Units, 1,000 Units, Intracatheter, PRN, Fátima Boston MD, 2,800 Units at 09/19/20 0841    ondansetron (ZOFRAN) injection 4 mg, 4 mg, Intravenous, Q4H PRN, Efraín Wallis MD, 4 mg at 09/19/20 2320    promethazine (PHENERGAN) in sodium chloride 0.9% 50 mL IVPB 25 mg, 25 mg, Intravenous, Q4H PRN, Efraín Wallis MD, Stopped at 09/18/20 0355    lactobacillus (CULTURELLE) capsule 2 capsule, 2 capsule, Oral, BID , AGUSTIN Xie CNP, 2 capsule at 09/21/20 0837    furosemide (LASIX) injection 80 mg, 80 mg, Intravenous, BID, Fátima Boston MD, 80 mg at 09/21/20 0836    [COMPLETED] dilTIAZem injection 10 mg, 10 mg, acute to subacute infarctions in the bilateral cerebral hemispheres    and right cerebellar hemisphere, most pronounced in the left occipital lobe,    likely embolic. 2. Small old lacunar infarcts in the left thalamus and right cerebellar    hemisphere. 3. Mild chronic microvascular disease. LEVY 9/16/20  Overall LV systolic function moderately reduced.    Mild to moderate mitral regurgitation.    No significant aortic valve abnormalities.    No WINNIE thrombus.       EKG 9/16/20  Atrial flutter.     Impression  1. PEA arrest w/ possible status epilepticus. She responded to AED therapy. Her encephalopathy has improved, now extubated and following commands. The potential for some degree of anoxic brain injury is acknowledged. MRI brain did show embolic appearing infarcts. Various etiologies are possible and include cardioembolic/coagulopathy/septic/general hypoperfusion. She remains in atrial flutter. Additional acute medical issues include PE, bacteremia, PNA, and renal impairment. Recommendations  1. Continue LEV/LCM as ordered. If HD is long term, may need LEV dose adjustment if she can tolerate. 2.  Currently on anticoagulation which is adequate for stroke prevention. 3.  Management of other acute medical issues ongoing. 4.  Prognosis remains guarded. Will likely need prolonged recovery. Will follow along from periphery. Please call back w/ any additional questions or concerns. Thank you.       Korin Pichardo NP  [address] [place] [address] [address] Neurology    A copy of this note was provided for Dr Bairon Lopez MD

## 2020-09-21 NOTE — ADT AUTH CERT
Utilization Reviews         Pulmonary Embolism - Care Day 12 (9/21/2020) by Yesenia Camacho RN         Review Status  Review Entered    Completed  9/21/2020 15:55        Criteria Review       Care Day: 12 Care Date: 9/21/2020 Level of Care: ICU    Guideline Day 5    Level Of Care    (X) Floor to discharge [T]    Clinical Status    ( ) * Hemodynamic stability    (X) * Dyspnea absent    ( ) * New oxygen requirement absent    (X) * Tachypnea absent    (X) * Dangerous arrhythmia absent    (X) * No bleeding or evidence of new emboli    ( ) * INR therapeutic if indicated [R]    ( ) * Pain absent or managed    ( ) * Discharge plans and education understood    Activity    (X) * Ambulatory or acceptable for next level of care    Routes    (X) * Oral hydration, medications, [U] and diet    Interventions    (X) Possible PT/INR    Medications    (X) * Anticoagulants regimen established for next level of care    (X) Anticoagulants [I]    * Milestone    Additional Notes    9/21/2020       Care day 12       ICU       VS: 98.9 24 118 153/74  94% on 2 lpm nc       Internal medicine progress note:  Shock, septic vs cardiogenic    Severe mitral and tricuspid regurgitation on TTE,    moderate pulmonary HTN,    large bilateral pleural effusions and hepatic congestion on CT. MRSA bacteremia. Off pressors    - ID consulted    - cardiology following              MRSA bacteremia, PNA    Lab clarifying susceptibilities. mecA positive    - ID following, continue zosyn, vanc, no cefepime or merrem with concern for seizures    - TTE, LEVY without vegetations though concerning for endocarditis given embolic CVA              Acute CVA    MRI with scattered acute to subacute infarcts, likely embolic    - neuro following    - workup for endocarditis as above, LEVY negative              Cardiomyopathy, acute on chronic systolic HF    EF 30- 53%. Severe diffuse hypokinesis with severely reduced rt ventricular function, severe tricuspid regurgitation and mitral regurgitation, grade 3 diastolic dysfunction. No prior TTE.    - cardiology following    - RHC/LHC when able              S/p PEA arrest, acute hypoxic resp failure    Intubated 9/10 shortly after admission.    - vent management per CC    -Mental status improved enough for SBP 9/19-- extubated 9/19 to 2L              Seizure like activity    Prior to PEA arrest, episodic rhythmic twitching and myoclonus. Initial EEG negative    - keppra 1000 mg BID    - MRI brain as above    - repeat EEG 9/16 with concern for seizures, possible status    - added vimpat    - repeat EEG 9/17 improved with addition of vimpat, partner discussed with neuro and can defer cvEEG for now    - Neurologic status significantly improved              A fib RVR    Cardioverted 9/16, started on amio drip without adequate control, added dilt. Multiple attempts to cardiovert all failed    - cardiology following    - continue to monitor    - added digoxin    -in afib with RVR runs this am.              R groin hematoma    After removing femoral line. Arterial duplex with normal flow, good pulses    - monitor    - back on heparin drip                   Bilateral PE    Ho DVT/PE, on coumadin chronically    - heparin drip-- resumed - monitor groin hematoma.              Oliguric/anuric MORELIA    Suboptimal response to Lasix. -CRRT started, switched to HD    -Checking ZENON (neg), ANCA, complement (c3 low), antiphospholipid antibodies    -Nephrology following    - UOP improved              Coagulapathy    Hx Factor V Leiden on coumadin.  INR therapeutic at 2.2 on admission though hematology notes non-adherence to coumadin in the past. Has had oozing on heparin drip, sepsis with bacteremia, DIC versus shock liver versus vitamin K deficiency    - Heme-onc following, fibrinogen and INR improving, no need for vit K or cryo now    - DOAC when able          Elevated LFTs    Likely shock liver, improving    - daily labs      Obesity Cardioverted 9/16, started on amio drip without adequate control, added dilt. Multiple attempts to cardiovert all failed    - cardiology following    - continue to monitor    - added digoxin    -in afib with RVR runs this am.              R groin hematoma    After removing femoral line. Arterial duplex with normal flow, good pulses    - monitor    - back on heparin drip                   Bilateral PE    Ho DVT/PE, on coumadin chronically    - heparin drip-- resumed - monitor groin hematoma.              Oliguric/anuric MORELIA    Suboptimal response to Lasix. -CRRT started, switched to HD    -Checking ZENON (neg), ANCA, complement (c3 low), antiphospholipid antibodies    -Nephrology following    - UOP improved              Coagulapathy    Hx Factor V Leiden on coumadin.  INR therapeutic at 2.2 on admission though hematology notes non-adherence to coumadin in the past. Has had oozing on heparin drip, sepsis with bacteremia, DIC versus shock liver versus vitamin K deficiency    - Heme-onc following, fibrinogen and INR improving, no need for vit K or cryo now    - DOAC when able          Elevated LFTs    Likely shock liver, improving    - daily labs          EP consult:  Assessment & Plan:         Persistent atrial fibrillation                - rates with fair control, improved overall compared to last week                - s/p cardioversion x7 over the last few days - did not convert for more than a minute or so                - on amiodarone drip, no other rate control meds at this time besides PRN IV metoprolol which has not been given recently                - add PO digoxin, consider adding scheduled IV or PO beta blocker given adequate BP at this time and cardiomyopathy                - CHADS2-VASc 2 (gender, HTN) - currently on heparin drip                                      - consider DOAC when appropriate         New cardiomyopathy                - unclear etiology                - EF 30-35% on TTE, difficult to assess on LEVY                - optimize cardiac medications - beta blocker, ACEI/ARB when BP and renal function acceptable                - currently appears well compensated         PEA arrest                - respiratory driven?                - s/p extubation         Mitral regurgitation                - moderate on LEVY         Septic shock                - secondary to bacteremia, pneumonia                - care per ID         Bilateral pulmonary emboli                - on 2L O2                - care per pulmonary         Acute CVA                - ?embolic                - care per neuro            Pulmonology progress note:  Plan:              PE    - Heparin. - complication of hematoma.  Monitor    -         seizure    -neurology following.    - Keppra    - Vimpat.              status post arrest    - hx infarcts on MRI    - will answer some questions.      -         A fib with RVR    - cardioverted 7 times    - per cardiolgoy              MORELIA    - HD MWF              Cardiomyopathy    - EF 30%    - Amio    - cardiology flowing.              Nutrition    - DIET TUBE FEED CONTINUOUS/CYCLIC NPO; Renal Formula; Nasogastric; Continuous; 20; 55    - SLP today.    - PT OT consulted.        Intake/Output Summary (Last 24 hours) at 9/21/2020 1007    Last data filed at 9/21/2020 0747    Gross per 24 hour    Intake 2204 ml    Output 3300 ml    Net -1096 ml                 Access    Arterial        PICC            CVC                  Abnormal labs:  Hgb 8.6    Hct 26.8    Cl 96    BUN 35    Creatinine 3.1    Alt 112    Bilidir 0.7    Bilitot 1.1       Meds:    amiodarone 450mg/250ml D5W infusion Last Rate: 0.5 mg/min (09/21/20 1115)    heparin (Porcine) Last Rate: 7 mL/hr (09/21/20 0839)        ·  insulin lispro, 0-12 Units, Subcutaneous, 6 times per day    ·  digoxin, 125 mcg, Per NG tube, Daily    ·  metoprolol tartrate, 25 mg, Per NG tube, BID    ·  lacosamide (VIMPAT) IVPB, 200 mg, Intravenous, BID    · cefepime or merrem with concern for seizures    - TTE, LEVY without vegetations though concerning for endocarditis given embolic CVA         Acute CVA    MRI with scattered acute to subacute infarcts, likely embolic    - neuro following    - workup for endocarditis as above, LEVY negative         Cardiomyopathy, acute on chronic systolic HF    EF 30- 39%. Severe diffuse hypokinesis with severely reduced rt ventricular function, severe tricuspid regurgitation and mitral regurgitation, grade 3 diastolic dysfunction. No prior TTE.    - cardiology following    - RHC/LHC when able         S/p PEA arrest, acute hypoxic resp failure    Intubated 9/10 shortly after admission.    - vent management per CC    -Mental status improved enough for SBP 9/19-- extubated 9/19 to 2L         Seizure like activity    Prior to PEA arrest, episodic rhythmic twitching. Initial EEG negative    - neuro following    - keppra 1000 mg BID    - MRI brain as above    - repeat EEG 9/16 with concern for seizures, possible status    - added vimpat    - repeat EEG 9/17 improved with addition of vimpat, discussed with neuro and can defer cvEEG for now    -Neurologic status significantly improved, now following commands, able to extubate though not back to baseline, possible anoxic brain injury, continue to monitor         A fib RVR    Cardioverted 9/16, started on amio drip without adequate control, added dilt. Multiple attempts to cardiovert all failed    - cardiology following    - continue to monitor    - added digoxin         R groin hematoma    After removing femoral line. Arterial duplex with normal flow, good pulses    - monitor    - off heparin drip         Bilateral PE    Ho DVT/PE, on coumadin chronically    - heparin drip-- currently off with new hematoma         Oliguric/anuric MORELIA    Suboptimal response to Lasix.     -CRRT started, switched to HD    -Checking ZENON (neg), ANCA, complement (c3 low), antiphospholipid antibodies    -Nephrology following    - UOP improved         Coagulapathy    Hx Factor V Leiden on coumadin. INR therapeutic at 2.2 on admission though hematology notes non-adherence to coumadin in the past. Has had oozing on heparin drip, sepsis with bacteremia, DIC versus shock liver versus vitamin K deficiency    -Heme-onc following, fibrinogen and INR improving, no need for vit K or cryo now    - DOAC when able         Elevated LFTs    Likely shock liver, improving    - daily labs         Obesity    Body mass index is 31.25 kg/m². Counseled on effects of weight on overall health and chronic medical conditions and discussed weight loss.              DVT Prophylaxis: heparin    Diet: DIET TUBE FEED CONTINUOUS/CYCLIC NPO; Renal Formula; Nasogastric; Continuous; 20; 55    Code Status: Full Code     PT/OT Eval Status: deferred       Nephrology progress note:  ASSESSMENT and PLAN:         1. MORELIA    - likely secondary to ischemia ATI. She had additional exposure to radiocontrast                - ZENON is neg, ANCA neg, C3 is low at 54    - Initiated on IHD on 9/11/20. Transitioned to CRRT on 9/12/20 which was stopped 9/15/20.  Restarted HD 09/17/20     on IV Lasix , urine output is great - however Cr rebounding still.    - HD TTS.           2. Hyperkalemia/metabolic acidosis: resolved         3. S/P PEA arrest 9/11/20         4. Acute hypoxic respiratory failure:    - per pulmonary    - Extubated 9/19.         5. MRSA bacteremia:    - on Vancomycin with pharmacy dosing    - LEVY shows no vegetation         6. CSDHF    - wt down significantly with CRRT    - now non-oliguric with Lasix         7. Factor V leiden deficiency with PE/coagulopathy/DIC:    - per hematology         8.  CVA    - MRI shows bilateral acute sub acute infarcts out    - neurology following    - EEG apparently better with higher keppra dose         9. atrial fibrillation    - on cardizem gtt and amiodarone gtt    - not rate controlled          Pulmonology progress  note:    Plan:    · Titrate oxygen for saturations greater than or equal to 90%    · Antibiotics per ID    · GI/DVT prophylaxis:  Protonix, SCDS.  .  Protonix as substitute for home PPI    · Resume heparin gtt today.      · Amiodarone and Diltiazem infusions.  Defer to cardiology regarding transitioning    · Resume enteral feeding    · Glycemic control    · RRT Per Nephrology.  Recommend fluid removal with next session due to effusions    · AEDs per Neurology.      · PT/OT if able.  Will at least try            Cultures:    Sputum:  MSSA    Blood:  MRSA       Abnormal labs:  WBC 11.3    Hgb 8.2    Hct 26.0    Plt 134    K 3.4    Cl 93    BUN 24    Creatinine 2.3    Ast 43    Alt 147    Bilidirect 0.9    Bilitot 1.5    Protime 14.3    INR 1.236    Aptt 44.8       Meds:    amiodarone 450mg/250ml D5W infusion Last Rate: 0.5 mg/min (09/21/20 1115)    dilTIAZem Last Rate: Stopped (09/20/20 1710)    heparin (Porcine) Last Rate: 7 mL/hr (09/21/20 0839)        ·  insulin lispro, 0-12 Units, Subcutaneous, 6 times per day    ·  digoxin, 125 mcg, Per NG tube, Daily    ·  metoprolol tartrate, 25 mg, Per NG tube, BID    ·  lacosamide (VIMPAT) IVPB, 200 mg, Intravenous, BID    ·  lactobacillus, 2 capsule, Oral, BID WC    ·  furosemide, 80 mg, Intravenous, BID    ·  piperacillin-tazobactam, 2.25 g, Intravenous, Q8H    ·  sodium chloride flush, 10 mL, Intravenous, 2 times per day    ·  pantoprazole, 40 mg, Intravenous, Daily **AND** sodium chloride (PF), 10 mL, Intravenous, Daily    ·  levetiracetam, 1,000 mg, Intravenous, Q12H    ·  vancomycin (VANCOCIN) intermittent dosing (placeholder), , Other, RX Placeholder       PRN meds:    Fentanyl 50 mcg q 2 hrs prn iv x 1    Ativan 2 mg q 2 hrs prn iv x 1           Pulmonary Embolism - Care Day 10 (9/19/2020) by Angelika Kurtz RN         Review Status  Review Entered    Completed  9/21/2020 15:52        Criteria Review       Care Day: 10 Care Date: 9/19/2020 Level of Care: ICU Guideline Day 5    Level Of Care    (X) Floor to discharge [T]    Clinical Status    ( ) * Hemodynamic stability    (X) * Dyspnea absent    ( ) * New oxygen requirement absent    (X) * Tachypnea absent    (X) * Dangerous arrhythmia absent    (X) * No bleeding or evidence of new emboli    ( ) * INR therapeutic if indicated [R]    ( ) * Pain absent or managed    ( ) * Discharge plans and education understood    Activity    (X) * Ambulatory or acceptable for next level of care    Routes    (X) * Oral hydration, medications, [U] and diet    Interventions    (X) Possible PT/INR    Medications    (X) * Anticoagulants regimen established for next level of care    (X) Anticoagulants [I]    * Milestone    Additional Notes    9/19/2020       Care day 10       ICU       VS: 97.6 22 115 124/87 40% FI02 100% saturation on ventilator       Internal medicine progress note:  Shock, septic vs cardiogenic    Severe mitral and tricuspid regurgitation on TTE, moderate pulmonary HTN, large bilateral pleural effusions and hepatic congestion on CT. MRSA bacteremia. Off pressors    - ID consulted    - cardiology following         MRSA bacteremia, PNA    Lab clarifying susceptibilities. mecA positive    - ID following, switched to Unasyn, continue vanc --switched to zosyn, vanc, no cefepime or merrem with concern for seizures    - TTE, LEVY without vegetations though concerning for endocarditis given embolic CVA         Acute CVA    MRI with scattered acute to subacute infarcts, likely embolic    - neuro following    - workup for endocarditis as above, LEVY negative         Cardiomyopathy, acute on chronic systolic HF    EF 30- 78%. Severe diffuse hypokinesis with severely reduced rt ventricular function, severe tricuspid regurgitation and mitral regurgitation, grade 3 diastolic dysfunction.  No prior TTE.    - cardiology following    - RHC/LHC when able         S/p PEA arrest    Intubated 9/10 shortly after admission    - vent management per CC    -Mental status improved enough for SBP 9/19         Seizure like activity    Prior to PEA arrest, episodic rhythmic twitching. Initial EEG negative    - neuro following    - keppra 1000 mg BID    - MRI brain as above    - repeat EEG 9/16 with concern for seizures, possible status    - added vimpat    - repeat EEG 9/17 improved with addition of vimpat, discussed with neuro and can defer cvEEG for now    -Neurologic status significantly improved, no following commands         A fib RVR    Cardioverted 9/16, started on amio drip without adequate control, added dilt. Multiple attempts to cardiovert all failed    - cardiology following    - continue to monitor    - added digoxin         Coagulapathy    Hx Factor V Leiden on coumadin. INR therapeutic at 2.2 on admission though hematology notes non-adherence to coumadin in the past. Has had oozing on heparin drip, sepsis with bacteremia, DIC versus shock liver versus vitamin K deficiency    -Heme-onc following, fibrinogen and INR improving, no need for vit K or cryo now    - DOAC when able         R groin hematoma    After removing femoral line. Arterial duplex with normal flow, good pulses    - monitor    - off heparin drip         Bilateral PE    Ho DVT/PE, on coumadin chronically    - heparin drip-- currently off with new hematoma         Oliguric/anuric MORELIA    Suboptimal response to Lasix. -CRRT started, switched to HD    -Checking ZENON (neg), ANCA, complement (c3 low), antiphospholipid antibodies    -Nephrology following         Elevated LFTs    Likely shock liver, improving    - daily labs         Obesity    Body mass index is 32.01 kg/m².     Counseled on effects of weight on overall health and chronic medical conditions and discussed weight loss.              DVT Prophylaxis: heparin    Diet: DIET TUBE FEED CONTINUOUS/CYCLIC NPO; Renal Formula; Nasogastric; Continuous; 20; 55    Code Status: Full Code     PT/OT Eval Status: deferred       Pulmonology progress note:    Plan:    · SBT today      · ABG in 30 minutes    · PRN sedations    · Antibiotics per ID    · GI/DVT prophylaxis:  Protonix, SCDS.  Heparin gtt on hold due to hematoma in the right groin    · Amiodarone and Diltiazem infusions.  Defer to cardiology regarding transitioning    · Hold enteral feeding    · Glycemic control    · RRT Per Nephrology    · AEDs per Neurology. Sana Yoder    Mental status much improved today.  Following commands.         Nephrology progress note:  ASSESSMENT and PLAN:         1. MORELIA    - likely secondary to ischemia ATI. She had additional exposure to radiocontrast                - ZENON is neg, ANCA neg, C3 is low at 54    - Initiated on IHD on 9/11/20. Transitioned to CRRT on 9/12/20 which was stopped 9/15/20.  Restarted HD 09/17/20     started IV Lasix , urine output is great - however Cr rebounding still.    - HD TTS.  On HD.         2. Hyperkalemia/metabolic acidosis: resolved         3. S/P PEA arrest 9/11/20         4. Acute hypoxic respiratory failure:    - per pulmonary         5. MRSA bacteremia:    - on Vancomycin with pharmacy dosing    - LEVY shows no vegetation         6. CSDHF    - wt down significantly with CRRT    - now non-oliguric with Lasix         7. Factor V leiden deficiency with PE/coagulopathy/DIC:    - per hematology         8. CVA    - MRI shows bilateral acute sub acute infarcts out    - neurology following    - EEG apparently better with higher keppra dose         9. atrial fibrillation    - on cardizem gtt and amiodarone gtt    - not rate controlled quite yet         Neurology progress note:    Impression:    1. PEA arrest. Remains intubated. Following commands. Concern for seizures/status epilepticus - improved, not currently seizing. 2. Encephalopathy secondary to #1. Improving. Again, now following commands. 3. Multifocal/multivessel ischemic infarcts - cardioembolic vs coagulopathy vs septic vs hypoperfusion.     4. Multiple other medical comorbidities.         Plan:    1. Continue Keppra and Vimpat at current doses. No need for cvEEG at this time. Would reconsider if significant worsening or increased clinical activity concerning for prolonged seizures. 2. Continue supportive care. 3. Your medical management. 4. Limit sedating meds when possible. 5. Prognosis still guarded. Will need prolonged recovery if able. 6. Neurology will follow at a distance. Call with questions.     7. >25 minutes spent with patient, including chart review, discussion with pt/nursing, examination of the patient, documentation, etc.               Abnormal labs:  Wbc 15.1    Hgb 8.2    Hct 26.3    Cl 9*6    BUN 57    Creatinine 3.1    Ast 51    Alt 200    Bilidirect 0.9    Bilitot 1.3       Meds:  amiodarone 450mg/250ml D5W infusion Last Rate: 0.5 mg/min (09/21/20 1115)    dilTIAZem Last Rate: Stopped (09/20/20 1710)    dextrose    heparin (Porcine) Last Rate: 7 mL/hr (09/21/20 0839)        ·  insulin lispro, 0-12 Units, Subcutaneous, 6 times per day    ·  digoxin, 125 mcg, Per NG tube, Daily    ·  metoprolol tartrate, 25 mg, Per NG tube, BID    ·  lacosamide (VIMPAT) IVPB, 200 mg, Intravenous, BID    ·  lactobacillus, 2 capsule, Oral, BID WC    ·  furosemide, 80 mg, Intravenous, BID    ·  piperacillin-tazobactam, 2.25 g, Intravenous, Q8H    ·  sodium chloride flush, 10 mL, Intravenous, 2 times per day    ·  pantoprazole, 40 mg, Intravenous, Daily **AND** sodium chloride (PF), 10 mL, Intravenous, Daily    ·  levetiracetam, 1,000 mg, Intravenous, Q12H    ·  vancomycin (VANCOCIN) intermittent dosing (placeholder), , Other, RX Placeholder       PRN meds:    Zofran 4 mg q 4 hrs prn iv x 1    Heparin 1000 unit prn x 1

## 2020-09-21 NOTE — PROGRESS NOTES
Comprehensive Nutrition Assessment    Type and Reason for Visit:  Reassess    Nutrition Recommendations/Plan:   Continue nutrition via NG, using Nepro formula, goal rate of 55 ml per hour + 1 bottle of Proteinex per feeding tube per day    Nutrition Assessment:  Follow-up. Pt now extubated. Swallowing evaluated this date with recommendations for alternate nutrition. Pt continues to tolerate adjusted goal rate of 55 ml Nepro formula + 1 bottle of Proteinex per feeding tube per day. Current flush noted at 30 ml every 4 hours. Regimen remains appropriate. CRRT converted to routine HD. Noted mental status is improving. Malnutrition Assessment:  Malnutrition Status:  Insufficient data    Context:  Acute Illness       Estimated Daily Nutrient Needs:  Energy (kcal):  7955-0254 (adj for HD & obesity); Weight Used for Energy Requirements:        Protein (g):   (1.2-2 x IBW 61 kg (adj for obesity & HD); Weight Used for Protein Requirements:           Fluid (ml/day):  per provider; Weight Used for Fluid Requirements:         Nutrition Related Findings:  Noted BM per rectal tube. Noted trace edema to BUE & +1 edema to BLE. Wounds:  (noted blister on back)       Current Nutrition Therapies:    DIET TUBE FEED CONTINUOUS/CYCLIC NPO; Renal Formula; Nasogastric; Continuous; 20; 55  Current Tube Feeding (TF) Orders:  · Feeding Route: Nasogastric  · Formula: Renal(Nepro with new goal rate of 55 ml per hour + 1 bottle of Proteinex per feeding tube per day)  · Schedule: Continuous  · Additives/Modulars: Protein(1 bottle of Proteinex per feeding tube per day.  Do not mix directly with TF.)  · Water Flushes: per provider  · Current TF & Flush Orders Provides: 1100 ml TV / 2084 kcals (1980 (TF) + 104 (Proteinex) / 115 gms pro (89 (TF) + 26 (Proteinex) / 800 ml free water per day    Anthropometric Measures:  · Height: 5' 7\" (170.2 cm)  · Current Body Weight: 196 lb (88.9 kg)   · Admission Body Weight: 252 lb (114.3 kg) · Ideal Body Weight: 135 lbs; % Ideal Body Weight 145.2 %   · BMI: 30.7  · BMI Categories: Obese Class 1 (BMI 30.0-34. 9)       Nutrition Diagnosis:   · Inadequate oral intake related to biting/chewing (masticatory) difficulty as evidenced by nutrition support - enteral nutrition      Nutrition Interventions:   Food and/or Nutrient Delivery:  Continue Current Tube Feeding  Nutrition Education/Counseling:  No recommendation at this time   Coordination of Nutrition Care:  Continued Inpatient Monitoring    Goals:  tolerate most appropriate form of nutrition       Nutrition Monitoring and Evaluation:   Food/Nutrient Intake Outcomes:  Enteral Nutrition Intake/Tolerance  Physical Signs/Symptoms Outcomes:  Biochemical Data, Chewing or Swallowing, Constipation, Diarrhea, Nausea or Vomiting, Fluid Status or Edema, Weight, Skin, Nutrition Focused Physical Findings     Discharge Planning:     Too soon to determine     Electronically signed by Donna Alba RD, LD on 9/21/20 at 12:33 PM EDT    Contact: [phone]

## 2020-09-22 LAB
ALBUMIN SERPL-MCNC: 2.8 G/DL (ref 3.4–5)
ALP BLD-CCNC: 131 U/L (ref 40–129)
ALT SERPL-CCNC: 88 U/L (ref 10–40)
ANION GAP SERPL CALCULATED.3IONS-SCNC: 14 MMOL/L (ref 3–16)
APTT: 68.2 SEC (ref 24.2–36.2)
APTT: 73.1 SEC (ref 24.2–36.2)
APTT: 78.8 SEC (ref 24.2–36.2)
AST SERPL-CCNC: 36 U/L (ref 15–37)
BASOPHILS ABSOLUTE: 0.2 K/UL (ref 0–0.2)
BASOPHILS RELATIVE PERCENT: 1.3 %
BILIRUB SERPL-MCNC: 0.9 MG/DL (ref 0–1)
BILIRUBIN DIRECT: 0.5 MG/DL (ref 0–0.3)
BILIRUBIN, INDIRECT: 0.4 MG/DL (ref 0–1)
BUN BLDV-MCNC: 46 MG/DL (ref 7–20)
CALCIUM SERPL-MCNC: 8.6 MG/DL (ref 8.3–10.6)
CHLORIDE BLD-SCNC: 93 MMOL/L (ref 99–110)
CO2: 29 MMOL/L (ref 21–32)
CREAT SERPL-MCNC: 3.2 MG/DL (ref 0.6–1.1)
EOSINOPHILS ABSOLUTE: 0.4 K/UL (ref 0–0.6)
EOSINOPHILS RELATIVE PERCENT: 2.9 %
GFR AFRICAN AMERICAN: 19
GFR NON-AFRICAN AMERICAN: 15
GLUCOSE BLD-MCNC: 138 MG/DL (ref 70–99)
GLUCOSE BLD-MCNC: 155 MG/DL (ref 70–99)
GLUCOSE BLD-MCNC: 209 MG/DL (ref 70–99)
GLUCOSE BLD-MCNC: 215 MG/DL (ref 70–99)
GLUCOSE BLD-MCNC: 220 MG/DL (ref 70–99)
GLUCOSE BLD-MCNC: 97 MG/DL (ref 70–99)
HAPTOGLOBIN: 96 MG/DL (ref 30–200)
HCT VFR BLD CALC: 27.2 % (ref 36–48)
HEMOGLOBIN: 8.5 G/DL (ref 12–16)
LYMPHOCYTES ABSOLUTE: 2.1 K/UL (ref 1–5.1)
LYMPHOCYTES RELATIVE PERCENT: 16 %
MAGNESIUM: 2.2 MG/DL (ref 1.8–2.4)
MCH RBC QN AUTO: 26.6 PG (ref 26–34)
MCHC RBC AUTO-ENTMCNC: 31.1 G/DL (ref 31–36)
MCV RBC AUTO: 85.6 FL (ref 80–100)
MONOCYTES ABSOLUTE: 0.7 K/UL (ref 0–1.3)
MONOCYTES RELATIVE PERCENT: 5.6 %
NEUTROPHILS ABSOLUTE: 9.5 K/UL (ref 1.7–7.7)
NEUTROPHILS RELATIVE PERCENT: 74.2 %
PDW BLD-RTO: 20.2 % (ref 12.4–15.4)
PERFORMED ON: ABNORMAL
PERFORMED ON: NORMAL
PHOSPHORUS: 4 MG/DL (ref 2.5–4.9)
PLATELET # BLD: 269 K/UL (ref 135–450)
PMV BLD AUTO: 9.7 FL (ref 5–10.5)
POTASSIUM SERPL-SCNC: 3 MMOL/L (ref 3.5–5.1)
RBC # BLD: 3.18 M/UL (ref 4–5.2)
SODIUM BLD-SCNC: 136 MMOL/L (ref 136–145)
TOTAL PROTEIN: 6 G/DL (ref 6.4–8.2)
VANCOMYCIN RANDOM: 11.3 UG/ML
WBC # BLD: 12.8 K/UL (ref 4–11)

## 2020-09-22 PROCEDURE — C9254 INJECTION, LACOSAMIDE: HCPCS | Performed by: NURSE PRACTITIONER

## 2020-09-22 PROCEDURE — 92526 ORAL FUNCTION THERAPY: CPT

## 2020-09-22 PROCEDURE — 05HC33Z INSERTION OF INFUSION DEVICE INTO LEFT BASILIC VEIN, PERCUTANEOUS APPROACH: ICD-10-PCS | Performed by: INTERNAL MEDICINE

## 2020-09-22 PROCEDURE — 90935 HEMODIALYSIS ONE EVALUATION: CPT

## 2020-09-22 PROCEDURE — 2580000003 HC RX 258: Performed by: INTERNAL MEDICINE

## 2020-09-22 PROCEDURE — 76937 US GUIDE VASCULAR ACCESS: CPT

## 2020-09-22 PROCEDURE — C1751 CATH, INF, PER/CENT/MIDLINE: HCPCS

## 2020-09-22 PROCEDURE — 85025 COMPLETE CBC W/AUTO DIFF WBC: CPT

## 2020-09-22 PROCEDURE — 83735 ASSAY OF MAGNESIUM: CPT

## 2020-09-22 PROCEDURE — 6360000002 HC RX W HCPCS: Performed by: NURSE PRACTITIONER

## 2020-09-22 PROCEDURE — 94760 N-INVAS EAR/PLS OXIMETRY 1: CPT

## 2020-09-22 PROCEDURE — 6370000000 HC RX 637 (ALT 250 FOR IP): Performed by: NURSE PRACTITIONER

## 2020-09-22 PROCEDURE — 80076 HEPATIC FUNCTION PANEL: CPT

## 2020-09-22 PROCEDURE — 80202 ASSAY OF VANCOMYCIN: CPT

## 2020-09-22 PROCEDURE — 6360000002 HC RX W HCPCS: Performed by: INTERNAL MEDICINE

## 2020-09-22 PROCEDURE — 99233 SBSQ HOSP IP/OBS HIGH 50: CPT | Performed by: INTERNAL MEDICINE

## 2020-09-22 PROCEDURE — 2500000003 HC RX 250 WO HCPCS: Performed by: INTERNAL MEDICINE

## 2020-09-22 PROCEDURE — 99232 SBSQ HOSP IP/OBS MODERATE 35: CPT | Performed by: NURSE PRACTITIONER

## 2020-09-22 PROCEDURE — APPNB15 APP NON BILLABLE TIME 0-15 MINS: Performed by: NURSE PRACTITIONER

## 2020-09-22 PROCEDURE — C9113 INJ PANTOPRAZOLE SODIUM, VIA: HCPCS | Performed by: INTERNAL MEDICINE

## 2020-09-22 PROCEDURE — 2700000000 HC OXYGEN THERAPY PER DAY

## 2020-09-22 PROCEDURE — 84100 ASSAY OF PHOSPHORUS: CPT

## 2020-09-22 PROCEDURE — 2580000003 HC RX 258: Performed by: NURSE PRACTITIONER

## 2020-09-22 PROCEDURE — 2580000003 HC RX 258: Performed by: STUDENT IN AN ORGANIZED HEALTH CARE EDUCATION/TRAINING PROGRAM

## 2020-09-22 PROCEDURE — 83010 ASSAY OF HAPTOGLOBIN QUANT: CPT

## 2020-09-22 PROCEDURE — 6370000000 HC RX 637 (ALT 250 FOR IP): Performed by: INTERNAL MEDICINE

## 2020-09-22 PROCEDURE — 80048 BASIC METABOLIC PNL TOTAL CA: CPT

## 2020-09-22 PROCEDURE — 85730 THROMBOPLASTIN TIME PARTIAL: CPT

## 2020-09-22 PROCEDURE — 99231 SBSQ HOSP IP/OBS SF/LOW 25: CPT | Performed by: INTERNAL MEDICINE

## 2020-09-22 PROCEDURE — 2000000000 HC ICU R&B

## 2020-09-22 RX ORDER — LEVETIRACETAM 100 MG/ML
1000 SOLUTION ORAL 2 TIMES DAILY
Status: DISCONTINUED | OUTPATIENT
Start: 2020-09-22 | End: 2020-10-04 | Stop reason: HOSPADM

## 2020-09-22 RX ORDER — SODIUM CHLORIDE 0.9 % (FLUSH) 0.9 %
10 SYRINGE (ML) INJECTION PRN
Status: DISCONTINUED | OUTPATIENT
Start: 2020-09-22 | End: 2020-10-04 | Stop reason: HOSPADM

## 2020-09-22 RX ORDER — SODIUM CHLORIDE 0.9 % (FLUSH) 0.9 %
10 SYRINGE (ML) INJECTION EVERY 12 HOURS SCHEDULED
Status: DISCONTINUED | OUTPATIENT
Start: 2020-09-22 | End: 2020-10-04 | Stop reason: HOSPADM

## 2020-09-22 RX ORDER — POTASSIUM CHLORIDE 29.8 MG/ML
20 INJECTION INTRAVENOUS
Status: COMPLETED | OUTPATIENT
Start: 2020-09-22 | End: 2020-09-22

## 2020-09-22 RX ORDER — LIDOCAINE HYDROCHLORIDE 10 MG/ML
5 INJECTION, SOLUTION EPIDURAL; INFILTRATION; INTRACAUDAL; PERINEURAL ONCE
Status: DISCONTINUED | OUTPATIENT
Start: 2020-09-22 | End: 2020-10-04 | Stop reason: HOSPADM

## 2020-09-22 RX ORDER — LORAZEPAM 2 MG/ML
2 INJECTION INTRAMUSCULAR
Status: DISCONTINUED | OUTPATIENT
Start: 2020-09-22 | End: 2020-10-04 | Stop reason: HOSPADM

## 2020-09-22 RX ORDER — LACOSAMIDE 100 MG/1
200 TABLET ORAL 2 TIMES DAILY
Status: DISCONTINUED | OUTPATIENT
Start: 2020-09-22 | End: 2020-10-04 | Stop reason: HOSPADM

## 2020-09-22 RX ADMIN — LEVETIRACETAM 1000 MG: 500 SOLUTION ORAL at 21:07

## 2020-09-22 RX ADMIN — METOPROLOL TARTRATE 25 MG: 25 TABLET, FILM COATED ORAL at 21:07

## 2020-09-22 RX ADMIN — POTASSIUM CHLORIDE 20 MEQ: 29.8 INJECTION, SOLUTION INTRAVENOUS at 18:35

## 2020-09-22 RX ADMIN — VANCOMYCIN HYDROCHLORIDE 1250 MG: 5 INJECTION, POWDER, LYOPHILIZED, FOR SOLUTION INTRAVENOUS at 15:59

## 2020-09-22 RX ADMIN — INSULIN LISPRO 4 UNITS: 100 INJECTION, SOLUTION INTRAVENOUS; SUBCUTANEOUS at 03:08

## 2020-09-22 RX ADMIN — POTASSIUM CHLORIDE 20 MEQ: 29.8 INJECTION, SOLUTION INTRAVENOUS at 17:15

## 2020-09-22 RX ADMIN — Medication 10 ML: at 12:11

## 2020-09-22 RX ADMIN — SODIUM CHLORIDE, PRESERVATIVE FREE 10 ML: 5 INJECTION INTRAVENOUS at 21:07

## 2020-09-22 RX ADMIN — DIGOXIN 125 MCG: 125 TABLET ORAL at 12:10

## 2020-09-22 RX ADMIN — INSULIN LISPRO 2 UNITS: 100 INJECTION, SOLUTION INTRAVENOUS; SUBCUTANEOUS at 18:46

## 2020-09-22 RX ADMIN — SODIUM CHLORIDE, PRESERVATIVE FREE 10 ML: 5 INJECTION INTRAVENOUS at 12:12

## 2020-09-22 RX ADMIN — AMIODARONE HYDROCHLORIDE 0.5 MG/MIN: 50 INJECTION, SOLUTION INTRAVENOUS at 15:59

## 2020-09-22 RX ADMIN — LORAZEPAM 2 MG: 2 INJECTION INTRAMUSCULAR; INTRAVENOUS at 03:32

## 2020-09-22 RX ADMIN — AMIODARONE HYDROCHLORIDE 1 MG/MIN: 50 INJECTION, SOLUTION INTRAVENOUS at 02:22

## 2020-09-22 RX ADMIN — Medication 2 CAPSULE: at 15:59

## 2020-09-22 RX ADMIN — PANTOPRAZOLE SODIUM 40 MG: 40 INJECTION, POWDER, FOR SOLUTION INTRAVENOUS at 12:11

## 2020-09-22 RX ADMIN — LEVETIRACETAM 1000 MG: 500 SOLUTION ORAL at 16:00

## 2020-09-22 RX ADMIN — LACOSAMIDE 200 MG: 100 TABLET, FILM COATED ORAL at 21:07

## 2020-09-22 RX ADMIN — DEXTROSE MONOHYDRATE 200 MG: 50 INJECTION, SOLUTION INTRAVENOUS at 12:10

## 2020-09-22 RX ADMIN — Medication 2 CAPSULE: at 12:10

## 2020-09-22 RX ADMIN — HEPARIN SODIUM 8 ML/HR: 10000 INJECTION, SOLUTION INTRAVENOUS at 07:41

## 2020-09-22 RX ADMIN — INSULIN LISPRO 4 UNITS: 100 INJECTION, SOLUTION INTRAVENOUS; SUBCUTANEOUS at 13:29

## 2020-09-22 ASSESSMENT — PAIN SCALES - GENERAL
PAINLEVEL_OUTOF10: 2
PAINLEVEL_OUTOF10: 0

## 2020-09-22 NOTE — PROGRESS NOTES
Occupational Therapy    Attempted to see pt for OT tx. Pt undergoing dialysis. Will follow up as pt condition permit.     Electronically signed by Maggi Eduardo OT on 9/22/2020 at 10:51 AM

## 2020-09-22 NOTE — PROGRESS NOTES
09/21/20  0640 09/22/20  0407    139 136   K 3.4* 3.5 3.0*   CL 93* 96* 93*   CO2 28 31 29   PHOS 3.8 4.0 4.0   BUN 24* 35* 46*   CREATININE 2.3* 3.1* 3.2*     LIVER PROFILE:   Recent Labs     09/20/20  0535 09/21/20  0640 09/22/20  0407   AST 43* 37 36   * 112* 88*   BILIDIR 0.9* 0.7* 0.5*   BILITOT 1.5* 1.1* 0.9   ALKPHOS 111 114 131*     PT/INR: No results for input(s): PROTIME, INR in the last 72 hours. APTT:   Recent Labs     09/21/20  1350 09/21/20 2000 09/22/20  0300   APTT 68.2* 44.1* 73.1*     UA:No results for input(s): NITRITE, COLORU, PHUR, LABCAST, WBCUA, RBCUA, MUCUS, TRICHOMONAS, YEAST, BACTERIA, CLARITYU, SPECGRAV, LEUKOCYTESUR, UROBILINOGEN, BILIRUBINUR, BLOODU, GLUCOSEU, AMORPHOUS in the last 72 hours. Invalid input(s): Starr Starr  No results for input(s): PH, PCO2, PO2 in the last 72 hours. Cx:      Films:      Assessment:             Plan:         PE  - Heparin. - complication of hematoma. Monitor. No change. - Wean O2 to sat >90%. seizure   -neurology following.   - Keppra  - Vimpat. status post arrest  - hx infarcts on MRI  - will answer some questions. -     A fib with RVR  - cardioverted 7 times  - per cardiolgoy  - amio, dig, amio      MORELIA  - HD MWF      Cardiomyopathy  - EF 30%  - Amio  - cardiology following. Nutrition  - DIET TUBE FEED CONTINUOUS/CYCLIC NPO; Renal Formula; Nasogastric; Continuous; 20; 55  - SLP. Currently NPO.   - PT OT consulted. Intake/Output Summary (Last 24 hours) at 9/22/2020 0729  Last data filed at 9/22/2020 0600  Gross per 24 hour   Intake 3108.9 ml   Output 2315 ml   Net 793.9 ml          Access  Arterial      PICC          CVC             Will sign off at this time. Thanks for the consult. Please call with questions.      Chip Seym[facility] [phone]

## 2020-09-22 NOTE — PROGRESS NOTES
Infectious Disease Follow up Notes  Admit Date: 9/10/2020  Hospital Day: 13    Antibiotics :   IV Vancomycin     CHIEF COMPLAINT:   MRSA bacteremia  MORELIA  SHOCK liver  Lactic acidosis   Pulmonary embolism  Cardiac arrest  CVA likely Embolic        Subjective interval History :  53 y.o.   Remains in ICU and mother at bed side and she remains on nasal cannula and some agitation and non purposeful movements noted and remains in A fib not able to give much history today and no fevers and WBC mild elevation noted     Past Medical History:    Past Medical History:   Diagnosis Date    Anticoagulant long-term use     Arthritis     Asthma     Baker's cyst     Depression     [facility]     Factor V Leiden ([facility]     H/O blood clots     Heartburn     Hyperlipidemia     MRSA bacteremia 09/10/2020    Osteoarthritis        Past Surgical History:    No surgeries reported      Current Medications:    Outpatient Medications Marked as Taking for the 9/10/20 encounter [facility] Encounter)   Medication Sig Dispense Refill    albuterol sulfate  (90 Base) MCG/ACT inhaler INHALE TWO PUFFS BY MOUTH EVERY 6 HOURS AS NEEDED FOR WHEEZING 18 g 0    vitamin D (ERGOCALCIFEROL) 1.25 MG (10878 UT) CAPS capsule TAKE ONE CAPSULE BY MOUTH ONCE WEEKLY 12 capsule 0    omeprazole (PRILOSEC) 20 MG delayed release capsule TAKE ONE CAPSULE BY MOUTH DAILY 90 capsule 6    warfarin (COUMADIN) 7.5 MG tablet TAKE ONE TABLET BY MOUTH DAILY EXCEPT ON MONDAY AND THURSDAY TAKE ONE-HALF TABLET BY MOUTH AS DIRECTED 21 tablet 5    atorvastatin (LIPITOR) 10 MG tablet TAKE ONE TABLET BY MOUTH DAILY 90 tablet 3    DULoxetine (CYMBALTA) 60 MG extended release capsule TAKE ONE CAPSULE BY MOUTH DAILY 30 capsule 11    BREO ELLIPTA 200-25 MCG/INH AEPB INHALE ONE DOSE BY MOUTH DAILY 1 each 11       Allergies:  Ibuprofen    Immunizations :   Immunization History Administered Date(s) Administered    Pneumococcal Polysaccharide (Yejirpeag02) 2007       Social History:   Social History     Tobacco Use    Smoking status: Former Smoker     Packs/day: 0.50     Types: Cigarettes     Last attempt to quit: 2020     Years since quittin.0    Smokeless tobacco: Never Used   Substance Use Topics    Alcohol use: No     Alcohol/week: 0.0 standard drinks    Drug use: Yes     Frequency: 14.0 times per week     Types: Marijuana     Social History     Tobacco Use   Smoking Status Former Smoker    Packs/day: 0.50    Types: Cigarettes    Last attempt to quit: 2020    Years since quittin.0   Smokeless Tobacco Never Used      Family History   Problem Relation Age of Onset    Heart Attack Father          REVIEW OF SYSTEMS:    No fever / chills / sweats. No weight loss. No visual change, eye pain, eye discharge. No oral lesion, sore throat, dysphagia.  + cough / sputum, sob+    Denies chest pain, +palpitations. Denies n / v / abd pain. No diarrhea. Denies dysuria or change in urinary function. Denies joint swelling or pain. No myalgia, arthralgia. Denies focal weakness, sensory change or other neurologic symptom. No symptoms endocrinopathy. No symptoms hematologic disease.         PHYSICAL EXAM:      Vitals:    BP (!) 109/56   Pulse 95   Temp 98.5 °F (36.9 °C) (Axillary)   Resp 22   Ht 5' 7\" (1.702 m)   Wt 198 lb 3.1 oz (89.9 kg)   LMP 2018 (Exact Date)   SpO2 100%   BMI 31.04 kg/m²     General Appearance: awake on nasal cannula very tired to converse and in some  acute distress, ++ pallor, no icterus  Obesity +  Skin: warm and dry, no rash or erythema  Head: normocephalic and atraumatic  Eyes: pupils equal, round, and reactive to light, conjunctivae normal  ENT: tympanic membrane, external ear and ear canal normal bilaterally, nose without deformity, nasal mucosa and turbinates normal without polyps  Neck: supple and non-tender without mass, no thyromegaly  no cervical lymphadenopathy  Pulmonary/Chest: coarse crepts  no wheezes, rales or rhonchi, normal air movement, in some respiratory distress  Cardiovascular:  S1 and S2, Mitral murmur, rubs, clicks, or gallops, no carotid bruits  Abdomen: soft, non-tender, non-distended, normal bowel sounds, no masses or organomegaly  Extremities: no cyanosis, clubbing or edema  Musculoskeletal: normal range of motion, no joint swelling, deformity or tenderness  Neurologic: reflexes normal and symmetric, no cranial nerve deficit  Lines:  IJ+   Arizmenid     Data Review:    Lab Results   Component Value Date    WBC 12.8 (H) 09/22/2020    HGB 8.5 (L) 09/22/2020    HCT 27.2 (L) 09/22/2020    MCV 85.6 09/22/2020     09/22/2020     Lab Results   Component Value Date    CREATININE 3.2 (H) 09/22/2020    BUN 46 (H) 09/22/2020     09/22/2020    K 3.0 (L) 09/22/2020    CL 93 (L) 09/22/2020    CO2 29 09/22/2020       Hepatic Function Panel:   Lab Results   Component Value Date    ALKPHOS 131 09/22/2020    ALT 88 09/22/2020    AST 36 09/22/2020    PROT 6.0 09/22/2020    PROT 7.0 10/19/2011    BILITOT 0.9 09/22/2020    BILIDIR 0.5 09/22/2020    IBILI 0.4 09/22/2020    LABALBU 2.8 09/22/2020       UA:  Lab Results   Component Value Date    COLORU DK YELLOW 09/10/2020    CLARITYU CLOUDY 09/10/2020    GLUCOSEU Negative 09/10/2020    BILIRUBINUR SMALL 09/10/2020    KETUA Negative 09/10/2020    SPECGRAV >1.030 09/10/2020    BLOODU MODERATE 09/10/2020    PHUR 5.5 09/10/2020    PROTEINU >=300 09/10/2020    UROBILINOGEN 1.0 09/10/2020    NITRU Negative 09/10/2020    LEUKOCYTESUR Negative 09/10/2020    LABMICR YES 09/10/2020    URINETYPE Cleancatch 09/10/2020      Urine Microscopic:   Lab Results   Component Value Date    BACTERIA 2+ 09/10/2020    COMU see below 09/10/2020    HYALCAST 3 03/16/2016    WBCUA 3-5 09/10/2020    RBCUA 21-50 09/10/2020    EPIU 11-20 09/10/2020     Vanco 24.7     Creat  3.2          WBC  14.7 SOURCE: Sputum Suctioned                   COLLECTED:  09/11/20 12:02   ANTIBIOTICS AT [facility]. :                      RECEIVED :  09/11/20 12:02   Performed at:   [facility] [address] [place] [employer]   Phone [phone]    Culture, Blood 1 [[id number]]   Collected: 09/12/20 1657    Order Status: Completed  Specimen: Blood  Updated: 09/13/20 1915     Blood Culture, Routine  No Growth to date.  Any change in status will be called. Narrative:      ORDER#: [id number]                          ORDERED BY: Armando Santos   SOURCE: Blood                              COLLECTED:  09/12/20 16:57   ANTIBIOTICS AT [facility]. :                      RECEIVED :  09/12/20 16:57   If child <=2 yrs old please draw pediatric bottle. ~Blood Culture #1   Performed at:   [facility] [address] [place], [place] [address]   Phone [phone]    Culture, Blood 1 [[id number]]   Collected: 09/13/20 1815    Order Status: Sent  Specimen: Blood  Updated: 09/13/20 1831    Culture, Blood 2 [[id number]]   Collected: 09/12/20 1300    Order Status: Completed  Specimen: Blood  Updated: 09/13/20 1515     Culture, Blood 2  No Growth to date.  Any change in status will be called. Narrative:      ORDER#: [id number]                          ORDERED BY: Armando Santos   SOURCE: Blood                              COLLECTED:  09/12/20 13:00   ANTIBIOTICS AT [facility]. :                      RECEIVED :  09/12/20 13:06   If child <=2 yrs old please draw pediatric bottle. ~Blood Culture #2   Performed at:   [facility]   [address] [place], [facility] [place] [address]   Phone [phone]    Culture, Blood 2 [[id number]]  (Abnormal)  Collected: 09/10/20 2314    Order Status: Completed  Specimen: Blood  Updated: 09/13/20 1344     Culture, Blood 2  --Abnormal       Gram stain Aerobic bottle:   Gram positive cocci in clusters   resembling Staphylococcus Information to follow   Abnormal       Organism  Staph aureus MRSA DNA DetectedAbnormal       Culture, Blood 2  --Abnormal       CONTACT PRECAUTIONS INDICATED   See additional report for complete BCID panel.    Isolated one of two sets   Abnormal       Organism  Staphylococcus aureusAbnormal       Culture, Blood 2  --     POSITIVE for   Sensitivity to follow   Repeating sensitivity    Narrative:      ORDER#: [id number]                          ORDERED BY: David Dietz   SOURCE: Blood                              COLLECTED:  09/10/20 23:14   ANTIBIOTICS AT [facility]. :                      RECEIVED :  09/10/20 23:18   CALL Simone Caceres  [id number] tel. [phone],   ., 09/13/2020 06:47, by [facility]   Microbiology results called to and read back by Jorge Mcgowan,   09/11/2020 16:00, by STEPHANI   Microbiology results called to and read back by FRANCHESCA Holman, 09/11/2020   15:59, by Whit Riggs   If child <=2 yrs old please draw pediatric bottle. ~Blood Culture #2   Performed at:   [facility]   [address], [place] [address]   Phone [phone]    Culture, Blood 2 [[id number]]      Order Status: No result  Specimen: Peripheral, site unknown from Blood     Culture, Blood 1 [[id number]]   Collected: 09/10/20 2303    Order Status: Completed  Specimen: Blood  Updated: 09/12/20 0115     Blood Culture, Routine  No Growth to date.  Any change in status will be called. Narrative:      ORDER#: [id number]                          ORDERED BY: David Dietz   SOURCE: Blood                              COLLECTED:  09/10/20 23:03   ANTIBIOTICS AT [facility]. :                      RECEIVED :  09/10/20 23:07   If child <=2 yrs old please draw pediatric bottle. ~Blood Culture #1   Performed at:   [facility]   [address], [place] [address]   Phone [phone]    Culture, Blood, PCR ID Panel Results Report [[id number]]   Collected: 09/10/20 2314    Order Status: Completed  Updated: 09/11/20 1600     Report  SEE IMAGE    Narrative:      CALL Ramon Vaca,   Microbiology results called to and read back by Chloe Carroll,   09/11/2020 16:00, by VALENTE   Microbiology results called to and read back by FRANCHESCA Rock, 09/11/2020   15:59, by Jenny Rivas   Referred out by:   [facility]   [address] [facility] [place] [address]   Phone [phone]    Strep Pneumoniae Antigen [[id number]]   Collected: 09/10/20 2207    Order Status: Completed  Specimen: Urine, clean catch  Updated: 09/11/20 1042     STREP PNEUMONIAE ANTIGEN, URINE  --     Presumptive Negative   Presumptive negative suggests no current or recent   pneumococcal infection. Infection due to Strep pneumoniae   cannot be ruled out since the antigen present in the sample   may be below the detection limit of the test.   Normal Range:Presumptive Negative    Narrative:      ORDER#: [id number]                          ORDERED BY: Cathie Fabian   SOURCE: Urine Clean Catch                  COLLECTED:  09/10/20 22:07   ANTIBIOTICS AT [facility]. :                      RECEIVED :  09/11/20 01:43   Performed at:   [facility]   [address] [facility] [employer] [address]   Phone [phone]    Legionella antigen, urine [[id number]]   Collected: 09/10/20 2207    Order Status: Completed  Specimen: Urine, clean catch  Updated: 09/11/20 1042     L. pneumophila Serogp 1 Ur Ag  --     Presumptive Negative   No Legionella pneumophila serogroup 1 antigens detected. A negative result does not exclude infection with   Legionella pneumophila serogroup 1 nor does it rule out   other microbial-caused respiratory infections or   disease caused by other serogroups of   Legionella pneumophila.    Normal Range: Presumptive Negative    Narrative:      ORDER#: [id number]                          ORDERED BY: JOSIANE MARTINEZ   SOURCE: Urine Clean Catch                 airspace disease. XR CHEST PORTABLE   Final Result   1. Suspected mild to moderate bilateral pleural effusions with adjacent   bibasilar atelectasis and/or airspace disease. 2. Mild to moderate cardiomegaly. 3. Recommend retraction of endotracheal tube 0.7 cm. XR CHEST PORTABLE   Final Result   Right jugular venous catheter and endotracheal tube project in normal   positions. No pneumothorax. Bilateral airspace disease and possible layered pleural effusions, consistent   with pulmonary edema and atelectasis. Pneumonia is a differential   possibility. XR CHEST PORTABLE   Final Result   Satisfactory position of the endotracheal tube 3 cm above the rahat. The   heart is upper normal size and vessels are borderline congested. VL Extremity Venous Bilateral   Final Result      VL DUP LOWER EXTREMITY ARTERIES BILATERAL   Final Result      XR CHEST PORTABLE   Final Result   Endotracheal tube has tip approximately 1.3 cm proximal to the rahat and   should be retracted approximately 2 cm. Findings are again suggestive of pulmonary edema with small pleural   effusions. Pneumonia is not excluded in the appropriate clinical setting. Findings were called by the radiology call center. CT HEAD WO CONTRAST   Final Result   Motion limited study. No convincing evidence for acute intracranial   abnormality identified within the limitations of motion artifact. If there is persistent clinical suspicion for intracranial abnormality,   repeat study should be considered. XR CHEST PORTABLE   Final Result   Cardiomegaly, pulmonary vascular congestion, and bilateral pleural effusions. Endotracheal tube terminates approximately 2 cm above the rahat. CT CHEST PULMONARY EMBOLISM W CONTRAST   Final Result   Addendum 1 of 1   ADDENDUM:   Findings were discussed with Dr. Rhoda Victoria at 10:49 pm on 9/10/2020.          Final            All the pertinent images and reports for the current Hospitalization were reviewed by me     Scheduled Meds:   potassium chloride  20 mEq Intravenous Q1H    vancomycin  1,250 mg Intravenous Once    insulin lispro  0-12 Units Subcutaneous 6 times per day    digoxin  125 mcg Per NG tube Daily    metoprolol tartrate  25 mg Per NG tube BID    lacosamide (VIMPAT) IVPB  200 mg Intravenous BID    lactobacillus  2 capsule Oral BID WC    furosemide  80 mg Intravenous BID    sodium chloride flush  10 mL Intravenous 2 times per day    pantoprazole  40 mg Intravenous Daily    And    sodium chloride (PF)  10 mL Intravenous Daily    levetiracetam  1,000 mg Intravenous Q12H    vancomycin (VANCOCIN) intermittent dosing (placeholder)   Other RX Placeholder       Continuous Infusions:   amiodarone 450mg/250ml D5W infusion 1 mg/min (09/22/20 0842)    dilTIAZem Stopped (09/20/20 1710)    dextrose      heparin (Porcine) 8 mL/hr (09/21/20 2041)       PRN Meds:  LORazepam, metoprolol, heparin (porcine), ondansetron, promethazine, fentanNYL, sodium chloride flush, acetaminophen **OR** acetaminophen, glucose, dextrose, glucagon (rDNA), dextrose      Assessment:     Patient Active Problem List   Diagnosis    Factor V Leiden mutation ([address]    Pulmonary embolus ([address]    Lactic acidosis    CAP (community acquired pneumonia)    Acute CHF (congestive heart failure) ([address]    Bilateral pulmonary embolism ([address]    Cardiac arrest ([address]    Seizures ([address]    Acute respiratory failure with hypoxia ([address]    Acute kidney injury ([address]    Metabolic acidosis    Congestive heart failure ([address]    Pulmonary hypertension ([address]    Multifocal pneumonia    Septic shock ([address]    Elevated LFTs    Thrombocytopenia (HCC)    Coagulopathy (HCC)    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) ([address]    Acute encephalopathy    Pneumonia due to organism    Bacteremia    Pleural effusion, bilateral    Elevated lactic acid level    Bacteremia due to methicillin susceptible Staphylococcus aureus (MSSA)    Pneumonia due to methicillin susceptible Staphylococcus aureus (MSSA) (HCC)    Anemia    Acute renal failure (HCC)    Bilateral pleural effusion    Mitral valve insufficiency    Tricuspid valve insufficiency    Class 2 obesity due to excess calories with body mass index (BMI) of 39.0 to 39.9 in adult    Cardiogenic shock (HCC)    Staphylococcal pneumonia (HCC)     Septic shock  Resolved     Cardiac arrest  Resuscitated  Pulmonary embolism on anticoagulation   Encephalopathy improving   Possible seizure episode on anti epileptics   Resp failure now on nasal cannula  WBC elevation improved   MRSA bacteremia follow up blood cx clearing   Sputum cx reported as MSSA interestingly and Micro lab did additional testing  TTE is abnormal with mitral valve calcification and myxomatous changes with Mitral regurgitation  Factor V leiden def  MORELIA  Now requiring HD intermittent     She remained critically ill in ICU from resp failure, CVA, and A fib with bacteremia now clinically slowly improving     WBC slowly trend down on IV abx and Micro repeat sensitivities noted     S/P LEVY  NO Vegetation but has Mitral regurgitation     MRI brain now reported to have multiple CVA possible embolic in origin and given severe Mitral regurgitation with MRSA bacteremia this is concerning for   Endocarditis     WBC trend down a bit with addition of IV Zosyn and now improved will adjust IV abx today anticipate to cont IV abx for MRSA bacteremia       Lfts improving and Creat remains elevated and will need to dose IV Vancomycin by levels     Labs, Microbiology, Radiology and all the pertinent results from current hospitalization and  care every where were reviewed  by me as a part of the evaluation   Plan:   1. Cont IV Vancomycin  Level elevated due to MORELIA   Pharmacy dosing  Stop date  X 10/10  2. Aim for trough 15 -20  3. Will up date VINCENT   4.  Will coordinate IV abx with HD   5/ s/p LEVY results noted no vegetation   6. Now on intermittent HD for MORELIA   7. MRI brain abnormal from CVA multiple likely embolic   8. Neurological status slow improvement and on antiepileptics due to seizures     Will follow peripherally updated VINCENT call with any new issues or concerns      Discussed with patient/Family and Nursing staff   Risk of Complications/Morbidity: High      · Illness(es)/ Infection present that pose threat to bodily function. · There is potential for severe exacerbation of infection/side effects of treatment. · Therapy requires intensive monitoring for antimicrobial agent toxicity. Thanks for allowing me to participate in your patient's care and please call me with any questions or concerns.     Cliff Munguia MD  Infectious Disease  [facility] ([facility]) Physician  Phone: [phone]   Fax : [phone]

## 2020-09-22 NOTE — PROGRESS NOTES
Clinical Pharmacy Note  Vancomycin Consult    Rain Cote is a 53 y.o. female ordered Vancomycin for MRSA bacteremia; consult received from  [facility] [place] to manage therapy.      Patient Active Problem List   Diagnosis    Factor V Leiden mutation ([facility]    Pulmonary embolus (HCC)    Lactic acidosis    CAP (community acquired pneumonia)    Acute CHF (congestive heart failure) ([facility]    Bilateral pulmonary embolism (HCC)    Cardiac arrest ([facility]    Seizures (HCC)    Acute respiratory failure with hypoxia (HCC)    Acute kidney injury ([facility]    Metabolic acidosis    Congestive heart failure (HCC)    Pulmonary hypertension (HCC)    Multifocal pneumonia    Septic shock (HCC)    Elevated LFTs    Thrombocytopenia (HCC)    Coagulopathy (HCC)    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)    Acute encephalopathy    Pneumonia due to organism    Bacteremia    Pleural effusion, bilateral    Elevated lactic acid level    Bacteremia due to methicillin susceptible Staphylococcus aureus (MSSA)    Pneumonia due to methicillin susceptible Staphylococcus aureus (MSSA) (HCC)    Anemia    Acute renal failure (HCC)    Bilateral pleural effusion    Mitral valve insufficiency    Tricuspid valve insufficiency    Class 2 obesity due to excess calories with body mass index (BMI) of 39.0 to 39.9 in adult    Cardiogenic shock (HCC)    Staphylococcal pneumonia (HCC)       Allergies:  Ibuprofen     Temp max:  Temp (24hrs), Av.5 °F (36.9 °C), Min:98.3 °F (36.8 °C), Max:98.9 °F (37.2 °C)      Recent Labs     20  0535 20  0640 20  0407   WBC 11.3* 10.9 12.8*       Recent Labs     20  0535 20  0640 20  0407   BUN 24* 35* 46*   CREATININE 2.3* 3.1* 3.2*         Intake/Output Summary (Last 24 hours) at 2020 0954  Last data filed at 2020 0600  Gross per 24 hour   Intake 3078.9 ml   Output 2190 ml   Net 888.9 ml       Culture Results:  09/10/20 Blood:

## 2020-09-22 NOTE — PROGRESS NOTES
0500: Lab results reviewed, Dr. Viral Lund at bedside- notified of potassium 3.0, no new orders received at this time.

## 2020-09-22 NOTE — PROGRESS NOTES
Clinical Pharmacy Note  Heparin Dosing       Lab Results   Component Value Date    APTT 44.1 09/21/2020     Lab Results   Component Value Date    HGB 8.6 09/21/2020    HCT 26.8 09/21/2020     09/21/2020    INR 1.23 09/18/2020       Current Infusion Rate: 7 mL/hr    Plan:  Bolus: 2000 units  Rate: 8 mL/hr  Next aPTT: 0300 9/22/20    Pharmacy will continue to monitor and adjust based on aPTT results.

## 2020-09-22 NOTE — PROGRESS NOTES
Hospitalist Progress Note      PCP: No primary care provider on file. Date of Admission: 9/10/2020    Chief Complaint:   Chief Complaint   Patient presents with    Shortness of Breath     HX of asthma. Short of breath for around 1 month. Patient states she just refilled her inhaler today. Hospital Course:  53 y.o. female with past medical history of factor V Leiden deficiency on chronic warfarin, history of blood clots, hyperlipidemia, arthritis, asthma who presents to [facility] with worsening above symptoms of fatigue, intermittent shortness of breath both at rest and on exertion, and extensive of abdominal and leg swelling. In the ED patient was significantly tachycardic, showing some combination of sinus tachycardia versus SVT, uncertain if may be underlying atrial fibrillation with rapid ventricular rate. Labs showed signs of infection as well as elevated proBNP. CTA with contrast was done on the chest and demonstrated left and right lobe PEs in certain segments, active findings suggestive of early patchy pneumonia bilaterally, and moderate bilateral pleural effusions suggestive of heart failure/volume overload. PEA arrest shortly after admission. TTE with EF 30- 35%. Severe diffuse hypokinesis with severely reduced rt ventricular function, severe tricuspid regurgitation and mitral regurgitation, grade 3 diastolic dysfunction. Found to have MRSA PNA and bacteremia. Developed shock liver and coagulopathy which are improving. Concern for seizure like activity, initial EEG negative but repeat with possible status, added vimpat to keppra and repeat EEG improved. Acute to subacute infarcts on MRI. Developed a fib RVR 9/16 that has not responded to cardioversion (x5), on amio and dilt IV. Neurologic status improved and she was extubated on 9/19. Subjective:      Able to open eyes better today. Oriented to place and person.      Dialysis at bedside this am.     Still with myoclonic jerking movements - better today. Medications:  Reviewed    Infusion Medications    amiodarone 450mg/250ml D5W infusion 1 mg/min (09/22/20 0842)    dilTIAZem Stopped (09/20/20 1710)    dextrose      heparin (Porcine) 8 mL/hr (09/21/20 2041)     Scheduled Medications    vancomycin  1,250 mg Intravenous Once    insulin lispro  0-12 Units Subcutaneous 6 times per day    digoxin  125 mcg Per NG tube Daily    metoprolol tartrate  25 mg Per NG tube BID    lacosamide (VIMPAT) IVPB  200 mg Intravenous BID    lactobacillus  2 capsule Oral BID WC    furosemide  80 mg Intravenous BID    sodium chloride flush  10 mL Intravenous 2 times per day    pantoprazole  40 mg Intravenous Daily    And    sodium chloride (PF)  10 mL Intravenous Daily    levetiracetam  1,000 mg Intravenous Q12H    vancomycin (VANCOCIN) intermittent dosing (placeholder)   Other RX Placeholder     PRN Meds: LORazepam, metoprolol, heparin (porcine), ondansetron, promethazine, fentanNYL, sodium chloride flush, acetaminophen **OR** acetaminophen, glucose, dextrose, glucagon (rDNA), dextrose      Intake/Output Summary (Last 24 hours) at 9/22/2020 1202  Last data filed at 9/22/2020 0600  Gross per 24 hour   Intake 3078.9 ml   Output 1790 ml   Net 1288.9 ml       Physical Exam Performed:    /76   Pulse 88   Temp 98.5 °F (36.9 °C) (Axillary)   Resp 23   Ht 5' 7\" (1.702 m)   Wt 198 lb 3.1 oz (89.9 kg)   LMP 12/05/2018 (Exact Date)   SpO2 (!) 29%   BMI 31.04 kg/m²     General appearance: No apparent distress, appears stated age  HEENT: unable to fully open her eyes, Pupils equal, round, and reactive to light. Conjunctivae/corneas clear. Neck: Supple, with full range of motion. Trachea midline. Respiratory:  Normal respiratory effort. Coarse breath sounds bilaterally   Cardiovascular: Regular rate and rhythm with normal S1/S2 without murmurs, rubs or gallops.   Abdomen: Soft, non-tender, non-distended with normal bowel sounds. Musculoskeletal: No clubbing, cyanosis or edema bilaterally. Large R groin hematoma  Skin: Skin color, texture, turgor normal.  No rashes or lesions. Neurologic: Oriented to self, moves all extremities, mostly following commands  Psychiatric: oriented to place and person. Speech difficult to discern. Capillary Refill: Brisk,< 3 seconds   Peripheral Pulses: +2 palpable, equal bilaterally       Labs:   Recent Labs     09/20/20  0535 09/21/20  0640 09/22/20  0407   WBC 11.3* 10.9 12.8*   HGB 8.2* 8.6* 8.5*   HCT 26.0* 26.8* 27.2*   * 204 269     Recent Labs     09/20/20  0535 09/21/20  0640 09/22/20  0407    139 136   K 3.4* 3.5 3.0*   CL 93* 96* 93*   CO2 28 31 29   BUN 24* 35* 46*   CREATININE 2.3* 3.1* 3.2*   CALCIUM 8.6 8.9 8.6   PHOS 3.8 4.0 4.0     Recent Labs     09/20/20  0535 09/21/20  0640 09/22/20  0407   AST 43* 37 36   * 112* 88*   BILIDIR 0.9* 0.7* 0.5*   BILITOT 1.5* 1.1* 0.9   ALKPHOS 111 114 131*     No results for input(s): INR in the last 72 hours. No results for input(s): [place] Gonzáles in the last 72 hours. Urinalysis:      Lab Results   Component Value Date    NITRU Negative 09/10/2020    WBCUA 3-5 09/10/2020    BACTERIA 2+ 09/10/2020    RBCUA 21-50 09/10/2020    BLOODU MODERATE 09/10/2020    SPECGRAV >1.030 09/10/2020    GLUCOSEU Negative 09/10/2020       Radiology:  VL DUP LOWER EXTREMITY ARTERIES RIGHT   Final Result      XR CHEST PORTABLE   Final Result   Suspect mild congestive failure with pleural effusion questioned greater on   the left. Endotracheal tube is approximately 3 cm above the rahat         MRI BRAIN WO CONTRAST   Final Result   Addendum 1 of 1   ADDENDUM:   Results were reported to GOLDIE Lebron at 3:26 p.m. on September 15,    2020. Final      XR CHEST PORTABLE   Final Result   Decreased layering effusions with improved basilar aeration. Appropriate   life support device positioning.          XR CHEST PORTABLE   Final Result   Low-lying endotracheal tube. Recommend retracting 1-2 cm. No substantial change in layering effusions and basilar opacities. The findings were sent to the Radiology Results [address] at 3:53   am on 9/14/2020to be communicated to a licensed caregiver. XR CHEST PORTABLE   Final Result   1. Interval placement of an NG tube, likely within the stomach. 2. Stable appropriate positions of endotracheal tube and right-sided dialysis   catheter. 3. No significant change in appearance of small to moderate bilateral pleural   effusions and bibasilar airspace disease. XR CHEST PORTABLE   Final Result   Supportive lines project in stable positions. Bibasilar opacities, suggestive of layered effusions and atelectasis or   airspace disease. XR CHEST PORTABLE   Final Result   1. Suspected mild to moderate bilateral pleural effusions with adjacent   bibasilar atelectasis and/or airspace disease. 2. Mild to moderate cardiomegaly. 3. Recommend retraction of endotracheal tube 0.7 cm. XR CHEST PORTABLE   Final Result   Right jugular venous catheter and endotracheal tube project in normal   positions. No pneumothorax. Bilateral airspace disease and possible layered pleural effusions, consistent   with pulmonary edema and atelectasis. Pneumonia is a differential   possibility. XR CHEST PORTABLE   Final Result   Satisfactory position of the endotracheal tube 3 cm above the rahat. The   heart is upper normal size and vessels are borderline congested. VL Extremity Venous Bilateral   Final Result      VL DUP LOWER EXTREMITY ARTERIES BILATERAL   Final Result      XR CHEST PORTABLE   Final Result   Endotracheal tube has tip approximately 1.3 cm proximal to the rahat and   should be retracted approximately 2 cm. Findings are again suggestive of pulmonary edema with small pleural   effusions.   Pneumonia is not excluded in the appropriate clinical setting. Findings were called by the radiology call center. CT HEAD WO CONTRAST   Final Result   Motion limited study. No convincing evidence for acute intracranial   abnormality identified within the limitations of motion artifact. If there is persistent clinical suspicion for intracranial abnormality,   repeat study should be considered. XR CHEST PORTABLE   Final Result   Cardiomegaly, pulmonary vascular congestion, and bilateral pleural effusions. Endotracheal tube terminates approximately 2 cm above the rahat. CT CHEST PULMONARY EMBOLISM W CONTRAST   Final Result   Addendum 1 of 1   ADDENDUM:   Findings were discussed with Dr. Robert Flores at 10:49 pm on 9/10/2020.          Final            Assessment/Plan:    Active Hospital Problems    Diagnosis    Cardiogenic shock ([address] [R57.0]    Staphylococcal pneumonia ([address] [J15.20]    Pleural effusion, bilateral [J90]    Elevated lactic acid level [R79.89]    Bacteremia due to methicillin susceptible Staphylococcus aureus (MSSA) [R78.81]    Pneumonia due to methicillin susceptible Staphylococcus aureus (MSSA) (HCC) [J15.211]    Anemia [D64.9]    Acute renal failure (HCC) [N17.9]    Bilateral pleural effusion [J90]    Mitral valve insufficiency [I34.0]    Tricuspid valve insufficiency [I07.1]    Class 2 obesity due to excess calories with body mass index (BMI) of 39.0 to 39.9 in adult [E66.09, Z68.39]    Pneumonia due to organism [J18.9]    Bacteremia [R78.81]    Cardiac arrest ([address] [I46.9]    Seizures ([address] [R56.9]    Acute respiratory failure with hypoxia ([address] [J96.01]    Acute kidney injury ([address] [X61.3]    Metabolic acidosis [Y84.7]    Congestive heart failure (HCC) [I50.9]    Pulmonary hypertension (HCC) [I27.20]    Multifocal pneumonia [J18.9]    Septic shock (HCC) [A41.9, R65.21]    Elevated LFTs [R94.5]    Thrombocytopenia (HCC) [D69.6]    Coagulopathy ([address] [D68.9]    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) ([facility]) [D65]    Acute encephalopathy [G93.40]    Pulmonary embolus (HCC) [I26.99]    Lactic acidosis [E87.2]    CAP (community acquired pneumonia) [J18.9]    Acute CHF (congestive heart failure) ([facility]) [I50.9]    Bilateral pulmonary embolism ([facility]) [I26.99]    Factor V Leiden mutation ([facility] [D68.51]         Shock, septic vs cardiogenic  Severe mitral and tricuspid regurgitation on TTE  moderate pulmonary HTN,   large bilateral pleural effusions and hepatic congestion on CT. MRSA bacteremia. Off pressors  - ID consulted  - cardiology following      MRSA bacteremia, MRSA PNA  Lab clarifying susceptibilities. mecA positive  - ID following - presently on vancomycin  - TTE, LEVY without vegetations though concerning for endocarditis given embolic CVA      Acute CVA  MRI with scattered acute to subacute infarcts, likely embolic  - neuro following  - workup for endocarditis as above, LEVY negative      Cardiomyopathy, acute on chronic systolic HF  EF 30- 50%. Severe diffuse hypokinesis with severely reduced rt ventricular function, severe tricuspid regurgitation and mitral regurgitation, grade 3 diastolic dysfunction. No prior TTE.   - cardiology following  - RHC/LHC when able      S/p PEA arrest, acute hypoxic resp failure  Intubated 9/10 shortly after admission.   - vent management per CC  -Mental status improved enough for SBP 9/19-- extubated 9/19 to 2L      Seizure like activity  Prior to PEA arrest, episodic rhythmic twitching and myoclonus. Initial EEG negative  - keppra 1000 mg BID  - MRI brain as above  - repeat EEG 9/16 with concern for seizures, possible status  - added vimpat  - repeat EEG 9/17 improved with addition of vimpat, partner discussed with neuro and can defer cvEEG for now  - Neurologic status significantly improved      A fib RVR  Cardioverted 9/16, started on amio drip without adequate control, added dilt.  Multiple attempts to cardiovert all failed  - cardiology following  - continue to monitor  - added digoxin  -in afib with RVR runs this am- overall rate control better. - tolerating heparin gtt       R groin hematoma - stable  After removing femoral line. Arterial duplex with normal flow, good pulses  - monitor  - back on heparin drip        Bilateral PE  Ho DVT/PE, on coumadin chronically  - heparin drip-- resumed - tolerating well      Oliguric/anuric MORELIA  Suboptimal response to Lasix. -CRRT started, switched to HD  -Checking ZENON (neg), ANCA, complement (c3 low), antiphospholipid antibodies  -Nephrology following  - UOP improved  - clinically hypovolemic to euvolemic now - increased free water bolus with tube feed. Coagulapathy  Hx Factor V Leiden on coumadin. INR therapeutic at 2.2 on admission though hematology notes non-adherence to coumadin in the past. Has had oozing on heparin drip, sepsis with bacteremia, DIC versus shock liver versus vitamin K deficiency  - Heme-onc following, fibrinogen and INR improving, no need for vit K or cryo now  - DOAC when able      Elevated LFTs  Likely shock liver, improving  - daily labs      Obesity  Body mass index is 31.04 kg/m². Counseled on effects of weight on overall health and chronic medical conditions and discussed weight loss. DVT Prophylaxis: heparin  Diet: DIET TUBE FEED CONTINUOUS/CYCLIC NPO; Renal Formula; Nasogastric; Continuous; 20; 55  Code Status: Full Code    PT/OT Eval Status: deferred    Dispo - pending. Better today. Still on tube feed via NG - speech eval and treat in progress - likely looking at [facility] prior to diet.      Charis Carlos MD

## 2020-09-22 NOTE — PROGRESS NOTES
[address] [place] [address] Note  Heparin Dosing       Lab Results   Component Value Date    APTT 78.8 09/22/2020     Lab Results   Component Value Date    HGB 8.5 09/22/2020    HCT 27.2 09/22/2020     09/22/2020    INR 1.23 09/18/2020       Current Infusion Rate: 8 mL/hr    Plan:  Rate: decrease to 7.5 mL/hr  Next aPTT: 1800  9/22/20    Pharmacy will continue to monitor and adjust based on aPTT results.   Katja Goetz [facility],9/22/2020,12:05 PM

## 2020-09-22 NOTE — PROGRESS NOTES
Speech Language Pathology    AM attempt: pt currently undergoing dialysis. Will retry in afternoon as schedule allows.     Renetta Funez MS, CCC-SLP #[id number]  Speech Language Pathologist

## 2020-09-22 NOTE — FLOWSHEET NOTE
09/22/20 1142   Treatment   Time On 0836   Time Off 1132  (system clotted, able to return arterial line)   Observations & Evaluations   Level of Consciousness 1   Vital Signs   BP (!) 114/91   Pulse 78   Resp 18   SpO2 99 %   Weight 197 lb 15.6 oz (89.8 kg)   Weight Method Bed scale   Percent Weight Change -0.11   Post-Hemodialysis Assessment   Post-Treatment Procedures Catheter capped, clamped and heparinized x 2 ports   Rinseback Volume (ml) 300 ml   Total Liters Processed (l/min) 56.6 l/min   Dialyzer Clearance Clotted   Duration of Treatment (minutes) 176 minutes   Hemodialysis Intake (ml) 800 ml   Hemodialysis Output (ml) 844 ml   NET Removed (ml) 44 ml   Tolerated Treatment Fair     tolerated tx fair, bp dropped to 89/73 and responded well with NS bolus of 200ml, patient very restless throughout treatment: kicking legs and shaking head back and forth, Dr. Judith Rabago was notified pt was on HD via PMD.  Treatment terminated early d/t system clotting, able to return arterial line.   Report given to [place] LASHON FERNANDEZ

## 2020-09-22 NOTE — PROGRESS NOTES
4 Eyes Skin Assessment     The patient is being assess for  Shift Handoff    I agree that 2 RN's have performed a thorough Head to Toe Skin Assessment on the patient. ALL assessment sites listed below have been assessed. Kelsy/Verito     Areas assessed by both nurses:   [x]   Head, Face, and Ears   [x]   Shoulders, Back, and Chest  [x]   Arms, Elbows, and Hands   [x]   Coccyx, Sacrum, and IschIum  [x]   Legs, Feet, and Heels        Does the Patient have Skin Breakdown?   Yes LDA WOUND CARE was Initiated documentation include the Kelesa-wound, Wound Assessment, Measurements, Dressing Treatment, Drainage, and Color\",         Cash Prevention initiated:  Yes   Wound Care Orders initiated:  LOBITO      WOC nurse consulted for Pressure Injury (Stage 3,4, Unstageable, DTI, NWPT, and Complex wounds), New and Established Ostomies:  LOBITO      Nurse 1 eSignature: Electronically signed by Gerardo Burciaga RN on 9/22/20 at 7:45 PM EDT    **SHARE this note so that the co-signing nurse is able to place an eSignature**    Nurse 2 eSignature: Electronically signed by Edmond Crews RN on 9/22/20 at 10:31 PM EDT

## 2020-09-22 NOTE — PROGRESS NOTES
MIDLINE PLACEMENT:  +Order per Nephrology Dr. Scott Okeefe for Midline only; Preprocedure & timeout done w primary RN Artem Lara; pt has no arm restrictions; L side used d/t no IV access L side at this time; no difficulty accessing L basilic vein; a 40YT midline catheter is placed and advanced wo difficulty or complications; +line is patent w brisk blood return and flushes wo resistance; there is some mild ecchymosis noted at the site prior to Midline procedure; reported same and ok to use MIDLINE to primary RN; pt tolerated procedure well; she will remain in bed to promote hemostasis to the insertion site; pt is left in a position of comfort w siderails up x2, call light in reach and bed is locked in lowest position

## 2020-09-22 NOTE — PROGRESS NOTES
Clinical Pharmacy Note  Heparin Dosing       Lab Results   Component Value Date    APTT 73.1 09/22/2020     Lab Results   Component Value Date    HGB 8.5 09/22/2020    HCT 27.2 09/22/2020     09/22/2020    INR 1.23 09/18/2020       Current Infusion Rate: 8 mL/hr    Plan:  Rate: Continue  8 mL/hr  Next aPTT: 0900  9/22/20    Pharmacy will continue to monitor and adjust based on aPTT results.     Pham Castaneda, PharmD  9/22/2020 4:32 AM

## 2020-09-22 NOTE — PROGRESS NOTES
NEPHROLOGY PROGRESS NOTE    Patient: Kathy Vidal MRN: [id number]     YOB: 1970  Age: 53 y.o. Sex: female    Unit: [address] [facility] [address] [facility] [address] 2W ICU Room/Bed: M8O-1340/2104-01 Location: [address] [place] [address] 12     Admitting Physician: Tiara Cruz    Primary Care Physician: No primary care provider on file. LOS: 12 days       Reason for evaluation:   [place]      SUBJECTIVE:      The patient was seen and examined. Notes and labs reviewed. There were no complications over night. HPI:  Breathing comfortably. No CP. A+Ox2. Converted to sinus. ROS:  In bed. [address]:  medications reviewed. OBJECTIVE:   /76   Pulse 90   Temp 97.7 °F (36.5 °C) (Axillary)   Resp 19   Ht 5' 7\" (1.702 m)   Wt 198 lb 3.1 oz (89.9 kg)   LMP 12/05/2018 (Exact Date)   SpO2 93%   BMI 31.04 kg/m²     Intake and Output:      Intake/Output Summary (Last 24 hours) at 9/22/2020 1227  Last data filed at 9/22/2020 1200  Gross per 24 hour   Intake 3078.9 ml   Output 1915 ml   Net 1163.9 ml       Exam:   CONSTITUTIONAL/PSYCHIATRY: Resting in bed, in ICU. RESPIRATORY:  deferred due to pandemic  CARDIOVASCULAR: deferred  GASTROINTESTINAL: deferred  EXTREMITIES:  No edema  SKIN: Warm and dry. R IJ Vasc cath in place. LABS:   RFP:   Recent Labs     09/20/20  0535 09/21/20  0640 09/22/20  0407    139 136   K 3.4* 3.5 3.0*   CL 93* 96* 93*   CO2 28 31 29   BUN 24* 35* 46*   CREATININE 2.3* 3.1* 3.2*   CALCIUM 8.6 8.9 8.6   MG 1.60* 2.60* 2.20   PHOS 3.8 4.0 4.0   GFRAA 27* 19* 19*     Liver panel:  Recent Labs     09/20/20  0535 09/21/20  0640 09/22/20  0407   AST 43* 37 36   * 112* 88*       CBC:   Recent Labs     09/20/20  0535 09/21/20  0640 09/22/20  0407   WBC 11.3* 10.9 12.8*   HGB 8.2* 8.6* 8.5*   HCT 26.0* 26.8* 27.2*   MCV 84.4 84.2 85.6   * 204 269         ASSESSMENT and PLAN:     1. MORELIA  - likely secondary to ischemia ATI.  She had additional exposure to radiocontrast   - ZENON is neg, ANCA neg, C3 is low at 54  - Initiated on IHD on 9/11/20. Transitioned to CRRT on 9/12/20 which was stopped 9/15/20. Restarted HD 09/17/20  - d/c lasix and assess what happens  - HD TTS. 2. Hyperkalemia/metabolic acidosis: resolved    3. S/P PEA arrest 9/11/20    4. Acute hypoxic respiratory failure:  - per pulmonary  - Extubated 9/19.    5. MRSA bacteremia:   - on Vancomycin with pharmacy dosing   - LEVY shows no vegetation    6. CSDHF  - wt down significantly with CRRT    7. Factor V leiden deficiency with PE/coagulopathy/DIC:   - per hematology    8.  CVA  - MRI shows bilateral acute sub acute infarcts  - neurology following  - EEG apparently better with higher keppra dose    9. atrial fibrillation  - on cardizem gtt and amiodarone gtt  - not rate controlled   - per EP

## 2020-09-22 NOTE — PROGRESS NOTES
s/s of aspiration with honey via tsp. Cannot r/o silent aspiration during or after the swallow with puree. Goals:   Dysphagia Goals: The patient will tolerate repeat BSE when able. ongoing  The pt will tolerate instrumental swallowing assessment when able to participate    Assessment:   Impressions:   Dysphagia Diagnosis: Moderate to severe oral stage dysphagia, Severe pharyngeal stage dysphagia   · Swallow function appears comparable to 9/21 evaluation at which time pt presented with high risk for aspiration with all consistencies d/t variable cognition/alertness and progressive fatigue  · reduced lingual strength and coordination affecting bolus control; decreased AP transit; high risk for premature bolus loss to pharynx; delayed swallow initiation; decreased laryngeal elevation; suspected reduced airway protection and reduced sensation, evidenced by delayed throat clear with honey via tsp and multiple swallows w all trials. · Continue NPO. Modified Barium Swallow is recommended prior to consideration for diet/liquid advancement if/when medical team deems pt may safely and adequately participate with procedure; her variable cognition and rapid fatigue are barriers to safe adequate participation in [address] at this time.      Diet Recommendations:  NPO  Recommended Form of Meds: Via alternative means of nutrition    Strategies:   Frequent oral care    Education:  Consulted and agree with results and recommendations: Patient, Family member, RN  Patient Education: results and recommendations  Patient Education Response: Needs reinforcement, No evidence of learning    Prognosis:   Guarded-fair    Plan:     Continue Dysphagia Therapy: yes  Interventions: Therapeutic Interventions: Therapeutic PO trials with SLP, Oral motor exercises, Patient/Family education, Pharyngeal exercises  Duration/Frequency of therapy while on unit: Duration/Frequency of Treatment  Duration/Frequency of Treatment: 3-5x/wk while on acute unit  Discharge Instructions:   Anticipate Yes for further skilled Speech Therapy for Dysphagia at discharge    This note serves as a D/C Summary in the event that this patient is discharged prior to the next therapy session.     Coded treatment time:5  Total treatment time: 30    Electronically signed by  Jesse Tsai MS, CCC-SLP #[id number]  Speech Language Pathologist    on 9/22/2020 at 256 PM

## 2020-09-22 NOTE — PROGRESS NOTES
Clinical Pharmacy Note  Heparin Dosing       Lab Results   Component Value Date    APTT 68.2 09/22/2020     Lab Results   Component Value Date    HGB 8.5 09/22/2020    HCT 27.2 09/22/2020     09/22/2020    INR 1.23 09/18/2020       Current Infusion Rate: 7.5 mL/hr    Plan:    No change in rate. Continue: 7.5 mL/hr  Next aPTT: 0100  09/23/20    Pharmacy will continue to monitor and adjust based on aPTT results.     Manuela Benjamin, PharmD, BCPS  9/22/2020  7:44 PM

## 2020-09-22 NOTE — PROGRESS NOTES
Cardiac Electrophysiology Progress Note     Admit Date: 9/10/2020     Reason for follow up: atrial fibrillation with RVR    HPI and Interval History:   Sabrina White is a 53y.o. year old female with past medical history significant for Factor V Leiden deficiency, HLD and asthma who initially presented to the hospital with SOB and leg swelling. She was found to have bilateral PE, MRSA bacteremia and new atrial fibrillation. She had a PEA arrest on 9/10 and was resuscitated. She had acute renal failure requiring CRRT (now on HD), seizures with multiple infarcts seen on MRI and shock ?cardiogenic given EF of 30-35%. Last week she went back into atrial fibrillation with RVR and was started on an amiodarone drip. She was cardioverted a total of 7 times without prolonged conversion. Digoxin PO and metoprolol PO were started yesterday, her amiodarone drip was increased early this morning. She converted to SR around 0930 this morning. Pt currently getting HD, able to answer questions, denies any CP or SOB. Says she feels \"fine. \"     Review of System:  · General: negative for fever, chills   · Ophthalmic ROS: negative for eye pain or loss of vision  · ENT ROS: negative for headaches, sore throat, nasal drainage  · Respiratory: negative for cough, sputum, SOB  · Cardiovascular: negative for chest pain, palpitations.   · Gastrointestinal: negative for abdominal pain, diarrhea, N/V  · Hematology: negative for bleeding, blood clots, bruising or jaundice  · Genito-Urinary:  negative for dysuria or incontinence  · Musculoskeletal: negative for joint swelling, muscle pain  · Neurological: positive for confusion, no dizziness or headaches   · Psychiatric: negative anxiety, depression  · Dermatological: negative for rash      Physical Examination:  Vitals:    09/22/20 1200   BP: 114/76   Pulse: 90   Resp: 19   Temp: 97.7 °F (36.5 °C)   SpO2: 93%        Intake/Output Summary (Last 24 hours) at 9/22/2020 1215  Last data filed at Component Value Date    PROTIME 14.3 2020    PROTIME 13.6 2020    PROTIME 13.1 2020    PROTIME 67.9 2016    INR 1.23 2020    INR 1.17 2020    INR 1.13 2020     Lab Results   Component Value Date    CHOL 154 08/10/2017    HDL 52 08/10/2017    HDL 35 2012    TRIG 79 08/10/2017       Scheduled Meds:   vancomycin  1,250 mg Intravenous Once    insulin lispro  0-12 Units Subcutaneous 6 times per day    digoxin  125 mcg Per NG tube Daily    metoprolol tartrate  25 mg Per NG tube BID    lacosamide (VIMPAT) IVPB  200 mg Intravenous BID    lactobacillus  2 capsule Oral BID WC    furosemide  80 mg Intravenous BID    sodium chloride flush  10 mL Intravenous 2 times per day    pantoprazole  40 mg Intravenous Daily    And    sodium chloride (PF)  10 mL Intravenous Daily    levetiracetam  1,000 mg Intravenous Q12H    vancomycin (VANCOCIN) intermittent dosing (placeholder)   Other RX Placeholder     Continuous Infusions:   amiodarone 450mg/250ml D5W infusion 1 mg/min (20 0842)    dilTIAZem Stopped (20 1710)    dextrose      heparin (Porcine) 8 mL/hr (20 2041)     PRN Meds:LORazepam, metoprolol, heparin (porcine), ondansetron, promethazine, fentanNYL, sodium chloride flush, acetaminophen **OR** acetaminophen, glucose, dextrose, glucagon (rDNA), dextrose     EC20  Atrial flutter at 175 BPM. Variable AV block.     LEVY: 20  Conclusions      Summary   Overall left ventricular systolic function appears moderately reduced.   But difficult to assess given atrial fibrillation.   Moderate to moderate mitral regurgitation is present.   The aortic valve is structurally normal. There is no significant aortic   valve regurgitation or stenosis.   Mild to moderate tricuspid regurgitation.   no WINNIE thrombus     Echo: 20   Conclusions      Summary   Ejection fraction is visually estimated to be 30%-35%.   Severe tricuspid regurgitation.   moderate pulmonary HTN   There is severe diffuse hypokinesis.   Diastolic filling parameters suggest grade III diastolic dysfunction.   Moderate calcification of the posterior leaflet of the mitral valve.   Mitral annular calcification is present.   The mitral valve leaflets appear myxomatous.   No evidence of mitral valve stenosis.   Severe mitral regurgitation is present.   Systolic flow reversal in pulmonary veins.   The right ventricle is enlarged.   Right ventricular systolic function is moderate to severely reduced.     Assessment and Plan:     Persistent atrial fibrillation              - converted to SR this morning              - will decrease amio drip to 0.5mg/min and continue until tomorrow, likely transition to PO tomorrow   - continue metoprolol with hold parameters and digoxin    - would change metoprolol to Toprol once dose done being titrated              - CHADS2-VASc 5 (gender, HTN, HF, CVA) - currently on heparin drip                                    - consider DOAC or warfarin when appropriate     New cardiomyopathy              - unclear etiology              - EF 30-35% on TTE, difficult to assess on LEVY               - optimize cardiac medications - beta blocker, ACEI/ARB when BP and renal function acceptable              - currently appears well compensated     PEA arrest               - respiratory driven?              - s/p extubation     Mitral regurgitation              - moderate on LEVY     Septic shock              - secondary to bacteremia, pneumonia              - care per ID     Bilateral pulmonary emboli              - on 2L O2              - care per pulmonary     Acute CVA              - ?embolic              - care per neuro    Huiocadia AGUSTIN Wiggins  [facility], [address] [place] [address], [address] [place] [address]  Phone: [phone]  Fax: [phone]    Electronically signed by AGUSTIN Lundberg - CNP on 9/22/2020 at 12:15 PM

## 2020-09-23 ENCOUNTER — APPOINTMENT (OUTPATIENT)
Dept: GENERAL RADIOLOGY | Age: 50
DRG: 130 | End: 2020-09-23
Payer: COMMERCIAL

## 2020-09-23 LAB
ALBUMIN SERPL-MCNC: 2.8 G/DL (ref 3.4–5)
ALP BLD-CCNC: 121 U/L (ref 40–129)
ALT SERPL-CCNC: 67 U/L (ref 10–40)
ANION GAP SERPL CALCULATED.3IONS-SCNC: 9 MMOL/L (ref 3–16)
APTT: 68.3 SEC (ref 24.2–36.2)
APTT: 80.4 SEC (ref 24.2–36.2)
AST SERPL-CCNC: 34 U/L (ref 15–37)
BASOPHILS ABSOLUTE: 0.1 K/UL (ref 0–0.2)
BASOPHILS RELATIVE PERCENT: 0.8 %
BILIRUB SERPL-MCNC: 0.7 MG/DL (ref 0–1)
BILIRUBIN DIRECT: 0.4 MG/DL (ref 0–0.3)
BILIRUBIN, INDIRECT: 0.3 MG/DL (ref 0–1)
BUN BLDV-MCNC: 28 MG/DL (ref 7–20)
CALCIUM SERPL-MCNC: 8.3 MG/DL (ref 8.3–10.6)
CHLORIDE BLD-SCNC: 99 MMOL/L (ref 99–110)
CO2: 24 MMOL/L (ref 21–32)
CREAT SERPL-MCNC: 2.3 MG/DL (ref 0.6–1.1)
EOSINOPHILS ABSOLUTE: 0.6 K/UL (ref 0–0.6)
EOSINOPHILS RELATIVE PERCENT: 3.9 %
GFR AFRICAN AMERICAN: 27
GFR NON-AFRICAN AMERICAN: 23
GLUCOSE BLD-MCNC: 137 MG/DL (ref 70–99)
GLUCOSE BLD-MCNC: 137 MG/DL (ref 70–99)
GLUCOSE BLD-MCNC: 143 MG/DL (ref 70–99)
GLUCOSE BLD-MCNC: 146 MG/DL (ref 70–99)
GLUCOSE BLD-MCNC: 165 MG/DL (ref 70–99)
GLUCOSE BLD-MCNC: 169 MG/DL (ref 70–99)
GLUCOSE BLD-MCNC: 177 MG/DL (ref 70–99)
HCT VFR BLD CALC: 24.4 % (ref 36–48)
HEMOGLOBIN: 7.5 G/DL (ref 12–16)
LYMPHOCYTES ABSOLUTE: 1.8 K/UL (ref 1–5.1)
LYMPHOCYTES RELATIVE PERCENT: 12.3 %
MAGNESIUM: 2 MG/DL (ref 1.8–2.4)
MCH RBC QN AUTO: 26.6 PG (ref 26–34)
MCHC RBC AUTO-ENTMCNC: 30.6 G/DL (ref 31–36)
MCV RBC AUTO: 86.9 FL (ref 80–100)
MONOCYTES ABSOLUTE: 1 K/UL (ref 0–1.3)
MONOCYTES RELATIVE PERCENT: 6.4 %
NEUTROPHILS ABSOLUTE: 11.4 K/UL (ref 1.7–7.7)
NEUTROPHILS RELATIVE PERCENT: 76.6 %
PDW BLD-RTO: 20.3 % (ref 12.4–15.4)
PERFORMED ON: ABNORMAL
PHOSPHORUS: 1.8 MG/DL (ref 2.5–4.9)
PLATELET # BLD: 305 K/UL (ref 135–450)
PMV BLD AUTO: 9.2 FL (ref 5–10.5)
POTASSIUM SERPL-SCNC: 4.2 MMOL/L (ref 3.5–5.1)
RBC # BLD: 2.81 M/UL (ref 4–5.2)
SODIUM BLD-SCNC: 132 MMOL/L (ref 136–145)
TOTAL PROTEIN: 5.8 G/DL (ref 6.4–8.2)
WBC # BLD: 14.9 K/UL (ref 4–11)

## 2020-09-23 PROCEDURE — 92526 ORAL FUNCTION THERAPY: CPT

## 2020-09-23 PROCEDURE — 6360000002 HC RX W HCPCS: Performed by: NURSE PRACTITIONER

## 2020-09-23 PROCEDURE — 83735 ASSAY OF MAGNESIUM: CPT

## 2020-09-23 PROCEDURE — 6370000000 HC RX 637 (ALT 250 FOR IP): Performed by: NURSE PRACTITIONER

## 2020-09-23 PROCEDURE — C9113 INJ PANTOPRAZOLE SODIUM, VIA: HCPCS | Performed by: INTERNAL MEDICINE

## 2020-09-23 PROCEDURE — 2580000003 HC RX 258: Performed by: INTERNAL MEDICINE

## 2020-09-23 PROCEDURE — 85730 THROMBOPLASTIN TIME PARTIAL: CPT

## 2020-09-23 PROCEDURE — 80048 BASIC METABOLIC PNL TOTAL CA: CPT

## 2020-09-23 PROCEDURE — 2060000000 HC ICU INTERMEDIATE R&B

## 2020-09-23 PROCEDURE — 2500000003 HC RX 250 WO HCPCS: Performed by: STUDENT IN AN ORGANIZED HEALTH CARE EDUCATION/TRAINING PROGRAM

## 2020-09-23 PROCEDURE — 99232 SBSQ HOSP IP/OBS MODERATE 35: CPT | Performed by: NURSE PRACTITIONER

## 2020-09-23 PROCEDURE — 36592 COLLECT BLOOD FROM PICC: CPT

## 2020-09-23 PROCEDURE — 92611 MOTION FLUOROSCOPY/SWALLOW: CPT

## 2020-09-23 PROCEDURE — 6360000002 HC RX W HCPCS: Performed by: INTERNAL MEDICINE

## 2020-09-23 PROCEDURE — 6370000000 HC RX 637 (ALT 250 FOR IP): Performed by: INTERNAL MEDICINE

## 2020-09-23 PROCEDURE — 2580000003 HC RX 258: Performed by: STUDENT IN AN ORGANIZED HEALTH CARE EDUCATION/TRAINING PROGRAM

## 2020-09-23 PROCEDURE — 97530 THERAPEUTIC ACTIVITIES: CPT

## 2020-09-23 PROCEDURE — 85025 COMPLETE CBC W/AUTO DIFF WBC: CPT

## 2020-09-23 PROCEDURE — 2500000003 HC RX 250 WO HCPCS: Performed by: INTERNAL MEDICINE

## 2020-09-23 PROCEDURE — 2580000003 HC RX 258: Performed by: NURSE PRACTITIONER

## 2020-09-23 PROCEDURE — 80076 HEPATIC FUNCTION PANEL: CPT

## 2020-09-23 PROCEDURE — 94760 N-INVAS EAR/PLS OXIMETRY 1: CPT

## 2020-09-23 PROCEDURE — 84100 ASSAY OF PHOSPHORUS: CPT

## 2020-09-23 PROCEDURE — 2700000000 HC OXYGEN THERAPY PER DAY

## 2020-09-23 PROCEDURE — 74230 X-RAY XM SWLNG FUNCJ C+: CPT

## 2020-09-23 RX ORDER — AMIODARONE HYDROCHLORIDE 200 MG/1
200 TABLET ORAL 2 TIMES DAILY
Status: DISCONTINUED | OUTPATIENT
Start: 2020-09-23 | End: 2020-10-04 | Stop reason: HOSPADM

## 2020-09-23 RX ORDER — DIAZEPAM 5 MG/1
5 TABLET ORAL EVERY 12 HOURS SCHEDULED
Status: DISCONTINUED | OUTPATIENT
Start: 2020-09-23 | End: 2020-10-04 | Stop reason: HOSPADM

## 2020-09-23 RX ORDER — AMIODARONE HYDROCHLORIDE 200 MG/1
200 TABLET ORAL DAILY
Status: DISCONTINUED | OUTPATIENT
Start: 2020-09-23 | End: 2020-09-23

## 2020-09-23 RX ADMIN — Medication 2 CAPSULE: at 09:05

## 2020-09-23 RX ADMIN — AMIODARONE HYDROCHLORIDE 0.5 MG/MIN: 50 INJECTION, SOLUTION INTRAVENOUS at 06:09

## 2020-09-23 RX ADMIN — DIAZEPAM 5 MG: 5 TABLET ORAL at 20:48

## 2020-09-23 RX ADMIN — LACOSAMIDE 200 MG: 100 TABLET, FILM COATED ORAL at 09:05

## 2020-09-23 RX ADMIN — METOPROLOL TARTRATE 25 MG: 25 TABLET, FILM COATED ORAL at 20:48

## 2020-09-23 RX ADMIN — INSULIN LISPRO 2 UNITS: 100 INJECTION, SOLUTION INTRAVENOUS; SUBCUTANEOUS at 09:05

## 2020-09-23 RX ADMIN — PANTOPRAZOLE SODIUM 40 MG: 40 INJECTION, POWDER, FOR SOLUTION INTRAVENOUS at 09:05

## 2020-09-23 RX ADMIN — LACOSAMIDE 200 MG: 100 TABLET, FILM COATED ORAL at 20:47

## 2020-09-23 RX ADMIN — SODIUM CHLORIDE, PRESERVATIVE FREE 10 ML: 5 INJECTION INTRAVENOUS at 12:09

## 2020-09-23 RX ADMIN — INSULIN LISPRO 2 UNITS: 100 INJECTION, SOLUTION INTRAVENOUS; SUBCUTANEOUS at 00:01

## 2020-09-23 RX ADMIN — DIAZEPAM 5 MG: 5 TABLET ORAL at 12:08

## 2020-09-23 RX ADMIN — Medication 2 CAPSULE: at 17:53

## 2020-09-23 RX ADMIN — SODIUM CHLORIDE, PRESERVATIVE FREE 10 ML: 5 INJECTION INTRAVENOUS at 20:49

## 2020-09-23 RX ADMIN — INSULIN LISPRO 2 UNITS: 100 INJECTION, SOLUTION INTRAVENOUS; SUBCUTANEOUS at 20:30

## 2020-09-23 RX ADMIN — METOPROLOL TARTRATE 25 MG: 25 TABLET, FILM COATED ORAL at 09:05

## 2020-09-23 RX ADMIN — LEVETIRACETAM 1000 MG: 500 SOLUTION ORAL at 09:05

## 2020-09-23 RX ADMIN — SODIUM PHOSPHATE, MONOBASIC, MONOHYDRATE 20 MMOL: 276; 142 INJECTION, SOLUTION INTRAVENOUS at 09:05

## 2020-09-23 RX ADMIN — INSULIN LISPRO 2 UNITS: 100 INJECTION, SOLUTION INTRAVENOUS; SUBCUTANEOUS at 04:28

## 2020-09-23 RX ADMIN — HEPARIN SODIUM 7.5 ML/HR: 10000 INJECTION, SOLUTION INTRAVENOUS at 12:04

## 2020-09-23 RX ADMIN — AMIODARONE HYDROCHLORIDE 200 MG: 200 TABLET ORAL at 20:48

## 2020-09-23 RX ADMIN — Medication 10 ML: at 09:06

## 2020-09-23 RX ADMIN — DIGOXIN 125 MCG: 125 TABLET ORAL at 09:05

## 2020-09-23 RX ADMIN — LEVETIRACETAM 1000 MG: 500 SOLUTION ORAL at 20:49

## 2020-09-23 ASSESSMENT — PAIN SCALES - GENERAL
PAINLEVEL_OUTOF10: 0
PAINLEVEL_OUTOF10: 0

## 2020-09-23 NOTE — PROGRESS NOTES
NEPHROLOGY PROGRESS NOTE    Patient: Chemo Vital MRN: [id number]     YOB: 1970  Age: 53 y.o. Sex: female    Unit: [address] ICU Room/Bed: Z7A-0934/2104-01 Location: [address] [place] [address] 12     Admitting Physician: Anne Moreno    Primary Care Physician: No primary care provider on file. LOS: 13 days       Reason for evaluation:   [place]      SUBJECTIVE:      The patient was seen and examined. Notes and labs reviewed. There were no complications over night. HPI:  Breathing comfortably. No CP. Confused. Hgb lower. ROS:  In bed. [address]:  medications reviewed. OBJECTIVE:   /73   Pulse 93   Temp 98.7 °F (37.1 °C) (Oral)   Resp 24   Ht 5' 7\" (1.702 m)   Wt 192 lb 0.3 oz (87.1 kg)   LMP 12/05/2018 (Exact Date)   SpO2 100%   BMI 30.07 kg/m²     Intake and Output:      Intake/Output Summary (Last 24 hours) at 9/23/2020 1202  Last data filed at 9/23/2020 0600  Gross per 24 hour   Intake 2498.9 ml   Output 700 ml   Net 1798.9 ml       Exam:   CONSTITUTIONAL/PSYCHIATRY: Resting in bed, in ICU. RESPIRATORY:  deferred due to pandemic  CARDIOVASCULAR: deferred  GASTROINTESTINAL: deferred  EXTREMITIES:  No edema  SKIN: Warm and dry. R IJ Vasc cath in place. LABS:   RFP:   Recent Labs     09/21/20  0640 09/22/20  0407 09/23/20  0415    136 132*   K 3.5 3.0* 4.2   CL 96* 93* 99   CO2 31 29 24   BUN 35* 46* 28*   CREATININE 3.1* 3.2* 2.3*   CALCIUM 8.9 8.6 8.3   MG 2.60* 2.20 2.00   PHOS 4.0 4.0 1.8*   GFRAA 19* 19* 27*     Liver panel:  Recent Labs     09/21/20  0640 09/22/20  0407 09/23/20  0415   AST 37 36 34   * 88* 67*       CBC:   Recent Labs     09/21/20  0640 09/22/20  0407 09/23/20  0415   WBC 10.9 12.8* 14.9*   HGB 8.6* 8.5* 7.5*   HCT 26.8* 27.2* 24.4*   MCV 84.2 85.6 86.9    269 305         ASSESSMENT and PLAN:     1. MORELIA  - likely secondary to ischemia ATI.  She had additional exposure to radiocontrast   - ZENON is neg, ANCA neg, C3 is low

## 2020-09-23 NOTE — PROGRESS NOTES
seizures with multiple infarcts seen on MRI and shock ?cardiogenic given EF of 30-35%. Last week she went back into atrial fibrillation with RVR and was started on an amiodarone drip. She was cardioverted a total of 7 times without prolonged conversion. She was on a Cardizem drip which has been weaned off. She remains on amiodarone drip at 0.5mg/min. She was given multiple doses of IV digoxin over the last couple days, yesterday 500mcg which did improve her HR some per her nurse. She was extubated 9/19.  9/21/2020 CSE recommend NPO  Subjective:     Current diet  NPO    Comments regarding tolerating Current Diet:   nsg reports strict NPO; tolerating NG tube feeds; slightly improved alertness today  MBS order noted      Objective:     Pain   Patient Currently in Pain: Denies    Cognitive/Behavior   Behavior/Cognition: Alert, Confused, Requires cueing    Presentations   Consistencies Administered: Dysphagia Pureed (Dysphagia I),  Honey - teaspoon, cup  Positioning   Upright in bed    PO Trials:  · Thin Liquids NT  · Nectar thick liquids NT  · Honey Thick liquids tsp x5. Cup x1: decreased bolus control and AP transit; suspected premature spillage; delayed swallow; decreased laryngeal elevation; delayed repeat swallow  · Puree 1/2 tsp x5: prolonged bolus transit; decreased bolus control and AP transit; suspected premature spillage; delayed swallow; decreased laryngeal elevation  · Soft food NT  · Regular food NT    Dysphagia Tx:   · Pt immediately opens eyes to verbal stim, assists in trunk support to sit more upright in bed. Able to follow simple OM commands and simple dx for safe PO acceptance. Improved labial and lingual ROM/strength noted. Improved ability to maintain alertness for participation without cues. Provides simple but delayed verbal responses that are mainly appropriate, but confused at times.   · PO trials with improved oral transit, but cannot r/o silent aspiration with all trials d/t progressive fatigue and variable levels of alertness. · MBS order received; will schedule for this AM    Goals:   Dysphagia Goals: The patient will tolerate repeat BSE when able. ongoing  The pt will tolerate instrumental swallowing assessment when able to participate scheduled for 11AM today    Assessment:   Impressions:   Dysphagia Diagnosis: Moderate to severe oral stage dysphagia, Severe pharyngeal stage dysphagia     · reduced lingual strength and coordination affecting bolus control; decreased AP transit; high risk for premature bolus loss to pharynx; delayed swallow initiation; decreased laryngeal elevation; suspected reduced airway protection and reduced sensation  · MBS order obtained. Improved participation, will schedule for 11AM today. Keep NPO pending results. Diet Recommendations:  NPO  Recommended Form of Meds: Via alternative means of nutrition    Strategies:   Frequent oral care    Education:  Consulted and agree with results and recommendations: Patient, Family member, RN  Patient Education: results and recommendations  Patient Education Response: Needs reinforcement, No evidence of learning    Prognosis:   fair    Plan:     Continue Dysphagia Therapy: yes  Interventions: Therapeutic Interventions: Therapeutic PO trials with SLP, Oral motor exercises, Patient/Family education, Pharyngeal exercises  Duration/Frequency of therapy while on unit: Duration/Frequency of Treatment  Duration/Frequency of Treatment: 3-5x/wk while on acute unit  Discharge Instructions:   Anticipate Yes for further skilled Speech Therapy for Dysphagia at discharge    This note serves as a D/C Summary in the event that this patient is discharged prior to the next therapy session.     Coded treatment time:5  Total treatment time: 15    Electronically signed by  Ashley Kehr, MS, CCC-SLP #[id number]  Speech Language Pathologist    on 9/23/2020 at 1027AM

## 2020-09-23 NOTE — PROGRESS NOTES
Hospitalist Progress Note      PCP: No primary care provider on file. Date of Admission: 9/10/2020    Chief Complaint:   Chief Complaint   Patient presents with    Shortness of Breath     HX of asthma. Short of breath for around 1 month. Patient states she just refilled her inhaler today. Hospital Course:  53 y.o. female with past medical history of factor V Leiden deficiency on chronic warfarin, history of blood clots, hyperlipidemia, arthritis, asthma who presents to [facility] with worsening above symptoms of fatigue, intermittent shortness of breath both at rest and on exertion, and extensive of abdominal and leg swelling. In the ED patient was significantly tachycardic, showing some combination of sinus tachycardia versus SVT, uncertain if may be underlying atrial fibrillation with rapid ventricular rate. Labs showed signs of infection as well as elevated proBNP. CTA with contrast was done on the chest and demonstrated left and right lobe PEs in certain segments, active findings suggestive of early patchy pneumonia bilaterally, and moderate bilateral pleural effusions suggestive of heart failure/volume overload. PEA arrest shortly after admission. TTE with EF 30- 35%. Severe diffuse hypokinesis with severely reduced rt ventricular function, severe tricuspid regurgitation and mitral regurgitation, grade 3 diastolic dysfunction. Found to have MRSA PNA and bacteremia. Developed shock liver and coagulopathy which are improving. Concern for seizure like activity, initial EEG negative but repeat with possible status, added vimpat to keppra and repeat EEG improved. Acute to subacute infarcts on MRI. Developed a fib RVR 9/16 that has not responded to cardioversion (x5), on amio and dilt IV. Neurologic status improved and she was extubated on 9/19. Subjective:      Mumbling mostly incoherently - though able to answer some of the questions appropriately. Ongoing restlessness. Medications:  Reviewed    Infusion Medications    dilTIAZem Stopped (09/20/20 1710)    dextrose      heparin (Porcine) 750 Units/hr (09/23/20 0600)     Scheduled Medications    sodium phosphate IVPB  20 mmol Intravenous Once    diazePAM  5 mg Oral 2 times per day    amiodarone  200 mg Per NG tube BID    lidocaine 1 % injection  5 mL Intradermal Once    sodium chloride flush  10 mL Intravenous 2 times per day    lacosamide  200 mg Oral BID    levETIRAcetam  1,000 mg Oral BID    insulin lispro  0-12 Units Subcutaneous 6 times per day    digoxin  125 mcg Per NG tube Daily    metoprolol tartrate  25 mg Per NG tube BID    lactobacillus  2 capsule Oral BID WC    pantoprazole  40 mg Intravenous Daily    And    sodium chloride (PF)  10 mL Intravenous Daily    vancomycin (VANCOCIN) intermittent dosing (placeholder)   Other RX Placeholder     PRN Meds: LORazepam, sodium chloride flush, metoprolol, heparin (porcine), ondansetron, promethazine, fentanNYL, acetaminophen **OR** acetaminophen, glucose, dextrose, glucagon (rDNA), dextrose      Intake/Output Summary (Last 24 hours) at 9/23/2020 1149  Last data filed at 9/23/2020 0600  Gross per 24 hour   Intake 2498.9 ml   Output 775 ml   Net 1723.9 ml       Physical Exam Performed:    /73   Pulse 93   Temp 98.7 °F (37.1 °C) (Oral)   Resp 24   Ht 5' 7\" (1.702 m)   Wt 192 lb 0.3 oz (87.1 kg)   LMP 12/05/2018 (Exact Date)   SpO2 100%   BMI 30.07 kg/m²     General appearance: No apparent distress, appears stated age  HEENT: unable to fully open her eyes, Pupils equal, round, and reactive to light. Conjunctivae/corneas clear. Neck: Supple, with full range of motion. Trachea midline. Respiratory:  Normal respiratory effort. Coarse breath sounds bilaterally   Cardiovascular: Regular rate and rhythm with normal S1/S2 without murmurs, rubs or gallops. Abdomen: Soft, non-tender, non-distended with normal bowel sounds.   Musculoskeletal: No clubbing, Recommend retracting 1-2 cm. No substantial change in layering effusions and basilar opacities. The findings were sent to the Radiology Results [address] at 3:53   am on 9/14/2020to be communicated to a licensed caregiver. XR CHEST PORTABLE   Final Result   1. Interval placement of an NG tube, likely within the stomach. 2. Stable appropriate positions of endotracheal tube and right-sided dialysis   catheter. 3. No significant change in appearance of small to moderate bilateral pleural   effusions and bibasilar airspace disease. XR CHEST PORTABLE   Final Result   Supportive lines project in stable positions. Bibasilar opacities, suggestive of layered effusions and atelectasis or   airspace disease. XR CHEST PORTABLE   Final Result   1. Suspected mild to moderate bilateral pleural effusions with adjacent   bibasilar atelectasis and/or airspace disease. 2. Mild to moderate cardiomegaly. 3. Recommend retraction of endotracheal tube 0.7 cm. XR CHEST PORTABLE   Final Result   Right jugular venous catheter and endotracheal tube project in normal   positions. No pneumothorax. Bilateral airspace disease and possible layered pleural effusions, consistent   with pulmonary edema and atelectasis. Pneumonia is a differential   possibility. XR CHEST PORTABLE   Final Result   Satisfactory position of the endotracheal tube 3 cm above the rahat. The   heart is upper normal size and vessels are borderline congested. VL Extremity Venous Bilateral   Final Result      VL DUP LOWER EXTREMITY ARTERIES BILATERAL   Final Result      XR CHEST PORTABLE   Final Result   Endotracheal tube has tip approximately 1.3 cm proximal to the rahat and   should be retracted approximately 2 cm. Findings are again suggestive of pulmonary edema with small pleural   effusions. Pneumonia is not excluded in the appropriate clinical setting.       Findings were [D68.9]    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) [D65]    Acute encephalopathy [G93.40]    Pulmonary embolus (HCC) [I26.99]    Lactic acidosis [E87.2]    CAP (community acquired pneumonia) [J18.9]    Acute CHF (congestive heart failure) (HCC) [I50.9]    Bilateral pulmonary embolism (HCC) [I26.99]    Factor V Leiden mutation ([address] [D68.51]         Shock, septic vs cardiogenic  Severe mitral and tricuspid regurgitation on TTE  moderate pulmonary HTN,   large bilateral pleural effusions and hepatic congestion on CT. MRSA bacteremia. Off pressors  - ID consulted  - cardiology following      MRSA bacteremia, MRSA PNA  - ID following - presently on vancomycin  - TTE, LEVY without vegetations though concerning for endocarditis given embolic CVA      Acute CVA  MRI with scattered acute to subacute infarcts, likely embolic  - neuro following  - workup for endocarditis LEVY negative      Cardiomyopathy, acute on chronic systolic HF  EF 30- 35%. Severe diffuse hypokinesis with severely reduced rt ventricular function, severe tricuspid regurgitation and mitral regurgitation, grade 3 diastolic dysfunction. No prior TTE.   - cardiology following  - RHC/LHC when able      S/p PEA arrest, acute hypoxic resp failure  Intubated 9/10 shortly after admission.   - vent management per CC  -Mental status improved enough for SBP 9/19-- extubated 9/19 to 2L      Seizure like activity  Prior to PEA arrest, episodic rhythmic twitching and myoclonus. Initial EEG negative  - keppra 1000 mg BID  - MRI brain as above  - repeat EEG 9/16 with concern for seizures, possible status  - added vimpat  - repeat EEG 9/17 improved with addition of vimpat, partner discussed with neuro and can defer cvEEG for now  - Neurologic status significantly improved  - no repeat seizures but ongoing myoclonus - will add valium to NG. A fib RVR  Cardioverted 9/16, started on amio drip without adequate control, added dilt.

## 2020-09-23 NOTE — PROGRESS NOTES
Clinical Pharmacy Note  Heparin Dosing       Lab Results   Component Value Date    APTT 68.3 09/23/2020     Lab Results   Component Value Date    HGB 8.5 09/22/2020    HCT 27.2 09/22/2020     09/22/2020    INR 1.23 09/18/2020       Current Infusion Rate: 7.5 mL/hr    Plan:  Rate: Continue 7.5 mL/hr  Next aPTT: 0800  09/23/20    Pharmacy will continue to monitor and adjust based on aPTT results.     Annie Proctor PharmD  9/23/2020 2:26 AM

## 2020-09-23 NOTE — PROGRESS NOTES
0725-handoff completed, pt turned and repositioned 4eyes completed bed pad changed,   0853-Dr Mccloud here to see pt,   1045-pt to radiology per stretcher on port tele, PCU status,   1755-labs drawn as ordered from [address] [facility] cath,.resulta pending  1910-handoff completed,  at bedside, pt is PCU status. Electronically signed by Ariel Henriquez RN on 9/23/2020 at 7:11 PM

## 2020-09-23 NOTE — PROGRESS NOTES
Physical Therapy  Facility/Department: [facility] 6U ICU  Daily Treatment Note  NAME: Patrick White  : 1970  MRN: [id number]    Date of Service: 2020    Discharge Recommendations:  Continue to assess pending progress   PT Equipment Recommendations  Other: Will monitor for potential equipt needs. Patrick White scored a  on the AM-PAC short mobility form. Current research shows that an AM-PAC score of 17 or less is typically not associated with a discharge to the patient's home setting. Based on the patient's AM-PAC score and their current functional mobility deficits, it is recommended that the patient have 3-5 sessions per week of Physical Therapy at d/c to increase the patient's independence. Please see assessment section for further patient specific details. If patient discharges prior to next session this note will serve as a discharge summary. Please see below for the latest assessment towards goals. Assessment   Body structures, Functions, Activity limitations: Decreased functional mobility ; Decreased strength;Decreased safe awareness;Decreased cognition;Decreased endurance  Assessment: 52 y/o female admit 2020 with Pneumonia, Pleural Effusion, Tachycardia, Cardiac Arrest.  MRI + Scattered Acute/Subacute Infarct B Cerebral Hemispheres and R Cerebellar Hemisphere; most pronounced L Occipital Lobe, likely embolic. CTA Chest + PE.   -2020 Pt Intubated. PMH as noted including Factor V Leiden, OA. Have initiated EOB activities. Slow progress. At this time anticipate need cont PT Services low/mid intensity (3-5) setting upon d/c. Decision Making: Medium Complexity  History: 52 y/o female admit 2020 with Pneumonia, Pleural Effusion, Tachycardia, Cardiac Arrest.  MRI + Scattered Acute/Subacute Infarct B Cerebral Hemispheres and R Cerebellar Hemisphere; most pronounced L Occipital Lobe, likely embolic. CTA Chest + PE.   -2020 Pt Intubated.   PMH as noted including Factor V Leiden, OA. Exam: See above. Clinical Presentation: See above. Patient Education: Role of PT, POC, Need to call for assist.  Barriers to Learning: Cognitive. REQUIRES PT FOLLOW UP: Yes  Activity Tolerance  Activity Tolerance: Patient limited by cognitive status; Patient limited by endurance  Activity Tolerance: Activity limit also due to extent trunk/extremities twitching/jerking; alittle better today with use of Stedy in front of pt (UE grasp/LE wgt bear). Patient Diagnosis(es): The primary encounter diagnosis was Pneumonia due to organism. Diagnoses of Tachycardia, Pleural effusion, bilateral, Elevated lactic acid level, Congestive heart failure, unspecified HF chronicity, unspecified heart failure type ([address] and Multiple subsegmental pulmonary emboli without acute cor pulmonale were also pertinent to this visit. has a past medical history of Anticoagulant long-term use, Arthritis, Asthma, Baker's cyst, Depression, Factor V Leiden ([address] Factor V Leiden ([address] H/O blood clots, Heartburn, Hyperlipidemia, MRSA bacteremia, and Osteoarthritis. has no past surgical history on file. Restrictions  Restrictions/Precautions  Restrictions/Precautions: Fall Risk  Position Activity Restriction  Other position/activity restrictions: O2 2L via NC. Flexiseal.  NG Tube. COVID negative. Subjective   General  Chart Reviewed: Yes  Additional Pertinent Hx: 52 y/o female admit 9/11/2020 with Pneumonia, Pleural Effusion, Tachycardia, Cardiac Arrest.  MRI + Scattered Acute/Subacute Infarct B Cerebral Hemispheres and R Cerebellar Hemisphere; most pronounced L Occipital Lobe, likely embolic. CTA Chest + PE.   9/11-9/19/2020 Pt Intubated. PMH as noted including Factor V Leiden, OA. Response To Previous Treatment: Patient with no complaints from previous session. Family / Caregiver Present: No  Referring Practitioner: Dr. Victorina Davison  Subjective  Subjective: Pt agreeable to PT Rx.   General Comment  Comments: Cont with twitching/jerking mvts LEs>UEs, increase with engage/effort with activity. Orientation  Orientation  Overall Orientation Status: Impaired  Orientation Level: Oriented to person;Oriented to place; Disoriented to time;Disoriented to situation  Cognition   Cognition  Overall Cognitive Status: Exceptions  Arousal/Alertness: Delayed responses to stimuli  Following Commands: Follows one step commands with increased time; Follows one step commands with repetition; Inconsistently follows commands  Attention Span: Difficulty attending to directions  Memory: Decreased short term memory;Decreased recall of recent events  Insights: Not aware of deficits  Initiation: Requires cues for some  Sequencing: Requires cues for some  Objective   Bed mobility  Supine to Sit: Dependent/Total;2 Person assistance  Sit to Supine: Dependent/Total;2 Person assistance  Scooting: Dependent/Total;2 Person assistance  Transfers  Bed to Chair: Unable to assess  Comment: Defer OOB at this time due to current cognitive status and extent twitching/jerking at EOB. Balance  Comments: Pt dora EOB ~10-12 min with brief periods of Min assist although Max assist with increase intensity of twitching/jerking of trunk/extremities. Did place Stedy in front of pt and with pt wgt bear through feet and holding sidebars of Stedy, twitching/jerking seemed to slight improve very briefly. AM-PAC Score  AM-PAC Inpatient Mobility Raw Score : 6 (09/23/20 1523)  AM-PAC Inpatient T-Scale Score : 23.55 (09/23/20 1523)  Mobility Inpatient CMS 0-100% Score: 100 (09/23/20 1523)  Mobility Inpatient CMS G-Code Modifier : CN (09/23/20 1523)          Goals  Short term goals  Time Frame for Short term goals: Upon d/c acute care setting. Short term goal 1: Bed Mob Min assist.  Short term goal 2: Transfers with/without assist device CGA. Short term goal 3: Amb with/without assist device 48' CGA.   Patient Goals   Patient goals : None Stated. Plan    Plan  Times per week: 3-5x week while in acute care setting.   Current Treatment Recommendations: Strengthening, Functional Mobility Training, Transfer Training, Gait Training, Stair training, Safety Education & Training, Patient/Caregiver Education & Training  Safety Devices  Type of devices: Bed alarm in place, Call light within reach, Left in bed, Nurse notified     Therapy Time   Individual Concurrent Group Co-treatment   Time In [address]         Time Out 1340         Minutes 9184 [facility],

## 2020-09-23 NOTE — PLAN OF CARE
Problem: Pain:  Goal: Pain level will decrease  Description: Pain level will decrease  Outcome: Met This Shift     Problem: Falls - Risk of:  Goal: Will remain free from falls  Description: Will remain free from falls  Outcome: Met This Shift  Note: Falling star program remains in place. Call light and personal belongings within reach. Frequent visual monitoring continues. Toileting program inplace. Patient assisted in turning/repositioning at least once every 2 hours, and on a prn basis. Problem: Skin Integrity:  Goal: Will show no infection signs and symptoms  Description: Will show no infection signs and symptoms  Outcome: Met This Shift  Note: Monitoring patient skin integrity for skin breakdown, turning and repositioning q2h per protocol. Patient needs help with turning and repositioning self. Problem: Nutrition  Goal: Optimal nutrition therapy  Outcome: Met This Shift  Note: Tube feed infusing.  Patient is tolerating well      Problem: OXYGENATION/RESPIRATORY FUNCTION  Goal: Patient will maintain patent airway  Outcome: Met This Shift  Note: Patient maintained oxygen levels at 95% - 100% on 2L Nasal canula  Goal: Patient will achieve/maintain normal respiratory rate/effort  Description: Respiratory rate and effort will be within normal limits for the patient  Outcome: Met This Shift     Problem: HEMODYNAMIC STATUS  Goal: Patient has stable vital signs and fluid balance  Outcome: Met This Shift     Problem: FLUID AND ELECTROLYTE IMBALANCE  Goal: Fluid and electrolyte balance are achieved/maintained  Outcome: Met This Shift  Note: Replacing phos this AM.      Problem: ACTIVITY INTOLERANCE/IMPAIRED MOBILITY  Goal: Mobility/activity is maintained at optimum level for patient  Outcome: Met This Shift

## 2020-09-23 NOTE — PROGRESS NOTES
4 Eyes Skin Assessment     The patient is being assess for  Shift Handoff    I agree that 2 RN's have performed a thorough Head to Toe Skin Assessment on the patient. ALL assessment sites listed below have been assessed. Areas assessed by both nurses: Ruchi/Verito  [x]   Head, Face, and Ears   [x]   Shoulders, Back, and Chest  [x]   Arms, Elbows, and Hands   [x]   Coccyx, Sacrum, and IschIum  [x]   Legs, Feet, and Heels        Does the Patient have Skin Breakdown? No         Cash Prevention initiated:  Yes   Wound Care Orders initiated:  No      [facility] nurse consulted for Pressure Injury (Stage 3,4, Unstageable, DTI, NWPT, and Complex wounds), New and Established Ostomies:  No      Nurse 1 eSignature: Electronically signed by Edmond Crews RN on 9/23/20 at 7:29 AM EDT    **SHARE this note so that the co-signing nurse is able to place an eSignature**    Nurse 2 eSignature: . Electronically signed by Jacklyn Drake RN on 9/23/2020 at 8:08 AM

## 2020-09-23 NOTE — PROGRESS NOTES
08/23/2020  @1910: Shift report received from [facility]. Patient is alert, oriented to person and follows commands. Patient denies pain. Patient is on 2 liters of O2 NC. Patient on heparin drips 7.5 ml/hr. BP: 135/76 (MAP 93), HR: 94, RR: 18, O2: 98. Will continue to monitor. @2194:  at bedside, updates given, all question answered. @9594: Pharmacy called and had the insulin drips changed to 6 ml/hr.   @2000: Shift assessment completed, see flow sheet. @2100: Patient started raising right hand to pull NG out. Restraint applied. Will continue to monitor. @2248: O2 turn off. Patient on room air. Will continue to monitor. 09/24/2020  @0000: Reassessment completed, see flow sheet. @0400: Reassessment completed, see flow sheet. @3920:  aware of phosphate level. No new orders. @3196: Shift handoff with Italia Mariee.

## 2020-09-23 NOTE — PROCEDURES
[facility]   Speech Therapy   MODIFIED BARIUM SWALLOW      Belia Wood  AGE: 53 y.o. GENDER: female  : 1970  [id number]  EPISODE DATE:  9/10/2020  Ordering MD:  Ordering Physician: Dr Bridgett Gallego  Radiologist:  Radiologist: Dr Saulo Ponce  Date of Eval:  2020     Type of Study: Modified Barium Swallow    ONSET DATE: 9/10/2020       MEDICAL DIAGNOSIS: Dysphagia  TREATMENT DIAGNOSIS: Oropharyngeal Dysphagia    PAST MEDICAL HISTORY   has a past medical history of Anticoagulant long-term use, Arthritis, Asthma, Baker's cyst, Depression, Factor V Leiden ([facility] [address] [facility] Factor V Leiden ([facility] [address] [facility] H/O blood clots, Heartburn, Hyperlipidemia, MRSA bacteremia (09/10/2020), and Osteoarthritis. PAST SURGICAL HISTORY  History reviewed. No pertinent surgical history. ALLERGIES  Allergies   Allergen Reactions    Ibuprofen      Chart review:   History of Present Illness  Som Woodard a 53y.o. year old female with past medical history significant for Factor V Leiden deficiency, HLD and asthma who initially presented to the hospital with SOB and leg swelling. She was found to have bilateral PE, MRSA bacteremia and new atrial fibrillation. She had a PEA arrest on 9/10 and was resuscitated. She had acute renal failure requiring CRRT (now on HD), seizures with multiple infarcts seen on MRI and shock ?cardiogenic given EF of 30-35%. Last week she went back into atrial fibrillation with RVR and was started on an amiodarone drip. She was cardioverted a total of 7 times without prolonged conversion. She was on a Cardizem drip which has been weaned off. She remains on amiodarone drip at 0.5mg/min. She was given multiple doses of IV digoxin over the last couple days, yesterday 500mcg which did improve her HR some per her nurse.  She was extubated .  2020 CSE recommend NPO      Subjective:     Reason for referral: Belia Wood was referred for a Modified Barium Swallow study to assess  the efficiency of swallow function, rule out aspiration and make recommendations regarding safe dietary consistencies, effective compensatory strategies, and safe eating environment. PATIENT AND FAMILY / STAFF COMPLAINTS:   Risk for aspiration post extubation, confusion and fatigue. Med team reportedly hopeful for PO initiation   Diet prior to study:   Current Diet Solid Consistency: NPO  Current Diet Liquid Consistency: NPO    Objective: Impressions and Recommendations     IMPRESSIONS:    1. Behavior/Cognition: Alert, Confused, Requires cueing, Cooperative    2. Dysphagia Diagnosis:  Severe oral and severe pharyngeal phase dysphagia characterized by lingual pumping, reduced bolus control and formation, decreased AP transit, reduced BOT retraction, delayed weak palatal trigger, delayed swallow initiation, reduced anterior hyolaryngeal elevation, reduced pharyngeal peristalsis. · Aspiration of nectar (tsp) with delayed cough but unable to fully clear  · Shallow penetration, progressing to deep penetration without full clearance of laryngeal vestibule as pt fatigues, with honey (tsp)  · Eventual deep penetration of puree without sensory response as pt fatigues; pt did elicit delayed cough upon command  · Premature bolus loss to valleculae with all trials  · Oral residue with all trials; variable vallecular and pharyngeal residue, more pronounced with heavier viscosities (ie, puree)  · Presence of NG tube does impact pharyngeal squeeze    Rapid fatigue with exacerbation of s/s as trials progress  Risk for aspiration increases with fatigue, with variable sensory response to penetrated/aspirated material    Dysphagia Score: Dysphagia Outcome Severity Scale: Level 1: Severe dysphagia- NPO.  Unable to tolerate any PO safely    Aspiration/Penetration Risk:   Penetration-Aspiration Scale (PAS): 7 - Material enters the airway, passes below the vocal folds, and is not ejected from the trachea despite effort    Diet Recommendations:  Solid consistency: NPO   Liquid consistency: NPO   Medication administration: Via NG/PEG     Compensatory Swallow Strategies:   Frequent oral care     Therapy:  Requires SLP Intervention: Yes     Therapy Interventions:   Therapeutic Interventions: Therapeutic PO trials with SLP, Oral motor exercises, Patient/Family education, Pharyngeal exercises, Thermal gustatory stimulation    Goals:   Dysphagia Goals: (Pt will tolerate skilled trials of puree 5/5 without overt s/s; Pt will complete lingual, tongue base, EMILY/PEX 5/5 with max cues)    Prognosis:  Prognosis for safe diet advancement: fair  Barriers to reach goals: severity of dysphagia, fatigue  Consulted and agree with results and recommendations: Patient, RN    Education:  Consulted and agree with results and recommendations: Patient, RN  Patient Education: results and recommendations  Patient Education Response: Needs reinforcement, No evidence of learning      D/C Recommendations: D/C Recommendations: To be determined              Assessment: Test Data   General  Cognitive/Behavior  Behavior/Cognition  Behavior/Cognition: Alert;Confused; Requires cueing; Cooperative    Vision/Hearing   functional for procedur    Consistencies Assessed    Consistencies Administered: Dysphagia Pureed (Dysphagia I), Honey teaspoon, Nectar  teaspoon    Positioning   Upright 90 degrees lateral     Indicators of Oral Phase Dysfunction   Oral Preparation / Oral Phase  Oral Phase: Impaired  Oral Phase - Major Contributing Deficits  Weak Lingual Manipulation: All  Lingual Pumping: All  Reduced Posterior Propulsion: All  Holding Of Bolus: All  Reduced Bolus Control: All  Decreased Bolus Cohesion: All  Lingual / Palatal Residue: All  Piecemeal Swallowing: All  Premature Bolus Loss to Pharynx:  All  Delayed Trigger of Palatal Elevation: All  Reduced Tongue Base Retraction: All  Fatigue of Mechanism: All    Indicators of Pharyngeal Phase Dysfunction  Pharyngeal Phase  Pharyngeal Phase: Impaired  Pharyngeal Phase: Impaired  Pharyngeal Phase - Major Contributing Deficits  Delayed Swallow Initiation: All  Premature Spillage to Valleculae: All  Reduced Pharyngeal Peristalsis: All  Reduced Laryngeal Elevation: All  Reduced Anterior Laryngeal Movement: All  Shallow Penetration During: Honey teaspoon  Deep Penetration During: Honey teaspoon;Puree  Aspiration During: Nectar teaspoon  Delayed Cough Reflex: Nectar teaspoon  Partial Bolus Expelled: Nectar teaspoon  Pharyngeal Residue - Valleculae: Puree; Honey teaspoon  Pharyngeal Residue - Posterior Pharynx: Puree  Pharyngeal Wall - Weakness:  All  Fatigue of Mechanism: All    Upper Esophageal Phase   DNT      MINUTES/CHARGES  Time In: 1100  SLP Individual Minutes  Time In: 1100  Time Out: 8711  Minutes: 45  Coded treatment time  5    Electronically signed by   Michel Gerardo MS, CCC-SLP #[id number]  Speech Language Pathologist    on 9/23/2020 at 11:55 AM

## 2020-09-23 NOTE — PROGRESS NOTES
Clinical Pharmacy Note  Heparin Dosing       Lab Results   Component Value Date    APTT 80.4 09/23/2020     Lab Results   Component Value Date    HGB 7.5 09/23/2020    HCT 24.4 09/23/2020     09/23/2020    INR 1.23 09/18/2020       Current Infusion Rate: 7.5 mL/hr    Plan:  Rate: decrease to 6 mL/hr  Next aPTT: 0200 09/24/20    Pharmacy will continue to monitor and adjust based on aPTT results.       [address] [facility] 9/23/2020  7:53 PM

## 2020-09-23 NOTE — PROGRESS NOTES
PROTIME 67.9 2016    INR 1.23 2020    INR 1.17 2020    INR 1.13 2020     Lab Results   Component Value Date    CHOL 154 08/10/2017    HDL 52 08/10/2017    HDL 35 2012    TRIG 79 08/10/2017       Scheduled Meds:   sodium phosphate IVPB  20 mmol Intravenous Once    diazePAM  5 mg Oral 2 times per day    amiodarone  200 mg Per NG tube Daily    lidocaine 1 % injection  5 mL Intradermal Once    sodium chloride flush  10 mL Intravenous 2 times per day    lacosamide  200 mg Oral BID    levETIRAcetam  1,000 mg Oral BID    insulin lispro  0-12 Units Subcutaneous 6 times per day    digoxin  125 mcg Per NG tube Daily    metoprolol tartrate  25 mg Per NG tube BID    lactobacillus  2 capsule Oral BID WC    pantoprazole  40 mg Intravenous Daily    And    sodium chloride (PF)  10 mL Intravenous Daily    vancomycin (VANCOCIN) intermittent dosing (placeholder)   Other RX Placeholder     Continuous Infusions:   dilTIAZem Stopped (20 1710)    dextrose      heparin (Porcine) 750 Units/hr (20 0600)     PRN Meds:LORazepam, sodium chloride flush, metoprolol, heparin (porcine), ondansetron, promethazine, fentanNYL, acetaminophen **OR** acetaminophen, glucose, dextrose, glucagon (rDNA), dextrose     EC20  Atrial flutter at 175 BPM. Variable AV block.     LEVY: 20  Conclusions      Summary   Overall left ventricular systolic function appears moderately reduced.   But difficult to assess given atrial fibrillation.   Moderate to moderate mitral regurgitation is present.   The aortic valve is structurally normal. There is no significant aortic   valve regurgitation or stenosis.   Mild to moderate tricuspid regurgitation.   no WINNIE thrombus     Echo: 20   Conclusions      Summary   Ejection fraction is visually estimated to be 30%-35%.   Severe tricuspid regurgitation.   moderate pulmonary HTN   There is severe diffuse hypokinesis.   Diastolic filling parameters suggest grade III diastolic dysfunction.   Moderate calcification of the posterior leaflet of the mitral valve.   Mitral annular calcification is present.   The mitral valve leaflets appear myxomatous.   No evidence of mitral valve stenosis.   Severe mitral regurgitation is present.   Systolic flow reversal in pulmonary veins.   The right ventricle is enlarged.   Right ventricular systolic function is moderate to severely reduced. Assessment and Plan:     Persistent atrial fibrillation              - converted to SR yesterday              - change amiodarone to PO (NG tube for now) - 200mg BID x 1 week and then 200mg QD   - continue metoprolol with hold parameters and digoxin    - would change metoprolol to Toprol once dose done being titrated              - CHADS2-VASc 5 (gender, HTN, HF, CVA) - currently on heparin drip                                    - consider DOAC or warfarin when appropriate     New cardiomyopathy              - unclear etiology              - EF 30-35% on TTE, difficult to assess on LEVY               - optimize cardiac medications - beta blocker, ACEI/ARB when BP and renal function acceptable              - currently appears well compensated     PEA arrest               - respiratory driven?              - s/p extubation     Mitral regurgitation              - moderate on LEVY     Septic shock              - secondary to bacteremia, pneumonia              - care per ID     Bilateral pulmonary emboli              - on 2L O2              - care per pulmonary     Acute CVA              - ?embolic              - care per neuro    EP issues are stable, will sign off. General cardiology to follow given new cardiomyopathy, previous mention of possible R/LHC by Dr. Luz Watt last week.      AGUSTIN Bullock  [facility], [address] [place] [address], [address] [place] [address]  Phone: [phone]  Fax: [phone]    Electronically signed by AGUSTIN Rodriguez - HANDY on 9/23/2020 at 10:26 AM

## 2020-09-23 NOTE — PROGRESS NOTES
Occupational Therapy  Facility/Department: [facility]B ICU  Daily Treatment Note  NAME: Amena Whitaker  : 1970  MRN: [id number]    Date of Service: 2020    Discharge Recommendations:  Patient would benefit from continued therapy after discharge, 3-5 sessions per week  OT Equipment Recommendations  Other: defer to 1 Kristie Navarro scored a 6/24 on the AM-PAC ADL Inpatient form. Current research shows that an AM-PAC score of 17 or less is typically not associated with a discharge to the patient's home setting. Based on the patient's AM-PAC score and their current ADL deficits, it is recommended that the patient have 3-5 sessions per week of Occupational Therapy at d/c to increase the patient's independence. Please see assessment section for further patient specific details. If patient discharges prior to next session this note will serve as a discharge summary. Please see below for the latest assessment towards goals. Assessment   Performance deficits / Impairments: Decreased functional mobility ; Decreased balance;Decreased safe awareness;Decreased ADL status; Decreased ROM; Decreased endurance;Decreased high-level IADLs;Decreased strength;Decreased cognition  Assessment: 52 y/o female admit 2020 with Pneumonia, Pleural Effusion, Tachycardia, Cardiac Arrest.  MRI + Scattered Acute/Subacute Infarct B Cerebral Hemispheres and R Cerebellar Hemisphere; most pronounced L Occipital Lobe, likely embolic. CTA Chest + PE.   -2020 Pt Intubated. PMH as noted including Factor V Leiden, OA. PTA pt lived at home with spouse and was independent with ADLs and functional mobility. Today, pt oriented to hospital setting only. Pt with twitching/jerking at rest and worse with effort/movement. Therapist placed lalitha baxter in front of patient and with pt weight bearing through feet and hands on sidebars of stedy her jerking seemed to improve for brief periods.  Pt required dependent x 2 for bed mobility and max A to maintain static sitting balance ~ 10 -15min. Anticipate pt will require max/total A for all ADls at this time d/t cognition and strength. Pt is functioning well below baseline and will benefit from skilled therapy at this time. Prognosis: Fair  OT Education: OT Role;Plan of Care;Orientation  Barriers to Learning: cognition, jerking/twitching movements  REQUIRES OT FOLLOW UP: Yes  Activity Tolerance  Activity Tolerance: Treatment limited secondary to decreased cognition;Treatment limited secondary to medical complications (free text)  Safety Devices  Safety Devices in place: Yes  Type of devices: Left in bed;Call light within reach;Nurse notified; Bed alarm in place  Restraints  Initially in place: Yes  Restraints: L wrist restraint tied at end of session         Patient Diagnosis(es): The primary encounter diagnosis was Pneumonia due to organism. Diagnoses of Tachycardia, Pleural effusion, bilateral, Elevated lactic acid level, Congestive heart failure, unspecified HF chronicity, unspecified heart failure type ([address] and Multiple subsegmental pulmonary emboli without acute cor pulmonale were also pertinent to this visit. has a past medical history of Anticoagulant long-term use, Arthritis, Asthma, Baker's cyst, Depression, Factor V Leiden ([address] Factor V Leiden ([facility] H/O blood clots, Heartburn, Hyperlipidemia, MRSA bacteremia, and Osteoarthritis. has no past surgical history on file. Restrictions  Restrictions/Precautions  Restrictions/Precautions: Fall Risk  Position Activity Restriction  Other position/activity restrictions: O2 2L via NC. Flexiseal.  NG Tube. COVID negative.      Subjective   General  Chart Reviewed: Yes  Patient assessed for rehabilitation services?: Yes  Additional Pertinent Hx: 52 y/o female admit 9/11/2020 with Pneumonia, Pleural Effusion, Tachycardia, Cardiac Arrest.  MRI + Scattered Acute/Subacute Infarct B Cerebral Hemispheres and R Cerebellar Hemisphere; most pronounced L Occipital Lobe, likely embolic. CTA Chest + PE.   9/11-9/19/2020 Pt Intubated. PMH as noted including Factor V Leiden, OA. Family / Caregiver Present: No  Referring Practitioner: Lesvia Graham  Subjective  Subjective: Per RN ok to attempt EOB activity. Pt seen bedside. Pt twitching/jerking that is worse with effort. Objective    ADL  Feeding: Dependent/Total(NG feeding tube)  Grooming: Dependent/Total(to comb hair seated EOB)  Toileting: Dependent/Total(ott and flexiseal)        Balance  Sitting Balance: Maximum assistance(EOB 10-12 min- brief periods of min A but up to max A when jerking/twitching)  Bed mobility  Supine to Sit: Dependent/Total;2 Person assistance  Sit to Supine: Dependent/Total;2 Person assistance  Scooting: Dependent/Total;2 Person assistance  Transfers  Transfer Comments: unsafe to attempt OOB transfers 2/2 jerking/command following     Cognition  Overall Cognitive Status: Exceptions  Arousal/Alertness: Delayed responses to stimuli  Following Commands: Follows one step commands with increased time; Follows one step commands with repetition; Inconsistently follows commands  Attention Span: Difficulty attending to directions  Memory: Decreased short term memory;Decreased recall of recent events  Insights: Not aware of deficits  Initiation: Requires cues for some  Sequencing: Requires cues for some              Additional Activities Comment  Additional Activities: Amrik baxter in front of patient and with pt weightbearing through feet and holding onto siderails of stedy twitching/jerking seemed to slightly improve for brief periods           Plan   Plan  Times per week: 3-5  Current Treatment Recommendations: Strengthening, Endurance Training, Balance Training, Gait Training, Functional Mobility Training, Self-Care / ADL, ROM, Positioning, Cognitive Reorientation    AM-PAC Score  AM-[facility] Inpatient Daily Activity Raw Score: 6 (09/23/20 6303)  AM-PAC Inpatient

## 2020-09-23 NOTE — DISCHARGE INSTR - COC
methicillin susceptible Staphylococcus aureus (MSSA) ([facility] J15.211    Anemia D64.9    Acute renal failure (HCC) N17.9    Bilateral pleural effusion J90    Mitral valve insufficiency I34.0    Tricuspid valve insufficiency I07.1    Class 2 obesity due to excess calories with body mass index (BMI) of 39.0 to 39.9 in adult E66.09, Z68.39    Cardiogenic shock (HCC) R57.0    Staphylococcal pneumonia (HCC) J15.20       Isolation/Infection:   Isolation            No Isolation          Patient Infection Status       Infection Onset Added Last Indicated Last Indicated By Review Planned Expiration Resolved Resolved By    None active    Resolved    MRSA 09/10/20 09/14/20 09/10/20 Culture, Blood 2   09/19/20 [facility], RN    MRSA 09/10/20 09/13/20 09/10/20 Culture, Blood 2   09/13/20 [facility], RN    MRSA 09/10/20 09/13/20 09/10/20 Culture, Blood 2   09/13/20 [facility], RN    COVID-19 Rule Out 09/11/20 09/11/20 09/11/20 COVID-19 (Ordered)   09/11/20 Rule-Out Test Resulted            Nurse Assessment:  Last Vital Signs: BP (!) 141/65   Pulse 91   Temp 98.8 °F (37.1 °C) (Axillary)   Resp 22   Ht 5' 7\" (1.702 m)   Wt 197 lb 15.6 oz (89.8 kg)   LMP 12/05/2018 (Exact Date)   SpO2 95%   BMI 31.01 kg/m²     Last documented pain score (0-10 scale): Pain Level: 2  Last Weight:   Wt Readings from Last 1 Encounters:   09/22/20 197 lb 15.6 oz (89.8 kg)     Mental Status:  oriented and alert    IV Access:  - None    Nursing Mobility/ADLs:  Walking   Dependent  Transfer  Dependent  Bathing  Dependent  Dressing  Dependent  Toileting  Dependent  Feeding  Dependent  Med Admin  Dependent  Med Delivery   none    Wound Care Documentation and Therapy:        Elimination:  Continence:   · Bowel: No  · Bladder: No  Urinary Catheter: None   Colostomy/Ileostomy/Ileal Conduit: No  Rectal Tube With balloon-Stool Appearance: Watery  Rectal Tube With balloon-Stool Color: Black, Brown  Rectal Tube With balloon-Stool Amount: Medium    Date of Last BM: 10/4/20    Intake/Output Summary (Last 24 hours) at 9/22/2020 2229  Last data filed at 9/22/2020 2200  Gross per 24 hour   Intake 3422 ml   Output 1964 ml   Net 1458 ml     I/O last 3 completed shifts: In: 3788.9 [I.V.:576.9; NG/GT:2192; IV Piggyback:220]  Out: 1547 [Urine:1715]    Safety Concerns: At Risk for Falls    Impairments/Disabilities:      Speech    Nutrition Therapy:  Current Nutrition Therapy:   - Tube Feedings:  Renal    Routes of Feeding: Gastrostomy Tube  Liquids: No Liquids  Daily Fluid Restriction: no  Last Modified Barium Swallow with Video (Video Swallowing Test): done on ***/***    Treatments at the Time of Hospital Discharge:   Respiratory Treatments:   Oxygen Therapy:  is not on home oxygen therapy. Ventilator:    - No ventilator support     Heart Failure Instructions for Daily Management  Patient was treated for acute on chronic systolic heart failure. she  will require the following:     Please weigh daily on the same scale and approximately the same time of day. Report weight gain of 3 pounds/day or 5 pounds/week to : facility MD and [facility] [phone].  Please use hospital discharge weight as baseline reference.  Please monitor for signs and symptoms of and report to MD:  o Worsening Heart Failure: sudden weight gain, shortness of breath, lower extremity or general edema/swelling, abdominal bloating/swelling, inability to lie flat, intolerance to usual activity, or cough (especially at night). Report these finding even if no increase in weight.  o Dehydration:  having difficulty or a decrease in urination, dizziness, worsening fatigue, or new onset/worsening of generalized weakness.  Please continue a LOW SODIUM diet and LIMIT fluid intake to 48 - 64 ounces ( 1.5 - 2 liters) per day.       Call facility MD and [facility] [phone] and/or Mary La @ [phone] with any questions or concerns.  Please continue heart failure education to patient and family/support system.  See After Visit Summary for hospital follow up appointment details.  Consider spiritual care referral for support and/or completion of advance directives [phone].  Consider: having the facility MD complete required 7 day follow up, [place] [address] telehealth program if patient agreeable and able to participate, palliative care consult for ongoing goals of care, end of life, and/or chronic disease management discussions and referral to [facility] [phone]) once SNF/HHC complete. Rehab Therapies: Physical Therapy, Occupational Therapy and Speech/Language Therapy  Weight Bearing Status/Restrictions: No weight bearing restirctions  Other Medical Equipment (for information only, NOT a DME order):  {EQUIPMENT:[id number]}  Other Treatments: ***    Patient's personal belongings (please select all that are sent with patient):  None    RN SIGNATURE:  Electronically signed by Latanya Freeman RN on 10/4/20 at 3:24 PM EDT    CASE MANAGEMENT/SOCIAL WORK SECTION    Inpatient Status Date: 09/10/2020    Readmission Risk Assessment Score:  Readmission Risk              Risk of Unplanned Readmission:        24           Discharging to Facility/ Agency   · Name: [facility]  · Address: [address] [place] [address], [address] [place] [address]  · Phone: [phone]  · Fax: [phone]    Dialysis Facility (if applicable)   · [address] [place] [address]  · Address: [address]  · Dialysis Schedule: MWF 5pm.  · Phone: [phone]  · Fax: [phone]    / signature: Electronically signed by EUGENIA Teixeira on 10/2/2020 at 5:13 PM      PHYSICIAN SECTION    Prognosis: Good    Condition at Discharge: Stable    Rehab Potential (if transferring to Rehab):  Fair    Recommended Labs or Other Treatments After Discharge:     Heparin porcine 30981-7.45 ut/250ml    [id number] units/hour iv continuous monitor ptt/inr      IV Vancomycin x 750 mg x Ifd-Pit-Ydmjos with Hemodialysis x stop date  10/10  CBC with diff, Vancomycin level weekly   Fax results to [phone]  No Follow  up  necessary     [address] [place] [facility]  Phone: [phone]   Fax : [phone]       Physician Certification: I certify the above information and transfer of Chemo Vital  is necessary for the continuing treatment of the diagnosis listed and that she requires homecare for less 30 days.      Update Admission H&P: No change in H&P    PHYSICIAN SIGNATURE:  Electronically signed by Fidelina Shepard MD on 10/2/20 at 2:04 PM EDT

## 2020-09-24 ENCOUNTER — APPOINTMENT (OUTPATIENT)
Dept: GENERAL RADIOLOGY | Age: 50
DRG: 130 | End: 2020-09-24
Payer: COMMERCIAL

## 2020-09-24 ENCOUNTER — ANESTHESIA EVENT (OUTPATIENT)
Dept: ENDOSCOPY | Age: 50
DRG: 130 | End: 2020-09-24
Payer: COMMERCIAL

## 2020-09-24 LAB
ALBUMIN SERPL-MCNC: 2.9 G/DL (ref 3.4–5)
ALP BLD-CCNC: 155 U/L (ref 40–129)
ALT SERPL-CCNC: 63 U/L (ref 10–40)
ANION GAP SERPL CALCULATED.3IONS-SCNC: 11 MMOL/L (ref 3–16)
ANISOCYTOSIS: ABNORMAL
APTT: 50.8 SEC (ref 24.2–36.2)
APTT: 55.2 SEC (ref 24.2–36.2)
APTT: 58.2 SEC (ref 24.2–36.2)
AST SERPL-CCNC: 43 U/L (ref 15–37)
ATYPICAL LYMPHOCYTE RELATIVE PERCENT: 1 % (ref 0–6)
BANDED NEUTROPHILS RELATIVE PERCENT: 2 % (ref 0–7)
BASOPHILS ABSOLUTE: 0 K/UL (ref 0–0.2)
BASOPHILS RELATIVE PERCENT: 0 %
BILIRUB SERPL-MCNC: 0.7 MG/DL (ref 0–1)
BILIRUBIN DIRECT: 0.4 MG/DL (ref 0–0.3)
BILIRUBIN, INDIRECT: 0.3 MG/DL (ref 0–1)
BUN BLDV-MCNC: 42 MG/DL (ref 7–20)
CALCIUM SERPL-MCNC: 8.8 MG/DL (ref 8.3–10.6)
CHLORIDE BLD-SCNC: 100 MMOL/L (ref 99–110)
CO2: 25 MMOL/L (ref 21–32)
CREAT SERPL-MCNC: 2.6 MG/DL (ref 0.6–1.1)
EOSINOPHILS ABSOLUTE: 0.4 K/UL (ref 0–0.6)
EOSINOPHILS RELATIVE PERCENT: 3 %
GFR AFRICAN AMERICAN: 24
GFR NON-AFRICAN AMERICAN: 20
GLUCOSE BLD-MCNC: 108 MG/DL (ref 70–99)
GLUCOSE BLD-MCNC: 117 MG/DL (ref 70–99)
GLUCOSE BLD-MCNC: 119 MG/DL (ref 70–99)
GLUCOSE BLD-MCNC: 146 MG/DL (ref 70–99)
GLUCOSE BLD-MCNC: 161 MG/DL (ref 70–99)
GLUCOSE BLD-MCNC: 181 MG/DL (ref 70–99)
HCT VFR BLD CALC: 24.6 % (ref 36–48)
HEMOGLOBIN: 7.8 G/DL (ref 12–16)
LYMPHOCYTES ABSOLUTE: 1.7 K/UL (ref 1–5.1)
LYMPHOCYTES RELATIVE PERCENT: 11 %
Lab: NORMAL
MAGNESIUM: 2.1 MG/DL (ref 1.8–2.4)
MCH RBC QN AUTO: 27.1 PG (ref 26–34)
MCHC RBC AUTO-ENTMCNC: 31.6 G/DL (ref 31–36)
MCV RBC AUTO: 85.8 FL (ref 80–100)
MONOCYTES ABSOLUTE: 0.8 K/UL (ref 0–1.3)
MONOCYTES RELATIVE PERCENT: 6 %
NEUTROPHILS ABSOLUTE: 10.9 K/UL (ref 1.7–7.7)
NEUTROPHILS RELATIVE PERCENT: 77 %
PDW BLD-RTO: 21.4 % (ref 12.4–15.4)
PERFORMED ON: ABNORMAL
PHOSPHORUS: 2.4 MG/DL (ref 2.5–4.9)
PLATELET # BLD: 385 K/UL (ref 135–450)
PLATELET SLIDE REVIEW: ADEQUATE
PMV BLD AUTO: 8.9 FL (ref 5–10.5)
POTASSIUM SERPL-SCNC: 4.1 MMOL/L (ref 3.5–5.1)
RBC # BLD: 2.86 M/UL (ref 4–5.2)
REPORT: NORMAL
SARS-COV-2, NAAT: NOT DETECTED
SODIUM BLD-SCNC: 136 MMOL/L (ref 136–145)
THIS TEST SENT TO: NORMAL
TOTAL PROTEIN: 6 G/DL (ref 6.4–8.2)
VACUOLATED NEUTROPHILS: PRESENT
VANCOMYCIN RANDOM: 18.4 UG/ML
WBC # BLD: 13.8 K/UL (ref 4–11)

## 2020-09-24 PROCEDURE — 85730 THROMBOPLASTIN TIME PARTIAL: CPT

## 2020-09-24 PROCEDURE — 71045 X-RAY EXAM CHEST 1 VIEW: CPT

## 2020-09-24 PROCEDURE — 2060000000 HC ICU INTERMEDIATE R&B

## 2020-09-24 PROCEDURE — 6370000000 HC RX 637 (ALT 250 FOR IP): Performed by: NURSE PRACTITIONER

## 2020-09-24 PROCEDURE — 84100 ASSAY OF PHOSPHORUS: CPT

## 2020-09-24 PROCEDURE — 6370000000 HC RX 637 (ALT 250 FOR IP): Performed by: INTERNAL MEDICINE

## 2020-09-24 PROCEDURE — 2500000003 HC RX 250 WO HCPCS: Performed by: INTERNAL MEDICINE

## 2020-09-24 PROCEDURE — 2580000003 HC RX 258: Performed by: INTERNAL MEDICINE

## 2020-09-24 PROCEDURE — 94760 N-INVAS EAR/PLS OXIMETRY 1: CPT

## 2020-09-24 PROCEDURE — 97129 THER IVNTJ 1ST 15 MIN: CPT

## 2020-09-24 PROCEDURE — 92526 ORAL FUNCTION THERAPY: CPT

## 2020-09-24 PROCEDURE — 85025 COMPLETE CBC W/AUTO DIFF WBC: CPT

## 2020-09-24 PROCEDURE — 97530 THERAPEUTIC ACTIVITIES: CPT

## 2020-09-24 PROCEDURE — 80076 HEPATIC FUNCTION PANEL: CPT

## 2020-09-24 PROCEDURE — 90935 HEMODIALYSIS ONE EVALUATION: CPT

## 2020-09-24 PROCEDURE — C9113 INJ PANTOPRAZOLE SODIUM, VIA: HCPCS | Performed by: INTERNAL MEDICINE

## 2020-09-24 PROCEDURE — U0002 COVID-19 LAB TEST NON-CDC: HCPCS

## 2020-09-24 PROCEDURE — 97110 THERAPEUTIC EXERCISES: CPT

## 2020-09-24 PROCEDURE — 6360000002 HC RX W HCPCS: Performed by: INTERNAL MEDICINE

## 2020-09-24 PROCEDURE — 80048 BASIC METABOLIC PNL TOTAL CA: CPT

## 2020-09-24 PROCEDURE — 80202 ASSAY OF VANCOMYCIN: CPT

## 2020-09-24 PROCEDURE — 83735 ASSAY OF MAGNESIUM: CPT

## 2020-09-24 RX ADMIN — LEVETIRACETAM 1000 MG: 500 SOLUTION ORAL at 09:55

## 2020-09-24 RX ADMIN — Medication 10 ML: at 09:19

## 2020-09-24 RX ADMIN — HEPARIN SODIUM 6 ML/HR: 10000 INJECTION, SOLUTION INTRAVENOUS at 17:18

## 2020-09-24 RX ADMIN — SODIUM PHOSPHATE, MONOBASIC, MONOHYDRATE 20 MMOL: 276; 142 INJECTION, SOLUTION INTRAVENOUS at 17:18

## 2020-09-24 RX ADMIN — VANCOMYCIN HYDROCHLORIDE 1000 MG: 1 INJECTION, POWDER, LYOPHILIZED, FOR SOLUTION INTRAVENOUS at 15:44

## 2020-09-24 RX ADMIN — DIAZEPAM 5 MG: 5 TABLET ORAL at 09:51

## 2020-09-24 RX ADMIN — AMIODARONE HYDROCHLORIDE 200 MG: 200 TABLET ORAL at 21:01

## 2020-09-24 RX ADMIN — Medication 2 CAPSULE: at 17:17

## 2020-09-24 RX ADMIN — AMIODARONE HYDROCHLORIDE 200 MG: 200 TABLET ORAL at 09:51

## 2020-09-24 RX ADMIN — INSULIN LISPRO 2 UNITS: 100 INJECTION, SOLUTION INTRAVENOUS; SUBCUTANEOUS at 21:23

## 2020-09-24 RX ADMIN — SODIUM CHLORIDE, PRESERVATIVE FREE 10 ML: 5 INJECTION INTRAVENOUS at 09:51

## 2020-09-24 RX ADMIN — PANTOPRAZOLE SODIUM 40 MG: 40 INJECTION, POWDER, FOR SOLUTION INTRAVENOUS at 09:19

## 2020-09-24 RX ADMIN — Medication 2 CAPSULE: at 09:51

## 2020-09-24 RX ADMIN — INSULIN LISPRO 2 UNITS: 100 INJECTION, SOLUTION INTRAVENOUS; SUBCUTANEOUS at 09:20

## 2020-09-24 RX ADMIN — DIGOXIN 125 MCG: 125 TABLET ORAL at 09:51

## 2020-09-24 RX ADMIN — LACOSAMIDE 200 MG: 100 TABLET, FILM COATED ORAL at 21:00

## 2020-09-24 RX ADMIN — LEVETIRACETAM 1000 MG: 500 SOLUTION ORAL at 21:29

## 2020-09-24 RX ADMIN — LACOSAMIDE 200 MG: 100 TABLET, FILM COATED ORAL at 09:51

## 2020-09-24 RX ADMIN — DIAZEPAM 5 MG: 5 TABLET ORAL at 21:01

## 2020-09-24 RX ADMIN — METOPROLOL TARTRATE 25 MG: 25 TABLET, FILM COATED ORAL at 09:51

## 2020-09-24 RX ADMIN — METOPROLOL TARTRATE 25 MG: 25 TABLET, FILM COATED ORAL at 21:00

## 2020-09-24 ASSESSMENT — PAIN SCALES - GENERAL
PAINLEVEL_OUTOF10: 0

## 2020-09-24 NOTE — PROGRESS NOTES
4 Eyes Skin Assessment     The patient is being assess for  Transfer to New Unit    I agree that 2 RN's have performed a thorough Head to Toe Skin Assessment on the patient. ALL assessment sites listed below have been assessed. Areas assessed by both nurses: alba echeverria rn  3 blisters on back    [x]   Head, Face, and Ears   [x]   Shoulders, Back, and Chest  [x]   Arms, Elbows, and Hands   [x]   Coccyx, Sacrum, and IschIum  [x]   Legs, Feet, and Heels        Does the Patient have Skin Breakdown?   No         Cash Prevention initiated:  No   Wound Care Orders initiated:  No      [facility] nurse consulted for Pressure Injury (Stage 3,4, Unstageable, DTI, NWPT, and Complex wounds), New and Established Ostomies:  No      Nurse 1 eSignature: Electronically signed by Anna Brooks RN on 9/24/20 at 7:24 PM EDT    **SHARE this note so that the co-signing nurse is able to place an eSignature**    Nurse 2 eSignature: Electronically signed by Rell Medina RN on 9/24/20 at 7:26 PM EDT

## 2020-09-24 NOTE — PROGRESS NOTES
0715: Handoff completed. Patient pulled out NGT. Attempted to replace, but patient began yelling and moving head back and forth. 0830: Small bore NGT placed to YONG jay. 0915: CXR completed. Report called to [facility]. 1000: Transferred to HD via stretcher.

## 2020-09-24 NOTE — PROGRESS NOTES
Speech Language Pathology    9044 dysphagia attempt: pt currently at dialysis. Will retry later today as schedule allows.     Simone Bob MS, CCC-SLP #[id number]  Speech Language Pathologist

## 2020-09-24 NOTE — PROGRESS NOTES
Hospitalist Progress Note      PCP: No primary care provider on file. Date of Admission: 9/10/2020    Chief Complaint:   Chief Complaint   Patient presents with    Shortness of Breath     HX of asthma. Short of breath for around 1 month. Patient states she just refilled her inhaler today. Hospital Course:  53 y.o. female with past medical history of factor V Leiden deficiency on chronic warfarin, history of blood clots, hyperlipidemia, arthritis, asthma who presents to [facility] with worsening above symptoms of fatigue, intermittent shortness of breath both at rest and on exertion, and extensive of abdominal and leg swelling. In the ED patient was significantly tachycardic, showing some combination of sinus tachycardia versus SVT, uncertain if may be underlying atrial fibrillation with rapid ventricular rate. Labs showed signs of infection as well as elevated proBNP. CTA with contrast was done on the chest and demonstrated left and right lobe PEs in certain segments, active findings suggestive of early patchy pneumonia bilaterally, and moderate bilateral pleural effusions suggestive of heart failure/volume overload. PEA arrest shortly after admission. TTE with EF 30- 35%. Severe diffuse hypokinesis with severely reduced rt ventricular function, severe tricuspid regurgitation and mitral regurgitation, grade 3 diastolic dysfunction. Found to have MRSA PNA and bacteremia. Developed shock liver and coagulopathy which are improving. Concern for seizure like activity, initial EEG negative but repeat with possible status, added vimpat to keppra and repeat EEG improved. Acute to subacute infarcts on MRI. Developed a fib RVR 9/16 that has not responded to cardioversion (x5), on amio and dilt IV. Neurologic status improved and she was extubated on 9/19. Subjective:      Appears more awake and calm today. Eyes open fully and spontaneously today. Speech is a little easier to discern. Oriented to place and person. Medications:  Reviewed    Infusion Medications    dilTIAZem Stopped (09/20/20 1710)    dextrose      heparin (Porcine) 6 mL/hr (09/23/20 1952)     Scheduled Medications    vancomycin  1,000 mg Intravenous Once in dialysis    diazePAM  5 mg Oral 2 times per day    amiodarone  200 mg Per NG tube BID    epoetin rajiv-epbx  5,000 Units Subcutaneous Once per day on Mon Wed Fri    lidocaine 1 % injection  5 mL Intradermal Once    sodium chloride flush  10 mL Intravenous 2 times per day    lacosamide  200 mg Oral BID    levETIRAcetam  1,000 mg Oral BID    insulin lispro  0-12 Units Subcutaneous 6 times per day    digoxin  125 mcg Per NG tube Daily    metoprolol tartrate  25 mg Per NG tube BID    lactobacillus  2 capsule Oral BID WC    pantoprazole  40 mg Intravenous Daily    And    sodium chloride (PF)  10 mL Intravenous Daily    vancomycin (VANCOCIN) intermittent dosing (placeholder)   Other RX Placeholder     PRN Meds: LORazepam, sodium chloride flush, metoprolol, heparin (porcine), ondansetron, promethazine, fentanNYL, acetaminophen **OR** acetaminophen, glucose, dextrose, glucagon (rDNA), dextrose      Intake/Output Summary (Last 24 hours) at 9/24/2020 1414  Last data filed at 9/24/2020 1357  Gross per 24 hour   Intake 1616.68 ml   Output 2260 ml   Net -643.32 ml       Physical Exam Performed:    /67   Pulse 91   Temp 97.6 °F (36.4 °C)   Resp 16   Ht 5' 7\" (1.702 m)   Wt 197 lb 5 oz (89.5 kg)   LMP 12/05/2018 (Exact Date)   SpO2 96%   BMI 30.90 kg/m²     General appearance: No apparent distress, appears stated age  HEENT: unable to fully open her eyes, Pupils equal, round, and reactive to light. Conjunctivae/corneas clear. Neck: Supple, with full range of motion. Trachea midline. Respiratory:  Normal respiratory effort.  Coarse breath sounds bilaterally   Cardiovascular: Regular rate and rhythm with normal S1/S2 without murmurs, rubs or gallops. Abdomen: Soft, non-tender, non-distended with normal bowel sounds. Musculoskeletal: No clubbing, cyanosis or edema bilaterally. Large R groin hematoma  Skin: Skin color, texture, turgor normal.  No rashes or lesions. Neurologic: Oriented to self, moves all extremities, mostly following commands  Psychiatric: oriented to place and person. Speech difficult to discern. Capillary Refill: Brisk,< 3 seconds   Peripheral Pulses: +2 palpable, equal bilaterally       Labs:   Recent Labs     09/22/20 0407 09/23/20 0415 09/24/20 0430   WBC 12.8* 14.9* 13.8*   HGB 8.5* 7.5* 7.8*   HCT 27.2* 24.4* 24.6*    305 385     Recent Labs     09/22/20 0407 09/23/20 0415 09/24/20 0430    132* 136   K 3.0* 4.2 4.1   CL 93* 99 100   CO2 29 24 25   BUN 46* 28* 42*   CREATININE 3.2* 2.3* 2.6*   CALCIUM 8.6 8.3 8.8   PHOS 4.0 1.8* 2.4*     Recent Labs     09/22/20 0407 09/23/20 0415 09/24/20  0430   AST 36 34 43*   ALT 88* 67* 63*   BILIDIR 0.5* 0.4* 0.4*   BILITOT 0.9 0.7 0.7   ALKPHOS 131* 121 155*     No results for input(s): INR in the last 72 hours. No results for input(s): [place] in the last 72 hours. Urinalysis:      Lab Results   Component Value Date    NITRU Negative 09/10/2020    WBCUA 3-5 09/10/2020    BACTERIA 2+ 09/10/2020    RBCUA 21-50 09/10/2020    BLOODU MODERATE 09/10/2020    SPECGRAV >1.030 09/10/2020    GLUCOSEU Negative 09/10/2020       Radiology:  XR CHEST PORTABLE   Final Result   Nasogastric tube terminates in the mid stomach. Fluoroscopy modified barium swallow with video   Final Result   Dysphagia was noted as described above. Penetration and aspiration was   demonstrated with nectar consistency. This elicited a cough. Deep silent   penetration was demonstrated with honey thick consistency and pudding   consistency. Please see separate speech pathology report for full discussion of findings   and recommendations.          VL DUP LOWER EXTREMITY ARTERIES RIGHT   Final Result      XR CHEST PORTABLE   Final Result   Suspect mild congestive failure with pleural effusion questioned greater on   the left. Endotracheal tube is approximately 3 cm above the rahat         MRI BRAIN WO CONTRAST   Final Result   Addendum 1 of 1   ADDENDUM:   Results were reported to GOLDIE Elizalde at 3:26 p.m. on September 15,    2020. Final      XR CHEST PORTABLE   Final Result   Decreased layering effusions with improved basilar aeration. Appropriate   life support device positioning. XR CHEST PORTABLE   Final Result   Low-lying endotracheal tube. Recommend retracting 1-2 cm. No substantial change in layering effusions and basilar opacities. The findings were sent to the Radiology Results [address] at 3:53   am on 9/14/2020to be communicated to a licensed caregiver. XR CHEST PORTABLE   Final Result   1. Interval placement of an NG tube, likely within the stomach. 2. Stable appropriate positions of endotracheal tube and right-sided dialysis   catheter. 3. No significant change in appearance of small to moderate bilateral pleural   effusions and bibasilar airspace disease. XR CHEST PORTABLE   Final Result   Supportive lines project in stable positions. Bibasilar opacities, suggestive of layered effusions and atelectasis or   airspace disease. XR CHEST PORTABLE   Final Result   1. Suspected mild to moderate bilateral pleural effusions with adjacent   bibasilar atelectasis and/or airspace disease. 2. Mild to moderate cardiomegaly. 3. Recommend retraction of endotracheal tube 0.7 cm. XR CHEST PORTABLE   Final Result   Right jugular venous catheter and endotracheal tube project in normal   positions. No pneumothorax. Bilateral airspace disease and possible layered pleural effusions, consistent   with pulmonary edema and atelectasis. Pneumonia is a differential   possibility.          XR CHEST PORTABLE   Final Result   Satisfactory position of the endotracheal tube 3 cm above the rahat. The   heart is upper normal size and vessels are borderline congested. VL Extremity Venous Bilateral   Final Result      VL DUP LOWER EXTREMITY ARTERIES BILATERAL   Final Result      XR CHEST PORTABLE   Final Result   Endotracheal tube has tip approximately 1.3 cm proximal to the rahat and   should be retracted approximately 2 cm. Findings are again suggestive of pulmonary edema with small pleural   effusions. Pneumonia is not excluded in the appropriate clinical setting. Findings were called by the radiology call center. CT HEAD WO CONTRAST   Final Result   Motion limited study. No convincing evidence for acute intracranial   abnormality identified within the limitations of motion artifact. If there is persistent clinical suspicion for intracranial abnormality,   repeat study should be considered. XR CHEST PORTABLE   Final Result   Cardiomegaly, pulmonary vascular congestion, and bilateral pleural effusions. Endotracheal tube terminates approximately 2 cm above the rahat. CT CHEST PULMONARY EMBOLISM W CONTRAST   Final Result   Addendum 1 of 1   ADDENDUM:   Findings were discussed with Dr. Rajesh Sunshine at 10:49 pm on 9/10/2020.          Final            Assessment/Plan:    Active Hospital Problems    Diagnosis    Cardiogenic shock ([address] [R57.0]    Staphylococcal pneumonia ([address] [J15.20]    Pleural effusion, bilateral [J90]    Elevated lactic acid level [R79.89]    Bacteremia due to methicillin susceptible Staphylococcus aureus (MSSA) [R78.81]    Pneumonia due to methicillin susceptible Staphylococcus aureus (MSSA) ([facility]) [J15.211]    Anemia [D64.9]    Acute renal failure ([address] [N17.9]    Bilateral pleural effusion [J90]    Mitral valve insufficiency [I34.0]    Tricuspid valve insufficiency [I07.1]    Class 2 obesity due to excess calories with body mass index (BMI) of 39.0 to 39.9 in adult [E66.09, Z68.39]    Pneumonia due to organism [J18.9]    Bacteremia [R78.81]    Cardiac arrest ([address] [I46.9]    Seizures ([address] [R56.9]    Acute respiratory failure with hypoxia (HCC) [J96.01]    Acute kidney injury ([address] [P85.8]    Metabolic acidosis [B26.8]    Congestive heart failure (HCC) [I50.9]    Pulmonary hypertension (HCC) [I27.20]    Multifocal pneumonia [J18.9]    Septic shock (HCC) [A41.9, R65.21]    Elevated LFTs [R94.5]    Thrombocytopenia ([address] [D69.6]    Coagulopathy ([address] [D68.9]    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) ([address] [D65]    Acute encephalopathy [G93.40]    Pulmonary embolus (HCC) [I26.99]    Lactic acidosis [E87.2]    CAP (community acquired pneumonia) [J18.9]    Acute CHF (congestive heart failure) (HCC) [I50.9]    Bilateral pulmonary embolism (HCC) [I26.99]    Factor V Leiden mutation ([address] [D68.51]         Shock, septic vs cardiogenic - resolved. Severe mitral and tricuspid regurgitation on TTE  moderate pulmonary HTN,   large bilateral pleural effusions and hepatic congestion on CT. MRSA bacteremia. Off pressors  - ID consulted  - cardiology following      MRSA bacteremia, MRSA PNA  - ID following - presently on vancomycin  - TTE, LEVY without vegetations though concerning for endocarditis given embolic CVA      Acute CVA  MRI with scattered acute to subacute infarcts, likely embolic  - neuro following  - workup for endocarditis LEVY negative      Cardiomyopathy, acute on chronic systolic HF  EF 30- 09%. Severe diffuse hypokinesis with severely reduced rt ventricular function, severe tricuspid regurgitation and mitral regurgitation, grade 3 diastolic dysfunction.  No prior TTE.   - cardiology following  - RHC/LHC when able      S/p PEA arrest, acute hypoxic resp failure  Intubated 9/10 shortly after admission.   -Mental status improved enough for SBP 9/19-- extubated 9/19 to 2L      Seizure like activity with ongoing myoclonic jerking movements - improved. Prior to PEA arrest, episodic rhythmic twitching and myoclonus. Initial EEG negative  - keppra 1000 mg BID  - MRI brain as above  - repeat EEG 9/16 with concern for seizures, possible status  - added vimpat  - repeat EEG 9/17 improved with addition of vimpat, partner discussed with neuro and can defer cvEEG for now  - Neurologic status significantly improved  - no repeat seizures but ongoing myoclonus -   - added valium to NG with good effect. A fib RVR  Cardioverted 9/16, started on amio drip without adequate control, added dilt. Multiple attempts to cardiovert all failed  - cardiology following  - continue to monitor  - added digoxin  - tolerating heparin gtt   - in sinus on the monitor today   - amio transitioned to PO formulation via NG.         R groin hematoma - stable  After removing femoral line. Arterial duplex with normal flow, good pulses  - monitor  - back on heparin drip        Bilateral PE  Ho DVT/PE, on coumadin chronically - albeit not sure if she was taking this at all.   - heparin drip      Oliguric/anuric MORELIA  Suboptimal response to Lasix. -CRRT started, switched to HD  -Checking ZENON (neg), ANCA, complement (c3 low), antiphospholipid antibodies  -Nephrology following  - UOP improved  - clinically hypovolemic to euvolemic now - increased free water bolus with tube feed. Coagulapathy  Hx Factor V Leiden on coumadin. INR therapeutic at 2.2 on admission though hematology notes non-adherence to coumadin in the past. Has had oozing on heparin drip, sepsis with bacteremia, DIC versus shock liver versus vitamin K deficiency  - Heme-onc following, fibrinogen and INR improving, no need for vit K or cryo now  - DOAC when able      Elevated LFTs  Likely shock liver, improving  - daily labs      Obesity  Body mass index is 30.9 kg/m².   Counseled on effects of weight on overall health and chronic medical conditions and discussed

## 2020-09-24 NOTE — PROGRESS NOTES
seizures with multiple infarcts seen on MRI and shock ?cardiogenic given EF of 30-35%. Last week she went back into atrial fibrillation with RVR and was started on an amiodarone drip. She was cardioverted a total of 7 times without prolonged conversion. She was on a Cardizem drip which has been weaned off. She remains on amiodarone drip at 0.5mg/min. She was given multiple doses of IV digoxin over the last couple days, yesterday 500mcg which did improve her HR some per her nurse. She was extubated 9/19.  9/21/2020 CSE recommend NPO  Subjective:     Current diet  NPO    Comments regarding tolerating Current Diet:   nsg reports strict NPO; tolerating NG tube feeds; slightly improved alertness today. PEG tube to be placed tomorrow. Objective:     Pain   Patient Currently in Pain: Denies    Cognitive/Behavior   Behavior/Cognition: Alert, Confused, Requires cueing, Cooperative    Presentations   Consistencies Administered: ice chips    Positioning   Upright in bed    PO Trials:  · Thin Liquids: ice chip x1: decreased bolus control and AP transit;  suspected premature spillage; delayed swallow; decreased laryngeal elevation; delayed repeat swallow. Delayed wet, congested cough noted with ice chip. · Nectar thick liquids NT  · Honey Thick liquids: NT  · Soft food NT  · Regular food NT    Dysphagia Tx:   · Pt alert and agreeable to tx. Pt left side lean with soft wrist restraints in place. Unable to full reposition upright d/t restraints. · Oral care completed prior to PO trials of ice chips  · Able to follow simple OM commands and simple dx for safe PO acceptance. · Improved labial and lingual ROM/strength noted. Improved ability to maintain alertness for participation without cues. · Moderate to severe dysarthria noted this date for communication.  Pt with frequent nonsensical verbalizations  · PO trials with improved oral transit, but cannot r/o silent aspiration with all trials d/t progressive fatigue and variable levels of alertness. · Edu on importance of oral care prior. Pt verbalized understanding. Goals:   Dysphagia Goals:   Goal 1: Pt will tolerate skilled trials of puree 5/5 without overt s/s: 09/24; ongoing  Goal 2: Pt will complete lingual, tongue base, EMILY/PEX 5/5 with max cues: 09/24; ongoing; addressed with jaw jutt x5 (5 sec hold), tongue protrusion x5 (5 sec hold), tongue to roof of mouth x5 (5 sec hold), falsetto \"e\", resistive jaw opening x10 with ST hand. Goal 3: The pt will tolerate instrumental swallowing assessment when able to participate 09/23 GOAL Met. MBSS completed    Assessment:   Impressions:   Dysphagia Diagnosis: Severe oral and severe pharyngeal phase dysphagia   Behavior/Cognition: Alert, Confused, Requires cueing, Cooperative    With ice chip PO:  · Lingual pumping, reduced bolus control, decreased AP transit  · Suspected delayed swallow initiation, reduced anterior hyolaryngeal elevation, comparable to MBSS results on 09/23  · Reduced lingual strength and coordination affecting bolus control; decreased AP transit; high risk for premature bolus loss to pharynx; delayed swallow initiation; decreased laryngeal elevation; suspected reduced airway protection and reduced sensation  · MBS order completed on 09/23 with NPO diagnosis with dysphagia diagnosis of severe oral and pharyngeal dysphagia. Improved participation. · Completed exercises: Jaw jutt, falsetto \"e\", tongue to roof of mouth, tongue protrusion all with 5 reps, 5 second hold and resistive jaw opening (x10)  · Presence of NG tube.  PEG tube to be placed on 09/25    Diet Recommendations:  NPO  Recommended Form of Meds: Via alternative means of nutrition    Strategies:   Frequent oral care    Education:  Consulted and agree with results and recommendations: Patient, RN  Patient Education: results and recommendations  Patient Education Response: Needs reinforcement, No evidence of learning    Prognosis:   Fair, guarded    Plan:     Continue Dysphagia Therapy: yes  Interventions: Therapeutic Interventions: Therapeutic PO trials with SLP, Oral motor exercises, Patient/Family education, Pharyngeal exercises, Thermal gustatory stimulation  Duration/Frequency of therapy while on unit: Duration/Frequency of Treatment  Duration/Frequency of Treatment: 3-5x/wk while on acute unit  Discharge Instructions:   Anticipate Yes for further skilled Speech Therapy for Dysphagia at discharge    This note serves as a D/C Summary in the event that this patient is discharged prior to the next therapy session.     Coded treatment time: 8  Total treatment time: 16 minutes    Pa Lord M.S., [facility] Pathologist  [phone]

## 2020-09-24 NOTE — PROGRESS NOTES
Occupational Therapy  Facility/Department: [address] ICU  Daily Treatment Note  NAME: Ranjeet Bowens  : 1970  MRN: [id number]    Date of Service: 2020    Discharge Recommendations:  Patient would benefit from continued therapy after discharge, 3-5 sessions per week  OT Equipment Recommendations  Other: defer to 1 Kristie Navarro scored a 6/24 on the AM-PAC ADL Inpatient form. Current research shows that an AM-PAC score of 17 or less is typically not associated with a discharge to the patient's home setting. Based on the patient's AM-PAC score and their current ADL deficits, it is recommended that the patient have 3-5 sessions per week of Occupational Therapy at d/c to increase the patient's independence. Please see assessment section for further patient specific details. If patient discharges prior to next session this note will serve as a discharge summary. Please see below for the latest assessment towards goals. Assessment   Performance deficits / Impairments: Decreased functional mobility ; Decreased balance;Decreased safe awareness;Decreased ADL status; Decreased ROM; Decreased endurance;Decreased high-level IADLs;Decreased strength;Decreased cognition  Assessment: 52 y/o female admit 2020 with Pneumonia, Pleural Effusion, Tachycardia, Cardiac Arrest.  MRI + Scattered Acute/Subacute Infarct B Cerebral Hemispheres and R Cerebellar Hemisphere; most pronounced L Occipital Lobe, likely embolic. CTA Chest + PE.   -2020 Pt Intubated. PMH as noted including Factor V Leiden, OA. PTA pt lived at home with spouse and was independent with ADLs and functional mobility. Today, pt with twitching/jerking at rest and worse with effort/movement. Therapist placed 7.5 lb weights on each ankle and 1.5 lb on each wrist to calm jerking.  Also placed lalitha stedy in front of patient and with pt weightbearing through feet and holding onto siderails of stedy twitching/jerking seemed to be calm for longer periods then previous session. Pt required CGA for static sitting balance with weights and holding onto stedy but requires up to max A when jerking/twitching occurs. Attempted to stand to lalitha stedy from elevated bed but pt unable to offload bottom d/t jerking despite max A x2. Pt was able to place hands (without weights) on cross bar of stedy and pull self forward/backwards 5x with CGA and cuing for controlled movements. Anticipate pt will require max/total A for all ADLs at this time d/t cognition and strength. Pt is functioning well below baseline and will benefit from skilled therapy at this time. Prognosis: Fair  OT Education: OT Role;Plan of Care;Orientation  Barriers to Learning: cognition, jerking/twitching movements  REQUIRES OT FOLLOW UP: Yes  Activity Tolerance  Activity Tolerance: Treatment limited secondary to decreased cognition;Treatment limited secondary to medical complications (free text)  Safety Devices  Safety Devices in place: Yes  Type of devices: Left in bed;Call light within reach;Nurse notified; Bed alarm in place  Restraints  Initially in place: Yes  Restraints: andi wrist restraints tied at end of session         Patient Diagnosis(es): The primary encounter diagnosis was Pneumonia due to organism. Diagnoses of Tachycardia, Pleural effusion, bilateral, Elevated lactic acid level, Congestive heart failure, unspecified HF chronicity, unspecified heart failure type ([address] and Multiple subsegmental pulmonary emboli without acute cor pulmonale were also pertinent to this visit. has a past medical history of Anticoagulant long-term use, Arthritis, Asthma, Baker's cyst, Depression, Factor V Leiden ([address] Factor V Leiden ([address] H/O blood clots, Heartburn, Hyperlipidemia, MRSA bacteremia, and Osteoarthritis. has no past surgical history on file.     Restrictions  Restrictions/Precautions  Restrictions/Precautions: Fall Risk  Position Activity Restriction  Other position/activity restrictions: Flexiseal.  NG Tube. COVID negative. Subjective   General  Chart Reviewed: Yes  Patient assessed for rehabilitation services?: Yes  Additional Pertinent Hx: 52 y/o female admit 9/11/2020 with Pneumonia, Pleural Effusion, Tachycardia, Cardiac Arrest.  MRI + Scattered Acute/Subacute Infarct B Cerebral Hemispheres and R Cerebellar Hemisphere; most pronounced L Occipital Lobe, likely embolic. CTA Chest + PE.   9/11-9/19/2020 Pt Intubated. PMH as noted including Factor V Leiden, OA. Family / Caregiver Present: No  Referring Practitioner: Michael Franz  Subjective  Subjective: Pt seen bedside and agreeable to therapy. General Comment  Comments: Per RN ok for therapy. Objective    ADL  Feeding: Dependent/Total(NG feeding tube)  Toileting: Dependent/Total(ott and flexiseal)        Balance  Sitting Balance: (EOB ~ 20 minutes, max A when twitching/jerking but CGA with weights on UE/LEs and hands holding onto lalitha stedy side bars)  Functional Mobility  Functional Mobility Comments: unsafe to attempt OOB transfers at this time. Bed mobility  Supine to Sit: Dependent/Total;2 Person assistance  Sit to Supine: Dependent/Total;2 Person assistance  Scooting: Dependent/Total;2 Person assistance     Transfers  Transfer Comments: unsafe to attempt OOB transfers 2/2 jerking         Cognition  Overall Cognitive Status: Exceptions  Arousal/Alertness: Delayed responses to stimuli  Following Commands: Follows one step commands with increased time; Follows one step commands with repetition; Inconsistently follows commands  Attention Span: Difficulty attending to directions  Memory: Decreased short term memory;Decreased recall of recent events  Insights: Not aware of deficits  Initiation: Requires cues for some  Sequencing: Requires cues for some      Additional Activities Comment  Additional Activities: Placed 7.5 lb weights on each ankle and 1.5 lb on each wrist to calm jerking.  Placed lalitha stedy in front of patient and with pt weightbearing through feet and holding onto siderails of stedy twitching/jerking seemed to be calm for longer periods then previous session. Attempted to stand to lalitha baxter from elevated bed but pt unable to offload bottom d/t jerking despite max A x2. Pt was able to place hands (without weights) on cross bar of stedy and pull self forward/backwards 5x with CGA and cuing for controlled movements. Plan   Plan  Times per week: 3-5  Current Treatment Recommendations: Strengthening, Endurance Training, Balance Training, Gait Training, Functional Mobility Training, Self-Care / ADL, ROM, Positioning, Cognitive Reorientation    AM-PAC Score  AM-PAC Inpatient Daily Activity Raw Score: 6 (09/24/20 0926)  AM-PAC Inpatient ADL T-Scale Score : 17.07 (09/24/20 0926)  ADL Inpatient CMS 0-100% Score: 100 (09/24/20 0926)  ADL Inpatient CMS G-Code Modifier : CN (09/24/20 0926)    Goals  Short term goals  Time Frame for Short term goals: Prior to DC: goals ongoing  Short term goal 1: Pt will complete bed mobility with max A  Short term goal 2: Pt will tolerate sitting EOB > 5 min for functional task with CGA  Short term goal 3: Pt will tolerate 15 reps of UE exercises in all planes to inc strength/endurance for ADLs  Short term goal 4: Pt will tolerate standing  with lalitha baxter and max A x 2  Long term goals  Time Frame for Long term goals : stgs=ltgs  Patient Goals   Patient goals : no goal stated       Therapy Time   Individual Concurrent Group Co-treatment   Time In 0815         Time Out 0900         Minutes 45         Timed Code Treatment Minutes: 45 Minutes     This note to serve as OT d/c summary if pt is d/c-ed prior to next therapy session.     Jovan Mondragon, OTR/L

## 2020-09-24 NOTE — PROGRESS NOTES
Clinical Pharmacy Note  Heparin Dosing       Lab Results   Component Value Date    APTT 55.2 09/24/2020     Lab Results   Component Value Date    HGB 7.5 09/23/2020    HCT 24.4 09/23/2020     09/23/2020    INR 1.23 09/18/2020       Current Infusion Rate: 6 mL/hr    Plan:  Rate: continue heparin infusion at 6 mL/hr  Next aPTT: 0800 09/24/20    Pharmacy will continue to monitor and adjust based on aPTT results.     Shabana Vanessa, PharmD, BCPS  9/24/2020 3:55 AM

## 2020-09-24 NOTE — PROGRESS NOTES
Clinical Pharmacy Note  Vancomycin Consult    Yola Newell is a 53 y.o. female ordered Vancomycin for MRSA bacteremia; consult received from Dr. Pa Alston to manage therapy.      Patient Active Problem List   Diagnosis    [facility] Leiden mutation [facility]    Pulmonary embolus (HCC)    Lactic acidosis    CAP (community acquired pneumonia)    Acute CHF (congestive heart failure) ([facility]    Bilateral pulmonary embolism (HCC)    Cardiac arrest ([facility]    Seizures ([facility]    Acute respiratory failure with hypoxia (HCC)    Acute kidney injury (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)    Acute encephalopathy    Pneumonia due to organism    Bacteremia    Pleural effusion, bilateral    Elevated lactic acid level    Bacteremia due to methicillin susceptible Staphylococcus aureus (MSSA)    Pneumonia due to methicillin susceptible Staphylococcus aureus (MSSA) (HCC)    Anemia    Acute renal failure (HCC)    Bilateral pleural effusion    Mitral valve insufficiency    Tricuspid valve insufficiency    Class 2 obesity due to excess calories with body mass index (BMI) of 39.0 to 39.9 in adult    Cardiogenic shock (HCC)    Staphylococcal pneumonia (HCC)       Allergies:  Ibuprofen     Temp max:  Temp (24hrs), Av.2 °F (36.8 °C), Min:97.9 °F (36.6 °C), Max:98.5 °F (36.9 °C)      Recent Labs     20  0407 20  0415 20  0430   WBC 12.8* 14.9* 13.8*       Recent Labs     20  0407 20  0415 20  0430   BUN 46* 28* 42*   CREATININE 3.2* 2.3* 2.6*         Intake/Output Summary (Last 24 hours) at 2020 1022  Last data filed at 2020 1000  Gross per 24 hour   Intake 2292.68 ml   Output 980 ml   Net 1312.68 ml       Culture Results:  09/10/20 Blood: MRSA   09/11/20 Respiratory: MSSA  09/12/20 Blood: No growth to date   09/13/20 Blood: No growth to date  09/14/20 Blood: No growth to date    Ht Readings from Last 1 Encounters:   09/24/20 5' 7\" (1.702 m)        Wt Readings from Last 1 Encounters:   09/24/20 199 lb 11.8 oz (90.6 kg)       Assessment:  Current Regimen: Intermittent dosing based on levels due to MORELIA. Receiving HD T/Th/Sat. Random level: 18.4 mg/L. Stop date 10/10 per Dr. Krystin Barber:  Vancomycin 1000 mg IV x 1 today with HD. Repeat random level on Saturday with AM labs. Thank you for the consult.      Carol Edwards, PharmD, [employer]

## 2020-09-24 NOTE — PROGRESS NOTES
Clinical Pharmacy Note  Heparin Dosing       Lab Results   Component Value Date    APTT 50.8 09/24/2020     Lab Results   Component Value Date    HGB 7.8 09/24/2020    HCT 24.6 09/24/2020     09/24/2020    INR 1.23 09/18/2020       Current Infusion Rate: 6 mL/hr    Plan:  Rate: continue heparin infusion at 6 mL/hr  Next aPTT: 1600 09/24/20    Pharmacy will continue to monitor and adjust based on aPTT results.     Sonam Gallegos RPh  9/24/2020 9:29 AM

## 2020-09-24 NOTE — PROGRESS NOTES
4 Eyes Skin Assessment     The patient is being assess for  Shift Handoff    I agree that 2 RN's have performed a thorough Head to Toe Skin Assessment on the patient. ALL assessment sites listed below have been assessed. Areas assessed by both nurses:   [x]   Head, Face, and Ears   [x]   Shoulders, Back, and Chest  [x]   Arms, Elbows, and Hands   [x]   Coccyx, Sacrum, and IschIum  [x]   Legs, Feet, and Heels        Does the Patient have Skin Breakdown?   No         Cash Prevention initiated:  NA   Wound Care Orders initiated:  NA      WOC nurse consulted for Pressure Injury (Stage 3,4, Unstageable, DTI, NWPT, and Complex wounds), New and Established Ostomies:  NA      Nurse 1 eSignature: Electronically signed by Donny Villalta RN on 9/24/20 at 7:00 AM EDT    **SHARE this note so that the co-signing nurse is able to place an eSignature**    Nurse 2 eSignature: Electronically signed by Shane Chacon RN on 9/24/20 at 10:17 AM EDT

## 2020-09-24 NOTE — PROGRESS NOTES
Physical Therapy  Facility/Department: [address] PROGRESSIVE CARE  Daily Treatment Note  NAME: Gama Vick  : 1970  MRN: [id number]    Date of Service: 2020    Discharge Recommendations:  Continue to assess pending progress   PT Equipment Recommendations  Other: Will monitor for potential equipt needs. Gama Vick scored a  on the AM-PAC short mobility form. Current research shows that an AM-PAC score of 17 or less is typically not associated with a discharge to the patient's home setting. Based on the patient's AM-PAC score and their current functional mobility deficits, it is recommended that the patient have 3-5 sessions per week of Physical Therapy at d/c to increase the patient's independence. Please see assessment section for further patient specific details. If patient discharges prior to next session this note will serve as a discharge summary. Please see below for the latest assessment towards goals. Assessment   Body structures, Functions, Activity limitations: Decreased functional mobility ; Decreased strength;Decreased safe awareness;Decreased cognition;Decreased endurance  Assessment: 54 y/o female admit 2020 with Pneumonia, Pleural Effusion, Tachycardia, Cardiac Arrest.  MRI + Scattered Acute/Subacute Infarct B Cerebral Hemispheres and R Cerebellar Hemisphere; most pronounced L Occipital Lobe, likely embolic. CTA Chest + PE.   -2020 Pt Intubated. PMH as noted including Factor V Leiden, OA. Have initiated EOB activities. Slow progress. At this time anticipate need cont PT Services low/mid intensity (3-5) setting upon d/c. Prognosis: Fair;Good  Decision Making: Medium Complexity  History: 54 y/o female admit 2020 with Pneumonia, Pleural Effusion, Tachycardia, Cardiac Arrest.  MRI + Scattered Acute/Subacute Infarct B Cerebral Hemispheres and R Cerebellar Hemisphere; most pronounced L Occipital Lobe, likely embolic.   CTA Chest + PE.   -2020 Pt Intubated. PMH as noted including Factor V Leiden, OA. Exam: See above. Clinical Presentation: See above. Patient Education: Role of PT, POC, Need to call for assist, EOB activities with use of Stedy. Barriers to Learning: Cognitive. REQUIRES PT FOLLOW UP: Yes  Activity Tolerance  Activity Tolerance: Patient limited by cognitive status; Patient limited by endurance  Activity Tolerance: Activity limit also due to extent trunk/extremities twitching/jerking; alittle better today with use of Stedy in front of pt (UE grasp/LE wgt bear  and use of wgts wrists/ankles to diminish twitching severity). Patient Diagnosis(es): The primary encounter diagnosis was Pneumonia due to organism. Diagnoses of Tachycardia, Pleural effusion, bilateral, Elevated lactic acid level, Congestive heart failure, unspecified HF chronicity, unspecified heart failure type ([address] and Multiple subsegmental pulmonary emboli without acute cor pulmonale were also pertinent to this visit. has a past medical history of Anticoagulant long-term use, Arthritis, Asthma, Baker's cyst, Depression, Factor V Leiden ([address] Factor V Leiden ([address] H/O blood clots, Heartburn, Hyperlipidemia, MRSA bacteremia, and Osteoarthritis. has no past surgical history on file. Restrictions  Restrictions/Precautions  Restrictions/Precautions: Fall Risk  Position Activity Restriction  Other position/activity restrictions: Flexiseal.  NG Tube. COVID negative. Subjective   General  Chart Reviewed: Yes  Additional Pertinent Hx: 54 y/o female admit 9/11/2020 with Pneumonia, Pleural Effusion, Tachycardia, Cardiac Arrest.  MRI + Scattered Acute/Subacute Infarct B Cerebral Hemispheres and R Cerebellar Hemisphere; most pronounced L Occipital Lobe, likely embolic. CTA Chest + PE.   9/11-9/19/2020 Pt Intubated. PMH as noted including Factor V Leiden, OA. Response To Previous Treatment: Patient with no complaints from previous session.   Family / Caregiver Present: No  Referring Practitioner: Dr. Traci Corrigan  Subjective  Subjective: Pt agreeable to PT Rx. General Comment  Comments: Cont with twitching/jerking mvts LEs>UEs, increase with engage/effort with activity. Orientation  Orientation  Overall Orientation Status: Impaired  Orientation Level: Oriented to person;Oriented to place; Disoriented to time;Disoriented to situation  Cognition   Cognition  Overall Cognitive Status: Exceptions  Arousal/Alertness: Delayed responses to stimuli  Following Commands: Follows one step commands with increased time; Follows one step commands with repetition; Inconsistently follows commands  Attention Span: Difficulty attending to directions  Memory: Decreased short term memory;Decreased recall of recent events  Insights: Not aware of deficits  Initiation: Requires cues for some  Sequencing: Requires cues for some  Objective   Bed mobility  Supine to Sit: Dependent/Total;2 Person assistance  Sit to Supine: Dependent/Total;2 Person assistance  Scooting: Dependent/Total;2 Person assistance  Transfers  Bed to Chair: Unable to assess  Comment: Attempted Transfer x 2 via Stedy although unsuccessful despite Max assist x 2. Following arrival at [facility], wgts placed on wrists/ankles (wrists 1 1/2 lb, ankles 7 1/2 lb) in attempt decrease intensity of twitching/jerking during functional activities. Balance  Comments: (EOB ~ 20 minutes, max A when twitching/jerking but CGA with weights on UE/LEs and hands holding onto lalitha stedy side bars)            Comment: While at EOB with Stedy placed, hands on Crossbar of Stedy (without wgt) and LEs placed (with wgts) pt did appear calmer and less jerking/twitching. Pt then able to keep hands placed on Crossbar of Stedy and pull self forward/back 5x with CGA and cues for controlled movements.                  AM-PAC Score  AM-PAC Inpatient Mobility Raw Score : 6 (09/24/20 1237)  AM-PAC Inpatient T-Scale Score : 23.55 (09/24/20 1237)  Mobility Inpatient CMS 0-100% Score: 100 (09/24/20 1237)  Mobility Inpatient CMS G-Code Modifier : CN (09/24/20 1237)          Goals  Short term goals  Time Frame for Short term goals: Upon d/c acute care setting. Short term goal 1: Bed Mob Min assist.  Short term goal 2: Transfers with/without assist device CGA. Short term goal 3: Amb with/without assist device 48' CGA. Patient Goals   Patient goals : None Stated. Plan    Plan  Times per week: 3-5x week while in acute care setting.   Current Treatment Recommendations: Strengthening, Functional Mobility Training, Transfer Training, Gait Training, Stair training, Safety Education & Training, Patient/Caregiver Education & Training  Safety Devices  Type of devices: Bed alarm in place, Call light within reach, Left in bed, Nurse notified     Therapy Time   Individual Concurrent Group Co-treatment   Time In 0815         Time Out 0900         Minutes [address] [place], PT

## 2020-09-24 NOTE — CONSULTS
GASTROENTEROLOGY INPATIENT CONSULTATION      IDENTIFYING DATA/REASON FOR CONSULTATION   PATIENT:  Marleni Day  MRN:  [id number]  ADMIT DATE: 9/10/2020  TIME OF EVALUATION: 9/24/2020 1:05 PM  HOSPITAL STAY:   LOS: 14 days     REASON FOR CONSULTATION:  PEG placement      HISTORY OF PRESENT ILLNESS   Marleni Day is a 53 y.o. female who we are consulted to see for PEG placement. She has a PMH of Factor V Leiden, DVT, HLD. She presented on 9/10/2020 with SOB, fatigue, and leg swelling. She was found to have bilateral PEs, pneumonia and moderate pleural effusions on CTA. Shortly after being admitted she went into PEA arrest.  CPR was performed,  ROSC achieved and she was intubated. She was evaluated by Neurology for concern for seizures, started on Keppra. She was found to have have MSSA pneumonia and bacteremia. She also developed acute renal failure requiring CRRT (now on HD), shocked liver (now improved) and coagulopathy. She had an MRI brain on 9/15 that showed scattered bilateral acute to subacute infarcts. On 9/16 she developed a fib with RVR, underwent multiple cardioversion attempts, eventually converted to SR 9/22. She was extubated on 9/19/2020. Speech therapy has been following since and reports patient with moderate to severe oral stage dysphagia and still severe pharyngeal stage dysphagia. She underwent a modified barium swallow study 9/23 that showed penetration and aspiration with nectar thick liquids. We have been consulted for PEG placement. Patient seen in dialysis. Patient alert, in no acute distress. Denies abdominal pain. Abdominal exam benign. NG tube in place. She remains on heparin drip.          Prior Endoscopic Evaluations:    PAST MEDICAL, SURGICAL, FAMILY, and SOCIAL HISTORY     Past Medical History:   Diagnosis Date    Anticoagulant long-term use     Arthritis     Asthma     Baker's cyst     Depression     [facility]     [facility] Leiden ([facility]     H/O blood clots     Heartburn     Hyperlipidemia     MRSA bacteremia 09/10/2020    Osteoarthritis      History reviewed. No pertinent surgical history.   Family History   Problem Relation Age of Onset    Heart Attack Father      Social History     Socioeconomic History    Marital status:      Spouse name: None    Number of children: None    Years of education: None    Highest education level: None   Occupational History    None   Social Needs    Financial resource strain: None    Food insecurity     Worry: None     Inability: None    Transportation needs     Medical: None     Non-medical: None   Tobacco Use    Smoking status: Former Smoker     Packs/day: 0.50     Types: Cigarettes     Last attempt to quit: 2020     Years since quittin.0    Smokeless tobacco: Never Used   Substance and Sexual Activity    Alcohol use: No     Alcohol/week: 0.0 standard drinks    Drug use: Yes     Frequency: 14.0 times per week     Types: Marijuana    Sexual activity: None   Lifestyle    Physical activity     Days per week: None     Minutes per session: None    Stress: None   Relationships    Social connections     Talks on phone: None     Gets together: None     Attends Ca[employer] service: None     Active member of club or organization: None     Attends meetings of clubs or organizations: None     Relationship status: None    Intimate partner violence     Fear of current or ex partner: None     Emotionally abused: None     Physically abused: None     Forced sexual activity: None   Other Topics Concern    None   Social History Narrative    None       MEDICATIONS   SCHEDULED:  vancomycin, 1,000 mg, Once in dialysis  diazePAM, 5 mg, 2 times per day  amiodarone, 200 mg, BID  epoetin rajiv-epbx, 5,000 Units, Once per day on   lidocaine 1 % injection, 5 mL, Once  sodium chloride flush, 10 mL, 2 times per day  lacosamide, 200 mg, BID  levETIRAcetam, 1,000 mg, BID  insulin lispro, 0-12 Units, 6 times per day  digoxin, 125 mcg, Daily  metoprolol tartrate, 25 mg, BID  lactobacillus, 2 capsule, BID WC  pantoprazole, 40 mg, Daily    And  sodium chloride (PF), 10 mL, Daily  vancomycin (VANCOCIN) intermittent dosing (placeholder), , RX Placeholder      FLUIDS/DRIPS:     dilTIAZem Stopped (09/20/20 1710)    dextrose      heparin (Porcine) 6 mL/hr (09/23/20 1952)     PRNs: LORazepam, 2 mg, Q2H PRN  sodium chloride flush, 10 mL, PRN  metoprolol, 5 mg, Q4H PRN  heparin (porcine), 1,000 Units, PRN  ondansetron, 4 mg, Q4H PRN  promethazine, 25 mg, Q4H PRN  fentanNYL, 50 mcg, Q2H PRN  acetaminophen, 650 mg, Q6H PRN    Or  acetaminophen, 650 mg, Q6H PRN  glucose, 15 g, PRN  dextrose, 12.5 g, PRN  glucagon (rDNA), 1 mg, PRN  dextrose, 100 mL/hr, PRN      ALLERGIES:  She   Allergies   Allergen Reactions    Ibuprofen        REVIEW OF SYSTEMS   Pertinent ROS noted in HPI    PHYSICAL EXAM     Vitals:    09/24/20 0800 09/24/20 0907 09/24/20 1020 09/24/20 1120   BP: (!) 153/76  (!) 153/44    Pulse: 111  103    Resp: 21 18 18    Temp: 98.2 °F (36.8 °C)  98.2 °F (36.8 °C)    TempSrc: Oral      SpO2: 93% 96%     Weight:   199 lb 11.8 oz (90.6 kg)    Height:    5' 7\" (1.702 m)       I/O last 3 completed shifts: In: 2232.7 [I.V.:296.7; NG/GT:1686; IV Piggyback:250]  Out: 0 [Urine:680; Stool:200]      Physical Exam:  General appearance: alert, cooperative, no distress, +NGT  Eyes: Anicteric  Head: Normocephalic, without obvious abnormality  Lungs: clear to auscultation bilaterally, Normal Effort  Heart: regular rate and rhythm, normal S1 and S2, no murmurs or rubs  Abdomen: soft, non-distended, non-tender.  Bowel sounds normal   Extremities: atraumatic, no cyanosis or edema  Skin: warm and dry, no jaundice  Neuro: Grossly intact, Alert      LABS AND IMAGING   Laboratory   Recent Labs     09/22/20  0407 09/23/20  0415 09/24/20  0430   WBC 12.8* 14.9* 13.8*   HGB 8.5* 7.5* 7.8*   HCT 27.2* 24.4* 24.6*   MCV 85.6 86.9 85.8    305 385     Recent Labs     09/22/20  0407 09/23/20 0415 09/24/20  0430    132* 136   K 3.0* 4.2 4.1   CL 93* 99 100   CO2 29 24 25   PHOS 4.0 1.8* 2.4*   BUN 46* 28* 42*   CREATININE 3.2* 2.3* 2.6*     Recent Labs     09/22/20  0407 09/23/20  0415 09/24/20  0430   AST 36 34 43*   ALT 88* 67* 63*   BILIDIR 0.5* 0.4* 0.4*   BILITOT 0.9 0.7 0.7   ALKPHOS 131* 121 155*     No results for input(s): LIPASE, AMYLASE in the last 72 hours. No results for input(s): PROTIME, INR in the last 72 hours. Imaging  XR CHEST PORTABLE   Final Result   Nasogastric tube terminates in the mid stomach. Fluoroscopy modified barium swallow with video   Final Result   Dysphagia was noted as described above. Penetration and aspiration was   demonstrated with nectar consistency. This elicited a cough. Deep silent   penetration was demonstrated with honey thick consistency and pudding   consistency. Please see separate speech pathology report for full discussion of findings   and recommendations. VL DUP LOWER EXTREMITY ARTERIES RIGHT   Final Result      XR CHEST PORTABLE   Final Result   Suspect mild congestive failure with pleural effusion questioned greater on   the left. Endotracheal tube is approximately 3 cm above the rahat         MRI BRAIN WO CONTRAST   Final Result   Addendum 1 of 1   ADDENDUM:   Results were reported to GOLDIE Nicholas at 3:26 p.m. on September 15,    2020. Final      XR CHEST PORTABLE   Final Result   Decreased layering effusions with improved basilar aeration. Appropriate   life support device positioning. XR CHEST PORTABLE   Final Result   Low-lying endotracheal tube. Recommend retracting 1-2 cm. No substantial change in layering effusions and basilar opacities. The findings were sent to the Radiology Results [address] at 3:53   am on 9/14/2020to be communicated to a licensed caregiver. XR CHEST PORTABLE   Final Result   1. Interval placement of an NG tube, likely within the stomach. 2. Stable appropriate positions of endotracheal tube and right-sided dialysis   catheter. 3. No significant change in appearance of small to moderate bilateral pleural   effusions and bibasilar airspace disease. XR CHEST PORTABLE   Final Result   Supportive lines project in stable positions. Bibasilar opacities, suggestive of layered effusions and atelectasis or   airspace disease. XR CHEST PORTABLE   Final Result   1. Suspected mild to moderate bilateral pleural effusions with adjacent   bibasilar atelectasis and/or airspace disease. 2. Mild to moderate cardiomegaly. 3. Recommend retraction of endotracheal tube 0.7 cm. XR CHEST PORTABLE   Final Result   Right jugular venous catheter and endotracheal tube project in normal   positions. No pneumothorax. Bilateral airspace disease and possible layered pleural effusions, consistent   with pulmonary edema and atelectasis. Pneumonia is a differential   possibility. XR CHEST PORTABLE   Final Result   Satisfactory position of the endotracheal tube 3 cm above the rahat. The   heart is upper normal size and vessels are borderline congested. VL Extremity Venous Bilateral   Final Result      VL DUP LOWER EXTREMITY ARTERIES BILATERAL   Final Result      XR CHEST PORTABLE   Final Result   Endotracheal tube has tip approximately 1.3 cm proximal to the rahat and   should be retracted approximately 2 cm. Findings are again suggestive of pulmonary edema with small pleural   effusions. Pneumonia is not excluded in the appropriate clinical setting. Findings were called by the radiology call center. CT HEAD WO CONTRAST   Final Result   Motion limited study. No convincing evidence for acute intracranial   abnormality identified within the limitations of motion artifact.       If there is persistent clinical suspicion for intracranial abnormality,   repeat study should be considered. XR CHEST PORTABLE   Final Result   Cardiomegaly, pulmonary vascular congestion, and bilateral pleural effusions. Endotracheal tube terminates approximately 2 cm above the rahat. CT CHEST PULMONARY EMBOLISM W CONTRAST   Final Result   Addendum 1 of 1   ADDENDUM:   Findings were discussed with Dr. Rhoda Victoria at 10:49 pm on 9/10/2020. Final            ASSESSMENT AND RECOMMENDATIONS   53 y.o. female with a PMH of  Factor V Leiden, DVT, HLD who presented 9/10/2020 with SOB, leg swelling. Admitted with septic vs cardiogenic shock with PEA arrest, bilateral PEs, MRSA pneumonia and bacteremia, MORELIA on HD, Seizures, new onset Afib with RVR, Acute CVA, shocked liver. GI consulted for PEG placement    IMPRESSION:  1. Oral pharyngeal dysphagia  2. Acute CVA  3. Bilateral PEs  4. Seizures like activity  5. New onset Afib  6. MORELIA on HD       RECOMMENDATIONS:    Patient seen and examined with Dr. Celsa Serna. Will proceed with PEG placement tomorrow. Hold tube feedings after midnight. Hold heparin drip 4 hours prior to procedure. If you have any questions or need any further information, please feel free to contact our consult team.  Thank you for allowing us to participate in the care of Rain Cote. The note was completed using Dragon voice recognition transcription. Every effort was made to ensure accuracy; however, inadvertent transcription errors may be present despite my best efforts to edit errors.       Toshia Dwyer PA-C

## 2020-09-24 NOTE — PROGRESS NOTES
Comprehensive Nutrition Assessment    Type and Reason for Visit:  Reassess    Nutrition Recommendations/Plan:   Continue TF per NG using Nepro formula goal rate of 55 ml per hour + 1 bottle of Proteinex per feeding tube per day. Flush per provider    Nutrition Assessment:  follow-up. Pt moved out of the ICU. Pt remains confused but able to answer simple questions. MBS completed on 9/23 with recommendations of npo. Nepro continued per NG at goal rate of 55 ml per hour +1 bottle of proteinex per feeding tube per day. Noted that pt pulled out feeding tube this date. New NG tube reinserted & nutrition will likely resume following dialysis this date. Malnutrition Assessment:  Malnutrition Status:  Insufficient data    Context:  Acute Illness       Estimated Daily Nutrient Needs:  Energy (kcal):  0392-2733 (adj for HD & obesity); Weight Used for Energy Requirements:        Protein (g):   (1.2-2 x IBW 61 kg (adj for obesity & HD); Weight Used for Protein Requirements:           Fluid (ml/day):  per provider; Weight Used for Fluid Requirements:         Nutrition Related Findings:  Noted BM on 9/24. Noted trace edema to BUE & BLE. Wounds:  (noted blister on back)       Current Nutrition Therapies:    DIET TUBE FEED CONTINUOUS/CYCLIC NPO; Renal Formula; Nasogastric; Continuous; 20; 55  Current Tube Feeding (TF) Orders:  · Feeding Route: Nasogastric  · Formula: Renal(Nepro with new goal rate of 55 ml per hour + 1 bottle of Proteinex per feeding tube per day)  · Schedule: Continuous  · Additives/Modulars: Protein(1 bottle of Proteinex per feeding tube per day.  Do not mix directly with TF.)  · Water Flushes: per provider  · Current TF & Flush Orders Provides: 1100 ml TV / 2084 kcals (1980 (TF) + 104 (Proteinex) / 115 gms pro (89 (TF) + 26 (Proteinex) / 800 ml free water per day  · Goal TF & Flush Orders Provides:        Anthropometric Measures:  · Height: 5' 7\" (170.2 cm)  · Current Body Weight: 200 lb (90.7 kg)   · Admission Body Weight: 252 lb (114.3 kg)    · Ideal Body Weight: 135 lbs; % Ideal Body Weight 148.1 %   · BMI: 31.3   · BMI Categories: Obese Class 1 (BMI 30.0-34. 9)       Nutrition Diagnosis:   · Inadequate oral intake related to biting/chewing (masticatory) difficulty as evidenced by nutrition support - enteral nutrition      Nutrition Interventions:   Food and/or Nutrient Delivery:  Continue Current Tube Feeding  Nutrition Education/Counseling:  No recommendation at this time   Coordination of Nutrition Care:  Continued Inpatient Monitoring    Goals:  tolerate most appropriate form of nutrition       Nutrition Monitoring and Evaluation:   Food/Nutrient Intake Outcomes:  Enteral Nutrition Intake/Tolerance  Physical Signs/Symptoms Outcomes:  Biochemical Data, Constipation, Diarrhea, Nausea or Vomiting, Fluid Status or Edema, Weight, Skin, Nutrition Focused Physical Findings     Discharge Planning:     Too soon to determine     Electronically signed by Amol Monreal RD, LD on 9/24/20 at 11:33 AM EDT    Contact: [phone]

## 2020-09-25 ENCOUNTER — ANESTHESIA (OUTPATIENT)
Dept: ENDOSCOPY | Age: 50
DRG: 130 | End: 2020-09-25
Payer: COMMERCIAL

## 2020-09-25 VITALS — DIASTOLIC BLOOD PRESSURE: 56 MMHG | OXYGEN SATURATION: 100 % | SYSTOLIC BLOOD PRESSURE: 111 MMHG

## 2020-09-25 LAB
ALBUMIN SERPL-MCNC: 2.9 G/DL (ref 3.4–5)
ALP BLD-CCNC: 130 U/L (ref 40–129)
ALT SERPL-CCNC: 54 U/L (ref 10–40)
ANION GAP SERPL CALCULATED.3IONS-SCNC: 12 MMOL/L (ref 3–16)
ANISOCYTOSIS: ABNORMAL
APTT: 36.3 SEC (ref 24.2–36.2)
AST SERPL-CCNC: 41 U/L (ref 15–37)
BANDED NEUTROPHILS RELATIVE PERCENT: 4 % (ref 0–7)
BASOPHILS ABSOLUTE: 0 K/UL (ref 0–0.2)
BASOPHILS RELATIVE PERCENT: 0 %
BILIRUB SERPL-MCNC: 0.5 MG/DL (ref 0–1)
BILIRUBIN DIRECT: 0.3 MG/DL (ref 0–0.3)
BILIRUBIN, INDIRECT: 0.2 MG/DL (ref 0–1)
BUN BLDV-MCNC: 33 MG/DL (ref 7–20)
CALCIUM SERPL-MCNC: 9 MG/DL (ref 8.3–10.6)
CHLORIDE BLD-SCNC: 102 MMOL/L (ref 99–110)
CO2: 24 MMOL/L (ref 21–32)
CREAT SERPL-MCNC: 2 MG/DL (ref 0.6–1.1)
EOSINOPHILS ABSOLUTE: 0.2 K/UL (ref 0–0.6)
EOSINOPHILS RELATIVE PERCENT: 2 %
GFR AFRICAN AMERICAN: 32
GFR NON-AFRICAN AMERICAN: 26
GLUCOSE BLD-MCNC: 105 MG/DL (ref 70–99)
GLUCOSE BLD-MCNC: 109 MG/DL (ref 70–99)
GLUCOSE BLD-MCNC: 112 MG/DL (ref 70–99)
GLUCOSE BLD-MCNC: 113 MG/DL (ref 70–99)
GLUCOSE BLD-MCNC: 122 MG/DL (ref 70–99)
GLUCOSE BLD-MCNC: 87 MG/DL (ref 70–99)
GLUCOSE BLD-MCNC: 89 MG/DL (ref 70–99)
HCT VFR BLD CALC: 22.8 % (ref 36–48)
HEMOGLOBIN: 7.2 G/DL (ref 12–16)
INR BLD: 0.99 (ref 0.86–1.14)
LYMPHOCYTES ABSOLUTE: 1.1 K/UL (ref 1–5.1)
LYMPHOCYTES RELATIVE PERCENT: 10 %
MAGNESIUM: 2 MG/DL (ref 1.8–2.4)
MCH RBC QN AUTO: 27.4 PG (ref 26–34)
MCHC RBC AUTO-ENTMCNC: 31.5 G/DL (ref 31–36)
MCV RBC AUTO: 87.1 FL (ref 80–100)
MONOCYTES ABSOLUTE: 0.7 K/UL (ref 0–1.3)
MONOCYTES RELATIVE PERCENT: 6 %
MYELOCYTE PERCENT: 1 %
NEUTROPHILS ABSOLUTE: 9.1 K/UL (ref 1.7–7.7)
NEUTROPHILS RELATIVE PERCENT: 77 %
NUCLEATED RED BLOOD CELLS: 1 /100 WBC
NUCLEATED RED BLOOD CELLS: 1 /100 WBC
OVALOCYTES: ABNORMAL
PDW BLD-RTO: 22.1 % (ref 12.4–15.4)
PERFORMED ON: ABNORMAL
PERFORMED ON: NORMAL
PERFORMED ON: NORMAL
PHOSPHORUS: 3.9 MG/DL (ref 2.5–4.9)
PLATELET # BLD: 370 K/UL (ref 135–450)
PLATELET SLIDE REVIEW: ADEQUATE
PMV BLD AUTO: 8.8 FL (ref 5–10.5)
POLYCHROMASIA: ABNORMAL
POTASSIUM SERPL-SCNC: 3.9 MMOL/L (ref 3.5–5.1)
PROTHROMBIN TIME: 11.5 SEC (ref 10–13.2)
RBC # BLD: 2.62 M/UL (ref 4–5.2)
SODIUM BLD-SCNC: 138 MMOL/L (ref 136–145)
STOMATOCYTES: ABNORMAL
TEAR DROP CELLS: ABNORMAL
TOTAL PROTEIN: 6.2 G/DL (ref 6.4–8.2)
VACUOLATED NEUTROPHILS: PRESENT
WBC # BLD: 11.1 K/UL (ref 4–11)

## 2020-09-25 PROCEDURE — 7100000000 HC PACU RECOVERY - FIRST 15 MIN: Performed by: INTERNAL MEDICINE

## 2020-09-25 PROCEDURE — 6370000000 HC RX 637 (ALT 250 FOR IP): Performed by: INTERNAL MEDICINE

## 2020-09-25 PROCEDURE — 6360000002 HC RX W HCPCS: Performed by: INTERNAL MEDICINE

## 2020-09-25 PROCEDURE — 2580000003 HC RX 258: Performed by: ANESTHESIOLOGY

## 2020-09-25 PROCEDURE — 85730 THROMBOPLASTIN TIME PARTIAL: CPT

## 2020-09-25 PROCEDURE — 2580000003 HC RX 258: Performed by: INTERNAL MEDICINE

## 2020-09-25 PROCEDURE — 85610 PROTHROMBIN TIME: CPT

## 2020-09-25 PROCEDURE — 80076 HEPATIC FUNCTION PANEL: CPT

## 2020-09-25 PROCEDURE — 97530 THERAPEUTIC ACTIVITIES: CPT

## 2020-09-25 PROCEDURE — 2709999900 HC NON-CHARGEABLE SUPPLY: Performed by: INTERNAL MEDICINE

## 2020-09-25 PROCEDURE — 2060000000 HC ICU INTERMEDIATE R&B

## 2020-09-25 PROCEDURE — C9113 INJ PANTOPRAZOLE SODIUM, VIA: HCPCS | Performed by: INTERNAL MEDICINE

## 2020-09-25 PROCEDURE — 83735 ASSAY OF MAGNESIUM: CPT

## 2020-09-25 PROCEDURE — 1200000000 HC SEMI PRIVATE

## 2020-09-25 PROCEDURE — 94760 N-INVAS EAR/PLS OXIMETRY 1: CPT

## 2020-09-25 PROCEDURE — 2500000003 HC RX 250 WO HCPCS: Performed by: INTERNAL MEDICINE

## 2020-09-25 PROCEDURE — 80048 BASIC METABOLIC PNL TOTAL CA: CPT

## 2020-09-25 PROCEDURE — 3700000000 HC ANESTHESIA ATTENDED CARE: Performed by: INTERNAL MEDICINE

## 2020-09-25 PROCEDURE — 7100000001 HC PACU RECOVERY - ADDTL 15 MIN: Performed by: INTERNAL MEDICINE

## 2020-09-25 PROCEDURE — 99233 SBSQ HOSP IP/OBS HIGH 50: CPT | Performed by: INTERNAL MEDICINE

## 2020-09-25 PROCEDURE — 3700000001 HC ADD 15 MINUTES (ANESTHESIA): Performed by: INTERNAL MEDICINE

## 2020-09-25 PROCEDURE — 6360000002 HC RX W HCPCS: Performed by: NURSE ANESTHETIST, CERTIFIED REGISTERED

## 2020-09-25 PROCEDURE — 3609013300 HC EGD TUBE PLACEMENT: Performed by: INTERNAL MEDICINE

## 2020-09-25 PROCEDURE — 97110 THERAPEUTIC EXERCISES: CPT

## 2020-09-25 PROCEDURE — 85025 COMPLETE CBC W/AUTO DIFF WBC: CPT

## 2020-09-25 PROCEDURE — 2500000003 HC RX 250 WO HCPCS: Performed by: NURSE ANESTHETIST, CERTIFIED REGISTERED

## 2020-09-25 PROCEDURE — 0DH63UZ INSERTION OF FEEDING DEVICE INTO STOMACH, PERCUTANEOUS APPROACH: ICD-10-PCS | Performed by: INTERNAL MEDICINE

## 2020-09-25 PROCEDURE — 84100 ASSAY OF PHOSPHORUS: CPT

## 2020-09-25 RX ORDER — LIDOCAINE HYDROCHLORIDE 10 MG/ML
INJECTION, SOLUTION INFILTRATION; PERINEURAL
Status: COMPLETED | OUTPATIENT
Start: 2020-09-25 | End: 2020-09-25

## 2020-09-25 RX ORDER — PROPOFOL 10 MG/ML
INJECTION, EMULSION INTRAVENOUS PRN
Status: DISCONTINUED | OUTPATIENT
Start: 2020-09-25 | End: 2020-09-25 | Stop reason: SDUPTHER

## 2020-09-25 RX ORDER — SODIUM CHLORIDE 9 MG/ML
INJECTION, SOLUTION INTRAVENOUS CONTINUOUS
Status: DISCONTINUED | OUTPATIENT
Start: 2020-09-25 | End: 2020-09-25

## 2020-09-25 RX ORDER — HEPARIN SODIUM 10000 [USP'U]/100ML
6.7 INJECTION, SOLUTION INTRAVENOUS CONTINUOUS
Status: DISCONTINUED | OUTPATIENT
Start: 2020-09-26 | End: 2020-10-04

## 2020-09-25 RX ORDER — METOPROLOL SUCCINATE 50 MG/1
50 TABLET, EXTENDED RELEASE ORAL DAILY
Status: DISCONTINUED | OUTPATIENT
Start: 2020-09-26 | End: 2020-10-01

## 2020-09-25 RX ORDER — SODIUM CHLORIDE 0.9 % (FLUSH) 0.9 %
10 SYRINGE (ML) INJECTION PRN
Status: DISCONTINUED | OUTPATIENT
Start: 2020-09-25 | End: 2020-09-25

## 2020-09-25 RX ORDER — HEPARIN SODIUM 1000 [USP'U]/ML
2000 INJECTION, SOLUTION INTRAVENOUS; SUBCUTANEOUS ONCE
Status: COMPLETED | OUTPATIENT
Start: 2020-09-25 | End: 2020-09-25

## 2020-09-25 RX ORDER — LIDOCAINE HYDROCHLORIDE 20 MG/ML
INJECTION, SOLUTION EPIDURAL; INFILTRATION; INTRACAUDAL; PERINEURAL PRN
Status: DISCONTINUED | OUTPATIENT
Start: 2020-09-25 | End: 2020-09-25 | Stop reason: SDUPTHER

## 2020-09-25 RX ORDER — LABETALOL HYDROCHLORIDE 5 MG/ML
5 INJECTION, SOLUTION INTRAVENOUS EVERY 10 MIN PRN
Status: DISCONTINUED | OUTPATIENT
Start: 2020-09-25 | End: 2020-09-25

## 2020-09-25 RX ORDER — PROPOFOL 10 MG/ML
INJECTION, EMULSION INTRAVENOUS CONTINUOUS PRN
Status: DISCONTINUED | OUTPATIENT
Start: 2020-09-25 | End: 2020-09-25 | Stop reason: SDUPTHER

## 2020-09-25 RX ORDER — PROMETHAZINE HYDROCHLORIDE 25 MG/ML
6.25 INJECTION, SOLUTION INTRAMUSCULAR; INTRAVENOUS
Status: DISCONTINUED | OUTPATIENT
Start: 2020-09-25 | End: 2020-09-25

## 2020-09-25 RX ORDER — ONDANSETRON 2 MG/ML
4 INJECTION INTRAMUSCULAR; INTRAVENOUS
Status: DISCONTINUED | OUTPATIENT
Start: 2020-09-25 | End: 2020-09-25

## 2020-09-25 RX ORDER — SODIUM CHLORIDE 0.9 % (FLUSH) 0.9 %
10 SYRINGE (ML) INJECTION EVERY 12 HOURS SCHEDULED
Status: DISCONTINUED | OUTPATIENT
Start: 2020-09-25 | End: 2020-09-25

## 2020-09-25 RX ADMIN — LACOSAMIDE 200 MG: 100 TABLET, FILM COATED ORAL at 21:27

## 2020-09-25 RX ADMIN — CEFAZOLIN SODIUM 2 G: 10 INJECTION, POWDER, FOR SOLUTION INTRAVENOUS at 15:00

## 2020-09-25 RX ADMIN — PROPOFOL 140 MCG/KG/MIN: 10 INJECTION, EMULSION INTRAVENOUS at 14:28

## 2020-09-25 RX ADMIN — SODIUM CHLORIDE, PRESERVATIVE FREE 10 ML: 5 INJECTION INTRAVENOUS at 21:28

## 2020-09-25 RX ADMIN — Medication 10 ML: at 08:46

## 2020-09-25 RX ADMIN — SODIUM CHLORIDE, PRESERVATIVE FREE 10 ML: 5 INJECTION INTRAVENOUS at 08:46

## 2020-09-25 RX ADMIN — HEPARIN SODIUM 2000 UNITS: 1000 INJECTION INTRAVENOUS; SUBCUTANEOUS at 07:11

## 2020-09-25 RX ADMIN — AMIODARONE HYDROCHLORIDE 200 MG: 200 TABLET ORAL at 21:27

## 2020-09-25 RX ADMIN — METOPROLOL TARTRATE 25 MG: 25 TABLET, FILM COATED ORAL at 21:27

## 2020-09-25 RX ADMIN — SODIUM CHLORIDE: 9 INJECTION, SOLUTION INTRAVENOUS at 14:22

## 2020-09-25 RX ADMIN — ACETAMINOPHEN 650 MG: 325 TABLET ORAL at 18:17

## 2020-09-25 RX ADMIN — PANTOPRAZOLE SODIUM 40 MG: 40 INJECTION, POWDER, FOR SOLUTION INTRAVENOUS at 08:46

## 2020-09-25 RX ADMIN — PROPOFOL 80 MG: 10 INJECTION, EMULSION INTRAVENOUS at 14:28

## 2020-09-25 RX ADMIN — Medication 2 CAPSULE: at 18:16

## 2020-09-25 RX ADMIN — DIAZEPAM 5 MG: 5 TABLET ORAL at 21:27

## 2020-09-25 RX ADMIN — LEVETIRACETAM 1000 MG: 500 SOLUTION ORAL at 21:27

## 2020-09-25 RX ADMIN — LIDOCAINE HYDROCHLORIDE 60 MG: 20 INJECTION, SOLUTION EPIDURAL; INFILTRATION; INTRACAUDAL; PERINEURAL at 14:28

## 2020-09-25 ASSESSMENT — PULMONARY FUNCTION TESTS
PIF_VALUE: 0

## 2020-09-25 ASSESSMENT — PAIN DESCRIPTION - ORIENTATION: ORIENTATION: OUTER

## 2020-09-25 ASSESSMENT — PAIN SCALES - GENERAL
PAINLEVEL_OUTOF10: 0
PAINLEVEL_OUTOF10: 7
PAINLEVEL_OUTOF10: 0
PAINLEVEL_OUTOF10: 0
PAINLEVEL_OUTOF10: 7
PAINLEVEL_OUTOF10: 0
PAINLEVEL_OUTOF10: 0
PAINLEVEL_OUTOF10: 5

## 2020-09-25 ASSESSMENT — PAIN DESCRIPTION - DESCRIPTORS: DESCRIPTORS: ACHING

## 2020-09-25 ASSESSMENT — PAIN - FUNCTIONAL ASSESSMENT: PAIN_FUNCTIONAL_ASSESSMENT: PREVENTS OR INTERFERES SOME ACTIVE ACTIVITIES AND ADLS

## 2020-09-25 ASSESSMENT — PAIN DESCRIPTION - PAIN TYPE: TYPE: ACUTE PAIN;SURGICAL PAIN

## 2020-09-25 ASSESSMENT — PAIN DESCRIPTION - LOCATION: LOCATION: ABDOMEN

## 2020-09-25 ASSESSMENT — PAIN DESCRIPTION - PROGRESSION: CLINICAL_PROGRESSION: GRADUALLY WORSENING

## 2020-09-25 ASSESSMENT — PAIN DESCRIPTION - ONSET: ONSET: PROGRESSIVE

## 2020-09-25 NOTE — ANESTHESIA PRE PROCEDURE
[facility] Department of Anesthesiology  Pre-Anesthesia Evaluation/Consultation       Name:  Jamal Cueva  : 1970  Age:  53 y.o.                                            MRN:  [id number]  Date: 2020           Surgeon: Surgeon(s):  Anne Marie Carballo MD    Procedure: Procedure(s):  ESOPHAGOGASTRODUODENOSCOPY PERCUTANEOUS ENDOSCOPIC GASTROSTOMY TUBE PLACEMENT     Allergies   Allergen Reactions    Ibuprofen      Patient Active Problem List   Diagnosis    Factor V Leiden mutation ([address]    Pulmonary embolus ([address]    Lactic acidosis    CAP (community acquired pneumonia)    Acute CHF (congestive heart failure) ([address]    Bilateral pulmonary embolism (HCC)    Cardiac arrest ([address]    Seizures ([address]    Acute respiratory failure with hypoxia ([address]    MORELIA (acute kidney injury) ([address]    Metabolic acidosis    Congestive heart failure (HCC)    Pulmonary hypertension (HCC)    Multifocal pneumonia    Septic shock (HCC)    Elevated LFTs    Thrombocytopenia (HCC)    Coagulopathy (HCC)    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) ([address]    Acute encephalopathy    Pneumonia due to organism    Bacteremia    Pleural effusion, bilateral    Elevated lactic acid level    Bacteremia due to methicillin susceptible Staphylococcus aureus (MSSA)    Pneumonia due to methicillin susceptible Staphylococcus aureus (MSSA) (HCC)    Anemia    Acute renal failure (HCC)    Bilateral pleural effusion    Mitral valve insufficiency    Tricuspid valve insufficiency    Class 2 obesity due to excess calories with body mass index (BMI) of 39.0 to 39.9 in adult    Cardiogenic shock (HCC)    Staphylococcal pneumonia ([address]    Acute on chronic systolic heart failure (HCC)    Paroxysmal atrial fibrillation ([address]    Essential hypertension     Past Medical History:   Diagnosis Date    Anticoagulant long-term use     Arthritis     Asthma     Baker's cyst     Depression     [facility]iden ([facility] [address]     [facility]     H/O blood clots     Heartburn     Hyperlipidemia     MRSA bacteremia 09/10/2020    Osteoarthritis      History reviewed. No pertinent surgical history. Social History     Tobacco Use    Smoking status: Former Smoker     Packs/day: 0.50     Types: Cigarettes     Last attempt to quit: 2020     Years since quittin.0    Smokeless tobacco: Never Used   Substance Use Topics    Alcohol use: No     Alcohol/week: 0.0 standard drinks    Drug use: Yes     Frequency: 14.0 times per week     Types: Marijuana     Medications  No current facility-administered medications on file prior to encounter.       Current Outpatient Medications on File Prior to Encounter   Medication Sig Dispense Refill    albuterol sulfate  (90 Base) MCG/ACT inhaler INHALE TWO PUFFS BY MOUTH EVERY 6 HOURS AS NEEDED FOR WHEEZING 18 g 0    vitamin D (ERGOCALCIFEROL) 1.25 MG (03788 UT) CAPS capsule TAKE ONE CAPSULE BY MOUTH ONCE WEEKLY 12 capsule 0    omeprazole (PRILOSEC) 20 MG delayed release capsule TAKE ONE CAPSULE BY MOUTH DAILY 90 capsule 6    warfarin (COUMADIN) 7.5 MG tablet TAKE ONE TABLET BY MOUTH DAILY EXCEPT ON MONDAY AND THURSDAY TAKE ONE-HALF TABLET BY MOUTH AS DIRECTED 21 tablet 5    atorvastatin (LIPITOR) 10 MG tablet TAKE ONE TABLET BY MOUTH DAILY 90 tablet 3    DULoxetine (CYMBALTA) 60 MG extended release capsule TAKE ONE CAPSULE BY MOUTH DAILY 30 capsule 11    BREO ELLIPTA 200-25 MCG/INH AEPB INHALE ONE DOSE BY MOUTH DAILY 1 each 11     Current Facility-Administered Medications   Medication Dose Route Frequency Provider Last Rate Last Dose    0.9 % sodium chloride infusion   Intravenous Continuous Brea Sood MD        sodium chloride flush 0.9 % injection 10 mL  10 mL Intravenous 2 times per day Brea Sood MD        sodium chloride flush 0.9 % injection 10 mL  10 mL Intravenous PRN Brea Sood MD        [START ON 2020] metoprolol succinate (TOPROL XL) time: 20 0700  Max: 133   Max taken time: 20 0500  SpO2  Av.3 %  Min: 90 %   Min taken time: 20 0300  Max: 100 %   Max taken time: 20 2357    BP Readings from Last 3 Encounters:   20 (!) 148/82   18 (!) 164/72   18 (!) 151/100     BMI  Body mass index is 30.39 kg/m². Estimated body mass index is 30.39 kg/m² as calculated from the following:    Height as of this encounter: 5' 7\" (1.702 m). Weight as of this encounter: 194 lb 0.1 oz (88 kg).     CBC   Lab Results   Component Value Date    WBC 11.1 2020    RBC 2.62 2020    HGB 7.2 2020    HCT 22.8 2020    MCV 87.1 2020    RDW 22.1 2020     2020     CMP    Lab Results   Component Value Date     2020    K 3.9 2020    K 4.1 09/10/2020     2020    CO2 24 2020    BUN 33 2020    CREATININE 2.0 2020    GFRAA 32 2020    GFRAA >60 2012    AGRATIO 1.1 2020    LABGLOM 26 2020    GLUCOSE 105 2020    PROT 6.2 2020    PROT 7.0 10/19/2011    CALCIUM 9.0 2020    BILITOT 0.5 2020    ALKPHOS 130 2020    AST 41 2020    ALT 54 2020     BMP    Lab Results   Component Value Date     2020    K 3.9 2020    K 4.1 09/10/2020     2020    CO2 24 2020    BUN 33 2020    CREATININE 2.0 2020    CALCIUM 9.0 2020    GFRAA 32 2020    GFRAA >60 2012    LABGLOM 26 2020    GLUCOSE 105 2020     POCGlucose  Recent Labs     20  0415 20  0430 20  0540   GLUCOSE 165* 181* 105*      Coags    Lab Results   Component Value Date    PROTIME 11.5 2020    PROTIME 67.9 2016    INR 0.99 2020    APTT 36.3      HCG (If Applicable)   Lab Results   Component Value Date    PREGTESTUR Negative 2016      ABGs   Lab Results   Component Value Date    PHART 7.451 2020    PO2ART 116.0 09/19/2020    ACN3IIB 44.4 09/19/2020    VUB7XCQ 30.9 09/19/2020    BEART 6.3 09/19/2020    D2UJOJIW 99.8 09/19/2020      Type & Screen (If Applicable)  No results found for: LABABO, LABRH                         BMI: Wt Readings from Last 3 Encounters:       NPO Status:   Date of last liquid consumption: 09/24/20   Time of last liquid consumption: 2300   Date of last solid food consumption: 09/24/20      Time of last solid consumption: 2300       Anesthesia Evaluation  Patient summary reviewed no history of anesthetic complications:   Airway: Mallampati: III  TM distance: >3 FB   Neck ROM: full   Dental:          Pulmonary:normal exam    (+) asthma:                            Cardiovascular:  Exercise tolerance: poor (<4 METS),   (+) hypertension:, valvular problems/murmurs: MR, past MI (PEA arrest s/p recovery and extubatio):, dysrhythmias: atrial fibrillation, CHF (EF 30):, pulmonary hypertension:,       ECG reviewed  Rhythm: regular  Rate: normal  Echocardiogram reviewed         Beta Blocker:  Not on Beta Blocker         Neuro/Psych:   (+) seizures:, psychiatric history:depression/anxiety             GI/Hepatic/Renal: Neg GI/Hepatic/Renal ROS            Endo/Other:    (+) blood dyscrasia (WARFARIN): Factor V, anemia and anticoagulation therapy:., .                 Abdominal:   (+) obese,         Vascular: negative vascular ROS. Anesthesia Plan      MAC     ASA 4     (Spoke with , Abbi Eastman, on phone. R/B/A of procedure and anesthesia described. Patient/family understand and would like to proceed. Questions and concerns addressed.  )  Induction: intravenous. Anesthetic plan and risks discussed with patient and spouse. Plan discussed with CRNA. This pre-anesthesia assessment may be used as a history and physical.    DOS STAFF ADDENDUM:    Pt seen and examined, chart reviewed (including anesthesia, drug and allergy history).   No interval changes to history and physical examination. Anesthetic plan, risks, benefits, alternatives, and personnel involved discussed with patient. Questions and concerns addressed. Patient(family) verbalized an understanding and agrees to proceed.       Remy Landis MD  September 25, 2020  1:20 PM

## 2020-09-25 NOTE — PLAN OF CARE
Pt was able to state her name, SHAZIA, that she was at the hospital and it was . She was able to select Trbel's name out of a list of presidents' names. Her speech is garbled and she has total body jerking motions whenever she is stimulated. Pt able to talk but cannot do anything fine motor due to the jerking. Her gross motor is affected but pt able to move in bed and assist with turning despite jerky movements. Pt is a high fall risk. Bed in locked, low position with side rails up X 3 and an active bed alarm. Fall risk bracelet placed. Fall sign already in place. Pt asking not to wear nonskid socks while in bed. Red fall socks at bedside and available for use. Pt has denied pain t/o shift. Pt is at risk for compromised skin integrity. She has 3 blisters on her R back. She has preventative mepilex drsgs on her B heels. Heels floated using pillows but pt tends to kick them out of the bed when moving around. Positioning wedge at bedside and used for off loading. Pt in BSW restraints at beginning of shift. Pt able to state why she was in restraints and that in order to get out she must not pull at her medical devices. Pt initially hesitant for RN to take her out of restraints. She was afraid she might pull out her NG while asleep or because her movements are jerky. Pt said she did not want to have to go through having another one placed. Telesitter placed at bedside with specific instructions to watch when pt raises her hand towards face and redirect. Restraints discontinued. Pt rounded on frequently and no issues noted so far. Pt is likely to have PEG placed today d/t dysphagia.  called and updated. TF stopped shortly after MN. Pt's heparin drip stopped then restarted because no time available for PEG placement and decision to stop deferred to day shift. Dr. Paco Au aware.      CMU called around 0400 because pt noted to have inverted P waves and intermittent pauses with the longest one being for 2.25 seconds. Pt noted to occasional PVCs as well. Rn went to check on pt and she noted to be asleep and breathing normally. Pt converted back to NSR per CMU without intervention. RN asked CMU to print strips for hard chart.

## 2020-09-25 NOTE — PROGRESS NOTES
Speech Language Pathology    Dysphagia therapy attempt: pt currently off the unit for PEG placement. Will retry at a later date.     Chencho Ly MS, CCC-SLP #[id number]  Speech Language Pathologist

## 2020-09-25 NOTE — CARE COORDINATION
SW placed phone call to [facility], AN AFFILIATE OF [facility] and spoke to Edilberto in admissions. For future reference their phone number is [phone]. SW notified that in addition to SNF placement we are trying to get her set up at [facility] on TTS schedule for dialysis. She stated that she would take a look at this referral and get back to us with a decision. SW informed that if they are able to accept this patient at discharge we would like them to start the pre-cert process as soon as possible. Patient will also need a HENS. Respectfully submitted,    EUGENIA Oliveira  [facility]   [phone]    Electronically signed by EUGENIA Kaur on 9/25/2020 at 1:40 PM

## 2020-09-25 NOTE — ANESTHESIA POSTPROCEDURE EVALUATION
[facility] Department of Anesthesiology  Post-Anesthesia Note       Name:  Maxwell Arellano                                  Age:  53 y.o. MRN:  [id number]     Last Vitals & Oxygen Saturation: /69   Pulse 96   Temp 96.8 °F (36 °C) (Temporal)   Resp 19   Ht 5' 7\" (1.702 m)   Wt 194 lb 0.1 oz (88 kg)   LMP 12/05/2018 (Exact Date)   SpO2 97%   BMI 30.39 kg/m²   Patient Vitals for the past 4 hrs:   BP Temp Temp src Pulse Resp SpO2   09/25/20 1512 139/69 -- -- 96 19 97 %   09/25/20 1506 137/72 -- -- 95 22 100 %   09/25/20 1501 (!) 140/95 -- -- 93 22 --   09/25/20 1458 130/71 -- -- 93 20 --   09/25/20 1453 -- 96.8 °F (36 °C) Temporal -- -- 100 %   09/25/20 1452 137/62 -- -- 94 28 --   09/25/20 1319 (!) 146/81 98 °F (36.7 °C) Oral 104 18 96 %   09/25/20 1204 (!) 148/82 97.3 °F (36.3 °C) Oral 97 20 92 %       Level of consciousness:  Awake, alert    Respiratory: Respirations easy, no distress. Stable. Cardiovascular: Hemodynamically stable. Hydration: Adequate. PONV: Adequately managed. Post-op pain: Adequately controlled. Post-op assessment: Tolerated anesthetic well without complication. Complications:  None.     Aure Biswas MD  September 25, 2020   3:49 PM

## 2020-09-25 NOTE — PLAN OF CARE
Problem: Pain:  Goal: Pain level will decrease  Description: Pain level will decrease  9/25/2020 1343 by Pinky Galindo RN  Outcome: Ongoing     Problem: Pain:  Goal: Control of acute pain  Description: Control of acute pain  9/25/2020 1343 by Pinky Galindo RN  Outcome: Ongoing     Problem: Pain:  Goal: Control of chronic pain  Description: Control of chronic pain  9/25/2020 1343 by Pinky Galindo RN  Outcome: Ongoing     Problem: Falls - Risk of:  Goal: Will remain free from falls  Description: Will remain free from falls  9/25/2020 1343 by Pinky Galindo RN  Outcome: Ongoing     Problem: Skin Integrity:  Goal: Will show no infection signs and symptoms  Description: Will show no infection signs and symptoms  9/25/2020 1343 by Pinky Galindo RN  Outcome: Ongoing     Problem: Skin Integrity:  Goal: Absence of new skin breakdown  Description: Absence of new skin breakdown  9/25/2020 1343 by Pinky Galindo RN  Outcome: Ongoing     Problem: Nutrition  Goal: Optimal nutrition therapy  9/25/2020 1343 by Pinky Galindo RN  Outcome: Ongoing

## 2020-09-25 NOTE — H&P
Pre-operative History and Physical    Patient: Vicenta Henriquez  : 1970  Acct#:     Intended Procedure:  EGD with PEG    HISTORY OF PRESENT ILLNESS:  The patient is a 53 y.o. female  who presents for EGD with PEG due to dysphagia. Past Medical History:        Diagnosis Date    Anticoagulant long-term use     Arthritis     Asthma     Baker's cyst     Depression     [facility] Le[facility]     Factor V Leiden ([facility]     H/O blood clots     Heartburn     Hyperlipidemia     MRSA bacteremia 09/10/2020    Osteoarthritis      Past Surgical History:    History reviewed. No pertinent surgical history. Medications Prior to Admission:   No current facility-administered medications on file prior to encounter. Current Outpatient Medications on File Prior to Encounter   Medication Sig Dispense Refill    albuterol sulfate  (90 Base) MCG/ACT inhaler INHALE TWO PUFFS BY MOUTH EVERY 6 HOURS AS NEEDED FOR WHEEZING 18 g 0    vitamin D (ERGOCALCIFEROL) 1.25 MG (13163 UT) CAPS capsule TAKE ONE CAPSULE BY MOUTH ONCE WEEKLY 12 capsule 0    omeprazole (PRILOSEC) 20 MG delayed release capsule TAKE ONE CAPSULE BY MOUTH DAILY 90 capsule 6    warfarin (COUMADIN) 7.5 MG tablet TAKE ONE TABLET BY MOUTH DAILY EXCEPT ON MONDAY AND THURSDAY TAKE ONE-HALF TABLET BY MOUTH AS DIRECTED 21 tablet 5    atorvastatin (LIPITOR) 10 MG tablet TAKE ONE TABLET BY MOUTH DAILY 90 tablet 3    DULoxetine (CYMBALTA) 60 MG extended release capsule TAKE ONE CAPSULE BY MOUTH DAILY 30 capsule 11    BREO ELLIPTA 200-25 MCG/INH AEPB INHALE ONE DOSE BY MOUTH DAILY 1 each 11        Allergies:  Ibuprofen    Social History:   TOBACCO:   reports that she quit smoking about 2 weeks ago. Her smoking use included cigarettes. She smoked 0.50 packs per day. She has never used smokeless tobacco.  ETOH:   reports no history of alcohol use. DRUGS:   reports current drug use. Frequency: 14.00 times per week. Drug: Marijuana.     PHYSICAL EXAM: Vital Signs: BP (!) 142/75   Pulse 99   Temp 98.7 °F (37.1 °C) (Oral)   Resp 17   Ht 5' 7\" (1.702 m)   Wt 194 lb 0.1 oz (88 kg)   LMP 12/05/2018 (Exact Date)   SpO2 92%   BMI 30.39 kg/m²    Airway: No stridor or wheezing noted. Good air movement  Pulmonary: without wheezes. Clear to auscultation  Cardiac:regular rate and rhythm without loud murmurs  Abdomen:soft, nontender,  Bowel sounds present    Pre-Procedure Assessment / Plan:  1) EGD with PEG    ASA Grade:  ASA 3 - Patient with moderate systemic disease with functional limitations  Mallampati Classification:  Class III    Level of Sedation Plan:MAC    Post Procedure plan: Return to same level of care    I assessed the patient and find that the patient is in satisfactory condition to proceed with the planned procedure and sedation plan. I have explained the risk, benefits, and alternatives to the procedure; the patient understands and agrees to proceed.        Yann Saenz MD  9/25/2020

## 2020-09-25 NOTE — PROGRESS NOTES
Physical Therapy  Progress Note  Pt in chair, requesting to return to bed. PT used maxi move to move pt from chair to bed. Once in bed, pt able to roll L/R multiple trials with Mod A to remove sling, change stacey pad, and perform ann-marie-care (small amount of loose stool noted around flexi-seal). Pt was then scooted up in bed (total assist x 2), then positioned for comfort with HOB elevated, call light in reach, alarm in place. *Session required increased time to complete d/t need for multiple line control, use of LIFT equipment, and pt's significant weakness limiting her ability to assist.   Time In: 1058  Time Coded Tx: 23 min.    Electronically signed by Chito Ryan PT [id number] on 9/25/2020 at 11:23 AM

## 2020-09-25 NOTE — OP NOTE
Endoscopy Note    Patient: Amarilis Mulligan   : 1970  Acct#:     Procedure: Esophagogastroduodenoscopy with percutaneous endoscopic gastrostomy tube placement. Date:  2020     Surgeon: Michael Mayfield MD    Referring Physician:  Adele Chadwick MD     Preoperative Diagnosis:  Dysphagia  Postoperative Diagnosis:  Dysphagia    Anesthesia:  MAC    Indications: 53 y.o. female  who presents for EGD with PEG due to dysphagia. Consent:  The patient or their legal guardian has signed an informed consent, and is aware of the potential risks, benefits, alternatives, and potential complications of this procedure. These include, but are not limited to hemorrhage, bleeding, post procedural pain, perforation, phlebitis, aspiration, hypotension, hypoxia, cardiovascular events such as arryhthmia, and possibly death. Procedure: Informed consent was obtained from the patient and the patient's power of  after explanation of indications, benefits, possible risks and complications of the procedure. The patient was then taken to the endoscopy suite, placed in the supine position with the head of the bed elevated at 45 degrees, and the above IV anesthesia was administered. An Olympus video endoscope was place in the patient's mouth, and under direct visualization passed into the esophagus. Visualization of the esophagus demonstrated normal mucosa. Scope was then advanced into the stomach. Visualization of the gastric body and antrum demonstrated normal mucosa. The pylorus was patent, and the scope was advance into the duodenum. Visualization of the duodenal bulb demonstrated normal mucosa. The second portion of the duodenum was also normal.  The scope was then withdrawn back into the stomach. Transillumination and finger denting were accomplished successfully through the skin of the anterior abdominal wall in the left upper quadrant.   This area was marked, prepped and draped in sterile fashion and infiltrated with 4 mL of 1% Xylocaine solution. A 1 cm transverse skin incision was made with #11 scalpel blade. A #14 gauge Angiocath was then passed through the skin incision puncturing the gastric lumen under endoscopic visualization. The metal trocar was removed and a guidewire was passed through the Angiocath into the gastric lumen where it was grasped with an electrocautery snare, passed through the scope. The scope snare and guidewire were then pulled back through the proximal stomach, esophagus and out through the patient's mouth, leaving the guidewire protruding from the mouth and protruding from the skin at the skin incision. A #20 Surinamese percutaneous endoscopic gastrostomy tube was then attached to the guidewire at the mouth. The guidewire at the skin was grasped and pulled, pulling the gastrostomy tube and guidewire though the gastrostomy incision until the intragastric bumper was snug against the intragastric wall. A second external bumper was advance over the external portion of the gastrostomy tube and fixed at the 3.5 cm martinez at the skin. The gastrostomy incision was then dressed with iodoform ointment and a dry, sterile, gauze dressing. A Y connector was attached to the end of the gastrostomy tube. The patient tolerated the procedure well and was taken to the post anesthesia care unit in good condition. Impression: Successful placement of a PEG tube    Recommendations/PEG care instructions:  - Keep site clean and dry  - No dressings under bumper, only over bumper  - Prevent tampering with tube  - Ok to use PEG 4 hours after placement for meds and water (9/25/20 at18:00), and begin feeds tomorrow.   - Flush tube well with 30 cc water qid, plus before and after any use  - Use liquid meds when possible if necessary  - Flush well before and after any medications   - Do not mix feeds and medications  - Elevate head of bed during and after any feedings  - Ok to restart heparin gtt at 2:00 am 9/26/20      Conner Licona MD

## 2020-09-25 NOTE — PROGRESS NOTES
conversation. She is oriented to place and person. She has little to no insight into her present condition. Medications:  Reviewed    Infusion Medications    dilTIAZem Stopped (09/20/20 1710)    dextrose      heparin (Porcine) 8.3 mL/hr (09/25/20 0710)     Scheduled Medications    [START ON 9/26/2020] metoprolol succinate  50 mg Oral Daily    diazePAM  5 mg Oral 2 times per day    amiodarone  200 mg Per NG tube BID    epoetin rajiv-epbx  5,000 Units Subcutaneous Once per day on Mon Wed Fri    lidocaine 1 % injection  5 mL Intradermal Once    sodium chloride flush  10 mL Intravenous 2 times per day    lacosamide  200 mg Oral BID    levETIRAcetam  1,000 mg Oral BID    insulin lispro  0-12 Units Subcutaneous 6 times per day    metoprolol tartrate  25 mg Per NG tube BID    lactobacillus  2 capsule Oral BID WC    pantoprazole  40 mg Intravenous Daily    And    sodium chloride (PF)  10 mL Intravenous Daily    vancomycin (VANCOCIN) intermittent dosing (placeholder)   Other RX Placeholder     PRN Meds: LORazepam, sodium chloride flush, metoprolol, heparin (porcine), ondansetron, promethazine, fentanNYL, acetaminophen **OR** acetaminophen, glucose, dextrose, glucagon (rDNA), dextrose      Intake/Output Summary (Last 24 hours) at 9/25/2020 1250  Last data filed at 9/25/2020 0907  Gross per 24 hour   Intake 3342 ml   Output 2175 ml   Net 1167 ml       Physical Exam Performed:    BP (!) 148/82   Pulse 97   Temp 97.3 °F (36.3 °C) (Oral)   Resp 20   Ht 5' 7\" (1.702 m)   Wt 194 lb 0.1 oz (88 kg)   LMP 12/05/2018 (Exact Date)   SpO2 92%   BMI 30.39 kg/m²     General appearance: No apparent distress, appears stated age  HEENT: unable to fully open her eyes, Pupils equal, round, and reactive to light. Conjunctivae/corneas clear. Neck: Supple, with full range of motion. Trachea midline. Respiratory:  Normal respiratory effort.  Coarse breath sounds bilaterally   Cardiovascular: Regular rate and recommendations. VL DUP LOWER EXTREMITY ARTERIES RIGHT   Final Result      XR CHEST PORTABLE   Final Result   Suspect mild congestive failure with pleural effusion questioned greater on   the left. Endotracheal tube is approximately 3 cm above the rahat         MRI BRAIN WO CONTRAST   Final Result   Addendum 1 of 1   ADDENDUM:   Results were reported to GOLDIE Stratton at 3:26 p.m. on September 15,    2020. Final      XR CHEST PORTABLE   Final Result   Decreased layering effusions with improved basilar aeration. Appropriate   life support device positioning. XR CHEST PORTABLE   Final Result   Low-lying endotracheal tube. Recommend retracting 1-2 cm. No substantial change in layering effusions and basilar opacities. The findings were sent to the Radiology Results [address] at 3:53   am on 9/14/2020to be communicated to a licensed caregiver. XR CHEST PORTABLE   Final Result   1. Interval placement of an NG tube, likely within the stomach. 2. Stable appropriate positions of endotracheal tube and right-sided dialysis   catheter. 3. No significant change in appearance of small to moderate bilateral pleural   effusions and bibasilar airspace disease. XR CHEST PORTABLE   Final Result   Supportive lines project in stable positions. Bibasilar opacities, suggestive of layered effusions and atelectasis or   airspace disease. XR CHEST PORTABLE   Final Result   1. Suspected mild to moderate bilateral pleural effusions with adjacent   bibasilar atelectasis and/or airspace disease. 2. Mild to moderate cardiomegaly. 3. Recommend retraction of endotracheal tube 0.7 cm. XR CHEST PORTABLE   Final Result   Right jugular venous catheter and endotracheal tube project in normal   positions. No pneumothorax. Bilateral airspace disease and possible layered pleural effusions, consistent   with pulmonary edema and atelectasis.   Pneumonia is a differential   possibility. XR CHEST PORTABLE   Final Result   Satisfactory position of the endotracheal tube 3 cm above the rahat. The   heart is upper normal size and vessels are borderline congested. VL Extremity Venous Bilateral   Final Result      VL DUP LOWER EXTREMITY ARTERIES BILATERAL   Final Result      XR CHEST PORTABLE   Final Result   Endotracheal tube has tip approximately 1.3 cm proximal to the rahat and   should be retracted approximately 2 cm. Findings are again suggestive of pulmonary edema with small pleural   effusions. Pneumonia is not excluded in the appropriate clinical setting. Findings were called by the radiology call center. CT HEAD WO CONTRAST   Final Result   Motion limited study. No convincing evidence for acute intracranial   abnormality identified within the limitations of motion artifact. If there is persistent clinical suspicion for intracranial abnormality,   repeat study should be considered. XR CHEST PORTABLE   Final Result   Cardiomegaly, pulmonary vascular congestion, and bilateral pleural effusions. Endotracheal tube terminates approximately 2 cm above the rahat. CT CHEST PULMONARY EMBOLISM W CONTRAST   Final Result   Addendum 1 of 1   ADDENDUM:   Findings were discussed with Dr. Estephania Alicia at 10:49 pm on 9/10/2020.          Final            Assessment/Plan:    Active Hospital Problems    Diagnosis    Acute on chronic systolic heart failure (HCC) [I50.23]    Paroxysmal atrial fibrillation (HCC) [I48.0]    Essential hypertension [I10]    Cardiogenic shock (HCC) [R57.0]    Staphylococcal pneumonia ([facility]) [J15.20]    Pleural effusion, bilateral [J90]    Elevated lactic acid level [R79.89]    Bacteremia due to methicillin susceptible Staphylococcus aureus (MSSA) [R78.81]    Pneumonia due to methicillin susceptible Staphylococcus aureus (MSSA) (HCC) [J15.211]    Anemia [D64.9]    Acute renal failure ([facility] [address] [N17.9]    Bilateral pleural effusion [J90]    Mitral valve insufficiency [I34.0]    Tricuspid valve insufficiency [I07.1]    Class 2 obesity due to excess calories with body mass index (BMI) of 39.0 to 39.9 in adult [E66.09, Z68.39]    Pneumonia due to organism [J18.9]    Bacteremia [R78.81]    Cardiac arrest ([facility] [address] [I46.9]    Seizures (HCC) [R56.9]    Acute respiratory failure with hypoxia (HCC) [J96.01]    MORELIA (acute kidney injury) ([facility] [address] [I77.6]    Metabolic acidosis [F18.0]    Congestive heart failure (HCC) [I50.9]    Pulmonary hypertension (HCC) [I27.20]    Multifocal pneumonia [J18.9]    Septic shock (HCC) [A41.9, R65.21]    Elevated LFTs [R94.5]    Thrombocytopenia (HCC) [D69.6]    Coagulopathy (HCC) [D68.9]    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) [D65]    Acute encephalopathy [G93.40]    Pulmonary embolus (HCC) [I26.99]    Lactic acidosis [E87.2]    CAP (community acquired pneumonia) [J18.9]    Acute CHF (congestive heart failure) (HCC) [I50.9]    Bilateral pulmonary embolism (HCC) [I26.99]    Factor V Leiden mutation ([facility] [address] [D68.51]         Shock, septic vs cardiogenic - resolved. Severe mitral and tricuspid regurgitation on TTE  moderate pulmonary HTN,   large bilateral pleural effusions and hepatic congestion on CT. MRSA bacteremia. Off pressors  - ID consulted  - cardiology following      MRSA bacteremia, MRSA PNA  - ID following - presently on vancomycin  - TTE, LEVY without vegetations though concerning for endocarditis given embolic CVA      Acute CVA  MRI with scattered acute to subacute infarcts, likely embolic  - neuro following  - workup for endocarditis LEVY negative      Cardiomyopathy, acute on chronic systolic HF  EF 30- 35%. Severe diffuse hypokinesis with severely reduced rt ventricular function, severe tricuspid regurgitation and mitral regurgitation, grade 3 diastolic dysfunction.  No prior TTE.   - cardiology following  - RHC/C when able      S/p PEA arrest, acute hypoxic resp failure  Intubated 9/10 shortly after admission.   -Mental status improved enough for SBP 9/19-- extubated 9/19 to 2L      Seizure like activity with ongoing myoclonic jerking movements - improved. Prior to PEA arrest, episodic rhythmic twitching and myoclonus. Initial EEG negative  - keppra 1000 mg BID  - MRI brain as above  - repeat EEG 9/16 with concern for seizures, possible status  - added vimpat  - repeat EEG 9/17 improved with addition of vimpat, partner discussed with neuro and can defer cvEEG for now  - Neurologic status significantly improved  - no repeat seizures but ongoing myoclonus -   - added valium to NG with good effect. A fib RVR  Cardioverted 9/16, started on amio drip without adequate control, added dilt. Multiple attempts to cardiovert all failed  - cardiology following  - continue to monitor  - added digoxin  - tolerating heparin gtt   - in sinus on the monitor today   - amio transitioned to PO formulation via NG.         R groin hematoma - stable  After removing femoral line. Arterial duplex with normal flow, good pulses  - monitor  - back on heparin drip        Bilateral PE  Ho DVT/PE, on coumadin chronically - albeit not sure if she was taking this at all.   - heparin drip      Oliguric/anuric MORELIA  Suboptimal response to Lasix. -CRRT started, switched to HD  -Checking ZENON (neg), ANCA, complement (c3 low), antiphospholipid antibodies  -Nephrology following  - UOP improved  - clinically hypovolemic to euvolemic now - increased free water bolus with tube feed. Coagulapathy  Hx Factor V Leiden on coumadin.  INR therapeutic at 2.2 on admission though hematology notes non-adherence to coumadin in the past. Has had oozing on heparin drip, sepsis with bacteremia, DIC versus shock liver versus vitamin K deficiency  - Heme-onc following, fibrinogen and INR improving, no need for vit K or cryo now  - DOAC when able      Elevated LFTs  Likely shock liver, improving  - daily labs      Obesity  Body mass index is 30.39 kg/m². Counseled on effects of weight on overall health and chronic medical conditions and discussed weight loss. Dysphagia -   Failed MBS 9/23 pretty much with all consistencies. Cont NG support. Ask GI for PEG evaluation - will need to coordinate heparin infusion. DVT Prophylaxis: heparin gtt  Diet: Diet NPO, After Midnight  Code Status: Full Code    PT/OT Eval Status: deferred    Dispo - failed MBS. PEG planned today. Will need ECF placement. Plan to transition to PO AC post procedures and Hgb permitting.          Gisella Bellamy MD

## 2020-09-25 NOTE — CARE COORDINATION
OSMAR sent referral to [facility], AN AFFILIATE OF [facility] upon request of  Opal Nelson today. OSMAR also paged Dr. Dustin Valencia to obtain clarification about CCRS vs ESRD and continued dialysis need. OSMAR will wait for a response. Respectfully submitted,    Shanell L. Deretha Bumpers MSW, LISW-S  [facility]   [phone]    Electronically signed by EUGENIA Kwon on 9/25/2020 at 1:20 PM

## 2020-09-25 NOTE — PROGRESS NOTES
Clinical Pharmacy Note  Heparin Dosing       Lab Results   Component Value Date    APTT 36.3 09/25/2020     Lab Results   Component Value Date    HGB 7.2 09/25/2020    HCT 22.8 09/25/2020     09/25/2020    INR 0.99 09/25/2020       Current Infusion Rate: 0 mL/hr - (heparin was stopped on 09/25/20 at 1000 for PEG placement)    Plan:  Per Dr. Federica Wood, ok to restart heparin at previously established rate on 09/26/20 at 0200  Heparin to be restarted on 09/26/20 at 0200 at 8.3 mL/hr  Next aPTT: 0800 09/26/20    Pharmacy will continue to monitor and adjust based on aPTT results.     Faustina Santana, [facility]  9/25/2020  4:25 PM

## 2020-09-25 NOTE — PROGRESS NOTES
Physical Therapy  Facility/Department: [address] PROGRESSIVE CARE  Daily Treatment Note  NAME: Belia Wood  : 1970  MRN: [id number]    Date of Service: 2020    Discharge Recommendations:  Patient would benefit from continued therapy after discharge, 3-5 sessions per week   PT Equipment Recommendations  Other: Will monitor for potential equipt needs. Assessment   Assessment: Pt c/o pain in LEs when attempting to stand to stedy (x 3 trials), and was unable to fully clear buttocks from elevated EOB in spite of Max A. She did demonstrate improved trunk control sitting EOB, but continues to experience intermittent LOB when attempting UE/LE voluntary movements, and weight shifting. She would benefit from continued therapy to further improve her balance, strength, endurance, and independence completing all mobility tasks. Continue to recommend low-moderate frequency therapy upon D/C. Belia Wood scored a 8/24 on the AM-PAC short mobility form. Current research shows that an AM-PAC score of 17 or less is typically not associated with a discharge to the patient's home setting. Based on the patient's AM-PAC score and their current functional mobility deficits, it is recommended that the patient have 3-5 sessions per week of Physical Therapy at d/c to increase the patient's independence. Please see assessment section for further patient specific details. If patient discharges prior to next session this note will serve as a discharge summary. Please see below for the latest assessment towards goals. History: 52 y/o female admit 2020 with Pneumonia, Pleural Effusion, Tachycardia, Cardiac Arrest.  MRI + Scattered Acute/Subacute Infarct B Cerebral Hemispheres and R Cerebellar Hemisphere; most pronounced L Occipital Lobe, likely embolic. CTA Chest + PE.   -2020 Pt Intubated. PMH as noted including Factor V Leiden, OA.   Activity Tolerance  Activity Tolerance: Patient Tolerated treatment well     Patient Diagnosis(es): The primary encounter diagnosis was Pneumonia due to organism. Diagnoses of Tachycardia, Pleural effusion, bilateral, Elevated lactic acid level, Congestive heart failure, unspecified HF chronicity, unspecified heart failure type ([facility] [address] and Multiple subsegmental pulmonary emboli without acute cor pulmonale were also pertinent to this visit. has a past medical history of Anticoagulant long-term use, Arthritis, Asthma, Baker's cyst, Depression, Factor V Leiden ([facility] [address] Factor V Leiden ([facility] [address] H/O blood clots, Heartburn, Hyperlipidemia, MRSA bacteremia, and Osteoarthritis. has no past surgical history on file. Restrictions  Restrictions/Precautions  Restrictions/Precautions: Fall Risk  Position Activity Restriction  Other position/activity restrictions: Flexiseal.  NG Tube. COVID negative. Subjective   Subjective  Subjective: Pt scheduled for PEG tube this afternoon. Agreeable to EOB/OOB activity as tolerated. Orientation  Orientation  Overall Orientation Status: Impaired  Orientation Level: Oriented to place;Oriented to person;Disoriented to situation;Disoriented to time    Objective      Bed mobility  Rolling to Left: Minimal assistance  Rolling to Right: Minimal assistance  Supine to Sit: Dependent/Total  Sit to Supine: Dependent/Total;2 Person assistance  Scooting: Dependent/Total;2 Person assistance     Transfers  Sit to Stand: Maximum Assistance;Dependent/Total(Max/Total assist to minimally clear buttocks from elevated EOB (to stedy). Attempted x 3 trials.)  Stand to sit: Dependent/Total  Bed to Chair: Dependent/Total(Using Maxi Move)     Ambulation  Ambulation?: No  Stairs/Curb  Stairs?: No     Balance  Comments: Pt required up to Mod A to correct sitting balance at EOB d/t intermittent UE/LE/trunk jerking. Throughout majority of static sitting, pt able to maintain balance with CGA-SBA.      Exercises  Comments: Seated EOB: RUE/LUE Elbow Flex AAROM  10,

## 2020-09-25 NOTE — PROGRESS NOTES
NEPHROLOGY PROGRESS NOTE    Patient: Jeet Granado MRN: [id number]     YOB: 1970  Age: 53 y.o. Sex: female    Unit: [address] Room/Bed: M0I-9603/5262-01 Location: [address] [place] [address]     Admitting Physician: Luke Flores    Primary Care Physician: No primary care provider on file. LOS: 15 days       Reason for evaluation:   [place]      SUBJECTIVE:      The patient was seen and examined. Notes and labs reviewed. There were no complications over night. HPI:  Breathing comfortably. No CP. Confused. ROS:  In bed. No fever. [facility]:  medications reviewed. OBJECTIVE:   BP (!) 146/81   Pulse 104   Temp 98 °F (36.7 °C) (Oral)   Resp 18   Ht 5' 7\" (1.702 m)   Wt 194 lb 0.1 oz (88 kg)   LMP 12/05/2018 (Exact Date)   SpO2 96%   BMI 30.39 kg/m²     Intake and Output:      Intake/Output Summary (Last 24 hours) at 9/25/2020 1330  Last data filed at 9/25/2020 0907  Gross per 24 hour   Intake 3342 ml   Output 2175 ml   Net 1167 ml       Exam:   CONSTITUTIONAL/PSYCHIATRY: Resting in bed, in ICU. RESPIRATORY:  deferred due to pandemic  CARDIOVASCULAR: deferred  GASTROINTESTINAL: deferred  EXTREMITIES:  No edema  SKIN: Warm and dry. R IJ Vasc cath in place. LABS:   RFP:   Recent Labs     09/23/20 0415 09/24/20  0430 09/25/20  0540   * 136 138   K 4.2 4.1 3.9   CL 99 100 102   CO2 24 25 24   BUN 28* 42* 33*   CREATININE 2.3* 2.6* 2.0*   CALCIUM 8.3 8.8 9.0   MG 2.00 2.10 2.00   PHOS 1.8* 2.4* 3.9   GFRAA 27* 24* 32*      Liver panel:  Recent Labs     09/23/20 0415 09/24/20  0430 09/25/20  0540   AST 34 43* 41*   ALT 67* 63* 54*       CBC:   Recent Labs     09/23/20 0415 09/24/20  0430 09/25/20  0540   WBC 14.9* 13.8* 11.1*   HGB 7.5* 7.8* 7.2*   HCT 24.4* 24.6* 22.8*   MCV 86.9 85.8 87.1    385 370         ASSESSMENT and PLAN:     1. MORELIA  - likely secondary to ischemia ATI.  She had additional exposure to radiocontrast   - ZENON is neg, ANCA neg, C3 is low at 47  - Initiated on IHD on 9/11/20. Transitioned to CRRT on 9/12/20 which was stopped 9/15/20. Restarted HD 09/17/20  - monitoring for possible recovery  - HD TTS. - will work on outpatient dialysis spot    2. FEN  - hypophosphatemia   - better    3. S/P PEA arrest 9/11/20    4. Acute hypoxic respiratory failure:  - per pulmonary  - Extubated 9/19.    5. MRSA bacteremia:   - on Vancomycin with pharmacy dosing   - LEVY shows no vegetation    6. CSDHF  - wt down significantly with CRRT    7. Factor V leiden deficiency with PE/coagulopathy/DIC:   - per hematology    8. CVA  - MRI shows bilateral acute sub acute infarcts  - neurology following  - EEG apparently better with higher keppra dose    9. atrial fibrillation  - on cardizem gtt and amiodarone gtt  - not rate controlled   - per EP    10. anemia  - Hgb lower?  - monitor hematoma  - still off anticoagulation  - on retacrit    SNF planned. D/w  regarding obtaining outpatient dialysis spot.

## 2020-09-25 NOTE — CARE COORDINATION
SW received phone call back from Dr. Nena Jesus today stating that the patient will have a continued need for dialysis on an outpatient basis. His notes are not in the system today. SW placed phone call to [facility] coordinator Charlene Enriquez at [phone] [place]. SW left contact info so they could review this patient's records and follow up with us when they are available. Respectfully submitted,    EUGENIA Torre  [facility]   [phone]    Electronically signed by EUGENIA Power on 9/25/2020 at 1:29 PM

## 2020-09-25 NOTE — PROGRESS NOTES
[facility], [place], operated by [facility]   Daily Progress Note      Admit Date:  9/10/2020    Reason for initial consultation: Atrial fibrillation/CHF    CC: \"thirsty\"    Interval History:  Pt with no acute overnight cardiac events. Patient with a complicated hospitalization. Notes reviewed from electrophysiology, gastroenterology, nephrology, and hospitalist.  The patient has diffuse myoclonic jerks and at times her speech is difficult to understand. However she seems to be answering simple questions appropriately. She denies chest pain, palpitations, and dyspnea at rest.    Review of Systems:   · Constitutional: No unanticipated weight loss. There's been no change in energy level, sleep pattern, or activity level. No fevers, chills. · Eyes: No visual changes or diplopia. No scleral icterus. · ENT: No Headaches, hearing loss or vertigo. No mouth sores or sore throat. · Cardiovascular: as reviewed in HPI  · Respiratory: No cough or wheezing, no sputum production. No hemoptysis. · Gastrointestinal: No abdominal pain, appetite loss, blood in stools. No change in bowel or bladder habits. · Genitourinary: No dysuria, trouble voiding, or hematuria. · Musculoskeletal:  No gait disturbance, no joint complaints. · Integumentary: No rash or pruritis. · Neurological: No headache, diplopia, change in muscle strength, numbness or tingling. · Psychiatric: No anxiety or depression. · Endocrine: No temperature intolerance. No excessive thirst, fluid intake, or urination. No tremor. · Hematologic/Lymphatic: No abnormal bruising or bleeding, blood clots or swollen lymph nodes. · Allergic/Immunologic: No nasal congestion or hives.     Objective:   BP (!) 142/75   Pulse 99   Temp 98.7 °F (37.1 °C) (Oral)   Resp 17   Ht 5' 7\" (1.702 m)   Wt 194 lb 0.1 oz (88 kg)   LMP 12/05/2018 (Exact Date)   SpO2 92%   BMI 30.39 kg/m²       Intake/Output Summary (Last 24 hours) at 9/25/2020 8580  Last data filed at 9/25/2020 2691  Gross per 24 hour   Intake 3402 ml   Output 2175 ml   Net 1227 ml     Wt Readings from Last 3 Encounters:   09/25/20 194 lb 0.1 oz (88 kg)   12/17/18 229 lb (103.9 kg)   12/14/18 227 lb 1.2 oz (103 kg)       Physical Exam:  General: No acute respiratory distress. Chronically ill-appearing. NG in place. Eyes: Pupils equal and round. EOMI. No icterus. Skin:  Warm and dry. No new appearing rashes or lesions. Neck:  Supple. No JVP or bruit appreciated. Respiratory:  No respiratory distress. Clear to auscultation. No wheezes/rhonchi/rales  Cardiovascular: Mildly tachycardic but regular. S1S2. No M/R/G. Abdomen:  Soft, nontender, +bowel sounds. MSK:  No LE edema. No clubbing or cyanosis. Moves all extremities. + Myoclonic jerks  Psych: Awake. Alert. Seems to answer simple questions appropriately. Oriented to year and place.     Medications:    diazePAM  5 mg Oral 2 times per day    amiodarone  200 mg Per NG tube BID    epoetin rajiv-epbx  5,000 Units Subcutaneous Once per day on Mon Wed Fri    lidocaine 1 % injection  5 mL Intradermal Once    sodium chloride flush  10 mL Intravenous 2 times per day    lacosamide  200 mg Oral BID    levETIRAcetam  1,000 mg Oral BID    insulin lispro  0-12 Units Subcutaneous 6 times per day    digoxin  125 mcg Per NG tube Daily    metoprolol tartrate  25 mg Per NG tube BID    lactobacillus  2 capsule Oral BID WC    pantoprazole  40 mg Intravenous Daily    And    sodium chloride (PF)  10 mL Intravenous Daily    vancomycin (VANCOCIN) intermittent dosing (placeholder)   Other RX Placeholder      dilTIAZem Stopped (09/20/20 1710)    dextrose      heparin (Porcine) 8.3 mL/hr (09/25/20 0710)     LORazepam, sodium chloride flush, metoprolol, heparin (porcine), ondansetron, promethazine, fentanNYL, acetaminophen **OR** acetaminophen, glucose, dextrose, glucagon (rDNA), dextrose    Lab Data:  CBC:   Recent Labs     09/23/20  0415 09/24/20  0430 09/25/20  0540   WBC 14.9* Felt secondary to ATN. Nephrology following. Patient on intermittent hemodialysis. 4) Bilateral pulmonary emboli/coagulopathy. Hem/Onc was following. Currently on heparin drip. 5) Acute CVA/seizures. Neurology following. 6) Anemia. Will defer work-up and treatment to primary team.  Hemoglobin 7.2 today. Will defer transfusion goals to primary team.    7) Essential hypertension. Controlled. Goal BP <130/80. Continue medical therapy. Overall, the problems requiring hospitalization are high in severity.      Electronically signed by Jarret Cavazos MD on 9/25/2020 at 8:24 AM

## 2020-09-26 LAB
ALBUMIN SERPL-MCNC: 3 G/DL (ref 3.4–5)
ALP BLD-CCNC: 130 U/L (ref 40–129)
ALT SERPL-CCNC: 40 U/L (ref 10–40)
ANION GAP SERPL CALCULATED.3IONS-SCNC: 14 MMOL/L (ref 3–16)
APTT: 47.7 SEC (ref 24.2–36.2)
APTT: 58.4 SEC (ref 24.2–36.2)
APTT: 63.3 SEC (ref 24.2–36.2)
AST SERPL-CCNC: 34 U/L (ref 15–37)
BASOPHILS ABSOLUTE: 0.2 K/UL (ref 0–0.2)
BASOPHILS RELATIVE PERCENT: 2.1 %
BILIRUB SERPL-MCNC: 0.5 MG/DL (ref 0–1)
BILIRUBIN DIRECT: 0.3 MG/DL (ref 0–0.3)
BILIRUBIN, INDIRECT: 0.2 MG/DL (ref 0–1)
BUN BLDV-MCNC: 40 MG/DL (ref 7–20)
CALCIUM SERPL-MCNC: 9.1 MG/DL (ref 8.3–10.6)
CHLORIDE BLD-SCNC: 106 MMOL/L (ref 99–110)
CO2: 20 MMOL/L (ref 21–32)
CREAT SERPL-MCNC: 2.7 MG/DL (ref 0.6–1.1)
EOSINOPHILS ABSOLUTE: 0.3 K/UL (ref 0–0.6)
EOSINOPHILS RELATIVE PERCENT: 3 %
GFR AFRICAN AMERICAN: 23
GFR NON-AFRICAN AMERICAN: 19
GLUCOSE BLD-MCNC: 116 MG/DL (ref 70–99)
GLUCOSE BLD-MCNC: 74 MG/DL (ref 70–99)
GLUCOSE BLD-MCNC: 75 MG/DL (ref 70–99)
GLUCOSE BLD-MCNC: 88 MG/DL (ref 70–99)
GLUCOSE BLD-MCNC: 91 MG/DL (ref 70–99)
GLUCOSE BLD-MCNC: 94 MG/DL (ref 70–99)
HCT VFR BLD CALC: 23.2 % (ref 36–48)
HEMOGLOBIN: 7.3 G/DL (ref 12–16)
LYMPHOCYTES ABSOLUTE: 1.6 K/UL (ref 1–5.1)
LYMPHOCYTES RELATIVE PERCENT: 17.2 %
MAGNESIUM: 2.1 MG/DL (ref 1.8–2.4)
MCH RBC QN AUTO: 27.4 PG (ref 26–34)
MCHC RBC AUTO-ENTMCNC: 31.6 G/DL (ref 31–36)
MCV RBC AUTO: 86.7 FL (ref 80–100)
MONOCYTES ABSOLUTE: 0.7 K/UL (ref 0–1.3)
MONOCYTES RELATIVE PERCENT: 7.1 %
NEUTROPHILS ABSOLUTE: 6.6 K/UL (ref 1.7–7.7)
NEUTROPHILS RELATIVE PERCENT: 70.6 %
PDW BLD-RTO: 23.2 % (ref 12.4–15.4)
PERFORMED ON: ABNORMAL
PERFORMED ON: NORMAL
PHOSPHORUS: 5.6 MG/DL (ref 2.5–4.9)
PLATELET # BLD: 393 K/UL (ref 135–450)
PMV BLD AUTO: 8.5 FL (ref 5–10.5)
POTASSIUM SERPL-SCNC: 4.2 MMOL/L (ref 3.5–5.1)
RBC # BLD: 2.68 M/UL (ref 4–5.2)
SODIUM BLD-SCNC: 140 MMOL/L (ref 136–145)
TOTAL PROTEIN: 6.4 G/DL (ref 6.4–8.2)
VANCOMYCIN RANDOM: 17.4 UG/ML
WBC # BLD: 9.4 K/UL (ref 4–11)

## 2020-09-26 PROCEDURE — 6370000000 HC RX 637 (ALT 250 FOR IP): Performed by: INTERNAL MEDICINE

## 2020-09-26 PROCEDURE — 80048 BASIC METABOLIC PNL TOTAL CA: CPT

## 2020-09-26 PROCEDURE — C9113 INJ PANTOPRAZOLE SODIUM, VIA: HCPCS | Performed by: INTERNAL MEDICINE

## 2020-09-26 PROCEDURE — 2580000003 HC RX 258: Performed by: INTERNAL MEDICINE

## 2020-09-26 PROCEDURE — 94760 N-INVAS EAR/PLS OXIMETRY 1: CPT

## 2020-09-26 PROCEDURE — 99232 SBSQ HOSP IP/OBS MODERATE 35: CPT | Performed by: NURSE PRACTITIONER

## 2020-09-26 PROCEDURE — 6360000002 HC RX W HCPCS: Performed by: INTERNAL MEDICINE

## 2020-09-26 PROCEDURE — 36415 COLL VENOUS BLD VENIPUNCTURE: CPT

## 2020-09-26 PROCEDURE — 2500000003 HC RX 250 WO HCPCS: Performed by: INTERNAL MEDICINE

## 2020-09-26 PROCEDURE — 80202 ASSAY OF VANCOMYCIN: CPT

## 2020-09-26 PROCEDURE — 3E0G76Z INTRODUCTION OF NUTRITIONAL SUBSTANCE INTO UPPER GI, VIA NATURAL OR ARTIFICIAL OPENING: ICD-10-PCS | Performed by: INTERNAL MEDICINE

## 2020-09-26 PROCEDURE — 85025 COMPLETE CBC W/AUTO DIFF WBC: CPT

## 2020-09-26 PROCEDURE — 1200000000 HC SEMI PRIVATE

## 2020-09-26 PROCEDURE — 6360000002 HC RX W HCPCS

## 2020-09-26 PROCEDURE — 90935 HEMODIALYSIS ONE EVALUATION: CPT

## 2020-09-26 PROCEDURE — 85730 THROMBOPLASTIN TIME PARTIAL: CPT

## 2020-09-26 PROCEDURE — 83735 ASSAY OF MAGNESIUM: CPT

## 2020-09-26 PROCEDURE — 84100 ASSAY OF PHOSPHORUS: CPT

## 2020-09-26 PROCEDURE — 80076 HEPATIC FUNCTION PANEL: CPT

## 2020-09-26 RX ORDER — ACETAMINOPHEN 650 MG/1
650 SUPPOSITORY RECTAL EVERY 6 HOURS PRN
Status: DISCONTINUED | OUTPATIENT
Start: 2020-09-26 | End: 2020-10-04 | Stop reason: HOSPADM

## 2020-09-26 RX ORDER — WARFARIN SODIUM 5 MG/1
5 TABLET ORAL DAILY
Status: DISCONTINUED | OUTPATIENT
Start: 2020-09-26 | End: 2020-09-26

## 2020-09-26 RX ORDER — WARFARIN SODIUM 7.5 MG/1
7.5 TABLET ORAL
Status: COMPLETED | OUTPATIENT
Start: 2020-09-26 | End: 2020-09-26

## 2020-09-26 RX ORDER — ACETAMINOPHEN 160 MG/5ML
650 SOLUTION ORAL EVERY 6 HOURS PRN
Status: DISCONTINUED | OUTPATIENT
Start: 2020-09-26 | End: 2020-10-04 | Stop reason: HOSPADM

## 2020-09-26 RX ORDER — ACETAMINOPHEN 325 MG/1
650 TABLET ORAL EVERY 6 HOURS PRN
Status: DISCONTINUED | OUTPATIENT
Start: 2020-09-26 | End: 2020-10-04 | Stop reason: HOSPADM

## 2020-09-26 RX ORDER — DIPHENHYDRAMINE HYDROCHLORIDE 50 MG/ML
INJECTION INTRAMUSCULAR; INTRAVENOUS
Status: COMPLETED
Start: 2020-09-26 | End: 2020-09-26

## 2020-09-26 RX ORDER — DIPHENHYDRAMINE HYDROCHLORIDE 50 MG/ML
25 INJECTION INTRAMUSCULAR; INTRAVENOUS ONCE
Status: COMPLETED | OUTPATIENT
Start: 2020-09-26 | End: 2020-09-26

## 2020-09-26 RX ADMIN — HEPARIN SODIUM 8.3 ML/HR: 10000 INJECTION, SOLUTION INTRAVENOUS at 02:00

## 2020-09-26 RX ADMIN — WARFARIN SODIUM 7.5 MG: 7.5 TABLET ORAL at 18:00

## 2020-09-26 RX ADMIN — LACOSAMIDE 200 MG: 100 TABLET, FILM COATED ORAL at 21:13

## 2020-09-26 RX ADMIN — METOPROLOL SUCCINATE 50 MG: 50 TABLET, EXTENDED RELEASE ORAL at 14:16

## 2020-09-26 RX ADMIN — DIPHENHYDRAMINE HYDROCHLORIDE 25 MG: 50 INJECTION INTRAMUSCULAR; INTRAVENOUS at 01:15

## 2020-09-26 RX ADMIN — Medication 10 ML: at 08:10

## 2020-09-26 RX ADMIN — AMIODARONE HYDROCHLORIDE 200 MG: 200 TABLET ORAL at 21:13

## 2020-09-26 RX ADMIN — DIAZEPAM 5 MG: 5 TABLET ORAL at 08:10

## 2020-09-26 RX ADMIN — ACETAMINOPHEN 650 MG: 325 TABLET ORAL at 21:13

## 2020-09-26 RX ADMIN — LACOSAMIDE 200 MG: 100 TABLET, FILM COATED ORAL at 08:09

## 2020-09-26 RX ADMIN — EPOETIN ALFA-EPBX 5000 UNITS: 10000 INJECTION, SOLUTION INTRAVENOUS; SUBCUTANEOUS at 12:42

## 2020-09-26 RX ADMIN — VANCOMYCIN HYDROCHLORIDE 1250 MG: 10 INJECTION, POWDER, LYOPHILIZED, FOR SOLUTION INTRAVENOUS at 12:30

## 2020-09-26 RX ADMIN — PANTOPRAZOLE SODIUM 40 MG: 40 INJECTION, POWDER, FOR SOLUTION INTRAVENOUS at 08:10

## 2020-09-26 RX ADMIN — Medication 2 CAPSULE: at 18:00

## 2020-09-26 RX ADMIN — AMIODARONE HYDROCHLORIDE 200 MG: 200 TABLET ORAL at 08:10

## 2020-09-26 RX ADMIN — Medication 2 CAPSULE: at 08:10

## 2020-09-26 RX ADMIN — SODIUM CHLORIDE, PRESERVATIVE FREE 10 ML: 5 INJECTION INTRAVENOUS at 21:13

## 2020-09-26 RX ADMIN — LEVETIRACETAM 1000 MG: 500 SOLUTION ORAL at 08:09

## 2020-09-26 RX ADMIN — DIAZEPAM 5 MG: 5 TABLET ORAL at 21:13

## 2020-09-26 RX ADMIN — DIPHENHYDRAMINE HYDROCHLORIDE 25 MG: 50 INJECTION, SOLUTION INTRAMUSCULAR; INTRAVENOUS at 01:15

## 2020-09-26 RX ADMIN — SODIUM CHLORIDE, PRESERVATIVE FREE 10 ML: 5 INJECTION INTRAVENOUS at 08:10

## 2020-09-26 RX ADMIN — LEVETIRACETAM 1000 MG: 500 SOLUTION ORAL at 21:19

## 2020-09-26 ASSESSMENT — PAIN - FUNCTIONAL ASSESSMENT: PAIN_FUNCTIONAL_ASSESSMENT: PREVENTS OR INTERFERES SOME ACTIVE ACTIVITIES AND ADLS

## 2020-09-26 ASSESSMENT — PAIN DESCRIPTION - DESCRIPTORS: DESCRIPTORS: ACHING

## 2020-09-26 ASSESSMENT — PAIN DESCRIPTION - LOCATION: LOCATION: ABDOMEN

## 2020-09-26 ASSESSMENT — PAIN DESCRIPTION - PROGRESSION: CLINICAL_PROGRESSION: GRADUALLY WORSENING

## 2020-09-26 ASSESSMENT — PAIN SCALES - GENERAL
PAINLEVEL_OUTOF10: 0
PAINLEVEL_OUTOF10: 0
PAINLEVEL_OUTOF10: 10

## 2020-09-26 ASSESSMENT — PAIN DESCRIPTION - PAIN TYPE: TYPE: ACUTE PAIN

## 2020-09-26 NOTE — PROGRESS NOTES
exposure to radiocontrast   - ZENON is neg, ANCA neg, C3 is low at 54  - Initiated on IHD on 9/11/20. Transitioned to CRRT on 9/12/20 which was stopped 9/15/20. Restarted HD 09/17/20  - monitoring for possible recovery UOP only 600 ml/24 hours Cr rebounding  - HD TTS. HD in progress on 3 K bath UF Goal minimum  -  outpatient dialysis spot pending  Will need TDC placement prior to discharge    2. FEN  - hypophosphatemia   - resolved Po4 5.6 mg today    3. S/P PEA arrest 9/11/20    4. Acute hypoxic respiratory failure:  - per pulmonary  - Extubated 9/19.    5. MRSA bacteremia:   - on Vancomycin with pharmacy dosing   - LEVY shows no vegetation    6. CSDHF  -resolved     7. Factor V leiden deficiency with PE/coagulopathy/DIC:   - per hematology    8.  CVA  - MRI shows bilateral acute sub acute infarcts  - neurology following  - EEG apparently better with higher keppra dose    9. atrial fibrillation  - on cardizem gtt and amiodarone gtt  - not rate controlled   - per EP    10. anemia  - Hgb stable 7.3 gm  - monitor hematoma  - still off anticoagulation  - on retacrit

## 2020-09-26 NOTE — FLOWSHEET NOTE
Treatment time: 3.5 HOURS    Net UF: 500 MLS    Pre weight: 88.2 KG  Post weight: 87.5 KG  EDW: TBD    Access used: R TDC   Access function: GOOD    Medications or blood products given: Vancomycin 1250, Retacrit 5,000 units    Regular outpatient schedule: MORELIA    Summary of response to treatment: 500 ml net removed. Pt tolerated tx well. Retacrit 5,000 units given. Vancomycin 1250 given. Post VSS. Report to primary nurse Shane Durbin RN    Copy of dialysis treatment record placed in chart, to be scanned into EMR.     09/26/20 0949 09/26/20 1330   Vital Signs   BP (!) 151/71 (!) 124/53   Temp 97 °F (36.1 °C) 98 °F (36.7 °C)   Pulse 55 53   Weight 194 lb 7.1 oz (88.2 kg) 192 lb 14.4 oz (87.5 kg)   Weight Method Bed scale Bed scale   Treatment Initiation   Dialyze Hours 3.5  --    Treatment  Initiation Universal Precautions maintained;Lines secured to patient; Connections secured;Prime given;Venous Parameters set; Arterial Parameters set; Air foam detector engaged; Hemosafe Device; Dialysate;Saline line double clamped;F160  --    Post-Hemodialysis Assessment   Hemodialysis Intake (ml)  --  800 ml   Hemodialysis Output (ml)  --  1400 ml   NET Removed (ml)  --  600 ml   Tolerated Treatment  --  Good

## 2020-09-26 NOTE — PROGRESS NOTES
Clinical Pharmacy Note  Heparin Dosing       Lab Results   Component Value Date    APTT 63.3 09/26/2020     Lab Results   Component Value Date    HGB 7.3 09/26/2020    HCT 23.2 09/26/2020     09/26/2020    INR 0.99 09/25/2020       Current Infusion Rate: 8.3 mL/hr    Plan:  Continue current rate: 8.3 mL/hr  Next aPTT: 0600 on 9-27-20    Pharmacy will continue to monitor and adjust based on aPTT results.   Lia Maher, [facility]  9/26/2020  6:00 PM

## 2020-09-26 NOTE — PROGRESS NOTES
Clinical Pharmacy Note  Heparin Dosing       Lab Results   Component Value Date    APTT 58.4 09/26/2020     Lab Results   Component Value Date    HGB 7.3 09/26/2020    HCT 23.2 09/26/2020     09/26/2020    INR 0.99 09/25/2020       Current Infusion Rate: 8.3 mL/hr    Plan:  Continue current rate: 8.3 mL/hr  Next aPTT: 1700 on 9-26-20    Pharmacy will continue to monitor and adjust based on aPTT results.   LANDY Healy [facility]  9/26/2020  2:47 PM

## 2020-09-26 NOTE — PLAN OF CARE
Pt has denied pain t/o shift. Pt noted to be off telemetry monitoring. RN went to bedside to assess pt. Pt had pulled off telemetry leads, ABD binder, G tube dressing, and F/C stat lock. Pt stated she was itchy and was scratching all over. Telesitter at bedside but didn't call RN to notify of pt's behavior. When RN called telesitter, she hadn't been updated about the pt's PEG. RN provided specific instructions to watch for patient reaching under her sheets, grabbing at her R neck line or L arm line, or removing her ABD binder. RN notified provider and received an order for benadryl 25mg IVP X 1. RN explained to pt that she cannot remove her medical devices. If she is trying to do something and can't because of the medical devices, she needs to call for help. RN reviewed call light and side rail controls. Pt VU. She did not remove any medical equipment or dressing t/o remainder of shift. Pt has sallow appearance. She has preventative mepilex drsgs on B heels. Pt checked for fecal incontinence t/o shift because rectal tube removed on days. Pt had 1 episode of incontinence. Zinc oxide paste applied. Pt turned Q2H using a positioning wedge. Pt's heparin drip timed to start at 0200 via pump through L SL ML.     Pt's PEG flushed with med pass and once more during shift. Pt to remain NPO until day shift because PEG placed on 9/25. Will continue to monitor.

## 2020-09-26 NOTE — PROGRESS NOTES
Clinical Pharmacy Note  Vancomycin Consult    Eusebio Baker is a 53 y.o. female ordered Vancomycin for MRSA bacteremia; consult received from Dr. Shaheen Stanley to manage therapy.      Patient Active Problem List   Diagnosis    Factor V Leiden mutation ([facility] [address]    Pulmonary embolus (HCC)    Lactic acidosis    CAP (community acquired pneumonia)    Acute CHF (congestive heart failure) ([facility] [address]    Bilateral pulmonary embolism (HCC)    Cardiac arrest ([facility] [address]    Seizures (HCC)    Acute respiratory failure with hypoxia (HCC)    MORELIA (acute kidney injury) ([facility] [address] [facility]    Metabolic acidosis    Congestive heart failure (HCC)    Pulmonary hypertension (HCC)    Multifocal pneumonia    Septic shock (HCC)    Elevated LFTs    Thrombocytopenia (HCC)    Coagulopathy (HCC)    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)    Acute encephalopathy    Pneumonia due to organism    Bacteremia    Pleural effusion, bilateral    Elevated lactic acid level    Bacteremia due to methicillin susceptible Staphylococcus aureus (MSSA)    Pneumonia due to methicillin susceptible Staphylococcus aureus (MSSA) (HCC)    Anemia    Acute renal failure (HCC)    Bilateral pleural effusion    Mitral valve insufficiency    Tricuspid valve insufficiency    Class 2 obesity due to excess calories with body mass index (BMI) of 39.0 to 39.9 in adult    Cardiogenic shock (HCC)    Staphylococcal pneumonia ([facility] [address]    Acute on chronic systolic heart failure (HCC)    Paroxysmal atrial fibrillation (HCC)    Essential hypertension       Allergies:  Ibuprofen     Temp max:  Temp (24hrs), Av.9 °F (36.6 °C), Min:96.8 °F (36 °C), Max:98.6 °F (37 °C)      Recent Labs     20  0430 20  0540 20  0450   WBC 13.8* 11.1* 9.4       Recent Labs     20  0430 20  0540 20  0450   BUN 42* 33* 40*   CREATININE 2.6* 2.0* 2.7*         Intake/Output Summary (Last 24 hours) at 2020 0922  Last data filed at 9/26/2020 0656  Gross per 24 hour   Intake 503.8 ml   Output 950 ml   Net -446.2 ml       Culture Results:  09/10/20 Blood: MRSA   09/11/20 Respiratory: MSSA  09/12/20 Blood: No growth to date   09/13/20 Blood: No growth to date  09/14/20 Blood: No growth to date    Ht Readings from Last 1 Encounters:   09/24/20 5' 7\" (1.702 m)        Wt Readings from Last 1 Encounters:   09/26/20 197 lb 15.6 oz (89.8 kg)       Assessment:  Current Regimen: Intermittent dosing based on levels due to MORELIA. Receiving HD T/Th/Sat. Random level: 17.4 mg/L. Stop date 10/10 per Dr. Dilia Wall:  Vancomycin 1250 mg IV x 1 today with HD. Repeat random level on Tuesday 9-29 with AM labs. Thank you for the consult.      Christie Murillo RPh 9/26/2020 7:30 AM

## 2020-09-26 NOTE — CONSULTS
Clinical Pharmacy Note  Warfarin Consult    Kathy Vidal is a 53 y.o. female receiving warfarin managed by pharmacy. Patient being bridged with heparin drip. Warfarin Indication: Factor V Leiden  Target INR range: 2-3   Dose prior to admission: 7.5mg daily except 3.75mg QMon &  Thursday    Current warfarin drug-drug interactions: Amiodarone (new)    Recent Labs     09/24/20  0430 09/25/20  0540 09/26/20  0450   HGB 7.8* 7.2* 7.3*   HCT 24.6* 22.8* 23.2*   INR  --  0.99  --        Assessment/Plan:    Warfarin 7.5 mg tonight. Daily PT/INR until stable within therapeutic range. If Amiodarone continued on discharge expect weekly dose to decrease. Thank you for the consult. Will continue to follow.   Adele Suarez [place] 9/26/2020 2:20 PM

## 2020-09-26 NOTE — PROGRESS NOTES
Pt back to room from dialysis. Vitals and blood sugar obtained. Heparin drip continuously running. Tube feed started via PEG tube. Pt repositioned and lying right side. Bed in lowest position, call light within reach and bed alarm on. Pt expresses no further needs at this time.

## 2020-09-26 NOTE — PROGRESS NOTES
GASTROENTEROLOGY INPATIENT CONSULTATION      IDENTIFYING DATA/REASON FOR CONSULTATION   PATIENT:  Chemo Vital  MRN:  [id number]  ADMIT DATE: 9/10/2020  TIME OF EVALUATION: 2020 10:15 AM  HOSPITAL STAY:   LOS: 16 days     REASON FOR CONSULTATION:  PEG placement      HISTORY OF PRESENT ILLNESS   Patient seen and examined. She denies any complaints. No evidence of bleeding following PEG placement. Patient restarted on heparin drip.     PAST MEDICAL, SURGICAL, FAMILY, and SOCIAL HISTORY     Past Medical History:   Diagnosis Date    Anticoagulant long-term use     Arthritis     Asthma     Baker's cyst     Depression     [facility]     Factor V Leiden ([facility]     H/O blood clots     Heartburn     Hyperlipidemia     MRSA bacteremia 09/10/2020    Osteoarthritis      Past Surgical History:   Procedure Laterality Date    GASTROSTOMY TUBE PLACEMENT N/A 2020    ESOPHAGOGASTRODUODENOSCOPY PERCUTANEOUS ENDOSCOPIC GASTROSTOMY TUBE PLACEMENT performed by Jewels Danielle MD at [address] [place] [address] Road History   Problem Relation Age of Onset    Heart Attack Father      Social History     Socioeconomic History    Marital status:      Spouse name: None    Number of children: None    Years of education: None    Highest education level: None   Occupational History    None   Social Needs    Financial resource strain: None    Food insecurity     Worry: None     Inability: None    Transportation needs     Medical: None     Non-medical: None   Tobacco Use    Smoking status: Former Smoker     Packs/day: 0.50     Types: Cigarettes     Last attempt to quit: 2020     Years since quittin.0    Smokeless tobacco: Never Used   Substance and Sexual Activity    Alcohol use: No     Alcohol/week: 0.0 standard drinks    Drug use: Yes     Frequency: 14.0 times per week     Types: Marijuana    Sexual activity: None   Lifestyle    Physical activity     Days per week: None     Minutes per session: None    Stress: None   Relationships    Social connections     Talks on phone: None     Gets together: None     Attends Voodoo service: None     Active member of club or organization: None     Attends meetings of clubs or organizations: None     Relationship status: None    Intimate partner violence     Fear of current or ex partner: None     Emotionally abused: None     Physically abused: None     Forced sexual activity: None   Other Topics Concern    None   Social History Narrative    None       MEDICATIONS   SCHEDULED:  vancomycin, 1,250 mg, Once  insulin lispro, 0-12 Units, Q6H  metoprolol succinate, 50 mg, Daily  diazePAM, 5 mg, 2 times per day  amiodarone, 200 mg, BID  epoetin rajiv-epbx, 5,000 Units, Once per day on Mon Wed Fri  lidocaine 1 % injection, 5 mL, Once  sodium chloride flush, 10 mL, 2 times per day  lacosamide, 200 mg, BID  levETIRAcetam, 1,000 mg, BID  lactobacillus, 2 capsule, BID WC  pantoprazole, 40 mg, Daily    And  sodium chloride (PF), 10 mL, Daily  vancomycin (VANCOCIN) intermittent dosing (placeholder), , RX Placeholder      FLUIDS/DRIPS:     heparin (Porcine) 8.3 mL/hr (09/26/20 0200)    dextrose       PRNs: LORazepam, 2 mg, Q2H PRN  sodium chloride flush, 10 mL, PRN  heparin (porcine), 1,000 Units, PRN  ondansetron, 4 mg, Q4H PRN  promethazine, 25 mg, Q4H PRN  acetaminophen, 650 mg, Q6H PRN    Or  acetaminophen, 650 mg, Q6H PRN  glucose, 15 g, PRN  dextrose, 12.5 g, PRN  glucagon (rDNA), 1 mg, PRN  dextrose, 100 mL/hr, PRN      ALLERGIES:  She   Allergies   Allergen Reactions    Ibuprofen        REVIEW OF SYSTEMS   Pertinent ROS noted in HPI    PHYSICAL EXAM     Vitals:    09/25/20 2344 09/26/20 0446 09/26/20 0745 09/26/20 0810   BP: (!) 175/94 (!) 148/80 135/74    Pulse: 92 97 95    Resp: 17 18 18    Temp: 98.1 °F (36.7 °C) 98.6 °F (37 °C) 97.9 °F (36.6 °C)    TempSrc: Oral Oral Oral    SpO2: 93% 95% 95% 95%   Weight:  197 lb 15.6 oz (89.8 kg)     Height: I/O last 3 completed shifts: In: 503.8 [I.V.:343.8; NG/GT:160]  Out: 950 [Urine:650; Stool:300]      Physical Exam:  General appearance: alert, cooperative, no distress  Eyes: Anicteric  Head: Normocephalic, without obvious abnormality  Lungs: clear to auscultation bilaterally, Normal Effort  Heart: regular rate and rhythm, normal S1 and S2, no murmurs or rubs  Abdomen: soft, non-distended, non-tender. Bowel sounds normal. PEG site clean and dry. External bumper relaxed to 4.5 cm martinez without tenderness. Extremities: atraumatic, no cyanosis or edema  Skin: warm and dry, no jaundice  Neuro: Grossly intact, Alert, twitching/jerking movements of LE bilaterally      LABS AND IMAGING   Laboratory   Recent Labs     09/24/20 0430 09/25/20 0540 09/26/20  0450   WBC 13.8* 11.1* 9.4   HGB 7.8* 7.2* 7.3*   HCT 24.6* 22.8* 23.2*   MCV 85.8 87.1 86.7    370 393     Recent Labs     09/24/20 0430 09/25/20 0540 09/26/20  0450    138 140   K 4.1 3.9 4.2    102 106   CO2 25 24 20*   PHOS 2.4* 3.9 5.6*   BUN 42* 33* 40*   CREATININE 2.6* 2.0* 2.7*     Recent Labs     09/24/20 0430 09/25/20 0540 09/26/20  0450   AST 43* 41* 34   ALT 63* 54* 40   BILIDIR 0.4* 0.3 0.3   BILITOT 0.7 0.5 0.5   ALKPHOS 155* 130* 130*     No results for input(s): LIPASE, AMYLASE in the last 72 hours. Recent Labs     09/25/20 0540   PROTIME 11.5   INR 0.99       Imaging  XR CHEST PORTABLE   Final Result   Nasogastric tube terminates in the mid stomach. Fluoroscopy modified barium swallow with video   Final Result   Dysphagia was noted as described above. Penetration and aspiration was   demonstrated with nectar consistency. This elicited a cough. Deep silent   penetration was demonstrated with honey thick consistency and pudding   consistency. Please see separate speech pathology report for full discussion of findings   and recommendations.          VL DUP LOWER EXTREMITY ARTERIES RIGHT   Final Result      XR CHEST PORTABLE   Final Result   Suspect mild congestive failure with pleural effusion questioned greater on   the left. Endotracheal tube is approximately 3 cm above the rahat         MRI BRAIN WO CONTRAST   Final Result   Addendum 1 of 1   ADDENDUM:   Results were reported to NP Ree Kennedy at 3:26 p.m. on September 15,    2020. Final      XR CHEST PORTABLE   Final Result   Decreased layering effusions with improved basilar aeration. Appropriate   life support device positioning. XR CHEST PORTABLE   Final Result   Low-lying endotracheal tube. Recommend retracting 1-2 cm. No substantial change in layering effusions and basilar opacities. The findings were sent to the Radiology Results [address] at 3:53   am on 9/14/2020to be communicated to a licensed caregiver. XR CHEST PORTABLE   Final Result   1. Interval placement of an NG tube, likely within the stomach. 2. Stable appropriate positions of endotracheal tube and right-sided dialysis   catheter. 3. No significant change in appearance of small to moderate bilateral pleural   effusions and bibasilar airspace disease. XR CHEST PORTABLE   Final Result   Supportive lines project in stable positions. Bibasilar opacities, suggestive of layered effusions and atelectasis or   airspace disease. XR CHEST PORTABLE   Final Result   1. Suspected mild to moderate bilateral pleural effusions with adjacent   bibasilar atelectasis and/or airspace disease. 2. Mild to moderate cardiomegaly. 3. Recommend retraction of endotracheal tube 0.7 cm. XR CHEST PORTABLE   Final Result   Right jugular venous catheter and endotracheal tube project in normal   positions. No pneumothorax. Bilateral airspace disease and possible layered pleural effusions, consistent   with pulmonary edema and atelectasis. Pneumonia is a differential   possibility.          XR CHEST PORTABLE   Final Result Satisfactory position of the endotracheal tube 3 cm above the rahat. The   heart is upper normal size and vessels are borderline congested. VL Extremity Venous Bilateral   Final Result      VL DUP LOWER EXTREMITY ARTERIES BILATERAL   Final Result      XR CHEST PORTABLE   Final Result   Endotracheal tube has tip approximately 1.3 cm proximal to the rahat and   should be retracted approximately 2 cm. Findings are again suggestive of pulmonary edema with small pleural   effusions. Pneumonia is not excluded in the appropriate clinical setting. Findings were called by the radiology call center. CT HEAD WO CONTRAST   Final Result   Motion limited study. No convincing evidence for acute intracranial   abnormality identified within the limitations of motion artifact. If there is persistent clinical suspicion for intracranial abnormality,   repeat study should be considered. XR CHEST PORTABLE   Final Result   Cardiomegaly, pulmonary vascular congestion, and bilateral pleural effusions. Endotracheal tube terminates approximately 2 cm above the rahat. CT CHEST PULMONARY EMBOLISM W CONTRAST   Final Result   Addendum 1 of 1   ADDENDUM:   Findings were discussed with Dr. Loyde Cockayne at 10:49 pm on 9/10/2020. Final            ASSESSMENT AND RECOMMENDATIONS   53 y.o. female with a PMH of  Factor V Leiden, DVT, HLD who presented 9/10/2020 with SOB, leg swelling. Admitted with septic vs cardiogenic shock with PEA arrest, bilateral PEs, MRSA pneumonia and bacteremia, MORELIA on HD, Seizures, new onset Afib with RVR, Acute CVA, shocked liver. GI consulted for PEG placement    IMPRESSION:  1. Oral pharyngeal dysphagia s/p PEG 9/25/20  2. Acute CVA  3. Bilateral PEs  4. Seizures like activity  5. New onset Afib  6.  MORELIA on HD       RECOMMENDATIONS/PEG care instructions:  - Keep site clean and dry  - No dressings under bumper, only over bumper  - Prevent tampering with tube  - Ok to use PEG for tube feeds at this time  - Flush tube well with 30 cc water qid, plus before and after any use  - Use liquid meds when possible if necessary  - Flush well before and after any medications   - Do not mix feeds and medications  - Elevate head of bed during and after any feedings    Thank you for allowing me to participate in this patient's care. No further GI work-up needed at this time. We will sign off, however please contact us with any additional questions at [phone].       Radha Abarca MD

## 2020-09-26 NOTE — PROGRESS NOTES
Hospitalist Progress Note      PCP: No primary care provider on file. Date of Admission: 9/10/2020    Chief Complaint:   Chief Complaint   Patient presents with    Shortness of Breath     HX of asthma. Short of breath for around 1 month. Patient states she just refilled her inhaler today. Hospital Course:  53 y.o. female with past medical history of factor V Leiden deficiency on chronic warfarin, history of blood clots, hyperlipidemia, arthritis, asthma who presents to [facility] with worsening above symptoms of fatigue, intermittent shortness of breath both at rest and on exertion, and extensive of abdominal and leg swelling. In the ED patient was significantly tachycardic, showing some combination of sinus tachycardia versus SVT, uncertain if may be underlying atrial fibrillation with rapid ventricular rate. Labs showed signs of infection as well as elevated proBNP. CTA with contrast was done on the chest and demonstrated left and right lobe PEs in certain segments, active findings suggestive of early patchy pneumonia bilaterally, and moderate bilateral pleural effusions suggestive of heart failure/volume overload. PEA arrest shortly after admission. TTE with EF 30- 35%. Severe diffuse hypokinesis with severely reduced rt ventricular function, severe tricuspid regurgitation and mitral regurgitation, grade 3 diastolic dysfunction. Found to have MRSA PNA and bacteremia. Developed shock liver and coagulopathy which are improving. Concern for seizure like activity, initial EEG negative but repeat with possible status, added vimpat to keppra and repeat EEG improved. Acute to subacute infarcts on MRI. Developed a fib RVR 9/16 that has not responded to cardioversion (x5), on amio and dilt IV. Neurologic status improved and she was extubated on 9/19. Subjective:      Pt's mental status has platoed over the past few days. She complains of feeling very weak.  She does not engage in conversation. She is oriented to place and person. She has little to no insight into her present condition. Medications:  Reviewed    Infusion Medications    heparin (Porcine) 8.3 mL/hr (09/26/20 0200)    dextrose       Scheduled Medications    vancomycin  1,250 mg Intravenous Once    insulin lispro  0-12 Units Subcutaneous Q6H    metoprolol succinate  50 mg Oral Daily    diazePAM  5 mg Oral 2 times per day    amiodarone  200 mg Per NG tube BID    epoetin rajiv-epbx  5,000 Units Subcutaneous Once per day on Mon Wed Fri    lidocaine 1 % injection  5 mL Intradermal Once    sodium chloride flush  10 mL Intravenous 2 times per day    lacosamide  200 mg Oral BID    levETIRAcetam  1,000 mg Oral BID    lactobacillus  2 capsule Oral BID WC    pantoprazole  40 mg Intravenous Daily    And    sodium chloride (PF)  10 mL Intravenous Daily    vancomycin (VANCOCIN) intermittent dosing (placeholder)   Other RX Placeholder     PRN Meds: LORazepam, sodium chloride flush, heparin (porcine), ondansetron, promethazine, acetaminophen **OR** acetaminophen, glucose, dextrose, glucagon (rDNA), dextrose      Intake/Output Summary (Last 24 hours) at 9/26/2020 1337  Last data filed at 9/26/2020 0745  Gross per 24 hour   Intake 543.8 ml   Output 950 ml   Net -406.2 ml       Physical Exam Performed:    BP (!) 148/79   Pulse 93   Temp 97 °F (36.1 °C)   Resp 18   Ht 5' 7\" (1.702 m)   Wt 194 lb 7.1 oz (88.2 kg)   LMP 12/05/2018 (Exact Date)   SpO2 95%   BMI 30.45 kg/m²     General appearance: No apparent distress, appears stated age  HEENT: unable to fully open her eyes, Pupils equal, round, and reactive to light. Conjunctivae/corneas clear. Neck: Supple, with full range of motion. Trachea midline. Respiratory:  Normal respiratory effort. Coarse breath sounds bilaterally   Cardiovascular: Regular rate and rhythm with normal S1/S2 without murmurs, rubs or gallops.   Abdomen: Soft, non-tender, non-distended with normal bowel sounds. Musculoskeletal: No clubbing, cyanosis or edema bilaterally. Large R groin hematoma  Skin: Skin color, texture, turgor normal.  No rashes or lesions. Neurologic: Oriented to self, moves all extremities, mostly following commands  Psychiatric: oriented to place and person. Speech difficult to discern. Capillary Refill: Brisk,< 3 seconds   Peripheral Pulses: +2 palpable, equal bilaterally       Labs:   Recent Labs     09/24/20 0430 09/25/20 0540 09/26/20  0450   WBC 13.8* 11.1* 9.4   HGB 7.8* 7.2* 7.3*   HCT 24.6* 22.8* 23.2*    370 393     Recent Labs     09/24/20 0430 09/25/20 0540 09/26/20  0450    138 140   K 4.1 3.9 4.2    102 106   CO2 25 24 20*   BUN 42* 33* 40*   CREATININE 2.6* 2.0* 2.7*   CALCIUM 8.8 9.0 9.1   PHOS 2.4* 3.9 5.6*     Recent Labs     09/24/20 0430 09/25/20 0540 09/26/20  0450   AST 43* 41* 34   ALT 63* 54* 40   BILIDIR 0.4* 0.3 0.3   BILITOT 0.7 0.5 0.5   ALKPHOS 155* 130* 130*     Recent Labs     09/25/20  0540   INR 0.99     No results for input(s): CKTOTAL, TROPONINI in the last 72 hours. Urinalysis:      Lab Results   Component Value Date    NITRU Negative 09/10/2020    WBCUA 3-5 09/10/2020    BACTERIA 2+ 09/10/2020    RBCUA 21-50 09/10/2020    BLOODU MODERATE 09/10/2020    SPECGRAV >1.030 09/10/2020    GLUCOSEU Negative 09/10/2020       Radiology:  XR CHEST PORTABLE   Final Result   Nasogastric tube terminates in the mid stomach. Fluoroscopy modified barium swallow with video   Final Result   Dysphagia was noted as described above. Penetration and aspiration was   demonstrated with nectar consistency. This elicited a cough. Deep silent   penetration was demonstrated with honey thick consistency and pudding   consistency. Please see separate speech pathology report for full discussion of findings   and recommendations.          VL DUP LOWER EXTREMITY ARTERIES RIGHT   Final Result      XR CHEST PORTABLE   Final Result Suspect mild congestive failure with pleural effusion questioned greater on   the left. Endotracheal tube is approximately 3 cm above the rahat         MRI BRAIN WO CONTRAST   Final Result   Addendum 1 of 1   ADDENDUM:   Results were reported to GOLDIE [place] at 3:26 p.m. on September 15,    2020. Final      XR CHEST PORTABLE   Final Result   Decreased layering effusions with improved basilar aeration. Appropriate   life support device positioning. XR CHEST PORTABLE   Final Result   Low-lying endotracheal tube. Recommend retracting 1-2 cm. No substantial change in layering effusions and basilar opacities. The findings were sent to the Radiology Results [address] at 3:53   am on 9/14/2020to be communicated to a licensed caregiver. XR CHEST PORTABLE   Final Result   1. Interval placement of an NG tube, likely within the stomach. 2. Stable appropriate positions of endotracheal tube and right-sided dialysis   catheter. 3. No significant change in appearance of small to moderate bilateral pleural   effusions and bibasilar airspace disease. XR CHEST PORTABLE   Final Result   Supportive lines project in stable positions. Bibasilar opacities, suggestive of layered effusions and atelectasis or   airspace disease. XR CHEST PORTABLE   Final Result   1. Suspected mild to moderate bilateral pleural effusions with adjacent   bibasilar atelectasis and/or airspace disease. 2. Mild to moderate cardiomegaly. 3. Recommend retraction of endotracheal tube 0.7 cm. XR CHEST PORTABLE   Final Result   Right jugular venous catheter and endotracheal tube project in normal   positions. No pneumothorax. Bilateral airspace disease and possible layered pleural effusions, consistent   with pulmonary edema and atelectasis. Pneumonia is a differential   possibility.          XR CHEST PORTABLE   Final Result   Satisfactory position of the endotracheal tube 3 cm above the rahat. The   heart is upper normal size and vessels are borderline congested. VL Extremity Venous Bilateral   Final Result      VL DUP LOWER EXTREMITY ARTERIES BILATERAL   Final Result      XR CHEST PORTABLE   Final Result   Endotracheal tube has tip approximately 1.3 cm proximal to the rahat and   should be retracted approximately 2 cm. Findings are again suggestive of pulmonary edema with small pleural   effusions. Pneumonia is not excluded in the appropriate clinical setting. Findings were called by the radiology call center. CT HEAD WO CONTRAST   Final Result   Motion limited study. No convincing evidence for acute intracranial   abnormality identified within the limitations of motion artifact. If there is persistent clinical suspicion for intracranial abnormality,   repeat study should be considered. XR CHEST PORTABLE   Final Result   Cardiomegaly, pulmonary vascular congestion, and bilateral pleural effusions. Endotracheal tube terminates approximately 2 cm above the rahat. CT CHEST PULMONARY EMBOLISM W CONTRAST   Final Result   Addendum 1 of 1   ADDENDUM:   Findings were discussed with Dr. Jessica Lo at 10:49 pm on 9/10/2020.          Final            Assessment/Plan:    Active Hospital Problems    Diagnosis    Acute on chronic systolic heart failure (HCC) [I50.23]    Paroxysmal atrial fibrillation (HCC) [I48.0]    Essential hypertension [I10]    Cardiogenic shock ([facility]) [R57.0]    Staphylococcal pneumonia ([facility]) [J15.20]    Pleural effusion, bilateral [J90]    Elevated lactic acid level [R79.89]    Bacteremia due to methicillin susceptible Staphylococcus aureus (MSSA) [R78.81]    Pneumonia due to methicillin susceptible Staphylococcus aureus (MSSA) ([facility]) [J15.211]    Anemia [D64.9]    Acute renal failure (HCC) [N17.9]    Bilateral pleural effusion [J90]    Mitral valve insufficiency [I34.0]    Tricuspid valve insufficiency [I07.1]    Class 2 obesity due to excess calories with body mass index (BMI) of 39.0 to 39.9 in adult [E66.09, Z68.39]    Pneumonia due to organism [J18.9]    Bacteremia [R78.81]    Cardiac arrest ([address] [I46.9]    Seizures ([address] [R56.9]    Acute respiratory failure with hypoxia (HCC) [J96.01]    MORELIA (acute kidney injury) ([address] [R31.0]    Metabolic acidosis [Z70.7]    Congestive heart failure (HCC) [I50.9]    Pulmonary hypertension (HCC) [I27.20]    Multifocal pneumonia [J18.9]    Septic shock (HCC) [A41.9, R65.21]    Elevated LFTs [R94.5]    Thrombocytopenia ([address] [D69.6]    Coagulopathy ([address] [D68.9]    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) [D65]    Acute encephalopathy [G93.40]    Pulmonary embolus (HCC) [I26.99]    Lactic acidosis [E87.2]    CAP (community acquired pneumonia) [J18.9]    Acute CHF (congestive heart failure) (HCC) [I50.9]    Bilateral pulmonary embolism (HCC) [I26.99]    Factor V Leiden mutation ([address] [D68.51]         Shock, septic vs cardiogenic - resolved. Severe mitral and tricuspid regurgitation on TTE.  moderate pulmonary HTN,   large bilateral pleural effusions and hepatic congestion on CT. MRSA bacteremia. Off pressors  - ID consulted. - cardiology following      MRSA bacteremia, MRSA PNA  - ID following - presently on vancomycin  - TTE, LEVY without vegetations though concerning for endocarditis given embolic CVA      Acute CVA  MRI with scattered acute to subacute infarcts, likely embolic  - neuro following  - workup for endocarditis LEVY negative      Cardiomyopathy, acute on chronic systolic HF  EF 30- 23%. Severe diffuse hypokinesis with severely reduced rt ventricular function, severe tricuspid regurgitation and mitral regurgitation, grade 3 diastolic dysfunction. No prior TTE.   - cardiology following  - RHC/LHC when able      S/p PEA arrest, acute hypoxic resp failure  Intubated 9/10 shortly after admission. medical conditions and discussed weight loss. Dysphagia -   Failed MBS 9/23 pretty much with all consistencies. S/p PEG 9/25 - will start tube feeding today. DVT Prophylaxis: heparin gtt  Diet: DIET TUBE FEED CONTINUOUS/CYCLIC NPO; Renal Formula; Gastrostomy; Continuous; 20; 55; Exceptions are: Ice Chips  Code Status: Full Code    PT/OT Eval Status: deferred    Dispo - failed MBS. PEG placed 9/25 - start tube feeds today. Heparin gtt - start coumadin bridge today.        Scotty Stubbs MD

## 2020-09-26 NOTE — PROGRESS NOTES
Pt lying right side in bed. Telecam in room. GI MD at bedside to assess PEG tube. RN did new dressing change. Vitals, shift assessment and medications complete. Pt in bed with call light within reach. Bed alarm on and in lowest position. Pt expresses no needs at this time.

## 2020-09-26 NOTE — PROGRESS NOTES
(89.8 kg)   12/17/18 229 lb (103.9 kg)   12/14/18 227 lb 1.2 oz (103 kg)       Physical Exam:  GEN: Appears ill, no acute distress  SKIN: Pink, warm, dry. Nails without clubbing. HEENT: PERRLA. Normocephalic, atraumatic. Neck supple. No adenopathy. LUNG: AP diameter normal. Diminished bilateral. Respiratory effort normal.  HEART: S1S2 A/R. No JVD. No carotid bruit. No murmur, rub or gallop. ABD: Soft, nontender. +BS X 4 quads. No hepatomegaly. EXT: Radial and pedal pulses 2+ and symmetric. Without varicosities. No edema. MUSCSKEL: Good ROM X4 extremities. No deformity. NEURO: A/O X3. Calm and cooperative. Telemetry: NSR with PAC,     Medications:    vancomycin  1,250 mg Intravenous Once    insulin lispro  0-12 Units Subcutaneous Q6H    metoprolol succinate  50 mg Oral Daily    diazePAM  5 mg Oral 2 times per day    amiodarone  200 mg Per NG tube BID    epoetin rajiv-epbx  5,000 Units Subcutaneous Once per day on Mon Wed Fri    lidocaine 1 % injection  5 mL Intradermal Once    sodium chloride flush  10 mL Intravenous 2 times per day    lacosamide  200 mg Oral BID    levETIRAcetam  1,000 mg Oral BID    lactobacillus  2 capsule Oral BID WC    pantoprazole  40 mg Intravenous Daily    And    sodium chloride (PF)  10 mL Intravenous Daily    vancomycin (VANCOCIN) intermittent dosing (placeholder)   Other RX Placeholder      heparin (Porcine) 8.3 mL/hr (09/26/20 0200)    dextrose       LORazepam, sodium chloride flush, heparin (porcine), ondansetron, promethazine, acetaminophen **OR** acetaminophen, glucose, dextrose, glucagon (rDNA), dextrose    Lab Data: I have reviewed all labs below today.    CBC:   Recent Labs     09/24/20 0430 09/25/20  0540 09/26/20  0450   WBC 13.8* 11.1* 9.4   HGB 7.8* 7.2* 7.3*   HCT 24.6* 22.8* 23.2*   MCV 85.8 87.1 86.7    370 393     BMP:   Recent Labs     09/24/20 0430 09/25/20  0540 09/26/20  0450    138 140   K 4.1 3.9 4.2    102 106   CO2 given atrial fibrillation.   Moderate to moderate mitral regurgitation is present.   The aortic valve is structurally normal. There is no significant aortic   valve regurgitation or stenosis.   Mild to moderate tricuspid regurgitation.   no WINNIE thrombus    Stress Test:     Cardiac Angiography:     Assessment/Plan:  1. ) Acute combined diastolic Grade 3 and systolic heart failure EF 30-35% with moderate MR and TR. Unclear etiology. Will need ischemic work up. GDMT limited with MORELIA. Appears euvolemic. Gettig HD for fluid management.    NYHA Class III   Stage 3  Diuretic: HD  Beta Blocker: Toprol XL  ACEi/ARB/ARNI: none MORELIA  Aldosterone Antagonist: none MORELIA  SGLT2 Inhibitor: outpatient  2gm Na diet, daily weight, 64 oz fluid restriction  Avoid NSAIDS and other nephrotoxic meds  Cardiac Rehab: outpatient  ICD: after 3 mos GDMT     2. ) Paroxysmal Atrial Fib: Converted to NSR  CHADSVASC- 5    HASBLED-  Thromboembolic risk reduction: On Hep gtt  Rate Control/Rhythm Control: Amio, Toprol XL    3.) Bilateral PE: Now on RA  Followed by pulm     4.) PNA/Bacteremia: Followe by ID    Electronically signed by AGUSTIN Saeed CNP on 9/26/2020 at 10:39 AM

## 2020-09-27 LAB
ALBUMIN SERPL-MCNC: 3.3 G/DL (ref 3.4–5)
ALP BLD-CCNC: 134 U/L (ref 40–129)
ALT SERPL-CCNC: 34 U/L (ref 10–40)
ANION GAP SERPL CALCULATED.3IONS-SCNC: 12 MMOL/L (ref 3–16)
APTT: 65.7 SEC (ref 24.2–36.2)
AST SERPL-CCNC: 44 U/L (ref 15–37)
BASOPHILS ABSOLUTE: 0.1 K/UL (ref 0–0.2)
BASOPHILS RELATIVE PERCENT: 1.3 %
BILIRUB SERPL-MCNC: 0.4 MG/DL (ref 0–1)
BILIRUBIN DIRECT: 0.3 MG/DL (ref 0–0.3)
BILIRUBIN, INDIRECT: 0.1 MG/DL (ref 0–1)
BUN BLDV-MCNC: 24 MG/DL (ref 7–20)
CALCIUM SERPL-MCNC: 9 MG/DL (ref 8.3–10.6)
CHLORIDE BLD-SCNC: 99 MMOL/L (ref 99–110)
CO2: 24 MMOL/L (ref 21–32)
CREAT SERPL-MCNC: 2.4 MG/DL (ref 0.6–1.1)
EOSINOPHILS ABSOLUTE: 0.2 K/UL (ref 0–0.6)
EOSINOPHILS RELATIVE PERCENT: 2.6 %
GFR AFRICAN AMERICAN: 26
GFR NON-AFRICAN AMERICAN: 21
GLUCOSE BLD-MCNC: 111 MG/DL (ref 70–99)
GLUCOSE BLD-MCNC: 113 MG/DL (ref 70–99)
GLUCOSE BLD-MCNC: 119 MG/DL (ref 70–99)
GLUCOSE BLD-MCNC: 122 MG/DL (ref 70–99)
GLUCOSE BLD-MCNC: 131 MG/DL (ref 70–99)
GLUCOSE BLD-MCNC: 140 MG/DL (ref 70–99)
HCT VFR BLD CALC: 23.6 % (ref 36–48)
HEMOGLOBIN: 7.4 G/DL (ref 12–16)
INR BLD: 1.03 (ref 0.86–1.14)
LYMPHOCYTES ABSOLUTE: 1.6 K/UL (ref 1–5.1)
LYMPHOCYTES RELATIVE PERCENT: 18.3 %
MAGNESIUM: 2 MG/DL (ref 1.8–2.4)
MCH RBC QN AUTO: 27.2 PG (ref 26–34)
MCHC RBC AUTO-ENTMCNC: 31.5 G/DL (ref 31–36)
MCV RBC AUTO: 86.5 FL (ref 80–100)
MONOCYTES ABSOLUTE: 0.9 K/UL (ref 0–1.3)
MONOCYTES RELATIVE PERCENT: 10.3 %
NEUTROPHILS ABSOLUTE: 5.8 K/UL (ref 1.7–7.7)
NEUTROPHILS RELATIVE PERCENT: 67.5 %
PDW BLD-RTO: 23.4 % (ref 12.4–15.4)
PERFORMED ON: ABNORMAL
PHOSPHORUS: 3.7 MG/DL (ref 2.5–4.9)
PLATELET # BLD: 353 K/UL (ref 135–450)
PMV BLD AUTO: 8.6 FL (ref 5–10.5)
POTASSIUM SERPL-SCNC: 4.2 MMOL/L (ref 3.5–5.1)
PROTHROMBIN TIME: 12 SEC (ref 10–13.2)
RBC # BLD: 2.73 M/UL (ref 4–5.2)
SODIUM BLD-SCNC: 135 MMOL/L (ref 136–145)
TOTAL PROTEIN: 6.3 G/DL (ref 6.4–8.2)
WBC # BLD: 8.6 K/UL (ref 4–11)

## 2020-09-27 PROCEDURE — 85730 THROMBOPLASTIN TIME PARTIAL: CPT

## 2020-09-27 PROCEDURE — 1200000000 HC SEMI PRIVATE

## 2020-09-27 PROCEDURE — 2500000003 HC RX 250 WO HCPCS: Performed by: INTERNAL MEDICINE

## 2020-09-27 PROCEDURE — 6370000000 HC RX 637 (ALT 250 FOR IP): Performed by: INTERNAL MEDICINE

## 2020-09-27 PROCEDURE — 80048 BASIC METABOLIC PNL TOTAL CA: CPT

## 2020-09-27 PROCEDURE — 85610 PROTHROMBIN TIME: CPT

## 2020-09-27 PROCEDURE — C9113 INJ PANTOPRAZOLE SODIUM, VIA: HCPCS | Performed by: INTERNAL MEDICINE

## 2020-09-27 PROCEDURE — 6370000000 HC RX 637 (ALT 250 FOR IP): Performed by: STUDENT IN AN ORGANIZED HEALTH CARE EDUCATION/TRAINING PROGRAM

## 2020-09-27 PROCEDURE — 6360000002 HC RX W HCPCS: Performed by: NURSE PRACTITIONER

## 2020-09-27 PROCEDURE — 6360000002 HC RX W HCPCS: Performed by: INTERNAL MEDICINE

## 2020-09-27 PROCEDURE — 85025 COMPLETE CBC W/AUTO DIFF WBC: CPT

## 2020-09-27 PROCEDURE — 2580000003 HC RX 258: Performed by: INTERNAL MEDICINE

## 2020-09-27 PROCEDURE — 84100 ASSAY OF PHOSPHORUS: CPT

## 2020-09-27 PROCEDURE — 80076 HEPATIC FUNCTION PANEL: CPT

## 2020-09-27 PROCEDURE — 94760 N-INVAS EAR/PLS OXIMETRY 1: CPT

## 2020-09-27 PROCEDURE — 99232 SBSQ HOSP IP/OBS MODERATE 35: CPT | Performed by: NURSE PRACTITIONER

## 2020-09-27 PROCEDURE — 83735 ASSAY OF MAGNESIUM: CPT

## 2020-09-27 RX ORDER — HYDROXYZINE HYDROCHLORIDE 10 MG/1
10 TABLET, FILM COATED ORAL 4 TIMES DAILY PRN
Status: DISCONTINUED | OUTPATIENT
Start: 2020-09-27 | End: 2020-10-04 | Stop reason: HOSPADM

## 2020-09-27 RX ORDER — DIPHENHYDRAMINE HYDROCHLORIDE 50 MG/ML
25 INJECTION INTRAMUSCULAR; INTRAVENOUS ONCE
Status: COMPLETED | OUTPATIENT
Start: 2020-09-27 | End: 2020-09-27

## 2020-09-27 RX ADMIN — SODIUM CHLORIDE, PRESERVATIVE FREE 10 ML: 5 INJECTION INTRAVENOUS at 22:15

## 2020-09-27 RX ADMIN — DIPHENHYDRAMINE HYDROCHLORIDE 25 MG: 50 INJECTION, SOLUTION INTRAMUSCULAR; INTRAVENOUS at 03:20

## 2020-09-27 RX ADMIN — INSULIN LISPRO 2 UNITS: 100 INJECTION, SOLUTION INTRAVENOUS; SUBCUTANEOUS at 21:59

## 2020-09-27 RX ADMIN — DIAZEPAM 5 MG: 5 TABLET ORAL at 09:44

## 2020-09-27 RX ADMIN — LACOSAMIDE 200 MG: 100 TABLET, FILM COATED ORAL at 21:58

## 2020-09-27 RX ADMIN — ACETAMINOPHEN 650 MG: 325 TABLET ORAL at 10:00

## 2020-09-27 RX ADMIN — AMIODARONE HYDROCHLORIDE 200 MG: 200 TABLET ORAL at 21:59

## 2020-09-27 RX ADMIN — AMIODARONE HYDROCHLORIDE 200 MG: 200 TABLET ORAL at 09:44

## 2020-09-27 RX ADMIN — PANTOPRAZOLE SODIUM 40 MG: 40 INJECTION, POWDER, FOR SOLUTION INTRAVENOUS at 09:44

## 2020-09-27 RX ADMIN — Medication 2 CAPSULE: at 10:00

## 2020-09-27 RX ADMIN — HEPARIN SODIUM 8.3 ML/HR: 10000 INJECTION, SOLUTION INTRAVENOUS at 07:39

## 2020-09-27 RX ADMIN — LEVETIRACETAM 1000 MG: 500 SOLUTION ORAL at 21:58

## 2020-09-27 RX ADMIN — SODIUM CHLORIDE, PRESERVATIVE FREE 10 ML: 5 INJECTION INTRAVENOUS at 09:44

## 2020-09-27 RX ADMIN — DIAZEPAM 5 MG: 5 TABLET ORAL at 21:59

## 2020-09-27 RX ADMIN — HYDROXYZINE HYDROCHLORIDE 10 MG: 10 TABLET, FILM COATED ORAL at 21:59

## 2020-09-27 RX ADMIN — Medication 10 ML: at 10:00

## 2020-09-27 RX ADMIN — METOPROLOL SUCCINATE 50 MG: 50 TABLET, EXTENDED RELEASE ORAL at 09:44

## 2020-09-27 RX ADMIN — LACOSAMIDE 200 MG: 100 TABLET, FILM COATED ORAL at 09:44

## 2020-09-27 RX ADMIN — Medication 2 CAPSULE: at 21:59

## 2020-09-27 RX ADMIN — LEVETIRACETAM 1000 MG: 500 SOLUTION ORAL at 10:00

## 2020-09-27 ASSESSMENT — PAIN SCALES - GENERAL
PAINLEVEL_OUTOF10: 0
PAINLEVEL_OUTOF10: 0
PAINLEVEL_OUTOF10: 10
PAINLEVEL_OUTOF10: 0
PAINLEVEL_OUTOF10: 1

## 2020-09-27 ASSESSMENT — PAIN DESCRIPTION - ONSET
ONSET: ON-GOING
ONSET: ON-GOING

## 2020-09-27 ASSESSMENT — PAIN DESCRIPTION - PROGRESSION
CLINICAL_PROGRESSION: NOT CHANGED

## 2020-09-27 ASSESSMENT — PAIN - FUNCTIONAL ASSESSMENT
PAIN_FUNCTIONAL_ASSESSMENT: ACTIVITIES ARE NOT PREVENTED

## 2020-09-27 ASSESSMENT — PAIN DESCRIPTION - PAIN TYPE
TYPE: ACUTE PAIN
TYPE: ACUTE PAIN

## 2020-09-27 ASSESSMENT — PAIN DESCRIPTION - FREQUENCY
FREQUENCY: CONTINUOUS
FREQUENCY: CONTINUOUS

## 2020-09-27 ASSESSMENT — PAIN DESCRIPTION - LOCATION
LOCATION: ABDOMEN
LOCATION: ABDOMEN

## 2020-09-27 ASSESSMENT — PAIN DESCRIPTION - DESCRIPTORS
DESCRIPTORS: ACHING
DESCRIPTORS: ACHING

## 2020-09-27 ASSESSMENT — PAIN DESCRIPTION - DIRECTION
RADIATING_TOWARDS: NO
RADIATING_TOWARDS: NO

## 2020-09-27 ASSESSMENT — PAIN DESCRIPTION - ORIENTATION
ORIENTATION: LOWER
ORIENTATION: LOWER

## 2020-09-27 NOTE — PROGRESS NOTES
[address]   Daily Progress Note      Admit Date:  9/10/2020    CC: fatige    HPI:   Rogelio Gonzalez is a 53 y.o. female with PMH HLD, Factor V Leiden (on chronic warfarin), HLP, asthma. Admitted to [facility] with bilateral PE, MRSA bacteremia and PNA, new AF (multiple cardioversions) and SHF. PEA arrest on 9/10 with rescusitation. Developed ARF initially on CRRT now HD. + seizures with multiple infarcts/acute CVA on MRI. + cardiogenic shock. +shock liver and coagulopathy. GI consulted for PEG placement. Difficult to understand speech. Limited ROS. She is on RA and denies SOB or CP. Review of Systems:   General: Denies fever, chills, fatigue, weakness  Skin: Denies skin changes, rash, itching, lesions.   HEENT: Denies headache, dizziness, vision changes, nosebleeds, sore throat, nasal drainage  RESP: Denies cough, sputum, dyspnea, wheeze, snoring  CARD: Denies palpitations,  murmur  GI:Denies nausea, vomiting, heartburn, loss of appetite, change in bowels  : Denies frequency, pain, incontinence, polyuria  VASC: Denies claudication, leg cramps, clots  MUSC/SKEL: Denies pain, stiffness, arthritis  PSYCH: Denies anxiety, depression, stress  NEURO: Denies numbness, tingling, weakness,change in mood or memory  HEME: Denies abn bruising, bleeding, anemia  ENDO: Denies intolerance to heat, cold, excessive thirst or hunger, hx thyroid disease    Objective:   /67   Pulse 81   Temp 97.5 °F (36.4 °C) (Oral)   Resp 16   Ht 5' 7\" (1.702 m)   Wt 191 lb 9.3 oz (86.9 kg)   LMP 12/05/2018 (Exact Date)   SpO2 95%   BMI 30.01 kg/m²           Intake/Output Summary (Last 24 hours) at 9/27/2020 1225  Last data filed at 9/27/2020 1203  Gross per 24 hour   Intake 2649.59 ml   Output 1750 ml   Net 899.59 ml       WEIGHT:Admit Weight: 251 lb 12.3 oz (114.2 kg)         Today  Weight: 191 lb 9.3 oz (86.9 kg)   DRY WEIGHT:  Wt Readings from Last 3 Encounters:   09/27/20 191 lb 9.3 oz (86.9 kg)   12/17/18 229 lb (103.9 kg)   12/14/18 227 lb 1.2 oz (103 kg)       Physical Exam:  GEN: Appears ill, no acute distress  SKIN: Pink, warm, dry. Nails without clubbing. HEENT: PERRLA. Normocephalic, atraumatic. Neck supple. No adenopathy. LUNG: AP diameter normal. Diminished bilateral. Respiratory effort normal.  HEART: S1S2 A/R. No JVD. No carotid bruit. No murmur, rub or gallop. ABD: Soft, nontender. +BS X 4 quads. No hepatomegaly. EXT: Radial and pedal pulses 2+ and symmetric. Without varicosities. No edema. MUSCSKEL: Good ROM X4 extremities. No deformity. NEURO: A/O X3. Calm and cooperative. Telemetry: NSR with PAC,     Medications:    insulin lispro  0-12 Units Subcutaneous Q6H    warfarin (COUMADIN) daily dosing (placeholder)   Other RX Placeholder    metoprolol succinate  50 mg Oral Daily    diazePAM  5 mg Oral 2 times per day    amiodarone  200 mg Per NG tube BID    epoetin rajiv-epbx  5,000 Units Subcutaneous Once per day on Mon Wed Fri    lidocaine 1 % injection  5 mL Intradermal Once    sodium chloride flush  10 mL Intravenous 2 times per day    lacosamide  200 mg Oral BID    levETIRAcetam  1,000 mg Oral BID    lactobacillus  2 capsule Oral BID WC    pantoprazole  40 mg Intravenous Daily    And    sodium chloride (PF)  10 mL Intravenous Daily    vancomycin (VANCOCIN) intermittent dosing (placeholder)   Other RX Placeholder      heparin (Porcine) 8.3 mL/hr (09/27/20 0739)    dextrose       hydrOXYzine, acetaminophen **OR** acetaminophen **OR** acetaminophen, LORazepam, sodium chloride flush, heparin (porcine), ondansetron, promethazine, glucose, dextrose, glucagon (rDNA), dextrose    Lab Data: I have reviewed all labs below today.    CBC:   Recent Labs     09/25/20  0540 09/26/20  0450 09/27/20  0506   WBC 11.1* 9.4 8.6   HGB 7.2* 7.3* 7.4*   HCT 22.8* 23.2* 23.6*   MCV 87.1 86.7 86.5    393 353     BMP:   Recent Labs     09/25/20  0540 09/26/20  0450 09/27/20  0506    140 135*   K 3.9 4.2 4.2    106 99   CO2 24 20* 24   PHOS 3.9 5.6* 3.7   BUN 33* 40* 24*   CREATININE 2.0* 2.7* 2.4*     GLUCOSE:   Recent Labs     09/25/20  0540 09/26/20  0450 09/27/20  0506   GLUCOSE 105* 91 122*     LIVER PROFILE:   Lab Results   Component Value Date    AST 44 09/27/2020    ALT 34 09/27/2020    LIPASE 45.0 03/16/2016    LABALBU 3.3 09/27/2020    BILIDIR 0.3 09/27/2020    BILITOT 0.4 09/27/2020    ALKPHOS 134 09/27/2020     PT/INR:   Lab Results   Component Value Date    PROTIME 12.0 09/27/2020    PROTIME 67.9 01/29/2016    INR 1.03 09/27/2020     APTT:   Lab Results   Component Value Date    APTT 65.7 09/27/2020     Pro-BNP:    Lab Results   Component Value Date    PROBNP 4,851 09/10/2020     Parameters:   > 450 pg/mL under age 48  > 900 pg/mL ages 54-65  > 1800 pg/mL over age 76    ENZYMES:  Lab Results   Component Value Date    JECDTEQ 094 09/11/2020    TROPONINI 0.02 09/11/2020     FASTING LIPID PANEL:  Lab Results   Component Value Date    CHOL 154 08/10/2017    HDL 52 08/10/2017    HDL 35 02/02/2012    LDLCALC 86 08/10/2017    TRIG 79 08/10/2017       Diagnostics:    EKG: Atrial flutter with variable A-V blockConfirmed by Citlali MONET MD ([id number]) on 9/16/2020 2:34:04 PM     ECHO:  Echo: 9/11/20  Ejection fraction is visually estimated to be 30%-35%. Severe tricuspid regurgitation. Moderate pulmonary HTN  There is severe diffuse hypokinesis. Diastolic filling parameters suggest grade III diastolic dysfunction. Moderate calcification of the posterior leaflet of the mitral valve. Mitral annular calcification is present. The mitral valve leaflets appear myxomatous. No evidence of mitral valve stenosis. Severe mitral regurgitation is present. Systolic flow reversal in pulmonary veins. The right ventricle is enlarged.   Right ventricular systolic function is moderate to severely reduced    LEVY: 9/16/20  Conclusions   Summary   Overall left ventricular systolic function appears

## 2020-09-27 NOTE — PROGRESS NOTES
Hospitalist Progress Note      PCP: No primary care provider on file. Date of Admission: 9/10/2020    Chief Complaint:   Chief Complaint   Patient presents with    Shortness of Breath     HX of asthma. Short of breath for around 1 month. Patient states she just refilled her inhaler today. Hospital Course:  53 y.o. female with past medical history of factor V Leiden deficiency on chronic warfarin, history of blood clots, hyperlipidemia, arthritis, asthma who presents to [facility] with worsening above symptoms of fatigue, intermittent shortness of breath both at rest and on exertion, and extensive of abdominal and leg swelling. In the ED patient was significantly tachycardic, showing some combination of sinus tachycardia versus SVT, uncertain if may be underlying atrial fibrillation with rapid ventricular rate. Labs showed signs of infection as well as elevated proBNP. CTA with contrast was done on the chest and demonstrated left and right lobe PEs in certain segments, active findings suggestive of early patchy pneumonia bilaterally, and moderate bilateral pleural effusions suggestive of heart failure/volume overload. PEA arrest shortly after admission. TTE with EF 30- 35%. Severe diffuse hypokinesis with severely reduced rt ventricular function, severe tricuspid regurgitation and mitral regurgitation, grade 3 diastolic dysfunction. Found to have MRSA PNA and bacteremia. Developed shock liver and coagulopathy which are improving. Concern for seizure like activity, initial EEG negative but repeat with possible status, added vimpat to keppra and repeat EEG improved. Acute to subacute infarcts on MRI. Developed a fib RVR 9/16 that has not responded to cardioversion (x5), on amio and dilt IV. Neurologic status improved and she was extubated on 9/19. Subjective:      Pt actually more engaged today. Tolerating PEG feeding well.                Medications:  Reviewed    Infusion Medications    heparin (Porcine) 8.3 mL/hr (09/27/20 0739)    dextrose       Scheduled Medications    insulin lispro  0-12 Units Subcutaneous Q6H    warfarin (COUMADIN) daily dosing (placeholder)   Other RX Placeholder    metoprolol succinate  50 mg Oral Daily    diazePAM  5 mg Oral 2 times per day    amiodarone  200 mg Per NG tube BID    epoetin rajiv-epbx  5,000 Units Subcutaneous Once per day on Mon Wed Fri    lidocaine 1 % injection  5 mL Intradermal Once    sodium chloride flush  10 mL Intravenous 2 times per day    lacosamide  200 mg Oral BID    levETIRAcetam  1,000 mg Oral BID    lactobacillus  2 capsule Oral BID WC    pantoprazole  40 mg Intravenous Daily    And    sodium chloride (PF)  10 mL Intravenous Daily    vancomycin (VANCOCIN) intermittent dosing (placeholder)   Other RX Placeholder     PRN Meds: hydrOXYzine, acetaminophen **OR** acetaminophen **OR** acetaminophen, LORazepam, sodium chloride flush, heparin (porcine), ondansetron, promethazine, glucose, dextrose, glucagon (rDNA), dextrose      Intake/Output Summary (Last 24 hours) at 9/27/2020 1555  Last data filed at 9/27/2020 1203  Gross per 24 hour   Intake 1459.59 ml   Output 350 ml   Net 1109.59 ml       Physical Exam Performed:    /72   Pulse 88   Temp 98.3 °F (36.8 °C) (Oral)   Resp 16   Ht 5' 7\" (1.702 m)   Wt 191 lb 9.3 oz (86.9 kg)   LMP 12/05/2018 (Exact Date)   SpO2 95%   BMI 30.01 kg/m²     General appearance: No apparent distress, appears stated age  HEENT: unable to fully open her eyes, Pupils equal, round, and reactive to light. Conjunctivae/corneas clear. Neck: Supple, with full range of motion. Trachea midline. Respiratory:  Normal respiratory effort. Coarse breath sounds bilaterally   Cardiovascular: Regular rate and rhythm with normal S1/S2 without murmurs, rubs or gallops. Abdomen: Soft, non-tender, non-distended with normal bowel sounds. Musculoskeletal: No clubbing, cyanosis or edema bilaterally. Large R groin hematoma  Skin: Skin color, texture, turgor normal.  No rashes or lesions. Neurologic: Oriented to self, moves all extremities, mostly following commands  Psychiatric: oriented to place and person. Speech difficult to discern. Capillary Refill: Brisk,< 3 seconds   Peripheral Pulses: +2 palpable, equal bilaterally       Labs:   Recent Labs     09/25/20  0540 09/26/20  0450 09/27/20  0506   WBC 11.1* 9.4 8.6   HGB 7.2* 7.3* 7.4*   HCT 22.8* 23.2* 23.6*    393 353     Recent Labs     09/25/20  0540 09/26/20  0450 09/27/20  0506    140 135*   K 3.9 4.2 4.2    106 99   CO2 24 20* 24   BUN 33* 40* 24*   CREATININE 2.0* 2.7* 2.4*   CALCIUM 9.0 9.1 9.0   PHOS 3.9 5.6* 3.7     Recent Labs     09/25/20  0540 09/26/20  0450 09/27/20  0506   AST 41* 34 44*   ALT 54* 40 34   BILIDIR 0.3 0.3 0.3   BILITOT 0.5 0.5 0.4   ALKPHOS 130* 130* 134*     Recent Labs     09/25/20  0540 09/27/20  0506   INR 0.99 1.03     No results for input(s): Emerald Faulkner in the last 72 hours. Urinalysis:      Lab Results   Component Value Date    NITRU Negative 09/10/2020    WBCUA 3-5 09/10/2020    BACTERIA 2+ 09/10/2020    RBCUA 21-50 09/10/2020    BLOODU MODERATE 09/10/2020    SPECGRAV >1.030 09/10/2020    GLUCOSEU Negative 09/10/2020       Radiology:  XR CHEST PORTABLE   Final Result   Nasogastric tube terminates in the mid stomach. Fluoroscopy modified barium swallow with video   Final Result   Dysphagia was noted as described above. Penetration and aspiration was   demonstrated with nectar consistency. This elicited a cough. Deep silent   penetration was demonstrated with honey thick consistency and pudding   consistency. Please see separate speech pathology report for full discussion of findings   and recommendations.          VL DUP LOWER EXTREMITY ARTERIES RIGHT   Final Result      XR CHEST PORTABLE   Final Result   Suspect mild congestive failure with pleural effusion questioned and vessels are borderline congested. VL Extremity Venous Bilateral   Final Result      VL DUP LOWER EXTREMITY ARTERIES BILATERAL   Final Result      XR CHEST PORTABLE   Final Result   Endotracheal tube has tip approximately 1.3 cm proximal to the rahat and   should be retracted approximately 2 cm. Findings are again suggestive of pulmonary edema with small pleural   effusions. Pneumonia is not excluded in the appropriate clinical setting. Findings were called by the radiology call center. CT HEAD WO CONTRAST   Final Result   Motion limited study. No convincing evidence for acute intracranial   abnormality identified within the limitations of motion artifact. If there is persistent clinical suspicion for intracranial abnormality,   repeat study should be considered. XR CHEST PORTABLE   Final Result   Cardiomegaly, pulmonary vascular congestion, and bilateral pleural effusions. Endotracheal tube terminates approximately 2 cm above the rahat. CT CHEST PULMONARY EMBOLISM W CONTRAST   Final Result   Addendum 1 of 1   ADDENDUM:   Findings were discussed with Dr. Lennox Sou at 10:49 pm on 9/10/2020.          Final            Assessment/Plan:    Active Hospital Problems    Diagnosis    Acute on chronic systolic heart failure (HCC) [I50.23]    Paroxysmal atrial fibrillation (HCC) [I48.0]    Essential hypertension [I10]    Cardiogenic shock (HCC) [R57.0]    Staphylococcal pneumonia (HCC) [J15.20]    Pleural effusion, bilateral [J90]    Elevated lactic acid level [R79.89]    Bacteremia due to methicillin susceptible Staphylococcus aureus (MSSA) [R78.81]    Pneumonia due to methicillin susceptible Staphylococcus aureus (MSSA) (HCC) [J15.211]    Anemia [D64.9]    Acute renal failure ([address] [N17.9]    Bilateral pleural effusion [J90]    Mitral valve insufficiency [I34.0]    Tricuspid valve insufficiency [I07.1]    Class 2 obesity due to excess calories with body mass index (BMI) of 39.0 to 39.9 in adult [E66.09, Z68.39]    Pneumonia due to organism [J18.9]    Bacteremia [R78.81]    Cardiac arrest ([address] [I46.9]    Seizures ([address] [R56.9]    Acute respiratory failure with hypoxia ([address] [J96.01]    MORELIA (acute kidney injury) ([address] [G29.7]    Metabolic acidosis [Y82.0]    Congestive heart failure (HCC) [I50.9]    Pulmonary hypertension (HCC) [I27.20]    Multifocal pneumonia [J18.9]    Septic shock (HCC) [A41.9, R65.21]    Elevated LFTs [R94.5]    Thrombocytopenia ([address] [D69.6]    Coagulopathy ([address] [D68.9]    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) ([address] [D65]    Acute encephalopathy [G93.40]    Pulmonary embolus (HCC) [I26.99]    Lactic acidosis [E87.2]    CAP (community acquired pneumonia) [J18.9]    Acute CHF (congestive heart failure) (HCC) [I50.9]    Bilateral pulmonary embolism (HCC) [I26.99]    Factor V Leiden mutation ([address] [D68.51]         Shock, septic vs cardiogenic - resolved. Severe mitral and tricuspid regurgitation on TTE.  moderate pulmonary HTN,   large bilateral pleural effusions and hepatic congestion on CT. MRSA bacteremia.   - ID involved. - cardiology following      MRSA bacteremia, MRSA PNA  - ID following - presently on vancomycin  - TTE, LEVY without vegetations       Acute CVA  MRI with scattered acute to subacute infarcts, likely embolic  - neuro signed off  - workup for endocarditis LEVY negative      Cardiomyopathy, acute on chronic systolic HF  EF 30- 49%. Severe diffuse hypokinesis with severely reduced rt ventricular function, severe tricuspid regurgitation and mitral regurgitation, grade 3 diastolic dysfunction. No prior TTE.   - cardiology following  - RHC/LHC when able      S/p PEA arrest, acute hypoxic resp failure  Intubated 9/10 shortly after admission.   -Mental status improved enough for SBP 9/19-- extubated 9/19 to 2L - did well onwards.        Seizure like activity with ongoing myoclonic jerking movements - improved. Prior to PEA arrest, episodic rhythmic twitching and myoclonus. Initial EEG negative  - keppra 1000 mg BID  - MRI brain as above  - repeat EEG 9/16 with concern for seizures, possible status  - added vimpat  - repeat EEG 9/17 improved with addition of vimpat, partner discussed with neuro and can defer cvEEG for now  - Neurologic status significantly improved  - no repeat seizures but ongoing myoclonus -   - added valium with good effect. A fib RVR  Cardioverted 9/16, started on amio drip without adequate control, added dilt. Multiple attempts to cardiovert all failed  - cardiology following  - continue to monitor  - added digoxin  - tolerating heparin gtt   - in sinus on the monitor today   - amio transitioned to PO formulation via NG.    - started on warfarin. R groin hematoma - stable  After removing femoral line. Arterial duplex with normal flow, good pulses  - monitor  - back on heparin drip        Bilateral PE  Hx/of DVT/PE, on coumadin chronically - albeit not sure if she was taking this at all.   - heparin drip  - Hx of factor V Leiden - bridging to warfarin      Oliguric/anuric MORELIA  Suboptimal response to Lasix. -CRRT started, switched to HD  -Checking ZENON (neg), ANCA, complement (c3 low), antiphospholipid antibodies  -Nephrology following  - UOP improved  - clinically hypovolemic to euvolemic now - increased free water bolus with tube feed. Coagulapathy  Hx Factor V Leiden on coumadin. - hematology notes non-adherence to coumadin in the past.   - Heme-onc were following.   - presently on heparin gtt bridging to warfarin. Obesity  Body mass index is 30.01 kg/m². Counseled on effects of weight on overall health and chronic medical conditions and discussed weight loss. Dysphagia -   Failed MBS 9/23 pretty much with all consistencies. S/p PEG 9/25 - started tube feeding 9/26 - tolerating well.          DVT Prophylaxis: heparin gtt  Diet: DIET TUBE FEED CONTINUOUS/CYCLIC NPO; Renal Formula; Gastrostomy; Continuous; 20; 55; Exceptions are: Ice Chips  Code Status: Full Code    PT/OT Eval Status: deferred    Dispo - will need to be therapeutic on warfarin - then stop heparin gtt and discharge to ECF.        Get Lebron MD

## 2020-09-27 NOTE — PROGRESS NOTES
Clinical Pharmacy Note  Heparin Dosing       Lab Results   Component Value Date    APTT 65.7 09/27/2020     Lab Results   Component Value Date    HGB 7.4 09/27/2020    HCT 23.6 09/27/2020     09/27/2020    INR 1.03 09/27/2020       Current Infusion Rate: 8.3 mL/hr    Plan:  Rate: continue at 8.3 mL/hr  Next aPTT: 0600 9/28/20    Pharmacy will continue to monitor and adjust based on aPTT results.   Julius Coyle, PharmD

## 2020-09-27 NOTE — PLAN OF CARE
Problem: Pain:  Goal: Pain level will decrease  Description: Pain level will decrease  9/27/2020 1035 by Holly Mayfield RN  Outcome: Ongoing  9/27/2020 0236 by Sebastian Gastelum RN  Outcome: Ongoing  Goal: Control of acute pain  Description: Control of acute pain  9/27/2020 1035 by Holly Mayfield RN  Outcome: Ongoing  9/27/2020 0236 by Sebastian Gastelum RN  Outcome: Ongoing  Goal: Control of chronic pain  Description: Control of chronic pain  9/27/2020 1035 by Holly Mayfield RN  Outcome: Ongoing  9/27/2020 0236 by Sebastian Gastelum RN  Outcome: Ongoing     Problem: Falls - Risk of:  Goal: Will remain free from falls  Description: Will remain free from falls  9/27/2020 1035 by Holly Mayfield RN  Outcome: Ongoing  9/27/2020 0236 by Sebastian Gastelum RN  Outcome: Ongoing  Goal: Absence of physical injury  Description: Absence of physical injury  9/27/2020 1035 by Holly Mayfield RN  Outcome: Ongoing  9/27/2020 0236 by Sebastian Gastelum RN  Outcome: Ongoing     Problem: Skin Integrity:  Goal: Will show no infection signs and symptoms  Description: Will show no infection signs and symptoms  9/27/2020 1035 by Holly Mayfield RN  Outcome: Ongoing  9/27/2020 0236 by Sebastian Gastelum RN  Outcome: Ongoing  Goal: Absence of new skin breakdown  Description: Absence of new skin breakdown  9/27/2020 1035 by Holly Mayfield RN  Outcome: Ongoing  9/27/2020 0236 by Sebastian Gastelum RN  Outcome: Ongoing     Problem: Nutrition  Goal: Optimal nutrition therapy  9/27/2020 1035 by Holly Mayfield RN  Outcome: Ongoing  9/27/2020 0236 by Sebastian Gastelum RN  Outcome: Ongoing     Problem: OXYGENATION/RESPIRATORY FUNCTION  Goal: Patient will maintain patent airway  9/27/2020 1035 by Holly Mayfield RN  Outcome: Ongoing  9/27/2020 0236 by Sebastian Gastelum RN  Outcome: Ongoing  Goal: Patient will achieve/maintain normal respiratory rate/effort  Description: Respiratory rate and effort will be within normal limits for the patient  9/27/2020 1035 by Rosalinda Maurice RN  Outcome: Ongoing  9/27/2020 0236 by Ozzy Mead RN  Outcome: Ongoing     Problem: HEMODYNAMIC STATUS  Goal: Patient has stable vital signs and fluid balance  9/27/2020 1035 by Rosalinda Maurice RN  Outcome: Ongoing  9/27/2020 0236 by Ozzy Mead RN  Outcome: Ongoing     Problem: FLUID AND ELECTROLYTE IMBALANCE  Goal: Fluid and electrolyte balance are achieved/maintained  9/27/2020 1035 by Rosalinda Maurice RN  Outcome: Ongoing  9/27/2020 0236 by Ozzy Mead RN  Outcome: Ongoing     Problem: Injury - Risk of, Physical Injury:  Goal: Will remain free from falls  Description: Will remain free from falls  9/27/2020 1035 by Rosalinda Maurice RN  Outcome: Ongoing  9/27/2020 0236 by Ozzy Mead RN  Outcome: Ongoing  Goal: Absence of physical injury  Description: Absence of physical injury  9/27/2020 1035 by Rosalinda Maurice RN  Outcome: Ongoing  9/27/2020 0236 by Ozzy Mead RN  Outcome: Ongoing     Problem: Mood - Altered:  Goal: Mood stable  Description: Mood stable  9/27/2020 1035 by Rosalinda Maurice RN  Outcome: Ongoing  9/27/2020 0236 by Ozzy Mead RN  Outcome: Ongoing     Problem: Psychomotor Activity - Altered:  Goal: Absence of psychomotor disturbance signs and symptoms  Description: Absence of psychomotor disturbance signs and symptoms  9/27/2020 1035 by Rosalinda Maurice RN  Outcome: Ongoing  9/27/2020 0236 by Ozzy Mead RN  Outcome: Ongoing     Problem: Sensory Perception - Impaired:  Goal: Demonstrations of improved sensory functioning will increase  Description: Demonstrations of improved sensory functioning will increase  9/27/2020 1035 by Rosalinda Maurice RN  Outcome: Ongoing  9/27/2020 0236 by Ozzy Mead RN  Outcome: Ongoing  Goal: Demonstrates accurate environmental perceptions  Description: Demonstrates accurate environmental perceptions  9/27/2020 1035 by Rosalinda Maurice RN  Outcome:

## 2020-09-27 NOTE — PROGRESS NOTES
NEPHROLOGY PROGRESS NOTE    Patient: Cassidy Patiño MRN: [id number]     YOB: 1970  Age: 53 y.o. Sex: female    Unit: [address] Room/Bed: I3S-8843/5262-01 Location: [address] [place] [address] 12     Admitting Physician: Hugo Casanova    Primary Care Physician: No primary care provider on file. LOS: 17 days       Reason for evaluation:   [place]      SUBJECTIVE:      The patient was seen and examined. Notes and labs reviewed. There were no complications over night. HPI:  Breathing comfortably. No CP.  ROS:  In bed. No fever. [facility]:  medications reviewed. OBJECTIVE:   /67   Pulse 81   Temp 97.5 °F (36.4 °C) (Oral)   Resp 16   Ht 5' 7\" (1.702 m)   Wt 191 lb 9.3 oz (86.9 kg)   LMP 12/05/2018 (Exact Date)   SpO2 95%   BMI 30.01 kg/m²     Intake and Output:      Intake/Output Summary (Last 24 hours) at 9/27/2020 1458  Last data filed at 9/27/2020 1203  Gross per 24 hour   Intake 1459.59 ml   Output 350 ml   Net 1109.59 ml       Exam:   CONSTITUTIONAL/PSYCHIATRY: Resting in bed  RESPIRATORY:  CTA bilaterally  CARDIOVASCULAR: RRR  GASTROINTESTINAL: soft BS PEG in place  + Hahn in place  EXTREMITIES:  No edema  SKIN: Warm and dry. R IJ Vasc cath in place. LABS:   RFP:   Recent Labs     09/25/20  0540 09/26/20  0450 09/27/20  0506    140 135*   K 3.9 4.2 4.2    106 99   CO2 24 20* 24   BUN 33* 40* 24*   CREATININE 2.0* 2.7* 2.4*   CALCIUM 9.0 9.1 9.0   MG 2.00 2.10 2.00   PHOS 3.9 5.6* 3.7   GFRAA 32* 23* 26*      Liver panel:  Recent Labs     09/25/20  0540 09/26/20  0450 09/27/20  0506   AST 41* 34 44*   ALT 54* 40 34       CBC:   Recent Labs     09/25/20  0540 09/26/20  0450 09/27/20  0506   WBC 11.1* 9.4 8.6   HGB 7.2* 7.3* 7.4*   HCT 22.8* 23.2* 23.6*   MCV 87.1 86.7 86.5    393 353         ASSESSMENT and PLAN:     1. MORELIA  - likely secondary to ischemia ATI.  She had additional exposure to radiocontrast   - ZENON is neg, ANCA neg, C3 is low

## 2020-09-28 LAB
ALBUMIN SERPL-MCNC: 3.1 G/DL (ref 3.4–5)
ALP BLD-CCNC: 139 U/L (ref 40–129)
ALT SERPL-CCNC: 27 U/L (ref 10–40)
ANION GAP SERPL CALCULATED.3IONS-SCNC: 14 MMOL/L (ref 3–16)
APTT: 59.8 SEC (ref 24.2–36.2)
AST SERPL-CCNC: 35 U/L (ref 15–37)
BASOPHILS ABSOLUTE: 0.1 K/UL (ref 0–0.2)
BASOPHILS RELATIVE PERCENT: 1.9 %
BILIRUB SERPL-MCNC: 0.4 MG/DL (ref 0–1)
BILIRUBIN DIRECT: <0.2 MG/DL (ref 0–0.3)
BILIRUBIN, INDIRECT: ABNORMAL MG/DL (ref 0–1)
BUN BLDV-MCNC: 38 MG/DL (ref 7–20)
CALCIUM SERPL-MCNC: 9.8 MG/DL (ref 8.3–10.6)
CHLORIDE BLD-SCNC: 101 MMOL/L (ref 99–110)
CO2: 23 MMOL/L (ref 21–32)
CREAT SERPL-MCNC: 3.4 MG/DL (ref 0.6–1.1)
EOSINOPHILS ABSOLUTE: 0.3 K/UL (ref 0–0.6)
EOSINOPHILS RELATIVE PERCENT: 3.4 %
GFR AFRICAN AMERICAN: 17
GFR NON-AFRICAN AMERICAN: 14
GLUCOSE BLD-MCNC: 117 MG/DL (ref 70–99)
GLUCOSE BLD-MCNC: 121 MG/DL (ref 70–99)
GLUCOSE BLD-MCNC: 124 MG/DL (ref 70–99)
GLUCOSE BLD-MCNC: 142 MG/DL (ref 70–99)
GLUCOSE BLD-MCNC: 97 MG/DL (ref 70–99)
HCT VFR BLD CALC: 25.1 % (ref 36–48)
HEMOGLOBIN: 7.9 G/DL (ref 12–16)
INR BLD: 1.08 (ref 0.86–1.14)
LYMPHOCYTES ABSOLUTE: 1.7 K/UL (ref 1–5.1)
LYMPHOCYTES RELATIVE PERCENT: 22 %
MAGNESIUM: 2.1 MG/DL (ref 1.8–2.4)
MCH RBC QN AUTO: 27 PG (ref 26–34)
MCHC RBC AUTO-ENTMCNC: 31.4 G/DL (ref 31–36)
MCV RBC AUTO: 85.8 FL (ref 80–100)
MONOCYTES ABSOLUTE: 0.7 K/UL (ref 0–1.3)
MONOCYTES RELATIVE PERCENT: 9.2 %
NEUTROPHILS ABSOLUTE: 4.8 K/UL (ref 1.7–7.7)
NEUTROPHILS RELATIVE PERCENT: 63.5 %
PDW BLD-RTO: 22.6 % (ref 12.4–15.4)
PERFORMED ON: ABNORMAL
PERFORMED ON: NORMAL
PHOSPHORUS: 4.6 MG/DL (ref 2.5–4.9)
PLATELET # BLD: 379 K/UL (ref 135–450)
PMV BLD AUTO: 8.4 FL (ref 5–10.5)
POTASSIUM SERPL-SCNC: 4 MMOL/L (ref 3.5–5.1)
PROTHROMBIN TIME: 12.5 SEC (ref 10–13.2)
RBC # BLD: 2.92 M/UL (ref 4–5.2)
SODIUM BLD-SCNC: 138 MMOL/L (ref 136–145)
TOTAL PROTEIN: 6.5 G/DL (ref 6.4–8.2)
WBC # BLD: 7.6 K/UL (ref 4–11)

## 2020-09-28 PROCEDURE — 2580000003 HC RX 258: Performed by: INTERNAL MEDICINE

## 2020-09-28 PROCEDURE — 6370000000 HC RX 637 (ALT 250 FOR IP): Performed by: INTERNAL MEDICINE

## 2020-09-28 PROCEDURE — 84100 ASSAY OF PHOSPHORUS: CPT

## 2020-09-28 PROCEDURE — 80048 BASIC METABOLIC PNL TOTAL CA: CPT

## 2020-09-28 PROCEDURE — 80076 HEPATIC FUNCTION PANEL: CPT

## 2020-09-28 PROCEDURE — 6360000002 HC RX W HCPCS: Performed by: INTERNAL MEDICINE

## 2020-09-28 PROCEDURE — 6370000000 HC RX 637 (ALT 250 FOR IP): Performed by: STUDENT IN AN ORGANIZED HEALTH CARE EDUCATION/TRAINING PROGRAM

## 2020-09-28 PROCEDURE — 1200000000 HC SEMI PRIVATE

## 2020-09-28 PROCEDURE — 85025 COMPLETE CBC W/AUTO DIFF WBC: CPT

## 2020-09-28 PROCEDURE — C9113 INJ PANTOPRAZOLE SODIUM, VIA: HCPCS | Performed by: INTERNAL MEDICINE

## 2020-09-28 PROCEDURE — 2500000003 HC RX 250 WO HCPCS: Performed by: INTERNAL MEDICINE

## 2020-09-28 PROCEDURE — 83735 ASSAY OF MAGNESIUM: CPT

## 2020-09-28 PROCEDURE — 99232 SBSQ HOSP IP/OBS MODERATE 35: CPT | Performed by: NURSE PRACTITIONER

## 2020-09-28 PROCEDURE — 85610 PROTHROMBIN TIME: CPT

## 2020-09-28 PROCEDURE — 92526 ORAL FUNCTION THERAPY: CPT

## 2020-09-28 PROCEDURE — 97129 THER IVNTJ 1ST 15 MIN: CPT

## 2020-09-28 PROCEDURE — 85730 THROMBOPLASTIN TIME PARTIAL: CPT

## 2020-09-28 RX ORDER — WARFARIN SODIUM 5 MG/1
10 TABLET ORAL
Status: DISCONTINUED | OUTPATIENT
Start: 2020-09-28 | End: 2020-09-28

## 2020-09-28 RX ORDER — WARFARIN SODIUM 5 MG/1
10 TABLET ORAL
Status: COMPLETED | OUTPATIENT
Start: 2020-09-29 | End: 2020-09-29

## 2020-09-28 RX ORDER — POLYETHYLENE GLYCOL 3350 17 G/17G
17 POWDER, FOR SOLUTION ORAL DAILY PRN
Status: DISCONTINUED | OUTPATIENT
Start: 2020-09-28 | End: 2020-10-04 | Stop reason: HOSPADM

## 2020-09-28 RX ADMIN — Medication 10 ML: at 11:19

## 2020-09-28 RX ADMIN — DIAZEPAM 5 MG: 5 TABLET ORAL at 10:59

## 2020-09-28 RX ADMIN — Medication 2 CAPSULE: at 10:59

## 2020-09-28 RX ADMIN — LEVETIRACETAM 1000 MG: 500 SOLUTION ORAL at 21:22

## 2020-09-28 RX ADMIN — HYDROXYZINE HYDROCHLORIDE 10 MG: 10 TABLET, FILM COATED ORAL at 10:59

## 2020-09-28 RX ADMIN — HYDROXYZINE HYDROCHLORIDE 10 MG: 10 TABLET, FILM COATED ORAL at 17:51

## 2020-09-28 RX ADMIN — AMIODARONE HYDROCHLORIDE 200 MG: 200 TABLET ORAL at 21:14

## 2020-09-28 RX ADMIN — Medication 2 CAPSULE: at 17:51

## 2020-09-28 RX ADMIN — SODIUM CHLORIDE, PRESERVATIVE FREE 10 ML: 5 INJECTION INTRAVENOUS at 11:00

## 2020-09-28 RX ADMIN — SODIUM CHLORIDE, PRESERVATIVE FREE 10 ML: 5 INJECTION INTRAVENOUS at 16:20

## 2020-09-28 RX ADMIN — ACETAMINOPHEN ORAL SOLUTION 650 MG: 650 SOLUTION ORAL at 10:59

## 2020-09-28 RX ADMIN — LACOSAMIDE 200 MG: 100 TABLET, FILM COATED ORAL at 10:59

## 2020-09-28 RX ADMIN — SODIUM CHLORIDE, PRESERVATIVE FREE 10 ML: 5 INJECTION INTRAVENOUS at 21:15

## 2020-09-28 RX ADMIN — AMIODARONE HYDROCHLORIDE 200 MG: 200 TABLET ORAL at 10:59

## 2020-09-28 RX ADMIN — DIAZEPAM 5 MG: 5 TABLET ORAL at 21:14

## 2020-09-28 RX ADMIN — HEPARIN SODIUM 8.3 ML/HR: 10000 INJECTION, SOLUTION INTRAVENOUS at 16:19

## 2020-09-28 RX ADMIN — LEVETIRACETAM 1000 MG: 500 SOLUTION ORAL at 11:00

## 2020-09-28 RX ADMIN — PANTOPRAZOLE SODIUM 40 MG: 40 INJECTION, POWDER, FOR SOLUTION INTRAVENOUS at 10:59

## 2020-09-28 RX ADMIN — METOPROLOL SUCCINATE 50 MG: 50 TABLET, EXTENDED RELEASE ORAL at 11:00

## 2020-09-28 RX ADMIN — LACOSAMIDE 200 MG: 100 TABLET, FILM COATED ORAL at 21:15

## 2020-09-28 RX ADMIN — INSULIN LISPRO 2 UNITS: 100 INJECTION, SOLUTION INTRAVENOUS; SUBCUTANEOUS at 21:15

## 2020-09-28 RX ADMIN — ACETAMINOPHEN ORAL SOLUTION 650 MG: 650 SOLUTION ORAL at 17:50

## 2020-09-28 RX ADMIN — HYDROXYZINE HYDROCHLORIDE 10 MG: 10 TABLET, FILM COATED ORAL at 04:47

## 2020-09-28 ASSESSMENT — PAIN SCALES - GENERAL
PAINLEVEL_OUTOF10: 2
PAINLEVEL_OUTOF10: 2
PAINLEVEL_OUTOF10: 0
PAINLEVEL_OUTOF10: 2
PAINLEVEL_OUTOF10: 2

## 2020-09-28 ASSESSMENT — PAIN DESCRIPTION - PROGRESSION
CLINICAL_PROGRESSION: NOT CHANGED

## 2020-09-28 ASSESSMENT — PAIN DESCRIPTION - FREQUENCY: FREQUENCY: CONTINUOUS

## 2020-09-28 ASSESSMENT — PAIN DESCRIPTION - DESCRIPTORS: DESCRIPTORS: ACHING

## 2020-09-28 ASSESSMENT — PAIN DESCRIPTION - PAIN TYPE: TYPE: ACUTE PAIN

## 2020-09-28 ASSESSMENT — PAIN DESCRIPTION - ORIENTATION: ORIENTATION: LOWER

## 2020-09-28 ASSESSMENT — PAIN DESCRIPTION - DIRECTION: RADIATING_TOWARDS: NO

## 2020-09-28 ASSESSMENT — PAIN DESCRIPTION - ONSET: ONSET: ON-GOING

## 2020-09-28 ASSESSMENT — PAIN - FUNCTIONAL ASSESSMENT: PAIN_FUNCTIONAL_ASSESSMENT: ACTIVITIES ARE NOT PREVENTED

## 2020-09-28 ASSESSMENT — PAIN DESCRIPTION - LOCATION: LOCATION: ABDOMEN

## 2020-09-28 NOTE — CARE COORDINATION
OSMAR placed phone call to Mr. Halford Closs after speaking with dialysis today. He stated that 5pm MWF would be a better time for them as outpatient. OSMAR informed that [facility] was full so we need two more facilities to reach out to. Mr. Halford Closs stated that he wants to speak to his mother in law but we have permission to speak with her regarding a plan after lunch. Respectfully submitted,    EUGENIA Sellers  [facility]   [phone]    Electronically signed by EUGENIA Ruiz on 9/28/2020 at 12:37 PM

## 2020-09-28 NOTE — PROGRESS NOTES
INPATIENT CONSULTATION:    IDENTIFYING DATA/REASON FOR CONSULTATION   PATIENT:  Vicenta Henriquez  MRN:  [id number]  ADMIT DATE: 9/10/2020  TIME OF EVALUATION: 9/28/2020 1:53 PM  HOSPITAL STAY:   LOS: 18 days   CONSULTING PHYSICIAN: Martin Gonzalez MD   REASON FOR CONSULTATION: perianal mucosal erosion after rectal tube removal 2 days ago    Subjective:    Patient has a PMH of factor V Leiden deficiency, DVT, hyperlipidemia. She presented to [facility] [place] 9/10/2020 with shortness of breath. The patient has had a prolonged hospital course notable for PEA arrest with ROSC, bilateral PEs, pneumonia, MORELIA resulting in renal replacement therapy, shock liver, atrial fibrillation with RVR, subacute cerebral infarcts and oropharyngeal dysphagia s/p PEG tube placement 9/25/20 by Dr. Ana Rosa Newell. GI asked to see patient again by wound care nurse regarding perianal mucosal erosion after rectal tube removal 2 days ago. Patient denies anal pain or rectal pain. She is having loose bowel movements. No blood in her stools. Patient denies incontinence. Patient denies rectal  pain with bowel movements.       MEDICATIONS   SCHEDULED:  warfarin, 10 mg, Once  insulin lispro, 0-12 Units, Q6H  warfarin (COUMADIN) daily dosing (placeholder), , RX Placeholder  metoprolol succinate, 50 mg, Daily  diazePAM, 5 mg, 2 times per day  amiodarone, 200 mg, BID  epoetin rajiv-epbx, 5,000 Units, Once per day on Mon Wed Fri  lidocaine 1 % injection, 5 mL, Once  sodium chloride flush, 10 mL, 2 times per day  lacosamide, 200 mg, BID  levETIRAcetam, 1,000 mg, BID  lactobacillus, 2 capsule, BID WC  pantoprazole, 40 mg, Daily    And  sodium chloride (PF), 10 mL, Daily  vancomycin (VANCOCIN) intermittent dosing (placeholder), , RX Placeholder      FLUIDS/DRIPS:     heparin (Porcine) 8.3 mL/hr (09/27/20 0739)    dextrose       PRNs: hydrOXYzine, 10 mg, 4x Daily PRN  acetaminophen, 650 mg, Q6H PRN    Or  acetaminophen, 650 mg, Q6H PRN Or  acetaminophen, 650 mg, Q6H PRN  LORazepam, 2 mg, Q2H PRN  sodium chloride flush, 10 mL, PRN  heparin (porcine), 1,000 Units, PRN  ondansetron, 4 mg, Q4H PRN  promethazine, 25 mg, Q4H PRN  glucose, 15 g, PRN  dextrose, 12.5 g, PRN  glucagon (rDNA), 1 mg, PRN  dextrose, 100 mL/hr, PRN      ALLERGIES:    Allergies   Allergen Reactions    Ibuprofen          PHYSICAL EXAM   VITALS:  /72   Pulse 95   Temp 98.1 °F (36.7 °C) (Oral)   Resp 18   Ht 5' 7\" (1.702 m)   Wt 193 lb 2 oz (87.6 kg)   LMP 2018 (Exact Date)   SpO2 96%   BMI 30.25 kg/m²   TEMPERATURE:  Current - Temp: 98.1 °F (36.7 °C); Max - Temp  Av.1 °F (36.7 °C)  Min: 97.8 °F (36.6 °C)  Max: 98.3 °F (36.8 °C)    Physical Exam:  General appearance: alert, cooperative, no distress  Eyes: Anicteric  Head: Normocephalic, without obvious abnormality  Lungs: clear to auscultation bilaterally, Normal Effort  Heart: regular rate and rhythm, normal S1 and S2, no murmurs or rubs  Abdomen: soft, non-distended, non-tender. Bowel sounds normal. No masses,  no organomegaly. Rectal Exam:  1 cm perianal wound at 6 o'clock location, clean/dry, no bleeding or purulent drainage  Extremities: atraumatic, no cyanosis or edema  Skin: warm and dry, no jaundice  Neuro: Grossly intact, A&OX3    LABS AND IMAGING   Laboratory   Recent Labs     20  0450 20  0506 20  0640   WBC 9.4 8.6 7.6   HGB 7.3* 7.4* 7.9*   HCT 23.2* 23.6* 25.1*   MCV 86.7 86.5 85.8    353 379     Recent Labs     20  0450 20  0506 20  0640    135* 138   K 4.2 4.2 4.0    99 101   CO2 20* 24 23   PHOS 5.6* 3.7 4.6   BUN 40* 24* 38*   CREATININE 2.7* 2.4* 3.4*     Recent Labs     20  0450 20  0506 20  0640   AST 34 44* 35   ALT 40 34 27   BILIDIR 0.3 0.3 <0.2   BILITOT 0.5 0.4 0.4   ALKPHOS 130* 134* 139*     No results for input(s): LIPASE, AMYLASE in the last 72 hours.   Recent Labs     20  0506 20  8058 PROTIME 12.0 12.5   INR 1.03 1.08         Imaging  XR CHEST PORTABLE   Final Result   Nasogastric tube terminates in the mid stomach. Fluoroscopy modified barium swallow with video   Final Result   Dysphagia was noted as described above. Penetration and aspiration was   demonstrated with nectar consistency. This elicited a cough. Deep silent   penetration was demonstrated with honey thick consistency and pudding   consistency. Please see separate speech pathology report for full discussion of findings   and recommendations. VL DUP LOWER EXTREMITY ARTERIES RIGHT   Final Result      XR CHEST PORTABLE   Final Result   Suspect mild congestive failure with pleural effusion questioned greater on   the left. Endotracheal tube is approximately 3 cm above the rahat         MRI BRAIN WO CONTRAST   Final Result   Addendum 1 of 1   ADDENDUM:   Results were reported to NP Shawna Nicholas at 3:26 p.m. on September 15,    2020. Final      XR CHEST PORTABLE   Final Result   Decreased layering effusions with improved basilar aeration. Appropriate   life support device positioning. XR CHEST PORTABLE   Final Result   Low-lying endotracheal tube. Recommend retracting 1-2 cm. No substantial change in layering effusions and basilar opacities. The findings were sent to the Radiology Results [address] at 3:53   am on 9/14/2020to be communicated to a licensed caregiver. XR CHEST PORTABLE   Final Result   1. Interval placement of an NG tube, likely within the stomach. 2. Stable appropriate positions of endotracheal tube and right-sided dialysis   catheter. 3. No significant change in appearance of small to moderate bilateral pleural   effusions and bibasilar airspace disease. XR CHEST PORTABLE   Final Result   Supportive lines project in stable positions. Bibasilar opacities, suggestive of layered effusions and atelectasis or   airspace disease. XR CHEST PORTABLE   Final Result   1. Suspected mild to moderate bilateral pleural effusions with adjacent   bibasilar atelectasis and/or airspace disease. 2. Mild to moderate cardiomegaly. 3. Recommend retraction of endotracheal tube 0.7 cm. XR CHEST PORTABLE   Final Result   Right jugular venous catheter and endotracheal tube project in normal   positions. No pneumothorax. Bilateral airspace disease and possible layered pleural effusions, consistent   with pulmonary edema and atelectasis. Pneumonia is a differential   possibility. XR CHEST PORTABLE   Final Result   Satisfactory position of the endotracheal tube 3 cm above the rahat. The   heart is upper normal size and vessels are borderline congested. VL Extremity Venous Bilateral   Final Result      VL DUP LOWER EXTREMITY ARTERIES BILATERAL   Final Result      XR CHEST PORTABLE   Final Result   Endotracheal tube has tip approximately 1.3 cm proximal to the rahat and   should be retracted approximately 2 cm. Findings are again suggestive of pulmonary edema with small pleural   effusions. Pneumonia is not excluded in the appropriate clinical setting. Findings were called by the radiology call center. CT HEAD WO CONTRAST   Final Result   Motion limited study. No convincing evidence for acute intracranial   abnormality identified within the limitations of motion artifact. If there is persistent clinical suspicion for intracranial abnormality,   repeat study should be considered. XR CHEST PORTABLE   Final Result   Cardiomegaly, pulmonary vascular congestion, and bilateral pleural effusions. Endotracheal tube terminates approximately 2 cm above the rahat. CT CHEST PULMONARY EMBOLISM W CONTRAST   Final Result   Addendum 1 of 1   ADDENDUM:   Findings were discussed with Dr. Sunil Amezquita at 10:49 pm on 9/10/2020.          Final          Endoscopy      ASSESSMENT AND RECOMMENDATIONS Vadim Grant is a 53 y.o. female with PMH of factor V Leiden deficiency, DVT, hyperlipidemia who presented [facility] [place] 9/10/2020 with shortness of breath. The patient has had a prolonged hospital course notable for PEA arrest with ROSC, bilateral PEs, pneumonia, MORELIA resulting in renal replacement therapy, shock liver, atrial fibrillation with RVR, subacute cerebral infarcts and oropharyngeal dysphagia s/p PEG tube placement 9/25/20. GI re-consulted regarding ann-marie-anal wound    1. Perianal wound  -rectal exam shows ~1 cm perianal wound at 6 o'clock location tracking into the rectum. Wound clean/dry, no bleeding or purulent drainage. Digital rectal exam not performed to avoid further mucosal irritation or injury. -pt denies anal or rectal pain. She is having soft/loose bowel movement. No pain with bowel movements  -suspect wound 2/2 prolonged rectal tube placement    -wound care following    2. Oropharyngeal dysphagia s/p PEG tube placement 9/25  -Pt receiving TFings at goal rate. Tolerating. PEG site clean, dry with no drainage, erythema, or bleeding. Outer bumper flushed to skin, rotates easily    3. Acute CVA  4. Bilateral PEs  -on Coumadin and heparin bridge    5. Seizure like activity  6. Afib  7. MORELIA on HD    RECOMMENDATIONS:    Continue wound care to perianal area with moisture barrier application q shift per WOCN recommendations. Avoid rectal tube placement to allow mucosal healing  Will want pt to continue to have soft, easy passable stools. Will order Miralax as needed to avoid constipation. Will discuss case with Dr. Vandy Burkitt. Please await his input and recommendation. If you have any questions or need any further information, please feel free to contact us [phone]. Thank you for allowing us to participate in the care of Vadim Grant. The note was completed using Dragon voice recognition transcription.  Every effort was made to ensure accuracy; however, inadvertent transcription errors may be present despite my best efforts to edit errors.     Jennifer ROTH

## 2020-09-28 NOTE — CARE COORDINATION
SW received phone call from [facility] asking us to ask MD if we can change her from IJ to midline for IVAB. SW left message and will call them back once we have a response. Respectfully submitted,    Linda SALINAS, KESHA-S  [facility]   [phone]    Electronically signed by EUGENIA Campbell on 9/28/2020 at 4:13 PM

## 2020-09-28 NOTE — PROGRESS NOTES
FRANCHESCA rosales served NP for FYI informing them of patient having pauses on monitor overnight. Pt is asymptomatic and sleeping. Pt had a pause of 1.9 then 2.35. Not PEA. WCTM.

## 2020-09-28 NOTE — PROGRESS NOTES
Pt did not c/o pain throughout shift. Pt has fall precautions in place, bed alarm on, non skid socks in place bed locked in lowest position and arm band on. Call light w/in reach and 3/4 side rails up. Skin assessed and maintained throughout shift. Peg tube feeding @ goal and pt tolerating appropriately. No c/o SOB or abdominal pain. Pt c/o itching pt was given hydroxyzine and lotion provided on irritated areas. Pt midline dressing changed. Pt heparin gtt @8.3 therapeutic.

## 2020-09-28 NOTE — CARE COORDINATION
SW received a phone call from [facility] and they inform that they are not in network with [facility] and the patient is also too young to be placed at their facility. SW thanked them for considering this referral and will martinez them in the system accordingly.      Respectfully submitted,     EUGENIA Vegas  [facility]   [phone]    Electronically signed by EUGENIA Esqueda on 9/28/2020 at 2:46 PM

## 2020-09-28 NOTE — PLAN OF CARE
Problem: Pain:  Goal: Pain level will decrease  Description: Pain level will decrease  9/27/2020 2342 by Hui Klein RN  Outcome: Ongoing  9/27/2020 1035 by Marge Roque RN  Outcome: Ongoing  Goal: Control of acute pain  Description: Control of acute pain  9/27/2020 2342 by Hui Klein RN  Outcome: Ongoing  9/27/2020 1035 by Marge Roque RN  Outcome: Ongoing  Goal: Control of chronic pain  Description: Control of chronic pain  9/27/2020 2342 by Hui Klein RN  Outcome: Ongoing  9/27/2020 1035 by Marge Roque RN  Outcome: Ongoing     Problem: Falls - Risk of:  Goal: Will remain free from falls  Description: Will remain free from falls  9/27/2020 2342 by Hui Klein RN  Outcome: Ongoing  9/27/2020 1035 by Marge Roque RN  Outcome: Ongoing  Goal: Absence of physical injury  Description: Absence of physical injury  9/27/2020 2342 by Hui Klein RN  Outcome: Ongoing  9/27/2020 1035 by Marge Roque RN  Outcome: Ongoing     Problem: Skin Integrity:  Goal: Will show no infection signs and symptoms  Description: Will show no infection signs and symptoms  9/27/2020 2342 by Hui Klein RN  Outcome: Ongoing  9/27/2020 1035 by Magre Roque RN  Outcome: Ongoing  Goal: Absence of new skin breakdown  Description: Absence of new skin breakdown  9/27/2020 2342 by Hui Klein RN  Outcome: Ongoing  9/27/2020 1035 by Marge Roque RN  Outcome: Ongoing     Problem: Nutrition  Goal: Optimal nutrition therapy  9/27/2020 2342 by Hui Klein RN  Outcome: Ongoing  9/27/2020 1035 by Marge Roque RN  Outcome: Ongoing     Problem: OXYGENATION/RESPIRATORY FUNCTION  Goal: Patient will maintain patent airway  9/27/2020 2342 by Hui Klein RN  Outcome: Ongoing  9/27/2020 1035 by Marge Roque RN  Outcome: Ongoing  Goal: Patient will achieve/maintain normal respiratory rate/effort  Description: Respiratory rate and effort will be within normal limits for the patient  9/27/2020 2342 by Sharron Paget, RN  Outcome: Ongoing  9/27/2020 1035 by Vicenta Chiang RN  Outcome: Ongoing     Problem: HEMODYNAMIC STATUS  Goal: Patient has stable vital signs and fluid balance  9/27/2020 2342 by Sharron Paget, RN  Outcome: Ongoing  9/27/2020 1035 by Vicenta Chiang RN  Outcome: Ongoing     Problem: FLUID AND ELECTROLYTE IMBALANCE  Goal: Fluid and electrolyte balance are achieved/maintained  9/27/2020 2342 by Sharron Paget, RN  Outcome: Ongoing  9/27/2020 1035 by Vicenta Chiang RN  Outcome: Ongoing     Problem: ACTIVITY INTOLERANCE/IMPAIRED MOBILITY  Goal: Mobility/activity is maintained at optimum level for patient  9/27/2020 2342 by Sharron Paget, RN  Outcome: Ongoing  9/27/2020 1035 by Vicenta Chiang RN  Outcome: Ongoing     Problem: Injury - Risk of, Physical Injury:  Goal: Will remain free from falls  Description: Will remain free from falls  9/27/2020 2342 by Sharron Paget, RN  Outcome: Ongoing  9/27/2020 1035 by Vicenta Chiang RN  Outcome: Ongoing  Goal: Absence of physical injury  Description: Absence of physical injury  9/27/2020 2342 by Sharron Paget, RN  Outcome: Ongoing  9/27/2020 1035 by Vicenta Chiang RN  Outcome: Ongoing     Problem: Mood - Altered:  Goal: Mood stable  Description: Mood stable  9/27/2020 2342 by Sharron Paget, RN  Outcome: Ongoing  9/27/2020 1035 by Vicenta Chiang RN  Outcome: Ongoing     Problem: Psychomotor Activity - Altered:  Goal: Absence of psychomotor disturbance signs and symptoms  Description: Absence of psychomotor disturbance signs and symptoms  9/27/2020 2342 by Sharron Paget, RN  Outcome: Ongoing  9/27/2020 1035 by Vicenta Chiang RN  Outcome: Ongoing     Problem: Sensory Perception - Impaired:  Goal: Demonstrations of improved sensory functioning will increase  Description: Demonstrations of improved sensory functioning will increase  9/27/2020 2342 by Sharron Paget, RN  Outcome: Ongoing  9/27/2020 1035 by Lord Lester RN  Outcome: Ongoing  Goal: Demonstrates accurate environmental perceptions  Description: Demonstrates accurate environmental perceptions  9/27/2020 2342 by Keith Green RN  Outcome: Ongoing  9/27/2020 1035 by Lord Lester RN  Outcome: Ongoing     Problem: Sleep Pattern Disturbance:  Goal: Appears well-rested  Description: Appears well-rested  9/27/2020 2342 by Keith Green RN  Outcome: Ongoing  9/27/2020 1035 by Lord Lester RN  Outcome: Ongoing

## 2020-09-28 NOTE — CARE COORDINATION
SW received a phone call from [facility] and they inform that they are not in network with [facility]. SW thanked them for considering this referral and will martinez them in the system accordingly. Respectfully submitted,    Linda SALINAS, KESHA-S  [facility]   [phone]    Electronically signed by EUGENIA Jean on 9/28/2020 at 2:15 PM

## 2020-09-28 NOTE — PROGRESS NOTES
Hospitalist Progress Note  9/28/2020 9:49 AM    PCP: No primary care provider on file.     [phone]     Date of Admission: 9/10/2020                                                                                                                     HOSPITAL COURSE    Patient demographics:  The patient  Delmis Love is a 53 y.o. female     Significant past medical history:   Patient Active Problem List   Diagnosis    Factor V Leiden mutation ([facility]    Pulmonary embolus ([address]    Lactic acidosis    CAP (community acquired pneumonia)    Acute CHF (congestive heart failure) ([address]    Bilateral pulmonary embolism (HCC)    Cardiac arrest ([address]    Seizures ([address]    Acute respiratory failure with hypoxia ([address]    MORELIA (acute kidney injury) ([address]    Metabolic acidosis    Congestive heart failure (HCC)    Pulmonary hypertension ([address]    Multifocal pneumonia    Septic shock (HCC)    Elevated LFTs    Thrombocytopenia (HCC)    Coagulopathy (HCC)    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) ([address]    Acute encephalopathy    Pneumonia due to organism    Bacteremia    Pleural effusion, bilateral    Elevated lactic acid level    Bacteremia due to methicillin susceptible Staphylococcus aureus (MSSA)    Pneumonia due to methicillin susceptible Staphylococcus aureus (MSSA) (HCC)    Anemia    Acute renal failure (HCC)    Bilateral pleural effusion    Mitral valve insufficiency    Tricuspid valve insufficiency    Class 2 obesity due to excess calories with body mass index (BMI) of 39.0 to 39.9 in adult    Cardiogenic shock (HCC)    Staphylococcal pneumonia (HCC)    Acute on chronic systolic heart failure (HCC)    Paroxysmal atrial fibrillation (HCC)    Essential hypertension         Presenting symptoms:  Fatigue, shortness of breath, extensive abdominal and leg swelling    Diagnostic workup:      CONSULTS DURING ADMISSION :   IP CONSULT TO HEART FAILURE NURSE/COORDINATOR  IP CONSULT TO DIETITIAN  IP CONSULT TO NEUROLOGY  IP CONSULT TO CARDIOLOGY  IP CONSULT TO NEPHROLOGY  PHARMACY TO DOSE VANCOMYCIN  IP CONSULT TO HEM/ONC  IP CONSULT TO INFECTIOUS DISEASES  IP CONSULT TO CARDIAC REHAB  IP CONSULT TO DIETITIAN  IP CONSULT TO SOCIAL WORK  IP CONSULT TO GI  PHARMACY TO DOSE WARFARIN      Patient was diagnosed with:        Treatment while inpatient:   pt presented to [facility] with worsening above symptoms of fatigue, intermittent shortness of breath both at rest and on exertion, and extensive of abdominal and leg swelling.                                                                                       ----------------------------------------------------------      SUBJECTIVE COMPLAINTS- follow up for  shortness of breath    Diet: DIET TUBE FEED CONTINUOUS/CYCLIC NPO; Renal Formula; Gastrostomy; Continuous; 20; 55; Exceptions are: Ice Chips      OBJECTIVE:   Patient Active Problem List   Diagnosis    Factor V Leiden mutation ([facility]    Pulmonary embolus ([address]    Lactic acidosis    CAP (community acquired pneumonia)    Acute CHF (congestive heart failure) ([address]    Bilateral pulmonary embolism ([address]    Cardiac arrest ([address]    Seizures ([address]    Acute respiratory failure with hypoxia ([address]    MORELIA (acute kidney injury) ([address]    Metabolic acidosis    Congestive heart failure (HCC)    Pulmonary hypertension ([address]    Multifocal pneumonia    Septic shock (HCC)    Elevated LFTs    Thrombocytopenia (HCC)    Coagulopathy (HCC)    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) ([address]    Acute encephalopathy    Pneumonia due to organism    Bacteremia    Pleural effusion, bilateral    Elevated lactic acid level    Bacteremia due to methicillin susceptible Staphylococcus aureus (MSSA)    Pneumonia due to methicillin susceptible Staphylococcus aureus (MSSA) (HCC)    Anemia    Acute renal failure (HCC)    Bilateral pleural effusion    Mitral valve insufficiency    Tricuspid valve insufficiency    Class 2 obesity due to excess calories with body mass index (BMI) of 39.0 to 39.9 in adult    Cardiogenic shock (HCC)    Staphylococcal pneumonia (HCC)    Acute on chronic systolic heart failure (HCC)    Paroxysmal atrial fibrillation (HCC)    Essential hypertension       Allergies  Ibuprofen    Medications    Scheduled Meds:   warfarin  10 mg Oral Once    insulin lispro  0-12 Units Subcutaneous Q6H    warfarin (COUMADIN) daily dosing (placeholder)   Other RX Placeholder    metoprolol succinate  50 mg Oral Daily    diazePAM  5 mg Oral 2 times per day    amiodarone  200 mg Per NG tube BID    epoetin rajiv-epbx  5,000 Units Subcutaneous Once per day on Mon Wed Fri    lidocaine 1 % injection  5 mL Intradermal Once    sodium chloride flush  10 mL Intravenous 2 times per day    lacosamide  200 mg Oral BID    levETIRAcetam  1,000 mg Oral BID    lactobacillus  2 capsule Oral BID WC    pantoprazole  40 mg Intravenous Daily    And    sodium chloride (PF)  10 mL Intravenous Daily    vancomycin (VANCOCIN) intermittent dosing (placeholder)   Other RX Placeholder     Continuous Infusions:   heparin (Porcine) 8.3 mL/hr (09/27/20 0739)    dextrose       PRN Meds:  hydrOXYzine, acetaminophen **OR** acetaminophen **OR** acetaminophen, LORazepam, sodium chloride flush, heparin (porcine), ondansetron, promethazine, glucose, dextrose, glucagon (rDNA), dextrose    Vitals   Vitals /wt   Patient Vitals for the past 8 hrs:   BP Temp Temp src Pulse Resp SpO2 Weight   09/28/20 0434 (!) 153/75 98.1 °F (36.7 °C) Oral 88 18 93 % 193 lb 2 oz (87.6 kg)        72HR INTAKE/OUTPUT:      Intake/Output Summary (Last 24 hours) at 9/28/2020 0949  Last data filed at 9/28/2020 0450  Gross per 24 hour   Intake 5813.2 ml   Output 850 ml   Net 4963.2 ml       Exam:    Gen:   Alert and oriented ×3   Eyes: PERRL. No sclera icterus. No conjunctival injection. ENT: No discharge.  Pharynx clear. External appearance of ears and nose normal.  Neck: Trachea midline. No obvious mass. Resp: No accessory muscle use. No crackles. No wheezes. No rhonchi. CV: Regular rate. Regular rhythm. No murmur or rub. No edema. GI: Non-tender. Non-distended. No hernia. Skin: Warm, dry, normal texture and turgor. Lymph: No cervical LAD. No supraclavicular LAD. M/S: / Ext. No cyanosis. No clubbing. No joint deformity. Neuro: CN 2-12 are intact,  no neurologic deficits noted. PT/INR:   Recent Labs     09/27/20  0506 09/28/20  0640   PROTIME 12.0 12.5   INR 1.03 1.08     APTT:   Recent Labs     09/26/20  1045 09/26/20  1656 09/27/20  0506 09/28/20  0640   APTT 58.4* 63.3* 65.7* 59.8*       CBC:   Recent Labs     09/26/20  0450 09/27/20  0506 09/28/20  0640   WBC 9.4 8.6 7.6   HGB 7.3* 7.4* 7.9*   HCT 23.2* 23.6* 25.1*   MCV 86.7 86.5 85.8    353 379       BMP:   Recent Labs     09/26/20  0450 09/27/20  0506 09/28/20  0640    135* 138   K 4.2 4.2 4.0    99 101   CO2 20* 24 23   PHOS 5.6* 3.7 4.6   BUN 40* 24* 38*   CREATININE 2.7* 2.4* 3.4*       LIVER PROFILE:   Recent Labs     09/26/20  0450 09/27/20  0506 09/28/20  0640   ALKPHOS 130* 134* 139*   AST 34 44* 35   ALT 40 34 27   BILIDIR 0.3 0.3 <0.2   BILITOT 0.5 0.4 0.4     No results for input(s): AMYLASE in the last 72 hours. No results for input(s): LIPASE in the last 72 hours. UA:No results for input(s): NITRITE, LABCAST, WBCUA, RBCUA, MUCUS in the last 72 hours. TROPONIN: No results for input(s): Pari Jannetks in the last 72 hours. Lab Results   Component Value Date/Time    URRFLXCULT Not Indicated 09/10/2020 10:07 PM       No results for input(s): TSHREFLEX in the last 72 hours. No components found for: [id number]  POC GLUCOSE:    Recent Labs     09/27/20  0759 09/27/20  1154 09/27/20  1639 09/27/20  2132 09/28/20  0226   POCGLU 119* 113* 111* 140* 121*     No results for input(s): LABA1C in the last 72 hours.    Lab Results   Component Value Date    LABA1C 5.6 12/17/2018         ASSESSMENT AND PLAN     Shock, septic vs cardiogenic - resolved. Severe mitral and tricuspid regurgitation on TTE.  moderate pulmonary HTN,   large bilateral pleural effusions and hepatic congestion on CT. MRSA bacteremia. Cardiology  And  Id  following        MRSA bacteremia, MRSA PNA  Continue  On  vancomycin   TTE, LEVY without vegetations         Acute CVA  MRI with scattered acute to subacute infarcts, likely embolic   neuro signed off   workup for endocarditis LEVY negative        Cardiomyopathy, acute on chronic systolic HF  EF 30- 41%. Severe diffuse hypokinesis with severely reduced rt ventricular function, severe tricuspid regurgitation and mitral regurgitation, grade 3 diastolic dysfunction. No prior TTE. RHC/LHC when able        S/p PEA arrest, acute hypoxic resp failure  Intubated 9/10 shortly after admission. Mental status improved enough for SBP 9/19-- extubated 9/19 to 2L - did well onwards.         Seizure like activity with ongoing myoclonic jerking movements - improved. Prior to PEA arrest, episodic rhythmic twitching and myoclonus. Initial EEG negative   keppra 1000 mg BID   MRI brain as above   repeat EEG 9/16 with concern for seizures, possible status   added vimpat   repeat EEG 9/17 improved with addition of vimpat, partner discussed with neuro and can defer cvEEG for now   Neurologic status significantly improved   no repeat seizures but ongoing myoclonus -    added valium with good effect.          A fib RVR  Cardioverted 9/16, started on amio drip without adequate control, added dilt. Multiple attempts to cardiovert all failed   added digoxin   tolerating heparin gtt -transition  To  coumadin   in sinus on the monitor today    amio transitioned to PO formulation via NG.       R groin hematoma - stable  After removing femoral line.    Arterial duplex with normal flow, good pulses   monitor           Bilateral PE  Hx/of DVT/PE, on coumadin chronically - albeit not sure if she was taking this at all.    heparin drip   Hx of factor V Leiden - bridging to warfarin        MORELIA   likely  Due  To  Ischemic  ati  Also  Had  Exposure  To  radiocontrast  Remains  On  hemodialysis  Urine  Output  Only  600  Ml  Per  24  hours       Coagulapathy  Hx Factor V Leiden on coumadin.    hematology notes non-adherence to coumadin in the past.    Heme-onc were following. presently on heparin gtt bridging to warfarin.         Obesity  Body mass index is 30.01 kg/m². Counseled on effects of weight on overall health and chronic medical conditions and discussed weight loss.         Dysphagia -   Failed MBS 9/23 pretty much with all consistencies. S/p PEG 9/25 - started tube feeding 9/26 - tolerating well.        Code Status: Full Code        Dispo -cc        The patient and / or the family were informed of the results of any tests, a time was given to answer questions, a plan was proposed and they agreed with plan. Diego Macedo MD    This note was transcribed using [id number] [employer]. Please disregard any translational errors.

## 2020-09-28 NOTE — PROGRESS NOTES
[address]   Daily Progress Note      Admit Date:  9/10/2020    CC: fatige    HPI:   Ranjana Garcia is a 53 y.o. female with PMH HLD, Factor V Leiden (on chronic warfarin), HLP, asthma. Admitted to [facility] with bilateral PE, MRSA bacteremia and PNA, new AF (multiple cardioversions) and SHF. PEA arrest on 9/10 with rescusitation. Developed ARF initially on CRRT now HD. + seizures with multiple infarcts/acute CVA on MRI. + cardiogenic shock. +shock liver and coagulopathy. GI consulted for PEG placement. Difficult to understand speech. Limited ROS. She is on RA and denies SOB or CP. Review of Systems:   General: Denies fever, chills, fatigue, weakness  Skin: Denies skin changes, rash, itching, lesions.   HEENT: Denies headache, dizziness, vision changes, nosebleeds, sore throat, nasal drainage  RESP: Denies cough, sputum, dyspnea, wheeze, snoring  CARD: Denies palpitations,  murmur  GI:Denies nausea, vomiting, heartburn, loss of appetite, change in bowels  : Denies frequency, pain, incontinence, polyuria  VASC: Denies claudication, leg cramps, clots  MUSC/SKEL: Denies pain, stiffness, arthritis  PSYCH: Denies anxiety, depression, stress  NEURO: Denies numbness, tingling, weakness,change in mood or memory  HEME: Denies abn bruising, bleeding, anemia  ENDO: Denies intolerance to heat, cold, excessive thirst or hunger, hx thyroid disease    Objective:   BP (!) 153/75   Pulse 88   Temp 98.1 °F (36.7 °C) (Oral)   Resp 18   Ht 5' 7\" (1.702 m)   Wt 193 lb 2 oz (87.6 kg)   LMP 12/05/2018 (Exact Date)   SpO2 93%   BMI 30.25 kg/m²        Intake/Output Summary (Last 24 hours) at 9/28/2020 0837  Last data filed at 9/28/2020 0450  Gross per 24 hour   Intake 5813.2 ml   Output 850 ml   Net 4963.2 ml       WEIGHT:Admit Weight: 251 lb 12.3 oz (114.2 kg)         Today  Weight: 193 lb 2 oz (87.6 kg)   DRY WEIGHT:  Wt Readings from Last 3 Encounters:   09/28/20 193 lb 2 oz (87.6 kg)   12/17/18 229 lb (103.9 kg)   12/14/18 227 lb 1.2 oz (103 kg)       Physical Exam:  GEN: Appears ill, no acute distress  SKIN: Pink, warm, dry. Nails without clubbing. HEENT: PERRLA. Normocephalic, atraumatic. Neck supple. No adenopathy. LUNG: AP diameter normal. Diminished bilateral. Respiratory effort normal.  HEART: S1S2 A/R. No JVD. No carotid bruit. No murmur, rub or gallop. ABD: Soft, nontender. +BS X 4 quads. No hepatomegaly. EXT: Radial and pedal pulses 2+ and symmetric. Without varicosities. No edema. MUSCSKEL: Good ROM X4 extremities. No deformity. NEURO: A/O X3. Calm and cooperative. Telemetry: NSR with PAC,     Medications:    warfarin  10 mg Oral Once    insulin lispro  0-12 Units Subcutaneous Q6H    warfarin (COUMADIN) daily dosing (placeholder)   Other RX Placeholder    metoprolol succinate  50 mg Oral Daily    diazePAM  5 mg Oral 2 times per day    amiodarone  200 mg Per NG tube BID    epoetin rajiv-epbx  5,000 Units Subcutaneous Once per day on Mon Wed Fri    lidocaine 1 % injection  5 mL Intradermal Once    sodium chloride flush  10 mL Intravenous 2 times per day    lacosamide  200 mg Oral BID    levETIRAcetam  1,000 mg Oral BID    lactobacillus  2 capsule Oral BID WC    pantoprazole  40 mg Intravenous Daily    And    sodium chloride (PF)  10 mL Intravenous Daily    vancomycin (VANCOCIN) intermittent dosing (placeholder)   Other RX Placeholder      heparin (Porcine) 8.3 mL/hr (09/27/20 0739)    dextrose       hydrOXYzine, acetaminophen **OR** acetaminophen **OR** acetaminophen, LORazepam, sodium chloride flush, heparin (porcine), ondansetron, promethazine, glucose, dextrose, glucagon (rDNA), dextrose    Lab Data: I have reviewed all labs below today.    CBC:   Recent Labs     09/26/20  0450 09/27/20  0506 09/28/20  0640   WBC 9.4 8.6 7.6   HGB 7.3* 7.4* 7.9*   HCT 23.2* 23.6* 25.1*   MCV 86.7 86.5 85.8    353 379     BMP:   Recent Labs     09/26/20  0450 09/27/20  0504 09/28/20  0640    135* 138   K 4.2 4.2 4.0    99 101   CO2 20* 24 23   PHOS 5.6* 3.7 4.6   BUN 40* 24* 38*   CREATININE 2.7* 2.4* 3.4*     GLUCOSE:   Recent Labs     09/26/20  0450 09/27/20  0506 09/28/20  0640   GLUCOSE 91 122* 124*     LIVER PROFILE:   Lab Results   Component Value Date    AST 35 09/28/2020    ALT 27 09/28/2020    LIPASE 45.0 03/16/2016    LABALBU 3.1 09/28/2020    BILIDIR <0.2 09/28/2020    BILITOT 0.4 09/28/2020    ALKPHOS 139 09/28/2020     PT/INR:   Lab Results   Component Value Date    PROTIME 12.5 09/28/2020    PROTIME 67.9 01/29/2016    INR 1.08 09/28/2020     APTT:   Lab Results   Component Value Date    APTT 59.8 09/28/2020     Pro-BNP:    Lab Results   Component Value Date    PROBNP 4,851 09/10/2020     Parameters:   > 450 pg/mL under age 48  > 900 pg/mL ages 54-65  > 1800 pg/mL over age 76    ENZYMES:  Lab Results   Component Value Date    ZIVYVKG 314 09/11/2020    TROPONINI 0.02 09/11/2020     FASTING LIPID PANEL:  Lab Results   Component Value Date    CHOL 154 08/10/2017    HDL 52 08/10/2017    HDL 35 02/02/2012    LDLCALC 86 08/10/2017    TRIG 79 08/10/2017       Diagnostics:    EKG: Atrial flutter with variable A-V blockConfirmed by Isabelle MONET MD ([id number]) on 9/16/2020 2:34:04 PM     ECHO:  Echo: 9/11/20  Ejection fraction is visually estimated to be 30%-35%. Severe tricuspid regurgitation. Moderate pulmonary HTN  There is severe diffuse hypokinesis. Diastolic filling parameters suggest grade III diastolic dysfunction. Moderate calcification of the posterior leaflet of the mitral valve. Mitral annular calcification is present. The mitral valve leaflets appear myxomatous. No evidence of mitral valve stenosis. Severe mitral regurgitation is present. Systolic flow reversal in pulmonary veins. The right ventricle is enlarged.   Right ventricular systolic function is moderate to severely reduced    LEVY: 9/16/20  Conclusions   Summary   Overall left ventricular

## 2020-09-28 NOTE — PROGRESS NOTES
NEPHROLOGY PROGRESS NOTE    Patient: Brynn Ramirez MRN: [id number]     YOB: 1970  Age: 53 y.o. Sex: female    [address]: [address] Room/Bed: [id number]-7427/5262-01 Location: [address] [place] [address] 12     Admitting Physician: Osvaldo Carballo    Primary Care Physician: No primary care provider on file. LOS: 18 days       Reason for evaluation:   [place]      SUBJECTIVE:      The patient was seen and examined. Notes and labs reviewed. There were no complications over night. HPI:  Breathing comfortably. No CP.  ROS:  In bed. No fever. [facility]:  medications reviewed. OBJECTIVE:   /75   Pulse 86   Temp 98.2 °F (36.8 °C) (Oral)   Resp 16   Ht 5' 7\" (1.702 m)   Wt 193 lb 2 oz (87.6 kg)   LMP 12/05/2018 (Exact Date)   SpO2 94%   BMI 30.25 kg/m²     Intake and Output:      Intake/Output Summary (Last 24 hours) at 9/28/2020 1633  Last data filed at 9/28/2020 0450  Gross per 24 hour   Intake 5013.2 ml   Output 700 ml   Net 4313.2 ml       Exam:   CONSTITUTIONAL/PSYCHIATRY: Resting in bed  RESPIRATORY:  CTA bilaterally  CARDIOVASCULAR: RRR  GASTROINTESTINAL: soft BS PEG in place  + Hanh in place  EXTREMITIES:  No edema  SKIN: Warm and dry. R IJ Vasc cath in place. LABS:   RFP:   Recent Labs     09/26/20  0450 09/27/20  0506 09/28/20  0640    135* 138   K 4.2 4.2 4.0    99 101   CO2 20* 24 23   BUN 40* 24* 38*   CREATININE 2.7* 2.4* 3.4*   CALCIUM 9.1 9.0 9.8   MG 2.10 2.00 2.10   PHOS 5.6* 3.7 4.6   GFRAA 23* 26* 17*      Liver panel:  Recent Labs     09/26/20  0450 09/27/20  0506 09/28/20  0640   AST 34 44* 35   ALT 40 34 27       CBC:   Recent Labs     09/26/20  0450 09/27/20  0506 09/28/20  0640   WBC 9.4 8.6 7.6   HGB 7.3* 7.4* 7.9*   HCT 23.2* 23.6* 25.1*   MCV 86.7 86.5 85.8    353 379         ASSESSMENT and PLAN:     1. MORELIA  - likely secondary to ischemia ATI.  She had additional exposure to radiocontrast   - ZNEON is neg, ANCA neg, C3 is low at 54  - Initiated on IHD on 9/11/20. Transitioned to CRRT on 9/12/20 which was stopped 9/15/20. Restarted HD 09/17/20  - monitoring for possible recovery UOP only 600 ml/24 hours Cr rebounding  - HD TTS. Monitor for recovery   - outpatient dialysis spot at [facility] MWF @ 5 PM  - will have [facility] placed tomorrow by IR    2. FEN  - hypophosphatemia   - resolved Po4 5.6 mg     3. S/P PEA arrest 9/11/20    4. Acute hypoxic respiratory failure:  - per pulmonary  - Extubated 9/19. on RA now     5. MRSA bacteremia:   - on Vancomycin with pharmacy dosing   - LEVY shows no vegetation    6. CSDHF  -resolved     7. Factor V leiden deficiency with PE/coagulopathy/DIC:   - per hematology  - on heparin gtt  - delayed coumadin until tomorrow for [facility] placement    8.  CVA  - MRI shows bilateral acute sub acute infarcts  - neurology following  - EEG apparently better with higher keppra dose    9. atrial fibrillation  - on cardizem gtt and amiodarone gtt  - not rate controlled   - per EP    10. anemia  - Hgb stable 7.4 gm stable  - on retacrit

## 2020-09-28 NOTE — PROGRESS NOTES
[facility]   Speech Therapy  Daily Dysphagia Treatment Note        Marleni Day  AGE: 53 y.o. GENDER: female  : 1970  [id number]  EPISODE DATE:  9/10/2020  Patient Active Problem List   Diagnosis    Factor [facility] Leiden mutation ([facility]    Pulmonary embolus (HCC)    Lactic acidosis    CAP (community acquired pneumonia)    Acute CHF (congestive heart failure) ([facility]    Bilateral pulmonary embolism (HCC)    Cardiac arrest ([facility]    Seizures (HCC)    Acute respiratory failure with hypoxia (HCC)    MORELIA (acute kidney injury) ([facility]    Metabolic acidosis    Congestive heart failure (HCC)    Pulmonary hypertension (HCC)    Multifocal pneumonia    Septic shock (HCC)    Elevated LFTs    Thrombocytopenia (HCC)    Coagulopathy (HCC)    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)    Acute encephalopathy    Pneumonia due to organism    Bacteremia    Pleural effusion, bilateral    Elevated lactic acid level    Bacteremia due to methicillin susceptible Staphylococcus aureus (MSSA)    Pneumonia due to methicillin susceptible Staphylococcus aureus (MSSA) (HCC)    Anemia    Acute renal failure (HCC)    Bilateral pleural effusion    Mitral valve insufficiency    Tricuspid valve insufficiency    Class 2 obesity due to excess calories with body mass index (BMI) of 39.0 to 39.9 in adult    Cardiogenic shock (HCC)    Staphylococcal pneumonia (HCC)    Acute on chronic systolic heart failure (HCC)    Paroxysmal atrial fibrillation (HCC)    Essential hypertension     Allergies   Allergen Reactions    Ibuprofen      Treatment Diagnosis: Dysphagia       Chart review:   History of Present Illness  Corinn Sever a 53y.o. year old female with past medical history significant for Factor V Leiden deficiency, HLD and asthma who initially presented to the hospital with SOB and leg swelling.  She was found to have bilateral PE, MRSA bacteremia and new atrial fibrillation. She had a PEA arrest on 9/10 and was resuscitated. She had acute renal failure requiring CRRT (now on HD), seizures with multiple infarcts seen on MRI and shock ?cardiogenic given EF of 30-35%. Last week she went back into atrial fibrillation with RVR and was started on an amiodarone drip. She was cardioverted a total of 7 times without prolonged conversion. She was on a Cardizem drip which has been weaned off. She remains on amiodarone drip at 0.5mg/min. She was given multiple doses of IV digoxin over the last couple days, yesterday 500mcg which did improve her HR some per her nurse. She was extubated 9/19.  9/21/2020 CSE recommend NPO  9/23/2020 MBS: Severe oral and severe pharyngeal phase dysphagia characterized by lingual pumping, reduced bolus control and formation, decreased AP transit, reduced BOT retraction, delayed weak palatal trigger, delayed swallow initiation, reduced anterior hyolaryngeal elevation, reduced pharyngeal peristalsis.   · Aspiration of nectar (tsp) with delayed cough but unable to fully clear  · Shallow penetration, progressing to deep penetration without full clearance of laryngeal vestibule as pt fatigues, with honey (tsp)  · Eventual deep penetration of puree without sensory response as pt fatigues; pt did elicit delayed cough upon command  · Premature bolus loss to valleculae with all trials  · Oral residue with all trials; variable vallecular and pharyngeal residue, more pronounced with heavier viscosities (ie, puree)  · Presence of NG tube does impact pharyngeal squeeze  Rapid fatigue with exacerbation of s/s as trials progress  Risk for aspiration increases with fatigue, with variable sensory response to penetrated/aspirated material  9/25/2020: PEG placed  Subjective:     Current diet  NPO  PEG tube    Comments regarding tolerating Current Diet:   Tolerating PEG feed per nsg; improving alertness and cognition    Objective:     Pain   Patient Currently in Pain: No    Cognitive/Behavior   Behavior/Cognition: Alert, Requires cueing, Cooperative    Presentations   Consistencies Administered: Dysphagia Pureed (Dysphagia I)    Positioning   Upright in bed    PO Trials:  · Thin Liquids NT  · Nectar thick liquids NT  · Honey Thick liquids tsp NT  · Puree 1/2 tsp x5: prolonged bolus transit; decreased bolus control and AP transit; decreased laryngeal elevation; delayed repeat swallows; weak vocal quality throughout; no reflexive cough or throat clear. Pt was cued for dry swallow and protective throat clear  · Soft food NT  · Regular food NT    Dysphagia Tx:   · Pt awake and alert. Provides simple but at times confused verbal responses. Variable vocal quality, primarily with low volume and dysphonic. · OMEX introduced and completed x5 each with moderate impairment:  · Lingual protrusion, exaggerated yawn, vocal adduction x3 sets, effortful swallow  · PO: applesauce x5 without immediate or delayed overt s/s of aspiration. Follows verbal cues for dry swallow and protective throat clear. Pt verbalizes fatigue when asked. Goals:   Dysphagia Goals:   Pt will tolerate skilled trials of puree 5/5 without overt s/s ongoing  Pt will complete lingual, tongue base, EMILY/PEX 5/5 with max cues ongoing  The pt will tolerate instrumental swallowing assessment when able to participate completed 9/23    Assessment:   Impressions:   Dysphagia Diagnosis: Moderate to severe oral stage dysphagia, Severe pharyngeal stage dysphagia   · reduced bolus control and formation, decreased AP transit, reduced BOT retraction, delayed weak palatal trigger, delayed swallow initiation, reduced anterior hyolaryngeal elevation, reduced pharyngeal peristalsis  · Cue dependent, but follows directions, for dry swallow and protective throat clear.   No immediate or delayed overt s/s of aspiration, but remains at risk for silent aspiration d/t rapid fatigue and weak vocal quality with concerns for reduced airway

## 2020-09-28 NOTE — PROGRESS NOTES
Provides: 1100 ml TV / 1980 kcals / 89 gms pro / 800 ml free water per day  ·  To help meet est protein needs in pt's on HD, recommend restarting Proteinex 1 bottle per feeding tube per day. Do not mix directly with TF. Anthropometric Measures:  · Height: 5' 7\" (170.2 cm)  · Current Body Weight: 193 lb (87.5 kg)   · Admission Body Weight: 252 lb (114.3 kg)    · Ideal Body Weight: 135 lbs; % Ideal Body Weight 143 %   · BMI: 30.2  · BMI Categories: Obese Class 1 (BMI 30.0-34. 9)       Nutrition Diagnosis:   · Inadequate oral intake related to biting/chewing (masticatory) difficulty as evidenced by nutrition support - enteral nutrition      Nutrition Interventions:   Food and/or Nutrient Delivery:  Continue Current Tube Feeding  Nutrition Education/Counseling:  No recommendation at this time   Coordination of Nutrition Care:  Continued Inpatient Monitoring    Goals:  tolerate most appropriate form of nutrition       Nutrition Monitoring and Evaluation:   Food/Nutrient Intake Outcomes:  Enteral Nutrition Intake/Tolerance, Diet Advancement/Tolerance  Physical Signs/Symptoms Outcomes:  Biochemical Data, Chewing or Swallowing, Constipation, Diarrhea, Nausea or Vomiting, Fluid Status or Edema, Weight, Skin, Nutrition Focused Physical Findings     Discharge Planning:     Too soon to determine     Electronically signed by Thad Morrell RD, LD on 9/28/20 at 4:58 PM EDT    Contact: [phone]

## 2020-09-28 NOTE — PROGRESS NOTES
Clinical Pharmacy Note  Heparin Dosing       Lab Results   Component Value Date    APTT 59.8 09/28/2020     Lab Results   Component Value Date    HGB 7.9 09/28/2020    HCT 25.1 09/28/2020     09/28/2020    INR 1.08 09/28/2020       Current Infusion Rate: 8.3 mL/hr    Plan:  Rate: continue at 8.3 mL/hr  Next aPTT: 0600 9/29/20    Pharmacy will continue to monitor and adjust based on aPTT results.   Gio Morales, [address] 9/28/2020 8:49 AM

## 2020-09-28 NOTE — CONSULTS
Clinical Pharmacy Note  Warfarin Consult    Rogelio Gonzalez is a 53 y.o. female receiving warfarin managed by pharmacy. Patient being bridged with heparin drip. Warfarin Indication: Factor V Leiden  Target INR range: 2-3   Dose prior to admission: 7.5mg daily except 3.75mg QMon &  Thursday    Current warfarin drug-drug interactions: Amiodarone (new)    Recent Labs     09/26/20  0450 09/27/20  0506 09/28/20  0640   HGB 7.3* 7.4* 7.9*   HCT 23.2* 23.6* 25.1*   INR  --  1.03 1.08       Assessment/Plan:  Dose missed yesterday  Warfarin 10 mg tonight. Daily PT/INR until stable within therapeutic range. If Amiodarone continued on discharge expect weekly dose to decrease. Thank you for the consult. Will continue to follow.   Ede Nguyễn RPh 9/28/2020 8:19 AM

## 2020-09-28 NOTE — CARE COORDINATION
[address] [id number] Continence Nurse  Consult Note       NAME:  Pop Tracey  MEDICAL RECORD NUMBER:  [id number]  AGE: 53 y.o. GENDER: female  : 1970  TODAY'S DATE:  2020    Subjective   Reason for WOCN Evaluation and Assessment: \"possible  device associated , rectal tub removed 2 days ago, anal spencter possible ulcer/ open  area. \" Pt has mucosal injury to rectal area      Pop Tracey is a 53 y.o. female referred by:   [] Physician  [x] Nursing  [] Other:     Wound Identification:  Wound Type: mucosal injury  Contributing Factors:  rectal tube    Wound History: noted yesterday, rectal tube removed 2 days ago  Current Wound Care Treatment:  BRADLY    Patient Goal of Care:  [x] Wound Healing  [] Odor Control  [] Palliative Care  [] Pain Control   [] Other:         PAST MEDICAL HISTORY        Diagnosis Date    Anticoagulant long-term use     Arthritis     Asthma     Baker's cyst     Depression     Factor V Leiden ([address]     Factor V Leiden ([address]     H/O blood clots     Heartburn     Hyperlipidemia     MRSA bacteremia 09/10/2020    Osteoarthritis        PAST SURGICAL HISTORY    Past Surgical History:   Procedure Laterality Date    GASTROSTOMY TUBE PLACEMENT N/A 2020    ESOPHAGOGASTRODUODENOSCOPY PERCUTANEOUS ENDOSCOPIC GASTROSTOMY TUBE PLACEMENT performed by Gene Wheeler MD at [address] [place] [address] HISTORY    Family History   Problem Relation Age of Onset    Heart Attack Father        SOCIAL HISTORY    Social History     Tobacco Use    Smoking status: Former Smoker     Packs/day: 0.50     Types: Cigarettes     Last attempt to quit: 2020     Years since quittin.0    Smokeless tobacco: Never Used   Substance Use Topics    Alcohol use: No     Alcohol/week: 0.0 standard drinks    Drug use: Yes     Frequency: 14.0 times per week     Types: Marijuana       ALLERGIES    Allergies   Allergen Reactions    Ibuprofen        MEDICATIONS    No current facility-administered medications on file prior to encounter.       Current Outpatient Medications on File Prior to Encounter   Medication Sig Dispense Refill    albuterol sulfate  (90 Base) MCG/ACT inhaler INHALE TWO PUFFS BY MOUTH EVERY 6 HOURS AS NEEDED FOR WHEEZING 18 g 0    vitamin D (ERGOCALCIFEROL) 1.25 MG (88303 UT) CAPS capsule TAKE ONE CAPSULE BY MOUTH ONCE WEEKLY 12 capsule 0    omeprazole (PRILOSEC) 20 MG delayed release capsule TAKE ONE CAPSULE BY MOUTH DAILY 90 capsule 6    warfarin (COUMADIN) 7.5 MG tablet TAKE ONE TABLET BY MOUTH DAILY EXCEPT ON MONDAY AND THURSDAY TAKE ONE-HALF TABLET BY MOUTH AS DIRECTED 21 tablet 5    atorvastatin (LIPITOR) 10 MG tablet TAKE ONE TABLET BY MOUTH DAILY 90 tablet 3    DULoxetine (CYMBALTA) 60 MG extended release capsule TAKE ONE CAPSULE BY MOUTH DAILY 30 capsule 11    BREO ELLIPTA 200-25 MCG/INH AEPB INHALE ONE DOSE BY MOUTH DAILY 1 each 11       Objective    /72   Pulse 95   Temp 98.1 °F (36.7 °C) (Oral)   Resp 18   Ht 5' 7\" (1.702 m)   Wt 193 lb 2 oz (87.6 kg)   LMP 12/05/2018 (Exact Date)   SpO2 96%   BMI 30.25 kg/m²     LABS:  WBC:    Lab Results   Component Value Date    WBC 7.6 09/28/2020     H/H:    Lab Results   Component Value Date    HGB 7.9 09/28/2020    HCT 25.1 09/28/2020     PTT:    Lab Results   Component Value Date    APTT 59.8 09/28/2020   [APTT}  PT/INR:    Lab Results   Component Value Date    PROTIME 12.5 09/28/2020    PROTIME 67.9 01/29/2016    INR 1.08 09/28/2020     HgBA1c:    Lab Results   Component Value Date    LABA1C 5.6 12/17/2018       Assessment   Cash Risk Score: Cash Scale Score: 13    Patient Active Problem List   Diagnosis Code    [facility] D68.51    Pulmonary embolus (HCC) I26.99    Lactic acidosis E87.2    CAP (community acquired pneumonia) J18.9    Acute CHF (congestive heart failure) (HCC) I50.9    Bilateral pulmonary embolism (HCC) I26.99    Cardiac arrest (HCC) I46.9    Seizures (HCC) R56.9  Acute respiratory failure with hypoxia ([facility]) J96.01    MORELIA (acute kidney injury) ([facility] K84.4    Metabolic acidosis I39.2    Congestive heart failure (HCC) I50.9    Pulmonary hypertension (HCC) I27.20    Multifocal pneumonia J18.9    Septic shock ([facility]) A41.9, R65.21    Elevated LFTs R94.5    Thrombocytopenia ([facility]) D69.6    Coagulopathy (HCC) D68.9    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) ([facility]) D65    Acute encephalopathy G93.40    Pneumonia due to organism J18.9    Bacteremia R78.81    Pleural effusion, bilateral J90    Elevated lactic acid level R79.89    Bacteremia due to methicillin susceptible Staphylococcus aureus (MSSA) R78.81    Pneumonia due to methicillin susceptible Staphylococcus aureus (MSSA) ([facility]) J15.211    Anemia D64.9    Acute renal failure ([facility]) N17.9    Bilateral pleural effusion J90    Mitral valve insufficiency I34.0    Tricuspid valve insufficiency I07.1    Class 2 obesity due to excess calories with body mass index (BMI) of 39.0 to 39.9 in adult E66.09, Z68.39    Cardiogenic shock ([facility]) R57.0    Staphylococcal pneumonia ([facility]) J15.20    Acute on chronic systolic heart failure ([facility]) I50.23    Paroxysmal atrial fibrillation ([facility]) I48.0    Essential hypertension I10       Measurements:        Wound 09/28/20 Rectum (Active)   Wound Image   09/28/20 1030   Wound Other 09/28/20 1030   Dressing/Treatment Moisture barrier 09/28/20 1030   Wound Cleansed Other (Comment) 09/28/20 1030   Wound Length (cm) 1 cm 09/28/20 1030   Wound Width (cm) 1.5 cm 09/28/20 1030   Wound Surface Area (cm^2) 1.5 cm^2 09/28/20 1030   Wound Assessment Pale;Pink 09/28/20 1030   Drainage Amount None 09/28/20 1030   Odor None 09/28/20 1030   Margins Defined edges 09/28/20 1030   [place]-wound Assessment Clean;Dry 09/28/20 1030   [place]%Wound Bed 100 09/28/20 1030   Number of days: 0      Pt seen. Awake, responsive. Has twitching, reported to be baseline.  Bilat heels intact. Does have mucosal erosion noted, unable to determine extent internally. Suspect may be from device. Right groin bruised, resolving hematoma. Response to treatment:  Well tolerated by patient.      Pain Assessment:  Severity:  0 / 10    Plan   Plan of Care: Wound 09/28/20 Rectum-Dressing/Treatment: Moisture barrier  -consider GI consult to eval extent of internal erosion    Specialty Bed Required : No  -pt is restless and moves around; Cash is 15  [] Low Air Loss   [] Pressure Redistribution  [] Fluid Immersion  [] Bariatric  [] Total Pressure Relief  [] Other:     Current Diet: DIET TUBE FEED CONTINUOUS/CYCLIC NPO; Renal Formula; Gastrostomy; Continuous; 20; 55; Exceptions are: Ice Chips  Dietician consult:  Yes    Discharge Plan:  Placement for patient upon discharge: skilled nursing    Patient appropriate for Outpatient [address]: No    Referrals:  [x]   [] [facility]  [] Supplies  [] Other    Patient/Caregiver Teaching:  Level of patient/caregiver understanding able to:   [] Indicates understanding       [x] Needs reinforcement  [] Unsuccessful      [] Verbal Understanding  [] Demonstrated understanding       [] No evidence of learning  [] Refused teaching         [] N/A       Electronically signed by YAS Sr on 9/28/2020 at 10:56 AM

## 2020-09-28 NOTE — CARE COORDINATION
OSMAR informed Magdiel Beckham ([phone]) regarding their request for vein mapping during hospital stay. OSMAR informed that Dr. Jt Girard called back and declined their request for vein mapping. Dr. Jt Girard stated that the patient can get it on an outpatient basis. OSMAR left a message for her today.      Patient will need precert for nursing home. Patient will need a HENS.      Respectfully submitted,     EUGENIA Crowe  [facility]   [phone]    Electronically signed by EUGENIA Jean on 9/28/2020 at 4:33 PM

## 2020-09-29 ENCOUNTER — APPOINTMENT (OUTPATIENT)
Dept: CT IMAGING | Age: 50
DRG: 130 | End: 2020-09-29
Payer: COMMERCIAL

## 2020-09-29 ENCOUNTER — APPOINTMENT (OUTPATIENT)
Dept: INTERVENTIONAL RADIOLOGY/VASCULAR | Age: 50
DRG: 130 | End: 2020-09-29
Payer: COMMERCIAL

## 2020-09-29 LAB
ALBUMIN SERPL-MCNC: 3.2 G/DL (ref 3.4–5)
ALP BLD-CCNC: 145 U/L (ref 40–129)
ALT SERPL-CCNC: 25 U/L (ref 10–40)
ANION GAP SERPL CALCULATED.3IONS-SCNC: 14 MMOL/L (ref 3–16)
APTT: 30 SEC (ref 24.2–36.2)
APTT: 65.3 SEC (ref 24.2–36.2)
AST SERPL-CCNC: 29 U/L (ref 15–37)
BASOPHILS ABSOLUTE: 0.1 K/UL (ref 0–0.2)
BASOPHILS RELATIVE PERCENT: 1.3 %
BILIRUB SERPL-MCNC: 0.5 MG/DL (ref 0–1)
BILIRUBIN DIRECT: <0.2 MG/DL (ref 0–0.3)
BILIRUBIN, INDIRECT: ABNORMAL MG/DL (ref 0–1)
BUN BLDV-MCNC: 47 MG/DL (ref 7–20)
CALCIUM SERPL-MCNC: 10.2 MG/DL (ref 8.3–10.6)
CHLORIDE BLD-SCNC: 102 MMOL/L (ref 99–110)
CO2: 24 MMOL/L (ref 21–32)
CREAT SERPL-MCNC: 3.6 MG/DL (ref 0.6–1.1)
EOSINOPHILS ABSOLUTE: 0.3 K/UL (ref 0–0.6)
EOSINOPHILS RELATIVE PERCENT: 4.1 %
GFR AFRICAN AMERICAN: 16
GFR NON-AFRICAN AMERICAN: 13
GLUCOSE BLD-MCNC: 108 MG/DL (ref 70–99)
GLUCOSE BLD-MCNC: 120 MG/DL (ref 70–99)
GLUCOSE BLD-MCNC: 120 MG/DL (ref 70–99)
GLUCOSE BLD-MCNC: 122 MG/DL (ref 70–99)
GLUCOSE BLD-MCNC: 130 MG/DL (ref 70–99)
HCT VFR BLD CALC: 25.6 % (ref 36–48)
HEMOGLOBIN: 8.1 G/DL (ref 12–16)
INR BLD: 1.02 (ref 0.86–1.14)
LYMPHOCYTES ABSOLUTE: 1.5 K/UL (ref 1–5.1)
LYMPHOCYTES RELATIVE PERCENT: 21.2 %
MAGNESIUM: 2.2 MG/DL (ref 1.8–2.4)
MCH RBC QN AUTO: 27.5 PG (ref 26–34)
MCHC RBC AUTO-ENTMCNC: 31.7 G/DL (ref 31–36)
MCV RBC AUTO: 86.8 FL (ref 80–100)
MONOCYTES ABSOLUTE: 0.7 K/UL (ref 0–1.3)
MONOCYTES RELATIVE PERCENT: 9.9 %
NEUTROPHILS ABSOLUTE: 4.5 K/UL (ref 1.7–7.7)
NEUTROPHILS RELATIVE PERCENT: 63.5 %
PDW BLD-RTO: 22.1 % (ref 12.4–15.4)
PERFORMED ON: ABNORMAL
PHOSPHORUS: 5.5 MG/DL (ref 2.5–4.9)
PLATELET # BLD: 403 K/UL (ref 135–450)
PMV BLD AUTO: 8.4 FL (ref 5–10.5)
POTASSIUM SERPL-SCNC: 3.9 MMOL/L (ref 3.5–5.1)
PROTHROMBIN TIME: 11.8 SEC (ref 10–13.2)
RBC # BLD: 2.95 M/UL (ref 4–5.2)
SODIUM BLD-SCNC: 140 MMOL/L (ref 136–145)
TOTAL PROTEIN: 6.5 G/DL (ref 6.4–8.2)
VANCOMYCIN RANDOM: 15 UG/ML
WBC # BLD: 7.2 K/UL (ref 4–11)

## 2020-09-29 PROCEDURE — 02H633Z INSERTION OF INFUSION DEVICE INTO RIGHT ATRIUM, PERCUTANEOUS APPROACH: ICD-10-PCS | Performed by: RADIOLOGY

## 2020-09-29 PROCEDURE — 85730 THROMBOPLASTIN TIME PARTIAL: CPT

## 2020-09-29 PROCEDURE — 1200000000 HC SEMI PRIVATE

## 2020-09-29 PROCEDURE — 6370000000 HC RX 637 (ALT 250 FOR IP): Performed by: INTERNAL MEDICINE

## 2020-09-29 PROCEDURE — 6360000002 HC RX W HCPCS: Performed by: HOSPITALIST

## 2020-09-29 PROCEDURE — 80202 ASSAY OF VANCOMYCIN: CPT

## 2020-09-29 PROCEDURE — 97110 THERAPEUTIC EXERCISES: CPT

## 2020-09-29 PROCEDURE — 72125 CT NECK SPINE W/O DYE: CPT

## 2020-09-29 PROCEDURE — 85610 PROTHROMBIN TIME: CPT

## 2020-09-29 PROCEDURE — 36558 INSERT TUNNELED CV CATH: CPT

## 2020-09-29 PROCEDURE — 97530 THERAPEUTIC ACTIVITIES: CPT

## 2020-09-29 PROCEDURE — 90935 HEMODIALYSIS ONE EVALUATION: CPT

## 2020-09-29 PROCEDURE — 2500000003 HC RX 250 WO HCPCS: Performed by: INTERNAL MEDICINE

## 2020-09-29 PROCEDURE — 84100 ASSAY OF PHOSPHORUS: CPT

## 2020-09-29 PROCEDURE — 2580000003 HC RX 258: Performed by: INTERNAL MEDICINE

## 2020-09-29 PROCEDURE — 80076 HEPATIC FUNCTION PANEL: CPT

## 2020-09-29 PROCEDURE — 92526 ORAL FUNCTION THERAPY: CPT

## 2020-09-29 PROCEDURE — 70450 CT HEAD/BRAIN W/O DYE: CPT

## 2020-09-29 PROCEDURE — 85025 COMPLETE CBC W/AUTO DIFF WBC: CPT

## 2020-09-29 PROCEDURE — 80048 BASIC METABOLIC PNL TOTAL CA: CPT

## 2020-09-29 PROCEDURE — 76937 US GUIDE VASCULAR ACCESS: CPT

## 2020-09-29 PROCEDURE — 6360000002 HC RX W HCPCS: Performed by: INTERNAL MEDICINE

## 2020-09-29 PROCEDURE — 99152 MOD SED SAME PHYS/QHP 5/>YRS: CPT

## 2020-09-29 PROCEDURE — 83735 ASSAY OF MAGNESIUM: CPT

## 2020-09-29 PROCEDURE — 6360000002 HC RX W HCPCS: Performed by: RADIOLOGY

## 2020-09-29 PROCEDURE — 6370000000 HC RX 637 (ALT 250 FOR IP): Performed by: STUDENT IN AN ORGANIZED HEALTH CARE EDUCATION/TRAINING PROGRAM

## 2020-09-29 PROCEDURE — 77001 FLUOROGUIDE FOR VEIN DEVICE: CPT

## 2020-09-29 PROCEDURE — C1769 GUIDE WIRE: HCPCS

## 2020-09-29 PROCEDURE — 2580000003 HC RX 258: Performed by: HOSPITALIST

## 2020-09-29 PROCEDURE — 99153 MOD SED SAME PHYS/QHP EA: CPT

## 2020-09-29 PROCEDURE — 0JH63XZ INSERTION OF TUNNELED VASCULAR ACCESS DEVICE INTO CHEST SUBCUTANEOUS TISSUE AND FASCIA, PERCUTANEOUS APPROACH: ICD-10-PCS | Performed by: RADIOLOGY

## 2020-09-29 PROCEDURE — C9113 INJ PANTOPRAZOLE SODIUM, VIA: HCPCS | Performed by: INTERNAL MEDICINE

## 2020-09-29 RX ORDER — CHOLECALCIFEROL (VITAMIN D3) 10 MCG
1 TABLET ORAL DAILY
Status: DISCONTINUED | OUTPATIENT
Start: 2020-09-29 | End: 2020-10-04 | Stop reason: HOSPADM

## 2020-09-29 RX ORDER — MIDAZOLAM HYDROCHLORIDE 1 MG/ML
INJECTION INTRAMUSCULAR; INTRAVENOUS DAILY PRN
Status: COMPLETED | OUTPATIENT
Start: 2020-09-29 | End: 2020-09-29

## 2020-09-29 RX ORDER — HEPARIN SODIUM 1000 [USP'U]/ML
4000 INJECTION, SOLUTION INTRAVENOUS; SUBCUTANEOUS ONCE
Status: COMPLETED | OUTPATIENT
Start: 2020-09-29 | End: 2020-09-29

## 2020-09-29 RX ORDER — FENTANYL CITRATE 50 UG/ML
INJECTION, SOLUTION INTRAMUSCULAR; INTRAVENOUS DAILY PRN
Status: COMPLETED | OUTPATIENT
Start: 2020-09-29 | End: 2020-09-29

## 2020-09-29 RX ORDER — SEVELAMER CARBONATE 800 MG/1
800 TABLET, FILM COATED ORAL
Status: DISCONTINUED | OUTPATIENT
Start: 2020-09-29 | End: 2020-09-30

## 2020-09-29 RX ADMIN — SODIUM CHLORIDE, PRESERVATIVE FREE 10 ML: 5 INJECTION INTRAVENOUS at 09:50

## 2020-09-29 RX ADMIN — Medication 10 ML: at 09:50

## 2020-09-29 RX ADMIN — HYDROXYZINE HYDROCHLORIDE 10 MG: 10 TABLET, FILM COATED ORAL at 09:49

## 2020-09-29 RX ADMIN — CEFAZOLIN SODIUM 2 G: 10 INJECTION, POWDER, FOR SOLUTION INTRAVENOUS at 14:21

## 2020-09-29 RX ADMIN — AMIODARONE HYDROCHLORIDE 200 MG: 200 TABLET ORAL at 21:44

## 2020-09-29 RX ADMIN — ACETAMINOPHEN ORAL SOLUTION 650 MG: 650 SOLUTION ORAL at 09:48

## 2020-09-29 RX ADMIN — VANCOMYCIN HYDROCHLORIDE 1000 MG: 1 INJECTION, POWDER, LYOPHILIZED, FOR SOLUTION INTRAVENOUS at 09:49

## 2020-09-29 RX ADMIN — Medication 2 CAPSULE: at 19:22

## 2020-09-29 RX ADMIN — SEVELAMER CARBONATE 800 MG: 800 TABLET, FILM COATED ORAL at 19:22

## 2020-09-29 RX ADMIN — AMIODARONE HYDROCHLORIDE 200 MG: 200 TABLET ORAL at 09:48

## 2020-09-29 RX ADMIN — HEPARIN SODIUM 4000 UNITS: 1000 INJECTION INTRAVENOUS; SUBCUTANEOUS at 21:51

## 2020-09-29 RX ADMIN — LEVETIRACETAM 1000 MG: 500 SOLUTION ORAL at 09:48

## 2020-09-29 RX ADMIN — LACOSAMIDE 200 MG: 100 TABLET, FILM COATED ORAL at 21:44

## 2020-09-29 RX ADMIN — Medication 2 CAPSULE: at 09:48

## 2020-09-29 RX ADMIN — EPOETIN ALFA-EPBX 2000 UNITS: 2000 INJECTION, SOLUTION INTRAVENOUS; SUBCUTANEOUS at 18:05

## 2020-09-29 RX ADMIN — DIAZEPAM 5 MG: 5 TABLET ORAL at 09:49

## 2020-09-29 RX ADMIN — EPOETIN ALFA-EPBX 3000 UNITS: 3000 INJECTION, SOLUTION INTRAVENOUS; SUBCUTANEOUS at 18:05

## 2020-09-29 RX ADMIN — HYDROXYZINE HYDROCHLORIDE 10 MG: 10 TABLET, FILM COATED ORAL at 00:30

## 2020-09-29 RX ADMIN — PANTOPRAZOLE SODIUM 40 MG: 40 INJECTION, POWDER, FOR SOLUTION INTRAVENOUS at 09:49

## 2020-09-29 RX ADMIN — ONDANSETRON 4 MG: 2 INJECTION INTRAMUSCULAR; INTRAVENOUS at 16:48

## 2020-09-29 RX ADMIN — SODIUM CHLORIDE, PRESERVATIVE FREE 10 ML: 5 INJECTION INTRAVENOUS at 21:45

## 2020-09-29 RX ADMIN — ACETAMINOPHEN ORAL SOLUTION 650 MG: 650 SOLUTION ORAL at 16:48

## 2020-09-29 RX ADMIN — LACOSAMIDE 200 MG: 100 TABLET, FILM COATED ORAL at 09:49

## 2020-09-29 RX ADMIN — ONDANSETRON 4 MG: 2 INJECTION INTRAMUSCULAR; INTRAVENOUS at 09:48

## 2020-09-29 RX ADMIN — FENTANYL CITRATE 50 MCG: 50 INJECTION INTRAMUSCULAR; INTRAVENOUS at 14:28

## 2020-09-29 RX ADMIN — LEVETIRACETAM 1000 MG: 500 SOLUTION ORAL at 21:50

## 2020-09-29 RX ADMIN — WARFARIN SODIUM 10 MG: 5 TABLET ORAL at 19:22

## 2020-09-29 RX ADMIN — DIAZEPAM 5 MG: 5 TABLET ORAL at 21:44

## 2020-09-29 RX ADMIN — METOPROLOL SUCCINATE 50 MG: 50 TABLET, EXTENDED RELEASE ORAL at 09:49

## 2020-09-29 RX ADMIN — MIDAZOLAM 1 MG: 1 INJECTION INTRAMUSCULAR; INTRAVENOUS at 14:28

## 2020-09-29 RX ADMIN — HEPARIN SODIUM 8.3 ML/HR: 10000 INJECTION, SOLUTION INTRAVENOUS at 08:00

## 2020-09-29 ASSESSMENT — PAIN DESCRIPTION - PROGRESSION
CLINICAL_PROGRESSION: NOT CHANGED

## 2020-09-29 ASSESSMENT — PAIN - FUNCTIONAL ASSESSMENT
PAIN_FUNCTIONAL_ASSESSMENT: PREVENTS OR INTERFERES SOME ACTIVE ACTIVITIES AND ADLS
PAIN_FUNCTIONAL_ASSESSMENT: PREVENTS OR INTERFERES SOME ACTIVE ACTIVITIES AND ADLS
PAIN_FUNCTIONAL_ASSESSMENT: ACTIVITIES ARE NOT PREVENTED

## 2020-09-29 ASSESSMENT — PAIN SCALES - GENERAL
PAINLEVEL_OUTOF10: 0
PAINLEVEL_OUTOF10: 2
PAINLEVEL_OUTOF10: 0
PAINLEVEL_OUTOF10: 4
PAINLEVEL_OUTOF10: 2
PAINLEVEL_OUTOF10: 0
PAINLEVEL_OUTOF10: 0
PAINLEVEL_OUTOF10: 8

## 2020-09-29 ASSESSMENT — PAIN DESCRIPTION - FREQUENCY
FREQUENCY: CONTINUOUS

## 2020-09-29 ASSESSMENT — PAIN DESCRIPTION - ONSET
ONSET: ON-GOING

## 2020-09-29 ASSESSMENT — PAIN DESCRIPTION - DESCRIPTORS
DESCRIPTORS: ACHING
DESCRIPTORS: ACHING
DESCRIPTORS: DISCOMFORT
DESCRIPTORS: ACHING
DESCRIPTORS: DISCOMFORT

## 2020-09-29 ASSESSMENT — PAIN DESCRIPTION - ORIENTATION
ORIENTATION: RIGHT
ORIENTATION: RIGHT
ORIENTATION: MID

## 2020-09-29 ASSESSMENT — PAIN SCALES - WONG BAKER
WONGBAKER_NUMERICALRESPONSE: 0

## 2020-09-29 ASSESSMENT — PAIN DESCRIPTION - PAIN TYPE
TYPE: ACUTE PAIN

## 2020-09-29 ASSESSMENT — PAIN DESCRIPTION - DIRECTION
RADIATING_TOWARDS: NO

## 2020-09-29 ASSESSMENT — PAIN DESCRIPTION - LOCATION
LOCATION: ABDOMEN
LOCATION: CHEST
LOCATION: ABDOMEN
LOCATION: CHEST
LOCATION: ABDOMEN

## 2020-09-29 NOTE — FLOWSHEET NOTE
Treatment time: 3 hours completed  Net UF: 500mls    Pre weight:8 5.8 kg  Post weight: 85.5kg   EDW: tbd    Access used: R TDC   Access function: good    Medications or blood products given: Aranesp 5,000 units    Regular outpatient schedule: TTS     Summary of response to treatment: 500 mls net fluid removed. Pt tolerated tx well. RIJ NTDC removed post Tx per MD verbal order. Post VSS. Report to [facility] RN    Copy of dialysis treatment record placed in chart, to be scanned into EMR.     09/29/20 1515 09/29/20 1823   Vital Signs   /74 119/61   Temp 97.7 °F (36.5 °C) 98 °F (36.7 °C)   Pulse 66 70   Weight 189 lb 2.5 oz (85.8 kg) 188 lb 7.9 oz (85.5 kg)   Weight Method Bed scale Bed scale   Treatment Initiation   Dialyze Hours 3.5  --    Treatment  Initiation Universal Precautions maintained;Lines secured to patient; Connections secured;Prime given;Venous Parameters set; Arterial Parameters set; Air foam detector engaged; Hemosafe Device; Dialysate;Saline line double clamped; Hemo-Safe Applied;F160  --    Post-Hemodialysis Assessment   Hemodialysis Intake (ml)  --  500 ml   Hemodialysis Output (ml)  --  1000 ml   NET Removed (ml)  --  500 ml   Tolerated Treatment  --  Good

## 2020-09-29 NOTE — PROGRESS NOTES
Hospitalist Progress Note  9/29/2020 9:11 AM    PCP: No primary care provider on file.     [phone]     Date of Admission: 9/10/2020                                                                                                                     HOSPITAL COURSE    Patient demographics:  The patient  Madyson Franz is a 53 y.o. female     Significant past medical history:   Patient Active Problem List   Diagnosis    Factor V Leiden mutation ([facility]    Pulmonary embolus ([address]    Lactic acidosis    CAP (community acquired pneumonia)    Acute CHF (congestive heart failure) ([address]    Bilateral pulmonary embolism (HCC)    Cardiac arrest ([address]    Seizures ([address]    Acute respiratory failure with hypoxia ([address]    MORELIA (acute kidney injury) ([address]    Metabolic acidosis    Congestive heart failure (HCC)    Pulmonary hypertension ([address]    Multifocal pneumonia    Septic shock (HCC)    Elevated LFTs    Thrombocytopenia (HCC)    Coagulopathy (HCC)    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) ([address]    Acute encephalopathy    Pneumonia due to organism    Bacteremia    Pleural effusion, bilateral    Elevated lactic acid level    Bacteremia due to methicillin susceptible Staphylococcus aureus (MSSA)    Pneumonia due to methicillin susceptible Staphylococcus aureus (MSSA) (HCC)    Anemia    Acute renal failure (HCC)    Bilateral pleural effusion    Mitral valve insufficiency    Tricuspid valve insufficiency    Class 2 obesity due to excess calories with body mass index (BMI) of 39.0 to 39.9 in adult    Cardiogenic shock (HCC)    Staphylococcal pneumonia (HCC)    Acute on chronic systolic heart failure (HCC)    Paroxysmal atrial fibrillation (HCC)    Essential hypertension         Presenting symptoms:  Fatigue, shortness of breath, extensive abdominal and leg swelling    Diagnostic workup:      CONSULTS DURING ADMISSION :   IP CONSULT TO HEART FAILURE NURSE/COORDINATOR  IP CONSULT TO DIETITIAN  IP CONSULT TO NEUROLOGY  IP CONSULT TO CARDIOLOGY  IP CONSULT TO NEPHROLOGY  PHARMACY TO DOSE VANCOMYCIN  IP CONSULT TO HEM/ONC  IP CONSULT TO INFECTIOUS DISEASES  IP CONSULT TO CARDIAC REHAB  IP CONSULT TO DIETITIAN  IP CONSULT TO SOCIAL WORK  IP CONSULT TO GI  PHARMACY TO DOSE WARFARIN  IP CONSULT TO GI      Patient was diagnosed with:        Treatment while inpatient:   pt presented to [facility] with worsening above symptoms of fatigue, intermittent shortness of breath both at rest and on exertion, and extensive of abdominal and leg swelling.                                                                                       ----------------------------------------------------------      SUBJECTIVE COMPLAINTS- follow up for  shortness of breath    Diet: Diet NPO, After Midnight Exceptions are: Ice Chips      OBJECTIVE:   Patient Active Problem List   Diagnosis    Factor V Leiden mutation ([address]    Pulmonary embolus ([address]    Lactic acidosis    CAP (community acquired pneumonia)    Acute CHF (congestive heart failure) ([address]    Bilateral pulmonary embolism ([address]    Cardiac arrest ([address]    Seizures ([address]    Acute respiratory failure with hypoxia ([address]    MORELIA (acute kidney injury) ([address]    Metabolic acidosis    Congestive heart failure (HCC)    Pulmonary hypertension ([address]    Multifocal pneumonia    Septic shock (HCC)    Elevated LFTs    Thrombocytopenia (HCC)    Coagulopathy (HCC)    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) ([address]    Acute encephalopathy    Pneumonia due to organism    Bacteremia    Pleural effusion, bilateral    Elevated lactic acid level    Bacteremia due to methicillin susceptible Staphylococcus aureus (MSSA)    Pneumonia due to methicillin susceptible Staphylococcus aureus (MSSA) (HCC)    Anemia    Acute renal failure (HCC)    Bilateral pleural effusion    Mitral valve insufficiency    Tricuspid valve insufficiency    Class 2 obesity due to excess calories with body mass index (BMI) of 39.0 to 39.9 in adult    Cardiogenic shock (HCC)    Staphylococcal pneumonia (HCC)    Acute on chronic systolic heart failure (HCC)    Paroxysmal atrial fibrillation (HCC)    Essential hypertension       Allergies  Ibuprofen    Medications    Scheduled Meds:   vancomycin  1,000 mg Intravenous Once    epoetin rajiv-epbx  5,000 Units Subcutaneous Once per day on Tue Thu Sat    warfarin  10 mg Oral Once    insulin lispro  0-12 Units Subcutaneous Q6H    warfarin (COUMADIN) daily dosing (placeholder)   Other RX Placeholder    metoprolol succinate  50 mg Oral Daily    diazePAM  5 mg Oral 2 times per day    amiodarone  200 mg Per NG tube BID    lidocaine 1 % injection  5 mL Intradermal Once    sodium chloride flush  10 mL Intravenous 2 times per day    lacosamide  200 mg Oral BID    levETIRAcetam  1,000 mg Oral BID    lactobacillus  2 capsule Oral BID WC    pantoprazole  40 mg Intravenous Daily    And    sodium chloride (PF)  10 mL Intravenous Daily    vancomycin (VANCOCIN) intermittent dosing (placeholder)   Other RX Placeholder     Continuous Infusions:   heparin (Porcine) 8.3 mL/hr (09/28/20 1619)    dextrose       PRN Meds:  polyethylene glycol, hydrOXYzine, acetaminophen **OR** acetaminophen **OR** acetaminophen, LORazepam, sodium chloride flush, heparin (porcine), ondansetron, promethazine, glucose, dextrose, glucagon (rDNA), dextrose    Vitals   Vitals /wt   Patient Vitals for the past 8 hrs:   BP Temp Temp src Pulse Resp SpO2 Weight   09/29/20 0736 (!) 176/90 98.3 °F (36.8 °C) -- 93 -- -- 189 lb 6 oz (85.9 kg)   09/29/20 0501 -- -- -- -- -- -- 191 lb 12.8 oz (87 kg)   09/29/20 0317 (!) 157/84 97.6 °F (36.4 °C) Oral 91 16 96 % --   09/29/20 0133 (!) 142/66 -- -- 102 20 -- --        72HR INTAKE/OUTPUT:      Intake/Output Summary (Last 24 hours) at 9/29/2020 0911  Last data filed at 9/29/2020 0501  Gross per 24 [id number]  POC GLUCOSE:    Recent Labs     09/28/20  1107 09/28/20  1615 09/28/20  2102 09/29/20  0314 09/29/20  0852   POCGLU 97 117* 142* 122* 108*     No results for input(s): LABA1C in the last 72 hours. Lab Results   Component Value Date    LABA1C 5.6 12/17/2018         ASSESSMENT AND PLAN     Shock, septic vs cardiogenic - resolved. Severe mitral and tricuspid regurgitation on TTE.  moderate pulmonary HTN,   large bilateral pleural effusions and hepatic congestion on CT. Cardiology signoff        MRSA bacteremia, MRSA PNA  Continue  On  vancomycin   TTE, LEVY without vegetations         Acute CVA  MRI with scattered acute to subacute infarcts, likely embolic   neuro signed off   workup for endocarditis LEVY negative        Cardiomyopathy, acute on chronic systolic HF  EF 30- 75%. Severe diffuse hypokinesis with severely reduced rt ventricular function, severe tricuspid regurgitation and mitral regurgitation, grade 3 diastolic dysfunction. No prior TTE. RHC/LHC when able        S/p PEA arrest, acute hypoxic resp failure  Intubated 9/10 shortly after admission. Mental status improved enough for SBP 9/19-- extubated 9/19 to 2L - did well onwards.         Seizure like activity with ongoing myoclonic jerking movements - improved. Prior to PEA arrest, episodic rhythmic twitching and myoclonus. Initial EEG negative   keppra 1000 mg BID   MRI brain as above   repeat EEG 9/16 with concern for seizures, possible status   added vimpat   repeat EEG 9/17 improved with addition of vimpat, partner discussed with neuro and can defer cvEEG for now   Neurologic status significantly improved   no repeat seizures but ongoing myoclonus -    added valium with good effect.          A fib RVR  Cardioverted 9/16, started on amio drip without adequate control, added dilt.  Multiple attempts to cardiovert all failed   added digoxin   tolerating heparin gtt -transition  To  coumadin   in sinus on the monitor today    amio

## 2020-09-29 NOTE — PRE SEDATION
Sedation Pre-Procedure Note    Patient Name: Jud Lund   YOB: 1970  Room/Bed: N8O-0516/5262-01  Medical Record Number: [id number]  Date: 9/29/2020   Time: 4:12 PM       Indication:  MORELIA here for tunneled HD catheter placement. Consent: I have discussed with the patient and/or the patient representative the indication, alternatives, and the possible risks and/or complications of the planned procedure and the anesthesia methods. The patient and/or patient representative appear to understand and agree to proceed. Vital Signs:   Vitals:    09/29/20 1515   BP: 103/74   Pulse: 66   Resp: 18   Temp: 97.7 °F (36.5 °C)   SpO2:        Past Medical History:   has a past medical history of Anticoagulant long-term use, Arthritis, Asthma, Baker's cyst, Depression, Factor V Leiden ([address] Factor V Leiden ([address] H/O blood clots, Heartburn, Hyperlipidemia, MRSA bacteremia, and Osteoarthritis. Past Surgical History:   has a past surgical history that includes Gastrostomy tube placement (N/A, 9/25/2020); Tunneled venous catheter placement (Right, 09/29/2020); and IR TUNNELED CVC PLACE WO SQ PORT/PUMP > 5 YEARS (9/29/2020).     Medications:   Scheduled Meds:    epoetin rajiv-epbx  5,000 Units Subcutaneous Once per day on Tue Thu Sat    warfarin  10 mg Oral Once    insulin lispro  0-12 Units Subcutaneous Q6H    warfarin (COUMADIN) daily dosing (placeholder)   Other RX Placeholder    metoprolol succinate  50 mg Oral Daily    diazePAM  5 mg Oral 2 times per day    amiodarone  200 mg Per NG tube BID    lidocaine 1 % injection  5 mL Intradermal Once    sodium chloride flush  10 mL Intravenous 2 times per day    lacosamide  200 mg Oral BID    levETIRAcetam  1,000 mg Oral BID    lactobacillus  2 capsule Oral BID WC    pantoprazole  40 mg Intravenous Daily    And    sodium chloride (PF)  10 mL Intravenous Daily    vancomycin (VANCOCIN) intermittent dosing (placeholder)   Other RX Placeholder Continuous Infusions:    heparin (Porcine) Stopped (09/29/20 0910)    dextrose       PRN Meds: polyethylene glycol, hydrOXYzine, acetaminophen **OR** acetaminophen **OR** acetaminophen, LORazepam, sodium chloride flush, heparin (porcine), ondansetron, promethazine, glucose, dextrose, glucagon (rDNA), dextrose  Home Meds:   Prior to Admission medications    Medication Sig Start Date End Date Taking? Authorizing Provider   albuterol sulfate  (90 Base) MCG/ACT inhaler INHALE TWO PUFFS BY MOUTH EVERY 6 HOURS AS NEEDED FOR WHEEZING 8/25/20  Yes Shaina Nieves APRN - CNP   vitamin D (ERGOCALCIFEROL) 1.25 MG (21112 UT) CAPS capsule TAKE ONE CAPSULE BY MOUTH ONCE WEEKLY 8/13/20  Yes Nahomy Farias DO   omeprazole (PRILOSEC) 20 MG delayed release capsule TAKE ONE CAPSULE BY MOUTH DAILY 6/10/20  Yes Shayna Earl MD   warfarin (COUMADIN) 7.5 MG tablet TAKE ONE TABLET BY MOUTH DAILY EXCEPT ON MONDAY AND THURSDAY TAKE ONE-HALF TABLET BY MOUTH AS DIRECTED 4/22/20  Yes Shayna Earl MD   atorvastatin (LIPITOR) 10 MG tablet TAKE ONE TABLET BY MOUTH DAILY 1/8/20  Yes Shayna Earl MD   DULoxetine (CYMBALTA) 60 MG extended release capsule TAKE ONE CAPSULE BY MOUTH DAILY 9/9/19  Yes Shayna Earl MD   BREO ELLIPTA 200-25 MCG/INH AEPB INHALE ONE DOSE BY MOUTH DAILY 3/22/19  Yes Shayna Earl MD     Coumadin Use Last 7 Days:  no  Antiplatelet drug therapy use last 7 days: no  Other anticoagulant use last 7 days: no  Additional Medication Information:  n/a      Pre-Sedation Documentation and Exam:   I have reviewed the patient's history and review of systems.     Mallampati Airway Assessment:  Mallampati Class II - (soft palate, fauces & uvula are visible)    Prior History of Anesthesia Complications:   none    ASA Classification:  Class 3 - A patient with severe systemic disease that limits activity but is not incapacitating    Sedation/ Anesthesia Plan:   intravenous sedation    Medications Planned:   midazolam (Versed) intravenously and fentanyl intravenously    Patient is an appropriate candidate for plan of sedation: yes    Electronically signed by Christian Armas MD on 9/29/2020 at 4:12 PM

## 2020-09-29 NOTE — BRIEF OP NOTE
Brief Postoperative Note    Refugia Bolus  YOB: 1970  [id number]    Pre-operative Diagnosis: MORELIA    Post-operative Diagnosis: Same    Procedure: Permacath placement    Anesthesia: Moderate Sedation    Surgeons: Booker Willard MD    Estimated Blood Loss: Less than 5 mL    Complications: None    Specimens: Was Not Obtained    Findings: Successful placement of a right IJ tunnelled HD catheter.     Electronically signed by Booker Willard MD on 9/29/2020 at 4:14 PM

## 2020-09-29 NOTE — PLAN OF CARE
Problem: Pain:  Goal: Pain level will decrease  Description: Pain level will decrease  9/29/2020 1136 by Yung Licona RN  Outcome: Ongoing  9/29/2020 8836 by Trevor Hernandez RN  Outcome: Ongoing  Goal: Control of acute pain  Description: Control of acute pain  9/29/2020 1136 by Yung Licona RN  Outcome: Ongoing  9/29/2020 0614 by Trevor Hernandez RN  Outcome: Ongoing  Goal: Control of chronic pain  Description: Control of chronic pain  9/29/2020 1136 by Yung Licona RN  Outcome: Ongoing  9/29/2020 0614 by Trevor Hernandez RN  Outcome: Ongoing     Problem: Falls - Risk of:  Goal: Will remain free from falls  Description: Will remain free from falls  9/29/2020 1136 by Yung Licona RN  Outcome: Ongoing  9/29/2020 0614 by Trevor Hernandez RN  Outcome: Ongoing  Goal: Absence of physical injury  Description: Absence of physical injury  9/29/2020 1136 by Yung Licona RN  Outcome: Ongoing  9/29/2020 0614 by Trevor Hernandez RN  Outcome: Ongoing     Problem: Skin Integrity:  Goal: Will show no infection signs and symptoms  Description: Will show no infection signs and symptoms  9/29/2020 1136 by Yung Licona RN  Outcome: Ongoing  9/29/2020 0614 by Tervor Hernandez RN  Outcome: Ongoing  Goal: Absence of new skin breakdown  Description: Absence of new skin breakdown  9/29/2020 1136 by Yung Licona RN  Outcome: Ongoing  9/29/2020 0614 by Trevor Hernandez RN  Outcome: Ongoing     Problem: Nutrition  Goal: Optimal nutrition therapy  9/29/2020 1136 by Yung Licona RN  Outcome: Ongoing  9/29/2020 0614 by Trevor Hernandez RN  Outcome: Ongoing     Problem: OXYGENATION/RESPIRATORY FUNCTION  Goal: Patient will maintain patent airway  9/29/2020 1136 by Yung Licona RN  Outcome: Ongoing  9/29/2020 0614 by Trevor Hernandez RN  Outcome: Ongoing  Goal: Patient will achieve/maintain normal respiratory rate/effort  Description: Respiratory rate and effort will be within normal limits for the patient  9/29/2020 1136 by Farooq Houser RN  Outcome: Ongoing  9/29/2020 3246 by Natacha Clarke RN  Outcome: Ongoing     Problem: HEMODYNAMIC STATUS  Goal: Patient has stable vital signs and fluid balance  9/29/2020 1136 by Farooq Houser RN  Outcome: Ongoing  9/29/2020 0614 by Natacha Clarke RN  Outcome: Ongoing     Problem: FLUID AND ELECTROLYTE IMBALANCE  Goal: Fluid and electrolyte balance are achieved/maintained  9/29/2020 1136 by Farooq Houser RN  Outcome: Ongoing  9/29/2020 0614 by Natacha Clarke RN  Outcome: Ongoing     Problem: ACTIVITY INTOLERANCE/IMPAIRED MOBILITY  Goal: Mobility/activity is maintained at optimum level for patient  9/29/2020 1136 by Farooq Houser RN  Outcome: Ongoing  9/29/2020 0614 by Natacha Clarke RN  Outcome: Ongoing     Problem: Injury - Risk of, Physical Injury:  Goal: Will remain free from falls  Description: Will remain free from falls  9/29/2020 1136 by Farooq Houser RN  Outcome: Ongoing  9/29/2020 0614 by Natacha Clarke RN  Outcome: Ongoing  Goal: Absence of physical injury  Description: Absence of physical injury  9/29/2020 1136 by Farooq Houser RN  Outcome: Ongoing  9/29/2020 0614 by Natacha Clarke RN  Outcome: Ongoing     Problem: Mood - Altered:  Goal: Mood stable  Description: Mood stable  9/29/2020 1136 by Farooq Houser RN  Outcome: Ongoing  9/29/2020 0614 by Natacha Clarke RN  Outcome: Ongoing     Problem: Psychomotor Activity - Altered:  Goal: Absence of psychomotor disturbance signs and symptoms  Description: Absence of psychomotor disturbance signs and symptoms  9/29/2020 1136 by Farooq Houser RN  Outcome: Ongoing  9/29/2020 0614 by Natacha Clarke RN  Outcome: Ongoing     Problem: Sensory Perception - Impaired:  Goal: Demonstrations of improved sensory functioning will increase  Description: Demonstrations of improved sensory functioning will increase  9/29/2020 1136 by Farooq Houser RN  Outcome: Ongoing  9/29/2020 0614 by Myke Car RN  Outcome: Ongoing  Goal: Demonstrates accurate environmental perceptions  Description: Demonstrates accurate environmental perceptions  9/29/2020 1136 by Royer Edward RN  Outcome: Ongoing  9/29/2020 0614 by Myke Car RN  Outcome: Ongoing     Problem: Sleep Pattern Disturbance:  Goal: Appears well-rested  Description: Appears well-rested  9/29/2020 1136 by Royer Edward RN  Outcome: Ongoing  9/29/2020 0614 by Myke Car RN  Outcome: Ongoing

## 2020-09-29 NOTE — PLAN OF CARE
Pt had a fall around 0130. This RN was with another pt at the time the bed alarm sounded. Staff responded immediately to find the pt lying on the floor on her L side. When asked what she was doing, pt said \"I don't know. \" She denied needing to go to the bathroom at the time of the fall. Pt sustained B knee abrasions and her L elbow is bruised. Pt went for stat head and cervical spine CT. Results showed no intercranial hemorrhage nor cervical spine defect. House supervisor, NP, and charge nurse made aware. Post fall debriefing completed. RN increased sensitivity of bed alarm to medium and placed a telesitter at the bedside. Hourly rounding call light tool utilized. Pt re-oriented frequently and reminded of fall precautions frequently t/o remainder of shift. Pt did set bed alarm off once after the fall because she was moving her legs up in the air. Bed alarm reset at medium sensitivity. Pt initially c/o pain in her L elbow and B knees after the fall but later denied pain. Pt's TF stopped at 0030 for pending vascath placement in IR. Pt's heparin to be stopped at 0900. At 0600, RN programmed ~ 3 hours worth of heparin as the volume to be infused so that the pump will alarm around 0900. Pt's VS stable before and after the fall. Pt remains on RA. Pt has multiple loose bowel movements. She was incontinent for the first two then used the bed pan for the most recent one. Pt removed the preventative mepilex drsgs on her B heels.

## 2020-09-29 NOTE — CARE COORDINATION
SW reached out to the following facilities throughout the day regarding facility admission:     [facility]. Spoke to Mariaa Gonzáles in admissions [phone] regarding acceptance. He stated that they were about to review this referral and would call us by 4pm. SW will wait for a response before reaching out to family regarding facility choice. [facility]-Declined. Unable to meet patient care needs. [address] taking patients at this time. They currently have a new COVID patient and are unable to accept new referrals. [address] [place] [address]-Able to accept this patient at discharge and ready to start precert. Family needs to decide today so respective company can start pre-cert. Facilities are listed above in no particular order. Patient will need a HENS. SW and family to follow up by close of business today. SNF will be asked to star pre-cert this afternoon after family's choice. Respectfully submitted,    EUGENIA Rivera  [facility]   [phone]    Electronically signed by EUGENIA Lux on 9/29/2020 at 2:57 PM

## 2020-09-29 NOTE — PROGRESS NOTES
Occupational Therapy  Facility/Department: [address] PROGRESSIVE CARE  Daily Treatment Note  NAME: Rogelio Gonzalez  : 1970  MRN: [id number]    Date of Service: 2020    Discharge Recommendations:  Patient would benefit from continued therapy after discharge, 3-5 sessions per week  OT Equipment Recommendations  Other: defer to [facility] [address] scored a 6/24 on the AM-PAC ADL Inpatient form. Current research shows that an AM-PAC score of 17 or less is typically not associated with a discharge to the patient's home setting. Based on the patient's AM-PAC score and their current ADL deficits, it is recommended that the patient have 3-5 sessions per week of Occupational Therapy at d/c to increase the patient's independence. Please see assessment section for further patient specific details. If patient discharges prior to next session this note will serve as a discharge summary. Please see below for the latest assessment towards goals. Assessment   Performance deficits / Impairments: Decreased functional mobility ; Decreased balance;Decreased safe awareness;Decreased ADL status; Decreased ROM; Decreased endurance;Decreased high-level IADLs;Decreased strength;Decreased cognition  Assessment: Discussed with OTR: Pt tolerated session fair. Pt continues with jerking/twitching, LE>RUE's and unsafe for attempt OOB or sitting to EOB this date. Pt also to have procedure this am(tunnel cath placement). Pt completing bed mob with MIn/Mod A x1 for rolling. Pt dependent for ADL's (toileting-hygiene needs). Pt is functioning below her baseline, limited by the above deficits and will benefit from cont OT at d/c. Prognosis: Fair  History: 54 y/o female admit 2020 with Pneumonia, Pleural Effusion, Tachycardia, Cardiac Arrest.  MRI + Scattered Acute/Subacute Infarct B Cerebral Hemispheres and R Cerebellar Hemisphere; most pronounced L Occipital Lobe, likely embolic.   CTA Chest + PE.   -2020 Pt Intubated. PMH as noted including Factor V Leiden, OA. PTA pt lived at home with spouse and was independent with ADLs and functional mobility. OT Education: OT Role;Plan of Care;Orientation;Home Exercise Program  Barriers to Learning: cognition, jerking/twitching movements  REQUIRES OT FOLLOW UP: Yes  Activity Tolerance  Activity Tolerance: Treatment limited secondary to decreased cognition;Treatment limited secondary to medical complications (free text)  Activity Tolerance: jerking/twitching in UE, LE's  Safety Devices  Safety Devices in place: Yes(RN, [place] aware)  Type of devices: Left in bed;Call light within reach;Nurse notified; Bed alarm in place         Patient Diagnosis(es): The primary encounter diagnosis was Pneumonia due to organism. Diagnoses of Tachycardia, Pleural effusion, bilateral, Elevated lactic acid level, Congestive heart failure, unspecified HF chronicity, unspecified heart failure type ([address] and Multiple subsegmental pulmonary emboli without acute cor pulmonale were also pertinent to this visit. has a past medical history of Anticoagulant long-term use, Arthritis, Asthma, Baker's cyst, Depression, Factor V Leiden ([address] Factor V Leiden ([address] H/O blood clots, Heartburn, Hyperlipidemia, MRSA bacteremia, and Osteoarthritis. has a past surgical history that includes Gastrostomy tube placement (N/A, 9/25/2020). Restrictions  Restrictions/Precautions  Restrictions/Precautions: Fall Risk  Position Activity Restriction  Other position/activity restrictions: IV, ott, COVID negative. Subjective   General  Chart Reviewed: Yes  Patient assessed for rehabilitation services?: Yes  Additional Pertinent Hx: 54 y/o female admit 9/11/2020 with Pneumonia, Pleural Effusion, Tachycardia, Cardiac Arrest.  MRI + Scattered Acute/Subacute Infarct B Cerebral Hemispheres and R Cerebellar Hemisphere; most pronounced L Occipital Lobe, likely embolic. CTA Chest + PE.   9/11-9/19/2020 Pt Intubated.   PMH as 17.07 (09/29/20 6484)  ADL Inpatient CMS 0-100% Score: 100 (09/29/20 0921)  ADL Inpatient CMS G-Code Modifier : CN (09/29/20 5373)    Goals  Short term goals  Time Frame for Short term goals: Prior to DC: goals ongoing  Short term goal 1: Pt will complete bed mobility with max A  Short term goal 2: Pt will tolerate sitting EOB > 5 min for functional task with CGA  Short term goal 3: Pt will tolerate 15 reps of UE exercises in all planes to inc strength/endurance for ADLs  Short term goal 4: Pt will tolerate standing  with lalitha stedy and max A x 2  Long term goals  Time Frame for Long term goals : stgs=ltgs  Patient Goals   Patient goals : no goal stated       Therapy Time   Individual Concurrent Group Co-treatment   Time In 0900         Time Out 0930         Minutes 30              Hailey STUBBS/ARSENIO,[id number]

## 2020-09-29 NOTE — ADT AUTH CERT
Utilization Reviews         Pulmonary Embolism - Care Day 19 (9/28/2020) by Donya Post RN         Review Status  Review Entered    Completed  9/28/2020 11:45        Criteria Review       Care Day: 19 Care Date: 9/28/2020 Level of Care: ICU    Guideline Day 5    Level Of Care    (X) Floor to discharge [T]    9/28/2020 11:45 AM EDT by Jayla Barroso      ICU    Clinical Status    (X) * Hemodynamic stability    9/28/2020 11:45 AM EDT by Jayla Barroso      hr 78-95  bp: 120/72    (X) * Dyspnea absent    9/28/2020 11:45 AM EDT by Jayla Barroso      rr 18    (X) * New oxygen requirement absent    9/28/2020 11:45 AM EDT by Jayla Barroso      93-96% ra    (X) * Tachypnea absent    (X) * Dangerous arrhythmia absent    (X) * No bleeding or evidence of new emboli    ( ) * INR therapeutic if indicated [R]    ( ) * Pain absent or managed    ( ) * Discharge plans and education understood    Activity    (X) * Ambulatory or acceptable for next level of care    Routes    (X) * Oral hydration, medications, [U] and diet    Interventions    (X) Possible PT/INR    Medications    ( ) * Anticoagulants regimen established for next level of care    (X) Anticoagulants [I]    * Milestone    Additional Notes    9/28  Day 19       Pt remains in ICU       Vitals:  t: 36.7 p: 95 rr: 18 bp: 120/72 spo2: 96% ra       Abn labs:  hgb: 7.9, bun: 38, cr: 3.4       Orders: Accu checks ac/hs with ssi coverage    Heparin gtt continuous    Protonix 40mg iv daily    Cbc, bmp, mg, phos, hfp daily    GI consult    Nephrology consult       Per Cardiology PN:    Assessment/Plan:    1. ) Acute combined diastolic Grade 3 and systolic heart failure EF 30-35% with moderate MR and TR. Unclear etiology. Will consider ischemic work up as outpatient. GDMT limited with MORELIA. Appears euvolemic. Gettig HD for fluid management.     NYHA Class III                Stage 3    Diuretic: HD    Beta Blocker: Toprol XL    ACEi/ARB/ARNI: none MORELIA Aldosterone Antagonist: none MORELIA    SGLT2 Inhibitor: outpatient    2gm Na diet, daily weight, 64 oz fluid restriction    Avoid NSAIDS and other nephrotoxic meds    Cardiac Rehab: outpatient    ICD: after 3 mos GDMT         2. ) Paroxysmal Atrial Fib: Converted to NSR    CHADSVASC- 5    HASBLED-    Thromboembolic risk reduction: On Hep gtt>[place] [facility] [place] and hem/onc D/T coagulopathy/ Factor V Leiden    Rate Control/Rhythm Control: Amio, Toprol XL         3.) Bilateral PE: Now on RA. Hep gtt    Followed by pulm         4.) PNA/Bacteremia: Followe by ID         5. ) Anemia: management per Primary and Hem/Onc         Discussed plan with Dr. Neftali Barksdale. Will sign off and F/U after discharge.  [facility] make appointment.           Pulmonary Embolism - Care Day 18 (9/27/2020) by Sailaja Pretty RN         Review Status  Review Entered    Completed  9/28/2020 11:45        Criteria Review       Care Day: 18 Care Date: 9/27/2020 Level of Care: ICU    Guideline Day 5    Level Of Care    (X) Floor to discharge [T]    9/28/2020 11:45 AM EDT by Gaby Church      ICU    Clinical Status    (X) * Hemodynamic stability    9/28/2020 11:45 AM EDT by Gaby Church      hr 78-85  bp: 105/58    (X) * Dyspnea absent    (X) * New oxygen requirement absent    9/28/2020 11:45 AM EDT by Gaby Church      90-97% ra    (X) * Tachypnea absent    9/28/2020 11:45 AM EDT by Gaby Church      rr 12-18    (X) * Dangerous arrhythmia absent    (X) * No bleeding or evidence of new emboli    ( ) * INR therapeutic if indicated [R]    ( ) * Pain absent or managed    ( ) * Discharge plans and education understood    Activity    (X) * Ambulatory or acceptable for next level of care    Routes    (X) * Oral hydration, medications, [U] and diet    Interventions    (X) Possible PT/INR    Medications    ( ) * Anticoagulants regimen established for next level of care    (X) Anticoagulants [I]    * Milestone    Additional Notes    9/27  Day 18       Pt remains in ICU       Vitals:  t: 36.4 p: 81 rrr: 16 bp: 132/67 spo2: 95% ra       Abn labs:  hgb: 7.4, alk phos: 134, na: 135, bun: 24, cr: 2.4       Orders:    Continuous TF    Accu checks ac/hs with ssi coverage    Protonix 40mg iv daily    Cbc, bmp, mg, phos, hfp daily    GI consult    Nephrology consult       Per Cardiology PN:         Assessment/Plan:    1. ) Acute combined diastolic Grade 3 and systolic heart failure EF 30-35% with moderate MR and TR. Unclear etiology. Will need ischemic work up. GDMT limited with MORELIA. Appears euvolemic. Gettig HD for fluid management. NYHA Class III                Stage 3    Diuretic: HD    Beta Blocker: Toprol XL    ACEi/ARB/ARNI: none MORELIA    Aldosterone Antagonist: none MORELIA    SGLT2 Inhibitor: outpatient    2gm Na diet, daily weight, 64 oz fluid restriction    Avoid NSAIDS and other nephrotoxic meds    Cardiac Rehab: outpatient    ICD: after 3 mos GDMT         2. ) Paroxysmal Atrial Fib: Converted to NSR    CHADSVASC- 5    HASBLED-    Thromboembolic risk reduction: On Hep gtt>defer transfering to [facility] per primary team and hem/onc D/T coagulopathy/ Factor V Leiden    Rate Control/Rhythm Control: Amio, Toprol XL         3.) Bilateral PE: Now on RA. Hep gtt    Followed by pulm         4.) PNA/Bacteremia: Followe by ID         5. ) Anemia: management per Primary and Hem/Onc         Per Nephrology PN:    ASSESSMENT and PLAN:         1. MORELIA    - likely secondary to ischemia ATI. She had additional exposure to radiocontrast                - ZENON is neg, ANCA neg, C3 is low at 54    - Initiated on IHD on 9/11/20. Transitioned to CRRT on 9/12/20 which was stopped 9/15/20.  Restarted HD 09/17/20    - monitoring for possible recovery UOP only 600 ml/24 hours Cr rebounding    - HD TTS. Monitor for recovery    -  outpatient dialysis spot pending    Will need TDC placement prior to discharge         2.  FEN    - hypophosphatemia                - resolved Po4 5.6 mg         3. S/P PEA arrest 9/11/20         4. Acute hypoxic respiratory failure:    - per pulmonary    - Extubated 9/19. on RA now         5. MRSA bacteremia:    - on Vancomycin with pharmacy dosing    - LEVY shows no vegetation         6. CSDHF    -resolved         7. Factor V leiden deficiency with PE/coagulopathy/DIC:    - per hematology         8. CVA    - MRI shows bilateral acute sub acute infarcts    - neurology following    - EEG apparently better with higher keppra dose         9. atrial fibrillation    - on cardizem gtt and amiodarone gtt    - not rate controlled    - per EP         10.  anemia    - Hgb stable 7.4 gm stable    - still off anticoagulation    - on retacrit         Per IM PN:    Assessment/Plan:         Active Hospital Problems      Diagnosis    · Acute on chronic systolic heart failure (HCC) [I50.23]    · Paroxysmal atrial fibrillation (HCC) [I48.0]    · Essential hypertension [I10]    · Cardiogenic shock (HCC) [R57.0]    · Staphylococcal pneumonia (HCC) [J15.20]    · Pleural effusion, bilateral [J90]    · Elevated lactic acid level [R79.89]    · Bacteremia due to methicillin susceptible Staphylococcus aureus (MSSA) [R78.81]    · Pneumonia due to methicillin susceptible Staphylococcus aureus (MSSA) ([address] [J15.211]    · Anemia [D64.9]    · Acute renal failure (HCC) [N17.9]    · Bilateral pleural effusion [J90]    · Mitral valve insufficiency [I34.0]    · Tricuspid valve insufficiency [I07.1]    · Class 2 obesity due to excess calories with body mass index (BMI) of 39.0 to 39.9 in adult [E66.09, Z68.39]    · Pneumonia due to organism [J18.9]    · Bacteremia [R78.81]    · Cardiac arrest ([address] [I46.9]    · Seizures ([address] [R56.9]    · Acute respiratory failure with hypoxia ([address] [J96.01]    · MORELIA (acute kidney injury) ([address] [E43.9]    · Metabolic acidosis [N57.6]    · Congestive heart failure ([address] [I50.9]    · Pulmonary hypertension ([address] [I27.20]    · Multifocal pneumonia [J18.9]    · Septic shock ([facility] [A41.9, R65.21]    · Elevated LFTs [R94.5]    · Thrombocytopenia ([facility]) [D69.6]    · Coagulopathy ([facility] [D68.9]    · 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) ([facility]) [D65]    · Acute encephalopathy [G93.40]    · Pulmonary embolus ([facility]) [I26.99]    · Lactic acidosis [E87.2]    · CAP (community acquired pneumonia) [J18.9]    · Acute CHF (congestive heart failure) ([facility] [I50.9]    · Bilateral pulmonary embolism ([facility] [I26.99]    · Factor V Leiden mutation ([facility] [D68.51]                   Shock, septic vs cardiogenic - resolved. Severe mitral and tricuspid regurgitation on TTE.    moderate pulmonary HTN,    large bilateral pleural effusions and hepatic congestion on CT. MRSA bacteremia.    - ID involved. - cardiology following              MRSA bacteremia, MRSA PNA    - ID following - presently on vancomycin    - TTE, LEVY without vegetations              Acute CVA    MRI with scattered acute to subacute infarcts, likely embolic    - neuro signed off    - workup for endocarditis LEVY negative              Cardiomyopathy, acute on chronic systolic HF    EF 30- 45%. Severe diffuse hypokinesis with severely reduced rt ventricular function, severe tricuspid regurgitation and mitral regurgitation, grade 3 diastolic dysfunction. No prior TTE.    - cardiology following    - RHC/LHC when able              S/p PEA arrest, acute hypoxic resp failure    Intubated 9/10 shortly after admission.    -Mental status improved enough for SBP 9/19-- extubated 9/19 to 2L - did well onwards.              Seizure like activity with ongoing myoclonic jerking movements - improved. Prior to PEA arrest, episodic rhythmic twitching and myoclonus.     Initial EEG negative    - keppra 1000 mg BID    - MRI brain as above    - repeat EEG 9/16 with concern for seizures, possible status    - added vimpat    - repeat EEG 9/17 improved with addition of vimpat, partner discussed with neuro and can defer cvEEG for now    - Neurologic status significantly improved    - no repeat seizures but ongoing myoclonus -    - added valium with good effect.                A fib RVR    Cardioverted 9/16, started on amio drip without adequate control, added dilt. Multiple attempts to cardiovert all failed    - cardiology following    - continue to monitor    - added digoxin    - tolerating heparin gtt    - in sinus on the monitor today                - amio transitioned to PO formulation via NG.             - started on warfarin.                   R groin hematoma - stable    After removing femoral line. Arterial duplex with normal flow, good pulses    - monitor    - back on heparin drip                   Bilateral PE    Hx/of DVT/PE, on coumadin chronically - albeit not sure if she was taking this at all.    - heparin drip    - Hx of factor V Leiden - bridging to warfarin              Oliguric/anuric MORELIA    Suboptimal response to Lasix. -CRRT started, switched to HD    -Checking ZENON (neg), ANCA, complement (c3 low), antiphospholipid antibodies    -Nephrology following    - UOP improved    - clinically hypovolemic to euvolemic now - increased free water bolus with tube feed.              Coagulapathy    Hx Factor V Leiden on coumadin. - hematology notes non-adherence to coumadin in the past.    - Heme-onc were following.    - presently on heparin gtt bridging to warfarin.              Obesity    Body mass index is 30.01 kg/m². Counseled on effects of weight on overall health and chronic medical conditions and discussed weight loss.              Dysphagia -    Failed MBS 9/23 pretty much with all consistencies.     S/p PEG 9/25 - started tube feeding 9/26 - tolerating well.                               Pulmonary Embolism - Care Day 17 (9/26/2020) by Rakan Ulloa RN         Review Status  Review Entered    Completed  9/28/2020 11:45        Criteria Review       Care Day: 17 Care Date: 9/26/2020 Level of Care: ICU Guideline Day 5    Level Of Care    (X) Floor to discharge [T]    9/28/2020 11:45 AM EDT by Kimberly Villela      ICU    Clinical Status    (X) * Hemodynamic stability    9/28/2020 11:45 AM EDT by Kimberly Villela      hr   bp: 120/70    (X) * Dyspnea absent    9/28/2020 11:45 AM EDT by Kimberly Villela      rr 14-16    ( ) * New oxygen requirement absent    (X) * Tachypnea absent    (X) * Dangerous arrhythmia absent    (X) * No bleeding or evidence of new emboli    ( ) * INR therapeutic if indicated [R]    ( ) * Pain absent or managed    ( ) * Discharge plans and education understood    Activity    (X) * Ambulatory or acceptable for next level of care    Routes    (X) * Oral hydration, medications, [U] and diet    Interventions    (X) Possible PT/INR    Medications    ( ) * Anticoagulants regimen established for next level of care    (X) Anticoagulants [I]    9/28/2020 11:45 AM EDT by Kimberly Villela      heparin gtt continuous    * Milestone    Additional Notes    9/26  Day 17       Pt remains in ICU       Vitals: t: 36.6 p: 105 rr: 16 bp: 124/65 spo2: 95% ra       Abn labs:  hgb: 7.3, bun: 40, cr: 2.7, alk phos: 130       Orders: Accu checks ac/hs with ssi coverage    Heparin gtt continuous    Protonix 40mg iv daily    Cbc, bmp, mg, phos, hfp daily    GI consult    Nephrology consult       Per GI PN:    IMPRESSION:    1. Oral pharyngeal dysphagia s/p PEG 9/25/20    2. Acute CVA    3. Bilateral PEs    4. Seizures like activity    5. New onset Afib    6.  MORELIA on HD              RECOMMENDATIONS/PEG care instructions:    - Keep site clean and dry    - No dressings under bumper, only over bumper    - Prevent tampering with tube    - Ok to use PEG for tube feeds at this time    - Flush tube well with 30 cc water qid, plus before and after any use    - Use liquid meds when possible if necessary    - Flush well before and after any medications    - Do not mix feeds and medications    - Elevate head of bed during and after any feedings         Per Nephrology PN:    ASSESSMENT and PLAN:         1. MORELIA    - likely secondary to ischemia ATI. She had additional exposure to radiocontrast                - ZENON is neg, ANCA neg, C3 is low at 54    - Initiated on IHD on 9/11/20. Transitioned to CRRT on 9/12/20 which was stopped 9/15/20.  Restarted HD 09/17/20    - monitoring for possible recovery UOP only 600 ml/24 hours Cr rebounding    - HD TTS.  HD in progress on 3 K bath UF Goal minimum    -  outpatient dialysis spot pending    Will need TDC placement prior to discharge         2. FEN    - hypophosphatemia                - resolved Po4 5.6 mg today         3. S/P PEA arrest 9/11/20         4. Acute hypoxic respiratory failure:    - per pulmonary    - Extubated 9/19.         5. MRSA bacteremia:    - on Vancomycin with pharmacy dosing    - LEVY shows no vegetation         6. CSDHF    -resolved         7. Factor V leiden deficiency with PE/coagulopathy/DIC:    - per hematology         8. CVA    - MRI shows bilateral acute sub acute infarcts    - neurology following    - EEG apparently better with higher keppra dose         9. atrial fibrillation    - on cardizem gtt and amiodarone gtt    - not rate controlled    - per EP         10. anemia    - Hgb stable 7.3 gm    - monitor hematoma    - still off anticoagulation    - on retacrit         Per Cardiology PN:    Assessment/Plan:    1. ) Acute combined diastolic Grade 3 and systolic heart failure EF 30-35% with moderate MR and TR. Unclear etiology. Will need ischemic work up. GDMT limited with MORELIA. Appears euvolemic. Gettig HD for fluid management.     NYHA Class III                Stage 3    Diuretic: HD    Beta Blocker: Toprol XL    ACEi/ARB/ARNI: none MORELIA    Aldosterone Antagonist: none MORELIA    SGLT2 Inhibitor: outpatient    2gm Na diet, daily weight, 64 oz fluid restriction    Avoid NSAIDS and other nephrotoxic meds    Cardiac Rehab: outpatient    ICD: after 3 mos GDMT      pulmonary embolism (HCC) [I26.99]    · Factor V Leiden mutation ([address] [D68.51]                   Shock, septic vs cardiogenic - resolved. Severe mitral and tricuspid regurgitation on TTE.    moderate pulmonary HTN,    large bilateral pleural effusions and hepatic congestion on CT. MRSA bacteremia. Off pressors    - ID consulted. - cardiology following              MRSA bacteremia, MRSA PNA    - ID following - presently on vancomycin    - TTE, LEVY without vegetations though concerning for endocarditis given embolic CVA              Acute CVA    MRI with scattered acute to subacute infarcts, likely embolic    - neuro following    - workup for endocarditis LEVY negative              Cardiomyopathy, acute on chronic systolic HF    EF 30- 48%. Severe diffuse hypokinesis with severely reduced rt ventricular function, severe tricuspid regurgitation and mitral regurgitation, grade 3 diastolic dysfunction. No prior TTE.    - cardiology following    - RHC/LHC when able              S/p PEA arrest, acute hypoxic resp failure    Intubated 9/10 shortly after admission.    -Mental status improved enough for SBP 9/19-- extubated 9/19 to 2L              Seizure like activity with ongoing myoclonic jerking movements - improved. Prior to PEA arrest, episodic rhythmic twitching and myoclonus. Initial EEG negative    - keppra 1000 mg BID    - MRI brain as above    - repeat EEG 9/16 with concern for seizures, possible status    - added vimpat    - repeat EEG 9/17 improved with addition of vimpat, partner discussed with neuro and can defer cvEEG for now    - Neurologic status significantly improved    - no repeat seizures but ongoing myoclonus -    - added valium to NG with good effect.                A fib RVR    Cardioverted 9/16, started on amio drip without adequate control, added dilt.  Multiple attempts to cardiovert all failed    - cardiology following    - continue to monitor    - added digoxin    - tolerating heparin gtt    - in sinus on the monitor today                - amio transitioned to PO formulation via NG.                   R groin hematoma - stable    After removing femoral line. Arterial duplex with normal flow, good pulses    - monitor    - back on heparin drip                   Bilateral PE    Ho DVT/PE, on coumadin chronically - albeit not sure if she was taking this at all.    - heparin drip              Oliguric/anuric MORELIA    Suboptimal response to Lasix. -CRRT started, switched to HD    -Checking ZENON (neg), ANCA, complement (c3 low), antiphospholipid antibodies    -Nephrology following    - UOP improved    - clinically hypovolemic to euvolemic now - increased free water bolus with tube feed.              Coagulapathy    Hx Factor V Leiden on coumadin. INR therapeutic at 2.2 on admission though hematology notes non-adherence to coumadin in the past. Has had oozing on heparin drip, sepsis with bacteremia, DIC versus shock liver versus vitamin K deficiency    - Heme-onc were following.    - DOAC when able              Elevated LFTs    Likely shock liver, improving    - daily labs              Obesity    Body mass index is 30.45 kg/m². Counseled on effects of weight on overall health and chronic medical conditions and discussed weight loss.              Dysphagia -    Failed MBS 9/23 pretty much with all consistencies.     S/p PEG 9/25 - will start tube feeding today.              Pulmonary Embolism - Care Day 16 (9/25/2020) by Charlie Ghosh RN         Review Status  Review Entered    Completed  9/28/2020 11:45        Criteria Review       Care Day: 16 Care Date: 9/25/2020 Level of Care: ICU    Guideline Day 5    Level Of Care    (X) Floor to discharge [T]    9/28/2020 11:45 AM EDT by Koby calixto    Clinical Status    (X) * Hemodynamic stability    9/28/2020 11:45 AM EDT by Koby Ferrer      hr   bp: 133/73    (X) * Dyspnea absent    ( ) * New oxygen requirement absent    (X) * Tachypnea absent    9/28/2020 11:45 AM EDT by Rubens De Santiago      rr 17-28    (X) * Dangerous arrhythmia absent    (X) * No bleeding or evidence of new emboli    ( ) * INR therapeutic if indicated [R]    ( ) * Pain absent or managed    ( ) * Discharge plans and education understood    Activity    (X) * Ambulatory or acceptable for next level of care    Routes    (X) * Oral hydration, medications, [U] and diet    Interventions    (X) Possible PT/INR    Medications    ( ) * Anticoagulants regimen established for next level of care    9/28/2020 11:45 AM EDT by Rubens De Santiago      heparin gtt continous    (X) Anticoagulants [I]    * Milestone    Additional Notes    9/25  Day 16       Pt remains in ICU       Vitals:  37.1 p: 91 rr: 17 bp: 133/73 spo2: 97% ra       Abn labs:  gluc: 112       Orders: Accu checks ac/hs with ssi coverage    Heparin gtt continuous    Protonix 40mg iv daily    Cbc, bmp, mg, phos, hfp daily    GI consult    Nephrology consult       Per Cardiology PN:         Assessment/Plan:         1) Acute on chronic systolic heart failure.  EF 59-80%.  Etiology uncertain but will ultimately need ischemic work-up.  No ACE-I/ARB/Aldactone with MORELIA.  Will change Lopressor to Toprol XL.  No indication for ICD as patient has not been on optimal medical therapy.      2) Paroxysmal atrial fibrillation.  CHADS-Vasc is at least 5 (not 2 as indicated in EP notes). Converted to sinus 9/22/20.  Evaluated by EP who \"signed off. \"  Attempting rhythm control with amiodarone.  Continue beta-blocker.  Will discontinue digoxin as patient is in sinus rhythm.  Patient already recommended for lifelong anticoagulation with PE.  Currently on IV heparin and will defer transitioning to oral agent to the primary team or hem/onc.         3) MORELIA.  [place] secondary to ATN.  Nephrology following.  Patient on intermittent hemodialysis.          4) Bilateral pulmonary emboli/coagulopathy.  Hem/Onc was following.  Currently on heparin drip.      5) Acute CVA/seizures.  Neurology following.         6) Anemia.  Will defer work-up and treatment to primary team.  Hemoglobin 7.2 today.  Will defer transfusion goals to primary team.         7) Essential hypertension. Controlled. Goal BP <130/80. Continue medical therapy.        Per EP PN:    Assessment and Plan:         Persistent atrial fibrillation                - converted to SR this morning                - will decrease amio drip to 0.5mg/min and continue until tomorrow, likely transition to PO tomorrow                - continue metoprolol with hold parameters and digoxin                            - would change metoprolol to Toprol once dose done being titrated                - CHADS2-VASc 5 (gender, HTN, HF, CVA) - currently on heparin drip                                      - consider DOAC or warfarin when appropriate         New cardiomyopathy                - unclear etiology                - EF 30-35% on TTE, difficult to assess on LEVY                - optimize cardiac medications - beta blocker, ACEI/ARB when BP and renal function acceptable                - currently appears well compensated         PEA arrest                - respiratory driven?                - s/p extubation         Mitral regurgitation                - moderate on LEVY         Septic shock                - secondary to bacteremia, pneumonia                - care per ID         Bilateral pulmonary emboli                - on 2L O2                - care per pulmonary         Acute CVA                - ?embolic                - care per neuro         Per IM PN:    Assessment/Plan:         Active Hospital Problems      Diagnosis    · Acute on chronic systolic heart failure (HCC) [I50.23]    · Paroxysmal atrial fibrillation (HCC) [I48.0]    · Essential hypertension [I10]    · Cardiogenic shock (HCC) [R57.0]    · Staphylococcal pneumonia (HCC) [J15.20]    · Pleural effusion, bilateral [J90]    · Elevated lactic hypovolemic to euvolemic now - increased free water bolus with tube feed.              Coagulapathy    Hx Factor V Leiden on coumadin. INR therapeutic at 2.2 on admission though hematology notes non-adherence to coumadin in the past. Has had oozing on heparin drip, sepsis with bacteremia, DIC versus shock liver versus vitamin K deficiency    - Heme-onc following, fibrinogen and INR improving, no need for vit K or cryo now    - DOAC when able              Elevated LFTs    Likely shock liver, improving    - daily labs              Obesity    Body mass index is 30.39 kg/m². Counseled on effects of weight on overall health and chronic medical conditions and discussed weight loss.              Dysphagia -    Failed MBS 9/23 pretty much with all consistencies. Cont NG support. Ask GI for PEG evaluation - will need to coordinate heparin infusion. Per Nephrology PN:    ASSESSMENT and PLAN:         1. MORELIA    - likely secondary to ischemia ATI. She had additional exposure to radiocontrast                - ZENON is neg, ANCA neg, C3 is low at 54    - Initiated on IHD on 9/11/20. Transitioned to CRRT on 9/12/20 which was stopped 9/15/20.  Restarted HD 09/17/20    - monitoring for possible recovery    - HD TTS.      - will work on outpatient dialysis spot         2. FEN    - hypophosphatemia                - better         3. S/P PEA arrest 9/11/20         4. Acute hypoxic respiratory failure:    - per pulmonary    - Extubated 9/19.         5. MRSA bacteremia:    - on Vancomycin with pharmacy dosing    - LEVY shows no vegetation         6. CSDHF    - wt down significantly with CRRT         7. Factor V leiden deficiency with PE/coagulopathy/DIC:    - per hematology         8.  CVA    - MRI shows bilateral acute sub acute infarcts    - neurology following    - EEG apparently better with higher keppra dose         9. atrial fibrillation    - on cardizem gtt and amiodarone gtt    - not rate controlled    - per EP      10. anemia    - Hgb lower?    - monitor hematoma    - still off anticoagulation    - on retacrit         SNF planned.  D/w  regarding obtaining outpatient dialysis spot.

## 2020-09-29 NOTE — PROGRESS NOTES
NEPHROLOGY PROGRESS NOTE    Patient: Ranjeet Bowens MRN: [id number]     YOB: 1970  Age: 53 y.o. Sex: female    Unit: [address] Room/Bed: K4Z-0048/5262-01 Location: [address] [place] [address] 12     Admitting Physician: Fifi Suarez    Primary Care Physician: No primary care provider on file. LOS: 19 days       Reason for evaluation:   Milly      SUBJECTIVE:      The patient was seen and examined. Notes and labs reviewed. There were no complications over night. HPI:  Breathing comfortably. No CP. TDC just placed. ROS:  In bed. No fever. [address]:  medications reviewed. OBJECTIVE:   /74   Pulse 66   Temp 97.7 °F (36.5 °C)   Resp 18   Ht 5' 7\" (1.702 m)   Wt 189 lb 2.5 oz (85.8 kg)   LMP 12/05/2018 (Exact Date)   SpO2 100% Comment: LOC = alert; no c/o  BMI 29.63 kg/m²     Intake and Output:      Intake/Output Summary (Last 24 hours) at 9/29/2020 1650  Last data filed at 9/29/2020 0501  Gross per 24 hour   Intake 1522.6 ml   Output 500 ml   Net 1022.6 ml       Exam:   CONSTITUTIONAL/PSYCHIATRY: Resting in bed  RESPIRATORY:  CTA bilaterally  CARDIOVASCULAR: RRR  GASTROINTESTINAL: soft BS PEG in place  + Hahn in place  EXTREMITIES:  No edema  SKIN: Warm and dry. R IJ Vasc cath in place. LABS:   RFP:   Recent Labs     09/27/20  0506 09/28/20  0640 09/29/20  0501   * 138 140   K 4.2 4.0 3.9   CL 99 101 102   CO2 24 23 24   BUN 24* 38* 47*   CREATININE 2.4* 3.4* 3.6*   CALCIUM 9.0 9.8 10.2   MG 2.00 2.10 2.20   PHOS 3.7 4.6 5.5*   GFRAA 26* 17* 16*      Liver panel:  Recent Labs     09/27/20  0506 09/28/20  0640 09/29/20  0501   AST 44* 35 29   ALT 34 27 25       CBC:   Recent Labs     09/27/20  0506 09/28/20  0640 09/29/20  0501   WBC 8.6 7.6 7.2   HGB 7.4* 7.9* 8.1*   HCT 23.6* 25.1* 25.6*   MCV 86.5 85.8 86.8    379 403         ASSESSMENT and PLAN:     1. MILLY  - likely secondary to ischemia ATI.  She had additional exposure to radiocontrast   - ZENON is neg, ANCA neg, C3 is low at 54  - Initiated on IHD on 9/11/20. Transitioned to CRRT on 9/12/20 which was stopped 9/15/20. Restarted HD 09/17/20  - monitoring for possible recovery UOP only 600 ml/24 hours Cr rebounding  - HD TTS. Monitor for recovery   - outpatient dialysis spot at [facility] MWF @ 5 PM  - [facility] placed by IR 09/29/20   - start nephrocap    2. FEN  - hypophosphatemia   - will start renvela 800 mg PO qdinner    3. S/P PEA arrest 9/11/20    4. Acute hypoxic respiratory failure:  - per pulmonary  - Extubated 9/19. on RA now     5. MRSA bacteremia:   - on Vancomycin with pharmacy dosing   - LEVY shows no vegetation    6. CSDHF  -resolved     7. Factor V leiden deficiency with PE/coagulopathy/DIC:   - per hematology  - on heparin gtt  - coumadin to start today    8.  CVA  - MRI shows bilateral acute sub acute infarcts  - neurology following  - EEG apparently better with higher keppra dose    9. atrial fibrillation  - on cardizem gtt and amiodarone gtt  - not rate controlled   - per EP    10. anemia  - Hgb stable 7.4 gm stable  - on retacrit    SNF pending

## 2020-09-29 NOTE — PROGRESS NOTES
atrial fibrillation. She had a PEA arrest on 9/10 and was resuscitated. She had acute renal failure requiring CRRT (now on HD), seizures with multiple infarcts seen on MRI and shock ?cardiogenic given EF of 30-35%. Last week she went back into atrial fibrillation with RVR and was started on an amiodarone drip. She was cardioverted a total of 7 times without prolonged conversion. She was on a Cardizem drip which has been weaned off. She remains on amiodarone drip at 0.5mg/min. She was given multiple doses of IV digoxin over the last couple days, yesterday 500mcg which did improve her HR some per her nurse. She was extubated 9/19.  9/21/2020 CSE recommend NPO  9/23/2020 MBS: Severe oral and severe pharyngeal phase dysphagia characterized by lingual pumping, reduced bolus control and formation, decreased AP transit, reduced BOT retraction, delayed weak palatal trigger, delayed swallow initiation, reduced anterior hyolaryngeal elevation, reduced pharyngeal peristalsis.   · Aspiration of nectar (tsp) with delayed cough but unable to fully clear  · Shallow penetration, progressing to deep penetration without full clearance of laryngeal vestibule as pt fatigues, with honey (tsp)  · Eventual deep penetration of puree without sensory response as pt fatigues; pt did elicit delayed cough upon command  · Premature bolus loss to valleculae with all trials  · Oral residue with all trials; variable vallecular and pharyngeal residue, more pronounced with heavier viscosities (ie, puree)  · Presence of NG tube does impact pharyngeal squeeze  Rapid fatigue with exacerbation of s/s as trials progress  Risk for aspiration increases with fatigue, with variable sensory response to penetrated/aspirated material  9/25/2020: PEG placed  Subjective:     Current diet  NPO  PEG tube    Comments regarding tolerating Current Diet:   Tolerating PEG feed per nsg  NPO this AM for procedure this afternoon  Nurse reports pt fell overnight    Objective: Pain   Patient Currently in Pain: Yes    Cognitive/Behavior   Behavior/Cognition: lethargic, Requires cueing, variable participation    Presentations   Consistencies Administered: none 2/2 NPO for procedure    Positioning   Reclined 45 degrees in bed    PO Trials:  · Thin Liquids NT  · Nectar thick liquids NT  · Honey Thick liquids tsp NT  · Puree NT  · Soft food NT  · Regular food NT    Dysphagia Tx:   · Pt asleep, requires max verbal and tactile cues to maintain alertness for minimal participation with swallow exs. · Mother at bedside. SLP reviewed and demonstrated all swallow exs from last session (Lingual protrusion, exaggerated yawn, vocal adduction x3 sets, effortful swallow), and provided written copy to family, encouraging frequent practice with pt to promote swallow function. Mother verb understanding    · OMEX: pt only completed lingual protrusion x3. Attempted effortful swallow but lethargy interfered. Did not elicit yawn or vocal adduction when cued. · PO: held 2/2 NPO for procedure     Goals:   Dysphagia Goals:   Pt will tolerate skilled trials of puree 5/5 without overt s/s ongoing  Pt will complete lingual, tongue base, EMILY/PEX 5/5 with max cues ongoing  The pt will tolerate instrumental swallowing assessment when able to participate completed 9/23    Assessment:   Impressions:   Dysphagia Diagnosis: Moderate to severe oral stage dysphagia, Severe pharyngeal stage dysphagia   · PO trials held this session 2/2 NPO for procedure  · MBS reported reduced bolus control and formation, decreased AP transit, reduced BOT retraction, delayed weak palatal trigger, delayed swallow initiation, reduced anterior hyolaryngeal elevation, reduced pharyngeal peristalsis  · Remains at risk for silent aspiration d/t rapid fatigue and weak vocal quality with concerns for reduced airway protection.   · Pt will need repeat MBS, but does not yet appear ready for consideration for diet upgrade    Diet Recommendations:  NPO  Recommended Form of Meds: Via PEG    Strategies:   Frequent oral care    Education:  Consulted and agree with results and recommendations: Patient, RN, pt's mother  Patient Education: results and recommendations  Patient Education Response: Needs reinforcement    Prognosis:   Fair for diet advancement pending progress with therapy    Plan:     Continue Dysphagia Therapy: yes  Interventions: Therapeutic Interventions: Therapeutic PO trials with SLP, Oral motor exercises, Patient/Family education, Pharyngeal exercises, Thermal gustatory stimulation  Duration/Frequency of therapy while on unit: Duration/Frequency of Treatment  Duration/Frequency of Treatment: 3-5x/wk while on acute unit  Discharge Instructions:   Anticipate Yes for further skilled Speech Therapy for Dysphagia at discharge    This note serves as a D/C Summary in the event that this patient is discharged prior to the next therapy session.     Coded treatment time:10  Total treatment time: 25    Electronically signed by  Bishnu Crawford MS, CCC-SLP #[id number]  Speech Language Pathologist    on 9/29/2020 at [id number]

## 2020-09-29 NOTE — PROGRESS NOTES
Physical Therapy  Facility/Department: [address] PROGRESSIVE CARE  Daily Treatment Note  NAME: Cassidy Patiño  : 1970  MRN: [id number]    Date of Service: 2020  Discharge Recommendations:  Patient would benefit from continued therapy after discharge, 3-5 sessions per week   PT Equipment Recommendations  Other: Will monitor for potential equipt needs. Assessment   Body structures, Functions, Activity limitations: Decreased functional mobility ; Decreased strength;Decreased safe awareness;Decreased cognition;Decreased endurance  Assessment: Pt with limited session this date due to pending procedure this date, as well as continued LE and UE twitching/jerking movements. She would benefit from continued therapy to further improve her balance, strength, endurance, and independence completing all mobility tasks, as able to participate. Continue to recommend low-moderate frequency therapy upon D/C. Will cont while hospitalized. Cassidy Patiño scored a 8 on the AM-PAC short mobility form. Current research shows that an AM-PAC score of 17 or less is typically not associated with a discharge to the patient's home setting. Based on the patient's AM-PAC score and their current functional mobility deficits, it is recommended that the patient have 3-5 sessions per week of Physical Therapy at d/c to increase the patient's independence. Please see assessment section for further patient specific details. If patient discharges prior to next session this note will serve as a discharge summary. Please see below for the latest assessment towards goals. Prognosis: Fair  History: 52 y/o female admit 2020 with Pneumonia, Pleural Effusion, Tachycardia, Cardiac Arrest.  MRI + Scattered Acute/Subacute Infarct B Cerebral Hemispheres and R Cerebellar Hemisphere; most pronounced L Occipital Lobe, likely embolic. CTA Chest + PE.   -2020 Pt Intubated. PMH as noted including Factor V Leiden, OA.   Barriers to Learning: Cognitive./ LE /UE twitching/jerking movements  REQUIRES PT FOLLOW UP: Yes  Activity Tolerance  Activity Tolerance: Treatment limited secondary to medical complications (free text)  Activity Tolerance: Activity limit due to B LE and UE twitching/jerking movements     Patient Diagnosis(es): The primary encounter diagnosis was Pneumonia due to organism. Diagnoses of Tachycardia, Pleural effusion, bilateral, Elevated lactic acid level, Congestive heart failure, unspecified HF chronicity, unspecified heart failure type ([address] and Multiple subsegmental pulmonary emboli without acute cor pulmonale were also pertinent to this visit. has a past medical history of Anticoagulant long-term use, Arthritis, Asthma, Baker's cyst, Depression, Factor V Leiden ([address] Factor V Leiden ([address] H/O blood clots, Heartburn, Hyperlipidemia, MRSA bacteremia, and Osteoarthritis. has a past surgical history that includes Gastrostomy tube placement (N/A, 9/25/2020). Restrictions  Restrictions/Precautions  Restrictions/Precautions: Fall Risk  Position Activity Restriction  Other position/activity restrictions: IV, ott, COVID negative. Subjective   General  Chart Reviewed: Yes  Additional Pertinent Hx: 52 y/o female admit 9/11/2020 with Pneumonia, Pleural Effusion, Tachycardia, Cardiac Arrest.  MRI + Scattered Acute/Subacute Infarct B Cerebral Hemispheres and R Cerebellar Hemisphere; most pronounced L Occipital Lobe, likely embolic. CTA Chest + PE.   9/11-9/19/2020 Pt Intubated. PMH as noted including Factor V Leiden, OA. Response To Previous Treatment: Patient reporting fatigue but able to participate. Family / Caregiver Present: No  Referring Practitioner: Dr. Ena Marshall  Subjective  Subjective: Pt in supine. Alert, but sleepy, tended to keep eyes closed this session. Oriented x month & year. Did not want to sit up with therapy this date. Reported \"just don't feel good\". Difficult to understand at times.   General Comment  Comments: Cont with twitching/jerking movements LEs>UEs, increase with engage/effort and with activity. Orientation  Orientation  Overall Orientation Status: Impaired  Orientation Level: Oriented to place;Oriented to person;Disoriented to situation(knew month & year this date.)     Objective   Bed mobility  Rolling to Left: Moderate assistance;Minimal assistance(needs assist to bring UEs to bedrail, pt attempted to hold railing once on sides.)  Rolling to Right: Moderate assistance;Minimal assistance  Supine to Sit: Unable to assess(NT this date, pt declined, Pending procedure this date as well.)  Sit to Supine: Unable to assess  Scooting: Dependent/Total;2 Person assistance;Maximal assistance(up into bed at end of session.)  Transfers  Sit to Stand: Unable to assess  Stand to sit: Unable to assess  Comment: OON NT this date, due to pt decline and pending procedure this am.  Pt also cont with jerking movements of B LEs > UEs. Ambulation  Ambulation?: No     Balance  Comments: EOB NT this date, due to pt feeling poor, pending morning procdure. Pt left in supine to comfort after session. Exercises  Heelslides: x 8 B LE with asssit due to jerking  Ankle Pumps: x 10 B LE  Comments: Supine LE / UE ther ex per PT/OT notes. Comment: Assist with pericare and cleaning/linens change. Pt left in supine with needs in reach at end of visit. AM-PAC Score  AM-PAC Inpatient Mobility Raw Score : 8 (09/29/20 0934)  AM-PAC Inpatient T-Scale Score : 28.52 (09/29/20 0934)  Mobility Inpatient CMS 0-100% Score: 86.62 (09/29/20 0934)  Mobility Inpatient CMS G-Code Modifier : CM (09/29/20 7637)     Goals  Short term goals  Time Frame for Short term goals: Upon d/c acute care setting. - ongoing 9/29/20  Short term goal 1: Bed Mob Min assist.  Short term goal 2: Transfers with/without assist device CGA. Short term goal 3: Amb with/without assist device 48' CGA. Patient Goals   Patient goals : None Stated.     Plan Plan  Times per week: 3-5x week while in acute care setting., cotx with OT  Current Treatment Recommendations: Strengthening, Functional Mobility Training, Transfer Training, Gait Training, Stair training, Safety Education & Training, Patient/Caregiver Education & Training  Safety Devices  Type of devices:  All fall risk precautions in place, Call light within reach, Gait belt, Nurse notified, Krissy Silverio in use, Bed alarm in place, Patient at risk for falls, Left in bed  Restraints  Initially in place: No   Therapy Time   Individual Concurrent Group Co-treatment   Time In 0900         Time Out 0930         Minutes [address] Electronically signed by Maegan Erwin, PT on 9/29/2020 at 9:35 AM

## 2020-09-29 NOTE — PROGRESS NOTES
Clinical Pharmacy Note  Heparin Dosing       Lab Results   Component Value Date    APTT 65.3 09/29/2020     Lab Results   Component Value Date    HGB 8.1 09/29/2020    HCT 25.6 09/29/2020     09/29/2020    INR 1.02 09/29/2020       Current Infusion Rate: 8.3 mL/hr    Plan:  Rate: continue at 8.3 mL/hr  Next aPTT: 0600 9/30/20    Pharmacy will continue to monitor and adjust based on aPTT results.   Alyssa Diamond [facility] 9/29/2020 6:47 AM

## 2020-09-30 LAB
ALBUMIN SERPL-MCNC: 3.2 G/DL (ref 3.4–5)
ALP BLD-CCNC: 131 U/L (ref 40–129)
ALT SERPL-CCNC: 23 U/L (ref 10–40)
ANION GAP SERPL CALCULATED.3IONS-SCNC: 10 MMOL/L (ref 3–16)
APTT: 58.2 SEC (ref 24.2–36.2)
APTT: 72.4 SEC (ref 24.2–36.2)
AST SERPL-CCNC: 32 U/L (ref 15–37)
BASOPHILS ABSOLUTE: 0.1 K/UL (ref 0–0.2)
BASOPHILS RELATIVE PERCENT: 1.5 %
BILIRUB SERPL-MCNC: 0.3 MG/DL (ref 0–1)
BILIRUBIN DIRECT: <0.2 MG/DL (ref 0–0.3)
BILIRUBIN, INDIRECT: ABNORMAL MG/DL (ref 0–1)
BUN BLDV-MCNC: 20 MG/DL (ref 7–20)
CALCIUM SERPL-MCNC: 9.3 MG/DL (ref 8.3–10.6)
CHLORIDE BLD-SCNC: 101 MMOL/L (ref 99–110)
CO2: 25 MMOL/L (ref 21–32)
CREAT SERPL-MCNC: 2.2 MG/DL (ref 0.6–1.1)
EOSINOPHILS ABSOLUTE: 0.3 K/UL (ref 0–0.6)
EOSINOPHILS RELATIVE PERCENT: 4.1 %
GFR AFRICAN AMERICAN: 29
GFR NON-AFRICAN AMERICAN: 24
GLUCOSE BLD-MCNC: 101 MG/DL (ref 70–99)
GLUCOSE BLD-MCNC: 104 MG/DL (ref 70–99)
GLUCOSE BLD-MCNC: 114 MG/DL (ref 70–99)
GLUCOSE BLD-MCNC: 114 MG/DL (ref 70–99)
GLUCOSE BLD-MCNC: 137 MG/DL (ref 70–99)
GLUCOSE BLD-MCNC: 88 MG/DL (ref 70–99)
HCT VFR BLD CALC: 25.6 % (ref 36–48)
HEMOGLOBIN: 8.1 G/DL (ref 12–16)
INR BLD: 1.1 (ref 0.86–1.14)
LYMPHOCYTES ABSOLUTE: 1.4 K/UL (ref 1–5.1)
LYMPHOCYTES RELATIVE PERCENT: 19.5 %
MAGNESIUM: 2 MG/DL (ref 1.8–2.4)
MCH RBC QN AUTO: 27.6 PG (ref 26–34)
MCHC RBC AUTO-ENTMCNC: 31.8 G/DL (ref 31–36)
MCV RBC AUTO: 86.7 FL (ref 80–100)
MONOCYTES ABSOLUTE: 0.7 K/UL (ref 0–1.3)
MONOCYTES RELATIVE PERCENT: 10.3 %
NEUTROPHILS ABSOLUTE: 4.5 K/UL (ref 1.7–7.7)
NEUTROPHILS RELATIVE PERCENT: 64.6 %
PDW BLD-RTO: 22.8 % (ref 12.4–15.4)
PERFORMED ON: ABNORMAL
PERFORMED ON: NORMAL
PHOSPHORUS: 3.3 MG/DL (ref 2.5–4.9)
PLATELET # BLD: 294 K/UL (ref 135–450)
PMV BLD AUTO: 8.6 FL (ref 5–10.5)
POTASSIUM SERPL-SCNC: 4.3 MMOL/L (ref 3.5–5.1)
PROTHROMBIN TIME: 12.8 SEC (ref 10–13.2)
RBC # BLD: 2.95 M/UL (ref 4–5.2)
SODIUM BLD-SCNC: 136 MMOL/L (ref 136–145)
TOTAL PROTEIN: 6.4 G/DL (ref 6.4–8.2)
WBC # BLD: 7 K/UL (ref 4–11)

## 2020-09-30 PROCEDURE — 94760 N-INVAS EAR/PLS OXIMETRY 1: CPT

## 2020-09-30 PROCEDURE — 2580000003 HC RX 258: Performed by: INTERNAL MEDICINE

## 2020-09-30 PROCEDURE — 77001 FLUOROGUIDE FOR VEIN DEVICE: CPT

## 2020-09-30 PROCEDURE — 80076 HEPATIC FUNCTION PANEL: CPT

## 2020-09-30 PROCEDURE — 6360000002 HC RX W HCPCS: Performed by: INTERNAL MEDICINE

## 2020-09-30 PROCEDURE — 36415 COLL VENOUS BLD VENIPUNCTURE: CPT

## 2020-09-30 PROCEDURE — 80048 BASIC METABOLIC PNL TOTAL CA: CPT

## 2020-09-30 PROCEDURE — 6370000000 HC RX 637 (ALT 250 FOR IP): Performed by: INTERNAL MEDICINE

## 2020-09-30 PROCEDURE — 92526 ORAL FUNCTION THERAPY: CPT

## 2020-09-30 PROCEDURE — 1200000000 HC SEMI PRIVATE

## 2020-09-30 PROCEDURE — 99152 MOD SED SAME PHYS/QHP 5/>YRS: CPT

## 2020-09-30 PROCEDURE — 36558 INSERT TUNNELED CV CATH: CPT

## 2020-09-30 PROCEDURE — C9113 INJ PANTOPRAZOLE SODIUM, VIA: HCPCS | Performed by: INTERNAL MEDICINE

## 2020-09-30 PROCEDURE — 6370000000 HC RX 637 (ALT 250 FOR IP): Performed by: HOSPITALIST

## 2020-09-30 PROCEDURE — 85610 PROTHROMBIN TIME: CPT

## 2020-09-30 PROCEDURE — 83735 ASSAY OF MAGNESIUM: CPT

## 2020-09-30 PROCEDURE — 85025 COMPLETE CBC W/AUTO DIFF WBC: CPT

## 2020-09-30 PROCEDURE — 85730 THROMBOPLASTIN TIME PARTIAL: CPT

## 2020-09-30 PROCEDURE — 97129 THER IVNTJ 1ST 15 MIN: CPT

## 2020-09-30 PROCEDURE — 84100 ASSAY OF PHOSPHORUS: CPT

## 2020-09-30 PROCEDURE — 99153 MOD SED SAME PHYS/QHP EA: CPT

## 2020-09-30 PROCEDURE — 76937 US GUIDE VASCULAR ACCESS: CPT

## 2020-09-30 RX ORDER — SEVELAMER CARBONATE FOR ORAL SUSPENSION 2400 MG/1
0.8 POWDER, FOR SUSPENSION ORAL
Status: DISCONTINUED | OUTPATIENT
Start: 2020-09-30 | End: 2020-10-04 | Stop reason: HOSPADM

## 2020-09-30 RX ORDER — WARFARIN SODIUM 5 MG/1
10 TABLET ORAL
Status: COMPLETED | OUTPATIENT
Start: 2020-09-30 | End: 2020-09-30

## 2020-09-30 RX ADMIN — METOPROLOL SUCCINATE 50 MG: 50 TABLET, EXTENDED RELEASE ORAL at 09:31

## 2020-09-30 RX ADMIN — HYDROXYZINE HYDROCHLORIDE 10 MG: 10 TABLET, FILM COATED ORAL at 19:44

## 2020-09-30 RX ADMIN — DIAZEPAM 5 MG: 5 TABLET ORAL at 21:31

## 2020-09-30 RX ADMIN — Medication 2 CAPSULE: at 09:31

## 2020-09-30 RX ADMIN — Medication 10 ML: at 09:31

## 2020-09-30 RX ADMIN — LACOSAMIDE 200 MG: 100 TABLET, FILM COATED ORAL at 21:31

## 2020-09-30 RX ADMIN — AMIODARONE HYDROCHLORIDE 200 MG: 200 TABLET ORAL at 21:31

## 2020-09-30 RX ADMIN — SODIUM CHLORIDE, PRESERVATIVE FREE 10 ML: 5 INJECTION INTRAVENOUS at 21:32

## 2020-09-30 RX ADMIN — PANTOPRAZOLE SODIUM 40 MG: 40 INJECTION, POWDER, FOR SOLUTION INTRAVENOUS at 09:30

## 2020-09-30 RX ADMIN — SODIUM CHLORIDE, PRESERVATIVE FREE 10 ML: 5 INJECTION INTRAVENOUS at 09:30

## 2020-09-30 RX ADMIN — LEVETIRACETAM 1000 MG: 500 SOLUTION ORAL at 09:30

## 2020-09-30 RX ADMIN — SEVELAMER CARBONATE 0.8 G: 2.4 POWDER, FOR SUSPENSION ORAL at 19:44

## 2020-09-30 RX ADMIN — HYDROXYZINE HYDROCHLORIDE 10 MG: 10 TABLET, FILM COATED ORAL at 02:37

## 2020-09-30 RX ADMIN — DIAZEPAM 5 MG: 5 TABLET ORAL at 09:31

## 2020-09-30 RX ADMIN — AMIODARONE HYDROCHLORIDE 200 MG: 200 TABLET ORAL at 09:31

## 2020-09-30 RX ADMIN — WARFARIN SODIUM 10 MG: 5 TABLET ORAL at 18:07

## 2020-09-30 RX ADMIN — LACOSAMIDE 200 MG: 100 TABLET, FILM COATED ORAL at 09:31

## 2020-09-30 RX ADMIN — Medication 2 CAPSULE: at 18:07

## 2020-09-30 RX ADMIN — NEPHROCAP 1 MG: 1 CAP ORAL at 09:31

## 2020-09-30 RX ADMIN — LEVETIRACETAM 1000 MG: 500 SOLUTION ORAL at 21:31

## 2020-09-30 ASSESSMENT — PAIN SCALES - GENERAL
PAINLEVEL_OUTOF10: 0
PAINLEVEL_OUTOF10: 0

## 2020-09-30 NOTE — CONSULTS
Clinical Pharmacy Note  Warfarin Consult    Chuyita Moon is a 53 y.o. female receiving warfarin managed by pharmacy. Patient being bridged with heparin drip. Warfarin Indication: Factor V Leiden  Target INR range: 2-3   Dose prior to admission: 7.5mg daily except 3.75mg QMon &  Thursday    Current warfarin drug-drug interactions: Amiodarone (new)    Recent Labs     09/28/20  0640 09/29/20  0501 09/30/20  0530   HGB 7.9* 8.1* 8.1*   HCT 25.1* 25.6* 25.6*   INR 1.08 1.02 1.10       Assessment/Plan:  Patient did not receive warfarin prior 2 days before resuming yesterday  Warfarin 10 mg again  tonight. Daily PT/INR until stable within therapeutic range. If Amiodarone continued on discharge expect weekly dose to decrease. Thank you for the consult. Will continue to follow.   David Michaels RPh 9/30/2020 9:12 AM

## 2020-09-30 NOTE — PLAN OF CARE
Problem: Pain:  Goal: Pain level will decrease  Description: Pain level will decrease  Outcome: Ongoing     Problem: Falls - Risk of:  Goal: Will remain free from falls  Description: Will remain free from falls  Outcome: Ongoing     Problem: Skin Integrity:  Goal: Will show no infection signs and symptoms  Description: Will show no infection signs and symptoms  Outcome: Ongoing     Problem: OXYGENATION/RESPIRATORY FUNCTION  Goal: Patient will maintain patent airway  Outcome: Ongoing     Problem: HEMODYNAMIC STATUS  Goal: Patient has stable vital signs and fluid balance  Outcome: Ongoing

## 2020-09-30 NOTE — CARE COORDINATION
Osmar call to Dasia Haas with [facility] ([phone]) to inquire about referral. OSMAR LM requesting return call. Electronically signed by BRAD Mcgregor, ESAUW on 9/30/2020 at 8:13 AM    Sw received call from Andriy estes at [address] [place] [address] ([phone]) checking on SNF referral. [facility] reports that facility does have a private room avaliable for pt. Sw call to pt mother, Adama Medina ([phone]) to discuss SNF referrals. Discussed [facility] availability and pending referral for [facility]. Mother in agreement to move forward with [facility] at this time. Call to pt spouse regarding SNF placement. Spouse in agreement with plan. Sw call to Andriy estes with [facility] to start pre cert at this time.      Electronically signed by BRAD Mcgregor, MARANDA on 9/30/2020 at 9:46 AM

## 2020-09-30 NOTE — PROGRESS NOTES
[facility]   Speech Therapy  Daily Dysphagia Treatment Note        Mitzy Matthew  AGE: 53 y.o. GENDER: female  : 1970  [id number]  EPISODE DATE:  9/10/2020  Patient Active Problem List   Diagnosis    Factor [facility] Leiden mutation ([facility]    Pulmonary embolus (HCC)    Lactic acidosis    CAP (community acquired pneumonia)    Acute CHF (congestive heart failure) ([facility] [address]    Bilateral pulmonary embolism (HCC)    Cardiac arrest ([facility] [address] [facility]    Seizures (HCC)    Acute respiratory failure with hypoxia (HCC)    MORELIA (acute kidney injury) ([facility] [address] [facility]    Metabolic acidosis    Congestive heart failure (HCC)    Pulmonary hypertension (HCC)    Multifocal pneumonia    Septic shock (HCC)    Elevated LFTs    Thrombocytopenia (HCC)    Coagulopathy (HCC)    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)    Acute encephalopathy    Pneumonia due to organism    Bacteremia    Pleural effusion, bilateral    Elevated lactic acid level    Bacteremia due to methicillin susceptible Staphylococcus aureus (MSSA)    Pneumonia due to methicillin susceptible Staphylococcus aureus (MSSA) (HCC)    Anemia    Acute renal failure (HCC)    Bilateral pleural effusion    Mitral valve insufficiency    Tricuspid valve insufficiency    Class 2 obesity due to excess calories with body mass index (BMI) of 39.0 to 39.9 in adult    Cardiogenic shock (HCC)    Staphylococcal pneumonia (HCC)    Acute on chronic systolic heart failure (HCC)    Paroxysmal atrial fibrillation (HCC)    Essential hypertension     Allergies   Allergen Reactions    Ibuprofen      Treatment Diagnosis: Dysphagia       Chart review:   History of Present Illness  Filemon Haque a 53y.o. year old female with past medical history significant for Factor V Leiden deficiency, HLD and asthma who initially presented to the hospital with SOB and leg swelling.  She was found to have bilateral PE, MRSA bacteremia and new atrial fibrillation. She had a PEA arrest on 9/10 and was resuscitated. She had acute renal failure requiring CRRT (now on HD), seizures with multiple infarcts seen on MRI and shock ?cardiogenic given EF of 30-35%. Last week she went back into atrial fibrillation with RVR and was started on an amiodarone drip. She was cardioverted a total of 7 times without prolonged conversion. She was on a Cardizem drip which has been weaned off. She remains on amiodarone drip at 0.5mg/min. She was given multiple doses of IV digoxin over the last couple days, yesterday 500mcg which did improve her HR some per her nurse. She was extubated 9/19.  9/21/2020 CSE recommend NPO  9/23/2020 MBS: Severe oral and severe pharyngeal phase dysphagia characterized by lingual pumping, reduced bolus control and formation, decreased AP transit, reduced BOT retraction, delayed weak palatal trigger, delayed swallow initiation, reduced anterior hyolaryngeal elevation, reduced pharyngeal peristalsis.   · Aspiration of nectar (tsp) with delayed cough but unable to fully clear  · Shallow penetration, progressing to deep penetration without full clearance of laryngeal vestibule as pt fatigues, with honey (tsp)  · Eventual deep penetration of puree without sensory response as pt fatigues; pt did elicit delayed cough upon command  · Premature bolus loss to valleculae with all trials  · Oral residue with all trials; variable vallecular and pharyngeal residue, more pronounced with heavier viscosities (ie, puree)  · Presence of NG tube does impact pharyngeal squeeze  Rapid fatigue with exacerbation of s/s as trials progress  Risk for aspiration increases with fatigue, with variable sensory response to penetrated/aspirated material  9/25/2020: PEG placed  Subjective:     Current diet  NPO  PEG tube    Comments regarding tolerating Current Diet:   Tolerating PEG feed per nsg; nsg ok'd PO trials with SLP this session    Objective:     Pain   Patient Currently in Pain: Denies    Cognitive/Behavior   Behavior/Cognition: Alert, Requires cueing, Cooperative    Presentations   Consistencies Administered: Dysphagia Pureed (Dysphagia I)    Positioning   Upright in bed    PO Trials:  · Thin Liquids NT  · Nectar thick liquids NT  · Honey Thick liquids tsp NT  · Puree 1/4 tsp x3: prolonged bolus transit; decreased bolus control and AP transit; decreased laryngeal elevation; delayed repeat swallows; immediate cough x1, delayed cough x1. Pt was cued for dry swallow  · Soft food NT  · Regular food NT    Dysphagia Tx:   · Pt awake and alert but requires mod cues/encouragement for consistent participation. Provides minimal simple (primarily 1-2 word) verbal responses. Vocal quality improved to mildly dysphonic with moderately low volume, but improves to mild impairment with cues. · OMEX completed x5 each with moderate impairment:  · Lingual protrusion, exaggerated yawn, vocal adduction x3 sets, effortful swallow (achieved 3/5 attempts), ascending pitch glide  · PO: applesauce x3 with immediate/delayed cough in 66% of trials. Follows verbal cues for dry swallow and protective throat clear. Pt verbalizes fatigue when asked, and requested no additional trials. Goals:   Dysphagia Goals:   Pt will tolerate skilled trials of puree 5/5 without overt s/s ongoing  Pt will complete lingual, tongue base, EMILY/PEX 5/5 with max cues ongoing  The pt will tolerate instrumental swallowing assessment when able to participate completed 9/23    Assessment:   Impressions:   Dysphagia Diagnosis: Moderate to severe oral stage dysphagia, Severe pharyngeal stage dysphagia   · Alert and participates with treatment with mod cues; follows simple commands but is cue dependent, and does not initiate any verbal interaction during session. Eyes mostly closed.   · reduced bolus control and formation, decreased AP transit, reduced BOT retraction, delayed weak palatal trigger, delayed swallow initiation, reduced anterior hyolaryngeal elevation, reduced pharyngeal peristalsis  · Cue dependent, but follows directions for dry swallow and protective throat clear. Cough post swallow in 2/3 puree trials which is new and potentially indicative of improved sensory response to aspirate; remains at risk for aspiration d/t rapid fatigue and weak vocal quality with concerns for reduced airway protection. · Pt will need repeat MBS, but does not yet appear ready for consideration for diet upgrade    Diet Recommendations:  NPO  Recommended Form of Meds: Via PEG    Strategies:   Frequent oral care    Education:  Consulted and agree with results and recommendations: Patient, RN  Patient Education: results and recommendations  Patient Education Response: Needs reinforcement    Prognosis:   Fair for diet advancement pending progress with therapy    Plan:     Continue Dysphagia Therapy: yes  Interventions: Therapeutic Interventions: Therapeutic PO trials with SLP, Oral motor exercises, Patient/Family education, Pharyngeal exercises, Thermal gustatory stimulation  Duration/Frequency of therapy while on unit: Duration/Frequency of Treatment  Duration/Frequency of Treatment: 3-5x/wk while on acute unit  Discharge Instructions:   Anticipate Yes for further skilled Speech Therapy for Dysphagia at discharge    This note serves as a D/C Summary in the event that this patient is discharged prior to the next therapy session.     Coded treatment time:10  Total treatment time: 25    Electronically signed by  Pham Parra MS, [id number]  Speech Language Pathologist    on 9/30/2020 at [id number]

## 2020-09-30 NOTE — PROGRESS NOTES
Clinical Pharmacy Note  Heparin Dosing       Lab Results   Component Value Date    APTT 58.2 09/30/2020     Lab Results   Component Value Date    HGB 8.1 09/30/2020    HCT 25.6 09/30/2020     09/30/2020    INR 1.10 09/30/2020       Current Infusion Rate: 8.3 mL/hr    Plan:  Rate: continue at 8.3 mL/hr  Next aPTT: 0600 10/01/20    Pharmacy will continue to monitor and adjust based on aPTT results.   [employer], [facility] 9/30/2020 2:22 PM

## 2020-09-30 NOTE — PROGRESS NOTES
Clinical Pharmacy Note  Heparin Dosing       Lab Results   Component Value Date    APTT 30.0 09/29/2020     Lab Results   Component Value Date    HGB 8.1 09/29/2020    HCT 25.6 09/29/2020     09/29/2020    INR 1.02 09/29/2020       Restarting after procedure. Off since 0900 today    Plan:  Bolus: 4000 units  Rate: 8.3 mL/hr  Next aPTT: 09/30/20 0400    Pharmacy will continue to monitor and adjust based on aPTT results.   Haley Morrow, [facility]

## 2020-09-30 NOTE — PROGRESS NOTES
Hospitalist Progress Note  9/30/2020 9:29 AM    PCP: No primary care provider on file.     [phone]     Date of Admission: 9/10/2020                                                                                                                     HOSPITAL COURSE    Patient demographics:  The patient  Amena Whitaker is a 53 y.o. female     Significant past medical history:   Patient Active Problem List   Diagnosis    [facility] Leiden [facility] [address]    Pulmonary embolus ([address]    Lactic acidosis    CAP (community acquired pneumonia)    Acute CHF (congestive heart failure) ([address]    Bilateral pulmonary embolism (HCC)    Cardiac arrest ([address]    Seizures ([address]    Acute respiratory failure with hypoxia ([facility] [address]    MORELIA (acute kidney injury) ([address]    Metabolic acidosis    Congestive heart failure (HCC)    Pulmonary hypertension ([address]    Multifocal pneumonia    Septic shock (HCC)    Elevated LFTs    Thrombocytopenia (HCC)    Coagulopathy (HCC)    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) ([address]    Acute encephalopathy    Pneumonia due to organism    Bacteremia    Pleural effusion, bilateral    Elevated lactic acid level    Bacteremia due to methicillin susceptible Staphylococcus aureus (MSSA)    Pneumonia due to methicillin susceptible Staphylococcus aureus (MSSA) (HCC)    Anemia    Acute renal failure (HCC)    Bilateral pleural effusion    Mitral valve insufficiency    Tricuspid valve insufficiency    Class 2 obesity due to excess calories with body mass index (BMI) of 39.0 to 39.9 in adult    Cardiogenic shock (HCC)    Staphylococcal pneumonia (HCC)    Acute on chronic systolic heart failure (HCC)    Paroxysmal atrial fibrillation (HCC)    Essential hypertension         Presenting symptoms:  Fatigue, shortness of breath, extensive abdominal and leg swelling    Diagnostic workup:      CONSULTS DURING ADMISSION :   IP CONSULT TO HEART FAILURE NURSE/COORDINATOR  IP CONSULT TO DIETITIAN  IP CONSULT TO NEUROLOGY  IP CONSULT TO CARDIOLOGY  IP CONSULT TO NEPHROLOGY  PHARMACY TO DOSE VANCOMYCIN  IP CONSULT TO HEM/ONC  IP CONSULT TO INFECTIOUS DISEASES  IP CONSULT TO CARDIAC REHAB  IP CONSULT TO DIETITIAN  IP CONSULT TO SOCIAL WORK  IP CONSULT TO GI  PHARMACY TO DOSE WARFARIN  IP CONSULT TO GI      Patient was diagnosed with:        Treatment while inpatient:   pt presented to [facility] with worsening above symptoms of fatigue, intermittent shortness of breath both at rest and on exertion, and extensive of abdominal and leg swelling.                                                                                       ----------------------------------------------------------      SUBJECTIVE COMPLAINTS- follow up for  shortness of breath    Diet: DIET TUBE FEED CONTINUOUS/CYCLIC NPO; Renal Formula; Gastrostomy; Continuous; 20; 55; Exceptions are: Ice Chips      OBJECTIVE:   Patient Active Problem List   Diagnosis    Factor V Leiden mutation ([address]    Pulmonary embolus ([address]    Lactic acidosis    CAP (community acquired pneumonia)    Acute CHF (congestive heart failure) ([address]    Bilateral pulmonary embolism ([address]    Cardiac arrest ([address]    Seizures ([address]    Acute respiratory failure with hypoxia ([address]    MORELIA (acute kidney injury) ([address]    Metabolic acidosis    Congestive heart failure (HCC)    Pulmonary hypertension ([address]    Multifocal pneumonia    Septic shock (HCC)    Elevated LFTs    Thrombocytopenia (HCC)    Coagulopathy (HCC)    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) ([address]    Acute encephalopathy    Pneumonia due to organism    Bacteremia    Pleural effusion, bilateral    Elevated lactic acid level    Bacteremia due to methicillin susceptible Staphylococcus aureus (MSSA)    Pneumonia due to methicillin susceptible Staphylococcus aureus (MSSA) (HCC)    Anemia    Acute renal failure (HCC)    Bilateral pleural effusion  Mitral valve insufficiency    Tricuspid valve insufficiency    Class 2 obesity due to excess calories with body mass index (BMI) of 39.0 to 39.9 in adult    Cardiogenic shock (HCC)    Staphylococcal pneumonia (HCC)    Acute on chronic systolic heart failure (HCC)    Paroxysmal atrial fibrillation (HCC)    Essential hypertension       Allergies  Ibuprofen    Medications    Scheduled Meds:   epoetin rajiv-epbx  2,000 Units Subcutaneous Once per day on Tue Thu Sat    And    epoetin rajiv-epbx  3,000 Units Subcutaneous Once per day on Tue Thu Sat    b complex-C-folic acid  1 capsule Oral Daily    sevelamer  800 mg Oral Dinner    insulin lispro  0-12 Units Subcutaneous Q6H    warfarin (COUMADIN) daily dosing (placeholder)   Other RX Placeholder    metoprolol succinate  50 mg Oral Daily    diazePAM  5 mg Oral 2 times per day    amiodarone  200 mg Per NG tube BID    lidocaine 1 % injection  5 mL Intradermal Once    sodium chloride flush  10 mL Intravenous 2 times per day    lacosamide  200 mg Oral BID    levETIRAcetam  1,000 mg Oral BID    lactobacillus  2 capsule Oral BID WC    pantoprazole  40 mg Intravenous Daily    And    sodium chloride (PF)  10 mL Intravenous Daily    vancomycin (VANCOCIN) intermittent dosing (placeholder)   Other RX Placeholder     Continuous Infusions:   heparin (Porcine) 8.3 mL/hr (09/29/20 2215)    dextrose       PRN Meds:  polyethylene glycol, hydrOXYzine, acetaminophen **OR** acetaminophen **OR** acetaminophen, LORazepam, sodium chloride flush, heparin (porcine), ondansetron, promethazine, glucose, dextrose, glucagon (rDNA), dextrose    Vitals   Vitals /wt   Patient Vitals for the past 8 hrs:   BP Temp Temp src Pulse Resp SpO2 Weight   09/30/20 0804 117/68 98 °F (36.7 °C) Oral 89 16 93 % --   09/30/20 0525 -- -- -- -- -- -- 191 lb 12.8 oz (87 kg)   09/30/20 0430 132/80 98 °F (36.7 °C) Axillary 99 16 96 % --        72HR INTAKE/OUTPUT:      Intake/Output Summary (Last 24 hours) at 9/30/2020 0929  Last data filed at 9/30/2020 0558  Gross per 24 hour   Intake 1878 ml   Output 1350 ml   Net 528 ml       Exam:    Gen:   Alert and oriented ×3   Eyes: PERRL. No sclera icterus. No conjunctival injection. ENT: No discharge. Pharynx clear. External appearance of ears and nose normal.  Neck: Trachea midline. No obvious mass. Resp: No accessory muscle use. No crackles. No wheezes. No rhonchi. CV: Regular rate. Regular rhythm. No murmur or rub. No edema. GI: Non-tender. Non-distended. No hernia. Skin: Warm, dry, normal texture and turgor. Lymph: No cervical LAD. No supraclavicular LAD. M/S: / Ext. No cyanosis. No clubbing. No joint deformity. Neuro: CN 2-12 are intact,  no neurologic deficits noted. PT/INR:   Recent Labs     09/28/20  0640 09/29/20  0501 09/30/20  0530   PROTIME 12.5 11.8 12.8   INR 1.08 1.02 1.10     APTT:   Recent Labs     09/28/20  0640 09/29/20  0501 09/29/20  2030 09/30/20  0530   APTT 59.8* 65.3* 30.0 72.4*       CBC:   Recent Labs     09/28/20  0640 09/29/20  0501 09/30/20  0530   WBC 7.6 7.2 7.0   HGB 7.9* 8.1* 8.1*   HCT 25.1* 25.6* 25.6*   MCV 85.8 86.8 86.7    403 294       BMP:   Recent Labs     09/28/20  0640 09/29/20  0501 09/30/20  0530    140 136   K 4.0 3.9 4.3    102 101   CO2 23 24 25   PHOS 4.6 5.5* 3.3   BUN 38* 47* 20   CREATININE 3.4* 3.6* 2.2*       LIVER PROFILE:   Recent Labs     09/28/20  0640 09/29/20  0501 09/30/20  0530   ALKPHOS 139* 145* 131*   AST 35 29 32   ALT 27 25 23   BILIDIR <0.2 <0.2 <0.2   BILITOT 0.4 0.5 0.3     No results for input(s): AMYLASE in the last 72 hours. No results for input(s): LIPASE in the last 72 hours. UA:No results for input(s): NITRITE, LABCAST, WBCUA, RBCUA, MUCUS in the last 72 hours. TROPONIN: No results for input(s): Les Maria Elena in the last 72 hours.     Lab Results   Component Value Date/Time    URRFLXCULT Not Indicated 09/10/2020 10:07 PM       No results for input(s): TSHREFLEX in the last 72 hours. No components found for: [id number]  POC GLUCOSE:    Recent Labs     09/29/20  0852 09/29/20  1135 09/29/20  2100 09/30/20  0324 09/30/20  0823   POCGLU 108* 130* 120* 137* 114*     No results for input(s): LABA1C in the last 72 hours. Lab Results   Component Value Date    LABA1C 5.6 12/17/2018         ASSESSMENT AND PLAN     Shock, septic vs cardiogenic - resolved. Severe mitral and tricuspid regurgitation on TTE.  moderate pulmonary HTN,   large bilateral pleural effusions and hepatic congestion on CT. Cardiology signed off     MRSA PNA  Completed antibiotics   TTE, LEVY without vegetations         Acute CVA  MRI with scattered acute to subacute infarcts, likely embolic   LEVY negative        Cardiomyopathy, acute on chronic systolic HF  EF 30- 95%. Severe diffuse hypokinesis with severely reduced rt ventricular function, severe tricuspid regurgitation and mitral regurgitation, grade 3 diastolic dysfunction. No prior TTE. RHC/LHC when able        S/p PEA arrest, acute hypoxic resp failure  Intubated 9/10 shortly after admission. Mental status improved enough for SBP 9/19-- extubated 9/19 to 2L - did well onwards.         Seizure like activity with ongoing myoclonic jerking movements - improved. Prior to PEA arrest, episodic rhythmic twitching and myoclonus. Initial EEG negative   keppra 1000 mg BID   MRI brain as above   repeat EEG 9/16 with concern for seizures, possible status   added vimpat   repeat EEG 9/17 improved with addition of vimpat, partner discussed with neuro and can defer cvEEG for now   Neurologic status significantly improved   no repeat seizures but ongoing myoclonus -    added valium with good effect.          A fib RVR  Cardioverted 9/16, started on amio drip without adequate control, added dilt.  Multiple attempts to cardiovert all failed   added digoxin   tolerating heparin gtt -transition  To  coumadin   in sinus on the monitor today amio transitioned to PO formulation via NG.       R groin hematoma - stable  After removing femoral line. Arterial duplex with normal flow, good pulses   monitor           Bilateral PE  Hx/of DVT/PE, on coumadin chronically - albeit not sure if she was taking this at all.    heparin drip   Hx of factor V Leiden - bridging to warfarin        MORELIA   likely  Due  To  Ischemic  ati  Also  Had  Exposure  To  radiocontrast  Remains  On  hemodialysis  Urine  Output  Only  600  Ml  Per  24  hours       Coagulapathy  Hx Factor V Leiden on coumadin.    hematology notes non-adherence to coumadin in the past.    Heme-onc were following. presently on heparin gtt bridging to warfarin.         Obesity  Body mass index is 30.01 kg/m². Counseled on effects of weight on overall health and chronic medical conditions and discussed weight loss.         Dysphagia -   Failed MBS 9/23 pretty much with all consistencies. S/p PEG 9/25 - started tube feeding 9/26 - tolerating well.        Code Status: Full Code        Dispo -social work for placement        The patient and / or the family were informed of the results of any tests, a time was given to answer questions, a plan was proposed and they agreed with plan. Katrin Avila MD    This note was transcribed using [id number] [employer] by [employer]. Please disregard any translational errors.

## 2020-09-30 NOTE — PROGRESS NOTES
Clinical Pharmacy Note  Heparin Dosing       Lab Results   Component Value Date    APTT 72.4 09/30/2020     Lab Results   Component Value Date    HGB 8.1 09/30/2020    HCT 25.6 09/30/2020     09/30/2020    INR 1.10 09/30/2020       Current Infusion Rate: 8.3 mL/hr    Plan:  Rate: continue at 8.3 mL/hr  Next aPTT: 1300 9/30/20    Pharmacy will continue to monitor and adjust based on aPTT results.   Kamini Mendenhall, PharmD

## 2020-09-30 NOTE — PROGRESS NOTES
NEPHROLOGY PROGRESS NOTE    Patient: Vadim Grant MRN: [id number]     YOB: 1970  Age: 53 y.o. Sex: female    Unit: [address] Room/Bed: F7I-2414/5262-01 Location: [address] [place] [address] 12     Admitting Physician: Asha Ramirez    Primary Care Physician: No primary care provider on file. LOS: 20 days       Reason for evaluation:   [place]      SUBJECTIVE:      The patient was seen and examined. Notes and labs reviewed. There were no complications over night. HPI:  Breathing comfortably. No CP.    ROS:  In bed. No fever. [facility]:  medications reviewed. OBJECTIVE:   /83   Pulse 94   Temp 97.7 °F (36.5 °C) (Oral)   Resp 16   Ht 5' 7\" (1.702 m)   Wt 191 lb 12.8 oz (87 kg)   LMP 12/05/2018 (Exact Date)   SpO2 96%   BMI 30.04 kg/m²     Intake and Output:      Intake/Output Summary (Last 24 hours) at 9/30/2020 1454  Last data filed at 9/30/2020 0930  Gross per 24 hour   Intake 2353 ml   Output 1350 ml   Net 1003 ml       Exam:   CONSTITUTIONAL/PSYCHIATRY: Resting in bed  RESPIRATORY:  CTA bilaterally  CARDIOVASCULAR: RRR  GASTROINTESTINAL: soft BS PEG in place  + Hahn in place  EXTREMITIES:  No edema  SKIN: Warm and dry. R IJ Vasc cath in place. LABS:   RFP:   Recent Labs     09/28/20  0640 09/29/20  0501 09/30/20  0530    140 136   K 4.0 3.9 4.3    102 101   CO2 23 24 25   BUN 38* 47* 20   CREATININE 3.4* 3.6* 2.2*   CALCIUM 9.8 10.2 9.3   MG 2.10 2.20 2.00   PHOS 4.6 5.5* 3.3   GFRAA 17* 16* 29*      Liver panel:  Recent Labs     09/28/20  0640 09/29/20  0501 09/30/20  0530   AST 35 29 32   ALT 27 25 23       CBC:   Recent Labs     09/28/20  0640 09/29/20  0501 09/30/20  0530   WBC 7.6 7.2 7.0   HGB 7.9* 8.1* 8.1*   HCT 25.1* 25.6* 25.6*   MCV 85.8 86.8 86.7    403 294         ASSESSMENT and PLAN:     1. MORELIA  - likely secondary to ischemia ATI.  She had additional exposure to radiocontrast   - ZENON is neg, ANCA neg, C3 is low at 54  - Initiated on IHD on 9/11/20. Transitioned to CRRT on 9/12/20 which was stopped 9/15/20. Restarted HD 09/17/20  - monitoring for possible recovery UOP only 600 ml/24 hours Cr rebounding  - outpatient dialysis spot at [facility] MWF @ 5 PM  - [facility] placed by IR 09/29/20   - on nephrocap  - next HD on Friday to get on outpatient MWF schedule    2. FEN  - hypophosphatemia   - on renvela 800 mg PO qdinner    3. S/P PEA arrest 9/11/20    4. Acute hypoxic respiratory failure:  - per pulmonary  - Extubated 9/19. on RA now     5. MRSA bacteremia:   - on Vancomycin with pharmacy dosing   - LEVY shows no vegetation    6. CSDHF  -resolved     7. Factor V leiden deficiency with PE/coagulopathy/DIC:   - per hematology  - on heparin gtt  - awaiting therapeutic coumadin level    8.  CVA  - MRI shows bilateral acute sub acute infarcts  - neurology following  - EEG apparently better with higher keppra dose    9. atrial fibrillation  - on cardizem gtt and amiodarone gtt  - not rate controlled   - per EP    10. anemia  - Hgb stable 7.4 gm stable  - on retacrit    SNF pending

## 2020-09-30 NOTE — PLAN OF CARE
Problem: Pain:  Goal: Pain level will decrease  Description: Pain level will decrease  9/30/2020 1540 by aZyda Antonio RN  Outcome: Ongoing     Problem: Pain:  Goal: Control of acute pain  Description: Control of acute pain  9/30/2020 1540 by Zayda Antonio RN  Outcome: Ongoing     Problem: Pain:  Goal: Control of chronic pain  Description: Control of chronic pain  9/30/2020 1540 by Zayda Antonio RN  Outcome: Ongoing     Problem: Falls - Risk of:  Goal: Will remain free from falls  Description: Will remain free from falls  9/30/2020 1540 by Zayda Antonio RN  Outcome: Ongoing     Problem: Falls - Risk of:  Goal: Absence of physical injury  Description: Absence of physical injury  9/30/2020 1540 by Zayda Antonio RN  Outcome: Ongoing     Problem: Skin Integrity:  Goal: Will show no infection signs and symptoms  Description: Will show no infection signs and symptoms  9/30/2020 1540 by Zayda Antonio RN  Outcome: Ongoing     Problem: Skin Integrity:  Goal: Absence of new skin breakdown  Description: Absence of new skin breakdown  9/30/2020 1540 by Zayda Antonio RN  Outcome: Ongoing     Problem: Nutrition  Goal: Optimal nutrition therapy  9/30/2020 1540 by Zayda Antonio RN  Outcome: Ongoing     Problem: OXYGENATION/RESPIRATORY FUNCTION  Goal: Patient will maintain patent airway  9/30/2020 1540 by Zayda Antonio RN  Outcome: Ongoing     Problem: OXYGENATION/RESPIRATORY FUNCTION  Goal: Patient will achieve/maintain normal respiratory rate/effort  Description: Respiratory rate and effort will be within normal limits for the patient  9/30/2020 1540 by Zayda Antonio RN  Outcome: Ongoing   Electronically signed by Ed Connolly RN on 9/30/2020 at 3:41 PM

## 2020-10-01 ENCOUNTER — APPOINTMENT (OUTPATIENT)
Dept: GENERAL RADIOLOGY | Age: 50
DRG: 130 | End: 2020-10-01
Payer: COMMERCIAL

## 2020-10-01 LAB
ALBUMIN SERPL-MCNC: 3.2 G/DL (ref 3.4–5)
ALP BLD-CCNC: 139 U/L (ref 40–129)
ALT SERPL-CCNC: 19 U/L (ref 10–40)
ANION GAP SERPL CALCULATED.3IONS-SCNC: 14 MMOL/L (ref 3–16)
APTT: 53.6 SEC (ref 24.2–36.2)
AST SERPL-CCNC: 28 U/L (ref 15–37)
BASOPHILS ABSOLUTE: 0.1 K/UL (ref 0–0.2)
BASOPHILS RELATIVE PERCENT: 1.8 %
BILIRUB SERPL-MCNC: 0.3 MG/DL (ref 0–1)
BILIRUBIN DIRECT: <0.2 MG/DL (ref 0–0.3)
BILIRUBIN, INDIRECT: ABNORMAL MG/DL (ref 0–1)
BUN BLDV-MCNC: 32 MG/DL (ref 7–20)
CALCIUM SERPL-MCNC: 10.8 MG/DL (ref 8.3–10.6)
CHLORIDE BLD-SCNC: 99 MMOL/L (ref 99–110)
CO2: 24 MMOL/L (ref 21–32)
CREAT SERPL-MCNC: 3.1 MG/DL (ref 0.6–1.1)
EOSINOPHILS ABSOLUTE: 0.4 K/UL (ref 0–0.6)
EOSINOPHILS RELATIVE PERCENT: 4.9 %
GFR AFRICAN AMERICAN: 19
GFR NON-AFRICAN AMERICAN: 16
GLUCOSE BLD-MCNC: 112 MG/DL (ref 70–99)
GLUCOSE BLD-MCNC: 116 MG/DL (ref 70–99)
GLUCOSE BLD-MCNC: 118 MG/DL (ref 70–99)
GLUCOSE BLD-MCNC: 121 MG/DL (ref 70–99)
GLUCOSE BLD-MCNC: 124 MG/DL (ref 70–99)
HCT VFR BLD CALC: 24.6 % (ref 36–48)
HEMOGLOBIN: 8 G/DL (ref 12–16)
INR BLD: 1.04 (ref 0.86–1.14)
KEPPRA DOSE AMT: ABNORMAL
KEPPRA: 47.5 UG/ML (ref 6–46)
LYMPHOCYTES ABSOLUTE: 1.8 K/UL (ref 1–5.1)
LYMPHOCYTES RELATIVE PERCENT: 25.2 %
MAGNESIUM: 2.1 MG/DL (ref 1.8–2.4)
MCH RBC QN AUTO: 27.7 PG (ref 26–34)
MCHC RBC AUTO-ENTMCNC: 32.5 G/DL (ref 31–36)
MCV RBC AUTO: 85.1 FL (ref 80–100)
MONOCYTES ABSOLUTE: 0.7 K/UL (ref 0–1.3)
MONOCYTES RELATIVE PERCENT: 10 %
NEUTROPHILS ABSOLUTE: 4.2 K/UL (ref 1.7–7.7)
NEUTROPHILS RELATIVE PERCENT: 58.1 %
PDW BLD-RTO: 22.3 % (ref 12.4–15.4)
PERFORMED ON: ABNORMAL
PHOSPHORUS: 4.4 MG/DL (ref 2.5–4.9)
PLATELET # BLD: 322 K/UL (ref 135–450)
PMV BLD AUTO: 8.3 FL (ref 5–10.5)
POTASSIUM SERPL-SCNC: 4.2 MMOL/L (ref 3.5–5.1)
PROTHROMBIN TIME: 12.1 SEC (ref 10–13.2)
RBC # BLD: 2.89 M/UL (ref 4–5.2)
SODIUM BLD-SCNC: 137 MMOL/L (ref 136–145)
TOTAL PROTEIN: 6.7 G/DL (ref 6.4–8.2)
VANCOMYCIN RANDOM: 16.6 UG/ML
WBC # BLD: 7.3 K/UL (ref 4–11)

## 2020-10-01 PROCEDURE — 85025 COMPLETE CBC W/AUTO DIFF WBC: CPT

## 2020-10-01 PROCEDURE — 84100 ASSAY OF PHOSPHORUS: CPT

## 2020-10-01 PROCEDURE — 6370000000 HC RX 637 (ALT 250 FOR IP): Performed by: INTERNAL MEDICINE

## 2020-10-01 PROCEDURE — 80076 HEPATIC FUNCTION PANEL: CPT

## 2020-10-01 PROCEDURE — C9113 INJ PANTOPRAZOLE SODIUM, VIA: HCPCS | Performed by: INTERNAL MEDICINE

## 2020-10-01 PROCEDURE — 74230 X-RAY XM SWLNG FUNCJ C+: CPT

## 2020-10-01 PROCEDURE — 2580000003 HC RX 258: Performed by: INTERNAL MEDICINE

## 2020-10-01 PROCEDURE — 85610 PROTHROMBIN TIME: CPT

## 2020-10-01 PROCEDURE — 85730 THROMBOPLASTIN TIME PARTIAL: CPT

## 2020-10-01 PROCEDURE — 6360000002 HC RX W HCPCS: Performed by: INTERNAL MEDICINE

## 2020-10-01 PROCEDURE — 92611 MOTION FLUOROSCOPY/SWALLOW: CPT

## 2020-10-01 PROCEDURE — 80177 DRUG SCRN QUAN LEVETIRACETAM: CPT

## 2020-10-01 PROCEDURE — 1200000000 HC SEMI PRIVATE

## 2020-10-01 PROCEDURE — 97129 THER IVNTJ 1ST 15 MIN: CPT

## 2020-10-01 PROCEDURE — 36415 COLL VENOUS BLD VENIPUNCTURE: CPT

## 2020-10-01 PROCEDURE — 80048 BASIC METABOLIC PNL TOTAL CA: CPT

## 2020-10-01 PROCEDURE — 2500000003 HC RX 250 WO HCPCS: Performed by: INTERNAL MEDICINE

## 2020-10-01 PROCEDURE — 94760 N-INVAS EAR/PLS OXIMETRY 1: CPT

## 2020-10-01 PROCEDURE — 92526 ORAL FUNCTION THERAPY: CPT

## 2020-10-01 PROCEDURE — 83735 ASSAY OF MAGNESIUM: CPT

## 2020-10-01 PROCEDURE — 6370000000 HC RX 637 (ALT 250 FOR IP): Performed by: HOSPITALIST

## 2020-10-01 PROCEDURE — 80202 ASSAY OF VANCOMYCIN: CPT

## 2020-10-01 RX ORDER — CARVEDILOL 12.5 MG/1
12.5 TABLET ORAL 2 TIMES DAILY WITH MEALS
Status: DISCONTINUED | OUTPATIENT
Start: 2020-10-01 | End: 2020-10-04 | Stop reason: HOSPADM

## 2020-10-01 RX ADMIN — CARVEDILOL 12.5 MG: 12.5 TABLET, FILM COATED ORAL at 16:46

## 2020-10-01 RX ADMIN — LEVETIRACETAM 1000 MG: 500 SOLUTION ORAL at 09:06

## 2020-10-01 RX ADMIN — DIAZEPAM 5 MG: 5 TABLET ORAL at 20:49

## 2020-10-01 RX ADMIN — Medication 2 CAPSULE: at 16:46

## 2020-10-01 RX ADMIN — LACOSAMIDE 200 MG: 100 TABLET, FILM COATED ORAL at 20:49

## 2020-10-01 RX ADMIN — AMIODARONE HYDROCHLORIDE 200 MG: 200 TABLET ORAL at 09:05

## 2020-10-01 RX ADMIN — PANTOPRAZOLE SODIUM 40 MG: 40 INJECTION, POWDER, FOR SOLUTION INTRAVENOUS at 09:06

## 2020-10-01 RX ADMIN — LEVETIRACETAM 1000 MG: 500 SOLUTION ORAL at 20:49

## 2020-10-01 RX ADMIN — AMIODARONE HYDROCHLORIDE 200 MG: 200 TABLET ORAL at 20:49

## 2020-10-01 RX ADMIN — SODIUM CHLORIDE, PRESERVATIVE FREE 10 ML: 5 INJECTION INTRAVENOUS at 21:45

## 2020-10-01 RX ADMIN — HYDROXYZINE HYDROCHLORIDE 10 MG: 10 TABLET, FILM COATED ORAL at 09:05

## 2020-10-01 RX ADMIN — HYDROXYZINE HYDROCHLORIDE 10 MG: 10 TABLET, FILM COATED ORAL at 01:45

## 2020-10-01 RX ADMIN — Medication 2 CAPSULE: at 09:08

## 2020-10-01 RX ADMIN — WARFARIN SODIUM 12.5 MG: 5 TABLET ORAL at 17:33

## 2020-10-01 RX ADMIN — NEPHROCAP 1 MG: 1 CAP ORAL at 09:05

## 2020-10-01 RX ADMIN — DIAZEPAM 5 MG: 5 TABLET ORAL at 09:05

## 2020-10-01 RX ADMIN — SODIUM CHLORIDE, PRESERVATIVE FREE 10 ML: 5 INJECTION INTRAVENOUS at 09:07

## 2020-10-01 RX ADMIN — SEVELAMER CARBONATE 0.8 G: 2.4 POWDER, FOR SUSPENSION ORAL at 16:47

## 2020-10-01 RX ADMIN — LACOSAMIDE 200 MG: 100 TABLET, FILM COATED ORAL at 09:05

## 2020-10-01 RX ADMIN — METOPROLOL SUCCINATE 50 MG: 50 TABLET, EXTENDED RELEASE ORAL at 09:06

## 2020-10-01 RX ADMIN — Medication 10 ML: at 09:07

## 2020-10-01 RX ADMIN — HEPARIN SODIUM 8.3 ML/HR: 10000 INJECTION, SOLUTION INTRAVENOUS at 04:27

## 2020-10-01 ASSESSMENT — PAIN SCALES - GENERAL
PAINLEVEL_OUTOF10: 0

## 2020-10-01 NOTE — PROGRESS NOTES
NEPHROLOGY PROGRESS NOTE    Patient: Rain Cote MRN: [id number]     YOB: 1970  Age: 53 y.o. Sex: female    Unit: [address] Room/Bed: R4R-2349/5262-01 Location: [address] [place] [address]     Admitting Physician: Mana Bryant    Primary Care Physician: No primary care provider on file. LOS: 21 days       Reason for evaluation:   [place]      SUBJECTIVE:      The patient was seen and examined. Notes and labs reviewed. There were no complications over night. HPI:  Breathing comfortably. No CP.    ROS:  In bed. No fever. [facility]:  medications reviewed. OBJECTIVE:   BP (!) 150/74   Pulse 103   Temp 97.8 °F (36.6 °C) (Oral)   Resp 18   Ht 5' 7\" (1.702 m)   Wt 191 lb 12.8 oz (87 kg)   LMP 12/05/2018 (Exact Date)   SpO2 93%   BMI 30.04 kg/m²     Intake and Output:      Intake/Output Summary (Last 24 hours) at 10/1/2020 1233  Last data filed at 10/1/2020 1135  Gross per 24 hour   Intake 2002 ml   Output 1425 ml   Net 577 ml       Exam:   CONSTITUTIONAL/PSYCHIATRY: Resting in bed  RESPIRATORY:  CTA bilaterally  CARDIOVASCULAR: RRR  GASTROINTESTINAL: soft BS PEG in place  + Hahn in place  EXTREMITIES:  No edema  SKIN: Warm and dry. R IJ Vasc cath in place. LABS:   RFP:   Recent Labs     09/29/20  0501 09/30/20  0530 10/01/20  0637    136 137   K 3.9 4.3 4.2    101 99   CO2 24 25 24   BUN 47* 20 32*   CREATININE 3.6* 2.2* 3.1*   CALCIUM 10.2 9.3 10.8*   MG 2.20 2.00 2.10   PHOS 5.5* 3.3 4.4   GFRAA 16* 29* 19*      Liver panel:  Recent Labs     09/29/20  0501 09/30/20  0530 10/01/20  0637   AST 29 32 28   ALT 25 23 19       CBC:   Recent Labs     09/29/20  0501 09/30/20  0530 10/01/20  0637   WBC 7.2 7.0 7.3   HGB 8.1* 8.1* 8.0*   HCT 25.6* 25.6* 24.6*   MCV 86.8 86.7 85.1    294 322         ASSESSMENT and PLAN:     1. MORELIA  - likely secondary to ischemia ATI.  She had additional exposure to radiocontrast   - ZENON is neg, ANCA neg, C3 is low at 47  - Initiated on IHD on 9/11/20. Transitioned to CRRT on 9/12/20 which was stopped 9/15/20. Restarted HD 09/17/20  - monitoring for possible recovery UOP only 600 ml/24 hours Cr rebounding  - outpatient dialysis spot at [facility] MWF @ 5 PM  - [facility] placed by IR 09/29/20   - on nephrocap  - next HD on Friday to get on outpatient MWF schedule    2. FEN  - hypophosphatemia   - on renvela 800 mg PO qdinner    3. S/P PEA arrest 9/11/20    4. Acute hypoxic respiratory failure:  - per pulmonary  - Extubated 9/19. on RA now     5. MRSA bacteremia:   - on Vancomycin with pharmacy dosing   - LEVY shows no vegetation    6. CSDHF  -resolved     7. Factor V leiden deficiency with PE/coagulopathy/DIC:   - per hematology  - on heparin gtt  - awaiting therapeutic coumadin level    8.  CVA  - MRI shows bilateral acute sub acute infarcts  - neurology following  - EEG apparently better with higher keppra dose    9. atrial fibrillation  - on PO amiodarone    10. anemia  - Hgb stable  - on retacrit    SNF pending

## 2020-10-01 NOTE — PROGRESS NOTES
[facility]   Speech Therapy  Daily Dysphagia Treatment Note        Loco Arndt  AGE: 53 y.o. GENDER: female  : 1970  [id number]  EPISODE DATE:  9/10/2020  Patient Active Problem List   Diagnosis    [facility]    Pulmonary embolus (HCC)    Lactic acidosis    CAP (community acquired pneumonia)    Acute CHF [facility]congestive heart failure) ([facility]    Bilateral pulmonary embolism (HCC)    Cardiac arrest ([facility]    Seizures (HCC)    Acute respiratory failure with hypoxia (HCC)    MORELIA (acute kidney injury) ([facility]    Metabolic acidosis    Congestive heart failure (HCC)    Pulmonary hypertension (HCC)    Multifocal pneumonia    Septic shock (HCC)    Elevated LFTs    Thrombocytopenia (HCC)    Coagulopathy (HCC)    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)    Acute encephalopathy    Pneumonia due to organism    Bacteremia    Pleural effusion, bilateral    Elevated lactic acid level    Bacteremia due to methicillin susceptible Staphylococcus aureus (MSSA)    Pneumonia due to methicillin susceptible Staphylococcus aureus (MSSA) (HCC)    Anemia    Acute renal failure (HCC)    Bilateral pleural effusion    Mitral valve insufficiency    Tricuspid valve insufficiency    Class 2 obesity due to excess calories with body mass index (BMI) of 39.0 to 39.9 in adult    Cardiogenic shock (HCC)    Staphylococcal pneumonia (HCC)    Acute on chronic systolic heart failure (HCC)    Paroxysmal atrial fibrillation (HCC)    Essential hypertension     Allergies   Allergen Reactions    Ibuprofen      Treatment Diagnosis: Dysphagia       Chart review:   History of Present Illness  Deborah Lopez a 53y.o. year old female with past medical history significant for Factor V Leiden deficiency, HLD and asthma who initially presented to the hospital with SOB and leg swelling.  She was found to have bilateral PE, MRSA bacteremia and new atrial fibrillation. She had a PEA arrest on 9/10 and was resuscitated. She had acute renal failure requiring CRRT (now on HD), seizures with multiple infarcts seen on MRI and shock ?cardiogenic given EF of 30-35%. Last week she went back into atrial fibrillation with RVR and was started on an amiodarone drip. She was cardioverted a total of 7 times without prolonged conversion. She was on a Cardizem drip which has been weaned off. She remains on amiodarone drip at 0.5mg/min. She was given multiple doses of IV digoxin over the last couple days, yesterday 500mcg which did improve her HR some per her nurse. She was extubated 9/19.  9/21/2020 CSE recommend NPO  9/23/2020 MBS: Severe oral and severe pharyngeal phase dysphagia characterized by lingual pumping, reduced bolus control and formation, decreased AP transit, reduced BOT retraction, delayed weak palatal trigger, delayed swallow initiation, reduced anterior hyolaryngeal elevation, reduced pharyngeal peristalsis.   · Aspiration of nectar (tsp) with delayed cough but unable to fully clear  · Shallow penetration, progressing to deep penetration without full clearance of laryngeal vestibule as pt fatigues, with honey (tsp)  · Eventual deep penetration of puree without sensory response as pt fatigues; pt did elicit delayed cough upon command  · Premature bolus loss to valleculae with all trials  · Oral residue with all trials; variable vallecular and pharyngeal residue, more pronounced with heavier viscosities (ie, puree)  · Presence of NG tube does impact pharyngeal squeeze  Rapid fatigue with exacerbation of s/s as trials progress  Risk for aspiration increases with fatigue, with variable sensory response to penetrated/aspirated material  9/25/2020: PEG placed  Subjective:     Current diet  NPO (exceptions are ice chips)  PEG tube    Comments regarding tolerating Current Diet:   Tolerating PEG feed per nsg; nsg ok'd PO trials with SLP this session    Objective: Pain   Patient Currently in Pain: Denies    Cognitive/Behavior   Behavior/Cognition: Alert, Requires cueing, Cooperative, responds appropriately to simple questions with min-mod cues    Presentations   Consistencies Administered: Dysphagia Pureed (Dysphagia I)    Positioning   Upright in bed    PO Trials:  · Thin Liquids NT  · Nectar thick liquids NT  · Honey Thick liquids tsp NT  · Puree 1/4 tsp x5: prolonged bolus transit; decreased bolus control and AP transit; decreased laryngeal elevation; delayed repeat swallows; delayed cough x1. Pt was cued for dry swallow  · Soft food NT  · Regular food NT    Dysphagia Tx:   · Pt awake and alert, follows simple dx and answers simple questions with min-mod cues for consistent participation. Vocal quality improved to mildly dysphonic with moderately low volume, but improves to mild impairment with cues. · OMEX completed x5 each with moderate impairment:  · Lingual protrusion and lateralization, lateralization with resistance, exaggerated yawn, vocal adduction x3 sets, effortful swallow (achieved 4/5 attempts), alejandro (achieved 2/3 attempts, very weak)  · PO: applesauce x5 delayed cough in 20% of trials. Follows verbal cues for dry swallow and protective throat clear. Goals:   Dysphagia Goals:   Pt will tolerate skilled trials of puree 5/5 without overt s/s ongoing  Pt will complete lingual, tongue base, EMILY/PEX 5/5 with max cues ongoing  The pt will tolerate instrumental swallowing assessment when able to participate completed 9/23; request for repeat MBS 10/1    Assessment:   Impressions:   Dysphagia Diagnosis: Suspected moderate to severe oral stage dysphagia, Severe pharyngeal stage dysphagia   · Alert and participates with treatment with mod cues; follows simple commands with cueing.   · reduced bolus control and formation, decreased AP transit, reduced BOT retraction, delayed weak palatal trigger, delayed swallow initiation, reduced anterior hyolaryngeal elevation, reduced pharyngeal peristalsis persist  · Follows simple dx for participation with simple swallow exs and for dry swallow and protective throat clear with PO. Cough post swallow in 20% puree trials; potentially indicative of improved sensory response to aspirate; remains at risk for aspiration d/t rapid fatigue and weak vocal quality with concerns for reduced airway protection. · Recommend repeat Modified Barium Swallow to determine any improvements to pharyngeal phase, with hopeful progression of treatment plan/PO trials. Please obtain MD order for MBS if in agreement     Diet Recommendations:  NPO  Recommended Form of Meds: Via PEG    Strategies:   Frequent oral care    Education:  Consulted and agree with results and recommendations: Patient, RN  Patient Education: results and recommendations  Patient Education Response: Needs reinforcement    Prognosis:   Fair for diet advancement pending progress with therapy    Plan:     Continue Dysphagia Therapy: yes  Interventions: Therapeutic Interventions: Therapeutic PO trials with SLP, Oral motor exercises, Patient/Family education, Pharyngeal exercises, Thermal gustatory stimulation  Duration/Frequency of therapy while on unit: Duration/Frequency of Treatment  Duration/Frequency of Treatment: 3-5x/wk while on acute unit  Discharge Instructions:   Anticipate Yes for further skilled Speech Therapy for Dysphagia at discharge    This note serves as a D/C Summary in the event that this patient is discharged prior to the next therapy session.     Coded treatment time:10  Total treatment time: 30    Electronically signed by  Shayy Noland MS, CCC-SLP #[id number]  Speech Language Pathologist    on 10/1/2020 at 1150AM

## 2020-10-01 NOTE — PROGRESS NOTES
Physical Therapy  Status Note  10/1/2020  Loco Arndt  [id number]  [id number]    Patient at Modified barium swallow test. Will follow and continue to see as patient available and able to tolerate.   Electronically signed by Amandeep Qiu, [address] [place] [address] Drive [phone])  on 10/1/2020 at 6:32 PM

## 2020-10-01 NOTE — PROGRESS NOTES
Hospitalist Progress Note  10/1/2020 9:01 AM    PCP: No primary care provider on file.     [id number]     Date of Admission: 9/10/2020                                                                                                                     HOSPITAL COURSE    Patient demographics:  The patient  Loco Arndt is a 53 y.o. female     Significant past medical history:   Patient Active Problem List   Diagnosis    [facility] Leiden mutation ([facility]    Pulmonary embolus ([address]    Lactic acidosis    CAP (community acquired pneumonia)    Acute CHF (congestive heart failure) ([facility]    Bilateral pulmonary embolism (HCC)    Cardiac arrest ([address]    Seizures ([address]    Acute respiratory failure with hypoxia ([address]    MORELIA (acute kidney injury) ([address]    Metabolic acidosis    Congestive heart failure (HCC)    Pulmonary hypertension ([address]    Multifocal pneumonia    Septic shock (HCC)    Elevated LFTs    Thrombocytopenia (HCC)    Coagulopathy (HCC)    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) ([address]    Acute encephalopathy    Pneumonia due to organism    Bacteremia    Pleural effusion, bilateral    Elevated lactic acid level    Bacteremia due to methicillin susceptible Staphylococcus aureus (MSSA)    Pneumonia due to methicillin susceptible Staphylococcus aureus (MSSA) (HCC)    Anemia    Acute renal failure (HCC)    Bilateral pleural effusion    Mitral valve insufficiency    Tricuspid valve insufficiency    Class 2 obesity due to excess calories with body mass index (BMI) of 39.0 to 39.9 in adult    Cardiogenic shock (HCC)    Staphylococcal pneumonia (HCC)    Acute on chronic systolic heart failure (HCC)    Paroxysmal atrial fibrillation (HCC)    Essential hypertension         Presenting symptoms:  Fatigue, shortness of breath, extensive abdominal and leg swelling    Diagnostic workup:      CONSULTS DURING ADMISSION :   IP CONSULT TO HEART FAILURE NURSE/COORDINATOR  IP CONSULT TO DIETITIAN  IP CONSULT TO NEUROLOGY  IP CONSULT TO CARDIOLOGY  IP CONSULT TO NEPHROLOGY  PHARMACY TO DOSE VANCOMYCIN  IP CONSULT TO HEM/ONC  IP CONSULT TO INFECTIOUS DISEASES  IP CONSULT TO CARDIAC REHAB  IP CONSULT TO DIETITIAN  IP CONSULT TO SOCIAL WORK  IP CONSULT TO GI  PHARMACY TO DOSE WARFARIN  IP CONSULT TO GI      Patient was diagnosed with:        Treatment while inpatient:   53 y.o. female with past medical history of factor V Leiden deficiency on chronic warfarin, history of blood clots, hyperlipidemia, arthritis, asthma who presents to [address] above symptoms of fatigue, intermittent shortness of breath both at rest and on exertion, and extensive of abdominal and leg swelling. In the ED patient was significantly tachycardic, showing some combination of sinus tachycardia versus SVT, uncertain if may be underlying atrial fibrillation with rapid ventricular rate.  Labs showed signs of infection as well as elevated proBNP.  CTA with contrast was done on the chest and demonstrated left and right lobe PEs in certain segments, active findings suggestive of early patchy pneumonia bilaterally, and moderate bilateral pleural effusions suggestive of heart failure/volume overload. PEA arrest shortly after admission. TTE with EF 30- 35%. Severe diffuse hypokinesis with severely reduced rt ventricular function, severe tricuspid regurgitation and mitral regurgitation, grade 3 diastolic dysfunction. For acute renal failure likely due to ischemia patient was started on hemodialysis. Found to have MRSA PNA and bacteremia. Developed shock liver and coagulopathy which are improving. Concern for seizure like activity, initial EEG negative but repeat with possible status, added vimpat to keppra and repeat EEG improved. Acute to subacute infarcts on MRI. Developed a fib RVR 9/16 that has not responded to cardioversion (x5), on amio and dilt IV.     Patient is status post PEG tube placement 9/25. continues to be on tube feed  Patient failed modified barium swallow on 9/23.   Repeat modified barium swallow 10/2                                                         ----------------------------------------------------------      SUBJECTIVE COMPLAINTS- follow up for  shortness of breath    Diet: DIET TUBE FEED CONTINUOUS/CYCLIC NPO; Renal Formula; Gastrostomy; Continuous; 20; 55; Exceptions are: Ice Chips      OBJECTIVE:   Patient Active Problem List   Diagnosis    [facility] ([facility]    Pul[facility]us ([facility]    Lactic acidosis    CAP (community acquired pneumonia)    Acute CHF (congestive heart failure) ([facility])    Bilateral pulmonary embolism (HCC)    Cardiac arrest ([facility])    Seizures ([facility])    Acute respiratory failure with hypoxia ([facility])    MORELIA (acute kidney injury) ([facility]    Metabolic acidosis    Congestive heart failure (HCC)    Pulmonary hypertension (HCC)    Multifocal pneumonia    Septic shock ([facility])    Elevated LFTs    Thrombocytopenia (HCC)    Coagulopathy (HCC)    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) ([facility])    Acute encephalopathy    Pneumonia due to organism    Bacteremia    Pleural effusion, bilateral    Elevated lactic acid level    Bacteremia due to methicillin susceptible Staphylococcus aureus (MSSA)    Pneumonia due to methicillin susceptible Staphylococcus aureus (MSSA) ([facility])    Anemia    Acute renal failure (HCC)    Bilateral pleural effusion    Mitral valve insufficiency    Tricuspid valve insufficiency    Class 2 obesity due to excess calories with body mass index (BMI) of 39.0 to 39.9 in adult    Cardiogenic shock (HCC)    Staphylococcal pneumonia (HCC)    Acute on chronic systolic heart failure (HCC)    Paroxysmal atrial fibrillation (HCC)    Essential hypertension       Allergies  Ibuprofen    Medications    Scheduled Meds:   warfarin  12.5 mg Oral Once    sevelamer  0.8 g Per G Tube Dinner    epoetin rajiv-epbx 2,000 Units Subcutaneous Once per day on Tue Thu Sat    And    epoetin rajiv-epbx  3,000 Units Subcutaneous Once per day on Tue Thu Sat    b complex-C-folic acid  1 capsule Oral Daily    insulin lispro  0-12 Units Subcutaneous Q6H    warfarin (COUMADIN) daily dosing (placeholder)   Other RX Placeholder    metoprolol succinate  50 mg Oral Daily    diazePAM  5 mg Oral 2 times per day    amiodarone  200 mg Per NG tube BID    lidocaine 1 % injection  5 mL Intradermal Once    sodium chloride flush  10 mL Intravenous 2 times per day    lacosamide  200 mg Oral BID    levETIRAcetam  1,000 mg Oral BID    lactobacillus  2 capsule Oral BID WC    pantoprazole  40 mg Intravenous Daily    And    sodium chloride (PF)  10 mL Intravenous Daily    vancomycin (VANCOCIN) intermittent dosing (placeholder)   Other RX Placeholder     Continuous Infusions:   heparin (Porcine) 8.3 mL/hr (10/01/20 0427)    dextrose       PRN Meds:  polyethylene glycol, hydrOXYzine, acetaminophen **OR** acetaminophen **OR** acetaminophen, LORazepam, sodium chloride flush, heparin (porcine), ondansetron, promethazine, glucose, dextrose, glucagon (rDNA), dextrose    Vitals   Vitals /wt   Patient Vitals for the past 8 hrs:   BP Temp Temp src Pulse Resp SpO2 Weight   10/01/20 0827 -- -- -- -- 18 92 % --   10/01/20 0728 (!) 151/80 97.4 °F (36.3 °C) Oral 103 18 93 % --   10/01/20 0600 -- -- -- -- -- -- 191 lb 12.8 oz (87 kg)   10/01/20 0425 (!) 162/83 98.2 °F (36.8 °C) Oral 101 16 95 % --        72HR INTAKE/OUTPUT:      Intake/Output Summary (Last 24 hours) at 10/1/2020 0901  Last data filed at 10/1/2020 0547  Gross per 24 hour   Intake 2477 ml   Output 1425 ml   Net 1052 ml       Exam:    Gen:   Alert and oriented ×3   Eyes: PERRL. No sclera icterus. No conjunctival injection. ENT: No discharge. Pharynx clear. External appearance of ears and nose normal.  Neck: Trachea midline. No obvious mass. Resp: No accessory muscle use. No crackles.  No PEA arrest, acute hypoxic resp failure  Intubated 9/10 shortly after admission. extubated 9/19    did well onwards for respiratory status       MRSA PNA  Completed antibiotics   TTE, LEVY without vegetations         Acute CVA  MRI with scattered acute to subacute infarcts, likely embolic   LEVY negative        Cardiomyopathy, acute on chronic systolic HF  EF 30- 63%. Severe diffuse hypokinesis   severe tricuspid regurgitation and mitral regurgitation,   grade 3 diastolic dysfunction. RHC/LHC when able         Seizure   keppra 1000 mg BID   repeat EEG 9/16 with concern for seizures, possible status   added vimpat to Keppra   repeat EEG 9/17 improved with addition of vimpat, partner discussed with neuro and can defer cvEEG for now   no repeat seizures but ongoing myoclonus -    added valium with good effect.          A fib RVR  Cardioverted 9/16, started on amio drip without adequate control, added dilt. Multiple attempts to cardiovert all failed   added digoxin   tolerating heparin gtt -transition  To  coumadin   in sinus on the monitor today    amio transitioned to PO formulation via NG.       R groin hematoma - stable  After removing femoral line. Arterial duplex with normal flow, good pulses   monitor           Bilateral PE  Hx/of DVT/PE, on coumadin chronically - albeit not sure if she was taking this at all.    heparin drip   Hx of factor V Leiden - bridging to warfarin        MORELIA  Continue on hemodialysis    Coagulapathy  Hx Factor V Leiden on coumadin.    hematology notes non-adherence to coumadin in the past.    Heme-onc were following. presently on heparin gtt bridging to warfarin.         Obesity  Body mass index is 30.01 kg/m². Counseled on effects of weight on overall health and chronic medical conditions and discussed weight loss.         Dysphagia -   Failed MBS 9/23 pretty much with all consistencies. S/p PEG 9/25 - started tube feeding 9/26 - tolerating well.    Repeat modified barium swallow in a.m. Code Status: Full Code        Dispo -social work for placement        The patient and / or the family were informed of the results of any tests, a time was given to answer questions, a plan was proposed and they agreed with plan. Becki To MD    This note was transcribed using [id number]. Please disregard any translational errors.

## 2020-10-01 NOTE — PROGRESS NOTES
Occupational Therapy  Pt to have MBS test soon, per RN. Will follow and re-attempt as schedule permits. Refer to the last note for status if pt is discharged prior to the next session.    Hailey STUBBS/ARSENIO,[id number]

## 2020-10-01 NOTE — PLAN OF CARE
Problem: Falls - Risk of:  Goal: Will remain free from falls  Description: Will remain free from falls  10/1/2020 0034 by Jinny Salgado RN  Outcome: Ongoing  Bed locked and in lowest position. Bed alarm on. Non-skid socks on pt. Call light within reach. Siderails up. Telesitter in place. Problem: Skin Integrity:  Goal: Absence of new skin breakdown  Description: Absence of new skin breakdown  10/1/2020 0034 by Jinny Salgado RN  Outcome: Ongoing  No new skin breakdown noted. Pt being turned Q2 hours. Heels elevated off bed. Zinc cream applied to sacrum. Problem: HEMODYNAMIC STATUS  Goal: Patient has stable vital signs and fluid balance  10/1/2020 0034 by Jinny Salgado RN  Outcome: Ongoing  VS stable. I&O being monitored. Problem: OXYGENATION/RESPIRATORY FUNCTION  Goal: Patient will achieve/maintain normal respiratory rate/effort  Description: Respiratory rate and effort will be within normal limits for the patient  10/1/2020 0034 by Jinny Salgado RN  Outcome: Ongoing  Pt on room air with O2 sats >90%. Normal respiratory rate. Unlabored.

## 2020-10-01 NOTE — PROCEDURES
[facility]   Speech Therapy   MODIFIED BARIUM SWALLOW      Luzma Ballesteros  AGE: 53 y.o. GENDER: female  : 1970  [id number]  EPISODE DATE:  9/10/2020  Ordering MD:  Ordering Physician: Dr Madelin Wilhelm  Radiologist:  Radiologist: Dr Dyana Espinosa  Date of Eval:  10/1/2020     Type of Study: Repeat Modified Barium Swallow    ONSET DATE: 9/10/2020       MEDICAL DIAGNOSIS: Dysphagia  TREATMENT DIAGNOSIS: Oropharyngeal Dysphagia    PAST MEDICAL HISTORY   has a past medical history of Anticoagulant long-term use, Arthritis, Asthma, Baker's cyst, Depression, Factor V Leiden ([facility] [address] Factor V Leiden ([facility] [address] H/O blood clots, Heartburn, Hyperlipidemia, MRSA bacteremia (09/10/2020), and Osteoarthritis. PAST SURGICAL HISTORY  Past Surgical History:   Procedure Laterality Date    GASTROSTOMY TUBE PLACEMENT N/A 2020    ESOPHAGOGASTRODUODENOSCOPY PERCUTANEOUS ENDOSCOPIC GASTROSTOMY TUBE PLACEMENT performed by Yan Ramirez MD at [address] IR TUNNELED [address] 5 YEARS  2020    IR TUNNELED CATHETER PLACEMENT GREATER THAN 5 YEARS 2020 [facility] SPECIAL PROCEDURES    TUNNELED VENOUS CATHETER PLACEMENT Right 2020    Permacath ; RIJ; 19cm; Dr. Moran Setting  Allergies   Allergen Reactions    Ibuprofen      Previous MBS 2020:Severe oral and severe pharyngeal phase dysphagia characterized by lingual pumping, reduced bolus control and formation, decreased AP transit, reduced BOT retraction, delayed weak palatal trigger, delayed swallow initiation, reduced anterior hyolaryngeal elevation, reduced pharyngeal peristalsis.   · Aspiration of nectar (tsp) with delayed cough but unable to fully clear  · Shallow penetration, progressing to deep penetration without full clearance of laryngeal vestibule as pt fatigues, with honey (tsp)  · Eventual deep penetration of puree without sensory response as pt fatigues; pt did elicit delayed cough upon command  · Premature bolus loss to valleculae with all trials  · Oral residue with all trials; variable vallecular and pharyngeal residue, more pronounced with heavier viscosities (ie, puree)  · Presence of NG tube does impact pharyngeal squeeze  Rapid fatigue with exacerbation of s/s as trials progress  Risk for aspiration increases with fatigue, with variable sensory response to penetrated/aspirated material      Subjective:     Reason for referral: Velasquez Salgado was referred for a Modified Barium Swallow study to assess  the efficiency of swallow function, rule out aspiration and make recommendations regarding safe dietary consistencies, effective compensatory strategies, and safe eating environment. PATIENT AND FAMILY / STAFF COMPLAINTS:   Improved swallow function and cognition; inconsistent cough with puree trials w SLP   Diet prior to study:   Current Diet Solid Consistency: NPO  Current Diet Liquid Consistency: NPO    Objective: Impressions and Recommendations     IMPRESSIONS:    1. Behavior/Cognition: Alert, Cooperative, Pleasant mood, Confused, Requires cueing     2. Dysphagia Diagnosis:Mod-severe oral and mod-severe pharyngeal phase dysphagia characterized by lingual pumping, reduced bolus control and formation, decreased AP transit, reduced BOT retraction, delayed weak palatal trigger, delayed swallow initiation, reduced anterior hyolaryngeal elevation, reduced pharyngeal peristalsis.   · Aspiration of thin (tsp) with immediate cough  · Deep penetration of nectar (tsp) with reflexive cough, though not fully cleared  · Shallow penetration of puree and honey (tsp) with occasional cough and clearance; deeper penetration with honey (cup) without full clearance  · Premature bolus loss to valleculae with all trials  · Trace to min oral residue with thinner viscosities; moderate residue with mince/moist  · Minimal to absent pharyngeal residue, improved from last MBS  · Improved but mildly impaired pharyngeal squeeze     While

## 2020-10-01 NOTE — PLAN OF CARE
Problem: Nutrition  Goal: Optimal nutrition therapy  10/1/2020 1021 by Hanane Bolden RD, LD  Outcome: Ongoing  10/1/2020 0034 by Etha Osgood, RN  Outcome: Ongoing   Nutrition Problem #1: Inadequate oral intake  Intervention: Food and/or Nutrient Delivery: Continue Current Tube Feeding(add proteinex if okay with MD)  Nutritional Goals: tolerate most appropriate form of nutrition

## 2020-10-01 NOTE — PROGRESS NOTES
Clinical Pharmacy Note  Warfarin Consult    Refugia Bolus is a 53 y.o. female receiving warfarin managed by pharmacy. Patient being bridged with heparin drip. Warfarin Indication: Factor V Leiden and then had Bilateral PE, Acute CVA, and AFib this admission  Target INR range: 2-3   Dose prior to admission: 7.5mg daily except 3.75mg QMon &  Thursday    Current warfarin drug-drug interactions: Amiodarone (new)    Recent Labs     09/29/20  0501 09/30/20  0530 10/01/20  0637   HGB 8.1* 8.1* 8.0*   HCT 25.6* 25.6* 24.6*   INR 1.02 1.10 1.04       Assessment/Plan:    Patient now receiving continuous Nepro tube feeds via PEG tube. Receiving roughly 120 mcg/day of Vitamin K currently. Anticipate patient will require more warfarin than home dose due to TF's. INR has not budged despite two doses of warfarin 10 mg. Will dose warfarin 12.5 mg again tonight. Daily PT/INR until stable within therapeutic range. If Amiodarone continued on discharge expect weekly dose to decrease. Thank you for the consult. Will continue to follow. Jeison Moy PharmD.   10/1/2020  7:24 AM

## 2020-10-01 NOTE — ACP (ADVANCE CARE PLANNING)
Advance Care Planning     Advance Care Planning Activator (Inpatient)  Conversation Note      Date of ACP Conversation: 10/1/2020    Conversation Conducted with: Patient with Decision Making Capacity    ACP Activator: [id number] Decision Maker:     Current Designated Health Care Decision Maker:     If no Decision Maker listed above or available through scanned documents, then:    If no Authorized Decision Maker has previously been identified, then patient chooses Health Care Decision Maker:  \"Who would you like to name as your primary health care decision-maker? \"               Name: Jannie Mehta        Relationship: Spouse          Phone number: [phone]  Danny Diaz this person be reached easily? \" Yes  \"Who would you like to name as your back-up decision maker? \"   Name: Esha Shay      Relationship: Mother          Phone number: [phone]  Danny Diaz this person be reached easily? \" Yes    Care Preferences    Ventilation: \"If you were in your present state of health and suddenly became very ill and were unable to breathe on your own, what would your preference be about the use of a ventilator (breathing machine) if it were available to you? \"      Would the patient desire the use of ventilator (breathing machine)?: yes    \"If your health worsens and it becomes clear that your chance of recovery is unlikely, what would your preference be about the use of a ventilator (breathing machine) if it were available to you? \"     Would the patient desire the use of ventilator (breathing machine)?: Yes      Resuscitation  \"CPR works best to restart the heart when there is a sudden event, like a heart attack, in someone who is otherwise healthy. Unfortunately, CPR does not typically restart the heart for people who have serious health conditions or who are very sick. \"    \"In the event your heart stopped as a result of an underlying serious health condition, would you want attempts to be made to restart your heart (answer \"yes\" for attempt to resuscitate) or would you prefer a natural death (answer \"no\" for do not attempt to resuscitate)? \" yes      NOTE: If the patient has a valid advance directive AND now provides care preference(s) that are inconsistent with that prior directive, advise the patient to consider either: creating a new advance directive that complies with state-specific requirements; or, if that is not possible, orally revoking that prior directive in accordance with state-specific requirements, which must be documented in the EHR. [] Yes   [x] No   Educated Patient / Jess Raymond regarding differences between Advance Directives and portable DNR orders.     Length of ACP Conversation in minutes:      Conversation Outcomes:  [x] ACP discussion completed  [] Existing advance directive reviewed with patient; no changes to patient's previously recorded wishes  [] New Advance Directive completed  [] Portable Do Not Rescitate prepared for Provider review and signature  [] POLST/POST/MOLST/MOST prepared for Provider review and signature      Follow-up plan:    [] Schedule follow-up conversation to continue planning  [] Referred individual to Provider for additional questions/concerns   [] Advised patient/agent/surrogate to review completed ACP document and update if needed with changes in condition, patient preferences or care setting    [x] This note routed to one or more involved healthcare providers      Electronically signed by BRAD Saba, MARANDA on 10/1/2020 at 9:57 AM

## 2020-10-01 NOTE — PROGRESS NOTES
Clinical Pharmacy Note  Vancomycin Consult    Virgil Walsh is a 53 y.o. female ordered Vancomycin for MRSA bacteremia; consult received from Dr. Bonnie Muñoz to manage therapy.      Patient Active Problem List   Diagnosis    [facility]iden mutation ([facility]    Pulmonary embolus (HCC)    Lactic acidosis    CAP (community acquired pneumonia)    Acute CHF (congestive heart failure) ([facility]    Bilateral pulmonary embolism (HCC)    Cardiac arrest ([facility]    Seizures (HCC)    Acute respiratory failure with hypoxia (HCC)    MORELIA (acute kidney injury) ([facility]    Metabolic acidosis    Congestive heart failure (HCC)    Pulmonary hypertension (HCC)    Multifocal pneumonia    Septic shock (HCC)    Elevated LFTs    Thrombocytopenia (HCC)    Coagulopathy (HCC)    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)    Acute encephalopathy    Pneumonia due to organism    Bacteremia    Pleural effusion, bilateral    Elevated lactic acid level    Bacteremia due to methicillin susceptible Staphylococcus aureus (MSSA)    Pneumonia due to methicillin susceptible Staphylococcus aureus (MSSA) (HCC)    Anemia    Acute renal failure (HCC)    Bilateral pleural effusion    Mitral valve insufficiency    Tricuspid valve insufficiency    Class 2 obesity due to excess calories with body mass index (BMI) of 39.0 to 39.9 in adult    Cardiogenic shock (HCC)    Staphylococcal pneumonia ([facility]    Acute on chronic systolic heart failure (HCC)    Paroxysmal atrial fibrillation (HCC)    Essential hypertension       Allergies:  Ibuprofen     Temp max:  Temp (24hrs), Av °F (36.7 °C), Min:97.4 °F (36.3 °C), Max:98.4 °F (36.9 °C)      Recent Labs     20  0501 20  0530 10/01/20  0637   WBC 7.2 7.0 7.3       Recent Labs     20  0501 20  0530 10/01/20  0637   BUN 47* 20 32*   CREATININE 3.6* 2.2* 3.1*         Intake/Output Summary (Last 24 hours) at 10/1/2020 0801  Last data filed at 10/1/2020 0411  Gross per 24 hour   Intake 2477 ml   Output 1425 ml   Net 1052 ml       Culture Results:  09/10/20 Blood: MRSA   09/11/20 Respiratory: MSSA  09/12/20 Blood: No growth to date   09/13/20 Blood: No growth to date  09/14/20 Blood: No growth to date    Ht Readings from Last 1 Encounters:   09/28/20 5' 7\" (1.702 m)        Wt Readings from Last 1 Encounters:   10/01/20 191 lb 12.8 oz (87 kg)       Assessment:    Current Regimen: Intermittent dosing based on levels due to MORELIA. Was receiving HD T/Th/Sat up until this week - schedule now changing to MWF starting tomorrow per Dr. Fredis Benavides.  Random level today: 16.6 mg/L. Stop date 10/10 per Dr. Monica Potter: Will wait until tomorrow to order vanco to align with HD schedule. Thank you for the consult. Nalini Lara, JeromeD.   10/1/2020  8:02 AM

## 2020-10-01 NOTE — CARE COORDINATION
Lyudmila received call from [place] ([phone]) from [address] [place] [address] who informed LYUDMILA that pre cert has been obtained. Call to [facility] to confirm chair time for patient.  Patient is scheduled for MWF at 5pm with an arrival time of 4:45pm.     Electronically signed by BRAD Casper LSW on 10/1/2020 at 10:41 AM

## 2020-10-01 NOTE — PROGRESS NOTES
Patient's  called for an update. Updated on plan of care. He requested for a physician to call him for an update.  Physician notified of request.

## 2020-10-01 NOTE — PROGRESS NOTES
Clinical Pharmacy Note  Heparin Dosing       Lab Results   Component Value Date    APTT 53.6 10/01/2020     Lab Results   Component Value Date    HGB 8.0 10/01/2020    HCT 24.6 10/01/2020     10/01/2020    INR 1.04 10/01/2020       Current Infusion Rate: 8.3 mL/hr    Plan:  Rate: continue at 8.3 mL/hr  Next aPTT: 0600 10/02/20    Pharmacy will continue to monitor and adjust based on aPTT results.   Lizeth Gonzalez, [address] 10/1/2020 7:35 AM

## 2020-10-01 NOTE — PROGRESS NOTES
Comprehensive Nutrition Assessment    Type and Reason for Visit:  Reassess    Nutrition Recommendations/Plan:   -Refer to MD to restart 1 bottle proteinex daily per gtube. Do no mix directly with TF.    -Continue TF regimen: Nepro @ 55 mL/hr x 24 hrs (rate adjusted for nrsg care, run time ~20hrs). Flush per MD (currently 200 ml q 6 hrs). Nutrition Assessment:  Follow-up. SLP continue to follow pt. Pt tolerating Nepro TF @ goal of 55 mL/hr. Protein modular not added post op, refer to MD to resume protienex (1 bottle per gtube per day, do not mix directly with TF). Flush noted to be 200 ml q 6 hrs per MD. Pt s/p IJ tunnelled HD cath placed 9/29. Continue current nutrition interventions. Malnutrition Assessment:  Malnutrition Status: At risk for malnutrition (Comment)      Estimated Daily Nutrient Needs:  Energy (kcal):  8248-9854 (adj for HD & obesity)   Protein (g):   (1.2-2 x IBW 61 kg (adj for obesity & HD)  Fluid (ml/day):  per provider    Nutrition Related Findings:  BM 9/30; tracegeneralized edema noted      Wounds:  (small area near anus. Tx in place)       Current Nutrition Therapies:    DIET TUBE FEED CONTINUOUS/CYCLIC NPO; Renal Formula; Gastrostomy; Continuous; 20; 55; Exceptions are: Ice Chips  Current Tube Feeding (TF) Orders:  · Feeding Route: Gastrostomy  · Formula: Renal(goal rate of 55 ml per hour)  · Schedule: Continuous  · Water Flushes: per provider  · Current TF & Flush Orders Provides: 1100 ml TV / 1980 kcals / 89 gms pro / 800 ml free water per day  · Goal TF & Flush Orders Provides: To help meet est protein needs in pt's on HD, recommend restarting Proteinex 1 bottle per feeding tube per day. Do not mix directly with TF.        Anthropometric Measures:  · Height: 5' 7\" (170.2 cm)  · Current Body Weight: 191 lb (86.6 kg)   · Admission Body Weight: 252 lb (114.3 kg)     · Ideal Body Weight: 135 lbs; % Ideal Body Weight 141.5 %   · BMI: 29.9  · BMI Categories: Obese Class 1 (BMI 30.0-34. 9)       Nutrition Diagnosis:   · Inadequate oral intake related to biting/chewing (masticatory) difficulty as evidenced by nutrition support - enteral nutrition      Nutrition Interventions:   Food and/or Nutrient Delivery:  Continue Current Tube Feeding(add proteinex if okay with MD)  Nutrition Education/Counseling:  No recommendation at this time   Coordination of Nutrition Care:  Continued Inpatient Monitoring    Goals:  tolerate most appropriate form of nutrition       Nutrition Monitoring and Evaluation:   Food/Nutrient Intake Outcomes:  Enteral Nutrition Intake/Tolerance  Physical Signs/Symptoms Outcomes:  Biochemical Data, Chewing or Swallowing, Constipation, Diarrhea, Nausea or Vomiting, Fluid Status or Edema, Weight, Skin, Nutrition Focused Physical Findings     Discharge Planning:     Too soon to determine     Electronically signed by Sheri Ta RD, LD on 10/1/20 at 10:18 AM EDT    Contact: [phone]

## 2020-10-02 LAB
ALBUMIN SERPL-MCNC: 3.3 G/DL (ref 3.4–5)
ALP BLD-CCNC: 148 U/L (ref 40–129)
ALT SERPL-CCNC: 20 U/L (ref 10–40)
ANION GAP SERPL CALCULATED.3IONS-SCNC: 13 MMOL/L (ref 3–16)
APTT: 31.1 SEC (ref 24.2–36.2)
APTT: 64.6 SEC (ref 24.2–36.2)
AST SERPL-CCNC: 35 U/L (ref 15–37)
BASOPHILS ABSOLUTE: 0.1 K/UL (ref 0–0.2)
BASOPHILS RELATIVE PERCENT: 1.3 %
BILIRUB SERPL-MCNC: 0.3 MG/DL (ref 0–1)
BILIRUBIN DIRECT: <0.2 MG/DL (ref 0–0.3)
BILIRUBIN, INDIRECT: ABNORMAL MG/DL (ref 0–1)
BUN BLDV-MCNC: 40 MG/DL (ref 7–20)
CALCIUM SERPL-MCNC: 11.5 MG/DL (ref 8.3–10.6)
CHLORIDE BLD-SCNC: 99 MMOL/L (ref 99–110)
CO2: 25 MMOL/L (ref 21–32)
CREAT SERPL-MCNC: 3.2 MG/DL (ref 0.6–1.1)
EOSINOPHILS ABSOLUTE: 0.4 K/UL (ref 0–0.6)
EOSINOPHILS RELATIVE PERCENT: 5.7 %
GFR AFRICAN AMERICAN: 19
GFR NON-AFRICAN AMERICAN: 15
GLUCOSE BLD-MCNC: 107 MG/DL (ref 70–99)
GLUCOSE BLD-MCNC: 121 MG/DL (ref 70–99)
GLUCOSE BLD-MCNC: 122 MG/DL (ref 70–99)
GLUCOSE BLD-MCNC: 125 MG/DL (ref 70–99)
GLUCOSE BLD-MCNC: 129 MG/DL (ref 70–99)
GLUCOSE BLD-MCNC: 132 MG/DL (ref 70–99)
HCT VFR BLD CALC: 27.2 % (ref 36–48)
HEMOGLOBIN: 8.7 G/DL (ref 12–16)
INR BLD: 1.26 (ref 0.86–1.14)
LYMPHOCYTES ABSOLUTE: 1.6 K/UL (ref 1–5.1)
LYMPHOCYTES RELATIVE PERCENT: 24.1 %
MAGNESIUM: 2.1 MG/DL (ref 1.8–2.4)
MCH RBC QN AUTO: 27.3 PG (ref 26–34)
MCHC RBC AUTO-ENTMCNC: 32.1 G/DL (ref 31–36)
MCV RBC AUTO: 85.2 FL (ref 80–100)
MONOCYTES ABSOLUTE: 0.6 K/UL (ref 0–1.3)
MONOCYTES RELATIVE PERCENT: 9.6 %
NEUTROPHILS ABSOLUTE: 3.9 K/UL (ref 1.7–7.7)
NEUTROPHILS RELATIVE PERCENT: 59.3 %
PARATHYROID HORMONE INTACT: <1.2 PG/ML (ref 14–72)
PDW BLD-RTO: 22 % (ref 12.4–15.4)
PERFORMED ON: ABNORMAL
PHOSPHORUS: 4.9 MG/DL (ref 2.5–4.9)
PLATELET # BLD: 350 K/UL (ref 135–450)
PMV BLD AUTO: 8.6 FL (ref 5–10.5)
POTASSIUM SERPL-SCNC: 4.1 MMOL/L (ref 3.5–5.1)
PROTHROMBIN TIME: 14.6 SEC (ref 10–13.2)
RBC # BLD: 3.19 M/UL (ref 4–5.2)
SODIUM BLD-SCNC: 137 MMOL/L (ref 136–145)
TOTAL PROTEIN: 6.8 G/DL (ref 6.4–8.2)
VITAMIN D 25-HYDROXY: 31.6 NG/ML
WBC # BLD: 6.5 K/UL (ref 4–11)

## 2020-10-02 PROCEDURE — 6370000000 HC RX 637 (ALT 250 FOR IP): Performed by: INTERNAL MEDICINE

## 2020-10-02 PROCEDURE — 82306 VITAMIN D 25 HYDROXY: CPT

## 2020-10-02 PROCEDURE — 83735 ASSAY OF MAGNESIUM: CPT

## 2020-10-02 PROCEDURE — 97530 THERAPEUTIC ACTIVITIES: CPT

## 2020-10-02 PROCEDURE — 90935 HEMODIALYSIS ONE EVALUATION: CPT

## 2020-10-02 PROCEDURE — 85025 COMPLETE CBC W/AUTO DIFF WBC: CPT

## 2020-10-02 PROCEDURE — 85730 THROMBOPLASTIN TIME PARTIAL: CPT

## 2020-10-02 PROCEDURE — 6360000002 HC RX W HCPCS: Performed by: INTERNAL MEDICINE

## 2020-10-02 PROCEDURE — 2580000003 HC RX 258: Performed by: INTERNAL MEDICINE

## 2020-10-02 PROCEDURE — 6360000002 HC RX W HCPCS: Performed by: HOSPITALIST

## 2020-10-02 PROCEDURE — 97110 THERAPEUTIC EXERCISES: CPT

## 2020-10-02 PROCEDURE — 36415 COLL VENOUS BLD VENIPUNCTURE: CPT

## 2020-10-02 PROCEDURE — 1200000000 HC SEMI PRIVATE

## 2020-10-02 PROCEDURE — 2580000003 HC RX 258: Performed by: HOSPITALIST

## 2020-10-02 PROCEDURE — 6370000000 HC RX 637 (ALT 250 FOR IP): Performed by: STUDENT IN AN ORGANIZED HEALTH CARE EDUCATION/TRAINING PROGRAM

## 2020-10-02 PROCEDURE — 83970 ASSAY OF PARATHORMONE: CPT

## 2020-10-02 PROCEDURE — 2500000003 HC RX 250 WO HCPCS: Performed by: INTERNAL MEDICINE

## 2020-10-02 PROCEDURE — 80076 HEPATIC FUNCTION PANEL: CPT

## 2020-10-02 PROCEDURE — 6370000000 HC RX 637 (ALT 250 FOR IP): Performed by: HOSPITALIST

## 2020-10-02 PROCEDURE — 94760 N-INVAS EAR/PLS OXIMETRY 1: CPT

## 2020-10-02 PROCEDURE — 84100 ASSAY OF PHOSPHORUS: CPT

## 2020-10-02 PROCEDURE — 80048 BASIC METABOLIC PNL TOTAL CA: CPT

## 2020-10-02 PROCEDURE — 85610 PROTHROMBIN TIME: CPT

## 2020-10-02 RX ORDER — CHOLECALCIFEROL (VITAMIN D3) 10 MCG
1 TABLET ORAL DAILY
Qty: 30 CAPSULE | Refills: 3 | Status: SHIPPED | OUTPATIENT
Start: 2020-10-03

## 2020-10-02 RX ORDER — SODIUM CHLORIDE 0.9 % (FLUSH) 0.9 %
10 SYRINGE (ML) INJECTION PRN
Status: DISCONTINUED | OUTPATIENT
Start: 2020-10-02 | End: 2020-10-04 | Stop reason: HOSPADM

## 2020-10-02 RX ORDER — LIDOCAINE HYDROCHLORIDE 10 MG/ML
5 INJECTION, SOLUTION EPIDURAL; INFILTRATION; INTRACAUDAL; PERINEURAL ONCE
Status: DISCONTINUED | OUTPATIENT
Start: 2020-10-02 | End: 2020-10-04 | Stop reason: HOSPADM

## 2020-10-02 RX ORDER — LEVETIRACETAM 100 MG/ML
1000 SOLUTION ORAL 2 TIMES DAILY
Qty: 300 ML | Refills: 3 | Status: SHIPPED | OUTPATIENT
Start: 2020-10-02

## 2020-10-02 RX ORDER — WARFARIN SODIUM 5 MG/1
5 TABLET ORAL DAILY
Qty: 30 TABLET | Refills: 0 | Status: SHIPPED | OUTPATIENT
Start: 2020-10-02 | End: 2020-10-04 | Stop reason: HOSPADM

## 2020-10-02 RX ORDER — WARFARIN SODIUM 5 MG/1
12.5 TABLET ORAL DAILY
Qty: 30 TABLET | Refills: 0 | Status: SHIPPED | OUTPATIENT
Start: 2020-10-02 | End: 2020-10-04 | Stop reason: HOSPADM

## 2020-10-02 RX ORDER — CARVEDILOL 12.5 MG/1
12.5 TABLET ORAL 2 TIMES DAILY WITH MEALS
Qty: 60 TABLET | Refills: 3 | Status: SHIPPED | OUTPATIENT
Start: 2020-10-02 | End: 2022-04-08 | Stop reason: ALTCHOICE

## 2020-10-02 RX ORDER — LACOSAMIDE 200 MG/1
200 TABLET ORAL 2 TIMES DAILY
Qty: 60 TABLET | Refills: 0 | Status: SHIPPED | OUTPATIENT
Start: 2020-10-02 | End: 2020-12-22

## 2020-10-02 RX ORDER — CALCITONIN SALMON 200 [IU]/.09ML
1 SPRAY, METERED NASAL DAILY
Status: DISCONTINUED | OUTPATIENT
Start: 2020-10-02 | End: 2020-10-04 | Stop reason: HOSPADM

## 2020-10-02 RX ORDER — HEPARIN SODIUM 1000 [USP'U]/ML
4000 INJECTION, SOLUTION INTRAVENOUS; SUBCUTANEOUS ONCE
Status: COMPLETED | OUTPATIENT
Start: 2020-10-02 | End: 2020-10-02

## 2020-10-02 RX ORDER — CALCITONIN SALMON 200 [IU]/.09ML
1 SPRAY, METERED NASAL DAILY
Qty: 1 BOTTLE | Refills: 3 | Status: SHIPPED | OUTPATIENT
Start: 2020-10-02 | End: 2022-04-08 | Stop reason: ALTCHOICE

## 2020-10-02 RX ORDER — DIAZEPAM 5 MG/1
5 TABLET ORAL EVERY 12 HOURS SCHEDULED
Qty: 20 TABLET | Refills: 0 | Status: SHIPPED | OUTPATIENT
Start: 2020-10-02 | End: 2020-10-04

## 2020-10-02 RX ORDER — SEVELAMER CARBONATE FOR ORAL SUSPENSION 2400 MG/1
0.8 POWDER, FOR SUSPENSION ORAL
Qty: 90 EACH | Refills: 1 | Status: SHIPPED | OUTPATIENT
Start: 2020-10-02 | End: 2022-04-08 | Stop reason: ALTCHOICE

## 2020-10-02 RX ORDER — SODIUM CHLORIDE 0.9 % (FLUSH) 0.9 %
10 SYRINGE (ML) INJECTION EVERY 12 HOURS SCHEDULED
Status: DISCONTINUED | OUTPATIENT
Start: 2020-10-02 | End: 2020-10-04 | Stop reason: HOSPADM

## 2020-10-02 RX ORDER — AMIODARONE HYDROCHLORIDE 200 MG/1
200 TABLET ORAL 2 TIMES DAILY
Qty: 30 TABLET | Refills: 3 | Status: SHIPPED | OUTPATIENT
Start: 2020-10-02

## 2020-10-02 RX ADMIN — CARVEDILOL 12.5 MG: 12.5 TABLET, FILM COATED ORAL at 17:46

## 2020-10-02 RX ADMIN — EPOETIN ALFA-EPBX 3000 UNITS: 3000 INJECTION, SOLUTION INTRAVENOUS; SUBCUTANEOUS at 15:31

## 2020-10-02 RX ADMIN — HYDROXYZINE HYDROCHLORIDE 10 MG: 10 TABLET, FILM COATED ORAL at 09:18

## 2020-10-02 RX ADMIN — HYDROXYZINE HYDROCHLORIDE 10 MG: 10 TABLET, FILM COATED ORAL at 00:56

## 2020-10-02 RX ADMIN — CALCITONIN SALMON 1 SPRAY: 200 SPRAY, METERED NASAL at 17:43

## 2020-10-02 RX ADMIN — LEVETIRACETAM 1000 MG: 500 SOLUTION ORAL at 09:18

## 2020-10-02 RX ADMIN — ACETAMINOPHEN ORAL SOLUTION 650 MG: 650 SOLUTION ORAL at 17:46

## 2020-10-02 RX ADMIN — Medication 2 CAPSULE: at 17:46

## 2020-10-02 RX ADMIN — HYDROXYZINE HYDROCHLORIDE 10 MG: 10 TABLET, FILM COATED ORAL at 20:51

## 2020-10-02 RX ADMIN — HEPARIN SODIUM 8.3 ML/HR: 10000 INJECTION, SOLUTION INTRAVENOUS at 21:15

## 2020-10-02 RX ADMIN — VANCOMYCIN HYDROCHLORIDE 1250 MG: 10 INJECTION, POWDER, LYOPHILIZED, FOR SOLUTION INTRAVENOUS at 15:35

## 2020-10-02 RX ADMIN — SEVELAMER CARBONATE 0.8 G: 2.4 POWDER, FOR SUSPENSION ORAL at 17:46

## 2020-10-02 RX ADMIN — AMIODARONE HYDROCHLORIDE 200 MG: 200 TABLET ORAL at 09:18

## 2020-10-02 RX ADMIN — LACOSAMIDE 200 MG: 100 TABLET, FILM COATED ORAL at 20:51

## 2020-10-02 RX ADMIN — Medication 2 CAPSULE: at 09:18

## 2020-10-02 RX ADMIN — AMIODARONE HYDROCHLORIDE 200 MG: 200 TABLET ORAL at 20:51

## 2020-10-02 RX ADMIN — HEPARIN SODIUM 4000 UNITS: 1000 INJECTION INTRAVENOUS; SUBCUTANEOUS at 21:15

## 2020-10-02 RX ADMIN — CARVEDILOL 12.5 MG: 12.5 TABLET, FILM COATED ORAL at 09:18

## 2020-10-02 RX ADMIN — LACOSAMIDE 200 MG: 100 TABLET, FILM COATED ORAL at 09:19

## 2020-10-02 RX ADMIN — ACETAMINOPHEN ORAL SOLUTION 650 MG: 650 SOLUTION ORAL at 09:17

## 2020-10-02 RX ADMIN — LEVETIRACETAM 1000 MG: 500 SOLUTION ORAL at 21:12

## 2020-10-02 RX ADMIN — DIAZEPAM 5 MG: 5 TABLET ORAL at 09:18

## 2020-10-02 RX ADMIN — EPOETIN ALFA-EPBX 2000 UNITS: 2000 INJECTION, SOLUTION INTRAVENOUS; SUBCUTANEOUS at 15:31

## 2020-10-02 RX ADMIN — WARFARIN SODIUM 12.5 MG: 5 TABLET ORAL at 17:46

## 2020-10-02 RX ADMIN — DIAZEPAM 5 MG: 5 TABLET ORAL at 20:51

## 2020-10-02 ASSESSMENT — PAIN SCALES - GENERAL
PAINLEVEL_OUTOF10: 0
PAINLEVEL_OUTOF10: 0

## 2020-10-02 NOTE — CARE COORDINATION
INITIAL CASE MANAGEMENT ASSESSMENT (follow up)    PT/OT Recs: SNF at discharge. Family is in agreement. Patient is going to [address] at discharge. HD/PD: [facility] at discharge. MWF 5pm.     PLAN/COMMENTS:   1) Likely discharge over the weekend per MD. Waiting on stable INR. [address] aware. SW provided contact information for patient or family to call with any questions. SW will follow and assist as needed. Respectfully submitted,    EUGENIA Jaffe  [facility]   [phone]    Electronically signed by EUGENIA Richards on 10/2/2020 at 12:11 PM

## 2020-10-02 NOTE — PROGRESS NOTES
Pt arrived to Dialysis soiled, linens were visibly soiled with dry tool. Pt cleaned up by Dialysis staff ann-marie care performed, linens changed. Dialysis start time delayed for this.

## 2020-10-02 NOTE — CARE COORDINATION
SW placed phone call to Mr. Henry Durand regarding the discharge plan and his phone went to [phone]. SW left a general message today. SW placed phone call to patient's mother and informed that patient is a likely weekend discharge. She stated that she purchased some comfortable clothing and a couple of gowns for this patient to take to rehab. SW was requested to do a later discharge (after 5pm) so they can spend time with her as they will not be able to visit for two weeks. OSMAR informed that we were waiting on patient to have a stable INR before discharge and to complete her heparin drip. SW and family to follow up over the weekend. Respectfully submitted,    EUGENIA Crowe  [facility]   [phone]    Electronically signed by EUGENIA Jean on 10/2/2020 at 5:22 PM

## 2020-10-02 NOTE — PROGRESS NOTES
Hospitalist Progress Note  10/2/2020 9:57 AM    PCP: No primary care provider on file.     [id number]     Date of Admission: 9/10/2020                                                                                                                     HOSPITAL COURSE    Patient demographics:  The patient  Rain Cote is a 53 y.o. female     Significant past medical history:   Patient Active Problem List   Diagnosis    Factor [facility] Leiden mutation ([facility]    Pulmonary embolus ([address]    Lactic acidosis    CAP (community acquired pneumonia)    Acute CHF (congestive heart failure) ([address]    Bilateral pulmonary embolism (HCC)    Cardiac arrest ([address]    Seizures ([address]    Acute respiratory failure with hypoxia ([address]    MORELIA (acute kidney injury) ([address]    Metabolic acidosis    Congestive heart failure (HCC)    Pulmonary hypertension ([address]    Multifocal pneumonia    Septic shock (HCC)    Elevated LFTs    Thrombocytopenia (HCC)    Coagulopathy (HCC)    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) ([address]    Acute encephalopathy    Pneumonia due to organism    Bacteremia    Pleural effusion, bilateral    Elevated lactic acid level    Bacteremia due to methicillin susceptible Staphylococcus aureus (MSSA)    Pneumonia due to methicillin susceptible Staphylococcus aureus (MSSA) (HCC)    Anemia    Acute renal failure (HCC)    Bilateral pleural effusion    Mitral valve insufficiency    Tricuspid valve insufficiency    Class 2 obesity due to excess calories with body mass index (BMI) of 39.0 to 39.9 in adult    Cardiogenic shock (HCC)    Staphylococcal pneumonia (HCC)    Acute on chronic systolic heart failure (HCC)    Paroxysmal atrial fibrillation (HCC)    Essential hypertension         Presenting symptoms:  Fatigue, shortness of breath, extensive abdominal and leg swelling    Diagnostic workup:      CONSULTS DURING ADMISSION :   IP CONSULT TO HEART FAILURE NURSE/COORDINATOR  IP CONSULT TO DIETITIAN  IP CONSULT TO NEUROLOGY  IP CONSULT TO CARDIOLOGY  IP CONSULT TO NEPHROLOGY  PHARMACY TO DOSE VANCOMYCIN  IP CONSULT TO HEM/ONC  IP CONSULT TO INFECTIOUS DISEASES  IP CONSULT TO CARDIAC REHAB  IP CONSULT TO DIETITIAN  IP CONSULT TO SOCIAL WORK  IP CONSULT TO GI  PHARMACY TO DOSE WARFARIN  IP CONSULT TO GI      Patient was diagnosed with:        Treatment while inpatient:   53 y.o. female with past medical history of factor V Leiden deficiency on chronic warfarin, history of blood clots, hyperlipidemia, arthritis, asthma who presents to [address] above symptoms of fatigue, intermittent shortness of breath both at rest and on exertion, and extensive of abdominal and leg swelling. In the ED patient was significantly tachycardic, showing some combination of sinus tachycardia versus SVT, uncertain if may be underlying atrial fibrillation with rapid ventricular rate.  Labs showed signs of infection as well as elevated proBNP.  CTA with contrast was done on the chest and demonstrated left and right lobe PEs in certain segments, active findings suggestive of early patchy pneumonia bilaterally, and moderate bilateral pleural effusions suggestive of heart failure/volume overload. PEA arrest shortly after admission. TTE with EF 30- 35%. Severe diffuse hypokinesis with severely reduced rt ventricular function, severe tricuspid regurgitation and mitral regurgitation, grade 3 diastolic dysfunction. For acute renal failure likely due to ischemia patient was started on hemodialysis. Found to have MRSA PNA and bacteremia. Developed shock liver and coagulopathy which are improving. Concern for seizure like activity, initial EEG negative but repeat with possible status, added vimpat to keppra and repeat EEG improved. Acute to subacute infarcts on MRI. Developed a fib RVR 9/16 that has not responded to cardioversion (x5), on amio and dilt IV.     Patient is status post PEG tube placement 9/25. continues to be on tube feed  Patient failed modified barium swallow on 9/23.   Repeat modified barium swallow 10/2                                                         ----------------------------------------------------------      SUBJECTIVE COMPLAINTS- follow up for  shortness of breath    Diet: DIET TUBE FEED CONTINUOUS/CYCLIC NPO; Renal Formula; Gastrostomy; Continuous; 20; 55; Exceptions are: Ice Chips      OBJECTIVE:   Patient Active Problem List   Diagnosis    [facility] ([facility]    Pulmonary embolus (HCC)    Lactic acidosis    CAP (community acquired pneumonia)    [facility]    Bilateral pulmonary embolism (HCC)    Cardiac arrest ([facility])    Seizures ([facility])    Acute respiratory failure with hypoxia ([facility])    MORELIA (acute kidney injury) ([facility]    Metabolic acidosis    Congestive heart failure (HCC)    Pulmonary hypertension (HCC)    Multifocal pneumonia    Septic shock ([facility])    Elevated LFTs    Thrombocytopenia (HCC)    Coagulopathy (HCC)    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) ([facility])    Acute encephalopathy    Pneumonia due to organism    Bacteremia    Pleural effusion, bilateral    Elevated lactic acid level    Bacteremia due to methicillin susceptible Staphylococcus aureus (MSSA)    Pneumonia due to methicillin susceptible Staphylococcus aureus (MSSA) ([facility])    Anemia    Acute renal failure (HCC)    Bilateral pleural effusion    Mitral valve insufficiency    Tricuspid valve insufficiency    Class 2 obesity due to excess calories with body mass index (BMI) of 39.0 to 39.9 in adult    Cardiogenic shock (HCC)    Staphylococcal pneumonia (HCC)    Acute on chronic systolic heart failure (HCC)    Paroxysmal atrial fibrillation (HCC)    Essential hypertension       Allergies  Ibuprofen    Medications    Scheduled Meds:   warfarin  12.5 mg Oral Once    carvedilol  12.5 mg Oral BID WC    sevelamer  0.8 g Per edema.   GI: Non-tender. Non-distended. No hernia. Skin: Warm, dry, normal texture and turgor. Lymph: No cervical LAD. No supraclavicular LAD. M/S: / Ext. No cyanosis. No clubbing. No joint deformity. Neuro: CN 2-12 are intact,  no neurologic deficits noted. PT/INR:   Recent Labs     09/30/20  0530 10/01/20  0637 10/02/20  0533   PROTIME 12.8 12.1 14.6*   INR 1.10 1.04 1.26*     APTT:   Recent Labs     09/30/20  0530 09/30/20  1355 10/01/20  0637 10/02/20  0533   APTT 72.4* 58.2* 53.6* 64.6*       CBC:   Recent Labs     09/30/20  0530 10/01/20  0637 10/02/20  0533   WBC 7.0 7.3 6.5   HGB 8.1* 8.0* 8.7*   HCT 25.6* 24.6* 27.2*   MCV 86.7 85.1 85.2    322 350       BMP:   Recent Labs     09/30/20  0530 10/01/20  0637 10/02/20  0533    137 137   K 4.3 4.2 4.1    99 99   CO2 25 24 25   PHOS 3.3 4.4 4.9   BUN 20 32* 40*   CREATININE 2.2* 3.1* 3.2*       LIVER PROFILE:   Recent Labs     09/30/20  0530 10/01/20  0637 10/02/20  0533   ALKPHOS 131* 139* 148*   AST 32 28 35   ALT 23 19 20   BILIDIR <0.2 <0.2 <0.2   BILITOT 0.3 0.3 0.3     No results for input(s): AMYLASE in the last 72 hours. No results for input(s): LIPASE in the last 72 hours. UA:No results for input(s): NITRITE, LABCAST, WBCUA, RBCUA, MUCUS in the last 72 hours. TROPONIN: No results for input(s): Towana Ball in the last 72 hours. Lab Results   Component Value Date/Time    URRFLXCULT Not Indicated 09/10/2020 10:07 PM       No results for input(s): TSHREFLEX in the last 72 hours. No components found for: [id number]  POC GLUCOSE:    Recent Labs     10/01/20  0811 10/01/20  1534 10/01/20  2035 10/02/20  0259 10/02/20  0818   POCGLU 112* 124* 118* 122* 132*     No results for input(s): LABA1C in the last 72 hours.    Lab Results   Component Value Date    LABA1C 5.6 12/17/2018         ASSESSMENT AND PLAN     Shock, septic vs cardiogenic  S/p PEA arrest, acute hypoxic resp failure  Intubated 9/10 shortly after admission. extubated 9/19   Respiratory status is stable now        Bilateral PE  Hx/of DVT/PE, on coumadin chronically  factor V Leiden  Continue on heparin infusion until INR is therapeutic on Coumadin  Patient can be discharged once INR is therapeutic       MRSA PNA  Completed antibiotics   TTE, LEVY without vegetations         Acute CVA  MRI with scattered acute to subacute infarcts, likely embolic   LEVY negative        Cardiomyopathy, acute on chronic systolic HF  EF 30- 59%. Severe diffuse hypokinesis   severe tricuspid regurgitation and mitral regurgitation,   grade 3 diastolic dysfunction. RHC/LHC when able         Seizure   keppra 1000 mg BID   repeat EEG 9/16 with concern for seizures, possible status   added vimpat to Keppra   repeat EEG 9/17 improved with addition of vimpat, partner discussed with neuro and can defer cvEEG for now   no repeat seizures but ongoing myoclonus -    added valium with good effect.          A fib RVR  Cardioverted 9/16, started on amio drip without adequate control, added dilt. Multiple attempts to cardiovert all failed   added digoxin   tolerating heparin gtt -transition  To  coumadin   in sinus on the monitor today    amio transitioned to PO formulation via NG.       R groin hematoma - stable          MORELIA  Continue on hemodialysis    Coagulapathy  Hx Factor V Leiden on coumadin.    hematology notes non-adherence to coumadin in the past.    Heme-onc were following. presently on heparin gtt bridging to warfarin.         Obesity  Body mass index is 30.01 kg/m². Counseled on effects of weight on overall health and chronic medical conditions and discussed weight loss.         Dysphagia -   Failed MBS 9/23 pretty much with all consistencies. S/p PEG 9/25 - started tube feeding 9/26 - tolerating well. Repeat modified barium swallow in a.m.     Code Status: Full Code        Dispo -social work arranged extended-care facility-patient can be discharged once INR is therapeutic        The patient and / or the family were informed of the results of any tests, a time was given to answer questions, a plan was proposed and they agreed with plan. Anna Arredondo MD    This note was transcribed using [email or web address]. Please disregard any translational errors.

## 2020-10-02 NOTE — PROGRESS NOTES
Called inpatient pharmacy to verify an alternate for the b-complex-C-folic acid gel capsule currently ordered for patient. Pharmacist confirmed that there was no real alterate formulation for these nephrocaps. Documented in [facility] as 'not given' due to patient's NPO status and PEG tube in place.     Electronically signed by Uzair Cole RN on 10/2/2020 at 4:53 PM

## 2020-10-02 NOTE — PROGRESS NOTES
Speech Language Pathology    130PM: attempted dysphagia tx; pt currently out of room at dialysis. Will reattempt at a later date.      Palmira Hunter MS, CCC-SLP #[id number]  Speech Language Pathologist

## 2020-10-02 NOTE — PROGRESS NOTES
Successful insertion of a right single lumen power midline into pt's basilic vein. Midline flushes without resistance and has brisk blood return. Midline cut at 15cm and out externally 1cm. Site CDI with Perpetuall applied to site.      Magdalena SORENSEN given handoff report

## 2020-10-02 NOTE — FLOWSHEET NOTE
Treatment time: 3.5 hours   Net UF: 870 mls    Pre weight: 87.3 kg  Post weight: 86.8 kg  EDW: tbd    Access used: R TDC   Access function: good    Medications or blood products given: Vancomycin given. Regular outpatient schedule: MWF    Summary of response to treatment: 870 mls net fluid removed. Vancomycin given with tx. Post VSS. Report given to primary nurse Magdalena SORENSEN    Copy of dialysis treatment record placed in chart, to be scanned into EMR.      10/02/20 1320 10/02/20 1640   Vital Signs   BP (!) 173/85 (!) 111/53   Temp 98.6 °F (37 °C) 98 °F (36.7 °C)   Pulse 87 86   Weight 192 lb 7.4 oz (87.3 kg) 191 lb 5.8 oz (86.8 kg)   Weight Method Bed scale Bed scale   Post-Hemodialysis Assessment   Hemodialysis Intake (ml)  --  600 ml   Hemodialysis Output (ml)  --  1470 ml   NET Removed (ml)  --  870 ml   Tolerated Treatment  --  Good

## 2020-10-02 NOTE — PROGRESS NOTES
Physical Therapy  Progress Note  Pt fatigued. Ready to return to bed. Pt transferred from chair to bed using maxi move. With max cues pt able to complete rolling R/L in bed multiple trials with only CGA-SBA (much improved). PT noted persistent tremors with UE/LE movement, but these appear to be more controlled. Pt was then positioned for comfort with HOB elevated 30 degrees, call light in reach, alarm in place, multiple pillow props. Time In: 1020  Time Coded Tx: 15 min.    Electronically signed by Clement Wiggins, PT [id number] on 10/2/2020 at 10:44 AM

## 2020-10-02 NOTE — PROGRESS NOTES
Physical Therapy  Facility/Department: [address] PROGRESSIVE CARE  Daily Treatment Note  NAME: Saul Chacon  : 1970  MRN: [id number]    Date of Service: 10/2/2020    Discharge Recommendations:  Patient would benefit from continued therapy after discharge, 3-5 sessions per week        Assessment   Body structures, Functions, Activity limitations: Decreased functional mobility ; Decreased strength;Decreased safe awareness;Decreased cognition;Decreased endurance  Assessment: Pt experienced less tremoring of UEs, but continues to experience significant tremoring/jerking of LEs. She was unable to stand to stedy in several attempts d/t LE weakness, but demonstrated improved sitting balance at EOB. She would benefit from continued therapy to improve her strength, balance, coordination, and independence transferring/ambulating. Continue to recommend low-moderate frequency therapy upon D/C. Saul Chacon scored a 8/24 on the AM-PAC short mobility form. Current research shows that an AM-PAC score of 17 or less is typically not associated with a discharge to the patient's home setting. Based on the patient's AM-PAC score and their current functional mobility deficits, it is recommended that the patient have 3-5 sessions per week of Physical Therapy at d/c to increase the patient's independence. Please see assessment section for further patient specific details. If patient discharges prior to next session this note will serve as a discharge summary. Please see below for the latest assessment towards goals. PT Education: Orientation;PT Role;General Safety  REQUIRES PT FOLLOW UP: Yes  Activity Tolerance  Activity Tolerance: Treatment limited secondary to medical complications (free text)     Patient Diagnosis(es): The primary encounter diagnosis was Pneumonia due to organism.  Diagnoses of Tachycardia, Pleural effusion, bilateral, Elevated lactic acid level, Congestive heart failure, unspecified HF chronicity, unspecified heart failure type ([facility] and Multiple subsegmental pulmonary emboli without acute cor pulmonale were also pertinent to this visit. has a past medical history of Anticoagulant long-term use, Arthritis, Asthma, Baker's cyst, Depression, Factor V Leiden ([facility] Factor V Leiden ([facility] H/O blood clots, Heartburn, Hyperlipidemia, MRSA bacteremia, and Osteoarthritis. has a past surgical history that includes Gastrostomy tube placement (N/A, 9/25/2020); Tunneled venous catheter placement (Right, 09/29/2020); and IR TUNNELED CVC PLACE WO SQ PORT/PUMP > 5 YEARS (9/29/2020). Restrictions  Restrictions/Precautions  Restrictions/Precautions: Fall Risk  Position Activity Restriction  Other position/activity restrictions: IV, ott, COVID negative, tube feed. Subjective   General  Chart Reviewed: Yes  Additional Pertinent Hx: 54 y/o female admit 9/11/2020 with Pneumonia, Pleural Effusion, Tachycardia, Cardiac Arrest.  MRI + Scattered Acute/Subacute Infarct B Cerebral Hemispheres and R Cerebellar Hemisphere; most pronounced L Occipital Lobe, likely embolic. CTA Chest + PE.   9/11-9/19/2020 Pt Intubated. PMH as noted including Factor V Leiden, OA. Response To Previous Treatment: Patient reporting fatigue but able to participate. Family / Caregiver Present: No  Referring Practitioner: Dr. Ilda Norton  Subjective  Subjective: Pt resting in bed. Agreeable to EOB/OOB activity with encouragement.      Orientation  Orientation  Overall Orientation Status: Impaired     Objective      Bed mobility  Rolling to Left: Minimal assistance  Rolling to Right: Minimal assistance  Supine to Sit: Maximum assistance  Sit to Supine: Maximum assistance     Transfers  Sit to Stand: Dependent/Total(Unable to clear buttocks from elevated EOB to stedy in multiple attempts today)  Stand to sit: Dependent/Total(Unable to clear buttocks from elevated EOB to stedy in multiple attempts today)  Bed to Chair: Dependent/Total(Using Maxi Move)     Ambulation  Ambulation?: No  Stairs/Curb  Stairs?: No     Balance  Comments: Pt able to maintain sitting balance at EOB with CGA, intermittent Min A. to correct Anterior/Posterior LOB. Exercises  Comments: Seated EOB:  Ankle pumps x 20, LAQ x 10, hip flex x 10, Shoulder Flex AAROM x 10, Elbow Flex x 10, A/P weight shift x 5, Lateral weight shift x 5, Min-CGA. Other exercises  Other exercises?: Yes  Other exercises 1: Sitting in chair:  ankle pumps x 10, R shoulder Flex AAROM x 10, Wrist Flex/Ext AAROM 2 x 10, max cues for facilitation. Other Activities: Other (see comment)  Comment: Pt required use of Maxi Move to transfer from bed to chair. Once in chair, pt completed seated exercises, then was positioned for comfort with leg rest elevated, call light in reach, alarm in place. RN notified. AM-PAC Score  AM-PAC Inpatient Mobility Raw Score : 8 (10/02/20 0932)  AM-PAC Inpatient T-Scale Score : 28.52 (10/02/20 0932)  Mobility Inpatient CMS 0-100% Score: 86.62 (10/02/20 0932)  Mobility Inpatient CMS G-Code Modifier : CM (10/02/20 0932)    Goals  Short term goals  Time Frame for Short term goals: Upon d/c acute care setting. - ongoing 9/29/20  Short term goal 1: Bed Mob Min assist.  Short term goal 2: Transfers with/without assist device CGA. Short term goal 3: Amb with/without assist device 48' CGA. Patient Goals   Patient goals : None Stated. Plan    Plan  Times per week: 3-5x week while in acute care setting., cotx with OT  Current Treatment Recommendations: Strengthening, Functional Mobility Training, Transfer Training, Gait Training, Stair training, Safety Education & Training, Patient/Caregiver Education & Training  Safety Devices  Type of devices:  All fall risk precautions in place, Call light within reach, Gait belt, Nurse notified, Lucrecia Guerin in use, Patient at risk for falls, Left in chair, Chair alarm in place  Restraints  Initially in place: No     Therapy Time   Individual Concurrent Group Co-treatment   Time In 0820         Time Out 0920         Minutes 60         Timed Code Treatment Minutes: [facility]       Marleni Tineo PT  Electronically signed by Marleni Tineo PT [id number] on 10/2/2020 at 9:34 AM

## 2020-10-02 NOTE — PROGRESS NOTES
Clinical Pharmacy Note  Vancomycin Consult    Ranjeet oBwens is a 53 y.o. female ordered Vancomycin for MRSA bacteremia; consult received from Dr. Rui Montelongo to manage therapy.      Patient Active Problem List   Diagnosis    Factor V Leiden mutation ([facility]    Pulmonary embolus (HCC)    Lactic acidosis    CAP (community acquired pneumonia)    Acute CHF (congestive heart failure) ([facility]    Bilateral pulmonary embolism (HCC)    Cardiac arrest ([facility]    Seizures (HCC)    Acute respiratory failure with hypoxia (HCC)    MORELIA (acute kidney injury) ([facility]    Metabolic acidosis    Congestive heart failure (HCC)    Pulmonary hypertension (HCC)    Multifocal pneumonia    Septic shock (HCC)    Elevated LFTs    Thrombocytopenia (HCC)    Coagulopathy (HCC)    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)    Acute encephalopathy    Pneumonia due to organism    Bacteremia    Pleural effusion, bilateral    Elevated lactic acid level    Bacteremia due to methicillin susceptible Staphylococcus aureus (MSSA)    Pneumonia due to methicillin susceptible Staphylococcus aureus (MSSA) (HCC)    Anemia    Acute renal failure (HCC)    Bilateral pleural effusion    Mitral valve insufficiency    Tricuspid valve insufficiency    Class 2 obesity due to excess calories with body mass index (BMI) of 39.0 to 39.9 in adult    Cardiogenic shock (HCC)    Staphylococcal pneumonia ([facility]    Acute on chronic systolic heart failure (HCC)    Paroxysmal atrial fibrillation (HCC)    Essential hypertension       Allergies:  Ibuprofen     Temp max:  Temp (24hrs), Av.2 °F (36.8 °C), Min:97.6 °F (36.4 °C), Max:98.8 °F (37.1 °C)      Recent Labs     20  0530 10/01/20  0637 10/02/20  0533   WBC 7.0 7.3 6.5       Recent Labs     20  0530 10/01/20  0637 10/02/20  0533   BUN 20 32* 40*   CREATININE 2.2* 3.1* 3.2*         Intake/Output Summary (Last 24 hours) at 10/2/2020 1403  Last data filed at 10/2/2020 1830  Gross per 24 hour   Intake 2369 ml   Output 1175 ml   Net 1194 ml       Culture Results:  09/10/20 Blood: MRSA   09/11/20 Respiratory: MSSA  09/12/20 Blood: No growth to date   09/13/20 Blood: No growth to date  09/14/20 Blood: No growth to date    Ht Readings from Last 1 Encounters:   10/01/20 5' 7\" (1.702 m)        Wt Readings from Last 1 Encounters:   10/02/20 192 lb 14.4 oz (87.5 kg)       Assessment:    Current Regimen: Intermittent dosing based on levels due to MORELIA. HD on MWF currently  Random level yesterday: 16.6 mg/L. Stop date 10/10 per Dr. Curran Idol:    Vancomycin 1250 mg x 1 today and check a random vanco level prior to HD on Monday morning. Thank you for the consult. Jeris Holstein, JeromeD.   10/2/2020  2:03 PM

## 2020-10-02 NOTE — PLAN OF CARE
Problem: Pain:  Goal: Pain level will decrease  Description: Pain level will decrease  Outcome: Ongoing     Problem: Pain:  Goal: Control of acute pain  Description: Control of acute pain  Outcome: Ongoing     Problem: Pain:  Goal: Control of chronic pain  Description: Control of chronic pain  Outcome: Ongoing     Problem: Falls - Risk of:  Goal: Will remain free from falls  Description: Will remain free from falls  Outcome: Ongoing     Problem: Falls - Risk of:  Goal: Absence of physical injury  Description: Absence of physical injury  Outcome: Ongoing     Problem: Skin Integrity:  Goal: Will show no infection signs and symptoms  Description: Will show no infection signs and symptoms  Outcome: Ongoing     Problem: Skin Integrity:  Goal: Absence of new skin breakdown  Description: Absence of new skin breakdown  Outcome: Ongoing     Problem: Nutrition  Goal: Optimal nutrition therapy  Outcome: Ongoing     Problem: OXYGENATION/RESPIRATORY FUNCTION  Goal: Patient will maintain patent airway  Outcome: Ongoing     Problem: OXYGENATION/RESPIRATORY FUNCTION  Goal: Patient will achieve/maintain normal respiratory rate/effort  Description: Respiratory rate and effort will be within normal limits for the patient  Outcome: Ongoing   Electronically signed by Ion Wong RN on 10/2/2020 at 4:39 PM

## 2020-10-02 NOTE — PROGRESS NOTES
Clinical Pharmacy Note  Heparin Dosing       Lab Results   Component Value Date    APTT 64.6 10/02/2020     Lab Results   Component Value Date    HGB 8.7 10/02/2020    HCT 27.2 10/02/2020     10/02/2020    INR 1.26 10/02/2020       Current Infusion Rate: 8.3 mL/hr    Plan:  Rate: Continue 8.3 mL/hr  Next aPTT: 0600  10/03/20    Pharmacy will continue to monitor and adjust based on aPTT results.     Eden Caceres, PharmD  10/2/2020 7:01 AM

## 2020-10-02 NOTE — PROGRESS NOTES
Pt pulled out PIV around 0630. Multiple RNs attempted new IV with no success.  Day shift nurse made aware to discuss IV access with MD.

## 2020-10-02 NOTE — PLAN OF CARE
Problem: Falls - Risk of:  Goal: Will remain free from falls  Description: Will remain free from falls  Outcome: Ongoing  Bed locked and in lowest position. Bed alarm on. Siderails up. Call light within reach. Telesitter in place. Problem: Skin Integrity:  Goal: Absence of new skin breakdown  Description: Absence of new skin breakdown  Outcome: Ongoing  Pt being turned Q2 hours. Heels elevated off bed. No skin breakdown noted. Problem: Nutrition  Goal: Optimal nutrition therapy  Outcome: Ongoing  Pt on tube feeds @55 via peg tube. Tolerating well. Problem: Mood - Altered:  Goal: Mood stable  Description: Mood stable  Outcome: Ongoing  Pt anxious at times. Problem: OXYGENATION/RESPIRATORY FUNCTION  Goal: Patient will achieve/maintain normal respiratory rate/effort  Description: Respiratory rate and effort will be within normal limits for the patient  Outcome: Ongoing   Pt on room air with O2 sats >90% and regular respirations.

## 2020-10-02 NOTE — PROGRESS NOTES
Clinical Pharmacy Note  Warfarin Consult    Patrick White is a 53 y.o. female receiving warfarin managed by pharmacy. Patient being bridged with heparin drip. Warfarin Indication: Factor V Leiden and then had Bilateral PE, Acute CVA, and AFib this admission  Target INR range: 2-3   Dose prior to admission: 7.5mg daily except 3.75mg QMon &  Thursday    Current warfarin drug-drug interactions: Amiodarone (new)    Recent Labs     09/30/20  0530 10/01/20  0637 10/02/20  0533   HGB 8.1* 8.0* 8.7*   HCT 25.6* 24.6* 27.2*   INR 1.10 1.04 1.26*       Assessment/Plan:    Patient now receiving continuous Nepro tube feeds via PEG tube. Receiving roughly 120 mcg/day of Vitamin K currently. Anticipate patient will require more warfarin than home dose due to TF's. After 2 doses of 10 mg, then 12.5 mg last pm, INR is starting to increase slightly. Will dose warfarin 12.5 mg again tonight. Daily PT/INR until stable within therapeutic range. If Amiodarone continued on discharge expect weekly dose to decrease. Thank you for the consult. Will continue to follow. Sarah Uribe PharmD.   10/2/2020  7:56 AM

## 2020-10-02 NOTE — PROGRESS NOTES
NEPHROLOGY PROGRESS NOTE    Patient: Rogelio Gonzalez MRN: [id number]     YOB: 1970  Age: 53 y.o. Sex: female    Unit: [address] Room/Bed: [id number]/5262-01 Location: [address] [place] [address] 12     Admitting Physician: Anabel Rolle    Primary Care Physician: No primary care provider on file. LOS: 22 days       Reason for evaluation:   [place]      SUBJECTIVE:      The patient was seen and examined. Notes and labs reviewed. There were no complications over night. HPI:  Breathing comfortably. No CP. Serum calcium level increasing. Coumadin subtherapeutic still. ROS:  In bed. No fever. [address]:  medications reviewed. OBJECTIVE:   BP (!) 163/71   Pulse 93   Temp 97.6 °F (36.4 °C) (Oral)   Resp 16   Ht 5' 7\" (1.702 m)   Wt 192 lb 14.4 oz (87.5 kg)   LMP 12/05/2018 (Exact Date)   SpO2 96%   BMI 30.21 kg/m²     Intake and Output:      Intake/Output Summary (Last 24 hours) at 10/2/2020 1105  Last data filed at 10/2/2020 0944  Gross per 24 hour   Intake 2369 ml   Output 1475 ml   Net 894 ml       Exam:   CONSTITUTIONAL/PSYCHIATRY: Resting in bed  RESPIRATORY:  CTA bilaterally  CARDIOVASCULAR: RRR  GASTROINTESTINAL: soft BS PEG in place  + Hahn in place  EXTREMITIES:  No edema  SKIN: Warm and dry. R IJ Vasc cath in place. LABS:   RFP:   Recent Labs     09/30/20  0530 10/01/20  0637 10/02/20  0533    137 137   K 4.3 4.2 4.1    99 99   CO2 25 24 25   BUN 20 32* 40*   CREATININE 2.2* 3.1* 3.2*   CALCIUM 9.3 10.8* 11.5*   MG 2.00 2.10 2.10   PHOS 3.3 4.4 4.9   GFRAA 29* 19* 19*      Liver panel:  Recent Labs     09/30/20  0530 10/01/20  0637 10/02/20  0533   AST 32 28 35   ALT 23 19 20       CBC:   Recent Labs     09/30/20  0530 10/01/20  0637 10/02/20  0533   WBC 7.0 7.3 6.5   HGB 8.1* 8.0* 8.7*   HCT 25.6* 24.6* 27.2*   MCV 86.7 85.1 85.2    322 350         ASSESSMENT and PLAN:     1. MORELIA  - likely secondary to ischemia ATI.  She had additional exposure to radiocontrast   - ZENON is neg, ANCA neg, C3 is low at 54  - Initiated on IHD on 9/11/20. Transitioned to CRRT on 9/12/20 which was stopped 9/15/20. Restarted HD 09/17/20  - monitoring for possible recovery UOP only 600 ml/24 hours Cr rebounding  - outpatient dialysis spot at [facility] MWF @ 5 PM  - [facility] placed by IR 09/29/20   - on nephrocap  - next HD on Friday to get on outpatient MWF schedule    2. FEN  - hypophosphatemia   - on renvela 800 mg PO qdinner  - hypercalcemia   - unclear why calcium level has increased   - suspect increased immobility given her increased dysarthria over the last two days   - no recent calcium administration   - check PTH (but, she is MORELIA without hypercalcemia earlier in hospitalization)    - will start calcitonin nasal spray    3. S/P PEA arrest 9/11/20    4. Acute hypoxic respiratory failure:  - per pulmonary  - Extubated 9/19. on RA now     5. MRSA bacteremia:   - on Vancomycin with pharmacy dosing   - LEVY shows no vegetation    6. CSDHF  - resolved     7. Factor V leiden deficiency with PE/coagulopathy/DIC:   - per hematology  - on heparin gtt  - awaiting therapeutic coumadin level    8.  CVA  - MRI shows bilateral acute sub acute infarcts  - neurology following  - EEG apparently better with higher keppra dose    9. atrial fibrillation  - on PO amiodarone    10. anemia  - Hgb stable  - on retacrit    SNF pending

## 2020-10-03 LAB
ALBUMIN SERPL-MCNC: 3.2 G/DL (ref 3.4–5)
ALP BLD-CCNC: 133 U/L (ref 40–129)
ALT SERPL-CCNC: 20 U/L (ref 10–40)
ANION GAP SERPL CALCULATED.3IONS-SCNC: 9 MMOL/L (ref 3–16)
APTT: 109.8 SEC (ref 24.2–36.2)
APTT: 46 SEC (ref 24.2–36.2)
APTT: 72 SEC (ref 24.2–36.2)
AST SERPL-CCNC: 33 U/L (ref 15–37)
BASOPHILS ABSOLUTE: 0.1 K/UL (ref 0–0.2)
BASOPHILS RELATIVE PERCENT: 1.8 %
BILIRUB SERPL-MCNC: 0.3 MG/DL (ref 0–1)
BILIRUBIN DIRECT: <0.2 MG/DL (ref 0–0.3)
BILIRUBIN, INDIRECT: ABNORMAL MG/DL (ref 0–1)
BUN BLDV-MCNC: 19 MG/DL (ref 7–20)
CALCIUM SERPL-MCNC: 10.1 MG/DL (ref 8.3–10.6)
CHLORIDE BLD-SCNC: 96 MMOL/L (ref 99–110)
CO2: 25 MMOL/L (ref 21–32)
CREAT SERPL-MCNC: 2.2 MG/DL (ref 0.6–1.1)
EOSINOPHILS ABSOLUTE: 0.4 K/UL (ref 0–0.6)
EOSINOPHILS RELATIVE PERCENT: 5.5 %
GFR AFRICAN AMERICAN: 29
GFR NON-AFRICAN AMERICAN: 24
GLUCOSE BLD-MCNC: 106 MG/DL (ref 70–99)
GLUCOSE BLD-MCNC: 108 MG/DL (ref 70–99)
GLUCOSE BLD-MCNC: 108 MG/DL (ref 70–99)
GLUCOSE BLD-MCNC: 117 MG/DL (ref 70–99)
GLUCOSE BLD-MCNC: 118 MG/DL (ref 70–99)
HCT VFR BLD CALC: 25.7 % (ref 36–48)
HEMOGLOBIN: 8.2 G/DL (ref 12–16)
INR BLD: 1.93 (ref 0.86–1.14)
LYMPHOCYTES ABSOLUTE: 1.6 K/UL (ref 1–5.1)
LYMPHOCYTES RELATIVE PERCENT: 21.6 %
MAGNESIUM: 2 MG/DL (ref 1.8–2.4)
MCH RBC QN AUTO: 27.2 PG (ref 26–34)
MCHC RBC AUTO-ENTMCNC: 31.8 G/DL (ref 31–36)
MCV RBC AUTO: 85.5 FL (ref 80–100)
MONOCYTES ABSOLUTE: 0.7 K/UL (ref 0–1.3)
MONOCYTES RELATIVE PERCENT: 9.6 %
NEUTROPHILS ABSOLUTE: 4.6 K/UL (ref 1.7–7.7)
NEUTROPHILS RELATIVE PERCENT: 61.5 %
PDW BLD-RTO: 21.6 % (ref 12.4–15.4)
PERFORMED ON: ABNORMAL
PHOSPHORUS: 3.4 MG/DL (ref 2.5–4.9)
PLATELET # BLD: 288 K/UL (ref 135–450)
PMV BLD AUTO: 8.3 FL (ref 5–10.5)
POTASSIUM SERPL-SCNC: 4 MMOL/L (ref 3.5–5.1)
PROTHROMBIN TIME: 22.5 SEC (ref 10–13.2)
RBC # BLD: 3.01 M/UL (ref 4–5.2)
SODIUM BLD-SCNC: 130 MMOL/L (ref 136–145)
TOTAL PROTEIN: 6.4 G/DL (ref 6.4–8.2)
WBC # BLD: 7.4 K/UL (ref 4–11)

## 2020-10-03 PROCEDURE — 85025 COMPLETE CBC W/AUTO DIFF WBC: CPT

## 2020-10-03 PROCEDURE — 6370000000 HC RX 637 (ALT 250 FOR IP): Performed by: INTERNAL MEDICINE

## 2020-10-03 PROCEDURE — 36415 COLL VENOUS BLD VENIPUNCTURE: CPT

## 2020-10-03 PROCEDURE — 6360000002 HC RX W HCPCS: Performed by: INTERNAL MEDICINE

## 2020-10-03 PROCEDURE — 2580000003 HC RX 258: Performed by: INTERNAL MEDICINE

## 2020-10-03 PROCEDURE — C9113 INJ PANTOPRAZOLE SODIUM, VIA: HCPCS | Performed by: INTERNAL MEDICINE

## 2020-10-03 PROCEDURE — 6370000000 HC RX 637 (ALT 250 FOR IP): Performed by: HOSPITALIST

## 2020-10-03 PROCEDURE — 80076 HEPATIC FUNCTION PANEL: CPT

## 2020-10-03 PROCEDURE — 2500000003 HC RX 250 WO HCPCS: Performed by: INTERNAL MEDICINE

## 2020-10-03 PROCEDURE — 2580000003 HC RX 258: Performed by: HOSPITALIST

## 2020-10-03 PROCEDURE — 85730 THROMBOPLASTIN TIME PARTIAL: CPT

## 2020-10-03 PROCEDURE — 83735 ASSAY OF MAGNESIUM: CPT

## 2020-10-03 PROCEDURE — 84100 ASSAY OF PHOSPHORUS: CPT

## 2020-10-03 PROCEDURE — 85610 PROTHROMBIN TIME: CPT

## 2020-10-03 PROCEDURE — 1200000000 HC SEMI PRIVATE

## 2020-10-03 PROCEDURE — 94760 N-INVAS EAR/PLS OXIMETRY 1: CPT

## 2020-10-03 PROCEDURE — 80048 BASIC METABOLIC PNL TOTAL CA: CPT

## 2020-10-03 RX ORDER — WARFARIN SODIUM 5 MG/1
5 TABLET ORAL
Status: COMPLETED | OUTPATIENT
Start: 2020-10-03 | End: 2020-10-03

## 2020-10-03 RX ORDER — HEPARIN SODIUM 1000 [USP'U]/ML
2000 INJECTION, SOLUTION INTRAVENOUS; SUBCUTANEOUS ONCE
Status: COMPLETED | OUTPATIENT
Start: 2020-10-03 | End: 2020-10-03

## 2020-10-03 RX ADMIN — AMIODARONE HYDROCHLORIDE 200 MG: 200 TABLET ORAL at 09:57

## 2020-10-03 RX ADMIN — LACOSAMIDE 200 MG: 100 TABLET, FILM COATED ORAL at 20:34

## 2020-10-03 RX ADMIN — CARVEDILOL 12.5 MG: 12.5 TABLET, FILM COATED ORAL at 17:45

## 2020-10-03 RX ADMIN — DIAZEPAM 5 MG: 5 TABLET ORAL at 20:35

## 2020-10-03 RX ADMIN — Medication 2 CAPSULE: at 09:57

## 2020-10-03 RX ADMIN — Medication 2 CAPSULE: at 17:45

## 2020-10-03 RX ADMIN — Medication 10 ML: at 09:59

## 2020-10-03 RX ADMIN — LEVETIRACETAM 1000 MG: 500 SOLUTION ORAL at 20:34

## 2020-10-03 RX ADMIN — LACOSAMIDE 200 MG: 100 TABLET, FILM COATED ORAL at 09:56

## 2020-10-03 RX ADMIN — AMIODARONE HYDROCHLORIDE 200 MG: 200 TABLET ORAL at 20:34

## 2020-10-03 RX ADMIN — Medication 10 ML: at 09:58

## 2020-10-03 RX ADMIN — HYDROXYZINE HYDROCHLORIDE 10 MG: 10 TABLET, FILM COATED ORAL at 15:53

## 2020-10-03 RX ADMIN — CARVEDILOL 12.5 MG: 12.5 TABLET, FILM COATED ORAL at 09:57

## 2020-10-03 RX ADMIN — PANTOPRAZOLE SODIUM 40 MG: 40 INJECTION, POWDER, FOR SOLUTION INTRAVENOUS at 09:57

## 2020-10-03 RX ADMIN — HEPARIN SODIUM 5 ML/HR: 10000 INJECTION, SOLUTION INTRAVENOUS at 04:38

## 2020-10-03 RX ADMIN — HEPARIN SODIUM 2000 UNITS: 1000 INJECTION INTRAVENOUS; SUBCUTANEOUS at 11:31

## 2020-10-03 RX ADMIN — NEPHROCAP 1 MG: 1 CAP ORAL at 09:57

## 2020-10-03 RX ADMIN — LEVETIRACETAM 1000 MG: 500 SOLUTION ORAL at 09:57

## 2020-10-03 RX ADMIN — SEVELAMER CARBONATE 0.8 G: 2.4 POWDER, FOR SUSPENSION ORAL at 17:45

## 2020-10-03 RX ADMIN — SODIUM CHLORIDE, PRESERVATIVE FREE 10 ML: 5 INJECTION INTRAVENOUS at 10:49

## 2020-10-03 RX ADMIN — CALCITONIN SALMON 1 SPRAY: 200 SPRAY, METERED NASAL at 16:00

## 2020-10-03 RX ADMIN — DIAZEPAM 5 MG: 5 TABLET ORAL at 09:57

## 2020-10-03 RX ADMIN — WARFARIN SODIUM 5 MG: 5 TABLET ORAL at 17:45

## 2020-10-03 RX ADMIN — Medication 10 ML: at 20:34

## 2020-10-03 ASSESSMENT — PAIN SCALES - GENERAL: PAINLEVEL_OUTOF10: 0

## 2020-10-03 NOTE — PLAN OF CARE
Problem: Falls - Risk of:  Goal: Will remain free from falls  Description: Will remain free from falls  10/3/2020 0445 by Sanya Bellamy RN  Outcome: Ongoing  Bed locked and in lowest position. Bed alarm on. Siderails up. Telesitter in place. Call light within reach. Problem: Nutrition  Goal: Optimal nutrition therapy  10/3/2020 0445 by Sanya Bellamy RN  Outcome: Ongoing  Pt NPO but receiving nutrition through tube feeds via PEG tube. Problem: HEMODYNAMIC STATUS  Goal: Patient has stable vital signs and fluid balance  10/3/2020 0445 by Sanya Bellamy RN  Outcome: Ongoing  VS stable. I&O being monitored. Problem: Skin Integrity:  Goal: Absence of new skin breakdown  Description: Absence of new skin breakdown  10/3/2020 0445 by Sanya Bellamy RN  Outcome: Ongoing  No new skin breakdown noted. Pt turned Q2 hours. Heels elevated off bed. HOB 30.

## 2020-10-03 NOTE — PROGRESS NOTES
Date)   SpO2 93%   BMI 30.21 kg/m²     Gen:   lethargic/sleepy  Eyes: PERRL. No sclera icterus. No conjunctival injection. ENT: No discharge. Pharynx clear. External appearance of ears and nose normal.  Neck: Trachea midline. No obvious mass. Resp: No accessory muscle use. No crackles. No wheezes. No rhonchi. CV: Regular rate. Regular rhythm. No murmur or rub. No edema. GI: Non-tender. Non-distended. No hernia. Skin: Warm, dry, normal texture and turgor. Lymph: No cervical LAD. No supraclavicular LAD. M/S: / Ext. No cyanosis. No clubbing. No joint deformity. Neuro: CN 2-12 are intact,  no neurologic deficits noted. Labs:   Recent Labs     10/01/20  0637 10/02/20  0533 10/03/20  0517   WBC 7.3 6.5 7.4   HGB 8.0* 8.7* 8.2*   HCT 24.6* 27.2* 25.7*    350 288     Recent Labs     10/01/20  0637 10/02/20  0533 10/03/20  0517    137 130*   K 4.2 4.1 4.0   CL 99 99 96*   CO2 24 25 25   BUN 32* 40* 19   CREATININE 3.1* 3.2* 2.2*   CALCIUM 10.8* 11.5* 10.1   PHOS 4.4 4.9 3.4     Recent Labs     10/01/20  0637 10/02/20  0533 10/03/20  0517   AST 28 35 33   ALT 19 20 20   BILIDIR <0.2 <0.2 <0.2   BILITOT 0.3 0.3 0.3   ALKPHOS 139* 148* 133*     Recent Labs     10/01/20  0637 10/02/20  0533 10/03/20  0517   INR 1.04 1.26* 1.93*     No results for input(s): CKTOTAL, TROPONINI in the last 72 hours. Urinalysis:      Lab Results   Component Value Date    NITRU Negative 09/10/2020    WBCUA 3-5 09/10/2020    BACTERIA 2+ 09/10/2020    RBCUA 21-50 09/10/2020    BLOODU MODERATE 09/10/2020    SPECGRAV >1.030 09/10/2020    GLUCOSEU Negative 09/10/2020       Radiology:  FL MODIFIED BARIUM SWALLOW W VIDEO   Final Result   Aspiration was seen      Please see separate speech pathology report for full discussion of findings   and recommendations. IR TUNNELED CVC PLACE WO SQ PORT/PUMP > 5 YEARS   Final Result   Successful placement of a right IJ 19 cm tunneled hemodialysis catheter.          CT HEAD significant change in appearance of small to moderate bilateral pleural   effusions and bibasilar airspace disease. XR CHEST PORTABLE   Final Result   Supportive lines project in stable positions. Bibasilar opacities, suggestive of layered effusions and atelectasis or   airspace disease. XR CHEST PORTABLE   Final Result   1. Suspected mild to moderate bilateral pleural effusions with adjacent   bibasilar atelectasis and/or airspace disease. 2. Mild to moderate cardiomegaly. 3. Recommend retraction of endotracheal tube 0.7 cm. XR CHEST PORTABLE   Final Result   Right jugular venous catheter and endotracheal tube project in normal   positions. No pneumothorax. Bilateral airspace disease and possible layered pleural effusions, consistent   with pulmonary edema and atelectasis. Pneumonia is a differential   possibility. XR CHEST PORTABLE   Final Result   Satisfactory position of the endotracheal tube 3 cm above the rahat. The   heart is upper normal size and vessels are borderline congested. VL Extremity Venous Bilateral   Final Result      VL DUP LOWER EXTREMITY ARTERIES BILATERAL   Final Result      XR CHEST PORTABLE   Final Result   Endotracheal tube has tip approximately 1.3 cm proximal to the rahat and   should be retracted approximately 2 cm. Findings are again suggestive of pulmonary edema with small pleural   effusions. Pneumonia is not excluded in the appropriate clinical setting. Findings were called by the radiology call center. CT HEAD WO CONTRAST   Final Result   Motion limited study. No convincing evidence for acute intracranial   abnormality identified within the limitations of motion artifact. If there is persistent clinical suspicion for intracranial abnormality,   repeat study should be considered.          XR CHEST PORTABLE   Final Result   Cardiomegaly, pulmonary vascular congestion, and bilateral pleural effusions. Endotracheal tube terminates approximately 2 cm above the rahat. CT CHEST PULMONARY EMBOLISM W CONTRAST   Final Result   Addendum 1 of 1   ADDENDUM:   Findings were discussed with Dr. Cali Del Real at 10:49 pm on 9/10/2020.          Final              Assessment/Plan:    Active Hospital Problems    Diagnosis    Acute on chronic systolic heart failure (HCC) [I50.23]    Paroxysmal atrial fibrillation (HCC) [I48.0]    Essential hypertension [I10]    Cardiogenic shock (HCC) [R57.0]    Staphylococcal pneumonia (HCC) [J15.20]    Pleural effusion, bilateral [J90]    Elevated lactic acid level [R79.89]    Bacteremia due to methicillin susceptible Staphylococcus aureus (MSSA) [R78.81, B95.61]    Pneumonia due to methicillin susceptible Staphylococcus aureus (MSSA) ([facility] [J15.211]    Anemia [D64.9]    Acute renal failure (HCC) [N17.9]    Bilateral pleural effusion [J90]    Mitral valve insufficiency [I34.0]    Tricuspid valve insufficiency [I07.1]    Class 2 obesity due to excess calories with body mass index (BMI) of 39.0 to 39.9 in adult [E66.09, Z68.39]    Pneumonia due to organism [J18.9]    Bacteremia [R78.81]    Cardiac arrest ([address] [I46.9]    Seizures ([address] [R56.9]    Acute respiratory failure with hypoxia ([address] [J96.01]    MORELIA (acute kidney injury) ([address] [X67.7]    Metabolic acidosis [U77.6]    Congestive heart failure (HCC) [I50.9]    Pulmonary hypertension (HCC) [I27.20]    Multifocal pneumonia [J18.9]    Septic shock (HCC) [A41.9, R65.21]    Elevated LFTs [R79.89]    Thrombocytopenia (HCC) [D69.6]    Coagulopathy ([address] [D68.9]    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) ([address] [D65]    Acute encephalopathy [G93.40]    Pulmonary embolus (HCC) [I26.99]    Lactic acidosis [E87.2]    CAP (community acquired pneumonia) [J18.9]    Acute CHF (congestive heart failure) (HCC) [I50.9]    Bilateral pulmonary embolism (HCC) [I26.99]    Factor V tolerating well.    Repeat modified barium swallow in a.m.       Diet: DIET TUBE FEED CONTINUOUS/CYCLIC NPO; Renal Formula; Gastrostomy; Continuous; 20; 55; Exceptions are: Ice Chips  Code Status: Full Code    PT/OT Eval Status: ordered    Dispo - cont care, poss d/c in am if INR therapeutic    Chloe Candelaria MD

## 2020-10-03 NOTE — CARE COORDINATION
Sw received call from patient's spouse today ([phone]). He confirmed dc plan for patient to go to [address] [place] [address].      Electronically signed by Prudence Azevedo on 10/3/2020 at 12:33 PM

## 2020-10-03 NOTE — PROGRESS NOTES
Clinical Pharmacy Note  Heparin Dosing       Lab Results   Component Value Date    APTT 109.8 10/03/2020     Lab Results   Component Value Date    HGB 8.7 10/02/2020    HCT 27.2 10/02/2020     10/02/2020    INR 1.26 10/02/2020       Current Infusion Rate: 8.3 mL/hr    Plan:  HOLD for 1 hour  Rate: Restart at 5 mL/hr  Next aPTT: 1030  10/3/20    Pharmacy will continue to monitor and adjust based on aPTT results.     Amaya Nur PharmD  10/3/2020 3:24 AM

## 2020-10-03 NOTE — PROGRESS NOTES
Clinical Pharmacy Note  Heparin Dosing       Lab Results   Component Value Date    APTT 31.1 10/02/2020     Lab Results   Component Value Date    HGB 8.7 10/02/2020    HCT 27.2 10/02/2020     10/02/2020    INR 1.26 10/02/2020       Current Infusion Rate: 0 mL/hr    Plan:  IV access re-established this afternoon/evening  Ok to restart Heparin infusion per Dr. Ghassan Heck  Bolus: 4,000 units IVP x 1  Restart heparin at previously established rate of 8.3 mL/hr  Next aPTT: 0300 10/03/20    Pharmacy will continue to monitor and adjust based on aPTT results.     Rossi Rosenberg, PharmD  10/2/2020 8:35 PM

## 2020-10-03 NOTE — PROGRESS NOTES
Clinical Pharmacy Note  Heparin Dosing       Lab Results   Component Value Date    APTT 72.0 10/03/2020     Lab Results   Component Value Date    HGB 8.2 10/03/2020    HCT 25.7 10/03/2020     10/03/2020    INR 1.93 10/03/2020       Current Infusion Rate: 6.7 mL/hr    Plan:  Rate: continue 6.7 mL/hr  Next aPTT:   0000 10/4/20    Pharmacy will continue to monitor and adjust based on aPTT results.     [address] [facility] 10/3/2020  6:41 PM

## 2020-10-03 NOTE — PROGRESS NOTES
Rechecked pt's residuals - remains at 450mL. Notified Margarita Bustamante NP. Ordered for tube feeds to continue at 30ml/hr.

## 2020-10-03 NOTE — PROGRESS NOTES
Clinical Pharmacy Note  Heparin Dosing       Lab Results   Component Value Date    APTT 46.0 10/03/2020     Lab Results   Component Value Date    HGB 8.2 10/03/2020    HCT 25.7 10/03/2020     10/03/2020    INR 1.93 10/03/2020       Current Infusion Rate: 5.0 mL/hr    Plan:  Bolus: 2000 units  Rate: 6.7 mL/hr  Next aPTT: 1800    Pharmacy will continue to monitor and adjust based on aPTT results.

## 2020-10-03 NOTE — PLAN OF CARE
Pt able to express presence/absence of pain and rate pain appropriately using numerical scale. Pain/discomfort being managed with PRN analgesics per MD orders (see MAR). Pain assessed every shift and after interventions. Patient free from falls this shift. Fall precautions in place at all times. Bed in lowest position with two side rails up and wheels locked. Call light within reach. Patient able and agreeable to contact for safety appropriately. Skin assessment performed each shift per protocol. Patient turned and repositioned every two hours and prn with pillow support. Patient checked for incontence every two hours.      Problem: Nutrition  Goal: Optimal nutrition therapy  Outcome: Ongoing     Problem: OXYGENATION/RESPIRATORY FUNCTION  Goal: Patient will maintain patent airway  Outcome: Ongoing  Goal: Patient will achieve/maintain normal respiratory rate/effort  Description: Respiratory rate and effort will be within normal limits for the patient  Outcome: Ongoing     Problem: ACTIVITY INTOLERANCE/IMPAIRED MOBILITY  Goal: Mobility/activity is maintained at optimum level for patient  Outcome: Ongoing

## 2020-10-03 NOTE — PROGRESS NOTES
NEPHROLOGY PROGRESS NOTE    Patient: Chuyita Moon MRN: [id number]     YOB: 1970  Age: 53 y.o. Sex: female    Unit: [address] Room/Bed: [id number]/5262-01 Location: [address] [place] [address] 12     Admitting Physician: Mariela San    Primary Care Physician: No primary care provider on file. LOS: 23 days       Reason for evaluation:   [place]      SUBJECTIVE:      The patient was seen and examined. Notes and labs reviewed. There were no complications over night. HPI:  Breathing comfortably. No CP. Serum calcium level better. Coumadin almost therapeutic. ROS:  In bed. No fever. [address]:  medications reviewed. OBJECTIVE:   /69   Pulse 91   Temp 98.4 °F (36.9 °C) (Axillary)   Resp 18   Ht 5' 7\" (1.702 m)   Wt 192 lb 14.4 oz (87.5 kg)   LMP 12/05/2018 (Exact Date)   SpO2 93%   BMI 30.21 kg/m²     Intake and Output:      Intake/Output Summary (Last 24 hours) at 10/3/2020 1606  Last data filed at 10/3/2020 1210  Gross per 24 hour   Intake 1591 ml   Output 2595 ml   Net -1004 ml       Exam:   CONSTITUTIONAL/PSYCHIATRY: Resting in bed  RESPIRATORY:  CTA bilaterally  CARDIOVASCULAR: RRR  GASTROINTESTINAL: soft BS PEG in place  + Hahn in place  EXTREMITIES:  No edema  SKIN: Warm and dry. R IJ Vasc cath in place. LABS:   RFP:   Recent Labs     10/01/20  0637 10/02/20  0533 10/03/20  0517    137 130*   K 4.2 4.1 4.0   CL 99 99 96*   CO2 24 25 25   BUN 32* 40* 19   CREATININE 3.1* 3.2* 2.2*   CALCIUM 10.8* 11.5* 10.1   MG 2.10 2.10 2.00   PHOS 4.4 4.9 3.4   GFRAA 19* 19* 29*      Liver panel:  Recent Labs     10/01/20  0637 10/02/20  0533 10/03/20  0517   AST 28 35 33   ALT 19 20 20       CBC:   Recent Labs     10/01/20  0637 10/02/20  0533 10/03/20  0517   WBC 7.3 6.5 7.4   HGB 8.0* 8.7* 8.2*   HCT 24.6* 27.2* 25.7*   MCV 85.1 85.2 85.5    350 288         ASSESSMENT and PLAN:     1. MORELIA  - likely secondary to ischemia ATI.  She had additional exposure

## 2020-10-04 VITALS
OXYGEN SATURATION: 95 % | DIASTOLIC BLOOD PRESSURE: 71 MMHG | SYSTOLIC BLOOD PRESSURE: 139 MMHG | HEART RATE: 88 BPM | TEMPERATURE: 97.8 F | RESPIRATION RATE: 18 BRPM | BODY MASS INDEX: 29.52 KG/M2 | HEIGHT: 67 IN | WEIGHT: 188.05 LBS

## 2020-10-04 LAB
ALBUMIN SERPL-MCNC: 3.2 G/DL (ref 3.4–5)
ALP BLD-CCNC: 131 U/L (ref 40–129)
ALT SERPL-CCNC: 23 U/L (ref 10–40)
ANION GAP SERPL CALCULATED.3IONS-SCNC: 14 MMOL/L (ref 3–16)
APTT: 61.1 SEC (ref 24.2–36.2)
APTT: 61.9 SEC (ref 24.2–36.2)
AST SERPL-CCNC: 32 U/L (ref 15–37)
BASOPHILS ABSOLUTE: 0.1 K/UL (ref 0–0.2)
BASOPHILS RELATIVE PERCENT: 1.3 %
BILIRUB SERPL-MCNC: <0.2 MG/DL (ref 0–1)
BILIRUBIN DIRECT: <0.2 MG/DL (ref 0–0.3)
BILIRUBIN, INDIRECT: ABNORMAL MG/DL (ref 0–1)
BUN BLDV-MCNC: 31 MG/DL (ref 7–20)
CALCIUM SERPL-MCNC: 10.7 MG/DL (ref 8.3–10.6)
CHLORIDE BLD-SCNC: 98 MMOL/L (ref 99–110)
CO2: 23 MMOL/L (ref 21–32)
CREAT SERPL-MCNC: 3 MG/DL (ref 0.6–1.1)
EOSINOPHILS ABSOLUTE: 0.4 K/UL (ref 0–0.6)
EOSINOPHILS RELATIVE PERCENT: 6.3 %
GFR AFRICAN AMERICAN: 20
GFR NON-AFRICAN AMERICAN: 17
GLUCOSE BLD-MCNC: 101 MG/DL (ref 70–99)
GLUCOSE BLD-MCNC: 108 MG/DL (ref 70–99)
GLUCOSE BLD-MCNC: 116 MG/DL (ref 70–99)
GLUCOSE BLD-MCNC: 132 MG/DL (ref 70–99)
HCT VFR BLD CALC: 24.9 % (ref 36–48)
HEMOGLOBIN: 7.9 G/DL (ref 12–16)
INR BLD: 2.56 (ref 0.86–1.14)
LYMPHOCYTES ABSOLUTE: 1.2 K/UL (ref 1–5.1)
LYMPHOCYTES RELATIVE PERCENT: 19.1 %
MAGNESIUM: 2.1 MG/DL (ref 1.8–2.4)
MCH RBC QN AUTO: 27.3 PG (ref 26–34)
MCHC RBC AUTO-ENTMCNC: 31.7 G/DL (ref 31–36)
MCV RBC AUTO: 86.1 FL (ref 80–100)
MONOCYTES ABSOLUTE: 0.7 K/UL (ref 0–1.3)
MONOCYTES RELATIVE PERCENT: 11.6 %
NEUTROPHILS ABSOLUTE: 3.9 K/UL (ref 1.7–7.7)
NEUTROPHILS RELATIVE PERCENT: 61.7 %
PDW BLD-RTO: 21.5 % (ref 12.4–15.4)
PERFORMED ON: ABNORMAL
PHOSPHORUS: 4.8 MG/DL (ref 2.5–4.9)
PLATELET # BLD: 326 K/UL (ref 135–450)
PMV BLD AUTO: 8.1 FL (ref 5–10.5)
POTASSIUM SERPL-SCNC: 4 MMOL/L (ref 3.5–5.1)
PROTHROMBIN TIME: 30 SEC (ref 10–13.2)
RBC # BLD: 2.89 M/UL (ref 4–5.2)
SODIUM BLD-SCNC: 135 MMOL/L (ref 136–145)
TOTAL PROTEIN: 6.6 G/DL (ref 6.4–8.2)
WBC # BLD: 6.3 K/UL (ref 4–11)

## 2020-10-04 PROCEDURE — 6370000000 HC RX 637 (ALT 250 FOR IP): Performed by: INTERNAL MEDICINE

## 2020-10-04 PROCEDURE — 84100 ASSAY OF PHOSPHORUS: CPT

## 2020-10-04 PROCEDURE — 94760 N-INVAS EAR/PLS OXIMETRY 1: CPT

## 2020-10-04 PROCEDURE — 83735 ASSAY OF MAGNESIUM: CPT

## 2020-10-04 PROCEDURE — 85610 PROTHROMBIN TIME: CPT

## 2020-10-04 PROCEDURE — 80048 BASIC METABOLIC PNL TOTAL CA: CPT

## 2020-10-04 PROCEDURE — 80076 HEPATIC FUNCTION PANEL: CPT

## 2020-10-04 PROCEDURE — 36415 COLL VENOUS BLD VENIPUNCTURE: CPT

## 2020-10-04 PROCEDURE — 85730 THROMBOPLASTIN TIME PARTIAL: CPT

## 2020-10-04 PROCEDURE — 6370000000 HC RX 637 (ALT 250 FOR IP): Performed by: HOSPITALIST

## 2020-10-04 PROCEDURE — 6360000002 HC RX W HCPCS: Performed by: INTERNAL MEDICINE

## 2020-10-04 PROCEDURE — 2580000003 HC RX 258: Performed by: INTERNAL MEDICINE

## 2020-10-04 PROCEDURE — C9113 INJ PANTOPRAZOLE SODIUM, VIA: HCPCS | Performed by: INTERNAL MEDICINE

## 2020-10-04 PROCEDURE — 85025 COMPLETE CBC W/AUTO DIFF WBC: CPT

## 2020-10-04 RX ORDER — DIAZEPAM 5 MG/1
5 TABLET ORAL EVERY 12 HOURS SCHEDULED
Qty: 20 TABLET | Refills: 0 | Status: SHIPPED | OUTPATIENT
Start: 2020-10-04 | End: 2020-10-14

## 2020-10-04 RX ORDER — WARFARIN SODIUM 7.5 MG/1
TABLET ORAL
Qty: 30 TABLET | Refills: 3 | Status: SHIPPED | OUTPATIENT
Start: 2020-10-04 | End: 2022-04-08 | Stop reason: ALTCHOICE

## 2020-10-04 RX ORDER — WARFARIN SODIUM 7.5 MG/1
7.5 TABLET ORAL
Status: COMPLETED | OUTPATIENT
Start: 2020-10-04 | End: 2020-10-04

## 2020-10-04 RX ADMIN — CARVEDILOL 12.5 MG: 12.5 TABLET, FILM COATED ORAL at 10:02

## 2020-10-04 RX ADMIN — Medication 10 ML: at 17:41

## 2020-10-04 RX ADMIN — WARFARIN SODIUM 7.5 MG: 7.5 TABLET ORAL at 17:38

## 2020-10-04 RX ADMIN — CALCITONIN SALMON 1 SPRAY: 200 SPRAY, METERED NASAL at 14:40

## 2020-10-04 RX ADMIN — CARVEDILOL 12.5 MG: 12.5 TABLET, FILM COATED ORAL at 17:38

## 2020-10-04 RX ADMIN — PANTOPRAZOLE SODIUM 40 MG: 40 INJECTION, POWDER, FOR SOLUTION INTRAVENOUS at 10:03

## 2020-10-04 RX ADMIN — NEPHROCAP 1 MG: 1 CAP ORAL at 10:02

## 2020-10-04 RX ADMIN — LACOSAMIDE 200 MG: 100 TABLET, FILM COATED ORAL at 10:02

## 2020-10-04 RX ADMIN — LEVETIRACETAM 1000 MG: 500 SOLUTION ORAL at 10:03

## 2020-10-04 RX ADMIN — AMIODARONE HYDROCHLORIDE 200 MG: 200 TABLET ORAL at 10:03

## 2020-10-04 RX ADMIN — Medication 2 CAPSULE: at 10:03

## 2020-10-04 RX ADMIN — Medication 2 CAPSULE: at 17:38

## 2020-10-04 RX ADMIN — DIAZEPAM 5 MG: 5 TABLET ORAL at 10:03

## 2020-10-04 NOTE — PLAN OF CARE
Problem: Pain:  Goal: Pain level will decrease  Description: Pain level will decrease  10/3/2020 2235 by Savanna Sanchez RN  Outcome: Ongoing     Problem: Pain:  Goal: Control of acute pain  Description: Control of acute pain  10/3/2020 2235 by Savanna Sanchez RN  Outcome: Ongoing     Problem: Pain:  Goal: Control of chronic pain  Description: Control of chronic pain  10/3/2020 2235 by Savanna Sanchez RN  Outcome: Ongoing     Problem: Falls - Risk of:  Goal: Absence of physical injury  Description: Absence of physical injury  10/3/2020 2235 by Savanna Sanchez RN  Outcome: Ongoing     Problem: Falls - Risk of:  Goal: Will remain free from falls  Description: Will remain free from falls  10/3/2020 2235 by Savanna Sanchez RN  Outcome: Ongoing     Problem: Skin Integrity:  Goal: Will show no infection signs and symptoms  Description: Will show no infection signs and symptoms  10/3/2020 2235 by Savanna Sanchez RN  Outcome: Ongoing     Problem: Skin Integrity:  Goal: Absence of new skin breakdown  Description: Absence of new skin breakdown  10/3/2020 2235 by Savanna Sanchez RN  Outcome: Ongoing     Problem: Nutrition  Goal: Optimal nutrition therapy  10/3/2020 2235 by Savanna Sanchez RN  Outcome: Ongoing     Problem: OXYGENATION/RESPIRATORY FUNCTION  Goal: Patient will maintain patent airway  10/3/2020 2235 by Savanna Snachez RN  Outcome: Ongoing     Problem: FLUID AND ELECTROLYTE IMBALANCE  Goal: Fluid and electrolyte balance are achieved/maintained  10/3/2020 2235 by Savanna Sanchez RN  Outcome: Ongoing

## 2020-10-04 NOTE — PROGRESS NOTES
Clinical Pharmacy Note  Warfarin Consult    Delmis Love is a 53 y.o. female receiving warfarin managed by pharmacy. Warfarin Indication: Factor V Leiden then had bilateral PE, acute CVA, and Afib this admission  Target INR range: 2-3   Dose prior to admission: 7.5 mg daily except 3.75 mg Mondays and Thursdays    Current warfarin drug-drug interactions:  Amiodarone (new)    Recent Labs     10/02/20  0533 10/03/20  0517 10/04/20  0552   HGB 8.7* 8.2* 7.9*   HCT 27.2* 25.7* 24.9*   INR 1.26* 1.93* 2.56*       Assessment/Plan:    Warfarin 7.5 mg tonight. Daily PT/INR until stable within therapeutic range. Patient being D/Cd to SNF. Recommend:  Warfarin 7.5 mg daily, except 3.75 mg Mondays and Thursdays  INRs twice weekly, Tuesdays and Thursdays with dialysis, and then call MD with results for warfarin dosing instructions. Patient is high risk with PMH of Factor V Leiden, bilateral PEs, CVA, and Afib started on amiodarone during this admission. Thank you for the consult. Will continue to follow.     Manuela Benjamin, PharmD, BCPS  10/4/2020  1:27 PM

## 2020-10-04 NOTE — DISCHARGE SUMMARY
Hospital Medicine Discharge Summary    Patient ID: Amena Sherman      Patient's PCP: No primary care provider on file. Admit Date: 9/10/2020     Discharge Date:  10/4/2020    Admitting Physician: Juvencio Reyes DO     Discharge Physician: Ivis Dow MD     Discharge Diagnoses:        Active Hospital Problems    Diagnosis    Acute on chronic systolic heart failure (HCC) [I50.23]    Paroxysmal atrial fibrillation (HCC) [I48.0]    Essential hypertension [I10]    Cardiogenic shock (HCC) [R57.0]    Staphylococcal pneumonia (HCC) [J15.20]    Pleural effusion, bilateral [J90]    Elevated lactic acid level [R79.89]    Bacteremia due to methicillin susceptible Staphylococcus aureus (MSSA) [R78.81, B95.61]    Pneumonia due to methicillin susceptible Staphylococcus aureus (MSSA) ([address] [J15.211]    Anemia [D64.9]    Acute renal failure (HCC) [N17.9]    Bilateral pleural effusion [J90]    Mitral valve insufficiency [I34.0]    Tricuspid valve insufficiency [I07.1]    Class 2 obesity due to excess calories with body mass index (BMI) of 39.0 to 39.9 in adult [E66.09, Z68.39]    Pneumonia due to organism [J18.9]    Bacteremia [R78.81]    Cardiac arrest ([address] [I46.9]    Seizures ([address] [R56.9]    Acute respiratory failure with hypoxia ([address] [J96.01]    MORELIA (acute kidney injury) ([address] [X89.5]    Metabolic acidosis [V92.5]    Congestive heart failure (HCC) [I50.9]    Pulmonary hypertension (HCC) [I27.20]    Multifocal pneumonia [J18.9]    Septic shock (HCC) [A41.9, R65.21]    Elevated LFTs [R79.89]    Thrombocytopenia (HCC) [D69.6]    Coagulopathy ([address] [D68.9]    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) ([address] [D65]    Acute encephalopathy [G93.40]    Pulmonary embolus (HCC) [I26.99]    Lactic acidosis [E87.2]    CAP (community acquired pneumonia) [J18.9]    Acute CHF (congestive heart failure) (HCC) [I50.9]    Bilateral pulmonary embolism ([facility] [I26.99]    Factor V Leiden mutation [facility]) [D68.51]       The patient was seen and examined on day of discharge and this discharge summary is in conjunction with any daily progress note from day of discharge. Hospital Course: The patient is a 53 y.o. female with past medical history of factor V Leiden deficiency on chronic warfarin, history of blood clots, hyperlipidemia, arthritis, asthma who presents to [facility] with worsening above symptoms of fatigue, intermittent shortness of breath both at rest and on exertion, and extensive of abdominal and leg swelling. She notes that the fatigue and shortness of breath have been progressively worsening for the past month but she felt that it may have been just secondary to a viral illness, was not in contact with anyone COVID positive nor anyone sick recently. He does note that in the past week week and a half she has been developing more swelling to her legs and abdomen and it is become very unbearable. She denies any fever, chills, nausea/vomiting/diarrhea/abdominal pain, poor appetite, poor fluid intake, overindulgence of food or fluid, chest pain, back pain, headache, dizziness/syncope, dysuria, rashes. Of note, in the ED patient was significantly tachycardic, showing some combination of sinus tachycardia versus SVT, uncertain if may be underlying atrial fibrillation with rapid ventricular rate. Labs showed signs of infection as well as elevated proBNP. CTA with contrast was done on the chest and demonstrated left and right lobe PEs in certain segments, active findings suggestive of early patchy pneumonia bilaterally, and moderate bilateral pleural effusions suggestive of heart failure/volume overload. She was given metoprolol prior to being placed on a diltiazem drip for rate control, given Lasix x1 dose, given antibiotics.         Shock, septic vs cardiogenic  S/p PEA arrest, acute hypoxic resp failure  Intubated 9/10 shortly after admission.  extubated 9/19   Respiratory status is stable now        Bilateral PE  Hx/of DVT/PE, on coumadin chronically  factor V Leiden  Continue on heparin infusion until INR is therapeutic on Coumadin, INR became therapeutic  Patient discharged as INR is therapeutic        MRSA PNA  Completed antibiotics  LEVY without vegetations         Acute CVA  MRI with scattered acute to subacute infarcts, likely embolic   LEVY negative        Cardiomyopathy, acute on chronic systolic HF  EF 30- 79%. Severe diffuse hypokinesis   severe tricuspid regurgitation and mitral regurgitation,   grade 3 diastolic dysfunction.         Seizure   keppra 1000 mg BID   repeat EEG 9/16 with concern for seizures, possible status   added vimpat to Keppra   repeat EEG 9/17 improved with addition of vimpat, partner discussed with neuro and can defer EEG for now   no repeat seizures but ongoing myoclonus -    added valium with good effect.          A fib RVR  Cardioverted 9/16, started on amio drip without adequate control, added dilt. Multiple attempts to cardiovert all failed   added digoxin   tolerating heparin gtt -transition  To  coumadin   in sinus on the monitor today    amio transitioned to PO formulation via NG.      R groin hematoma - stable     MORELIA - suspect ATN  Continue on hemodialysis     Coagulapathy  Hx Factor V Leiden on coumadin.    hematology notes non-adherence to coumadin in the past.    Heme-onc were following.    presently on heparin gtt bridging to warfarin.         Obesity  Body mass index is 30.01 kg/m². Counseled on effects of weight on overall health and chronic medical conditions and discussed weight loss.         Dysphagia -   Failed MBS 9/23 pretty much with all consistencies. S/p PEG 9/25 - started tube feeding 9/26 - tolerating well. Repeat modified barium swallow in a.m.       Physical Exam Performed:     /71   Pulse 88   Temp 97.8 °F (36.6 °C) (Oral)   Resp 18   Ht 5' 7\" (1.702 m)   Wt 188 lb 0.8 oz (85.3 kg)   LMP in the mid stomach. Fluoroscopy modified barium swallow with video   Final Result   Dysphagia was noted as described above. Penetration and aspiration was   demonstrated with nectar consistency. This elicited a cough. Deep silent   penetration was demonstrated with honey thick consistency and pudding   consistency. Please see separate speech pathology report for full discussion of findings   and recommendations. VL DUP LOWER EXTREMITY ARTERIES RIGHT   Final Result      XR CHEST PORTABLE   Final Result   Suspect mild congestive failure with pleural effusion questioned greater on   the left. Endotracheal tube is approximately 3 cm above the rahat         MRI BRAIN WO CONTRAST   Final Result   Addendum 1 of 1   ADDENDUM:   Results were reported to GOLDIE Barraza at 3:26 p.m. on September 15,    2020. Final      XR CHEST PORTABLE   Final Result   Decreased layering effusions with improved basilar aeration. Appropriate   life support device positioning. XR CHEST PORTABLE   Final Result   Low-lying endotracheal tube. Recommend retracting 1-2 cm. No substantial change in layering effusions and basilar opacities. The findings were sent to the Radiology Results [address] at 3:53   am on 9/14/2020to be communicated to a licensed caregiver. XR CHEST PORTABLE   Final Result   1. Interval placement of an NG tube, likely within the stomach. 2. Stable appropriate positions of endotracheal tube and right-sided dialysis   catheter. 3. No significant change in appearance of small to moderate bilateral pleural   effusions and bibasilar airspace disease. XR CHEST PORTABLE   Final Result   Supportive lines project in stable positions. Bibasilar opacities, suggestive of layered effusions and atelectasis or   airspace disease. XR CHEST PORTABLE   Final Result   1.  Suspected mild to moderate bilateral pleural effusions with adjacent bibasilar atelectasis and/or airspace disease. 2. Mild to moderate cardiomegaly. 3. Recommend retraction of endotracheal tube 0.7 cm. XR CHEST PORTABLE   Final Result   Right jugular venous catheter and endotracheal tube project in normal   positions. No pneumothorax. Bilateral airspace disease and possible layered pleural effusions, consistent   with pulmonary edema and atelectasis. Pneumonia is a differential   possibility. XR CHEST PORTABLE   Final Result   Satisfactory position of the endotracheal tube 3 cm above the rahat. The   heart is upper normal size and vessels are borderline congested. VL Extremity Venous Bilateral   Final Result      VL DUP LOWER EXTREMITY ARTERIES BILATERAL   Final Result      XR CHEST PORTABLE   Final Result   Endotracheal tube has tip approximately 1.3 cm proximal to the rahat and   should be retracted approximately 2 cm. Findings are again suggestive of pulmonary edema with small pleural   effusions. Pneumonia is not excluded in the appropriate clinical setting. Findings were called by the radiology call center. CT HEAD WO CONTRAST   Final Result   Motion limited study. No convincing evidence for acute intracranial   abnormality identified within the limitations of motion artifact. If there is persistent clinical suspicion for intracranial abnormality,   repeat study should be considered. XR CHEST PORTABLE   Final Result   Cardiomegaly, pulmonary vascular congestion, and bilateral pleural effusions. Endotracheal tube terminates approximately 2 cm above the rahat. CT CHEST PULMONARY EMBOLISM W CONTRAST   Final Result   Addendum 1 of 1   ADDENDUM:   Findings were discussed with Dr. Estephania Alicia at 10:49 pm on 9/10/2020.          Final             Consults:     IP CONSULT TO HEART FAILURE NURSE/COORDINATOR  IP CONSULT TO DIETITIAN  IP CONSULT TO NEUROLOGY  IP CONSULT TO CARDIOLOGY  IP CONSULT TO NEPHROLOGY  PHARMACY TO DOSE VANCOMYCIN  IP CONSULT TO HEM/ONC  IP CONSULT TO INFECTIOUS DISEASES  IP CONSULT TO CARDIAC REHAB  IP CONSULT TO DIETITIAN  IP CONSULT TO SOCIAL WORK  IP CONSULT TO GI  PHARMACY TO DOSE WARFARIN  IP CONSULT TO GI    Disposition:  SNF     Condition at Discharge: Stable    Discharge Instructions/Follow-up:  PCP in 1 week    Code Status:  Full Code     Activity: activity as tolerated    Diet: regular diet      Discharge Medications:     Current Discharge Medication List           Details   diazePAM (VALIUM) 5 MG tablet Take 1 tablet by mouth every 12 hours for 10 days. Qty: 20 tablet, Refills: 0    Associated Diagnoses: Acute encephalopathy      amiodarone (CORDARONE) 200 MG tablet 1 tablet by Per NG tube route 2 times daily  Qty: 30 tablet, Refills: 3      lacosamide (VIMPAT) 200 MG tablet Take 1 tablet by mouth 2 times daily for 30 days. Qty: 60 tablet, Refills: 0    Associated Diagnoses: Acute encephalopathy      levETIRAcetam (KEPPRA) 100 MG/ML solution Take 10 mLs by mouth 2 times daily  Qty: 300 mL, Refills: 3      carvedilol (COREG) 12.5 MG tablet Take 1 tablet by mouth 2 times daily (with meals)  Qty: 60 tablet, Refills: 3      calcitonin (MIACALCIN) 200 UNIT/ACT nasal spray 1 spray by Nasal route daily  Qty: 1 Bottle, Refills: 3      sevelamer (RENVELA) 2.4 g PACK packet 0.8 g by Per G Tube route Daily with supper  Qty: 90 each, Refills: 1      b complex-C-folic acid (NEPHROCAPS) 1 MG capsule Take 1 capsule by mouth daily  Qty: 30 capsule, Refills: 3              Details   warfarin (COUMADIN) 7.5 MG tablet Take 7.5 mg daily except 3.75 mg on Mondays and Thursdays. INR twice weekly with dialysis Tuesdays and Thursdays. Call MD at [facility] with results and for dosing instructions.   Qty: 30 tablet, Refills: 3              Details   albuterol sulfate  (90 Base) MCG/ACT inhaler INHALE TWO PUFFS BY MOUTH EVERY 6 HOURS AS NEEDED FOR WHEEZING  Qty: 18 g, Refills: 0    Associated Diagnoses: Uncomplicated asthma      vitamin D (ERGOCALCIFEROL) 1.25 MG (72358 UT) CAPS capsule TAKE ONE CAPSULE BY MOUTH ONCE WEEKLY  Qty: 12 capsule, Refills: 0    Associated Diagnoses: Vitamin D deficiency      omeprazole (PRILOSEC) 20 MG delayed release capsule TAKE ONE CAPSULE BY MOUTH DAILY  Qty: 90 capsule, Refills: 6    Associated Diagnoses: Gastroesophageal reflux disease without esophagitis      atorvastatin (LIPITOR) 10 MG tablet TAKE ONE TABLET BY MOUTH DAILY  Qty: 90 tablet, Refills: 3      DULoxetine (CYMBALTA) 60 MG extended release capsule TAKE ONE CAPSULE BY MOUTH DAILY  Qty: 30 capsule, Refills: 11    Associated Diagnoses: Depression, unspecified depression type      BREO ELLIPTA 200-25 MCG/INH AEPB INHALE ONE DOSE BY MOUTH DAILY  Qty: 1 each, Refills: 11    Associated Diagnoses: Uncomplicated asthma             Time Spent on discharge is more than 30 minutes in the examination, evaluation, counseling and review of medications and discharge plan. Signed:    Madison Blanca MD   10/4/2020      Thank you No primary care provider on file. for the opportunity to be involved in this patient's care. If you have any questions or concerns please feel free to contact me at [phone].

## 2020-10-04 NOTE — PROGRESS NOTES
NEPHROLOGY PROGRESS NOTE    Patient: Cassidy Patiño MRN: [id number]     YOB: 1970  Age: 53 y.o. Sex: female    Unit: [address] Room/Bed: X6N-2500/5262-01 Location: [address] [place], [place] [address]way [address]     Admitting Physician: Hguo Casanova    Primary Care Physician: No primary care provider on file. LOS: 24 days       Reason for evaluation:   Milly      SUBJECTIVE:      The patient was seen and examined. Notes and labs reviewed. There were no complications over night. HPI:  Breathing comfortably. No CP. Serum calcium level remains slightly high. Coumadin therapeutic. ROS:  In bed. No fever. [address]:  medications reviewed.  present. OBJECTIVE:   /71   Pulse 88   Temp 97.8 °F (36.6 °C) (Oral)   Resp 18   Ht 5' 7\" (1.702 m)   Wt 188 lb 0.8 oz (85.3 kg)   LMP 12/05/2018 (Exact Date)   SpO2 95%   BMI 29.45 kg/m²     Intake and Output:      Intake/Output Summary (Last 24 hours) at 10/4/2020 1724  Last data filed at 10/4/2020 1315  Gross per 24 hour   Intake 1505 ml   Output 1750 ml   Net -245 ml       Exam:   CONSTITUTIONAL/PSYCHIATRY: Resting in bed  RESPIRATORY:  CTA bilaterally  CARDIOVASCULAR: RRR  GASTROINTESTINAL: soft BS PEG in place  + Hahn in place  EXTREMITIES:  No edema  SKIN: Warm and dry. R IJ Vasc cath in place. LABS:   RFP:   Recent Labs     10/02/20  0533 10/03/20  0517 10/04/20  0552    130* 135*   K 4.1 4.0 4.0   CL 99 96* 98*   CO2 25 25 23   BUN 40* 19 31*   CREATININE 3.2* 2.2* 3.0*   CALCIUM 11.5* 10.1 10.7*   MG 2.10 2.00 2.10   PHOS 4.9 3.4 4.8   GFRAA 19* 29* 20*      Liver panel:  Recent Labs     10/02/20  0533 10/03/20  0517 10/04/20  0552   AST 35 33 32   ALT 20 20 23       CBC:   Recent Labs     10/02/20  0533 10/03/20  0517 10/04/20  0552   WBC 6.5 7.4 6.3   HGB 8.7* 8.2* 7.9*   HCT 27.2* 25.7* 24.9*   MCV 85.2 85.5 86.1    288 326         ASSESSMENT and PLAN:     1. MILLY  - likely secondary to ischemia ATI.  She had additional exposure to radiocontrast   - ZENON is neg, ANCA neg, C3 is low at 54  - Initiated on IHD on 9/11/20. Transitioned to CRRT on 9/12/20 which was stopped 9/15/20. Restarted HD 09/17/20  - monitoring for possible recovery UOP only 600 ml/24 hours Cr rebounding  - outpatient dialysis spot at [facility] MWF @ 5 PM  - [facility] placed by IR 09/29/20   - on nephrocap  - next HD on Friday to get on outpatient MWF schedule    2. FEN  - hypophosphatemia   - on renvela 800 mg PO qdinner  - hypercalcemia   - unclear why calcium level has increased   - suspect increased immobility   - no recent calcium administration   - PTH < 1.2, so absolutely suppressed   - on calcitonin nasal spray    3. S/P PEA arrest 9/11/20    4. Acute hypoxic respiratory failure:  - per pulmonary  - Extubated 9/19. on RA now     5. MRSA bacteremia:   - on Vancomycin with pharmacy dosing   - LEVY shows no vegetation    6. CSDHF  - resolved     7. Factor V leiden deficiency with PE/coagulopathy/DIC:   - per hematology  - on heparin gtt  - awaiting therapeutic coumadin level    8. CVA  - MRI shows bilateral acute sub acute infarcts  - neurology following  - EEG apparently better with higher keppra dose    9. atrial fibrillation  - on PO amiodarone    10. anemia  - Hgb stable  - on retacrit    Patient can be discharged from renal standpoint.

## 2020-10-04 NOTE — PROGRESS NOTES
Clinical Pharmacy Note  Heparin Dosing       Lab Results   Component Value Date    APTT 61.1 10/04/2020     Lab Results   Component Value Date    HGB 7.9 10/04/2020    HCT 24.9 10/04/2020     10/04/2020    INR 2.56 10/04/2020       Current Infusion Rate: 6.7 mL/hr    Plan:  Rate: Continue 6.7 mL/hr  Next aPTT: 0600  10/5/20    Pharmacy will continue to monitor and adjust based on aPTT results.     Cali Salguero, PharmD  10/4/2020 6:54 AM

## 2020-10-04 NOTE — PROGRESS NOTES
Clinical Pharmacy Note  Heparin Dosing       Lab Results   Component Value Date    APTT 61.9 10/04/2020     Lab Results   Component Value Date    HGB 8.2 10/03/2020    HCT 25.7 10/03/2020     10/03/2020    INR 1.93 10/03/2020       Current Infusion Rate: 6.7 mL/hr    Plan:  Rate: Continue 6.7 mL/hr  Next aPTT: 0600  10/4/20    Pharmacy will continue to monitor and adjust based on aPTT results.     Celina Roberts, PharmD  10/4/2020 1:30 AM

## 2020-10-07 ENCOUNTER — TELEPHONE (OUTPATIENT)
Dept: CARDIOLOGY CLINIC | Age: 50
End: 2020-10-07

## 2020-10-07 NOTE — TELEPHONE ENCOUNTER
Kathleen from [facility] called in this morning stating that Tenna Necessary has gained more than 3 lbs in 1 day. Denies chest pain, swelling or sob.     You can reach Ирина at #[phone], ask for the 3rd floor

## 2020-10-07 NOTE — TELEPHONE ENCOUNTER
Thanks for the update, if patient continues with weight gain after dialysis or any worsening s/s of heart failure can further discuss.

## 2020-10-08 ENCOUNTER — HOSPITAL ENCOUNTER (INPATIENT)
Age: 50
LOS: 8 days | Discharge: NURSING, REHAB OR LONG-TERM CARE | DRG: 252 | End: 2020-10-16
Attending: STUDENT IN AN ORGANIZED HEALTH CARE EDUCATION/TRAINING PROGRAM | Admitting: STUDENT IN AN ORGANIZED HEALTH CARE EDUCATION/TRAINING PROGRAM
Payer: COMMERCIAL

## 2020-10-08 PROBLEM — K94.23 PEG TUBE MALFUNCTION (HCC): Status: ACTIVE | Noted: 2020-10-08

## 2020-10-08 PROBLEM — K94.20 COMPLICATION OF GASTROSTOMY TUBE (HCC): Status: ACTIVE | Noted: 2020-10-08

## 2020-10-08 LAB
ANION GAP SERPL CALCULATED.3IONS-SCNC: 13 MMOL/L (ref 3–16)
BASOPHILS ABSOLUTE: 0.1 K/UL (ref 0–0.2)
BASOPHILS RELATIVE PERCENT: 0.7 %
BUN BLDV-MCNC: 10 MG/DL (ref 7–20)
CALCIUM SERPL-MCNC: 8.7 MG/DL (ref 8.3–10.6)
CHLORIDE BLD-SCNC: 100 MMOL/L (ref 99–110)
CO2: 20 MMOL/L (ref 21–32)
CREAT SERPL-MCNC: 1.8 MG/DL (ref 0.6–1.1)
EOSINOPHILS ABSOLUTE: 0.4 K/UL (ref 0–0.6)
EOSINOPHILS RELATIVE PERCENT: 4.9 %
GFR AFRICAN AMERICAN: 36
GFR NON-AFRICAN AMERICAN: 30
GLUCOSE BLD-MCNC: 93 MG/DL (ref 70–99)
HCT VFR BLD CALC: 26.9 % (ref 36–48)
HEMOGLOBIN: 8.2 G/DL (ref 12–16)
INR BLD: 1.99 (ref 0.86–1.14)
INR BLD: 2.16 (ref 0.86–1.14)
LYMPHOCYTES ABSOLUTE: 1.5 K/UL (ref 1–5.1)
LYMPHOCYTES RELATIVE PERCENT: 18.3 %
MCH RBC QN AUTO: 26.9 PG (ref 26–34)
MCHC RBC AUTO-ENTMCNC: 30.3 G/DL (ref 31–36)
MCV RBC AUTO: 88.7 FL (ref 80–100)
MONOCYTES ABSOLUTE: 0.9 K/UL (ref 0–1.3)
MONOCYTES RELATIVE PERCENT: 12 %
NEUTROPHILS ABSOLUTE: 5.1 K/UL (ref 1.7–7.7)
NEUTROPHILS RELATIVE PERCENT: 64.1 %
PDW BLD-RTO: 20.7 % (ref 12.4–15.4)
PLATELET # BLD: 303 K/UL (ref 135–450)
PMV BLD AUTO: 7.6 FL (ref 5–10.5)
POTASSIUM REFLEX MAGNESIUM: 4.3 MMOL/L (ref 3.5–5.1)
PROTHROMBIN TIME: 23.3 SEC (ref 10–13.2)
PROTHROMBIN TIME: 25.3 SEC (ref 10–13.2)
RBC # BLD: 3.03 M/UL (ref 4–5.2)
SARS-COV-2, NAAT: NOT DETECTED
SODIUM BLD-SCNC: 133 MMOL/L (ref 136–145)
WBC # BLD: 7.9 K/UL (ref 4–11)

## 2020-10-08 PROCEDURE — 2580000003 HC RX 258: Performed by: STUDENT IN AN ORGANIZED HEALTH CARE EDUCATION/TRAINING PROGRAM

## 2020-10-08 PROCEDURE — 99284 EMERGENCY DEPT VISIT MOD MDM: CPT

## 2020-10-08 PROCEDURE — 80048 BASIC METABOLIC PNL TOTAL CA: CPT

## 2020-10-08 PROCEDURE — 94760 N-INVAS EAR/PLS OXIMETRY 1: CPT

## 2020-10-08 PROCEDURE — 85610 PROTHROMBIN TIME: CPT

## 2020-10-08 PROCEDURE — 6360000002 HC RX W HCPCS: Performed by: PHYSICIAN ASSISTANT

## 2020-10-08 PROCEDURE — U0002 COVID-19 LAB TEST NON-CDC: HCPCS

## 2020-10-08 PROCEDURE — 1200000000 HC SEMI PRIVATE

## 2020-10-08 PROCEDURE — 2580000003 HC RX 258: Performed by: PHYSICIAN ASSISTANT

## 2020-10-08 PROCEDURE — 6360000002 HC RX W HCPCS: Performed by: INTERNAL MEDICINE

## 2020-10-08 PROCEDURE — G0378 HOSPITAL OBSERVATION PER HR: HCPCS

## 2020-10-08 PROCEDURE — 85025 COMPLETE CBC W/AUTO DIFF WBC: CPT

## 2020-10-08 PROCEDURE — 36415 COLL VENOUS BLD VENIPUNCTURE: CPT

## 2020-10-08 RX ORDER — DIAZEPAM 5 MG/ML
2.5 INJECTION, SOLUTION INTRAMUSCULAR; INTRAVENOUS EVERY 12 HOURS
Status: DISCONTINUED | OUTPATIENT
Start: 2020-10-08 | End: 2020-10-08

## 2020-10-08 RX ORDER — LACOSAMIDE 100 MG/1
200 TABLET ORAL 2 TIMES DAILY
Status: DISCONTINUED | OUTPATIENT
Start: 2020-10-08 | End: 2020-10-16 | Stop reason: HOSPADM

## 2020-10-08 RX ORDER — CARVEDILOL 12.5 MG/1
12.5 TABLET ORAL 2 TIMES DAILY WITH MEALS
Status: DISCONTINUED | OUTPATIENT
Start: 2020-10-08 | End: 2020-10-09

## 2020-10-08 RX ORDER — ACETAMINOPHEN 650 MG/1
650 SUPPOSITORY RECTAL EVERY 6 HOURS PRN
Status: DISCONTINUED | OUTPATIENT
Start: 2020-10-08 | End: 2020-10-16 | Stop reason: HOSPADM

## 2020-10-08 RX ORDER — PROMETHAZINE HYDROCHLORIDE 25 MG/1
12.5 TABLET ORAL EVERY 6 HOURS PRN
Status: DISCONTINUED | OUTPATIENT
Start: 2020-10-08 | End: 2020-10-16 | Stop reason: HOSPADM

## 2020-10-08 RX ORDER — ATORVASTATIN CALCIUM 10 MG/1
10 TABLET, FILM COATED ORAL DAILY
Status: DISCONTINUED | OUTPATIENT
Start: 2020-10-08 | End: 2020-10-16 | Stop reason: HOSPADM

## 2020-10-08 RX ORDER — LEVETIRACETAM 100 MG/ML
1000 SOLUTION ORAL 2 TIMES DAILY
Status: DISCONTINUED | OUTPATIENT
Start: 2020-10-08 | End: 2020-10-08

## 2020-10-08 RX ORDER — SODIUM CHLORIDE 0.9 % (FLUSH) 0.9 %
10 SYRINGE (ML) INJECTION EVERY 12 HOURS SCHEDULED
Status: DISCONTINUED | OUTPATIENT
Start: 2020-10-08 | End: 2020-10-16 | Stop reason: HOSPADM

## 2020-10-08 RX ORDER — ALBUTEROL SULFATE 90 UG/1
1 AEROSOL, METERED RESPIRATORY (INHALATION) EVERY 4 HOURS PRN
Status: DISCONTINUED | OUTPATIENT
Start: 2020-10-08 | End: 2020-10-16 | Stop reason: HOSPADM

## 2020-10-08 RX ORDER — LEVETIRACETAM 10 MG/ML
1000 INJECTION INTRAVASCULAR EVERY 12 HOURS
Status: DISCONTINUED | OUTPATIENT
Start: 2020-10-09 | End: 2020-10-09

## 2020-10-08 RX ORDER — ACETAMINOPHEN 325 MG/1
650 TABLET ORAL EVERY 6 HOURS PRN
Status: DISCONTINUED | OUTPATIENT
Start: 2020-10-08 | End: 2020-10-16 | Stop reason: HOSPADM

## 2020-10-08 RX ORDER — PHYTONADIONE 5 MG/1
5 TABLET ORAL ONCE
Status: DISCONTINUED | OUTPATIENT
Start: 2020-10-08 | End: 2020-10-08

## 2020-10-08 RX ORDER — CHOLECALCIFEROL (VITAMIN D3) 10 MCG
1 TABLET ORAL DAILY
Status: DISCONTINUED | OUTPATIENT
Start: 2020-10-08 | End: 2020-10-16 | Stop reason: HOSPADM

## 2020-10-08 RX ORDER — ONDANSETRON 2 MG/ML
4 INJECTION INTRAMUSCULAR; INTRAVENOUS EVERY 6 HOURS PRN
Status: DISCONTINUED | OUTPATIENT
Start: 2020-10-08 | End: 2020-10-16 | Stop reason: HOSPADM

## 2020-10-08 RX ORDER — WARFARIN SODIUM 7.5 MG/1
7.5 TABLET ORAL
Status: ACTIVE | OUTPATIENT
Start: 2020-10-08 | End: 2020-10-09

## 2020-10-08 RX ORDER — LORAZEPAM 2 MG/ML
0.5 INJECTION INTRAMUSCULAR EVERY 8 HOURS
Status: DISCONTINUED | OUTPATIENT
Start: 2020-10-08 | End: 2020-10-09

## 2020-10-08 RX ORDER — LEVETIRACETAM 10 MG/ML
1000 INJECTION INTRAVASCULAR ONCE
Status: COMPLETED | OUTPATIENT
Start: 2020-10-08 | End: 2020-10-08

## 2020-10-08 RX ORDER — AMIODARONE HYDROCHLORIDE 200 MG/1
200 TABLET ORAL 2 TIMES DAILY
Status: DISCONTINUED | OUTPATIENT
Start: 2020-10-08 | End: 2020-10-16 | Stop reason: HOSPADM

## 2020-10-08 RX ORDER — SEVELAMER CARBONATE FOR ORAL SUSPENSION 2400 MG/1
0.8 POWDER, FOR SUSPENSION ORAL
Status: DISCONTINUED | OUTPATIENT
Start: 2020-10-08 | End: 2020-10-09

## 2020-10-08 RX ORDER — DULOXETIN HYDROCHLORIDE 20 MG/1
40 CAPSULE, DELAYED RELEASE ORAL DAILY
Status: DISCONTINUED | OUTPATIENT
Start: 2020-10-08 | End: 2020-10-08

## 2020-10-08 RX ORDER — DIAZEPAM 5 MG/1
5 TABLET ORAL EVERY 12 HOURS SCHEDULED
Status: DISCONTINUED | OUTPATIENT
Start: 2020-10-08 | End: 2020-10-08

## 2020-10-08 RX ORDER — SODIUM CHLORIDE 0.9 % (FLUSH) 0.9 %
10 SYRINGE (ML) INJECTION PRN
Status: DISCONTINUED | OUTPATIENT
Start: 2020-10-08 | End: 2020-10-16 | Stop reason: HOSPADM

## 2020-10-08 RX ADMIN — SODIUM CHLORIDE, PRESERVATIVE FREE 10 ML: 5 INJECTION INTRAVENOUS at 09:47

## 2020-10-08 RX ADMIN — SODIUM CHLORIDE, PRESERVATIVE FREE 10 ML: 5 INJECTION INTRAVENOUS at 21:00

## 2020-10-08 RX ADMIN — LORAZEPAM 0.5 MG: 2 INJECTION INTRAMUSCULAR; INTRAVENOUS at 21:23

## 2020-10-08 RX ADMIN — LORAZEPAM 0.5 MG: 2 INJECTION INTRAMUSCULAR; INTRAVENOUS at 16:53

## 2020-10-08 RX ADMIN — LEVETIRACETAM 1000 MG: 10 INJECTION INTRAVENOUS at 13:22

## 2020-10-08 RX ADMIN — PHYTONADIONE 5 MG: 10 INJECTION, EMULSION INTRAMUSCULAR; INTRAVENOUS; SUBCUTANEOUS at 13:51

## 2020-10-08 ASSESSMENT — ENCOUNTER SYMPTOMS
BACK PAIN: 0
COLOR CHANGE: 0
VOMITING: 0
ABDOMINAL PAIN: 0

## 2020-10-08 ASSESSMENT — PAIN SCALES - GENERAL
PAINLEVEL_OUTOF10: 0
PAINLEVEL_OUTOF10: 0

## 2020-10-08 ASSESSMENT — PAIN SCALES - WONG BAKER: WONGBAKER_NUMERICALRESPONSE: 0

## 2020-10-08 NOTE — H&P
Hospital Medicine History & Physical      PCP: No primary care provider on file. Date of Admission: 10/8/2020    Date of Service: Pt seen/examined on 10/08/2020 and placed in Observation. Chief Complaint:  PEG malfunction      History Of Present Illness:     53 y.o. female who presented to [facility] with PEG malfunction, patient had DIC and cardiogenic shock back in September, on enteral tube feed, transferred to the hospital for new PEG, patient can not participate in history taking, discussed with nurse. Past Medical History:          Diagnosis Date    Anticoagulant long-term use     Arthritis     Asthma     Baker's cyst     Depression     Factor V Leiden ([facility]     Factor V Leiden ([facility]     H/O blood clots     Heartburn     Hyperlipidemia     MRSA bacteremia 09/10/2020    Osteoarthritis        Past Surgical History:          Procedure Laterality Date    GASTROSTOMY TUBE PLACEMENT N/A 9/25/2020    ESOPHAGOGASTRODUODENOSCOPY PERCUTANEOUS ENDOSCOPIC GASTROSTOMY TUBE PLACEMENT performed by Nini Jack MD at [address] [place] [address],[facility] 600 5 YEARS  9/29/2020    IR TUNNELED CATHETER PLACEMENT GREATER THAN 5 YEARS 9/29/2020 [facility] SPECIAL PROCEDURES    TUNNELED VENOUS CATHETER PLACEMENT Right 09/29/2020    Permacath ; RIJ; 19cm; Dr. Renetta Chinchilla       Medications Prior to Admission:      Prior to Admission medications    Medication Sig Start Date End Date Taking? Authorizing Provider   diazePAM (VALIUM) 5 MG tablet Take 1 tablet by mouth every 12 hours for 10 days. 10/4/20 10/14/20 Yes Keyon Gudino MD   warfarin (COUMADIN) 7.5 MG tablet Take 7.5 mg daily except 3.75 mg on Mondays and Thursdays. INR twice weekly with dialysis Tuesdays and Thursdays. Call MD at [facility] with results and for dosing instructions.  10/4/20  Yes Keyon Gudino MD   amiodarone (CORDARONE) 200 MG tablet 1 tablet by Per NG tube route 2 times daily 10/2/20  Yes Martin Gonzalez MD lacosamide (VIMPAT) 200 MG tablet Take 1 tablet by mouth 2 times daily for 30 days. 10/2/20 11/1/20 Yes Belia Ford MD   levETIRAcetam (KEPPRA) 100 MG/ML solution Take 10 mLs by mouth 2 times daily 10/2/20  Yes Belia Ford MD   carvedilol (COREG) 12.5 MG tablet Take 1 tablet by mouth 2 times daily (with meals) 10/2/20  Yes Belia Ford MD   calcitonin (MIACALCIN) 200 UNIT/ACT nasal spray 1 spray by Nasal route daily 10/2/20  Yes Belia Ford MD   sevelamer (RENVELA) 2.4 g PACK packet 0.8 g by Per G Tube route Daily with supper 10/2/20  Yes Belia Ford MD   b complex-C-folic acid (NEPHROCAPS) 1 MG capsule Take 1 capsule by mouth daily 10/3/20  Yes Belia Ford MD   vitamin D (ERGOCALCIFEROL) 1.25 MG (52821 UT) CAPS capsule TAKE ONE CAPSULE BY MOUTH ONCE WEEKLY 8/13/20  Yes Nahomy Farias DO   omeprazole (PRILOSEC) 20 MG delayed release capsule TAKE ONE CAPSULE BY MOUTH DAILY 6/10/20  Yes Osmel Love MD   atorvastatin (LIPITOR) 10 MG tablet TAKE ONE TABLET BY MOUTH DAILY 1/8/20  Yes Osmel Love MD   DULoxetine (CYMBALTA) 60 MG extended release capsule TAKE ONE CAPSULE BY MOUTH DAILY 9/9/19  Yes Osmel Love MD   BREO ELLIPTA 200-25 MCG/INH AEPB INHALE ONE DOSE BY MOUTH DAILY 3/22/19  Yes Osmel Love MD   albuterol sulfate  (90 Base) MCG/ACT inhaler INHALE TWO PUFFS BY MOUTH EVERY 6 HOURS AS NEEDED FOR WHEEZING 8/25/20   AGUSTIN Friedman - CNP       Allergies:  Ibuprofen    Social History:      The patient currently lives at a facility    TOBACCO:   reports that she quit smoking about 4 weeks ago. Her smoking use included cigarettes. She smoked 0.50 packs per day. She has never used smokeless tobacco.  ETOH:   reports no history of alcohol use. E-Cigarettes Vaping or Juuling     Questions Responses    Vaping Use Never User    Start Date     Does device contain nicotine?      Quit Date     Vaping Type             Family History:      Reviewed in detail and positive as follows:        Problem Relation Age of Onset    Heart Attack Father        REVIEW OF SYSTEMS:   Unable to obtain due to patient's mental status     PHYSICAL EXAM PERFORMED:    /76   Pulse 101   Temp 98.5 °F (36.9 °C) (Oral)   Resp 16   Ht 5' 7\" (1.702 m)   Wt 187 lb 6.3 oz (85 kg)   LMP 12/05/2018 (Exact Date)   SpO2 95%   BMI 29.35 kg/m²     General appearance:  No apparent distress  HEENT:  Normal cephalic  Neck: Supple  Respiratory:  Normal respiratory effort. Clear to auscultation, bilaterally without Rales/Wheezes/Rhonchi. Cardiovascular:  Regular rate and rhythm with normal S1/S2 without murmurs, rubs or gallops. Abdomen: Soft  Musculoskeletal:  No clubbing  Skin: Skin color, texture, turgor normal.  No rashes or lesions. Neurologic:  Moving extremities   Psychiatric:  Awake   Capillary Refill: Brisk,< 3 seconds   Peripheral Pulses: +2 palpable, equal bilaterally       Labs:     Recent Labs     10/08/20  0215   WBC 7.9   HGB 8.2*   HCT 26.9*        Recent Labs     10/08/20  0215   *   K 4.3      CO2 20*   BUN 10   CREATININE 1.8*   CALCIUM 8.7     No results for input(s): AST, ALT, BILIDIR, BILITOT, ALKPHOS in the last 72 hours. Recent Labs     10/08/20  0215   INR 1.99*     No results for input(s): Oleg Beery in the last 72 hours. Urinalysis:      Lab Results   Component Value Date    NITRU Negative 09/10/2020    WBCUA 3-5 09/10/2020    BACTERIA 2+ 09/10/2020    RBCUA 21-50 09/10/2020    BLOODU MODERATE 09/10/2020    SPECGRAV >1.030 09/10/2020    GLUCOSEU Negative 09/10/2020       Radiology:         No orders to display       ASSESSMENT:    Active Hospital Problems    Diagnosis Date Noted    Complication of gastrostomy tube ([facility] [address] [facility] [K94.20] 10/08/2020         PLAN:    1. PEG malfunction, likely for replacement, per GI, consulted. 2. Encephalopathy, appeared after an event in september, cardiogenic shock at that time, appears at baseline now  3. ESRD, HD  4. H/O PE, on coumadin. Diet: Diet NPO Effective Now  Code Status: Full Code      Dispo - observation        Abdoulaye Cedillo MD    Thank you No primary care provider on file. for the opportunity to be involved in this patient's care. If you have any questions or concerns please feel free to contact me at [phone].

## 2020-10-08 NOTE — DISCHARGE INSTR - COC
Continuity of Care Form    Patient Name: Tosha Caal   :  1970  MRN:  [id number]    [address] [facility] date:  10/8/2020  Discharge date:  10/16/20    Code Status Order: Full Code   Advance Directives:   Advance Care Flowsheet Documentation       Date/Time Healthcare Directive Type of Healthcare Directive Copy in [address] Agent's Name Healthcare Agent's Phone Number    10/08/20 5068  No, patient does not have an advance directive for healthcare treatment -- -- -- -- --            Admitting Physician:  Cornelius Richter DO  PCP: No primary care provider on file.     Discharging Nurse: Raymond Blackmon 23 Unit/Room#: P2R-5765/1886-26  Discharging Unit Phone Number: [phone]    Emergency Contact:   Extended Emergency Contact Information  Primary Emergency Contact: Estelita Avery  [place] Phone: [phone]  Relation: Parent  Secondary Emergency Contact: [address] Phone: [phone]  Mobile Phone: [phone]  Relation: Spouse    Past Surgical History:  Past Surgical History:   Procedure Laterality Date    GASTROSTOMY TUBE PLACEMENT N/A 2020    ESOPHAGOGASTRODUODENOSCOPY PERCUTANEOUS ENDOSCOPIC GASTROSTOMY TUBE PLACEMENT performed by Ashley Sandoval MD at [facility]  IR TUNNELED [address] 5 YEARS  2020    IR TUNNELED CATHETER PLACEMENT GREATER THAN 5 YEARS 2020 [facility]    TUNNELED VENOUS CATHETER PLACEMENT Right 2020    Permacath ; RIJ; 19cm; Dr. Bobby Cote       Immunization History:   Immunization History   Administered Date(s) Administered    Pneumococcal Polysaccharide ([id number]) 2007       Active Problems:  Patient Active Problem List   Diagnosis Code    Factor V Leiden mutation ([address] D68.51    Pulmonary embolus ([address] I26.99    Lactic acidosis E87.2    CAP (community acquired pneumonia) J18.9    Acute CHF (congestive heart failure) ([address] I50.9    Bilateral pulmonary embolism ([address] I26.99    Cardiac arrest (HCC) I46.9    Seizures (HCC) R56.9    Acute respiratory failure with hypoxia ([facility]) J96.01    MORELIA (acute kidney injury) ([facility] D85.1    Metabolic acidosis F38.0    Congestive heart failure (HCC) I50.9    Pulmonary hypertension (HCC) I27.20    Multifocal pneumonia J18.9    Septic shock ([facility]) A41.9, R65.21    Elevated LFTs R79.89    Thrombocytopenia (HCC) D69.6    Coagulopathy (HCC) D68.9    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) ([facility]) D65    Acute encephalopathy G93.40    Pneumonia due to organism J18.9    Bacteremia R78.81    Pleural effusion, bilateral J90    Elevated lactic acid level R79.89    Bacteremia due to methicillin susceptible Staphylococcus aureus (MSSA) R78.81, B95.61    Pneumonia due to methicillin susceptible Staphylococcus aureus (MSSA) ([facility]) J15.211    Anemia D64.9    Acute renal failure ([facility]) N17.9    Bilateral pleural effusion J90    Mitral valve insufficiency I34.0    Tricuspid valve insufficiency I07.1    Class 2 obesity due to excess calories with body mass index (BMI) of 39.0 to 39.9 in adult E66.09, Z68.39    Cardiogenic shock ([facility]) R57.0    Staphylococcal pneumonia ([facility]) J15.20    Acute on chronic systolic heart failure ([facility]) I50.23    Paroxysmal atrial fibrillation ([facility]) I48.0    Essential hypertension R28    Complication of gastrostomy tube ([facility]) K94.20       Isolation/Infection:   Isolation            No Isolation          Patient Infection Status       Infection Onset Added Last Indicated Last Indicated By Review Planned Expiration Resolved Resolved By    COVID-19 Rule Out 10/08/20 10/08/20 10/08/20 COVID-19 (Ordered) 10/15/20 10/22/20      Resolved    COVID-19 Rule Out 09/24/20 09/24/20 09/24/20 COVID-19 (Ordered)   09/24/20 Rule-Out Test Resulted    MRSA 09/10/20 09/14/20 09/10/20 Culture, Blood 2   09/19/20 Kamla Zamarripa RN    MRSA 09/10/20 09/13/20 09/10/20 Culture, Blood 2   09/13/20 Kamla Zamarripa RN MRSA 09/10/20 09/13/20 09/10/20 Culture, Blood 2   09/13/20 Barrett Lab, RN    COVID-19 Rule Out 09/11/20 09/11/20 09/11/20 COVID-19 (Ordered)   09/11/20 Rule-Out Test Resulted            Nurse Assessment:  Last Vital Signs: /68   Pulse 90   Temp 98.4 °F (36.9 °C) (Oral)   Resp 16   Ht 5' 7\" (1.702 m)   Wt 187 lb 6.3 oz (85 kg)   LMP 12/05/2018 (Exact Date)   SpO2 95%   BMI 29.35 kg/m²     Last documented pain score (0-10 scale): Pain Level: 0  Last Weight:   Wt Readings from Last 1 Encounters:   10/08/20 187 lb 6.3 oz (85 kg)     Mental Status:  disoriented and alert    IV Access:  - None    Nursing Mobility/ADLs:  Walking   Dependent  Transfer  Dependent  Bathing  Dependent  Dressing  Dependent  Toileting  Dependent  Feeding  Dependent  Med Admin  Dependent  Med Delivery   crushed through PEG tube    Wound Care Documentation and Therapy:  Wound 09/28/20 Rectum (Active)   Wound Image   09/28/20 1030   Wound Etiology Other 10/04/20 0818   Wound Cleansed Other (Comment) 09/30/20 0804   Dressing/Treatment Open to air 10/04/20 0818   Wound Length (cm) 1 cm 09/28/20 1030   Wound Width (cm) 1.5 cm 09/28/20 1030   Wound Surface Area (cm^2) 1.5 cm^2 09/28/20 1030   Number of days: 10        Elimination:  Continence:   · Bowel: No  · Bladder: No  Urinary Catheter: None   Colostomy/Ileostomy/Ileal Conduit: No       Date of Last BM: 10/15/20      Intake/Output Summary (Last 24 hours) at 10/8/2020 1127  Last data filed at 10/8/2020 0949  Gross per 24 hour   Intake 0 ml   Output --   Net 0 ml     No intake/output data recorded.     Safety Concerns:     Aspiration Risk    Impairments/Disabilities:      None    Nutrition Therapy:  Current Nutrition Therapy:   - Tube Feedings:  Renal    Routes of Feeding: Gastrostomy Tube  Liquids: No Liquids  Daily Fluid Restriction: no  Last Modified Barium Swallow with Video (Video Swallowing Test): not done    Treatments at the Time of Hospital Discharge:   Respiratory Treatments: ***  Oxygen Therapy:  is not on home oxygen therapy. Ventilator:    - No ventilator support    Rehab Therapies: ***  Weight Bearing Status/Restrictions: No weight bearing restirctions  Other Medical Equipment (for information only, NOT a DME order):  ***  Other Treatments: ***    Heart Failure Instructions for Daily Management  Patient was treated for chronic systolic heart failure. she  will require the following:     Please weigh daily on the same scale and approximately the same time of day. Report weight gain of 3 pounds/day or 5 pounds/week to : [facility] MD and [facility] [phone].  Please use hospital discharge weight as baseline reference.  Please monitor for signs and symptoms of and report to MD:  o Worsening Heart Failure: sudden weight gain, shortness of breath, lower extremity or general edema/swelling, abdominal bloating/swelling, inability to lie flat, intolerance to usual activity, or cough (especially at night). Report these finding even if no increase in weight.  o Dehydration:  having difficulty or a decrease in urination, dizziness, worsening fatigue, or new onset/worsening of generalized weakness.  Please continue a LOW SODIUM diet and LIMIT fluid intake to 48 - 64 ounces ( 1.5 - 2 liters) per day.  Call [facility] MD and [facility] [phone] and/or Mary La @ [phone] with any questions or concerns.  Please continue heart failure education to patient and family/support system.  See After Visit Summary for hospital follow up appointment details.  Consider spiritual care referral for support and/or completion of advance directives [phone].    Consider: having the [facility] MD complete required 7 day follow up, [facility]  telehealth program if patient agreeable and able to participate, palliative care consult for ongoing goals of care, end of life, and/or chronic disease management discussions and referral to [facility] [phone]) once SNF/HHC complete. Patient's personal belongings (please select all that are sent with patient):  None    RN SIGNATURE:  Electronically signed by Dung Clifton RN on 10/16/20 at 12:02 PM EDT    CASE MANAGEMENT/SOCIAL WORK SECTION    Inpatient Status Date: 10/16/2020    Readmission Risk Assessment Score:  Readmission Risk              Risk of Unplanned Readmission:        0           Discharging to Facility/ Agency   · Name: [address]  · Address: [address] [facility], [address] [facility]  · Phone:  [phone]  · Fax: [phone]    Dialysis Facility (if applicable)   · [address]  · Address: [place] [address], [address] [facility]  · Dialysis Schedule: MWF 5pm.  · Phone: [phone]  · Fax: [phone]    / signature: Electronically signed by EUGENIA Kelsey on 10/16/2020 at 9:04 AM      PHYSICIAN SECTION    Prognosis: Fair    Condition at Discharge: Stable    Rehab Potential (if transferring to Rehab): Fair    Recommended Labs or Other Treatments After Discharge: RN/PT/OT    Physician Certification: I certify the above information and transfer of Marleni Day  is necessary for the continuing treatment of the diagnosis listed and that she requires [facility] for greater 30 days.      Update Admission H&P: No change in H&P    PHYSICIAN SIGNATURE:  Electronically signed by AGUSTIN Hutchins CNP / Dr. Angella Chacon on 10/16/20 at 8:57 AM EDT

## 2020-10-08 NOTE — PROGRESS NOTES
Phone consent obtained via phone by patient's spouse, Joseph Bennett. Verified by 2nd RN, Nilsa Byrne.

## 2020-10-08 NOTE — PROGRESS NOTES
Comprehensive Nutrition Assessment    Type and Reason for Visit:  Initial    Nutrition Recommendations/Plan:   Tube Feeding recommendations: Nepro at 55 mL/hour (calculated over 20 hours for routine nursing care)  Continue to monitor nutrition-related labs, BM, TF    Nutrition Assessment:  Transferred from [facility] postacute rehab nursing facility. Pt admitted for PEG displacement. Hx of CVA, oropharyngeal dysphagia, hyperlipidemia, Factor V Leiden, encephalopathy. Pt with ESRD on HD. Obtained hx from EMR and nursing facility. Nursing facility stated NPO with Nepro TF rate at 66 mL/hr. This ran from 9pm-4pm with a 200 mL flush every 6 hours. Malnutrition Assessment:  Malnutrition Status:  Insufficient data    Context:  Acute Illness       Estimated Daily Nutrient Needs:  Energy (kcal):  2106-3923 (25-30); Weight Used for Energy Requirements:  Current     Protein (g):  73-92 (1.2-1.5 g/kcal); Weight Used for Protein Requirements:  Current        Fluid (ml/day):  1 mL per kcal; Weight Used for Fluid Requirements:  Current      Nutrition Related Findings:  Enteral TF. Wounds:  None       Current Nutrition Therapies:    Current Tube Feeding (TF) Orders:  · Goal TF & Flush Orders Provides: Recommend Nepro at 55 mL/hour (calculated over 20 hours for routine nursing care). This provides 1100 mL TV, 1980 calories, 89 grams protein, and 799 mL free fluid. Recommend maintenance flush or flush per MD.      Anthropometric Measures:  · Height: 5' 7\" (170.2 cm)  · Current Body Weight: 187 lb (84.8 kg)   · Admission Body Weight: 189 lb (85.7 kg)       · Ideal Body Weight: 135 lbs; % Ideal Body Weight 138.5 %   · BMI: 29.3  · Adjusted Body Weight:  ; No Adjustment   · BMI Categories: Overweight (BMI 25.0-29. 9)       Nutrition Diagnosis:   · Inadequate oral intake related to other (comment)(oropharyngeal dysphagia) as evidenced by nutrition support - enteral nutrition, NPO or clear liquid status due to medical condition      Nutrition Interventions:   Food and/or Nutrient Delivery:  Continue NPO, Continue Current Tube Feeding  Nutrition Education/Counseling:  No recommendation at this time   Coordination of Nutrition Care:  Continued Inpatient Monitoring    Goals: Tolerate most appropriate form of nutrition. Nutrition Monitoring and Evaluation:   Behavioral-Environmental Outcomes:  (N/a)   Food/Nutrient Intake Outcomes:  Enteral Nutrition Intake/Tolerance  Physical Signs/Symptoms Outcomes:  Biochemical Data, Weight, Nutrition Focused Physical Findings, Fluid Status or Edema, Skin, GI Status, Nausea or Vomiting, Diarrhea, Constipation     Discharge Planning:     Too soon to determine     Electronically signed by Nba Prieto on 10/8/20 at 2:40 PM EDT    Contact: Berta Bah

## 2020-10-08 NOTE — PLAN OF CARE
Problem: Nutrition  Goal: Optimal nutrition therapy  Outcome: Ongoing     Nutrition Problem #1: Inadequate oral intake  Intervention: Food and/or Nutrient Delivery: Continue NPO, Continue Current Tube Feeding  Nutritional Goals: Tolerate most appropriate form of nutrition.

## 2020-10-08 NOTE — PROGRESS NOTES
Clinical Pharmacy Note  Warfarin Consult    Saul Chacon is a 53 y.o. female receiving warfarin managed by pharmacy. Patient being bridged with none. Warfarin Indication: FVL/ hx PE   Target INR range: 2-3  Dose prior to admission: 7.5 mg daily except 3.75 mg on Mon + Thurs     Current warfarin drug-drug interactions: Amiodarone    Recent Labs     10/08/20  0215   HGB 8.2*   HCT 26.9*   INR 1.99*       Assessment/Plan:    Warfarin 7.5 mg tonight. Daily PT/INR until stable within therapeutic range. Given hx and need for therapeutic INR will give a higher dose today     Thank you for the consult. Will continue to follow.

## 2020-10-08 NOTE — PLAN OF CARE
Problem: Skin Integrity:  Goal: Will show no infection signs and symptoms  Description: Will show no infection signs and symptoms  10/8/2020 0701 by Jamison Pappas RN  Outcome: Ongoing  Note: Will monitor skin and mucous membranes. Will turn patient every 2 hours, monitor for friction and sheering, and change dressings as needed. Will preform skin assessment every shift. Electronically signed by Jamison Pappas RN on 10/8/2020 at 7:02 AM   10/8/2020 0700 by Jamison Pappas RN  Outcome: Ongoing     Problem: Pain:  Goal: Pain level will decrease  Description: Pain level will decrease  Outcome: Ongoing  Note: Educated patient on pain management. Will assess patients pain level per unit protocol, and provide pain management measures as needed. Electronically signed by Jamison Pappas RN on 10/8/2020 at 7:02 AM      Problem: Physical Regulation:  Goal: Will remain free from infection  Description: Will remain free from infection  Outcome: Ongoing  Note: No increased swelling, redness, or warmth noted in patient's surgical site. Educated patient on signs and symptoms of infection. Will continue to monitor. Electronically signed by Jamison Pappas RN on 10/8/2020 at 7:02 AM      Problem: Respiratory:  Goal: Ability to maintain normal respiratory secretions will improve  Description: Ability to maintain normal respiratory secretions will improve  Outcome: Ongoing  Note: Patients oxygen levels are within normal range. Educated on turning, coughing, deep breathing. Will continue to monitor throughout the shift.    Electronically signed by Jamison Pappas RN on 10/8/2020 at 7:02 AM

## 2020-10-08 NOTE — ED PROVIDER NOTES
[address] [facility]      Pt Name: Velasquez Salgado  MRN: [id number]  [place] 1970  Date of evaluation: 10/8/2020  Provider: GONZALEZ Dunn    This patient was not seen and evaluated by the attending physician No att. providers found. CHIEF COMPLAINT       Chief Complaint   Patient presents with    G Tube Complications     patient in by first care from [address] [place] [address] post acute, g tube found out       CRITICAL CARE TIME   I performed a total Critical Care time of  15 minutes, excluding separately reportable procedures. There was a high probability of clinically significant/life threatening deterioration in the patient's condition which required my urgent intervention. Not limited to multiple reexaminations, discussions with attending physician and consultants. HISTORY OF PRESENT ILLNESS  (Location/Symptom, Timing/Onset, Context/Setting, Quality, Duration, Modifying Factors, Severity.)   Velasquez Salgado is a 53 y.o. female who presents to the emergency department via EMS from the [facility] postacute rehab nursing facility. The patient is unable to provide much of the history. She is awake with mostly incomprehensible speech that nursing facility/EMS reports is baseline. The EMS report that the patient is at baseline mental status after sustaining encephalopathy with acute renal failure and DIC/cardiogenic shock at the beginning of September. On September 25th she had a gastrostomy tube placed by Dr. Cj Bernal. Sometime after dialysis this evening around 5 PM nursing home staff discovered that the patient's G-tube had been displaced. She is currently n.p.o. with every 6 hours tube feeds. She has history of factor V Leiden and pulmonary embolism. On warfarin. Nursing Notes were reviewed and I agree. REVIEW OF SYSTEMS    (2-9 systems for level 4, 10 or more for level 5)     Review of Systems   Constitutional: Negative for fever. 60 MG EXTENDED RELEASE CAPSULE    TAKE ONE CAPSULE BY MOUTH DAILY    LACOSAMIDE (VIMPAT) 200 MG TABLET    Take 1 tablet by mouth 2 times daily for 30 days. LEVETIRACETAM (KEPPRA) 100 MG/ML SOLUTION    Take 10 mLs by mouth 2 times daily    OMEPRAZOLE (PRILOSEC) 20 MG DELAYED RELEASE CAPSULE    TAKE ONE CAPSULE BY MOUTH DAILY    SEVELAMER (RENVELA) 2.4 G PACK PACKET    0.8 g by Per G Tube route Daily with supper    VITAMIN D (ERGOCALCIFEROL) 1.25 MG (32752 UT) CAPS CAPSULE    TAKE ONE CAPSULE BY MOUTH ONCE WEEKLY    WARFARIN (COUMADIN) 7.5 MG TABLET    Take 7.5 mg daily except 3.75 mg on  and . INR twice weekly with dialysis  and . Call MD at [facility] with results and for dosing instructions. ALLERGIES     Ibuprofen    FAMILY HISTORY           Problem Relation Age of Onset    Heart Attack Father      Family Status   Relation Name Status    Mother  Alive    Father          SOCIAL HISTORY      reports that she quit smoking about 4 weeks ago. Her smoking use included cigarettes. She smoked 0.50 packs per day. She has never used smokeless tobacco. She reports current drug use. Frequency: 14.00 times per week. Drug: Marijuana. She reports that she does not drink alcohol. PHYSICAL EXAM    (up to 7 for level 4, 8 or more for level 5)     ED Triage Vitals [10/08/20 0138]   BP Temp Temp src Pulse Resp SpO2 Height Weight   (!) 116/58 97.7 °F (36.5 °C) -- 85 16 94 % -- --       Physical Exam  Vitals signs and nursing note reviewed. HENT:      Head: Normocephalic and atraumatic. Cardiovascular:      Rate and Rhythm: Normal rate. Pulmonary:      Effort: Pulmonary effort is normal. No respiratory distress. Abdominal:      Tenderness: There is no abdominal tenderness. Neurological:      Mental Status: She is alert. Psychiatric:         Speech: Speech is slurred. Cognition and Memory: Cognition is impaired. Memory is impaired.          DIAGNOSTIC RESULTS NONE    LABS:  Labs Reviewed   CBC WITH AUTO DIFFERENTIAL   BASIC METABOLIC PANEL W/ REFLEX TO MG FOR LOW K       All other labs were within normal range or not returned as of this dictation. EMERGENCY DEPARTMENT COURSE and DIFFERENTIAL DIAGNOSIS/MDM:   Vitals:    Vitals:    10/08/20 0138   BP: (!) 116/58   Pulse: 85   Resp: 16   Temp: 97.7 °F (36.5 °C)   SpO2: 94%     Patient has dislodged G-tube. The wound appears to already be healing over the abdomen and is uncertain how long the tube has been out. Sometime before 5 PM per EMS/nursing report. As this is a fresh tract at 2 weeks and in an effort to avoid infection/fistula formation will avoid attempting to replace the tube at this time and consult hospitalist for admission. CONSULTS:  None    PROCEDURES:  Procedures      FINAL IMPRESSION      1. Displacement of other gastrointestinal device, initial encounter          DISPOSITION/PLAN   DISPOSITION Decision To Admit 10/08/2020 01:43:56 AM      PATIENT REFERRED TO:  No follow-up provider specified.     DISCHARGE MEDICATIONS:  New Prescriptions    No medications on file       (Please note that portions of this note were completed with a voice recognition program.  Efforts were made to edit the dictations but occasionally words are mis-transcribed.)    Marly Serrano, Eliasa. [address]  10/08/20 0157

## 2020-10-08 NOTE — CONSULTS
Once  warfarin (COUMADIN) daily dosing (placeholder), , RX Placeholder      FLUIDS/DRIPS:    PRNs: sodium chloride flush, 10 mL, PRN  acetaminophen, 650 mg, Q6H PRN    Or  acetaminophen, 650 mg, Q6H PRN  promethazine, 12.5 mg, Q6H PRN    Or  ondansetron, 4 mg, Q6H PRN  albuterol sulfate HFA, 1 puff, Q4H PRN      ALLERGIES:  She   Allergies   Allergen Reactions    Ibuprofen        REVIEW OF SYSTEMS   Pertinent ROS noted in HPI    PHYSICAL EXAM     Vitals:    10/08/20 0456 10/08/20 0541 10/08/20 0827 10/08/20 1104   BP: 126/70  138/76 119/68   Pulse: 88  101 90   Resp: 18  16 16   Temp: 98.4 °F (36.9 °C)  98.5 °F (36.9 °C) 98.4 °F (36.9 °C)   TempSrc: Axillary  Oral Oral   SpO2: 97%  95% 95%   Weight:  187 lb 6.3 oz (85 kg)     Height:  5' 7\" (1.702 m)         No intake/output data recorded. Physical Exam:  General appearance: alert, cooperative. Nonverbal   Eyes: Anicteric  Head: Normocephalic, without obvious abnormality  Lungs: clear to auscultation bilaterally, Normal Effort  Heart: regular rate and rhythm, normal S1 and S2, no murmurs or rubs  Abdomen: soft, non-distended, non-tender. Bowel sounds normal. Small cutaneous incision s/p PEG. Healing of the subcutaneous tissue. Tract is closed. No signs of infection. Extremities: atraumatic, no cyanosis or edema  Skin: warm and dry, no jaundice  Neuro: Cognition impaired    LABS AND IMAGING   Laboratory   Recent Labs     10/08/20  0215   WBC 7.9   HGB 8.2*   HCT 26.9*   MCV 88.7        Recent Labs     10/08/20  0215   *   K 4.3      CO2 20*   BUN 10   CREATININE 1.8*     No results for input(s): AST, ALT, ALB, BILIDIR, BILITOT, ALKPHOS in the last 72 hours. No results for input(s): LIPASE, AMYLASE in the last 72 hours.   Recent Labs     10/08/20  0215   PROTIME 23.3*   INR 1.99*       Imaging  No orders to display         ASSESSMENT AND RECOMMENDATIONS   53 y.o. female with a PMH of CVA, A.fib, oropharyngeal dysphagia, Factor V Leiden, DVT, HLD who presented 10/8/2020 with a PEG tube displacement, for which we have been consulted. IMPRESSION:    1. PEG tube displacement  -Hx of oropharyngeal dysphagia s/p PEG tube placement 9/25  -Was discharged from hospital and sent to a rehab nursing facility. PEG was found to be displaced around 5PM yesterday, 10/7.    -Patient is NPO and typically receives tube feeds every 6 hours  2. Supratherapeutic INR   -Hx of A. Fib, Factor V Leiden, PEs  -INR 1.99 this morning. Typically receives Coumadin Monday and Thursday. Will hold for procedure. RECOMMENDATIONS:      1) Spoke to [place]. Holding Warfarin. 2) Planning for PEG placement tomorrow. IV vitamin K ordered as well as repeat PT/INR. INR will need to be <1.5 for PEG procedure. If you have any questions or need any further information, please feel free to contact our consult team.  Thank you for allowing us to participate in the care of Ronen Holder. The note was completed using Dragon voice recognition transcription. Every effort was made to ensure accuracy; however, inadvertent transcription errors may be present despite my best efforts to edit errors. Opal Mancia PA-C    Attending physician addendum:      I have personally seen and examined the patient, reviewed the patient's medical record and pertinent labs and clinical imaging. I have personally staffed the case with Shante Simpson. I agree with her consultation note, exam findings, assessment and plans  as written above. I have made appropriate modifications and edited her assessment and plan where needed to reflect my impression and plans for this patient. 54 yo WF past medical history significant for factor V Leiden deficiency, atrial fibrillation, and prior pulmonary embolism, on Coumadin who had a prior PEG tube placed in September 2020 for oropharyngeal dysphagia secondary to CVA, who had presented from [facility] with dislodged PEG tube.   The patient reports that she may have pulled this out incidentally. She is unclear when this happened. She denies any pain or drainage from the site. She has had no fevers or chills. She has no abdominal distention or pain. /68   Pulse 90   Temp 98.4 °F (36.9 °C) (Oral)   Resp 16   Ht 5' 7\" (1.702 m)   Wt 187 lb 6.3 oz (85 kg)   LMP 12/05/2018 (Exact Date)   SpO2 95%   BMI 29.35 kg/m²      Peg site in mid epigastrium with near complete healing  CV- RRR  Lungs CTAB  Abd- soft, NT/ND    Labs reviewed    Impression:     1) Dislodged PEG tube  2) Afib/Hx of PE/Hx of Factor V leiden- coumadin on hold. INR 1.99. Need INR < 1.5 prior to PEG placement    Plan:   Vit K today  Repeat INR in am   If INR <1.6, OK to proceed with PEG replacement. Thank you for allowing me to participate in this patient's care. If there are any questions or concerns regarding this patient, or the plan we have set in place, please feel free to contact me at [phone].      Promise Aceves, DO

## 2020-10-08 NOTE — PROGRESS NOTES
Spoke with nurse from [address] [place] [address]. Updated on patient status. Per RN, patient is NPO and takes all meds through PEG tube.

## 2020-10-08 NOTE — CARE COORDINATION
DARREL screen completed. Current plan is to return to [address] [place] [address] unit [id number]. Spoke with Cruz Qureshi at facility who says pre-cert is not needed if she comes back within 24 hours, however she will check into this to make sure & call us back. Plan to reinsert g-tube shahnaz (unable to complete today d/t elevated INR). Spoke with pt at bedside, who seemed to endorse some concerns with her care. I was only able to decipher \"she isn't getting fed\" and \"always wet\" after our conversation d/t her speech limitations (this is her baseline). Addressed these concerns with  Vishal Barfield and facility.  feels she is doing things intentionally to get out of SNF bc she wants to come home. He says he's sure she pulled out her own g-tube. Per Cruz Qureshi at facility, they are in frequent contact with Anas and pt's mother. She has had conferences with DON and RNs on her care team to ensure the appropriate care is given. She will continue to follow her care to address these same concerns. I asked if video chats with family could be arranged, Cruz Qureshi said she was in process of doing this but pt wasn't there long and had dialysis as well but she will try to arrange this upon return. Will f/u with trudi christie.      Electronically signed by Kimberlyn Cedeño, RN on 10/8/2020 at 2:46 PM

## 2020-10-08 NOTE — PROGRESS NOTES
Patient resting in bed with eyes closed. Awakens to voice. Alert to self and place. Knows the year is 2020. No signs of pain. Respirations easy and even. Follows commands. Patient NPO. Call light and belongings within reach. Will continue to monitor.      Electronically signed by Napoleon Maritnes RN on 10/8/2020 at 11:19 AM

## 2020-10-08 NOTE — PROGRESS NOTES
Patient resting quietly in bed. Continues to be NPO. All meds given through IV due to PEG tube displacement. Checked and changed for incontinence. Will continue to monitor.      Electronically signed by Sarai Bailey RN on 10/8/2020 at 6:23 PM

## 2020-10-08 NOTE — PROGRESS NOTES
Vitamin K tablet ordered for patient to take prior to surgery. Patient on Coumadin but unable to take PO. Patient only takes pills through PEG tube. Call placed to Dr. Uma Rodriges.

## 2020-10-09 ENCOUNTER — ANESTHESIA (OUTPATIENT)
Dept: ENDOSCOPY | Age: 50
DRG: 252 | End: 2020-10-09
Payer: COMMERCIAL

## 2020-10-09 ENCOUNTER — ANESTHESIA EVENT (OUTPATIENT)
Dept: ENDOSCOPY | Age: 50
DRG: 252 | End: 2020-10-09
Payer: COMMERCIAL

## 2020-10-09 VITALS — OXYGEN SATURATION: 100 % | DIASTOLIC BLOOD PRESSURE: 69 MMHG | SYSTOLIC BLOOD PRESSURE: 141 MMHG

## 2020-10-09 LAB
APTT: 26.6 SEC (ref 24.2–36.2)
HCT VFR BLD CALC: 26.5 % (ref 36–48)
HEMOGLOBIN: 8.4 G/DL (ref 12–16)
INR BLD: 1.12 (ref 0.86–1.14)
MCH RBC QN AUTO: 27 PG (ref 26–34)
MCHC RBC AUTO-ENTMCNC: 31.6 G/DL (ref 31–36)
MCV RBC AUTO: 85.4 FL (ref 80–100)
PDW BLD-RTO: 20.5 % (ref 12.4–15.4)
PLATELET # BLD: 310 K/UL (ref 135–450)
PMV BLD AUTO: 7.1 FL (ref 5–10.5)
PROTHROMBIN TIME: 13 SEC (ref 10–13.2)
RBC # BLD: 3.1 M/UL (ref 4–5.2)
WBC # BLD: 7.9 K/UL (ref 4–11)

## 2020-10-09 PROCEDURE — 3700000000 HC ANESTHESIA ATTENDED CARE: Performed by: INTERNAL MEDICINE

## 2020-10-09 PROCEDURE — 7100000001 HC PACU RECOVERY - ADDTL 15 MIN: Performed by: INTERNAL MEDICINE

## 2020-10-09 PROCEDURE — 2709999900 HC NON-CHARGEABLE SUPPLY: Performed by: INTERNAL MEDICINE

## 2020-10-09 PROCEDURE — 0DH63UZ INSERTION OF FEEDING DEVICE INTO STOMACH, PERCUTANEOUS APPROACH: ICD-10-PCS | Performed by: INTERNAL MEDICINE

## 2020-10-09 PROCEDURE — 85610 PROTHROMBIN TIME: CPT

## 2020-10-09 PROCEDURE — 7100000000 HC PACU RECOVERY - FIRST 15 MIN: Performed by: INTERNAL MEDICINE

## 2020-10-09 PROCEDURE — 6360000002 HC RX W HCPCS: Performed by: ANESTHESIOLOGY

## 2020-10-09 PROCEDURE — 6370000000 HC RX 637 (ALT 250 FOR IP): Performed by: INTERNAL MEDICINE

## 2020-10-09 PROCEDURE — 85730 THROMBOPLASTIN TIME PARTIAL: CPT

## 2020-10-09 PROCEDURE — 2500000003 HC RX 250 WO HCPCS: Performed by: INTERNAL MEDICINE

## 2020-10-09 PROCEDURE — 2700000000 HC OXYGEN THERAPY PER DAY

## 2020-10-09 PROCEDURE — 3609013300 HC EGD TUBE PLACEMENT: Performed by: INTERNAL MEDICINE

## 2020-10-09 PROCEDURE — 2500000003 HC RX 250 WO HCPCS: Performed by: NURSE ANESTHETIST, CERTIFIED REGISTERED

## 2020-10-09 PROCEDURE — 3700000001 HC ADD 15 MINUTES (ANESTHESIA): Performed by: INTERNAL MEDICINE

## 2020-10-09 PROCEDURE — 6360000002 HC RX W HCPCS: Performed by: PHYSICIAN ASSISTANT

## 2020-10-09 PROCEDURE — 85027 COMPLETE CBC AUTOMATED: CPT

## 2020-10-09 PROCEDURE — 2580000003 HC RX 258: Performed by: NURSE ANESTHETIST, CERTIFIED REGISTERED

## 2020-10-09 PROCEDURE — 36415 COLL VENOUS BLD VENIPUNCTURE: CPT

## 2020-10-09 PROCEDURE — 1200000000 HC SEMI PRIVATE

## 2020-10-09 PROCEDURE — 6360000002 HC RX W HCPCS: Performed by: NURSE ANESTHETIST, CERTIFIED REGISTERED

## 2020-10-09 PROCEDURE — 6360000002 HC RX W HCPCS: Performed by: INTERNAL MEDICINE

## 2020-10-09 RX ORDER — LIDOCAINE HYDROCHLORIDE 20 MG/ML
INJECTION, SOLUTION EPIDURAL; INFILTRATION; INTRACAUDAL; PERINEURAL PRN
Status: DISCONTINUED | OUTPATIENT
Start: 2020-10-09 | End: 2020-10-09 | Stop reason: SDUPTHER

## 2020-10-09 RX ORDER — DIAZEPAM 5 MG/1
5 TABLET ORAL EVERY 12 HOURS
Status: DISCONTINUED | OUTPATIENT
Start: 2020-10-09 | End: 2020-10-16 | Stop reason: HOSPADM

## 2020-10-09 RX ORDER — HEPARIN SODIUM 10000 [USP'U]/100ML
18 INJECTION, SOLUTION INTRAVENOUS CONTINUOUS
Status: DISCONTINUED | OUTPATIENT
Start: 2020-10-09 | End: 2020-10-09

## 2020-10-09 RX ORDER — PROPOFOL 10 MG/ML
INJECTION, EMULSION INTRAVENOUS CONTINUOUS PRN
Status: DISCONTINUED | OUTPATIENT
Start: 2020-10-09 | End: 2020-10-09 | Stop reason: SDUPTHER

## 2020-10-09 RX ORDER — MORPHINE SULFATE 2 MG/ML
1 INJECTION, SOLUTION INTRAMUSCULAR; INTRAVENOUS EVERY 5 MIN PRN
Status: DISCONTINUED | OUTPATIENT
Start: 2020-10-09 | End: 2020-10-09

## 2020-10-09 RX ORDER — ONDANSETRON 2 MG/ML
4 INJECTION INTRAMUSCULAR; INTRAVENOUS
Status: DISCONTINUED | OUTPATIENT
Start: 2020-10-09 | End: 2020-10-09

## 2020-10-09 RX ORDER — SEVELAMER CARBONATE FOR ORAL SUSPENSION 2400 MG/1
0.8 POWDER, FOR SUSPENSION ORAL DAILY
Status: DISCONTINUED | OUTPATIENT
Start: 2020-10-10 | End: 2020-10-16 | Stop reason: HOSPADM

## 2020-10-09 RX ORDER — OXYCODONE HYDROCHLORIDE AND ACETAMINOPHEN 5; 325 MG/1; MG/1
1 TABLET ORAL PRN
Status: DISCONTINUED | OUTPATIENT
Start: 2020-10-09 | End: 2020-10-09

## 2020-10-09 RX ORDER — HEPARIN SODIUM 1000 [USP'U]/ML
80 INJECTION, SOLUTION INTRAVENOUS; SUBCUTANEOUS PRN
Status: DISCONTINUED | OUTPATIENT
Start: 2020-10-09 | End: 2020-10-09

## 2020-10-09 RX ORDER — LIDOCAINE HYDROCHLORIDE 10 MG/ML
INJECTION, SOLUTION INFILTRATION; PERINEURAL
Status: COMPLETED | OUTPATIENT
Start: 2020-10-09 | End: 2020-10-09

## 2020-10-09 RX ORDER — LEVETIRACETAM 100 MG/ML
1000 SOLUTION ORAL 2 TIMES DAILY
Status: DISCONTINUED | OUTPATIENT
Start: 2020-10-09 | End: 2020-10-16 | Stop reason: HOSPADM

## 2020-10-09 RX ORDER — LABETALOL HYDROCHLORIDE 5 MG/ML
5 INJECTION, SOLUTION INTRAVENOUS EVERY 10 MIN PRN
Status: DISCONTINUED | OUTPATIENT
Start: 2020-10-09 | End: 2020-10-09

## 2020-10-09 RX ORDER — HEPARIN SODIUM 10000 [USP'U]/100ML
7.1 INJECTION, SOLUTION INTRAVENOUS CONTINUOUS
Status: DISCONTINUED | OUTPATIENT
Start: 2020-10-09 | End: 2020-10-16

## 2020-10-09 RX ORDER — PROPOFOL 10 MG/ML
INJECTION, EMULSION INTRAVENOUS PRN
Status: DISCONTINUED | OUTPATIENT
Start: 2020-10-09 | End: 2020-10-09 | Stop reason: SDUPTHER

## 2020-10-09 RX ORDER — MEPERIDINE HYDROCHLORIDE 25 MG/ML
12.5 INJECTION INTRAMUSCULAR; INTRAVENOUS; SUBCUTANEOUS EVERY 5 MIN PRN
Status: DISCONTINUED | OUTPATIENT
Start: 2020-10-09 | End: 2020-10-09

## 2020-10-09 RX ORDER — HEPARIN SODIUM 1000 [USP'U]/ML
40 INJECTION, SOLUTION INTRAVENOUS; SUBCUTANEOUS PRN
Status: DISCONTINUED | OUTPATIENT
Start: 2020-10-09 | End: 2020-10-09

## 2020-10-09 RX ORDER — HYDRALAZINE HYDROCHLORIDE 20 MG/ML
5 INJECTION INTRAMUSCULAR; INTRAVENOUS
Status: DISCONTINUED | OUTPATIENT
Start: 2020-10-09 | End: 2020-10-09

## 2020-10-09 RX ORDER — CARVEDILOL 12.5 MG/1
12.5 TABLET ORAL 2 TIMES DAILY WITH MEALS
Status: DISCONTINUED | OUTPATIENT
Start: 2020-10-09 | End: 2020-10-16 | Stop reason: HOSPADM

## 2020-10-09 RX ORDER — MORPHINE SULFATE 2 MG/ML
2 INJECTION, SOLUTION INTRAMUSCULAR; INTRAVENOUS EVERY 5 MIN PRN
Status: DISCONTINUED | OUTPATIENT
Start: 2020-10-09 | End: 2020-10-09

## 2020-10-09 RX ORDER — SODIUM CHLORIDE 9 MG/ML
INJECTION, SOLUTION INTRAVENOUS CONTINUOUS PRN
Status: DISCONTINUED | OUTPATIENT
Start: 2020-10-09 | End: 2020-10-09 | Stop reason: SDUPTHER

## 2020-10-09 RX ORDER — OXYCODONE HYDROCHLORIDE AND ACETAMINOPHEN 5; 325 MG/1; MG/1
2 TABLET ORAL PRN
Status: DISCONTINUED | OUTPATIENT
Start: 2020-10-09 | End: 2020-10-09

## 2020-10-09 RX ADMIN — CEFAZOLIN SODIUM 2 G: 10 INJECTION, POWDER, FOR SOLUTION INTRAVENOUS at 13:00

## 2020-10-09 RX ADMIN — AMIODARONE HYDROCHLORIDE 200 MG: 200 TABLET ORAL at 20:38

## 2020-10-09 RX ADMIN — LEVETIRACETAM 1000 MG: 500 SOLUTION ORAL at 20:38

## 2020-10-09 RX ADMIN — LEVETIRACETAM 1000 MG: 10 INJECTION INTRAVENOUS at 02:38

## 2020-10-09 RX ADMIN — LACOSAMIDE 200 MG: 100 TABLET, FILM COATED ORAL at 20:38

## 2020-10-09 RX ADMIN — DIAZEPAM 5 MG: 5 TABLET ORAL at 20:38

## 2020-10-09 RX ADMIN — HYDROMORPHONE HYDROCHLORIDE 0.5 MG: 1 INJECTION, SOLUTION INTRAMUSCULAR; INTRAVENOUS; SUBCUTANEOUS at 13:31

## 2020-10-09 RX ADMIN — LIDOCAINE HYDROCHLORIDE 80 MG: 20 INJECTION, SOLUTION EPIDURAL; INFILTRATION; INTRACAUDAL; PERINEURAL at 13:05

## 2020-10-09 RX ADMIN — SODIUM CHLORIDE: 9 INJECTION, SOLUTION INTRAVENOUS at 12:45

## 2020-10-09 RX ADMIN — CARVEDILOL 12.5 MG: 12.5 TABLET, FILM COATED ORAL at 20:38

## 2020-10-09 RX ADMIN — PROPOFOL 50 MG: 10 INJECTION, EMULSION INTRAVENOUS at 13:10

## 2020-10-09 RX ADMIN — PROPOFOL 70 MG: 10 INJECTION, EMULSION INTRAVENOUS at 13:05

## 2020-10-09 RX ADMIN — LORAZEPAM 0.5 MG: 2 INJECTION INTRAMUSCULAR; INTRAVENOUS at 06:12

## 2020-10-09 RX ADMIN — PROPOFOL 140 MCG/KG/MIN: 10 INJECTION, EMULSION INTRAVENOUS at 13:05

## 2020-10-09 RX ADMIN — HEPARIN SODIUM 8 ML/HR: 10000 INJECTION, SOLUTION INTRAVENOUS at 23:25

## 2020-10-09 RX ADMIN — HYDROMORPHONE HYDROCHLORIDE 0.5 MG: 1 INJECTION, SOLUTION INTRAMUSCULAR; INTRAVENOUS; SUBCUTANEOUS at 13:36

## 2020-10-09 ASSESSMENT — PULMONARY FUNCTION TESTS
PIF_VALUE: 1
PIF_VALUE: 0
PIF_VALUE: 1

## 2020-10-09 ASSESSMENT — PAIN DESCRIPTION - ORIENTATION
ORIENTATION: MID

## 2020-10-09 ASSESSMENT — PAIN DESCRIPTION - PAIN TYPE
TYPE: SURGICAL PAIN

## 2020-10-09 ASSESSMENT — PAIN SCALES - WONG BAKER
WONGBAKER_NUMERICALRESPONSE: 10
WONGBAKER_NUMERICALRESPONSE: 0
WONGBAKER_NUMERICALRESPONSE: 10

## 2020-10-09 ASSESSMENT — PAIN DESCRIPTION - ONSET
ONSET: ON-GOING

## 2020-10-09 ASSESSMENT — PAIN DESCRIPTION - DESCRIPTORS
DESCRIPTORS: ACHING

## 2020-10-09 ASSESSMENT — PAIN DESCRIPTION - PROGRESSION
CLINICAL_PROGRESSION: NOT CHANGED

## 2020-10-09 ASSESSMENT — PAIN - FUNCTIONAL ASSESSMENT
PAIN_FUNCTIONAL_ASSESSMENT: PREVENTS OR INTERFERES SOME ACTIVE ACTIVITIES AND ADLS
PAIN_FUNCTIONAL_ASSESSMENT: 0-10

## 2020-10-09 ASSESSMENT — ENCOUNTER SYMPTOMS: SHORTNESS OF BREATH: 0

## 2020-10-09 ASSESSMENT — PAIN DESCRIPTION - LOCATION
LOCATION: ABDOMEN

## 2020-10-09 ASSESSMENT — PAIN DESCRIPTION - FREQUENCY
FREQUENCY: CONTINUOUS

## 2020-10-09 ASSESSMENT — PAIN SCALES - GENERAL
PAINLEVEL_OUTOF10: 7
PAINLEVEL_OUTOF10: 10
PAINLEVEL_OUTOF10: 7

## 2020-10-09 NOTE — CARE COORDINATION
SW received phone call from patient's mother stating that the patient doesn't want to return to [facility]. SW transferred call to current  working with patient for follow up. This writer was assigned to patient during her last admission. Respectfully submitted,    EUGENIA Mckinney  [facility]   [phone]    Electronically signed by EUGENIA Hannon on 10/9/2020 at 10:34 AM

## 2020-10-09 NOTE — PROGRESS NOTES
Hospitalist Progress Note      PCP: No primary care provider on file. Date of Admission: 10/8/2020      Subjective: unable to communicate, nurse at bedside. Medications:  Reviewed    Infusion Medications   Scheduled Medications    sodium chloride flush  10 mL Intravenous 2 times per day    amiodarone  200 mg Per NG tube BID    atorvastatin  10 mg Oral Daily    b complex-C-folic acid  1 capsule Oral Daily    carvedilol  12.5 mg Oral BID WC    lacosamide  200 mg Oral BID    sevelamer  0.8 g Per G Tube Dinner    warfarin (COUMADIN) daily dosing (placeholder)   Other RX Placeholder    levetiracetam  1,000 mg Intravenous Q12H    LORazepam  0.5 mg Intravenous Q8H     PRN Meds: sodium chloride flush, acetaminophen **OR** acetaminophen, promethazine **OR** ondansetron, albuterol sulfate HFA      Intake/Output Summary (Last 24 hours) at 10/9/2020 0846  Last data filed at 10/8/2020 1805  Gross per 24 hour   Intake 0 ml   Output --   Net 0 ml       Physical Exam Performed:    /80   Pulse 89   Temp 98.3 °F (36.8 °C) (Oral)   Resp 16   Ht 5' 7\" (1.702 m)   Wt 187 lb 9.8 oz (85.1 kg)   LMP 12/05/2018 (Exact Date)   SpO2 93%   BMI 29.38 kg/m²     General appearance: No apparent distress  Neck: Supple  Respiratory:  Normal respiratory effort. Clear to auscultation, bilaterally without Rales/Wheezes/Rhonchi. Cardiovascular: Regular rate and rhythm with normal S1/S2 without murmurs, rubs or gallops. Abdomen: Soft  Musculoskeletal: No clubbing  Skin: Skin color, texture, turgor normal.  No rashes or lesions.   Neurologic:  Moving extremities   Psychiatric: sleepy   Capillary Refill: Brisk,< 3 seconds   Peripheral Pulses: +2 palpable, equal bilaterally       Labs:   Recent Labs     10/08/20  0215   WBC 7.9   HGB 8.2*   HCT 26.9*        Recent Labs     10/08/20  0215   *   K 4.3      CO2 20*   BUN 10   CREATININE 1.8*   CALCIUM 8.7     No results for input(s): AST, ALT, BILIDIR, BILITOT, ALKPHOS in the last 72 hours. Recent Labs     10/08/20  0215 10/08/20  1332   INR 1.99* 2.16*     No results for input(s): Prakash Bee in the last 72 hours. Urinalysis:      Lab Results   Component Value Date    NITRU Negative 09/10/2020    WBCUA 3-5 09/10/2020    BACTERIA 2+ 09/10/2020    RBCUA 21-50 09/10/2020    BLOODU MODERATE 09/10/2020    SPECGRAV >1.030 09/10/2020    GLUCOSEU Negative 09/10/2020       Radiology:  No orders to display           Assessment/Plan:    Active Hospital Problems    Diagnosis    Complication of gastrostomy tube ([facility] [address] [K94.20]    PEG tube malfunction ([facility] [address] [K94.23]     1. PEG malfunction, likely for replacement today, per GI.  2. Encephalopathy, appeared after an event in september, cardiogenic shock at that time, appears at baseline now. 3. ESRD, HD  4. H/O PE, on coumadin. keppra and benzodiazepines changed to iv as patient not able to take po, hopefully to get the PEG today so we can restart her other medications.        Diet: Diet NPO Effective Now  Code Status: Full Code      Nisa Bustos MD

## 2020-10-09 NOTE — ANESTHESIA POSTPROCEDURE EVALUATION
Department of Anesthesiology  Postprocedure Note    Patient: Delmis Love  MRN: [id number]  YOB: 1970  Date of evaluation: 10/9/2020  Time:  1:51 PM     Procedure Summary     Date:  10/09/20 Room / Location:  [address] [place] [address]    Anesthesia Start:  1257 Anesthesia Stop:  1320    Procedure:  ESOPHAGOGASTRODUODENOSCOPY PERCUTANEOUS ENDOCOPIC GASTROSTOMY TUBE PLACEMENT (N/A ) Diagnosis:  (PEG DISPLACEMENT)    Surgeon:  Annita Cooley DO Responsible Provider:  Eleno English MD    Anesthesia Type:  MAC ASA Status:  3          Anesthesia Type: MAC    Miguel Phase I: Miguel Score: 9    Miguel Phase II:      Last vitals: Reviewed and per EMR flowsheets.        Anesthesia Post Evaluation    Patient location during evaluation: PACU  Level of consciousness: awake and alert  Airway patency: patent  Nausea & Vomiting: no nausea and no vomiting  Complications: no  Cardiovascular status: blood pressure returned to baseline  Respiratory status: acceptable  Hydration status: euvolemic  Comments: Postoperative Anesthesia Note    Name:    Delmis Love  MRN:      [id number]    Patient Vitals in the past 12 hrs:  10/09/20 1347, BP:(!) 89/56, Pulse:78, Resp:22, SpO2:93 %  10/09/20 1346, Temp:97.2 °F (36.2 °C), Temp src:Temporal, SpO2:93 %  10/09/20 1342, BP:107/63, Pulse:75, Resp:18, SpO2:93 %  10/09/20 1337, BP:113/75, Pulse:75, Resp:18, SpO2:97 %  10/09/20 1332, BP:125/71, Pulse:74, Resp:14  10/09/20 1326, BP:129/67, Pulse:76, Resp:23, SpO2:100 %  10/09/20 1323, BP:129/67, Temp:97.4 °F (36.3 °C), Temp src:Temporal, Pulse:75, Resp:21, SpO2:100 %  10/09/20 1049, BP:(!) 150/77, Temp:98.2 °F (36.8 °C), Temp src:Temporal, Pulse:93, Resp:16, SpO2:98 %  10/09/20 0419, Weight:187 lb 9.8 oz (85.1 kg)  10/09/20 0333, BP:126/80, Temp:98.3 °F (36.8 °C), Temp src:Oral, Pulse:89, Resp:16, SpO2:93 %     LABS:    CBC  Lab Results       Component                Value               Date/Time WBC                      7.9                 10/08/2020 02:15 AM        HGB                      8.2 (L)             10/08/2020 02:15 AM        HCT                      26.9 (L)            10/08/2020 02:15 AM        PLT                      303                 10/08/2020 02:15 AM   RENAL  Lab Results       Component                Value               Date/Time                  NA                       133 (L)             10/08/2020 02:15 AM        K                        4.3                 10/08/2020 02:15 AM        CL                       100                 10/08/2020 02:15 AM        CO2                      20 (L)              10/08/2020 02:15 AM        BUN                      10                  10/08/2020 02:15 AM        CREATININE               1.8 (H)             10/08/2020 02:15 AM        GLUCOSE                  93                  10/08/2020 02:15 AM   COAGS  Lab Results       Component                Value               Date/Time                  PROTIME                  13.0                10/09/2020 09:37 AM        PROTIME                  67.9                01/29/2016 10:09 AM        INR                      1.12                10/09/2020 09:37 AM        APTT                     61.1 (H)            10/04/2020 05:52 AM     Intake & Output:  @05SNQY@    Nausea & Vomiting:  No    Level of Consciousness:  Awake    Pain Assessment:  Adequate analgesia    Anesthesia Complications:  No apparent anesthetic complications    SUMMARY      Vital signs stable  OK to discharge from Stage I post anesthesia care.   Care transferred from Anesthesiology department on discharge from perioperative area

## 2020-10-09 NOTE — H&P
Patient seen and examined by me prior to the procedure. The patient is stable for sedation. There have been no significant changes since my initial consultation note. Please reference this note for full details    The patient was counseled at length about the risks of sugar Covid-19 during their perioperative period and any recovery window from their procedure. The patient was made aware that sugar Covid-19  may worsen their prognosis for recovering from their procedure  and lend to a higher morbidity and/or mortality risk. All material risks, benefits, and reasonable alternatives including postponing the procedure were discussed. The patient does wish to proceed with the procedure at this time.     ASA class-3  Malampati-2    Tanvireil Officer,

## 2020-10-09 NOTE — ANESTHESIA PRE PROCEDURE
[facility] Department of Anesthesiology  Pre-Anesthesia Evaluation/Consultation       Name:  Romel Pollack  : 1970  Age:  53 y.o.                                            MRN:  [id number]  Date: 10/9/2020           Surgeon: Surgeon(s):  Irene Tobin DO    Procedure: Procedure(s):  ESOPHAGOGASTRODUODENOSCOPY PERCUTANEOUS ENDOCOPIC GASTROSTOMY TUBE PLACEMENT     Allergies   Allergen Reactions    Ibuprofen      Patient Active Problem List   Diagnosis    Factor V Leiden mutation ([address]    Pulmonary embolus (HCC)    Lactic acidosis    CAP (community acquired pneumonia)    Acute CHF (congestive heart failure) ([address]    Bilateral pulmonary embolism (HCC)    Cardiac arrest ([address]    Seizures ([address]    Acute respiratory failure with hypoxia ([address]    MORELIA (acute kidney injury) ([address]    Metabolic acidosis    Congestive heart failure (HCC)    Pulmonary hypertension ([address]    Multifocal pneumonia    Septic shock (HCC)    Elevated LFTs    Thrombocytopenia (HCC)    Coagulopathy (HCC)    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) ([address]    Acute encephalopathy    Pneumonia due to organism    Bacteremia    Pleural effusion, bilateral    Elevated lactic acid level    Bacteremia due to methicillin susceptible Staphylococcus aureus (MSSA)    Pneumonia due to methicillin susceptible Staphylococcus aureus (MSSA) (HCC)    Anemia    Acute renal failure (HCC)    Bilateral pleural effusion    Mitral valve insufficiency    Tricuspid valve insufficiency    Class 2 obesity due to excess calories with body mass index (BMI) of 39.0 to 39.9 in adult    Cardiogenic shock (HCC)    Staphylococcal pneumonia (HCC)    Acute on chronic systolic heart failure (HCC)    Paroxysmal atrial fibrillation ([address]    Essential hypertension    Complication of gastrostomy tube ([address]    PEG tube malfunction ([address]     Past Medical History:   Diagnosis Date    Anticoagulant long-term use  Arthritis     Asthma     Baker's cyst     Depression     [facility] Le[facility]     Factor V Leiden ([facility]     H/O blood clots     Heartburn     Hyperlipidemia     MRSA bacteremia 09/10/2020    Osteoarthritis      Past Surgical History:   Procedure Laterality Date    GASTROSTOMY TUBE PLACEMENT N/A 2020    ESOPHAGOGASTRODUODENOSCOPY PERCUTANEOUS ENDOSCOPIC GASTROSTOMY TUBE PLACEMENT performed by Gene Wheeler MD at [facility]  IR TUNNELED [address] 5 YEARS  2020    IR TUNNELED CATHETER PLACEMENT GREATER THAN 5 YEARS 2020 [facility]    TUNNELED VENOUS CATHETER PLACEMENT Right 2020    Permacath ; RIJ; 19cm; Dr. She Hickey History     Tobacco Use    Smoking status: Former Smoker     Packs/day: 0.50     Types: Cigarettes     Last attempt to quit: 2020     Years since quittin.0    Smokeless tobacco: Never Used   Substance Use Topics    Alcohol use: No     Alcohol/week: 0.0 standard drinks    Drug use: Yes     Frequency: 14.0 times per week     Types: Marijuana     Medications  No current facility-administered medications on file prior to encounter. Current Outpatient Medications on File Prior to Encounter   Medication Sig Dispense Refill    diazePAM (VALIUM) 5 MG tablet Take 1 tablet by mouth every 12 hours for 10 days. 20 tablet 0    warfarin (COUMADIN) 7.5 MG tablet Take 7.5 mg daily except 3.75 mg on  and . INR twice weekly with dialysis  and . Call MD at [facility] with results and for dosing instructions. 30 tablet 3    amiodarone (CORDARONE) 200 MG tablet 1 tablet by Per NG tube route 2 times daily 30 tablet 3    lacosamide (VIMPAT) 200 MG tablet Take 1 tablet by mouth 2 times daily for 30 days.  60 tablet 0    levETIRAcetam (KEPPRA) 100 MG/ML solution Take 10 mLs by mouth 2 times daily 300 mL 3    carvedilol (COREG) 12.5 MG tablet Take 1 tablet by mouth 2 times daily (with meals) 60 tablet 3    calcitonin (MIACALCIN) 200 UNIT/ACT nasal spray 1 spray by Nasal route daily 1 Bottle 3    sevelamer (RENVELA) 2.4 g PACK packet 0.8 g by Per G Tube route Daily with supper 90 each 1    b complex-C-folic acid (NEPHROCAPS) 1 MG capsule Take 1 capsule by mouth daily 30 capsule 3    vitamin D (ERGOCALCIFEROL) 1.25 MG (09070 UT) CAPS capsule TAKE ONE CAPSULE BY MOUTH ONCE WEEKLY 12 capsule 0    omeprazole (PRILOSEC) 20 MG delayed release capsule TAKE ONE CAPSULE BY MOUTH DAILY 90 capsule 6    atorvastatin (LIPITOR) 10 MG tablet TAKE ONE TABLET BY MOUTH DAILY 90 tablet 3    DULoxetine (CYMBALTA) 60 MG extended release capsule TAKE ONE CAPSULE BY MOUTH DAILY 30 capsule 11    BREO ELLIPTA 200-25 MCG/INH AEPB INHALE ONE DOSE BY MOUTH DAILY 1 each 11    albuterol sulfate  (90 Base) MCG/ACT inhaler INHALE TWO PUFFS BY MOUTH EVERY 6 HOURS AS NEEDED FOR WHEEZING 18 g 0     Current Facility-Administered Medications   Medication Dose Route Frequency Provider Last Rate Last Dose    ceFAZolin (ANCEF) 2 g in dextrose 5 % 100 mL IVPB  2 g Intravenous On Call to [address] [place] [address]        sodium chloride flush 0.9 % injection 10 mL  10 mL Intravenous 2 times per day Lonny M Matheus, DO   10 mL at 10/08/20 2100    sodium chloride flush 0.9 % injection 10 mL  10 mL Intravenous PRN Lonny  Matheus, DO        acetaminophen (TYLENOL) tablet 650 mg  650 mg Oral Q6H PRN Lonny  Matheus, DO        Or    acetaminophen (TYLENOL) suppository 650 mg  650 mg Rectal Q6H PRN Lonny M Matheus, DO        promethazine (PHENERGAN) tablet 12.5 mg  12.5 mg Oral Q6H PRN Lonny  Matheus, DO        Or    ondansetron (ZOFRAN) injection 4 mg  4 mg Intravenous Q6H PRN Lonny  Matheus, DO        albuterol sulfate  (90 Base) MCG/ACT inhaler 1 puff  1 puff Inhalation Q4H PRN Berry Cummins MD        amiodarone (CORDARONE) tablet 200 mg  200 mg Per NG tube BID Berry Yuan MD        atorvastatin (LIPITOR) tablet 10 mg  10 mg Oral Daily Berry Cummins MD        b complex-C-folic acid (NEPHROCAPS) capsule 1 mg  1 capsule Oral Daily Berry Cummins MD        carvedilol (COREG) tablet 12.5 mg  12.5 mg Oral BID WC Berry Cummins MD        lacosamide (VIMPAT) tablet 200 mg  200 mg Oral BID Berry Cummins MD        sevelamer (RENVELA) packet 0.8 g  0.8 g Per Ludarci Wheeler MD        warfarin (COUMADIN) daily dosing (placeholder)   Other RX Placeholder Adrianne Singh MD        levetiracetam (KEPPRA) 1000 mg/100 mL IVPB  1,000 mg Intravenous Q12H Berry Head MD   Stopped at 10/09/20 0253    LORazepam (ATIVAN) injection 0.5 mg  0.5 mg Intravenous Q8H Berry Cummins MD   0.5 mg at 10/09/20 0612     Vital Signs (Current)   Vitals:    10/09/20 0059 10/09/20 0333 10/09/20 0419 10/09/20 1049   BP:  126/80  (!) 150/77   Pulse:  89  93   Resp:  16  16   Temp:  98.3 °F (36.8 °C)  98.2 °F (36.8 °C)   TempSrc:  Oral  Temporal   SpO2: 94% 93%  98%   Weight:   187 lb 9.8 oz (85.1 kg)    Height:                                              BP Readings from Last 3 Encounters:   10/09/20 (!) 150/77   10/04/20 139/71   20 (!) 111/56     Vital Signs Statistics (for past 48 hrs)     Temp  Av.4 °F (36.9 °C)  Min: 97.7 °F (36.5 °C)   Min taken time: 10/08/20 013  Max: 99.1 °F (37.3 °C)   Max taken time: 10/08/20 195  Pulse  Av.4  Min: 80   Min taken time: 10/08/20 1547  Max: 101   Max taken time: 10/08/20 0827  Resp  Av.2  Min: 12   Min taken time: 10/09/20 1049  Max: 25   Max taken time: 10/08/20 0456  BP  Min: 107/52   Min taken time: 10/08/20 0331  Max: 150/77   Max taken time: 10/09/20 1049  MAP (mmHg)  Av.2  Min: 77   Min taken time: 10/08/20 033  Max: 80   Max taken time: 10/08/20 0827  SpO2  Av.9 %  Min: 92 %   Min taken time: 10/08/20 0231  Max: 100 %   Max taken time: 10/08/20 1547  BP Readings from Last 3 Encounters:   10/09/20 (!) 150/77 10/04/20 139/71   09/25/20 (!) 111/56       BMI  Body mass index is 29.38 kg/m². Estimated body mass index is 29.38 kg/m² as calculated from the following:    Height as of this encounter: 5' 7\" (1.702 m). Weight as of this encounter: 187 lb 9.8 oz (85.1 kg).     CBC   Lab Results   Component Value Date    WBC 7.9 10/08/2020    RBC 3.03 10/08/2020    HGB 8.2 10/08/2020    HCT 26.9 10/08/2020    MCV 88.7 10/08/2020    RDW 20.7 10/08/2020     10/08/2020     CMP    Lab Results   Component Value Date     10/08/2020    K 4.3 10/08/2020     10/08/2020    CO2 20 10/08/2020    BUN 10 10/08/2020    CREATININE 1.8 10/08/2020    GFRAA 36 10/08/2020    GFRAA >60 07/25/2012    AGRATIO 1.1 09/12/2020    LABGLOM 30 10/08/2020    GLUCOSE 93 10/08/2020    PROT 6.6 10/04/2020    PROT 7.0 10/19/2011    CALCIUM 8.7 10/08/2020    BILITOT <0.2 10/04/2020    ALKPHOS 131 10/04/2020    AST 32 10/04/2020    ALT 23 10/04/2020     BMP    Lab Results   Component Value Date     10/08/2020    K 4.3 10/08/2020     10/08/2020    CO2 20 10/08/2020    BUN 10 10/08/2020    CREATININE 1.8 10/08/2020    CALCIUM 8.7 10/08/2020    GFRAA 36 10/08/2020    GFRAA >60 07/25/2012    LABGLOM 30 10/08/2020    GLUCOSE 93 10/08/2020     POCGlucose  Recent Labs     10/08/20  0215   GLUCOSE 93      Coags    Lab Results   Component Value Date    PROTIME 13.0 10/09/2020    PROTIME 67.9 01/29/2016    INR 1.12 10/09/2020    APTT 61.1 27/63/9159     HCG (If Applicable)   Lab Results   Component Value Date    PREGTESTUR Negative 03/16/2016      ABGs   Lab Results   Component Value Date    PHART 7.451 09/19/2020    PO2ART 116.0 09/19/2020    WJA5LHZ 44.4 09/19/2020    SFC4RVR 30.9 09/19/2020    BEART 6.3 09/19/2020    D3UOJGNY 99.8 09/19/2020      Type & Screen (If Applicable)  No results found for: LABABO, LABRH                         BMI: Wt Readings from Last 3 Encounters:       NPO Status:   Date of last liquid consumption: (N/A, PEG ([address]    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)    Acute encephalopathy    Pneumonia due to organism    Bacteremia    Pleural effusion, bilateral    Elevated lactic acid level    Bacteremia due to methicillin susceptible Staphylococcus aureus (MSSA)    Pneumonia due to methicillin susceptible Staphylococcus aureus (MSSA) (HCC)    Anemia    Acute renal failure (HCC)    Bilateral pleural effusion    Mitral valve insufficiency    Tricuspid valve insufficiency    Class 2 obesity due to excess calories with body mass index (BMI) of 39.0 to 39.9 in adult    Cardiogenic shock (HCC)    Staphylococcal pneumonia (HCC)    Acute on chronic systolic heart failure (HCC)    Paroxysmal atrial fibrillation ([address]    Essential hypertension    Complication of gastrostomy tube ([address]    PEG tube malfunction ([address]     Past Medical History:   Diagnosis Date    Anticoagulant long-term use     Arthritis     Asthma     Baker's cyst     Depression     Factor V Leiden ([address]     Factor V Leiden ([address]     H/O blood clots     Heartburn     Hyperlipidemia     MRSA bacteremia 09/10/2020    Osteoarthritis      Past Surgical History:   Procedure Laterality Date    GASTROSTOMY TUBE PLACEMENT N/A 2020    ESOPHAGOGASTRODUODENOSCOPY PERCUTANEOUS ENDOSCOPIC GASTROSTOMY TUBE PLACEMENT performed by Daniella Young MD at [facility]  IR TUNNELED CATHETER PLACEMENT GREATER THAN 5 YEARS  2020    IR TUNNELED CATHETER PLACEMENT GREATER THAN 5 YEARS 2020 [facility]    TUNNELED VENOUS CATHETER PLACEMENT Right 2020    Permacath ; RIJ; 19cm; Dr. Margo Walsh     Social History     Tobacco Use    Smoking status: Former Smoker     Packs/day: 0.50     Types: Cigarettes     Last attempt to quit: 2020     Years since quittin.0    Smokeless tobacco: Never Used   Substance Use Topics    Alcohol use: No     Alcohol/week: 0.0 standard drinks    Drug use:  Yes Frequency: 14.0 times per week     Types: Marijuana     Medications  Current Facility-Administered Medications on File Prior to Visit   Medication Dose Route Frequency Provider Last Rate Last Dose    ceFAZolin (ANCEF) 2 g in dextrose 5 % 100 mL IVPB  2 g Intravenous On Call to [address] [address] [place]        HYDROmorphone (DILAUDID) injection 0.25 mg  0.25 mg Intravenous Q5 Min PRN Zhanna Andrade MD        HYDROmorphone (DILAUDID) injection 0.5 mg  0.5 mg Intravenous Q5 Min PRN Zhanna Andrade MD        morphine (PF) injection 1 mg  1 mg Intravenous Q5 Min PRN Zhanna Andrade MD        morphine (PF) injection 2 mg  2 mg Intravenous Q5 Min PRN Zhanna Andrade MD        oxyCODONE-acetaminophen (PERCOCET) 5-325 MG per tablet 1 tablet  1 tablet Oral PRN Zhanna Andrade MD        Or    oxyCODONE-acetaminophen (PERCOCET) 5-325 MG per tablet 2 tablet  2 tablet Oral PRN Zhanna Andrade MD        [facility]ron [facility]) injection 4 mg  4 mg Intravenous Once PRN Zhanna Andrade MD        labetalol (NORMODYNE;TRANDATE) injection 5 mg  5 mg Intravenous Q10 Min PRN Zhanna Andrade MD        hydrALAZINE (APRESOLINE) injection 5 mg  5 mg Intravenous Q15 Min PRN Zhanna Andrade MD        meperidine (DEMEROL) injection 12.5 mg  12.5 mg Intravenous Q5 Min PRN Zhanna Andrade MD        sodium chloride flush 0.9 % injection 10 mL  10 mL Intravenous 2 times per day Lonny  Matheus, DO   10 mL at 10/08/20 2100    sodium chloride flush 0.9 % injection 10 mL  10 mL Intravenous PRN Lonny YEISON Jarvis, DO        acetaminophen (TYLENOL) tablet 650 mg  650 mg Oral Q6H PRN Lonny YEISON Jarvis DO        Or    acetaminophen (TYLENOL) suppository 650 mg  650 mg Rectal Q6H PRN Lonny YEISON Jarvis, DO        promethazine (PHENERGAN) tablet 12.5 mg  12.5 mg Oral Q6H PRN Lonny  DO Matheus        Or    ondansetron (ZOFRAN) injection 4 mg  4 mg Intravenous Q6H PRN Lonny Jarvis DO        albuterol sulfate  (90 Base) MCG/ACT inhaler 1 puff  1 puff Inhalation Q4H PRN Berry Cummins MD        amiodarone (CORDARONE) tablet 200 mg  200 mg Per NG tube BID Berry Cummins MD        atorvastatin (LIPITOR) tablet 10 mg  10 mg Oral Daily Berry Cummins MD        b complex-C-folic acid (NEPHROCAPS) capsule 1 mg  1 capsule Oral Daily Berry Cummins MD        carvedilol (COREG) tablet 12.5 mg  12.5 mg Oral BID WC Berry Cummins MD        lacosamide (VIMPAT) tablet 200 mg  200 mg Oral BID Berry Cummins MD        sevelamer (RENVELA) packet 0.8 g  0.8 g Per Holly Quezada MD        warfarin (COUMADIN) daily dosing (placeholder)   Other RX Placeholder Berry England MD        levetiracetam (KEPPRA) 1000 mg/100 mL IVPB  1,000 mg Intravenous Q12H Berry England MD   Stopped at 10/09/20 0253    LORazepam (ATIVAN) injection 0.5 mg  0.5 mg Intravenous Q8H Berry Cummins MD   0.5 mg at 10/09/20 5343     Current Outpatient Medications on File Prior to Visit   Medication Sig Dispense Refill    diazePAM (VALIUM) 5 MG tablet Take 1 tablet by mouth every 12 hours for 10 days. 20 tablet 0    warfarin (COUMADIN) 7.5 MG tablet Take 7.5 mg daily except 3.75 mg on Mondays and Thursdays. INR twice weekly with dialysis Tuesdays and Thursdays. Call MD at [facility] with results and for dosing instructions. 30 tablet 3    amiodarone (CORDARONE) 200 MG tablet 1 tablet by Per NG tube route 2 times daily 30 tablet 3    lacosamide (VIMPAT) 200 MG tablet Take 1 tablet by mouth 2 times daily for 30 days.  60 tablet 0    levETIRAcetam (KEPPRA) 100 MG/ML solution Take 10 mLs by mouth 2 times daily 300 mL 3    carvedilol (COREG) 12.5 MG tablet Take 1 tablet by mouth 2 times daily (with meals) 60 tablet 3    calcitonin (MIACALCIN) 200 UNIT/ACT nasal spray 1 spray by Nasal route daily 1 Bottle 3    sevelamer (RENVELA) 2.4 g PACK packet 0.8 g by Per G Tube route Daily with supper 90 each 1    b complex-C-folic acid (NEPHROCAPS) 1 MG capsule Take 1 capsule by mouth daily 30 capsule 3    albuterol sulfate  (90 Base) MCG/ACT inhaler INHALE TWO PUFFS BY MOUTH EVERY 6 HOURS AS NEEDED FOR WHEEZING 18 g 0    vitamin D (ERGOCALCIFEROL) 1.25 MG (91970 UT) CAPS capsule TAKE ONE CAPSULE BY MOUTH ONCE WEEKLY 12 capsule 0    omeprazole (PRILOSEC) 20 MG delayed release capsule TAKE ONE CAPSULE BY MOUTH DAILY 90 capsule 6    atorvastatin (LIPITOR) 10 MG tablet TAKE ONE TABLET BY MOUTH DAILY 90 tablet 3    DULoxetine (CYMBALTA) 60 MG extended release capsule TAKE ONE CAPSULE BY MOUTH DAILY 30 capsule 11    BREO ELLIPTA 200-25 MCG/INH AEPB INHALE ONE DOSE BY MOUTH DAILY 1 each 11     No current facility-administered medications for this visit. No current outpatient medications on file.      Facility-Administered Medications Ordered in Other Visits   Medication Dose Route Frequency Provider Last Rate Last Dose    ceFAZolin (ANCEF) 2 g in dextrose 5 % 100 mL IVPB  2 g Intravenous On Call to [address] [place], [place]        HYDROmorphone (DILAUDID) injection 0.25 mg  0.25 mg Intravenous Q5 Min PRN Radha Adan MD        HYDROmorphone (DILAUDID) injection 0.5 mg  0.5 mg Intravenous Q5 Min PRN Radha Adan MD        morphine (PF) injection 1 mg  1 mg Intravenous Q5 Min PRPATRICE Adan MD        morphine (PF) injection 2 mg  2 mg Intravenous Q5 Min PRN Radha Adan MD        oxyCODONE-acetaminophen (PERCOCET) 5-325 MG per tablet 1 tablet  1 tablet Oral PRN Radha Adan MD        Or    oxyCODONE-acetaminophen (PERCOCET) 5-325 MG per tablet 2 tablet  2 tablet Oral PRN Radha Adan MD        [facility]et[facility]) injection 4 mg  4 mg Intravenous Once PRN Radha Adan MD        labetalol (NORMODYNE;TRANDATE) injection 5 mg  5 mg Intravenous Q10 Min PRN Radha Adan MD       [facility] hydrALAZINE (APRESOLINE) injection 5 mg  5 mg Intravenous Q15 Min PRN Love Kwon MD        meperidine (DEMEROL) injection 12.5 mg  12.5 mg Intravenous Q5 Min PRN Love Kwon MD        sodium chloride flush 0.9 % injection 10 mL  10 mL Intravenous 2 times per day [facility]   10 mL at 10/08/20 2100    sodium chloride flush 0.9 % injection 10 mL  10 mL Intravenous PRN Lonny [facility], DO        acetaminophen (TYLENOL) tablet 650 mg  650 mg Oral Q6H PRN Lonny [facility], DO        Or    acetaminophen (TYLENOL) suppository 650 mg  650 mg Rectal Q6H PRN Lonny [facility], DO        promethazine (PHENERGAN) tablet 12.5 mg  12.5 mg Oral Q6H PRN Lonny [facility], DO        Or    ondansetron (ZOFRAN) injection 4 mg  4 mg Intravenous Q6H PRN Lonny [facility], DO        albuterol sulfate  (90 Base) MCG/ACT inhaler 1 puff  1 puff Inhalation Q4H PRN Berry Cummins MD        amiodarone (CORDARONE) tablet 200 mg  200 mg Per NG tube BID Berry Cummins MD        atorvastatin (LIPITOR) tablet 10 mg  10 mg Oral Daily Berry Cummins MD        b complex-C-folic acid (NEPHROCAPS) capsule 1 mg  1 capsule Oral Daily Berry Cummins MD        carvedilol (COREG) tablet 12.5 mg  12.5 mg Oral BID  Berry Cummisn MD        lacosamide (VIMPAT) tablet 200 mg  200 mg Oral BID Berry Cummins MD        sevelamer (RENVELA) packet 0.8 g  0.8 g Per Raymondhoward Brown MD        warfarin (COUMADIN) daily dosing (placeholder)   Other RX Placeholder Berry Pak MD        levetiracetam (KEPPRA) 1000 mg/100 mL IVPB  1,000 mg Intravenous Q12H Berry Pak MD   Stopped at 10/09/20 0253    LORazepam (ATIVAN) injection 0.5 mg  0.5 mg Intravenous Q8H Berry Cummins MD   0.5 mg at 10/09/20 2761     Vital Signs (Current)   There were no vitals filed for this visit.                                        BP Readings from Last 3 Encounters:   10/09/20 (!) 150/77   10/04/20 139/71   20 (!) 111/56     Vital Signs Statistics (for past 48 hrs)     Temp  Av.4 °F (36.9 °C)  Min: 97.7 °F (36.5 °C)   Min taken time: 10/08/20 0138  Max: 99.1 °F (37.3 °C)   Max taken time: 10/08/20 1959  Pulse  Av.4  Min: 80   Min taken time: 10/08/20 1547  Max: 101   Max taken time: 10/08/20 0827  Resp  Av.2  Min: 12   Min taken time: 10/09/20 1049  Max: 18   Max taken time: 10/08/20 0456  BP  Min: 107/52   Min taken time: 10/08/20 0331  Max: 150/77   Max taken time: 10/09/20 1049  MAP (mmHg)  Av.2  Min: 77   Min taken time: 10/08/20 033  Max: 97   Max taken time: 10/08/20 08  SpO2  Av.9 %  Min: 92 %   Min taken time: 10/08/20 0231  Max: 100 %   Max taken time: 10/08/20 1547  BP Readings from Last 3 Encounters:   10/09/20 (!) 150/77   10/04/20 139/20 (!) 111/56       BMI  There is no height or weight on file to calculate BMI. Estimated body mass index is 29.38 kg/m² as calculated from the following:    Height as of 10/8/20: 5' 7\" (1.702 m). Weight as of an earlier encounter on 10/9/20: 187 lb 9.8 oz (85.1 kg).     CBC   Lab Results   Component Value Date    WBC 7.9 10/08/2020    RBC 3.03 10/08/2020    HGB 8.2 10/08/2020    HCT 26.9 10/08/2020    MCV 88.7 10/08/2020    RDW 20.7 10/08/2020     10/08/2020     CMP    Lab Results   Component Value Date     10/08/2020    K 4.3 10/08/2020     10/08/2020    CO2 20 10/08/2020    BUN 10 10/08/2020    CREATININE 1.8 10/08/2020    GFRAA 36 10/08/2020    GFRAA >60 2012    AGRATIO 1.1 2020    LABGLOM 30 10/08/2020    GLUCOSE 93 10/08/2020    PROT 6.6 10/04/2020    PROT 7.0 10/19/2011    CALCIUM 8.7 10/08/2020    BILITOT <0.2 10/04/2020    ALKPHOS 131 10/04/2020    AST 32 10/04/2020    ALT 23 10/04/2020     BMP    Lab Results   Component Value Date     10/08/2020    K 4.3 10/08/2020     10/08/2020    CO2 20 10/08/2020    BUN 10 10/08/2020    CREATININE 1.8 10/08/2020 CALCIUM 8.7 10/08/2020    GFRAA 36 10/08/2020    GFRAA >60 07/25/2012    LABGLOM 30 10/08/2020    GLUCOSE 93 10/08/2020     POCGlucose  Recent Labs     10/08/20  0215   GLUCOSE 93      Coags    Lab Results   Component Value Date    PROTIME 13.0 10/09/2020    PROTIME 67.9 01/29/2016    INR 1.12 10/09/2020    APTT 61.1 05/18/5510     HCG (If Applicable)   Lab Results   Component Value Date    PREGTESTUR Negative 03/16/2016      ABGs   Lab Results   Component Value Date    PHART 7.451 09/19/2020    PO2ART 116.0 09/19/2020    FLQ3GNF 44.4 09/19/2020    QLD9RRE 30.9 09/19/2020    BEART 6.3 09/19/2020    [id number] 99.8 09/19/2020      Type & Screen (If Applicable)  No results found for: LABABO, LABRH                         BMI: Wt Readings from Last 3 Encounters:       NPO Status:                          Anesthesia Evaluation  Patient summary reviewed  Airway: Mallampati: II  TM distance: >3 FB   Neck ROM: full  Mouth opening: > = 3 FB Dental:    (+) edentulous      Pulmonary:   (+) pneumonia:  asthma:     (-) COPD and shortness of breath                           Cardiovascular:    (+) hypertension:, CHF:,     (-) valvular problems/murmurs, past MI, CAD, CABG/stent, dysrhythmias and  angina                Neuro/Psych:   (+) psychiatric history:   (-) seizures, TIA and CVA           GI/Hepatic/Renal:        (-) GERD, PUD, liver disease and no renal disease       Endo/Other:        (-) diabetes mellitus               Abdominal:           Vascular: negative vascular ROS. Anesthesia Plan      MAC     ASA 3     (I discussed intravenous sedation to the patient's satisfaction including risks and alternatives. The patient agreed with the plan and has no further questions. ABBIE OWENS )  Induction: intravenous. Anesthetic plan and risks discussed with patient. Plan discussed with CRNA.                   This pre-anesthesia assessment may be used as a history and physical.    DOS STAFF ADDENDUM:    Pt seen and examined, chart reviewed (including anesthesia, drug and allergy history). No interval changes to history and physical examination. Anesthetic plan, risks, benefits, alternatives, and personnel involved discussed with patient. Patient verbalized an understanding and agrees to proceed. Johnson Bautista MD  October 9, 2020  12:45 PM                     Anesthesia Plan      MAC     ASA 3     (I discussed intravenous sedation to the patient's satisfaction including risks and alternatives. The patient agreed with the plan and has no further questions. ABBIE OWENS )  Induction: intravenous. Anesthetic plan and risks discussed with patient. Plan discussed with CRNA. This pre-anesthesia assessment may be used as a history and physical.    DOS STAFF ADDENDUM:    Pt seen and examined, chart reviewed (including anesthesia, drug and allergy history). No interval changes to history and physical examination. Anesthetic plan, risks, benefits, alternatives, and personnel involved discussed with patient. Patient verbalized an understanding and agrees to proceed.       Johnson Bautista MD  October 9, 2020  12:43 PM

## 2020-10-09 NOTE — PLAN OF CARE
Problem: Skin Integrity:  Goal: Will show no infection signs and symptoms  Description: Will show no infection signs and symptoms  10/9/2020 0738 by Myrna Morgan RN  Outcome: Ongoing  Note: Will show no signs/symptoms infection. Electronically signed by Myrna Morgan RN on 10/9/2020 at 7:38 AM    10/9/2020 0058 by Delaney Mosher RN  Outcome: Ongoing  Goal: Absence of new skin breakdown  Description: Absence of new skin breakdown  10/9/2020 0738 by Myrna Morgan RN  Outcome: Ongoing  Note: Absence of new skin breakdown. Electronically signed by Myrna Morgan RN on 10/9/2020 at 7:38 AM    10/9/2020 0058 by Delaney Mosher RN  Outcome: Ongoing     Problem: Pain:  Goal: Pain level will decrease  Description: Pain level will decrease  10/9/2020 0738 by Myrna Morgan RN  Outcome: Ongoing  Note: Pain level will decrease. Electronically signed by Myrna Morgan RN on 10/9/2020 at 7:38 AM    10/9/2020 0058 by Delaney Mosher RN  Outcome: Ongoing  Goal: Control of acute pain  Description: Control of acute pain  10/9/2020 0738 by Myrna Morgan RN  Outcome: Ongoing  Note: Control of acute pain. Electronically signed by Myrna Morgan RN on 10/9/2020 at 7:39 AM    10/9/2020 0058 by Delaney Mosher RN  Outcome: Ongoing  Goal: Control of chronic pain  Description: Control of chronic pain  10/9/2020 0738 by Myrna Morgan RN  Outcome: Ongoing  Note: Control of chronic pain. Electronically signed by Myrna Morgan RN on 10/9/2020 at 7:39 AM    10/9/2020 0058 by Delaney Mosher RN  Outcome: Ongoing     Problem: Physical Regulation:  Goal: Will remain free from infection  Description: Will remain free from infection  10/9/2020 0738 by Myrna Morgan RN  Outcome: Ongoing  Note: Will remain free from infection.   Electronically signed by Myrna Morgan RN on 10/9/2020 at 7:39 AM    10/9/2020 0058 by Delaney Mosher RN  Outcome: Ongoing     Problem: Respiratory:  Goal: Ability to maintain normal respiratory secretions will improve  Description: Ability to maintain normal respiratory secretions will improve  10/9/2020 0738 by Raleigh Torres RN  Outcome: Ongoing  Note: Ability to maintain normal respiratory secretion will improve. Electronically signed by Raleigh Torres RN on 10/9/2020 at 7:39 AM    10/9/2020 0058 by Vickie Ferreira RN  Outcome: Ongoing     Problem: Falls - Risk of:  Goal: Will remain free from falls  Description: Will remain free from falls  10/9/2020 0738 by Raleigh Torres RN  Outcome: Ongoing  Note: Will remain free from falls. Electronically signed by Raleigh Torres RN on 10/9/2020 at 7:39 AM    10/9/2020 0058 by Vickie Ferreira RN  Outcome: Ongoing  Goal: Absence of physical injury  Description: Absence of physical injury  10/9/2020 0738 by Raleigh Torres RN  Outcome: Ongoing  Note: Absence of physical injury. Electronically signed by Raleigh Torres RN on 10/9/2020 at 7:40 AM    10/9/2020 0058 by Vickie Ferreira RN  Outcome: Ongoing     Problem: OXYGENATION/RESPIRATORY FUNCTION  Goal: Patient will maintain patent airway  10/9/2020 0738 by Raleigh Torres RN  Outcome: Ongoing  Note: Patient will maintain patent airway. Electronically signed by Raleigh Torres RN on 10/9/2020 at 7:40 AM    10/9/2020 0058 by Vickie Ferreira RN  Outcome: Ongoing  Goal: Patient will achieve/maintain normal respiratory rate/effort  Description: Respiratory rate and effort will be within normal limits for the patient  10/9/2020 0738 by Raleigh Torres RN  Outcome: Ongoing  Note: Patient will achieve/maintain normal respiratory rate/effort. Electronically signed by Raleigh Torres RN on 10/9/2020 at 7:40 AM    10/9/2020 0058 by Vickie Ferreira RN  Outcome: Ongoing     Problem: HEMODYNAMIC STATUS  Goal: Patient has stable vital signs and fluid balance  10/9/2020 0738 by Raleigh Torres RN  Outcome: Ongoing  Note: Patient has stable vital signs and fluid balance.   Electronically signed by Robert Steele Roxie Nic on 10/9/2020 at 7:40 AM    10/9/2020 0058 by Giovana Ricks RN  Outcome: Ongoing     Problem: FLUID AND ELECTROLYTE IMBALANCE  Goal: Fluid and electrolyte balance are achieved/maintained  10/9/2020 0738 by Zoltan Jacobsen RN  Outcome: Ongoing  Note: Fluid and electrolyte balance are achieved/maintained. Electronically signed by Zoltan Jacobsen RN on 10/9/2020 at 7:41 AM    10/9/2020 0058 by Giovana Ricks RN  Outcome: Ongoing     Problem: ACTIVITY INTOLERANCE/IMPAIRED MOBILITY  Goal: Mobility/activity is maintained at optimum level for patient  10/9/2020 0738 by Zoltan Jacobsen RN  Outcome: Ongoing  Note: Mobility/activity is maintained at optimum level for patient. Electronically signed by Zoltan Jacobsen RN on 10/9/2020 at 7:41 AM    10/9/2020 0058 by Giovana Ricks RN  Outcome: Ongoing     Problem: Nutrition  Goal: Optimal nutrition therapy  10/9/2020 0738 by Zoltan Jacobsen RN  Outcome: Ongoing  Note: Optimal nutrition therapy.   Electronically signed by Zoltan Jacobsen RN on 10/9/2020 at 7:41 AM    10/9/2020 0058 by Giovana Ricks RN  Outcome: Ongoing

## 2020-10-09 NOTE — PROGRESS NOTES
Pt to PACU from ENDO. VSS. Bhanu Arroyo. Abdomen soft and round. Dressing intact- abdominal binder intact. Continue to monitor.

## 2020-10-09 NOTE — PLAN OF CARE
Problem: Skin Integrity:  Goal: Will show no infection signs and symptoms  Description: Will show no infection signs and symptoms  Outcome: Ongoing  Goal: Absence of new skin breakdown  Description: Absence of new skin breakdown  Outcome: Ongoing     Problem: Pain:  Goal: Pain level will decrease  Description: Pain level will decrease  Outcome: Ongoing  Goal: Control of acute pain  Description: Control of acute pain  Outcome: Ongoing  Goal: Control of chronic pain  Description: Control of chronic pain  Outcome: Ongoing     Problem: Physical Regulation:  Goal: Will remain free from infection  Description: Will remain free from infection  Outcome: Ongoing     Problem: Respiratory:  Goal: Ability to maintain normal respiratory secretions will improve  Description: Ability to maintain normal respiratory secretions will improve  Outcome: Ongoing     Problem: Falls - Risk of:  Goal: Will remain free from falls  Description: Will remain free from falls  Outcome: Ongoing  Goal: Absence of physical injury  Description: Absence of physical injury  Outcome: Ongoing     Problem: OXYGENATION/RESPIRATORY FUNCTION  Goal: Patient will maintain patent airway  Outcome: Ongoing  Goal: Patient will achieve/maintain normal respiratory rate/effort  Description: Respiratory rate and effort will be within normal limits for the patient  Outcome: Ongoing     Problem: HEMODYNAMIC STATUS  Goal: Patient has stable vital signs and fluid balance  Outcome: Ongoing     Problem: FLUID AND ELECTROLYTE IMBALANCE  Goal: Fluid and electrolyte balance are achieved/maintained  Outcome: Ongoing     Problem: ACTIVITY INTOLERANCE/IMPAIRED MOBILITY  Goal: Mobility/activity is maintained at optimum level for patient  Outcome: Ongoing     Problem: Nutrition  Goal: Optimal nutrition therapy  10/9/2020 0058 by Navi Napier RN  Outcome: Ongoing  10/8/2020 1442 by Lacey Kapadia  Outcome: Ongoing

## 2020-10-09 NOTE — OP NOTE
Endoscopy Note    Patient: Chuyita Moon   : 1970  Acct#:     Procedure: Esophagogastroduodenoscopy with percutaneous endoscopic gastrostomy tube placement. Date:  10/9/2020     Endoscopist: Elian Gilliam DO    Referring Physician:  No primary care provider on file. Preoperative Diagnosis:  Dislodged PEG tube    Postoperative Diagnosis:  Successful replacement of a 20 Fr Pull PEG. Anesthesia:  The patient was administered TIVA per anesthesiology team.  Please see their operative records for full details of medications administered. 2 grams IV Ancef    Consent:  The patient or their legal guardian has signed an informed consent, and is aware of the potential risks, benefits, alternatives, and potential complications of this procedure. These include, but are not limited to hemorrhage, bleeding, post procedural pain, perforation, phlebitis, aspiration, hypotension, hypoxia, cardiovascular events such as arryhthmia, and possibly death. Procedure: Informed consent was obtained from the patient and the patient's power of  after explanation of indications, benefits, possible risks and complications of the procedure. The patient was then taken to the endoscopy suite, placed in the supine position with the head of the bed elevated at 45 degrees, and the above IV anesthesia was administered. An Olympus video endoscope was place in the patient's mouth, and under direct visualization passed into the esophagus. Visualization of the esophagus demonstrated normal mucosa. Scope was then advanced into the stomach. Visualization of the gastric body and antrum demonstrated an ulcer at the site of prior gastrostomy tube. Otherwise normal mucosa. The pylorus was patent, and the scope was advance into the duodenum. Visualization of the duodenal bulb demonstrated normal mucosa.   The second portion of the duodenum was also normal.  The scope was then withdrawn back into the stomach. Transillumination and finger denting were accomplished successfully through the skin of the anterior abdominal wall in the left upper quadrant. This area was marked, prepped and draped in sterile fashion and infiltrated with 1% Lidocaine solution. A 1 cm transverse skin incision was made with #11 scalpel blade. A #14 gauge Angiocath was then passed through the skin incision puncturing the gastric lumen under endoscopic visualization. The metal trocar was removed and a guidewire was passed through the Angiocath into the gastric lumen where it was grasped with an electrocautery snare, passed through the scope. The scope snare and guidewire were then pulled back through the proximal stomach, esophagus and out through the patient's mouth, leaving the guidewire protruding from the mouth and protruding from the skin at the skin incision. A #20 Gambian percutaneous endoscopic gastrostomy tube was then attached to the guidewire at the mouth. The guidewire at the skin was grasped and pulled, pulling the gastrostomy tube and guidewire though the gastrostomy incision until the intragastric bumper was snug against the intragastric wall. A second external bumper was advance over the external portion of the gastrostomy tube and fixed at the 4.5 cm martinez at the skin. The gastrostomy incision was then dressed with iodoform ointment and a dry, sterile, gauze dressing. A Y connector was attached to the end of the gastrostomy tube. The patient tolerated the procedure well and was taken to the post anesthesia care unit in good condition. Estimated blood loss: <10ml    * No specimens in log *    Impression:    Successful replacement of a PEG tube    Recommendations:    1. Resume tube feeds in 6 hours if hemodynamically stable   2. Convert medications to liquid if possible and infuse through PEG. If no liquid alternative, then crush and mix in 20-30 cc of water and infuse through PEG.   3.  Keep head of bed elevated during tube feeds. 4.  Keep outer bumper at 4.5 cm.  5.  Place dressing over top of PEG. Do not place dressing between outer bumper and skin. 6.  Place abdominal binder to prevent patient tampering with the PEG. 7.  Flush PEG with 30mL of water tid and before and after each use. 8.  Please call with any problems with the [phone].        Dante Hickey, DO

## 2020-10-09 NOTE — PROGRESS NOTES
Unable to get midline per neph, picc team will come and try to get US guided peripheral.  Electronically signed by Erica Lai RN on 10/9/2020 at 6:08 PM

## 2020-10-09 NOTE — PROGRESS NOTES
IV pulled out, supervisor (Barbara) called for new site she is calling ICU for someone to come do an US guided IV d/t pt getting a heparin gtt when site obtained.   Electronically signed by Zoltan Jacobsen RN on 10/9/2020 at 3:30 PM

## 2020-10-09 NOTE — CARE COORDINATION
SW received phone call from Debi at [facility] who informed that their assistant director of nursing got involved and spoke with patient's mother. She stated that they will be transferring this patient to the 6th floor next to the ADON's office and mother is agreeable to her return. OSMAR informed that they started precert again today. Respectfully submitted,    EUGENIA Marx  [facility]   [phone]    Electronically signed by EUGENIA Van on 10/9/2020 at 11:13 AM

## 2020-10-09 NOTE — PROGRESS NOTES
Clinical Pharmacy Note  Warfarin Consult    Carrie Viramontes is a 53 y.o. female receiving warfarin managed by pharmacy. Patient being bridged with none. Warfarin Indication: FVL/ hx PE   Target INR range: 2-3  Dose prior to admission: 7.5 mg daily except 3.75 mg on Mon + Thurs     Current warfarin drug-drug interactions: Amiodarone    Recent Labs     10/08/20  0215 10/08/20  1332 10/09/20  0937   HGB 8.2*  --   --    HCT 26.9*  --   --    INR 1.99* 2.16* 1.12       Assessment/Plan:    Warfarin reversed with 5 mg IV Vitamin K yesterday for procedure today. Heparin drip starting at high dose (no bolus). Will await first aPTT to see if therapeutic prior to ordering warfarin to be dosed tonight. Thank you for the consult. Will continue to follow. Nhi Mejia, PharmD.   10/9/2020  2:04 PM

## 2020-10-09 NOTE — PROGRESS NOTES
Ultrasound guided IV was unsuccessful, Perfect sever sent to nightshift NP. \"Patient has a history off DIC and is here from an acute rehab facility because she pulled out her PEG tube, it was surgically replaced today. She is typically on coumadin but it was stopped for surgery so they ordered an heparin gtt. Her IV went back today, she ask nephrology for a midline and they said no because she is a dialysis patient, we just had the PICC team here to place an ultrasound guided IV and they were unable to get it. \"    Electronically signed by Florence English RN on 10/9/2020 at 7:58 PM

## 2020-10-09 NOTE — PROGRESS NOTES
Clinical Pharmacy Note  Heparin Dosing Consult    Waqas Valdivia is a 53 y.o. female ordered heparin per high dose nomogram by Dr. Alan George. Lab Results   Component Value Date    APTT 61.1 10/04/2020     Lab Results   Component Value Date    HGB 8.2 10/08/2020    HCT 26.9 10/08/2020     10/08/2020    INR 1.12 10/09/2020       Ht Readings from Last 1 Encounters:   10/08/20 5' 7\" (1.702 m)        Wt Readings from Last 1 Encounters:   10/09/20 187 lb 9.8 oz (85.1 kg)     Dosing weight: 71 kg    Assessment/Plan:  Initial bolus: no initial bolus   Initial infusion rate: 8 mL/hr (based on patient's response to Heparin earlier this admission)  Next aPTT: 2030 10/9    Pharmacy will continue to monitor adjust heparin based on aPTT results using nomogram below:     HIGH DOSE HEPARIN PROTOCOL (DVT/PE)     Initial Bolus: 80 units/kg Max Bolus: 10,000 units       Initial Rate: 18 units/kg/hr Max Initial Rate: 2,100 units/hr     aPTT < 36   Heparin 80 units/kg bolus Increase infusion by 4 units/kg/hr       (maximum 10,000 units)   aPTT 37-48   Heparin 40 units/kg bolus Increase infusion by 2 units/kg/hr       (maximum 5,000 units)   aPTT 49-76   No bolus   No change   aPTT 77-85   No bolus   Decrease infusion by 2 units/kg/hr   aPTT 86-94   Hold heparin for 1 hour Decrease infusion by 3 units/kg/hr   aPTT      Hold heparin for 1 hour Decrease infusion by 4 units/kg/hr   aPTT > 103   Hold heparin for 1 hour Decrease infusion by 6 units/kg/hr    Obtain aPTT 6 hours after initial bolus and 6 hours after any dose change until two consecutive therapeutic aPTTs are achieved - then daily.   Dewayne Allen RPh 10/9/2020 2:10 PM

## 2020-10-09 NOTE — PROGRESS NOTES
Called to assist w vascular access; attempted USG PIV x2 wo successful; after assessment, there are minimal veins on the R arm to attempt; Nephrology has denied clearance for Midline; Primary RN made aware, will notify attending Physician and await further orders for vascular access

## 2020-10-09 NOTE — PROGRESS NOTES
Patient has been resting in bed this shift, no c/o. A/O to self, place, and knows the year. Vital signs stable, tele monitor and continuous pulse ox on. G-tube incision site covered with 4x4, scant drainage at this time. Call light in reach, bed alarm on, will continue to monitor.

## 2020-10-09 NOTE — PROGRESS NOTES
PICC team called, voicemail left at this time, will retry.   Electronically signed by Javier Adair RN on 10/9/2020 at 5:23 PM

## 2020-10-10 LAB
ANION GAP SERPL CALCULATED.3IONS-SCNC: 14 MMOL/L (ref 3–16)
APTT: 36 SEC (ref 24.2–36.2)
APTT: 63.1 SEC (ref 24.2–36.2)
BUN BLDV-MCNC: 27 MG/DL (ref 7–20)
CALCIUM SERPL-MCNC: 9.4 MG/DL (ref 8.3–10.6)
CHLORIDE BLD-SCNC: 105 MMOL/L (ref 99–110)
CO2: 22 MMOL/L (ref 21–32)
CREAT SERPL-MCNC: 3.3 MG/DL (ref 0.6–1.1)
GFR AFRICAN AMERICAN: 18
GFR NON-AFRICAN AMERICAN: 15
GLUCOSE BLD-MCNC: 109 MG/DL (ref 70–99)
INR BLD: 1.08 (ref 0.86–1.14)
POTASSIUM SERPL-SCNC: 3.8 MMOL/L (ref 3.5–5.1)
PROTHROMBIN TIME: 12.5 SEC (ref 10–13.2)
SODIUM BLD-SCNC: 141 MMOL/L (ref 136–145)

## 2020-10-10 PROCEDURE — 85730 THROMBOPLASTIN TIME PARTIAL: CPT

## 2020-10-10 PROCEDURE — 94760 N-INVAS EAR/PLS OXIMETRY 1: CPT

## 2020-10-10 PROCEDURE — 85610 PROTHROMBIN TIME: CPT

## 2020-10-10 PROCEDURE — 80048 BASIC METABOLIC PNL TOTAL CA: CPT

## 2020-10-10 PROCEDURE — 5A1D70Z PERFORMANCE OF URINARY FILTRATION, INTERMITTENT, LESS THAN 6 HOURS PER DAY: ICD-10-PCS | Performed by: INTERNAL MEDICINE

## 2020-10-10 PROCEDURE — 1200000000 HC SEMI PRIVATE

## 2020-10-10 PROCEDURE — 6360000002 HC RX W HCPCS: Performed by: INTERNAL MEDICINE

## 2020-10-10 PROCEDURE — 90935 HEMODIALYSIS ONE EVALUATION: CPT

## 2020-10-10 PROCEDURE — 6370000000 HC RX 637 (ALT 250 FOR IP): Performed by: INTERNAL MEDICINE

## 2020-10-10 PROCEDURE — 36415 COLL VENOUS BLD VENIPUNCTURE: CPT

## 2020-10-10 RX ORDER — HEPARIN SODIUM 1000 [USP'U]/ML
2800 INJECTION, SOLUTION INTRAVENOUS; SUBCUTANEOUS ONCE
Status: COMPLETED | OUTPATIENT
Start: 2020-10-10 | End: 2020-10-10

## 2020-10-10 RX ORDER — WARFARIN SODIUM 7.5 MG/1
7.5 TABLET ORAL
Status: COMPLETED | OUTPATIENT
Start: 2020-10-10 | End: 2020-10-10

## 2020-10-10 RX ADMIN — ATORVASTATIN CALCIUM 10 MG: 10 TABLET, FILM COATED ORAL at 09:30

## 2020-10-10 RX ADMIN — HEPARIN SODIUM 2800 UNITS: 1000 INJECTION INTRAVENOUS; SUBCUTANEOUS at 07:16

## 2020-10-10 RX ADMIN — LEVETIRACETAM 1000 MG: 500 SOLUTION ORAL at 09:30

## 2020-10-10 RX ADMIN — LEVETIRACETAM 1000 MG: 500 SOLUTION ORAL at 20:29

## 2020-10-10 RX ADMIN — DIAZEPAM 5 MG: 5 TABLET ORAL at 20:29

## 2020-10-10 RX ADMIN — CARVEDILOL 12.5 MG: 12.5 TABLET, FILM COATED ORAL at 09:34

## 2020-10-10 RX ADMIN — AMIODARONE HYDROCHLORIDE 200 MG: 200 TABLET ORAL at 09:30

## 2020-10-10 RX ADMIN — ACETAMINOPHEN 650 MG: 325 TABLET ORAL at 09:30

## 2020-10-10 RX ADMIN — DIAZEPAM 5 MG: 5 TABLET ORAL at 09:30

## 2020-10-10 RX ADMIN — ACETAMINOPHEN 650 MG: 325 TABLET ORAL at 01:01

## 2020-10-10 RX ADMIN — AMIODARONE HYDROCHLORIDE 200 MG: 200 TABLET ORAL at 20:28

## 2020-10-10 RX ADMIN — LACOSAMIDE 200 MG: 100 TABLET, FILM COATED ORAL at 20:29

## 2020-10-10 RX ADMIN — PROMETHAZINE HYDROCHLORIDE 12.5 MG: 25 TABLET ORAL at 01:01

## 2020-10-10 RX ADMIN — WARFARIN SODIUM 7.5 MG: 7.5 TABLET ORAL at 20:29

## 2020-10-10 RX ADMIN — SEVELAMER CARBONATE 0.8 G: 2.4 POWDER, FOR SUSPENSION ORAL at 09:30

## 2020-10-10 RX ADMIN — LACOSAMIDE 200 MG: 100 TABLET, FILM COATED ORAL at 09:29

## 2020-10-10 ASSESSMENT — PAIN DESCRIPTION - ORIENTATION
ORIENTATION: MID
ORIENTATION: MID

## 2020-10-10 ASSESSMENT — PAIN DESCRIPTION - PAIN TYPE
TYPE: SURGICAL PAIN
TYPE: SURGICAL PAIN

## 2020-10-10 ASSESSMENT — PAIN DESCRIPTION - FREQUENCY
FREQUENCY: CONTINUOUS
FREQUENCY: CONTINUOUS

## 2020-10-10 ASSESSMENT — PAIN SCALES - GENERAL
PAINLEVEL_OUTOF10: 3
PAINLEVEL_OUTOF10: 0
PAINLEVEL_OUTOF10: 0
PAINLEVEL_OUTOF10: 3
PAINLEVEL_OUTOF10: 0
PAINLEVEL_OUTOF10: 0

## 2020-10-10 ASSESSMENT — PAIN DESCRIPTION - ONSET
ONSET: ON-GOING
ONSET: ON-GOING

## 2020-10-10 ASSESSMENT — PAIN SCALES - WONG BAKER: WONGBAKER_NUMERICALRESPONSE: 0

## 2020-10-10 ASSESSMENT — PAIN DESCRIPTION - DESCRIPTORS
DESCRIPTORS: PATIENT UNABLE TO DESCRIBE
DESCRIPTORS: PATIENT UNABLE TO DESCRIBE

## 2020-10-10 ASSESSMENT — PAIN DESCRIPTION - LOCATION
LOCATION: ABDOMEN
LOCATION: ABDOMEN

## 2020-10-10 ASSESSMENT — PAIN - FUNCTIONAL ASSESSMENT
PAIN_FUNCTIONAL_ASSESSMENT: PREVENTS OR INTERFERES SOME ACTIVE ACTIVITIES AND ADLS

## 2020-10-10 ASSESSMENT — PAIN DESCRIPTION - PROGRESSION
CLINICAL_PROGRESSION: NOT CHANGED
CLINICAL_PROGRESSION: NOT CHANGED

## 2020-10-10 NOTE — PROGRESS NOTES
Clinical Pharmacy Note  Heparin Dosing       Lab Results   Component Value Date    APTT 36.0 10/10/2020     Lab Results   Component Value Date    HGB 8.4 10/09/2020    HCT 26.5 10/09/2020     10/09/2020    INR 1.08 10/10/2020       Current Infusion Rate: 8 mL/hr    Plan:  Bolus: 2800 units  Rate: increase to 9.4 mL/hr  Next aPTT: 1400 10/10/20    Pharmacy will continue to monitor and adjust based on aPTT results.   Michelle Taylor, PharmD

## 2020-10-10 NOTE — PROGRESS NOTES
Checking on patient Q2H for nutrition needs, hygiene needs, comfort measures, mobility, fall risk interventions, and safe environment. All precautions and interventions in place. Educated patient on use of call light and telephone. Patient verbalizes understanding. Call light/telephone in reach.   Electronically signed by Rajesh Cummings RN on 10/10/2020 at 7:23 AM

## 2020-10-10 NOTE — PROGRESS NOTES
Hospitalist Progress Note      PCP: No primary care provider on file. Date of Admission: 10/8/2020        Subjective: comfortable. Medications:  Reviewed    Infusion Medications    heparin (Porcine) 9.4 mL/hr (10/10/20 0717)     Scheduled Medications    carvedilol  12.5 mg Oral BID WC    levETIRAcetam  1,000 mg Oral BID    diazePAM  5 mg Oral Q12H    sevelamer  0.8 g Per G Tube Daily    sodium chloride flush  10 mL Intravenous 2 times per day    amiodarone  200 mg Per NG tube BID    atorvastatin  10 mg Oral Daily    b complex-C-folic acid  1 capsule Oral Daily    lacosamide  200 mg Oral BID    warfarin (COUMADIN) daily dosing (placeholder)   Other RX Placeholder     PRN Meds: sodium chloride flush, acetaminophen **OR** acetaminophen, promethazine **OR** ondansetron, albuterol sulfate HFA      Intake/Output Summary (Last 24 hours) at 10/10/2020 0954  Last data filed at 10/10/2020 0123  Gross per 24 hour   Intake 150 ml   Output --   Net 150 ml       Physical Exam Performed:    BP (!) 155/76   Pulse 86   Temp 97.7 °F (36.5 °C) (Oral)   Resp 16   Ht 5' 7\" (1.702 m)   Wt 188 lb 7.9 oz (85.5 kg)   LMP 12/05/2018 (Exact Date)   SpO2 99%   BMI 29.52 kg/m²     General appearance: No apparent distress  Neck: Supple  Respiratory:  Normal respiratory effort. Clear to auscultation, bilaterally without Rales/Wheezes/Rhonchi. Cardiovascular: Regular rate and rhythm with normal S1/S2 without murmurs, rubs or gallops. Abdomen: Soft, non-tender  Musculoskeletal: No clubbing  Skin: Skin color, texture, turgor normal.  No rashes or lesions.   Neurologic:  Moving extremities   Psychiatric: not coherent   Capillary Refill: Brisk,< 3 seconds   Peripheral Pulses: +2 palpable, equal bilaterally       Labs:   Recent Labs     10/08/20  0215 10/09/20  1410   WBC 7.9 7.9   HGB 8.2* 8.4*   HCT 26.9* 26.5*    310     Recent Labs     10/08/20  0215   *   K 4.3      CO2 20*   BUN 10 CREATININE 1.8*   CALCIUM 8.7     No results for input(s): AST, ALT, BILIDIR, BILITOT, ALKPHOS in the last 72 hours. Recent Labs     10/08/20  1332 10/09/20  0937 10/10/20  0448   INR 2.16* 1.12 1.08     No results for input(s): CKTOTAL, TROPONINI in the last 72 hours. Urinalysis:      Lab Results   Component Value Date    NITRU Negative 09/10/2020    WBCUA 3-5 09/10/2020    BACTERIA 2+ 09/10/2020    RBCUA 21-50 09/10/2020    BLOODU MODERATE 09/10/2020    SPECGRAV >1.030 09/10/2020    GLUCOSEU Negative 09/10/2020       Radiology:  No orders to display           Assessment/Plan:    Active Hospital Problems    Diagnosis    Complication of gastrostomy tube ([facility] [address] [K94.20]    PEG tube malfunction ([facility] [address] [K94.23]     1. PEG malfunction, s/p replacement, post op day 1. Restart medication enterally. 2. Encephalopathy, appeared after an event in september, cardiogenic shock at that time, appears at baseline now. 3. ESRD, HD  4.  H/O PE, on coumadin as outpatient, patient ESRD, started on heparin for bridging.          Diet: DIET TUBE FEED CONTINUOUS/CYCLIC NPO; Renal Formula; Gastrostomy; 20; 55  Code Status: Full Code      Ana Sanders MD

## 2020-10-10 NOTE — PROGRESS NOTES
Patient is c/o abd pain. Gave patient PRN pain medication and PRN nausea medication. Patient's residual is 55ml at this time. Will hold off on turning up tube feed. Will reevaluate.     Electronically signed by Drake Todd RN on 10/10/2020 at 1:34 AM

## 2020-10-10 NOTE — PLAN OF CARE
Problem: Skin Integrity:  Goal: Will show no infection signs and symptoms  Description: Will show no infection signs and symptoms  Outcome: Ongoing  Goal: Absence of new skin breakdown  Description: Absence of new skin breakdown  Outcome: Ongoing     Problem: Pain:  Goal: Pain level will decrease  Description: Pain level will decrease  Outcome: Ongoing  Goal: Control of acute pain  Description: Control of acute pain  Outcome: Ongoing  Goal: Control of chronic pain  Description: Control of chronic pain  Outcome: Ongoing     Problem: Physical Regulation:  Goal: Will remain free from infection  Description: Will remain free from infection  Outcome: Ongoing     Problem: Respiratory:  Goal: Ability to maintain normal respiratory secretions will improve  Description: Ability to maintain normal respiratory secretions will improve  Outcome: Ongoing     Problem: Falls - Risk of:  Goal: Will remain free from falls  Description: Will remain free from falls  Outcome: Ongoing  Goal: Absence of physical injury  Description: Absence of physical injury  Outcome: Ongoing     Problem: OXYGENATION/RESPIRATORY FUNCTION  Goal: Patient will maintain patent airway  Outcome: Ongoing  Goal: Patient will achieve/maintain normal respiratory rate/effort  Description: Respiratory rate and effort will be within normal limits for the patient  Outcome: Ongoing     Problem: HEMODYNAMIC STATUS  Goal: Patient has stable vital signs and fluid balance  Outcome: Ongoing     Problem: FLUID AND ELECTROLYTE IMBALANCE  Goal: Fluid and electrolyte balance are achieved/maintained  Outcome: Ongoing     Problem: ACTIVITY INTOLERANCE/IMPAIRED MOBILITY  Goal: Mobility/activity is maintained at optimum level for patient  Outcome: Ongoing     Problem: Nutrition  Goal: Optimal nutrition therapy  Outcome: Ongoing

## 2020-10-10 NOTE — CONSULTS
[facility] Inpatient Nephrology Consult Note    Reason for Consult:  MORELIA on HD  Requesting Physician:  Dr. Lázaro Clement    History of Present Ilness:    53 y.o. female who  has a past medical history of Anticoagulant long-term use, Arthritis, Asthma, Baker's cyst, Depression, Factor V Leiden ([address] Factor V Leiden ([address] H/O blood clots, Heartburn, Hyperlipidemia, MRSA bacteremia (09/10/2020), and Osteoarthritis. who presented for PEG tube dislodgement. Patient had DIC with cardiogenic shock in September [facility] malnutrition s/p PEG tube placement per GI. Patient presented to ED for PEG tube dislodgement and was replaced by GI. The patient had an MORELIA and is on HD MWF at [facility] DR ANJEL PATEL, still monitoring for recovery. We are consulted for dialysis dependent MORELIA. Labs were stable, otherwise no reported fever/chills/N/V/D, no issues with HD.           Past Medical History:   Diagnosis Date    Anticoagulant long-term use     Arthritis     Asthma     Baker's cyst     Depression     Factor V Leiden ([address]     Factor V Leiden ([address]     H/O blood clots     Heartburn     Hyperlipidemia     MRSA bacteremia 09/10/2020    Osteoarthritis              Past Surgical History:   Procedure Laterality Date    GASTROSTOMY TUBE PLACEMENT N/A 9/25/2020    ESOPHAGOGASTRODUODENOSCOPY PERCUTANEOUS ENDOSCOPIC GASTROSTOMY TUBE PLACEMENT performed by Jewels Danielle [facility] N/A 10/9/2020    ESOPHAGOGASTRODUODENOSCOPY PERCUTANEOUS ENDOCOPIC GASTROSTOMY TUBE PLACEMENT performed by Ludy Officer., DO at [address] IR TUNNELED CATHETER PLACEMENT GREATER THAN 5 YEARS  9/29/2020    IR TUNNELED CATHETER PLACEMENT GREATER THAN 5 YEARS 9/29/2020 [facility] SPECIAL PROCEDURES    TUNNELED VENOUS CATHETER PLACEMENT Right 09/29/2020    Permacath ; RIJ; 19cm; Dr. Vanessa Matthew        Allergies:  Ibuprofen      Social History     Occupational History    Not on file   Tobacco Use    Smoking status: Former Smoker     Packs/day: 0.50     Types: Cigarettes     Last attempt to quit: 2020     Years since quittin.0    Smokeless tobacco: Never Used   Substance and Sexual Activity    Alcohol use: No     Alcohol/week: 0.0 standard drinks    Drug use: Yes     Frequency: 14.0 times per week     Types: Marijuana    Sexual activity: Not on file          Family History   Problem Relation Age of Onset    Heart Attack Father        Review of Systems: limited due to patient confusion, she denies chest pain, does have some nausea per her.      Physical exam:     Vitals:    10/10/20 0937   BP:    Pulse:    Resp:    Temp:    SpO2: 99%    Temp (24hrs), Av.7 °F (36.5 °C), Min:97.2 °F (36.2 °C), Max:98.1 °F (36.7 °C)   & BP  Min: 89/56  Max: 155/76 Pulse  Av.4  Min: 78  Max: 91    24HR INTAKE/OUTPUT:      Intake/Output Summary (Last 24 hours) at 10/10/2020 1344  Last data filed at 10/10/2020 0123  Gross per 24 hour   Intake 50 ml   Output --   Net 50 ml         Gen: Resting in bed, in no acute distress  Head: NC , AT  Neck: supple, trachea in midline, no overt thyromegaly noted  CV: S1, S2 heard ,no peripheral edema, no JVD    Lungs: no increased WOB, no abdominal breathing pattern, +b/l air entry, no crackles  Abd: Soft, not tender , not distended , no guarding   Neuro: alert and awake, moves extremities spontaneously  Skin: no rash/lesions on exposed skin of head and neck     Database  Lab Results   Component Value Date    WBC 7.9 10/09/2020    HGB 8.4 (L) 10/09/2020    HCT 26.5 (L) 10/09/2020    MCV 85.4 10/09/2020     10/09/2020           No results found for: IRON, TIBC, FERRITIN    Lab Results   Component Value Date     10/10/2020    K 3.8 10/10/2020    K 4.3 10/08/2020     10/10/2020    CO2 22 10/10/2020    BUN 27 10/10/2020    CREATININE 3.3 10/10/2020    GLUCOSE 109 10/10/2020    CALCIUM 9.4 10/10/2020        Lab Results   Component Value Date PTH <1.2 (L) 10/02/2020    CALCIUM 9.4 10/10/2020    CAION 1.11 (L) 09/15/2020    PHOS 4.8 10/04/2020       Lab Results   Component Value Date    LABALBU 3.2 (L) 10/04/2020       Labs Renal Latest Ref Rng & Units 10/10/2020 10/8/2020 10/4/2020 10/3/2020 10/2/2020   BUN 7 - 20 mg/dL 27(H) 10 31(H) 19 40(H)   Cr 0.6 - 1.1 mg/dL 3. 3(H) 1.8(H) 3. 0(H) 2. 2(H) 3. 2(H)   K 3.5 - 5.1 mmol/L 3.8 4.3 4.0 4.0 4.1   Na 136 - 145 mmol/L 141 133(L) 135(L) 130(L) 137       Lab Results   Component Value Date    MG 2.10 10/04/2020       UA   Lab Results   Component Value Date    SPECGRAV >1.030 09/10/2020    BLOODU MODERATE 09/10/2020    LEUKOCYTESUR Negative 09/10/2020    WBCUA 3-5 09/10/2020    RBCUA 21-50 09/10/2020       RADIOLOGY     Lab Results   Component Value Date    LVEF 33 09/11/2020       Lab Results   Component Value Date    CREATININE 3.3 (H) 10/10/2020    CREATININE 1.8 (H) 10/08/2020    CREATININE 3.0 (H) 10/04/2020    CREATININE 2.2 (H) 10/03/2020    CREATININE 3.2 (H) 10/02/2020       Wt Readings from Last 3 Encounters:   10/10/20 188 lb 7.9 oz (85.5 kg)   10/04/20 188 lb 0.8 oz (85.3 kg)   12/17/18 229 lb (103.9 kg)       BP Readings from Last 3 Encounters:   10/10/20 (!) 155/76   10/09/20 (!) 141/69   10/04/20 139/71           Lab Results   Component Value Date    LABALBU 3.2 (L) 10/04/2020         Scheduled Meds:   carvedilol  12.5 mg Oral BID WC    levETIRAcetam  1,000 mg Oral BID    diazePAM  5 mg Oral Q12H    sevelamer  0.8 g Per G Tube Daily    sodium chloride flush  10 mL Intravenous 2 times per day    amiodarone  200 mg Per NG tube BID    atorvastatin  10 mg Oral Daily    b complex-C-folic acid  1 capsule Oral Daily    lacosamide  200 mg Oral BID    warfarin (COUMADIN) daily dosing (placeholder)   Other RX Placeholder     Continuous Infusions:   heparin (Porcine) 9.4 mL/hr (10/10/20 0717)     PRN Meds:.sodium chloride flush, acetaminophen **OR** acetaminophen, promethazine **OR** ondansetron, albuterol sulfate HFA        Assessment & Plan:    - Dialysis dependent MORELIA  - likely secondary to ischemia ATN. She had additional exposure to radiocontrast              - ZENON is neg, ANCA neg, C3 is low at 54  - Initiated on IHD on 9/11/20. Transitioned to CRRT on 9/12/20 which was stopped 9/15/20. Restarted HD 09/17/20  - outpatient dialysis at [facility] MWF @ 5 PM  - [facility] placed by IR 09/29/20     PLAN:  HD today given she missed yesterday  Monitoring for possible recovery still       - hx of PEA arrest 9/11/20    - Essential HTN: slightly above goal      - encephalopathy: since last admission, confused today     - CHF: systolic,  last Echo shows 35%, compensated at this point    - Hx of Factor V leiden deficiency with PE/coagulopathy/DIC:   On coumadin    - hyperphosphatemia : on sevelamer    - anemia :  CHAVEZ protocol       Thank you very much for asking us to participate in your patient's care. Do call me if you have any questions regarding the plan of care as outlined.     [place] Chaney

## 2020-10-10 NOTE — PROGRESS NOTES
Gastroenterology Progress Note    Ranjana Garcia is a 53 y.o. female patient. Hospitalization Day:2    SUBJECTIVE:  POD#1 s/p PEG replacement. Patient has had no acute events. She remains afebrile. She denies any severe abdominal pain. She is tolerating her tube feeds at goal.    ROS:  Gastrointestinal ROS: no abdominal pain, change in bowel habits, or black or bloody stools    Physical    VITALS:  /79   Pulse 83   Temp 97.8 °F (36.6 °C) (Axillary)   Resp 12   Ht 5' 7\" (1.702 m)   Wt 188 lb 7.9 oz (85.5 kg)   LMP 2018 (Exact Date)   SpO2 96%   BMI 29.52 kg/m²   TEMPERATURE:  Current - Temp: 97.8 °F (36.6 °C); Max - Temp  Av.7 °F (36.5 °C)  Min: 97.2 °F (36.2 °C)  Max: 98.2 °F (36.8 °C)    General:  Alert,  No apparent distress  Skin- without jaundice  Eyes: anicteric sclera  Cardiac: RRR, Nl s1s2, without murmurs  Lungs CTA Bilaterally, normal effort  Abdomen PEG site C/D/I. Prior PEG site with minimal erythema and no purulence. soft, ND, NT, no HSM, Bowel sounds normal  Ext: without edema  Neuro: no asterixis     Data    Data Review:    Recent Labs     10/08/20  0215 10/09/20  1410   WBC 7.9 7.9   HGB 8.2* 8.4*   HCT 26.9* 26.5*   MCV 88.7 85.4    310     Recent Labs     10/08/20  0215   *   K 4.3      CO2 20*   BUN 10   CREATININE 1.8*     No results for input(s): AST, ALT, ALB, BILIDIR, BILITOT, ALKPHOS in the last 72 hours. No results for input(s): LIPASE, AMYLASE in the last 72 hours. Recent Labs     10/08/20  1332 10/09/20  0937 10/10/20  0448   PROTIME 25.3* 13.0 12.5   INR 2.16* 1.12 1.08       Radiology Review:    No orders to display         ASSESSMENT:51 yo WF past medical history significant for factor V Leiden deficiency, atrial fibrillation, and prior pulmonary embolism, on Coumadin who had a prior PEG tube placed in 2020 for oropharyngeal dysphagia secondary to CVA, who had presented from [facility] with dislodged PEG tube.      1)Dislodged PEG tube- POD#1 s/p replacement. Doing well. Bumper loosened 0.5cm. Tolerating tube feedings  2) Factor V Leiden mutation with prior PE-  On heparin gtt. Bridging on to coumadin. PLAN :  1) OK to resume coumadin. Likely will need Lovenox bridging or heparin bridging  2) Abdominal binder since patient is a pull risk  3) Convert medications to liquid if possible and infuse through PEG. If no liquid alternative, then crush and mix in 20-30 cc of water and infuse through PEG. 4)  Keep head of bed elevated during tube feeds. 5)  Keep outer bumper at 5 cm. 6)  Place dressing over top of PEG. Do not place dressing between outer bumper and skin. 7)  Flush PEG with 30mL of water tid and before and after each use. 8)  Will sign off. Please call with any problems with the [phone]. Thank you for allowing me to participate in the care of your patient. Please feel free to contact me with any concerns.   [address] [place] [address] Shashi Stark, DO

## 2020-10-10 NOTE — PROGRESS NOTES
Clinical Pharmacy Note  Heparin Dosing       Lab Results   Component Value Date    APTT 63.1 10/10/2020     Lab Results   Component Value Date    HGB 8.4 10/09/2020    HCT 26.5 10/09/2020     10/09/2020    INR 1.08 10/10/2020       Current Infusion Rate: 9.4 mL/hr    Plan:  Bolus: none  Rate:continue 9.4 mL/hr  Next aPTT: 0200  10/11/20    Pharmacy will continue to monitor and adjust based on aPTT results.     [address] [facility], PRS 10/10/2020  7:58 PM

## 2020-10-10 NOTE — PROGRESS NOTES
Heparin rate changed to 9.4ml/her with another RN and tube feed restarted at 55ml/hr since dialysis is now planned for noon.     Electronically signed by Drake Todd RN on 10/10/2020 at 7:19 AM

## 2020-10-10 NOTE — FLOWSHEET NOTE
Treatment time: 3.5 hours   Net UF: 500 mls    Pre weight: 85.3 kg bed scale  Post weight: 84.6 kg  EDW: tbd    Access used: R TDC, New dressing applied CDI  Access function: good    Medications or blood products given: none     Regular outpatient schedule: [facility]    Summary of response to treatment: 500 mls net fluid removed. Pt tolerated tx poorly. Multiple complaints from pt little relief with saline flushes, position changes, primary nurse Jame administered meds, and swabbed pts mouths, pt X 1 episode of incontinence. DC tx early 30 min. Post VSS  Report to primary nurse Jame RN        10/10/20 1333 10/10/20 1638   Vital Signs   /62 (!) 115/56   Temp 98.7 °F (37.1 °C) 98 °F (36.7 °C)   Pulse 78 77   Weight 188 lb 0.8 oz (85.3 kg) 186 lb 8.2 oz (84.6 kg)   Weight Method Bed scale Bed scale   Treatment Initiation   Dialyze Hours 3.5  --    Treatment  Initiation Universal Precautions maintained;Lines secured to patient; Connections secured;Prime given;Venous Parameters set; Arterial Parameters set; Air foam detector engaged; Hemosafe Device; Dialysate;Saline line double clamped; Hemo-Safe Applied  --    Post-Hemodialysis Assessment   Hemodialysis Intake (ml)  --  700 ml   Hemodialysis Output (ml)  --  1238 ml   NET Removed (ml)  --  538 ml   Tolerated Treatment  --  Poor     Copy of dialysis treatment record placed in chart, to be scanned into EMR.

## 2020-10-10 NOTE — PROGRESS NOTES
Tube feed turned off per dialysis RN instructions.      Electronically signed by Ben Mack RN on 10/10/2020 at 6:50 AM

## 2020-10-10 NOTE — PLAN OF CARE
Problem: Skin Integrity:  Goal: Will show no infection signs and symptoms  Description: Will show no infection signs and symptoms  10/10/2020 0713 by Kalie Joyce RN  Outcome: Ongoing  Note: Patient is alert and oriented, afebrile, has manageable complaints of pain, skin is intact and appropriate for ethnicity in color    10/9/2020 2208 by Luz Campbell RN  Outcome: Ongoing  Goal: Absence of new skin breakdown  Description: Absence of new skin breakdown  10/10/2020 0713 by Kalie Joyce RN  Outcome: Ongoing  Note: Cash score assessed. Patient unable to ambulate and turn self. Repositioned patient Q2H and assessed skin. Educated patient on importance of repositioning to prevent skin issues. 10/9/2020 2208 by Luz Campbell RN  Outcome: Ongoing     Problem: Pain:  Goal: Pain level will decrease  Description: Pain level will decrease  10/10/2020 0713 by Kalie Joyce RN  Outcome: Ongoing  Note: Pain /discomfort being managed with PRN analgesics per MD orders. Patient able to express presence and absence of pain and rate pain appropriately using numerical scale.      10/9/2020 2208 by Luz Campbell RN  Outcome: Ongoing  Goal: Control of acute pain  Description: Control of acute pain  10/10/2020 0713 by Kalie Joyce RN  Outcome: Ongoing  10/9/2020 2208 by Luz Campbell RN  Outcome: Ongoing  Goal: Control of chronic pain  Description: Control of chronic pain  10/10/2020 0713 by Kalie Joyce RN  Outcome: Ongoing  10/9/2020 2208 by Luz Campbell RN  Outcome: Ongoing     Problem: Physical Regulation:  Goal: Will remain free from infection  Description: Will remain free from infection  10/10/2020 0713 by Kalie Joyce RN  Outcome: Ongoing  Note: Patient is alert and oriented, afebrile, has no complaints of pain, skin is intact and appropriate for ethnicity in color    10/9/2020 2208 by Luz Campbell RN  Outcome: Ongoing     Problem: Respiratory:  Goal: Ability to maintain normal respiratory secretions will improve  Description: Ability to maintain normal respiratory secretions will improve  10/10/2020 0713 by Alysa Diallo RN  Outcome: Ongoing  10/9/2020 2208 by Marta Harley RN  Outcome: Ongoing     Problem: Falls - Risk of:  Goal: Will remain free from falls  Description: Will remain free from falls  10/10/2020 0713 by Alysa Diallo RN  Outcome: Ongoing  Note: Fall risk assessment completed . Fall precautions in place, bed alarm on, side rails 2/4 up, call light in reach, educated pt on calling for assistance when needed, room clear of clutter. Pt verbalized understanding. 10/9/2020 2208 by Marta Harley RN  Outcome: Ongoing  Goal: Absence of physical injury  Description: Absence of physical injury  10/10/2020 0713 by Alysa Diallo RN  Outcome: Ongoing  10/9/2020 2208 by Marta Harley RN  Outcome: Ongoing     Problem: OXYGENATION/RESPIRATORY FUNCTION  Goal: Patient will maintain patent airway  10/10/2020 0713 by lAysa Diallo RN  Outcome: Ongoing  Note: Patient has maintained a patent airway, repositions self, lung sounds bilaterally diminished, cough noted and is infrequent and nonproductive, RT is involved in patient care and is providing care, patient has had adequate fluid intake and is on no fluid restriction, no need to suction airway at this time, educated patient to turn and cough and deep breathe every two hours and as needed, encouraged incentive spirometer and patient has been performing. Will continue to monitor and reassess.     10/9/2020 2208 by Marta Harley RN  Outcome: Ongoing  Goal: Patient will achieve/maintain normal respiratory rate/effort  Description: Respiratory rate and effort will be within normal limits for the patient  10/10/2020 8893 by Alysa Diallo RN  Outcome: Ongoing  10/9/2020 2208 by Marta Harley RN  Outcome: Ongoing     Problem: HEMODYNAMIC STATUS  Goal: Patient has stable vital signs and fluid balance  10/10/2020 0713 by Alysa Diallo RN  Outcome: Ongoing  Note: Pt VSS and WDL. Pt is a daily weight, strict intake and output being monitored, pt extremity neuro checks WDL. Pt is encouraged to deep breathe and cough. Pt encouraged to monitor fluid intake to prevent fluid overload. Will continue to monitor and assess. 10/9/2020 2208 by Ben Mack RN  Outcome: Ongoing     Problem: FLUID AND ELECTROLYTE IMBALANCE  Goal: Fluid and electrolyte balance are achieved/maintained  10/10/2020 0713 by Rachelle Tena RN  Outcome: Ongoing  Note: Educated to drink fluids within fluid restriction guidelines and to adequately hydrate, medications administered as ordered, abnormal lab values assessed and reported to physician if critical or pertinent to patient status, skin turgor, mucus membranes, and respiratory status assess this shift and exceptions are noted. Patient and Family was included in plan of care. Will continue to monitor and reassess.     10/9/2020 2208 by Ben Mack RN  Outcome: Ongoing     Problem: ACTIVITY INTOLERANCE/IMPAIRED MOBILITY  Goal: Mobility/activity is maintained at optimum level for patient  10/10/2020 6820 by Rachelle Tena RN  Outcome: Ongoing  10/9/2020 2208 by Ben Mack RN  Outcome: Ongoing     Problem: Nutrition  Goal: Optimal nutrition therapy  10/10/2020 0713 by Rachelle Tena RN  Outcome: Ongoing  10/9/2020 2208 by Ben Mack RN  Outcome: Ongoing

## 2020-10-11 LAB
APTT: 51.3 SEC (ref 24.2–36.2)
APTT: 58.9 SEC (ref 24.2–36.2)
HCT VFR BLD CALC: 26.3 % (ref 36–48)
HEMOGLOBIN: 8.4 G/DL (ref 12–16)
INR BLD: 1.09 (ref 0.86–1.14)
MCH RBC QN AUTO: 27.6 PG (ref 26–34)
MCHC RBC AUTO-ENTMCNC: 32.1 G/DL (ref 31–36)
MCV RBC AUTO: 86 FL (ref 80–100)
PDW BLD-RTO: 20.8 % (ref 12.4–15.4)
PLATELET # BLD: 339 K/UL (ref 135–450)
PMV BLD AUTO: 7.5 FL (ref 5–10.5)
PROTHROMBIN TIME: 12.7 SEC (ref 10–13.2)
RBC # BLD: 3.06 M/UL (ref 4–5.2)
WBC # BLD: 8 K/UL (ref 4–11)

## 2020-10-11 PROCEDURE — 6370000000 HC RX 637 (ALT 250 FOR IP): Performed by: INTERNAL MEDICINE

## 2020-10-11 PROCEDURE — 85730 THROMBOPLASTIN TIME PARTIAL: CPT

## 2020-10-11 PROCEDURE — 2580000003 HC RX 258: Performed by: INTERNAL MEDICINE

## 2020-10-11 PROCEDURE — 6370000000 HC RX 637 (ALT 250 FOR IP): Performed by: NURSE PRACTITIONER

## 2020-10-11 PROCEDURE — 2500000003 HC RX 250 WO HCPCS: Performed by: INTERNAL MEDICINE

## 2020-10-11 PROCEDURE — 85610 PROTHROMBIN TIME: CPT

## 2020-10-11 PROCEDURE — 1200000000 HC SEMI PRIVATE

## 2020-10-11 PROCEDURE — 85027 COMPLETE CBC AUTOMATED: CPT

## 2020-10-11 PROCEDURE — 94760 N-INVAS EAR/PLS OXIMETRY 1: CPT

## 2020-10-11 PROCEDURE — 36415 COLL VENOUS BLD VENIPUNCTURE: CPT

## 2020-10-11 RX ORDER — WARFARIN SODIUM 5 MG/1
10 TABLET ORAL
Status: COMPLETED | OUTPATIENT
Start: 2020-10-11 | End: 2020-10-11

## 2020-10-11 RX ORDER — DIPHENHYDRAMINE HCL 12.5MG/5ML
50 LIQUID (ML) ORAL EVERY 6 HOURS PRN
Status: DISCONTINUED | OUTPATIENT
Start: 2020-10-11 | End: 2020-10-16 | Stop reason: HOSPADM

## 2020-10-11 RX ADMIN — CARVEDILOL 12.5 MG: 12.5 TABLET, FILM COATED ORAL at 16:51

## 2020-10-11 RX ADMIN — ATORVASTATIN CALCIUM 10 MG: 10 TABLET, FILM COATED ORAL at 08:50

## 2020-10-11 RX ADMIN — CARVEDILOL 12.5 MG: 12.5 TABLET, FILM COATED ORAL at 08:49

## 2020-10-11 RX ADMIN — HEPARIN SODIUM 9.4 ML/HR: 10000 INJECTION, SOLUTION INTRAVENOUS at 02:49

## 2020-10-11 RX ADMIN — LACOSAMIDE 200 MG: 100 TABLET, FILM COATED ORAL at 08:49

## 2020-10-11 RX ADMIN — LEVETIRACETAM 1000 MG: 500 SOLUTION ORAL at 22:07

## 2020-10-11 RX ADMIN — AMIODARONE HYDROCHLORIDE 200 MG: 200 TABLET ORAL at 08:49

## 2020-10-11 RX ADMIN — PROMETHAZINE HYDROCHLORIDE 12.5 MG: 25 TABLET ORAL at 08:49

## 2020-10-11 RX ADMIN — ACETAMINOPHEN 650 MG: 325 TABLET ORAL at 16:51

## 2020-10-11 RX ADMIN — DIAZEPAM 5 MG: 5 TABLET ORAL at 22:08

## 2020-10-11 RX ADMIN — PROMETHAZINE HYDROCHLORIDE 12.5 MG: 25 TABLET ORAL at 16:51

## 2020-10-11 RX ADMIN — DIAZEPAM 5 MG: 5 TABLET ORAL at 08:50

## 2020-10-11 RX ADMIN — AMIODARONE HYDROCHLORIDE 200 MG: 200 TABLET ORAL at 22:07

## 2020-10-11 RX ADMIN — SEVELAMER CARBONATE 0.8 G: 2.4 POWDER, FOR SUSPENSION ORAL at 08:49

## 2020-10-11 RX ADMIN — LEVETIRACETAM 1000 MG: 500 SOLUTION ORAL at 08:49

## 2020-10-11 RX ADMIN — DIPHENHYDRAMINE HYDROCHLORIDE 50 MG: 25 SOLUTION ORAL at 01:18

## 2020-10-11 RX ADMIN — ACETAMINOPHEN 650 MG: 325 TABLET ORAL at 08:50

## 2020-10-11 RX ADMIN — LACOSAMIDE 200 MG: 100 TABLET, FILM COATED ORAL at 22:07

## 2020-10-11 RX ADMIN — WARFARIN SODIUM 10 MG: 5 TABLET ORAL at 16:52

## 2020-10-11 RX ADMIN — SODIUM CHLORIDE, PRESERVATIVE FREE 10 ML: 5 INJECTION INTRAVENOUS at 22:07

## 2020-10-11 ASSESSMENT — PAIN DESCRIPTION - ONSET
ONSET: ON-GOING
ONSET: ON-GOING

## 2020-10-11 ASSESSMENT — PAIN SCALES - GENERAL
PAINLEVEL_OUTOF10: 3
PAINLEVEL_OUTOF10: 0
PAINLEVEL_OUTOF10: 3

## 2020-10-11 ASSESSMENT — PAIN DESCRIPTION - DESCRIPTORS
DESCRIPTORS: ACHING
DESCRIPTORS: ACHING

## 2020-10-11 ASSESSMENT — PAIN DESCRIPTION - PAIN TYPE
TYPE: SURGICAL PAIN
TYPE: SURGICAL PAIN

## 2020-10-11 ASSESSMENT — PAIN DESCRIPTION - FREQUENCY
FREQUENCY: INTERMITTENT
FREQUENCY: INTERMITTENT

## 2020-10-11 ASSESSMENT — PAIN DESCRIPTION - ORIENTATION
ORIENTATION: MID
ORIENTATION: MID

## 2020-10-11 ASSESSMENT — PAIN SCALES - WONG BAKER
WONGBAKER_NUMERICALRESPONSE: 0

## 2020-10-11 ASSESSMENT — PAIN - FUNCTIONAL ASSESSMENT
PAIN_FUNCTIONAL_ASSESSMENT: PREVENTS OR INTERFERES SOME ACTIVE ACTIVITIES AND ADLS

## 2020-10-11 ASSESSMENT — PAIN DESCRIPTION - LOCATION
LOCATION: ABDOMEN
LOCATION: ABDOMEN

## 2020-10-11 ASSESSMENT — PAIN DESCRIPTION - PROGRESSION
CLINICAL_PROGRESSION: NOT CHANGED
CLINICAL_PROGRESSION: NOT CHANGED

## 2020-10-11 NOTE — PROGRESS NOTES
CMU informed that pt leads off. Upon entering room pt telemetry box and continuous spO2 laying on bedside table. Tele box reattached to leads, when attempted to place spO2 back in place pt pulled hand away, stating \"that's bugging me I'm not wearing it. \" Linda made aware of pt refusal to wear at this time.     Electronically signed by Talia Murillo RN on 24/19/8041 at 12:30 AM

## 2020-10-11 NOTE — PROGRESS NOTES
Patient resting for most of shift. No further complaints of itching this shift. Tolerating tube feedings well. <30ml noted for residuals at 1730.

## 2020-10-11 NOTE — PROGRESS NOTES
Clinical Pharmacy Note  Heparin Dosing       Lab Results   Component Value Date    APTT 58.9 10/11/2020     Lab Results   Component Value Date    HGB 8.4 10/11/2020    HCT 26.3 10/11/2020     10/11/2020    INR 1.09 10/11/2020       Current Infusion Rate: 9.4 mL/hr    Plan:  Rate: continue at 9.4 mL/hr  Next aPTT: 0600 10/12/20    Electronically signed by LANDY Ford [facility] on 10/11/2020 at 1:21 PM

## 2020-10-11 NOTE — PROGRESS NOTES
Hospitalist Progress Note      PCP: No primary care provider on file. Date of Admission: 10/8/2020        Subjective: awake, comfortable        Medications:  Reviewed    Infusion Medications    heparin (Porcine) 9.4 mL/hr (10/11/20 0249)     Scheduled Medications    warfarin  10 mg Oral Once    carvedilol  12.5 mg Oral BID WC    levETIRAcetam  1,000 mg Oral BID    diazePAM  5 mg Oral Q12H    sevelamer  0.8 g Per G Tube Daily    sodium chloride flush  10 mL Intravenous 2 times per day    amiodarone  200 mg Per NG tube BID    atorvastatin  10 mg Oral Daily    b complex-C-folic acid  1 capsule Oral Daily    lacosamide  200 mg Oral BID    warfarin (COUMADIN) daily dosing (placeholder)   Other RX Placeholder     PRN Meds: diphenhydrAMINE, sodium chloride flush, acetaminophen **OR** acetaminophen, promethazine **OR** ondansetron, albuterol sulfate HFA      Intake/Output Summary (Last 24 hours) at 10/11/2020 1252  Last data filed at 10/11/2020 0606  Gross per 24 hour   Intake 2126 ml   Output 1238 ml   Net 888 ml       Physical Exam Performed:    /60   Pulse 74   Temp 97.7 °F (36.5 °C) (Axillary)   Resp 14   Ht 5' 7\" (1.702 m)   Wt 188 lb 11.4 oz (85.6 kg)   LMP 12/05/2018 (Exact Date)   SpO2 98%   BMI 29.56 kg/m²     General appearance: No apparent distress  Respiratory:  Normal respiratory effort. Clear to auscultation, bilaterally without Rales/Wheezes/Rhonchi. Cardiovascular: Regular rate and rhythm with normal S1/S2 without murmurs, rubs or gallops. Abdomen: Soft  Musculoskeletal: No clubbing, cyanosis   Skin: Skin color, texture, turgor normal.  No rashes or lesions.   Neurologic:  Moving her extremities   Capillary Refill: Brisk,< 3 seconds   Peripheral Pulses: +2 palpable, equal bilaterally       Labs:   Recent Labs     10/09/20  1410 10/11/20  0249   WBC 7.9 8.0   HGB 8.4* 8.4*   HCT 26.5* 26.3*    339     Recent Labs     10/10/20  0945      K 3.8      CO2 22 BUN 27*   CREATININE 3.3*   CALCIUM 9.4     No results for input(s): AST, ALT, BILIDIR, BILITOT, ALKPHOS in the last 72 hours. Recent Labs     10/09/20  0937 10/10/20  0448 10/11/20  0249   INR 1.12 1.08 1.09     No results for input(s): CKTOTAL, TROPONINI in the last 72 hours. Urinalysis:      Lab Results   Component Value Date    NITRU Negative 09/10/2020    WBCUA 3-5 09/10/2020    BACTERIA 2+ 09/10/2020    RBCUA 21-50 09/10/2020    BLOODU MODERATE 09/10/2020    SPECGRAV >1.030 09/10/2020    GLUCOSEU Negative 09/10/2020       Radiology:  No orders to display           Assessment/Plan:    Active Hospital Problems    Diagnosis    Complication of gastrostomy tube ([facility] [K94.20]    PEG tube malfunction ([facility] [K94.23]     1. PEG malfunction, s/p replacement, post op day 1. Restart medication enterally. 2. Encephalopathy, appeared after an event in september, cardiogenic shock at that time, appears at baseline now. 3. ESRD, HD  4. H/O PE, on coumadin as outpatient, patient ESRD, started on heparin for bridging. INR not therapeutic.    5. CHF, systolic, chronic, no acute exacerbation      DVT Prophylaxis: heparin  Diet: DIET TUBE FEED CONTINUOUS/CYCLIC NPO; Renal Formula; Gastrostomy; 20; 55  Code Status: Full Code        Dispo - ongoing management     Dain Yu MD

## 2020-10-11 NOTE — PROGRESS NOTES
Clinical Pharmacy Note  Heparin Dosing       Lab Results   Component Value Date    APTT 51.3 10/11/2020     Lab Results   Component Value Date    HGB 8.4 10/11/2020    HCT 26.3 10/11/2020     10/11/2020    INR 1.09 10/11/2020       Current Infusion Rate: 9.4 mL/hr    Plan:  Rate: continue at 9.4 mL/hr  Next aPTT: 1000 10/11/20    Pharmacy will continue to monitor and adjust based on aPTT results.   Yana Navarrete, PharmD

## 2020-10-11 NOTE — PROGRESS NOTES
Pt IV noted to be leaking, bleeding at site. IV removed, catheter intact, dressing applied. This nurse tried with 2 unsuccessful attempts at placement. Savannah Clifton RN attempt x 2 with successful placement of 22G to left forearm. Blood return noted, flushed, infusing per orders.     Electronically signed by Myron Zamora RN on 50/34/9060 at 12:16 AM

## 2020-10-11 NOTE — PROGRESS NOTES
Clinical Pharmacy Note  Warfarin Consult    Carrie Viramontes is a 53 y.o. female receiving warfarin managed by pharmacy. Patient being bridged with weight based heparin. Warfarin Indication: Factor V leiden  Target INR range: 2-3   Dose prior to admission: 7.5 mg daily except 3.75 mg Monday and Thursday    Current warfarin drug-drug interactions: Amiodarone     Recent Labs     10/09/20  0937 10/09/20  1410 10/10/20  0448 10/11/20  0249   HGB  --  8.4*  --  8.4*   HCT  --  26.5*  --  26.3*   INR 1.12  --  1.08 1.09       Assessment/Plan:    Warfarin 10 mg tonight. Daily PT/INR until stable within therapeutic range. Thank you for the consult. Will continue to follow.   Jazmine Kulkarni, [facility]

## 2020-10-11 NOTE — PLAN OF CARE
Problem: Skin Integrity:  Goal: Will show no infection signs and symptoms  Description: Will show no infection signs and symptoms  10/11/2020 0715 by Trevor Woods RN  Outcome: Ongoing  Note: Patient is alert and oriented, afebrile, has manageable complaints of pain, skin is intact and appropriate for ethnicity in color    74/57/6393 5549 by Hubert San RN  Outcome: Ongoing  Goal: Absence of new skin breakdown  Description: Absence of new skin breakdown  10/11/2020 0715 by Trevor Woosd RN  Outcome: Ongoing  Note: Cash score assessed. Patient unable to ambulate and turn self. Repositioned patient Q2H and assessed skin. Educated patient on importance of repositioning to prevent skin issues. 76/65/6005 2770 by Hubert San RN  Outcome: Ongoing     Problem: Pain:  Goal: Pain level will decrease  Description: Pain level will decrease  10/11/2020 0715 by Trevor Woods RN  Outcome: Ongoing  Note: Pain /discomfort being managed with PRN analgesics per MD orders. Patient able to express presence and absence of pain and rate pain appropriately using numerical scale.      60/02/6611 8175 by Hubert San RN  Outcome: Ongoing  Goal: Control of acute pain  Description: Control of acute pain  10/11/2020 0715 by Trevor Woods RN  Outcome: Ongoing  12/19/8214 8793 by Hubert San RN  Outcome: Ongoing  Goal: Control of chronic pain  Description: Control of chronic pain  10/11/2020 0715 by Trevor Woods RN  Outcome: Ongoing  17/50/2946 8063 by Hubert San RN  Outcome: Ongoing     Problem: Physical Regulation:  Goal: Will remain free from infection  Description: Will remain free from infection  10/11/2020 0715 by Trevor Woods RN  Outcome: Ongoing  Note: Patient is alert and oriented, afebrile, has manageable complaints of pain, skin is intact and appropriate for ethnicity in color    00/34/0378 4091 by Hubert San RN  Outcome: Ongoing     Problem: Respiratory:  Goal: Ability to maintain normal respiratory secretions will improve  Description: Ability to maintain normal respiratory secretions will improve  10/11/2020 0715 by Lenny Rodriguez RN  Outcome: Ongoing  82/25/1127 5390 by Mable Bryan RN  Outcome: Ongoing     Problem: Falls - Risk of:  Goal: Will remain free from falls  Description: Will remain free from falls  10/11/2020 0715 by Lenny Rodriguez RN  Outcome: Ongoing  Note: Fall risk assessment completed . Fall precautions in place, bed alarm on, side rails 2/4 up, call light in reach, educated pt on calling for assistance when needed, room clear of clutter. Pt verbalized understanding. 77/84/1222 8500 by Mable Bryan RN  Outcome: Ongoing  Goal: Absence of physical injury  Description: Absence of physical injury  10/11/2020 0715 by Lenny Rodriguez RN  Outcome: Ongoing  42/39/2204 8642 by Mable Bryan RN  Outcome: Ongoing     Problem: OXYGENATION/RESPIRATORY FUNCTION  Goal: Patient will maintain patent airway  10/11/2020 0715 by Lenny Rodriguez RN  Outcome: Ongoing  Note: Patient has maintained a patent airway, repositions self, lung sounds bilaterally diminished, cough noted and is infrequent and nonproductive, RT is involved in patient care and is providing care, patient has had adequate fluid intake and is on no fluid restriction, no need to suction airway at this time, educated patient to turn and cough and deep breathe every two hours and as needed, encouraged incentive spirometer and patient has been performing. Will continue to monitor and reassess.     11/55/1558 7157 by Mable Bryan RN  Outcome: Ongoing  Goal: Patient will achieve/maintain normal respiratory rate/effort  Description: Respiratory rate and effort will be within normal limits for the patient  10/11/2020 0715 by Lenny Rodriguez RN  Outcome: Ongoing  34/43/3936 0610 by Mable Bryan RN  Outcome: Ongoing     Problem: HEMODYNAMIC STATUS  Goal: Patient has stable vital signs and fluid balance  10/11/2020 0715 by Lenny Rodriguez RN  Outcome: Ongoing  Note: Pt VSS and WDL. Pt is a daily weight, strict intake and output being monitored, pt extremity neuro checks WDL. Pt is encouraged to deep breathe and cough. Pt encouraged to monitor fluid intake to prevent fluid overload. Will continue to monitor and assess. 33/32/1062 5928 by Pa Ruiz RN  Outcome: Ongoing     Problem: FLUID AND ELECTROLYTE IMBALANCE  Goal: Fluid and electrolyte balance are achieved/maintained  10/11/2020 0715 by Junior Knight RN  Outcome: Ongoing  Note: Vital signs stable, respiratory rate normal, heart rate is WNLs and regular on cardiac monitor, +2 pulses and less than 3 second cap refill noted in all extremities, body color appropriate for ethnicity and temperature warm, edema noted to all extremities, patient repositions  self, and daily weights are ordered, no change from yesterday's weight. Will continue to monitor and reassess.      97/93/3813 3180 by Pa Ruiz RN  Outcome: Ongoing     Problem: ACTIVITY INTOLERANCE/IMPAIRED MOBILITY  Goal: Mobility/activity is maintained at optimum level for patient  10/11/2020 0715 by Junior Knight RN  Outcome: Ongoing  58/49/1872 2299 by Pa Ruiz RN  Outcome: Ongoing     Problem: Nutrition  Goal: Optimal nutrition therapy  10/11/2020 0715 by Junior Knight RN  Outcome: Ongoing  03/00/1475 9849 by Pa Ruiz RN  Outcome: Ongoing

## 2020-10-11 NOTE — PROGRESS NOTES
[facility] Inpatient Nephrology Consult Note    Reason for Consult:  MORELIA on HD  Requesting Physician:  Dr. Mauricio Kc    History of Present Ilness:    53 y.o. female who  has a past medical history of Anticoagulant long-term use, Arthritis, Asthma, Baker's cyst, Depression, Factor V Leiden ([address] Factor V Leiden ([address] H/O blood clots, Heartburn, Hyperlipidemia, MRSA bacteremia (09/10/2020), and Osteoarthritis. who presented for PEG tube dislodgement. Patient had DIC with cardiogenic shock in September [facility] malnutrition s/p PEG tube placement per GI. Patient presented to ED for PEG tube dislodgement and was replaced by GI. The patient had an MORELIA and is on HD MWF at [facility] DR ANJEL PATEL, still monitoring for recovery. We are consulted for dialysis dependent MORELIA. Labs were stable, otherwise no reported fever/chills/N/V/D, no issues with HD.       Subjective:  Lethargic and confused, doesn't answer questions to me  Not in distress    No family at bedside             Past Medical History:   Diagnosis Date    Anticoagulant long-term use     Arthritis     Asthma     Baker's cyst     Depression     Factor V Leiden ([address]     Factor V Leiden ([address]     H/O blood clots     Heartburn     Hyperlipidemia     MRSA bacteremia 09/10/2020    Osteoarthritis              Past Surgical History:   Procedure Laterality Date    GASTROSTOMY TUBE PLACEMENT N/A 9/25/2020    ESOPHAGOGASTRODUODENOSCOPY PERCUTANEOUS ENDOSCOPIC GASTROSTOMY TUBE PLACEMENT performed by Nini Jack MD at [address] [facility] N/A 10/9/2020    ESOPHAGOGASTRODUODENOSCOPY PERCUTANEOUS ENDOCOPIC GASTROSTOMY TUBE PLACEMENT performed by Chloe Wolf DO at [address] IR TUNNELED CATHETER PLACEMENT GREATER THAN 5 YEARS  9/29/2020    IR TUNNELED CATHETER PLACEMENT GREATER THAN 5 YEARS 9/29/2020 [facility] PROCEDURES    TUNNELED VENOUS CATHETER PLACEMENT Right 09/29/2020 [place] ; RIJ; 19cm; Dr. Bethany Kuo        Allergies:  Ibuprofen      Social History     Occupational History    Not on file   Tobacco Use    Smoking status: Former Smoker     Packs/day: 0.50     Types: Cigarettes     Last attempt to quit: 2020     Years since quittin.0    Smokeless tobacco: Never Used   Substance and Sexual Activity    Alcohol use: No     Alcohol/week: 0.0 standard drinks    Drug use: Yes     Frequency: 14.0 times per week     Types: Marijuana    Sexual activity: Not on file          Family History   Problem Relation Age of Onset    Heart Attack Father          Physical exam:     Vitals:    10/11/20 1210   BP: 111/60   Pulse: 74   Resp: 14   Temp: 97.7 °F (36.5 °C)   SpO2: 98%    Temp (24hrs), Av.3 °F (36.8 °C), Min:97.7 °F (36.5 °C), Max:98.9 °F (37.2 °C)   & BP  Min: 110/75  Max: 128/74 Pulse  Av.4  Min: 74  Max: 99    24HR INTAKE/OUTPUT:      Intake/Output Summary (Last 24 hours) at 10/11/2020 1623  Last data filed at 10/11/2020 0606  Gross per 24 hour   Intake 2126 ml   Output 1238 ml   Net 888 ml         Gen: Resting in bed, in no acute distress  Head: NC , AT  Neck: supple, trachea in midline, no overt thyromegaly noted  CV: S1, S2 heard ,no peripheral edema, no JVD    Lungs: no increased WOB, no abdominal breathing pattern, +b/l air entry, no crackles  Abd: Soft, not tender , not distended , no guarding  Skin: no rash/lesions on exposed skin of head and neck     Database  Lab Results   Component Value Date    WBC 8.0 10/11/2020    HGB 8.4 (L) 10/11/2020    HCT 26.3 (L) 10/11/2020    MCV 86.0 10/11/2020     10/11/2020           No results found for: IRON, TIBC, FERRITIN    Lab Results   Component Value Date     10/10/2020    K 3.8 10/10/2020    K 4.3 10/08/2020     10/10/2020    CO2 22 10/10/2020    BUN 27 10/10/2020    CREATININE 3.3 10/10/2020    GLUCOSE 109 10/10/2020    CALCIUM 9.4 10/10/2020        Lab Results   Component Value Date    PTH <1.2 (L) 10/02/2020    CALCIUM 9.4 10/10/2020    CAION 1.11 (L) 09/15/2020    PHOS 4.8 10/04/2020       Lab Results   Component Value Date    LABALBU 3.2 (L) 10/04/2020       Labs Renal Latest Ref Rng & Units 10/10/2020 10/8/2020 10/4/2020 10/3/2020 10/2/2020   BUN 7 - 20 mg/dL 27(H) 10 31(H) 19 40(H)   Cr 0.6 - 1.1 mg/dL 3. 3(H) 1.8(H) 3. 0(H) 2. 2(H) 3. 2(H)   K 3.5 - 5.1 mmol/L 3.8 4.3 4.0 4.0 4.1   Na 136 - 145 mmol/L 141 133(L) 135(L) 130(L) 137       Lab Results   Component Value Date    MG 2.10 10/04/2020       UA   Lab Results   Component Value Date    SPECGRAV >1.030 09/10/2020    BLOODU MODERATE 09/10/2020    LEUKOCYTESUR Negative 09/10/2020    WBCUA 3-5 09/10/2020    RBCUA 21-50 09/10/2020       RADIOLOGY     Lab Results   Component Value Date    LVEF 33 09/11/2020       Lab Results   Component Value Date    CREATININE 3.3 (H) 10/10/2020    CREATININE 1.8 (H) 10/08/2020    CREATININE 3.0 (H) 10/04/2020    CREATININE 2.2 (H) 10/03/2020    CREATININE 3.2 (H) 10/02/2020       Wt Readings from Last 3 Encounters:   10/11/20 188 lb 11.4 oz (85.6 kg)   10/04/20 188 lb 0.8 oz (85.3 kg)   12/17/18 229 lb (103.9 kg)       BP Readings from Last 3 Encounters:   10/11/20 111/60   10/09/20 (!) 141/69   10/04/20 139/71         Lab Results   Component Value Date    CALCIUM 9.4 10/10/2020    PHOS 4.8 10/04/2020         Lab Results   Component Value Date    LABALBU 3.2 (L) 10/04/2020         Scheduled Meds:   warfarin  10 mg Oral Once    carvedilol  12.5 mg Oral BID WC    levETIRAcetam  1,000 mg Oral BID    diazePAM  5 mg Oral Q12H    sevelamer  0.8 g Per G Tube Daily    sodium chloride flush  10 mL Intravenous 2 times per day    amiodarone  200 mg Per NG tube BID    atorvastatin  10 mg Oral Daily    b complex-C-folic acid  1 capsule Oral Daily    lacosamide  200 mg Oral BID    warfarin (COUMADIN) daily dosing (placeholder)   Other RX Placeholder     Continuous Infusions:   heparin (Porcine) 9.4 mL/hr (10/11/20 0249) PRN Meds:.diphenhydrAMINE, sodium chloride flush, acetaminophen **OR** acetaminophen, promethazine **OR** ondansetron, albuterol sulfate HFA        Assessment & Plan:    - Dialysis dependent MORELIA  - likely secondary to ischemia ATN. She had additional exposure to radiocontrast              - ZENON is neg, ANCA neg, C3 is low at 54  - Initiated on IHD on 9/11/20. Transitioned to CRRT on 9/12/20 which was stopped 9/15/20. Restarted HD 09/17/20  - outpatient dialysis at [facility] MWF @ 5 PM  - [facility] placed by IR 09/29/20     PLAN:  HD tomorrow   Monitoring for possible recovery still - no signs of recovery so far      - hx of PEA arrest 9/11/20    - Essential HTN: slightly above goal      - encephalopathy: since last admission, confused today     - CHF: systolic,  last Echo shows 35%, compensated at this point    - Hx of Factor V leiden deficiency with PE/coagulopathy/DIC:   On coumadin    - hyperphosphatemia : on sevelamer    - anemia :  CHAVEZ protocol       Thank you very much for asking us to participate in your patient's care. Do call me if you have any questions regarding the plan of care as outlined.     Nina Santamaria

## 2020-10-12 LAB
ANION GAP SERPL CALCULATED.3IONS-SCNC: 13 MMOL/L (ref 3–16)
APTT: 59.3 SEC (ref 24.2–36.2)
BASOPHILS ABSOLUTE: 0.1 K/UL (ref 0–0.2)
BASOPHILS RELATIVE PERCENT: 0.7 %
BUN BLDV-MCNC: 24 MG/DL (ref 7–20)
CALCIUM SERPL-MCNC: 9.5 MG/DL (ref 8.3–10.6)
CHLORIDE BLD-SCNC: 98 MMOL/L (ref 99–110)
CO2: 25 MMOL/L (ref 21–32)
CREAT SERPL-MCNC: 3.1 MG/DL (ref 0.6–1.1)
EOSINOPHILS ABSOLUTE: 0.9 K/UL (ref 0–0.6)
EOSINOPHILS RELATIVE PERCENT: 10 %
GFR AFRICAN AMERICAN: 19
GFR NON-AFRICAN AMERICAN: 16
GLUCOSE BLD-MCNC: 111 MG/DL (ref 70–99)
HCT VFR BLD CALC: 24.9 % (ref 36–48)
HEMOGLOBIN: 8.2 G/DL (ref 12–16)
HEPATITIS B SURFACE ANTIGEN INTERPRETATION: NORMAL
INR BLD: 1.09 (ref 0.86–1.14)
LYMPHOCYTES ABSOLUTE: 1.8 K/UL (ref 1–5.1)
LYMPHOCYTES RELATIVE PERCENT: 18.7 %
MCH RBC QN AUTO: 28.3 PG (ref 26–34)
MCHC RBC AUTO-ENTMCNC: 32.9 G/DL (ref 31–36)
MCV RBC AUTO: 85.8 FL (ref 80–100)
MONOCYTES ABSOLUTE: 0.8 K/UL (ref 0–1.3)
MONOCYTES RELATIVE PERCENT: 8.2 %
NEUTROPHILS ABSOLUTE: 5.9 K/UL (ref 1.7–7.7)
NEUTROPHILS RELATIVE PERCENT: 62.4 %
PDW BLD-RTO: 21 % (ref 12.4–15.4)
PLATELET # BLD: 356 K/UL (ref 135–450)
PMV BLD AUTO: 7.5 FL (ref 5–10.5)
POTASSIUM SERPL-SCNC: 3.9 MMOL/L (ref 3.5–5.1)
PROTHROMBIN TIME: 12.7 SEC (ref 10–13.2)
RBC # BLD: 2.9 M/UL (ref 4–5.2)
SODIUM BLD-SCNC: 136 MMOL/L (ref 136–145)
WBC # BLD: 9.5 K/UL (ref 4–11)

## 2020-10-12 PROCEDURE — 6370000000 HC RX 637 (ALT 250 FOR IP): Performed by: FAMILY MEDICINE

## 2020-10-12 PROCEDURE — 1200000000 HC SEMI PRIVATE

## 2020-10-12 PROCEDURE — 6370000000 HC RX 637 (ALT 250 FOR IP): Performed by: INTERNAL MEDICINE

## 2020-10-12 PROCEDURE — P9047 ALBUMIN (HUMAN), 25%, 50ML: HCPCS | Performed by: INTERNAL MEDICINE

## 2020-10-12 PROCEDURE — 85730 THROMBOPLASTIN TIME PARTIAL: CPT

## 2020-10-12 PROCEDURE — 6360000002 HC RX W HCPCS: Performed by: INTERNAL MEDICINE

## 2020-10-12 PROCEDURE — 87340 HEPATITIS B SURFACE AG IA: CPT

## 2020-10-12 PROCEDURE — 85610 PROTHROMBIN TIME: CPT

## 2020-10-12 PROCEDURE — 2580000003 HC RX 258: Performed by: INTERNAL MEDICINE

## 2020-10-12 PROCEDURE — 36415 COLL VENOUS BLD VENIPUNCTURE: CPT

## 2020-10-12 PROCEDURE — 80048 BASIC METABOLIC PNL TOTAL CA: CPT

## 2020-10-12 PROCEDURE — 90935 HEMODIALYSIS ONE EVALUATION: CPT

## 2020-10-12 PROCEDURE — 85025 COMPLETE CBC W/AUTO DIFF WBC: CPT

## 2020-10-12 PROCEDURE — 2500000003 HC RX 250 WO HCPCS: Performed by: INTERNAL MEDICINE

## 2020-10-12 PROCEDURE — 94640 AIRWAY INHALATION TREATMENT: CPT

## 2020-10-12 PROCEDURE — 2500000003 HC RX 250 WO HCPCS: Performed by: NURSE PRACTITIONER

## 2020-10-12 PROCEDURE — 6370000000 HC RX 637 (ALT 250 FOR IP): Performed by: NURSE PRACTITIONER

## 2020-10-12 RX ORDER — HEPARIN SODIUM 1000 [USP'U]/ML
3200 INJECTION, SOLUTION INTRAVENOUS; SUBCUTANEOUS PRN
Status: DISCONTINUED | OUTPATIENT
Start: 2020-10-12 | End: 2020-10-16 | Stop reason: HOSPADM

## 2020-10-12 RX ORDER — ALBUMIN (HUMAN) 12.5 G/50ML
12.5 SOLUTION INTRAVENOUS PRN
Status: DISCONTINUED | OUTPATIENT
Start: 2020-10-12 | End: 2020-10-16 | Stop reason: HOSPADM

## 2020-10-12 RX ORDER — WARFARIN SODIUM 5 MG/1
10 TABLET ORAL
Status: COMPLETED | OUTPATIENT
Start: 2020-10-12 | End: 2020-10-12

## 2020-10-12 RX ADMIN — Medication 1 PUFF: at 23:55

## 2020-10-12 RX ADMIN — CARVEDILOL 12.5 MG: 12.5 TABLET, FILM COATED ORAL at 17:14

## 2020-10-12 RX ADMIN — DIAZEPAM 5 MG: 5 TABLET ORAL at 21:16

## 2020-10-12 RX ADMIN — ATORVASTATIN CALCIUM 10 MG: 10 TABLET, FILM COATED ORAL at 08:41

## 2020-10-12 RX ADMIN — SEVELAMER CARBONATE 0.8 G: 2.4 POWDER, FOR SUSPENSION ORAL at 08:42

## 2020-10-12 RX ADMIN — LEVETIRACETAM 1000 MG: 500 SOLUTION ORAL at 08:42

## 2020-10-12 RX ADMIN — DIPHENHYDRAMINE HYDROCHLORIDE 50 MG: 25 SOLUTION ORAL at 00:15

## 2020-10-12 RX ADMIN — DIAZEPAM 5 MG: 5 TABLET ORAL at 08:41

## 2020-10-12 RX ADMIN — LACOSAMIDE 200 MG: 100 TABLET, FILM COATED ORAL at 08:39

## 2020-10-12 RX ADMIN — PROMETHAZINE HYDROCHLORIDE 12.5 MG: 25 TABLET ORAL at 00:01

## 2020-10-12 RX ADMIN — SODIUM CHLORIDE, PRESERVATIVE FREE 10 ML: 5 INJECTION INTRAVENOUS at 21:17

## 2020-10-12 RX ADMIN — LEVETIRACETAM 1000 MG: 500 SOLUTION ORAL at 21:16

## 2020-10-12 RX ADMIN — AMIODARONE HYDROCHLORIDE 200 MG: 200 TABLET ORAL at 21:16

## 2020-10-12 RX ADMIN — NEPHROCAP 1 MG: 1 CAP ORAL at 08:39

## 2020-10-12 RX ADMIN — ACETAMINOPHEN 650 MG: 325 TABLET ORAL at 00:01

## 2020-10-12 RX ADMIN — HEPARIN SODIUM 9.4 ML/HR: 10000 INJECTION, SOLUTION INTRAVENOUS at 05:29

## 2020-10-12 RX ADMIN — AMIODARONE HYDROCHLORIDE 200 MG: 200 TABLET ORAL at 08:41

## 2020-10-12 RX ADMIN — LACOSAMIDE 200 MG: 100 TABLET, FILM COATED ORAL at 21:16

## 2020-10-12 RX ADMIN — WARFARIN SODIUM 10 MG: 5 TABLET ORAL at 17:14

## 2020-10-12 RX ADMIN — FAMOTIDINE 20 MG: 10 INJECTION INTRAVENOUS at 21:16

## 2020-10-12 RX ADMIN — CARVEDILOL 12.5 MG: 12.5 TABLET, FILM COATED ORAL at 08:41

## 2020-10-12 RX ADMIN — ALBUMIN (HUMAN) 12.5 G: 0.25 INJECTION, SOLUTION INTRAVENOUS at 13:17

## 2020-10-12 RX ADMIN — ONDANSETRON 4 MG: 2 INJECTION INTRAMUSCULAR; INTRAVENOUS at 12:31

## 2020-10-12 ASSESSMENT — PAIN DESCRIPTION - PAIN TYPE
TYPE: SURGICAL PAIN
TYPE: SURGICAL PAIN

## 2020-10-12 ASSESSMENT — PAIN SCALES - WONG BAKER
WONGBAKER_NUMERICALRESPONSE: 0

## 2020-10-12 ASSESSMENT — PAIN SCALES - GENERAL
PAINLEVEL_OUTOF10: 2
PAINLEVEL_OUTOF10: 0
PAINLEVEL_OUTOF10: 0
PAINLEVEL_OUTOF10: 1

## 2020-10-12 ASSESSMENT — PAIN DESCRIPTION - DESCRIPTORS
DESCRIPTORS: ACHING
DESCRIPTORS: ACHING

## 2020-10-12 ASSESSMENT — PAIN DESCRIPTION - LOCATION
LOCATION: ABDOMEN
LOCATION: ABDOMEN

## 2020-10-12 ASSESSMENT — PAIN DESCRIPTION - ONSET
ONSET: ON-GOING
ONSET: ON-GOING

## 2020-10-12 ASSESSMENT — PAIN DESCRIPTION - FREQUENCY
FREQUENCY: INTERMITTENT
FREQUENCY: INTERMITTENT

## 2020-10-12 ASSESSMENT — PAIN - FUNCTIONAL ASSESSMENT
PAIN_FUNCTIONAL_ASSESSMENT: PREVENTS OR INTERFERES SOME ACTIVE ACTIVITIES AND ADLS
PAIN_FUNCTIONAL_ASSESSMENT: PREVENTS OR INTERFERES SOME ACTIVE ACTIVITIES AND ADLS

## 2020-10-12 ASSESSMENT — PAIN DESCRIPTION - ORIENTATION
ORIENTATION: MID
ORIENTATION: MID

## 2020-10-12 ASSESSMENT — PAIN DESCRIPTION - PROGRESSION
CLINICAL_PROGRESSION: NOT CHANGED
CLINICAL_PROGRESSION: NOT CHANGED

## 2020-10-12 NOTE — PROGRESS NOTES
Interventions:   Food and/or Nutrient Delivery:  Continue NPO, Continue Current Tube Feeding  Nutrition Education/Counseling:  No recommendation at this time   Coordination of Nutrition Care:  Continued Inpatient Monitoring    Goals: Tolerate most appropriate form of nutrition. Nutrition Monitoring and Evaluation:   Behavioral-Environmental Outcomes:  (N/a)   Food/Nutrient Intake Outcomes:  Enteral Nutrition Intake/Tolerance  Physical Signs/Symptoms Outcomes:  Biochemical Data, Weight, Nutrition Focused Physical Findings, Fluid Status or Edema, Skin, GI Status, Nausea or Vomiting, Diarrhea, Constipation     Discharge Planning:     Too soon to determine     Electronically signed by Franklyn Sibley on 10/12/20 at 10:38 AM EDT    Contact: [phone]

## 2020-10-12 NOTE — PROGRESS NOTES
Pt awake and alert this AM. Assessment completed. G-tube residual checked, 0 mL. Morning meds given through tube. No needs at this time. Fall precautions in place. Call light within reach. Will continue to monitor.     Electronically signed by Carolyne Fields RN on 10/12/2020 at 11:36 AM

## 2020-10-12 NOTE — PLAN OF CARE
Problem: Nutrition  Goal: Optimal nutrition therapy  10/12/2020 1310 by Shanika Nguyễn  Outcome: Ongoing   Nutrition Problem #1: Inadequate oral intake  Intervention: Food and/or Nutrient Delivery: Continue NPO, Continue Current Tube Feeding  Nutritional Goals: Tolerate most appropriate form of nutrition.

## 2020-10-12 NOTE — PROGRESS NOTES
Treatment time: 3.5hrs  Net UF: 0 ml     Pre weight: 85.1 kg   Post weight:85.1 kg  EDW: TBD     Access used: RTDC    Access function:good with  ml/min     Medications or blood products given: Albumin 12.5g     Regular outpatient schedule: Sandy FORREST     Summary of response to treatment: tolerated tx poorly, pt moaning and c/o nausea throughout tx, hypotensive at times stabilized with NS bolus and albumin, no fluid removed, no s/s of acute distress noted, pt refused to complete tx in full due to continuous nausea, Dr. Jena Brooks aware. Copy of dialysis treatment record placed in chart, to be scanned into EMR.

## 2020-10-12 NOTE — PROGRESS NOTES
Received call from dialysis nurse stating pt was feeling nauseous. PRN zofran given by this RN. See eMAR.     Electronically signed by Taran Whatley RN on 10/12/2020 at 12:51 PM

## 2020-10-12 NOTE — PROGRESS NOTES
Clinical Pharmacy Note  Heparin Dosing       Lab Results   Component Value Date    APTT 59.3 10/12/2020     Lab Results   Component Value Date    HGB 8.4 10/11/2020    HCT 26.3 10/11/2020     10/11/2020    INR 1.09 10/12/2020       Current Infusion Rate: 9.4 mL/hr    Plan:  Rate: continue at 9.4 mL/hr  Next aPTT: 0600 10/13/20    Pharmacy will continue to monitor and adjust based on aPTT results.   Connie Bumpers, PharmD

## 2020-10-12 NOTE — PROGRESS NOTES
[facility] Inpatient Nephrology Consult Note    Reason for Consult:  MORELIA on HD  Requesting Physician:  Dr. Tank White    History of Present Ilness:    53 y.o. female who  has a past medical history of Anticoagulant long-term use, Arthritis, Asthma, Baker's cyst, Depression, Factor V Leiden ([address] Factor V Leiden ([address] H/O blood clots, Heartburn, Hyperlipidemia, MRSA bacteremia (09/10/2020), and Osteoarthritis. who presented for PEG tube dislodgement. Patient had DIC with cardiogenic shock in September [facility] malnutrition s/p PEG tube placement per GI. Patient presented to ED for PEG tube dislodgement and was replaced by GI. The patient had an MORELIA and is on HD MWF at [facility] DR ANJEL PATEL, still monitoring for recovery. We are consulted for dialysis dependent MORELIA. Labs were stable, otherwise no reported fever/chills/N/V/D, no issues with HD.       Subjective:  Undergoing HD Has been confused  BP dropped during HD and UF Goal reduced to minimum    No family at bedside             Past Medical History:   Diagnosis Date    Anticoagulant long-term use     Arthritis     Asthma     Baker's cyst     Depression     Factor V Leiden ([address]     Factor V Leiden ([address]     H/O blood clots     Heartburn     Hyperlipidemia     MRSA bacteremia 09/10/2020    Osteoarthritis              Past Surgical History:   Procedure Laterality Date    GASTROSTOMY TUBE PLACEMENT N/A 9/25/2020    ESOPHAGOGASTRODUODENOSCOPY PERCUTANEOUS ENDOSCOPIC GASTROSTOMY TUBE PLACEMENT performed by Vonn Phoenix, MD at [address] [facility] N/A 10/9/2020    ESOPHAGOGASTRODUODENOSCOPY PERCUTANEOUS ENDOCOPIC GASTROSTOMY TUBE PLACEMENT performed by Zayda Casas.,  at [address] IR TUNNELED CATHETER PLACEMENT GREATER THAN 5 YEARS  9/29/2020    IR TUNNELED CATHETER PLACEMENT GREATER THAN 5 YEARS 9/29/2020 [facility] PROCEDURES    TUNNELED VENOUS CATHETER PLACEMENT Right 2020    Permacath ; RIRACHEL; 19cm; Dr. Zazueta Seen        Allergies:  Ibuprofen      Social History     Occupational History    Not on file   Tobacco Use    Smoking status: Former Smoker     Packs/day: 0.50     Types: Cigarettes     Last attempt to quit: 2020     Years since quittin.0    Smokeless tobacco: Never Used   Substance and Sexual Activity    Alcohol use: No     Alcohol/week: 0.0 standard drinks    Drug use: Yes     Frequency: 14.0 times per week     Types: Marijuana    Sexual activity: Not on file          Family History   Problem Relation Age of Onset    Heart Attack Father          Physical exam:     Vitals:    10/12/20 0846   BP: 138/78   Pulse: 84   Resp: 14   Temp: 98.3 °F (36.8 °C)   SpO2: 92%    Temp (24hrs), Av.1 °F (36.7 °C), Min:97.6 °F (36.4 °C), Max:98.7 °F (37.1 °C)   & BP  Min: 125/70  Max: 139/80 Pulse  Av  Min: 71  Max: 85    24HR INTAKE/OUTPUT:      Intake/Output Summary (Last 24 hours) at 10/12/2020 1212  Last data filed at 10/12/2020 1135  Gross per 24 hour   Intake 0 ml   Output --   Net 0 ml         Gen: Resting in bed, in no acute distress  Head: NC , AT  Neck: supple, trachea in midline, no overt thyromegaly noted  CV: S1, S2 heard ,no peripheral edema, no JVD    Lungs: no increased WOB, no abdominal breathing pattern, +b/l air entry, no crackles  Abd: Soft, not tender , not distended , no guarding PEG in place   Skin: no rash/lesions on exposed skin of head and neck   R TDC in place    Database  Lab Results   Component Value Date    WBC 9.5 10/12/2020    HGB 8.2 (L) 10/12/2020    HCT 24.9 (L) 10/12/2020    MCV 85.8 10/12/2020     10/12/2020           No results found for: IRON, TIBC, FERRITIN    Lab Results   Component Value Date     10/12/2020    K 3.9 10/12/2020    K 4.3 10/08/2020    CL 98 10/12/2020    CO2 25 10/12/2020    BUN 24 10/12/2020    CREATININE 3.1 10/12/2020    GLUCOSE 111 10/12/2020    CALCIUM 9.5 10/12/2020        Lab Results Component Value Date    PTH <1.2 (L) 10/02/2020    CALCIUM 9.5 10/12/2020    CAION 1.11 (L) 09/15/2020    PHOS 4.8 10/04/2020       Lab Results   Component Value Date    LABALBU 3.2 (L) 10/04/2020       Labs Renal Latest Ref Rng & Units 10/12/2020 10/10/2020 10/8/2020 10/4/2020 10/3/2020   BUN 7 - 20 mg/dL 24(H) 27(H) 10 31(H) 19   Cr 0.6 - 1.1 mg/dL 3. 1(H) 3. 3(H) 1.8(H) 3. 0(H) 2. 2(H)   K 3.5 - 5.1 mmol/L 3.9 3.8 4.3 4.0 4.0   Na 136 - 145 mmol/L 136 141 133(L) 135(L) 130(L)       Lab Results   Component Value Date    MG 2.10 10/04/2020       UA   Lab Results   Component Value Date    SPECGRAV >1.030 09/10/2020    BLOODU MODERATE 09/10/2020    LEUKOCYTESUR Negative 09/10/2020    WBCUA 3-5 09/10/2020    RBCUA 21-50 09/10/2020       RADIOLOGY     Lab Results   Component Value Date    LVEF 33 09/11/2020       Lab Results   Component Value Date    CREATININE 3.1 (H) 10/12/2020    CREATININE 3.3 (H) 10/10/2020    CREATININE 1.8 (H) 10/08/2020    CREATININE 3.0 (H) 10/04/2020    CREATININE 2.2 (H) 10/03/2020       Wt Readings from Last 3 Encounters:   10/12/20 187 lb 9.8 oz (85.1 kg)   10/04/20 188 lb 0.8 oz (85.3 kg)   12/17/18 229 lb (103.9 kg)       BP Readings from Last 3 Encounters:   10/12/20 138/78   10/09/20 (!) 141/69   10/04/20 139/71         Lab Results   Component Value Date    CALCIUM 9.5 10/12/2020    PHOS 4.8 10/04/2020         Lab Results   Component Value Date    LABALBU 3.2 (L) 10/04/2020         Scheduled Meds:   carvedilol  12.5 mg Oral BID WC    levETIRAcetam  1,000 mg Oral BID    diazePAM  5 mg Oral Q12H    sevelamer  0.8 g Per G Tube Daily    sodium chloride flush  10 mL Intravenous 2 times per day    amiodarone  200 mg Per NG tube BID    atorvastatin  10 mg Oral Daily    b complex-C-folic acid  1 capsule Oral Daily    lacosamide  200 mg Oral BID    warfarin (COUMADIN) daily dosing (placeholder)   Other RX Placeholder     Continuous Infusions:   heparin (Porcine) 9.4 mL/hr (10/12/20 0529)     PRN Meds:.heparin (porcine), diphenhydrAMINE, sodium chloride flush, acetaminophen **OR** acetaminophen, promethazine **OR** ondansetron, albuterol sulfate HFA        Assessment & Plan:    - Dialysis dependent MORELIA  - likely secondary to ischemia ATN. She had additional exposure to radiocontrast              - ZENON is neg, ANCA neg, C3 is low at 54  - Initiated on IHD on 9/11/20. Transitioned to CRRT on 9/12/20 which was stopped 9/15/20.   Restarted HD 09/17/20  - outpatient dialysis at [facility] MWF @ 5 PM  - [facility] placed by IR 09/29/20     PLAN:  HD in progress change to 3 K bath UF goal minimum due to low BP Use Albumin prn for BP support  Monitoring for possible recovery still - no signs of recovery so far      - hx of PEA arrest 9/11/20    - Essential HTN: slightly above goal      - encephalopathy: since last admission, confused off and on    - CHF: systolic,  last Echo shows 35%, compensated at this point    - Hx of Factor V leiden deficiency with PE/coagulopathy/DIC:   On coumadin    - hyperphosphatemia : on sevelamer    - anemia :  CHAVEZ protocol           Gaviota Kwon

## 2020-10-12 NOTE — PLAN OF CARE
Problem: Skin Integrity:  Goal: Will show no infection signs and symptoms  Description: Will show no infection signs and symptoms  10/12/2020 1138 by Skip Joy RN  Outcome: Ongoing  Note: No new signs and symptoms of infection     Problem: Skin Integrity:  Goal: Absence of new skin breakdown  Description: Absence of new skin breakdown  Outcome: Ongoing  Note: No new skin breakdown     Problem: Pain:  Goal: Pain level will decrease  Description: Pain level will decrease  10/12/2020 1138 by Skip Joy RN  Outcome: Ongoing  Note: Pt assessed for pain. Pt denies any pain at this time. Will continue to monitor pt and assess for pain throughout rest of shift. Problem: Pain:  Goal: Control of acute pain  Description: Control of acute pain  Outcome: Ongoing  Note: Pt assessed for pain. Pt denies any pain at this time. Will continue to monitor pt and assess for pain throughout rest of shift. Problem: Pain:  Goal: Control of chronic pain  Description: Control of chronic pain  Outcome: Ongoing  Note: Pt assessed for pain. Pt denies any pain at this time. Will continue to monitor pt and assess for pain throughout rest of shift. Problem: Physical Regulation:  Goal: Will remain free from infection  Description: Will remain free from infection  10/12/2020 1138 by Skip Joy RN  Outcome: Ongoing  Note: Will remain free from infection     Problem: Respiratory:  Goal: Ability to maintain normal respiratory secretions will improve  Description: Ability to maintain normal respiratory secretions will improve  Outcome: Ongoing     Problem: Falls - Risk of:  Goal: Will remain free from falls  Description: Will remain free from falls  10/12/2020 1138 by Skip Joy RN  Outcome: Ongoing  Note: Fall risk assessment completed. Fall precautions in place. Call light within reach. Pt educated on calling for assistance before getting up. Walkway free of clutter. Will continue to monitor.

## 2020-10-12 NOTE — PLAN OF CARE
Problem: Skin Integrity:  Goal: Will show no infection signs and symptoms  Description: Will show no infection signs and symptoms  Note: Pt will show no signs or symptoms of infection this shift. Problem: Pain:  Goal: Pain level will decrease  Description: Pain level will decrease  Note: Pt will report a decrease in pain this shift. Pain will be assessed frequently and treated per order. Problem: Falls - Risk of:  Goal: Will remain free from falls  Description: Will remain free from falls  Note: Pt remains free from falls this shift. Fall precautions in place. Will continue to monitor. Problem: Physical Regulation:  Goal: Will remain free from infection  Description: Will remain free from infection  Note: Pt will remain free of infection.

## 2020-10-12 NOTE — PROGRESS NOTES
Hospital Medicine Progress Note      Admit Date: 10/8/2020       CC: F/U for PEG malfunction    HPI: 53 y.o. female who presented to [facility] with PEG malfunction, patient had DIC and cardiogenic shock back in September, on enteral tube feed, transferred to the hospital for new PEG, patient can not participate in history taking, discussed with nurse. Interval History/Subjective: precert restarted for return to [facility] post-acute when ready. Pending neprhology plan. HD today. Changed HD to 3K bath UF goal minimum due to low BPs. Use albumen in HD for low BP PRN. Not ready to d/c yet. HD was stopped early due to nausea and low BP     Per the patient, she does not want to return to that facility she came from. will discuss with social work. Not sure what is reasonable discharge for her, because it has to be some place that can accomodate her HD. But her room will be next to the [facility] office at [facility] and patient's mother has been notified per farhana Pruitt, for better watchful eye there. Review of Systems:       The patient denied headaches, visual changes, LOC, SOB, CP, ABD pain, N/V/D, skin changes, new or worsening weakness or neuromuscular deficits. Comprehensive ROS negative except as mentioned above.     Past Medical History:        Diagnosis Date    Anticoagulant long-term use     Arthritis     Asthma     Baker's cyst     Depression     Factor V Leiden ([address]     [address] Le[address] [address]     H/O blood clots     Heartburn     Hyperlipidemia     MRSA bacteremia 09/10/2020    Osteoarthritis        Past Surgical History:        Procedure Laterality Date    GASTROSTOMY TUBE PLACEMENT N/A 9/25/2020    ESOPHAGOGASTRODUODENOSCOPY PERCUTANEOUS ENDOSCOPIC GASTROSTOMY TUBE PLACEMENT performed by Antwan Wilde MD at [facility] N/A 10/9/2020    ESOPHAGOGASTRODUODENOSCOPY PERCUTANEOUS ENDOCOPIC GASTROSTOMY TUBE PLACEMENT performed by Meg Gardner Esther German., DO at [address] [facility] TUNNELED CATHETER PLACEMENT GREATER THAN 5 YEARS  9/29/2020    IR TUNNELED CATHETER PLACEMENT GREATER THAN 5 YEARS 9/29/2020 [facility] PROCEDURES    TUNNELED VENOUS CATHETER PLACEMENT Right 09/29/2020    Permacath ; RIJ; 19cm; Dr. Chidi Mattson       Allergies:  Ibuprofen    Past medical and surgical history reviewed. Any changes have been noted. PHYSICAL EXAM:  /75   Pulse 85   Temp 97.8 °F (36.6 °C) (Oral)   Resp 15   Ht 5' 7\" (1.702 m)   Wt 187 lb 9.8 oz (85.1 kg)   LMP 12/05/2018 (Exact Date)   SpO2 92%   BMI 29.38 kg/m²     No intake or output data in the 24 hours ending 10/12/20 0804     General appearance:   No apparent distress, appears stated age. Cooperative. HEENT:  Normocephalic, atraumatic. PERRLA. EOMi. Conjunctivae/corneas clear, no icterus, non-injected. Neck: Supple, with full range of motion. No jugular venous distention. Trachea midline. Respiratory:  Normal respiratory effort. Clear to auscultation, bilaterally without Rales/Wheezes/Rhonchi. Cardiovascular:  Regular rate and rhythm without murmurs, rubs or gallops. Abdomen: abd binder in place; GTube unremarkable;  Soft, non-tender, non-distended, without rebound or guarding. Normal bowel sounds. Musculoskeletal:  No clubbing, cyanosis or edema bilaterally. Full range of motion without deformity. Skin: right CW Port-a-cath; RUE  Dialysis catheter dressing intact; unrermarkable; Skin color, texture, turgor normal.  No rashes or lesions. Neurologic: aphasia; Neurovascularly intact without any focal sensory/motor deficits. Cranial nerves: II-XII intact, grossly intact. No facial asymmetry, tongue midline.    Psychiatric:  Alert and oriented, thought content appropriate  Capillary Refill: Brisk,< 3 seconds   Peripheral Pulses: +2 palpable, equal bilaterally       LABS:    Lab Results   Component Value Date    WBC 8.0 10/11/2020    HGB 8.4 (L) 10/11/2020    HCT 26.3 (L) 10/11/2020 arrest 9/11/20  - at baseline now  - consult GI - Erika Conrad now signed off unless new needs re: PEG    ESRD, HD 2/2 MORELIA (ischemia ATN)  - Consult Nephrology  - cont HD schedule (MWF @ [facility])  - BMP trend  - per Nephrology: HD change to 3K bath UF goal minimum due to low BP . Use albumen prn for hypotension during HD  - Nephrology - Dr. Melida Romo    H/o PE 2/2Hx factor V leiden def w/PE   - anticoagulated on coumadin  - currently on hep gtt  - Consult pharm for hep; ok to resume coumadin per GI    HTN  - monitor  - home meds: amiodarone, carvedilol    CHF, systolic  - last echo EF 48%   - currently compensated    Hyperphosphatemia  - cont home sevelamer    Anemia  - monitor        Continue current regimen/therapies. Monitor. Adjust medical regimen as appropriate. Body mass index is 29.38 kg/m². The patient and / or the family were informed of the results of any tests, a time was given to answer questions, a plan was proposed and they agreed with plan.       DVT ppx: coumadin      Diet: DIET TUBE FEED CONTINUOUS/CYCLIC NPO; Renal Formula; Gastrostomy; 20; 55    Consults:  IP CONSULT TO GI  PHARMACY TO DOSE WARFARIN  IP CONSULT TO DIETITIAN  IP CONSULT TO NEPHROLOGY    DISPO/placement plan: pending ; back to facility in a couple of days per nephrology    Code Status: Full Code      Kera Leigh, AGUSTIN - CNP  10/12/20

## 2020-10-12 NOTE — PROGRESS NOTES
Pt resting in bed and appears to be comfortable. Shift assessment and PM medications given via g-tube complete. Pt tolerated well. Pt denies pain at this time. Pt repositioned for comfort. No further needs voiced. Call light in reach. Will continue to monitor.

## 2020-10-12 NOTE — PROGRESS NOTES
Transport here at this time to take patient to dialysis.     Electronically signed by Clark Pond RN on 10/12/2020 at 11:38 AM

## 2020-10-12 NOTE — PROGRESS NOTES
Clinical Pharmacy Note  Warfarin Consult    Carrie Viramontes is a 53 y.o. female receiving warfarin managed by pharmacy. Patient being bridged with weight based heparin. Warfarin Indication: Factor V leiden  Target INR range: 2-3   Dose prior to admission: 7.5 mg daily except 3.75 mg Monday and Thursday    Current warfarin drug-drug interactions: Amiodarone     Recent Labs     10/09/20  1410 10/10/20  0448 10/11/20  0249 10/12/20  0508 10/12/20  1040   HGB 8.4*  --  8.4*  --  8.2*   HCT 26.5*  --  26.3*  --  24.9*   INR  --  1.08 1.09 1.09  --        Assessment/Plan:  Patient with continuous enteral tube feeding which may lead to less warfarin GI absorption  Warfarin 10 mg tonight. Daily PT/INR until stable within therapeutic range. Thank you for the consult. Will continue to follow.   Jolene Jaramillo, [address] [facility]

## 2020-10-12 NOTE — PROGRESS NOTES
Pt had a very restless night. Pt states she slept all day and was unable to sleep tonight. Pt wants to try to stay awake throughout day in order to sleep at night. Pt does not complain of itching this AM. States no pain. No needs voiced. Call light in reach. Will continue to monitor.

## 2020-10-12 NOTE — CARE COORDINATION
Received message from Debi morejon at [place] [address] is completed.     Electronically signed by Shawn Reinoso on 10/12/2020 at 5:00 PM

## 2020-10-13 LAB
ANION GAP SERPL CALCULATED.3IONS-SCNC: 12 MMOL/L (ref 3–16)
APTT: 64.5 SEC (ref 24.2–36.2)
BUN BLDV-MCNC: 18 MG/DL (ref 7–20)
CALCIUM SERPL-MCNC: 9.6 MG/DL (ref 8.3–10.6)
CHLORIDE BLD-SCNC: 100 MMOL/L (ref 99–110)
CO2: 23 MMOL/L (ref 21–32)
CREAT SERPL-MCNC: 2.5 MG/DL (ref 0.6–1.1)
GFR AFRICAN AMERICAN: 25
GFR NON-AFRICAN AMERICAN: 20
GLUCOSE BLD-MCNC: 111 MG/DL (ref 70–99)
GLUCOSE BLD-MCNC: 95 MG/DL (ref 70–99)
HCT VFR BLD CALC: 25.9 % (ref 36–48)
HEMOGLOBIN: 8.4 G/DL (ref 12–16)
INR BLD: 1.49 (ref 0.86–1.14)
MCH RBC QN AUTO: 28.3 PG (ref 26–34)
MCHC RBC AUTO-ENTMCNC: 32.6 G/DL (ref 31–36)
MCV RBC AUTO: 86.9 FL (ref 80–100)
PDW BLD-RTO: 21.8 % (ref 12.4–15.4)
PERFORMED ON: ABNORMAL
PLATELET # BLD: 345 K/UL (ref 135–450)
PMV BLD AUTO: 7.6 FL (ref 5–10.5)
POTASSIUM SERPL-SCNC: 4.2 MMOL/L (ref 3.5–5.1)
PROTHROMBIN TIME: 17.4 SEC (ref 10–13.2)
RBC # BLD: 2.98 M/UL (ref 4–5.2)
SODIUM BLD-SCNC: 135 MMOL/L (ref 136–145)
WBC # BLD: 10.6 K/UL (ref 4–11)

## 2020-10-13 PROCEDURE — 6370000000 HC RX 637 (ALT 250 FOR IP): Performed by: NURSE PRACTITIONER

## 2020-10-13 PROCEDURE — 36415 COLL VENOUS BLD VENIPUNCTURE: CPT

## 2020-10-13 PROCEDURE — 1200000000 HC SEMI PRIVATE

## 2020-10-13 PROCEDURE — 2580000003 HC RX 258: Performed by: INTERNAL MEDICINE

## 2020-10-13 PROCEDURE — 94760 N-INVAS EAR/PLS OXIMETRY 1: CPT

## 2020-10-13 PROCEDURE — 85027 COMPLETE CBC AUTOMATED: CPT

## 2020-10-13 PROCEDURE — 85730 THROMBOPLASTIN TIME PARTIAL: CPT

## 2020-10-13 PROCEDURE — 6370000000 HC RX 637 (ALT 250 FOR IP): Performed by: INTERNAL MEDICINE

## 2020-10-13 PROCEDURE — 2500000003 HC RX 250 WO HCPCS: Performed by: NURSE PRACTITIONER

## 2020-10-13 PROCEDURE — 85610 PROTHROMBIN TIME: CPT

## 2020-10-13 PROCEDURE — 2500000003 HC RX 250 WO HCPCS: Performed by: INTERNAL MEDICINE

## 2020-10-13 PROCEDURE — 80048 BASIC METABOLIC PNL TOTAL CA: CPT

## 2020-10-13 PROCEDURE — 6370000000 HC RX 637 (ALT 250 FOR IP): Performed by: FAMILY MEDICINE

## 2020-10-13 RX ORDER — LOPERAMIDE HCL 1 MG/7.5ML
4 SOLUTION ORAL 4 TIMES DAILY PRN
Status: DISCONTINUED | OUTPATIENT
Start: 2020-10-13 | End: 2020-10-16 | Stop reason: HOSPADM

## 2020-10-13 RX ORDER — WARFARIN SODIUM 5 MG/1
5 TABLET ORAL
Status: COMPLETED | OUTPATIENT
Start: 2020-10-13 | End: 2020-10-13

## 2020-10-13 RX ADMIN — DIPHENHYDRAMINE HYDROCHLORIDE 50 MG: 25 SOLUTION ORAL at 00:17

## 2020-10-13 RX ADMIN — CARVEDILOL 12.5 MG: 12.5 TABLET, FILM COATED ORAL at 18:00

## 2020-10-13 RX ADMIN — FAMOTIDINE 20 MG: 10 INJECTION INTRAVENOUS at 09:52

## 2020-10-13 RX ADMIN — LOPERAMIDE HYDROCHLORIDE 4 MG: 1 SOLUTION ORAL at 18:00

## 2020-10-13 RX ADMIN — CARVEDILOL 12.5 MG: 12.5 TABLET, FILM COATED ORAL at 09:52

## 2020-10-13 RX ADMIN — DIAZEPAM 5 MG: 5 TABLET ORAL at 21:11

## 2020-10-13 RX ADMIN — HEPARIN SODIUM 9.4 ML/HR: 10000 INJECTION, SOLUTION INTRAVENOUS at 09:53

## 2020-10-13 RX ADMIN — SODIUM CHLORIDE, PRESERVATIVE FREE 10 ML: 5 INJECTION INTRAVENOUS at 10:18

## 2020-10-13 RX ADMIN — PROMETHAZINE HYDROCHLORIDE 12.5 MG: 25 TABLET ORAL at 10:17

## 2020-10-13 RX ADMIN — LACOSAMIDE 200 MG: 100 TABLET, FILM COATED ORAL at 09:52

## 2020-10-13 RX ADMIN — DIAZEPAM 5 MG: 5 TABLET ORAL at 09:52

## 2020-10-13 RX ADMIN — SODIUM CHLORIDE, PRESERVATIVE FREE 10 ML: 5 INJECTION INTRAVENOUS at 21:11

## 2020-10-13 RX ADMIN — LEVETIRACETAM 1000 MG: 500 SOLUTION ORAL at 21:11

## 2020-10-13 RX ADMIN — AMIODARONE HYDROCHLORIDE 200 MG: 200 TABLET ORAL at 09:52

## 2020-10-13 RX ADMIN — LEVETIRACETAM 1000 MG: 500 SOLUTION ORAL at 09:53

## 2020-10-13 RX ADMIN — LACOSAMIDE 200 MG: 100 TABLET, FILM COATED ORAL at 21:11

## 2020-10-13 RX ADMIN — AMIODARONE HYDROCHLORIDE 200 MG: 200 TABLET ORAL at 21:11

## 2020-10-13 RX ADMIN — SEVELAMER CARBONATE 0.8 G: 2.4 POWDER, FOR SUSPENSION ORAL at 09:51

## 2020-10-13 RX ADMIN — ATORVASTATIN CALCIUM 10 MG: 10 TABLET, FILM COATED ORAL at 09:52

## 2020-10-13 RX ADMIN — WARFARIN SODIUM 5 MG: 5 TABLET ORAL at 18:00

## 2020-10-13 RX ADMIN — PROMETHAZINE HYDROCHLORIDE 12.5 MG: 25 TABLET ORAL at 21:11

## 2020-10-13 NOTE — PLAN OF CARE
Problem: Skin Integrity:  Goal: Will show no infection signs and symptoms  Description: Will show no infection signs and symptoms  10/13/2020 1020 by Claude Enamorado RN  Outcome: Ongoing  Note: Pt has been afebrile this shift. Will monitor for changes. 10/13/2020 0539 by Mega Carballo RN  Outcome: Ongoing  Goal: Absence of new skin breakdown  Description: Absence of new skin breakdown  10/13/2020 1020 by Claude Enamorado RN  Outcome: Ongoing  Note: No new areas of skin breakdown noted. Will monitor for changes. 10/13/2020 0539 by Mega Carballo RN  Outcome: Ongoing     Problem: Pain:  Goal: Pain level will decrease  Description: Pain level will decrease  10/13/2020 1020 by Claude Enamorado RN  Outcome: Ongoing  Note: Pt has had no c/o pain. Will monitor and medicate as needed. 10/13/2020 0539 by Mega Carballo RN  Outcome: Ongoing  Goal: Control of acute pain  Description: Control of acute pain  10/13/2020 1020 by Claude Enamorado RN  Outcome: Ongoing  Note: Pt has had no c/o pain. Will monitor and medicate as needed. 10/13/2020 0539 by Mega Carballo RN  Outcome: Ongoing  Goal: Control of chronic pain  Description: Control of chronic pain  10/13/2020 1020 by Claude Enamorado RN  Outcome: Ongoing  Note: Pt has had no c/o pain. Will monitor and medicate as needed. 10/13/2020 0539 by Mega Carballo RN  Outcome: Ongoing     Problem: Physical Regulation:  Goal: Will remain free from infection  Description: Will remain free from infection  10/13/2020 1020 by Claude Enamorado RN  Outcome: Ongoing  Note: Pt has been afebrile with no s/s of infection noted. Will monitor for changes.   10/13/2020 0539 by Mega Carballo RN  Outcome: Ongoing     Problem: Respiratory:  Goal: Ability to maintain normal respiratory secretions will improve  Description: Ability to maintain normal respiratory secretions will improve  10/13/2020 1020 by Claude Enamorado RN  Outcome: Ongoing  Note: Pt's respirations are WDL with secretions noted. 10/13/2020 0539 by Papi Gonzales RN  Outcome: Ongoing     Problem: Falls - Risk of:  Goal: Will remain free from falls  Description: Will remain free from falls  10/13/2020 1020 by Tre Torrez RN  Outcome: Ongoing  Note: Pt free from injury or falls at this time, fall precautions in place, bed in low position, side rail up x2, [place] Fall Risk: High (45 and higher), bed alarm on, reoriented to room and call light, reminded not to get up without assistance, call light in reach, will continue to monitor. Pt verbalizes understanding of fall risk procedures. 10/13/2020 0539 by Papi Gonzales RN  Outcome: Ongoing  Goal: Absence of physical injury  Description: Absence of physical injury  10/13/2020 1020 by Tre Torrez RN  Outcome: Ongoing  Note: Pt has not incurred any type of physical injury this shift. 10/13/2020 0539 by Papi Gonzales RN  Outcome: Ongoing     Problem: OXYGENATION/RESPIRATORY FUNCTION  Goal: Patient will maintain patent airway  10/13/2020 1020 by Tre Torrez RN  Outcome: Ongoing  Note: Patient has maintained a patent airway, repositions self, lung sounds bilaterally clear/diminished, no cough noted, RT is involved in patient care and is providing care, patient has had adequate fluid intake, no need to suction airway at this time, educated patient to turn and cough and deep breathe every two hours and as needed, encouraged incentive spirometer and patient has been performing. Will continue to monitor and reassess.     10/13/2020 0539 by Papi Gonzales RN  Outcome: Ongoing  Goal: Patient will achieve/maintain normal respiratory rate/effort  Description: Respiratory rate and effort will be within normal limits for the patient  10/13/2020 1020 by Tre Torrez RN  Outcome: Ongoing  Note: Vital signs stable, respiratory rate normal, heart rate is WNLs and regular on cardiac monitor, +2 pulses and less than 3 second cap refill noted in all extremities, body color appropriate for ethnicity and temperature warm, no edema noted, patient repositions  self, and daily weights are ordered and +3 lb change from yesterday's weight. MD notified of weight gain. Will continue to monitor and reassess. 10/13/2020 0539 by Lissette Simmons RN  Outcome: Ongoing     Problem: HEMODYNAMIC STATUS  Goal: Patient has stable vital signs and fluid balance  10/13/2020 1020 by Mary Marques RN  Outcome: Ongoing  Note: Vital signs stable, respiratory rate normal, heart rate is WNLs and regular on cardiac monitor, +2 pulses and less than 3 second cap refill noted in all extremities, body color appropriate for ethnicity and temperature warm, no edema noted, patient repositions  self, and daily weights are ordered and +3 lb change from yesterday's weight. MD notified of weight gain. Will continue to monitor and reassess. 10/13/2020 0539 by Lissette Simmons RN  Outcome: Ongoing     Problem: FLUID AND ELECTROLYTE IMBALANCE  Goal: Fluid and electrolyte balance are achieved/maintained  10/13/2020 1020 by Mary Marques RN  Outcome: Ongoing  Note: Medications administered as ordered, abnormal lab values assessed and reported to physician if critical or pertinent to patient status, skin turgor, mucus membranes, and respiratory status assess this shift and LS are clear/diminished. Patient and Family was included in plan of care. Will continue to monitor and reassess. 10/13/2020 0539 by Lissette Simmons RN  Outcome: Ongoing     Problem: ACTIVITY INTOLERANCE/IMPAIRED MOBILITY  Goal: Mobility/activity is maintained at optimum level for patient  10/13/2020 1020 by Mary Marques RN  Outcome: Ongoing  Note: Pt currently on bedrest and moves self around in bed frequently.   10/13/2020 0539 by Lissette Simmons RN  Outcome: Ongoing     Problem: Nutrition  Goal: Optimal nutrition therapy  10/13/2020 1020 by Mary Marques RN  Outcome: Ongoing  Note:

## 2020-10-13 NOTE — PROGRESS NOTES
[facility] Inpatient Nephrology Consult Note    Reason for Consult:  MORELIA on HD  Requesting Physician:  Dr. Raymundo Multani    History of Present Ilness:    53 y.o. female who  has a past medical history of Anticoagulant long-term use, Arthritis, Asthma, Baker's cyst, Depression, Factor V Leiden ([address] Factor V Leiden ([address] H/O blood clots, Heartburn, Hyperlipidemia, MRSA bacteremia (09/10/2020), and Osteoarthritis. who presented for PEG tube dislodgement. Patient had DIC with cardiogenic shock in September [facility] malnutrition s/p PEG tube placement per GI. Patient presented to ED for PEG tube dislodgement and was replaced by GI. The patient had an MORELIA and is on HD MWF at [facility] [place], operated by [facility] DR ANJEL PATEL, still monitoring for recovery. We are consulted for dialysis dependent MORELIA. Labs were stable, otherwise no reported fever/chills/N/V/D, no issues with HD.       Subjective:    Resting   On Heparin gtt  Did not sleep well last night    No family at bedside             Past Medical History:   Diagnosis Date    Anticoagulant long-term use     Arthritis     Asthma     Baker's cyst     Depression     Factor V Leiden ([address]     Factor V Leiden ([address]     H/O blood clots     Heartburn     Hyperlipidemia     MRSA bacteremia 09/10/2020    Osteoarthritis              Past Surgical History:   Procedure Laterality Date    GASTROSTOMY TUBE PLACEMENT N/A 9/25/2020    ESOPHAGOGASTRODUODENOSCOPY PERCUTANEOUS ENDOSCOPIC GASTROSTOMY TUBE PLACEMENT performed by Scott Quintana MD at [facility] N/A 10/9/2020    ESOPHAGOGASTRODUODENOSCOPY PERCUTANEOUS ENDOCOPIC GASTROSTOMY TUBE PLACEMENT performed by Paloma Velasco DO at [address] [facility]  IR TUNNELED CATHETER PLACEMENT GREATER THAN 5 YEARS  9/29/2020    IR TUNNELED CATHETER PLACEMENT GREATER THAN 5 YEARS 9/29/2020 [facility] PROCEDURES    TUNNELED VENOUS CATHETER PLACEMENT Right 09/29/2020    Permacath ; RIJ; 19cm; Dr. Leigh Ann Anaya        Allergies:  Ibuprofen      Social History     Occupational History    Not on file   Tobacco Use    Smoking status: Former Smoker     Packs/day: 0.50     Types: Cigarettes     Last attempt to quit: 2020     Years since quittin.0    Smokeless tobacco: Never Used   Substance and Sexual Activity    Alcohol use: No     Alcohol/week: 0.0 standard drinks    Drug use: Yes     Frequency: 14.0 times per week     Types: Marijuana    Sexual activity: Not on file          Family History   Problem Relation Age of Onset    Heart Attack Father          Physical exam:     Vitals:    10/13/20 1533   BP: 121/66   Pulse: 74   Resp: 16   Temp: 97.4 °F (36.3 °C)   SpO2: 96%    Temp (24hrs), Av.2 °F (36.8 °C), Min:97.4 °F (36.3 °C), Max:98.7 °F (37.1 °C)   & BP  Min: 121/66  Max: 194/79 Pulse  Av.1  Min: 74  Max: 100    24HR INTAKE/OUTPUT:      Intake/Output Summary (Last 24 hours) at 10/13/2020 1707  Last data filed at 10/13/2020 0657  Gross per 24 hour   Intake 1192.3 ml   Output --   Net 1192.3 ml         Gen: Resting in bed, in no acute distress  Head: NC , AT  Neck: supple, trachea in midline, no overt thyromegaly noted  CV: S1, S2 heard ,no peripheral edema, no JVD    Lungs: no increased WOB, no abdominal breathing pattern, +b/l air entry, no crackles  Abd: Soft, not tender , not distended , no guarding PEG in place   Skin: no rash/lesions on exposed skin of head and neck   R TDC in place    Database  Lab Results   Component Value Date    WBC 10.6 10/13/2020    HGB 8.4 (L) 10/13/2020    HCT 25.9 (L) 10/13/2020    MCV 86.9 10/13/2020     10/13/2020           No results found for: IRON, TIBC, FERRITIN    Lab Results   Component Value Date     10/13/2020    K 4.2 10/13/2020    K 4.3 10/08/2020     10/13/2020    CO2 23 10/13/2020    BUN 18 10/13/2020    CREATININE 2.5 10/13/2020    GLUCOSE 95 10/13/2020    CALCIUM 9.6 10/13/2020        Lab Results   Component Value Date PTH <1.2 (L) 10/02/2020    CALCIUM 9.6 10/13/2020    CAION 1.11 (L) 09/15/2020    PHOS 4.8 10/04/2020       Lab Results   Component Value Date    LABALBU 3.2 (L) 10/04/2020       Labs Renal Latest Ref Rng & Units 10/13/2020 10/12/2020 10/10/2020 10/8/2020 10/4/2020   BUN 7 - 20 mg/dL 18 24(H) 27(H) 10 31(H)   Cr 0.6 - 1.1 mg/dL 2. 5(H) 3. 1(H) 3. 3(H) 1.8(H) 3. 0(H)   K 3.5 - 5.1 mmol/L 4.2 3.9 3.8 4.3 4.0   Na 136 - 145 mmol/L 135(L) 136 141 133(L) 135(L)       Lab Results   Component Value Date    MG 2.10 10/04/2020       UA   Lab Results   Component Value Date    SPECGRAV >1.030 09/10/2020    BLOODU MODERATE 09/10/2020    LEUKOCYTESUR Negative 09/10/2020    WBCUA 3-5 09/10/2020    RBCUA 21-50 09/10/2020       RADIOLOGY     Lab Results   Component Value Date    LVEF 33 09/11/2020       Lab Results   Component Value Date    CREATININE 2.5 (H) 10/13/2020    CREATININE 3.1 (H) 10/12/2020    CREATININE 3.3 (H) 10/10/2020    CREATININE 1.8 (H) 10/08/2020    CREATININE 3.0 (H) 10/04/2020       Wt Readings from Last 3 Encounters:   10/13/20 190 lb 11.2 oz (86.5 kg)   10/04/20 188 lb 0.8 oz (85.3 kg)   12/17/18 229 lb (103.9 kg)       BP Readings from Last 3 Encounters:   10/13/20 121/66   10/09/20 (!) 141/69   10/04/20 139/71         Lab Results   Component Value Date    CALCIUM 9.6 10/13/2020    PHOS 4.8 10/04/2020         Lab Results   Component Value Date    LABALBU 3.2 (L) 10/04/2020         Scheduled Meds:   warfarin  5 mg Oral Once    famotidine (PEPCID) injection  20 mg Intravenous Daily    carvedilol  12.5 mg Oral BID WC    levETIRAcetam  1,000 mg Oral BID    diazePAM  5 mg Oral Q12H    sevelamer  0.8 g Per G Tube Daily    sodium chloride flush  10 mL Intravenous 2 times per day    amiodarone  200 mg Per NG tube BID    atorvastatin  10 mg Oral Daily    b complex-C-folic acid  1 capsule Oral Daily    lacosamide  200 mg Oral BID    warfarin (COUMADIN) daily dosing (placeholder)   Other RX Placeholder Continuous Infusions:   heparin (Porcine) 9.4 mL/hr (10/13/20 0953)     PRN Meds:. Loperamide HCl, heparin (porcine), albumin human, diphenhydrAMINE, sodium chloride flush, acetaminophen **OR** acetaminophen, promethazine **OR** ondansetron, albuterol sulfate HFA        Assessment & Plan:    - Dialysis dependent MORELIA  - likely secondary to ischemia ATN. She had additional exposure to radiocontrast              - ZENON is neg, ANCA neg, C3 is low at 54  - Initiated on IHD on 9/11/20. Transitioned to CRRT on 9/12/20 which was stopped 9/15/20.   Restarted HD 09/17/20  - outpatient dialysis at [facility] MWF @ 5 PM  - [address] placed by IR 09/29/20     PLAN:  HD in Am  Monitoring for possible recovery still - no signs of recovery so far      - hx of PEA arrest 9/11/20    - Essential HTN: slightly above goal      - encephalopathy: since last admission, confused off and on    - CHF: systolic,  last Echo shows 35%, compensated at this point    - Hx of Factor V leiden deficiency with PE/coagulopathy/DIC:   On coumadin    - hyperphosphatemia : on sevelamer    - anemia :  CHAVEZ protocol           Leslie Ball

## 2020-10-13 NOTE — PROGRESS NOTES
Pt awake, but very restless this AM. VSS. Continues to pull at telemetry monitor despite redirection. Morning meds given with no issues. Assessment completed. Tube feed still infusing at 55 mL/hr. Pt changed and repositioned for comfort. Zinc cream applied on bottom. No further needs at this time. Fall precautions in place. Call light within reach. Will continue to monitor.     Electronically signed by Sparkle Herrera RN on 10/13/2020 at 10:25 AM

## 2020-10-13 NOTE — PLAN OF CARE
Problem: Skin Integrity:  Goal: Will show no infection signs and symptoms  Description: Will show no infection signs and symptoms  Outcome: Ongoing  Goal: Absence of new skin breakdown  Description: Absence of new skin breakdown  Outcome: Ongoing   Skin assessment completed every shift. Pt assessed for incontinence, appropriate barrier cream and wipes used prn. Pt encouraged to turn/rotate every 2 hours. Assistance provided if pt unable to do so themselves. Problem: Pain:  Goal: Pain level will decrease  Description: Pain level will decrease  Outcome: Ongoing  Goal: Control of acute pain  Description: Control of acute pain  Outcome: Ongoing  Goal: Control of chronic pain  Description: Control of chronic pain  Outcome: Ongoing   Pain/discomfort being managed with PRN analgesics per MD orders. Pt able to express presence and absence of pain and rate pain appropriately using numerical scale. Problem: Physical Regulation:  Goal: Will remain free from infection  Description: Will remain free from infection  Outcome: Ongoing     Problem: Respiratory:  Goal: Ability to maintain normal respiratory secretions will improve  Description: Ability to maintain normal respiratory secretions will improve  Outcome: Ongoing     Problem: Falls - Risk of:  Goal: Will remain free from falls  Description: Will remain free from falls  Outcome: Ongoing  Goal: Absence of physical injury  Description: Absence of physical injury  Outcome: Ongoing   Fall risk assessment completed every shift. All precautions in place. Pt has call light within reach at all times. Room clear of clutter. Pt aware to call for assistance when getting up.      Problem: OXYGENATION/RESPIRATORY FUNCTION  Goal: Patient will maintain patent airway  Outcome: Ongoing  Goal: Patient will achieve/maintain normal respiratory rate/effort  Description: Respiratory rate and effort will be within normal limits for the patient  Outcome: Ongoing     Problem: HEMODYNAMIC STATUS  Goal: Patient has stable vital signs and fluid balance  Outcome: Ongoing     Problem: FLUID AND ELECTROLYTE IMBALANCE  Goal: Fluid and electrolyte balance are achieved/maintained  Outcome: Ongoing     Problem: ACTIVITY INTOLERANCE/IMPAIRED MOBILITY  Goal: Mobility/activity is maintained at optimum level for patient  Outcome: Ongoing     Problem: Nutrition  Goal: Optimal nutrition therapy  Outcome: Ongoing

## 2020-10-13 NOTE — PROGRESS NOTES
Hospital Medicine Progress Note      Admit Date: 10/8/2020       CC: F/U for PEG malfunction    HPI: 53 y.o. female who presented to [facility] with PEG malfunction, patient had DIC and cardiogenic shock back in September, on enteral tube feed, transferred to the hospital for new PEG, patient can not participate in history taking, discussed with nurse.     Had PEA arrest 0/31/7311 91/02: precert restarted for return to [facility] post-acute when ready. Pending neprhology plan. HD today. Changed HD to 3K bath UF goal minimum due to low BPs. Use albumen in HD for low BP PRN. Not ready to d/c yet.      HD was stopped early due to nausea and low BP      Per the patient, she does not want to return to that facility she came from. will discuss with social work. Not sure what is reasonable discharge for her, because it has to be some place that can accomodate her HD. But her room will be next to the [facility] office at [facility] and patient's mother has been notified per farhana Pruitt, for better watchful eye there. Interval History/Subjective: sleeping when I enter. Does not engage much today. reportedly anxious overnight, pulling at things constantly. TFs at 55mL/hr. She is on scheduled valium. Pre-cert completed at [facility]. Awaiting readiness per nephrology to go back to facility . /62 last night. Back on coumadin. (target 2-3) for factor V leiden, bilat PE, Acute CVA and afib. Was on 7.5mg daily except 3.75mg Mon and Thurs prior to admission previous adm. On hep gtt bridge to therapeutic INR. 3 lb wt gain on bed scale overnight per RN. Review of Systems:       The patient denied headaches, visual changes, LOC, SOB, CP, ABD pain, N/V/D, skin changes, new or worsening weakness or neuromuscular deficits. Comprehensive ROS negative except as mentioned above.     Past Medical History:        Diagnosis Date    Anticoagulant long-term use     Arthritis     Asthma     Baker's cyst     Depression     [facility] Leiden [facility] [address]     Factor V Leiden ([facility] [address]     H/O blood clots     Heartburn     Hyperlipidemia     MRSA bacteremia 09/10/2020    Osteoarthritis        Past Surgical History:        Procedure Laterality Date    GASTROSTOMY TUBE PLACEMENT N/A 9/25/2020    ESOPHAGOGASTRODUODENOSCOPY PERCUTANEOUS ENDOSCOPIC GASTROSTOMY TUBE PLACEMENT performed by Marcus Beckman MD at [address] [facility] N/A 10/9/2020    ESOPHAGOGASTRODUODENOSCOPY PERCUTANEOUS ENDOCOPIC GASTROSTOMY TUBE PLACEMENT performed by Amalia Velazquez DO at [facility]  IR TUNNELED CATHETER PLACEMENT GREATER THAN 5 YEARS  9/29/2020    IR TUNNELED CATHETER PLACEMENT GREATER THAN 5 YEARS 9/29/2020 [facility] PROCED[facility]    TUNNELED VENOUS CATHETER PLACEMENT Right 09/29/2020    Permacath ; RIJ; 19cm; Dr. Davida Borja       Allergies:  Ibuprofen    Past medical and surgical history reviewed. Any changes have been noted. PHYSICAL EXAM:  BP (!) 150/62 Comment: manually  Pulse 96   Temp 98.2 °F (36.8 °C) (Oral)   Resp 16   Ht 5' 7\" (1.702 m)   Wt 190 lb 11.2 oz (86.5 kg)   LMP 12/05/2018 (Exact Date)   SpO2 95%   BMI 29.87 kg/m²       Intake/Output Summary (Last 24 hours) at 10/13/2020 0819  Last data filed at 10/13/2020 4165  Gross per 24 hour   Intake 2092.3 ml   Output 900 ml   Net 1192.3 ml         General appearance:   No apparent distress, appears stated age. Cooperative. HEENT:  Normocephalic, atraumatic. PERRLA. EOMi. Conjunctivae/corneas clear, no icterus, non-injected. Neck: Supple, with full range of motion. No jugular venous distention. Trachea midline. Respiratory:  Normal respiratory effort. Clear to auscultation, bilaterally without Rales/Wheezes/Rhonchi. Cardiovascular:  Regular rate and rhythm without murmurs, rubs or gallops. Abdomen: abd binder in place; GTube unremarkable;  Soft, non-tender, non-distended, without rebound or guarding.  Normal bowel sounds. Musculoskeletal:  No clubbing, cyanosis or edema bilaterally. Full range of motion without deformity. Skin: right CW Port-a-cath; RUE  Dialysis catheter dressing intact; unrermarkable; Skin color, texture, turgor normal.  No rashes or lesions. Neurologic: aphasia; Neurovascularly intact without any focal sensory/motor deficits. Cranial nerves: II-XII intact, grossly intact. No facial asymmetry, tongue midline. Psychiatric:  Alert and oriented, thought content appropriate  Capillary Refill: Brisk,< 3 seconds   Peripheral Pulses: +2 palpable, equal bilaterally     LABS:    Lab Results   Component Value Date    WBC 10.6 10/13/2020    HGB 8.4 (L) 10/13/2020    HCT 25.9 (L) 10/13/2020    MCV 86.9 10/13/2020     10/13/2020    LYMPHOPCT 18.7 10/12/2020    RBC 2.98 (L) 10/13/2020    MCH 28.3 10/13/2020    MCHC 32.6 10/13/2020    RDW 21.8 (H) 10/13/2020       Lab Results   Component Value Date    CREATININE 2.5 (H) 10/13/2020    BUN 18 10/13/2020     (L) 10/13/2020    K 4.2 10/13/2020     10/13/2020    CO2 23 10/13/2020       Lab Results   Component Value Date    MG 2.10 10/04/2020       Lab Results   Component Value Date    ALT 23 10/04/2020    AST 32 10/04/2020    ALKPHOS 131 (H) 10/04/2020    BILITOT <0.2 10/04/2020        No flowsheet data found.     Lab Results   Component Value Date    LABA1C 5.6 12/17/2018       Imaging:  No orders to display       Scheduled and prn Medications:    Scheduled Meds:   famotidine (PEPCID) injection  20 mg Intravenous Daily    carvedilol  12.5 mg Oral BID WC    levETIRAcetam  1,000 mg Oral BID    diazePAM  5 mg Oral Q12H    sevelamer  0.8 g Per G Tube Daily    sodium chloride flush  10 mL Intravenous 2 times per day    amiodarone  200 mg Per NG tube BID    atorvastatin  10 mg Oral Daily    b complex-C-folic acid  1 capsule Oral Daily    lacosamide  200 mg Oral BID    warfarin (COUMADIN) daily dosing (placeholder)   Other RX Placeholder Continuous Infusions:   heparin (Porcine) 9.4 mL/hr (10/12/20 0529)     PRN Meds:.heparin (porcine), albumin human, diphenhydrAMINE, sodium chloride flush, acetaminophen **OR** acetaminophen, promethazine **OR** ondansetron, albuterol sulfate HFA    Assessment & Plan:        G-Tube malfunction  - NPO for now: restarted TFs renal formula: Goal 55mL/hr   -abd binder since pt at pull risk  - convert meds to liquid if possible and infuse through PEG; if no alternative, crush and mix in 20-30ml water and infuse through PEG  - keep outer pumper at 5cm  - keep HOB during tube feeds  - place dressing over top of PEG, do not place dressing between outer pumper and skin  - flush PEG with 30mL water tid and before and after each use. Encephalopathy s/p cardiogenic shock (initially in Sept.)  - PEA arrest 9/11/20  - at baseline now  - consult GI - [facility] now signed off unless new needs re: PEG     ESRD, HD 2/2 MORELIA (ischemia ATN)  - Consult Nephrology  - cont HD schedule (MWF @ [facility]")  - BMP trend  - per Nephrology: HD change to 3K bath UF goal minimum due to low BP . Use albumen prn for hypotension during HD  - Nephrology - Dr. Garry Xiong     H/o PE 2/2Hx factor V leiden def w/PE   - anticoagulated on coumadin  - currently on hep gtt  - Consult pharm for hep; ok to resume coumadin per GI     HTN  - monitor  - home meds: amiodarone, carvedilol     CHF, systolic  - last echo EF 12%   - currently compensated     Hyperphosphatemia  - cont home sevelamer     Anemia  - monitor          Continue current regimen/therapies. Monitor. Adjust medical regimen as appropriate. Body mass index is 29.87 kg/m². The patient and / or the family were informed of the results of any tests, a time was given to answer questions, a plan was proposed and they agreed with plan.       DVT ppx: coumadin      Diet: DIET TUBE FEED CONTINUOUS/CYCLIC NPO; Renal Formula; Gastrostomy; 20; 55    Consults:  IP CONSULT TO GI  PHARMACY TO DOSE WARFARIN  IP CONSULT TO DIETITIAN  IP CONSULT TO NEPHROLOGY    DISPO/placement plan: pending ; back to facility in a couple of days per nephrology    Code Status: Full Code      AGUSTIN Huizar - HANDY  10/13/20

## 2020-10-13 NOTE — PROGRESS NOTES
Clinical Pharmacy Note  Warfarin Consult    Lee Cedillo is a 53 y.o. female receiving warfarin managed by pharmacy. Patient being bridged with weight based heparin. Warfarin Indication: Factor V leiden  Target INR range: 2-3   Dose prior to admission: 7.5 mg daily except 3.75 mg Monday and Thursday    Current warfarin drug-drug interactions: Amiodarone     Recent Labs     10/11/20  0249 10/12/20  0508 10/12/20  1040 10/13/20  0438   HGB 8.4*  --  8.2* 8.4*   HCT 26.3*  --  24.9* 25.9*   INR 1.09 1.09  --  1.49*       Assessment/Plan:  INR bump 1.09--> 1.49  Patient with continuous enteral tube feeding which may lead to less warfarin GI absorption  Warfarin 5 mg tonight. Daily PT/INR until stable within therapeutic range. Thank you for the consult. Will continue to follow.   Silas Mcdowell, [facility]

## 2020-10-13 NOTE — PROGRESS NOTES
Secure chat sent to Nicki Glaser NP to make her aware of a 3 lb weight gain on the patient. Will continue to monitor.     Electronically signed by Bj Damian RN on 10/13/2020 at 10:31 AM

## 2020-10-13 NOTE — PROGRESS NOTES
Pt awake, alert oriented to all but situation. No c/o pain. Is c/o \"bad\" heartburn. Perfect serve sent to hospitalist to request something for heartburn. Assessment completed and charted. Nephro infusing at 55 mL/hr into PEG tube. Abd binder in place. Pt had removed dressing to right subclavian tunneled cath. New dressing applied using sterile technique. Pt has generalized tremors, or spastic movements. Denies further needs. Call light in reach. Will monitor.

## 2020-10-13 NOTE — PROGRESS NOTES
Pt did not sleep well overnight. She is very anxious and restless. She pulls at things constantly. VS remain stable. TF still infusing at 55 mL/hr. Residual noted to be 90 mL this morning. Pt repositioned in bed. Denies further needs. Call light in reach. Will monitor.

## 2020-10-13 NOTE — ADT AUTH CERT
Utilization Reviews         Gastroenterology GRG - Care Day 5 (10/12/2020) by Sal Michaud RN         Review Status  Review Entered    Completed  10/13/2020 08:32        Criteria Review       Care Day: 5 Care Date: 10/12/2020 Level of Care:    Guideline Day 3    Level Of Care    ( ) * Activity level acceptable    ( ) * Complete discharge planning    Clinical Status    (X) * Hemodynamic stability    10/13/2020 8:32 AM EDT by Altagracia Schwartz      hr 80-94  bp: 138/78    ( ) * Abdominal status acceptable    ( ) * Pain and nausea absent or adequately managed    (X) * Temperature status acceptable    10/13/2020 8:32 AM EDT by Altagracia Schwartz      36.9    ( ) * Intestinal status acceptable    ( ) * Hepatic and biliary abnormalities absent or acceptable    ( ) * General Discharge Criteria met    Interventions    ( ) * No inpatient interventions needed    ( ) * Intake acceptable    * Milestone    Additional Notes    10/12  Day 5       Pt remains on medsurg unit       Vitals: t: 36.7 p: 87 rr: 18 bp: 125/78 spo2: 96% ra       Abn labs:  na: 135, hgb: 8.4, bun: 18, cr: 2.5       Orders:    Keppra 1g po bid    Heparin gtt continuous       Per Nephrology PN:  Assessment & Plan:         - Dialysis dependent MORELIA    - likely secondary to ischemia ATN. She had additional exposure to radiocontrast                - ZENON is neg, ANCA neg, C3 is low at 54    - Initiated on IHD on 9/11/20.  Transitioned to CRRT on 9/12/20 which was stopped 9/15/20.  Restarted HD 09/17/20    - outpatient dialysis at [facility] MWF @ 5 PM    - [facility] placed by IR 09/29/20         PLAN:    HD in progress change to 3 K bath UF goal minimum due to low BP Use Albumin prn for BP support    Monitoring for possible recovery still - no signs of recovery so far              - hx of PEA arrest 9/11/20         - Essential HTN: slightly above goal              - encephalopathy: since last admission, confused off and on         - CHF: systolic,  last Echo shows 35%, compensated at this point         - Hx of Factor V leiden deficiency with PE/coagulopathy/DIC:    On coumadin         - hyperphosphatemia : on sevelamer         - anemia :    CHAVEZ protocol       Per IM PN:  Assessment & Plan:              G-Tube malfunction    - NPO for now: restarted TFs renal formula: Goal 55mL/hr    -abd binder since pt at pull risk    - convert meds to liquid if possible and infuse through PEG; if no alternative, crush and mix in 20-30ml water and infuse through PEG    - keep outer pumper at 5cm    - keep HOB during tube feeds    - place dressing over top of PEG, do not place dressing between outer pumper and skin    - flush PEG with 30mL water tid and before and after each use. Encephalopathy s/p cardiogenic shock (initially in Sept.)    - PEA arrest 9/11/20    - at baseline now    - consult GI - Arcenio Triplett now signed off unless new needs re: PEG         ESRD, HD 2/2 MORELIA (ischemia ATN)    - Consult Nephrology    - cont HD schedule (MWF @ [address] [place] [address])    - BMP trend         H/o PE 2/2Hx factor V leiden def w/PE    - anticoagulated on coumadin    - currently on hep gtt    - Consult pharm for hep; ok to resume coumadin per GI         HTN    - monitor    - home meds: amiodarone, carvedilol         CHF, systolic    - last echo EF 35%    - currently compensated         Hyperphosphatemia    - cont home sevelamer         Anemia    - monitor                   Continue current regimen/therapies. Monitor.  Adjust medical regimen as appropriate.         Body mass index is 29.38 kg/m².         The patient and / or the family were informed of the results of any tests, a time was given to answer questions, a plan was proposed and they agreed with plan.              DVT ppx: coumadin              Diet: DIET TUBE FEED CONTINUOUS/CYCLIC NPO; Renal Formula; Gastrostomy; 20; 54           Gastroenterology GRG - Care Day 4 (10/11/2020) by Sailaja Pretty RN         Review Status  Review Entered MWF @ 5 PM    - TDC placed by IR 09/29/20         PLAN:    HD tomorrow    Monitoring for possible recovery still - no signs of recovery so far              - hx of PEA arrest 9/11/20         - Essential HTN: slightly above goal              - encephalopathy: since last admission, confused today         - CHF: systolic,  last Echo shows 35%, compensated at this point         - Hx of Factor V leiden deficiency with PE/coagulopathy/DIC:    On coumadin         - hyperphosphatemia : on sevelamer         - anemia :    CHAVEZ protocol        Gastroenterology GRG - Care Day 3 (10/10/2020) by Peter Smith RN         Review Status  Review Entered    Completed  10/13/2020 08:32        Criteria Review       Care Day: 3 Care Date: 10/10/2020 Level of Care:    Guideline Day 3    Level Of Care    ( ) * Activity level acceptable    ( ) * Complete discharge planning    Clinical Status    (X) * Hemodynamic stability    10/13/2020 8:32 AM EDT by Jackqueline Members      hr 78-88  bp: 126/79    ( ) * Abdominal status acceptable    ( ) * Pain and nausea absent or adequately managed    (X) * Temperature status acceptable    10/13/2020 8:32 AM EDT by Jackqueline Members      37.1    ( ) * Intestinal status acceptable    ( ) * Hepatic and biliary abnormalities absent or acceptable    ( ) * General Discharge Criteria met    Interventions    ( ) * No inpatient interventions needed    ( ) * Intake acceptable    * Milestone    Additional Notes    10/10  Day 3       Pt remains on medsurg unit.  Peg placed yesterday       Vitals: t: 36.7 p: 93 rr: 13 bp: 110/75 spo2: 98% ra       Abn labs:  bun: 27, cr: 3.3, hgb: 8.4       Orders:    Keppra 1g po bid    Heparin gtt continuous       Per GI PN:    ASSESSMENT:49 yo [place] medical history significant for factor V Leiden deficiency, atrial fibrillation, and prior pulmonary embolism, on Coumadin who had a prior PEG tube placed in September 2020 for oropharyngeal dysphagia secondary to CVA, who had presented from ECF with dislodged PEG tube.      1)Dislodged PEG tube- POD#1 s/p replacement.  Doing well.  Bumper loosened 0.5cm.  Tolerating tube feedings    2) Factor V Leiden mutation with prior PE-  On heparin gtt.  Bridging on to coumadin.           PLAN :    1) OK to resume coumadin. Chip Roberts will need Lovenox bridging or heparin bridging    2) Abdominal binder since patient is a pull risk    3) Convert medications to liquid if possible and infuse through PEG.  If no liquid alternative, then crush and mix in 20-30 cc of water and infuse through PEG. 4)  Keep head of bed elevated during tube feeds. 5)  Keep outer bumper at 5 cm. 6)  Place dressing over top of PEG.  Do not place dressing between outer bumper and skin. 7)  Flush PEG with 30mL of water tid and before and after each use. 8)  Will sign off. Please call with any problems with the [phone]. Per iM PN:    Assessment/Plan:         Active Hospital Problems      Diagnosis    · Complication of gastrostomy tube ([facility] [address] [K94.20]    · PEG tube malfunction ([address] [K94.23]         1. PEG malfunction, s/p replacement, post op day 1. Restart medication enterally. 2. Encephalopathy, appeared after an event in september, cardiogenic shock at that time, appears at baseline now. 3. ESRD, HD    4.  H/O PE, on coumadin as outpatient, patient ESRD, started on heparin for bridging.            Gastroenterology GRG - Care Day 2 (10/9/2020) by Donya Post RN         Review Status  Review Entered    Completed  10/13/2020 08:32        Criteria Review       Care Day: 2 Care Date: 10/9/2020 Level of Care:    Guideline Day 3    Level Of Care    ( ) * Activity level acceptable    ( ) * Complete discharge planning    Clinical Status    (X) * Hemodynamic stability    10/13/2020 8:32 AM EDT by Jayla Barroso      hr 78-93  bp: 122/80    ( ) * Abdominal status acceptable    ( ) * Pain and nausea absent or adequately managed    (X) * Temperature status acceptable    10/13/2020 8:32 AM EDT by Braeden Cardozo      98.3    ( ) * Intestinal status acceptable    ( ) * Hepatic and biliary abnormalities absent or acceptable    ( ) * General Discharge Criteria met    Interventions    ( ) * No inpatient interventions needed    ( ) * Intake acceptable    * Milestone    Additional Notes    10/9  Day 2       Pt remains on medsurg unit       Vitals: t: 98.3 p: 89 rr: 16 bp: 122/80 spo2: 96% ra       Orders:    Keppra 1g po bid    Heparin gtt continuous       Abn labs:  hgb: 8.4       Per iM PN:  Assessment/Plan:         Active Hospital Problems      Diagnosis    · Complication of gastrostomy tube (HCC) [K94.20]    · PEG tube malfunction ([facility] [address] [K94.23]         1. PEG malfunction, likely for replacement today, per GI.    2. Encephalopathy, appeared after an event in september, cardiogenic shock at that time, appears at baseline now. 3. ESRD, HD    4. H/O PE, on coumadin.     keppra and benzodiazepines changed to iv as patient not able to take po, hopefully to get the PEG today so we can restart her other medications           Physician Advisor inpt letter by Elton Lorenz RN         Review Status  Review Entered    In Primary  10/13/2020 08:29        Criteria Review    slr keep in    We recommend that the following pt's current hospitalization under INPATIENT   status is APPROPRIATE .        Name: Jud Lund   : 1970   CSN: [id number]   [facility]        Clinical summary 54 yo with a displaced g tube ,Undergoing HD Has been confused  BP dropped during HD and UF  Vitals unstable  Labs and Imaging Labile   MCG criteria applies yes,   Comments : Npo, tube feeds to resume ,still lethargic on day 3 hd to resume  when stable will return to snf       This chart was reviewed at 5:50 PM 10/12/2020    Iain Franco MD   [address]   CELL : [phone]

## 2020-10-13 NOTE — PROGRESS NOTES
Clinical Pharmacy Note  Heparin Dosing       Lab Results   Component Value Date    APTT 64.5 10/13/2020     Lab Results   Component Value Date    HGB 8.4 10/13/2020    HCT 25.9 10/13/2020     10/13/2020    INR 1.49 10/13/2020       Current Infusion Rate: 9.4 mL/hr    Plan:  Rate: continue at 9.4 mL/hr  Next aPTT: 0600 10/14/20    Pharmacy will continue to monitor and adjust based on aPTT results.   Perla Blanchard, JeromeD

## 2020-10-14 LAB
ANION GAP SERPL CALCULATED.3IONS-SCNC: 12 MMOL/L (ref 3–16)
APTT: 59.6 SEC (ref 24.2–36.2)
APTT: 77.6 SEC (ref 24.2–36.2)
APTT: 78.3 SEC (ref 24.2–36.2)
BUN BLDV-MCNC: 26 MG/DL (ref 7–20)
CALCIUM SERPL-MCNC: 9.8 MG/DL (ref 8.3–10.6)
CHLORIDE BLD-SCNC: 102 MMOL/L (ref 99–110)
CO2: 26 MMOL/L (ref 21–32)
CREAT SERPL-MCNC: 3 MG/DL (ref 0.6–1.1)
GFR AFRICAN AMERICAN: 20
GFR NON-AFRICAN AMERICAN: 17
GLUCOSE BLD-MCNC: 116 MG/DL (ref 70–99)
GLUCOSE BLD-MCNC: 130 MG/DL (ref 70–99)
INR BLD: 1.86 (ref 0.86–1.14)
PERFORMED ON: ABNORMAL
POTASSIUM SERPL-SCNC: 3.8 MMOL/L (ref 3.5–5.1)
PROTHROMBIN TIME: 21.7 SEC (ref 10–13.2)
SODIUM BLD-SCNC: 140 MMOL/L (ref 136–145)

## 2020-10-14 PROCEDURE — 85610 PROTHROMBIN TIME: CPT

## 2020-10-14 PROCEDURE — 6370000000 HC RX 637 (ALT 250 FOR IP): Performed by: INTERNAL MEDICINE

## 2020-10-14 PROCEDURE — 36415 COLL VENOUS BLD VENIPUNCTURE: CPT

## 2020-10-14 PROCEDURE — 80048 BASIC METABOLIC PNL TOTAL CA: CPT

## 2020-10-14 PROCEDURE — 85730 THROMBOPLASTIN TIME PARTIAL: CPT

## 2020-10-14 PROCEDURE — 90935 HEMODIALYSIS ONE EVALUATION: CPT

## 2020-10-14 PROCEDURE — 1200000000 HC SEMI PRIVATE

## 2020-10-14 PROCEDURE — 2580000003 HC RX 258: Performed by: INTERNAL MEDICINE

## 2020-10-14 PROCEDURE — P9047 ALBUMIN (HUMAN), 25%, 50ML: HCPCS | Performed by: INTERNAL MEDICINE

## 2020-10-14 PROCEDURE — 6360000002 HC RX W HCPCS: Performed by: INTERNAL MEDICINE

## 2020-10-14 PROCEDURE — 2500000003 HC RX 250 WO HCPCS: Performed by: NURSE PRACTITIONER

## 2020-10-14 PROCEDURE — 2500000003 HC RX 250 WO HCPCS: Performed by: INTERNAL MEDICINE

## 2020-10-14 PROCEDURE — 6370000000 HC RX 637 (ALT 250 FOR IP): Performed by: NURSE PRACTITIONER

## 2020-10-14 RX ORDER — WARFARIN SODIUM 7.5 MG/1
3.75 TABLET ORAL
Status: COMPLETED | OUTPATIENT
Start: 2020-10-14 | End: 2020-10-14

## 2020-10-14 RX ADMIN — LACOSAMIDE 200 MG: 100 TABLET, FILM COATED ORAL at 13:47

## 2020-10-14 RX ADMIN — PROMETHAZINE HYDROCHLORIDE 12.5 MG: 25 TABLET ORAL at 07:56

## 2020-10-14 RX ADMIN — AMIODARONE HYDROCHLORIDE 200 MG: 200 TABLET ORAL at 20:30

## 2020-10-14 RX ADMIN — LEVETIRACETAM 1000 MG: 500 SOLUTION ORAL at 13:47

## 2020-10-14 RX ADMIN — ALBUMIN (HUMAN) 12.5 G: 0.25 INJECTION, SOLUTION INTRAVENOUS at 10:17

## 2020-10-14 RX ADMIN — DIAZEPAM 5 MG: 5 TABLET ORAL at 20:30

## 2020-10-14 RX ADMIN — PROMETHAZINE HYDROCHLORIDE 12.5 MG: 25 TABLET ORAL at 17:08

## 2020-10-14 RX ADMIN — WARFARIN SODIUM 3.75 MG: 7.5 TABLET ORAL at 17:09

## 2020-10-14 RX ADMIN — CARVEDILOL 12.5 MG: 12.5 TABLET, FILM COATED ORAL at 13:47

## 2020-10-14 RX ADMIN — LACOSAMIDE 200 MG: 100 TABLET, FILM COATED ORAL at 20:29

## 2020-10-14 RX ADMIN — DIAZEPAM 5 MG: 5 TABLET ORAL at 13:47

## 2020-10-14 RX ADMIN — HEPARIN SODIUM 8.5 ML/HR: 10000 INJECTION, SOLUTION INTRAVENOUS at 06:25

## 2020-10-14 RX ADMIN — FAMOTIDINE 20 MG: 10 INJECTION INTRAVENOUS at 13:46

## 2020-10-14 RX ADMIN — HEPARIN SODIUM 8.5 ML/HR: 10000 INJECTION, SOLUTION INTRAVENOUS at 13:46

## 2020-10-14 RX ADMIN — AMIODARONE HYDROCHLORIDE 200 MG: 200 TABLET ORAL at 13:47

## 2020-10-14 RX ADMIN — CARVEDILOL 12.5 MG: 12.5 TABLET, FILM COATED ORAL at 17:09

## 2020-10-14 RX ADMIN — LEVETIRACETAM 1000 MG: 500 SOLUTION ORAL at 20:29

## 2020-10-14 RX ADMIN — SODIUM CHLORIDE, PRESERVATIVE FREE 10 ML: 5 INJECTION INTRAVENOUS at 20:30

## 2020-10-14 RX ADMIN — LOPERAMIDE HYDROCHLORIDE 4 MG: 1 SOLUTION ORAL at 17:08

## 2020-10-14 RX ADMIN — ATORVASTATIN CALCIUM 10 MG: 10 TABLET, FILM COATED ORAL at 13:47

## 2020-10-14 RX ADMIN — SEVELAMER CARBONATE 0.8 G: 2.4 POWDER, FOR SUSPENSION ORAL at 13:46

## 2020-10-14 ASSESSMENT — PAIN SCALES - GENERAL
PAINLEVEL_OUTOF10: 0

## 2020-10-14 ASSESSMENT — PAIN SCALES - WONG BAKER
WONGBAKER_NUMERICALRESPONSE: 0

## 2020-10-14 NOTE — PROGRESS NOTES
Hospital Medicine Progress Note      Admit Date: 10/8/2020       CC: F/U for PEG malfunction    HPI: 53 y.o. female who presented to [facility] with PEG malfunction, patient had DIC and cardiogenic shock back in September, on enteral tube feed, transferred to the hospital for new PEG, patient can not participate in history taking, discussed with nurse.      Had PEA arrest 7/79/3535     10/12: precert restarted for return to [facility] post-acute when ready. Pending neprhology plan. HD today. Changed HD to 3K bath UF goal minimum due to low BPs. Use albumen in HD for low BP PRN. Not ready to d/c yet.      HD was stopped early due to nausea and low BP      Per the patient, she does not want to return to that facility she came from. will discuss with social work. Not sure what is reasonable discharge for her, because it has to be some place that can accomodate her HD. But her room will be next to the [facility] office at [facility] and patient's mother has been notified per farhana Pruitt, for better watchful eye there.      10/13: sleeping when I enter. Does not engage much today. reportedly anxious overnight, pulling at things constantly. TFs at 55mL/hr. She is on scheduled valium.      Pre-cert completed at [facility]. Awaiting readiness per nephrology to go back to facility . /62 last night.      Back on coumadin. (target 2-3) for factor V leiden, bilat PE, Acute CVA and afib. Was on 7.5mg daily except 3.75mg Mon and Thurs prior to admission previous adm. On hep gtt bridge to therapeutic INR.     3 lb wt gain on bed scale overnight per RN.        Interval History/Subjective: feels tired after HD today. this is her usual. Had changed HD \"monitoring for possible recovery still - no signs of recovery so far. \" Nephrology. Had changed dialysis bath 2 days ago. Received albumin 12.5g albumen for hypotension in HD. UF 550mL x 3.5 hrs. Cr 3 today from 2.5 yesterday.     INR 1.68 with bridging to coumadin with heparin    Review of Systems:       The patient denied headaches, visual changes, LOC, SOB, CP, ABD pain, N/V/D, skin changes, new or worsening weakness or neuromuscular deficits. Comprehensive ROS negative except as mentioned above. Past Medical History:        Diagnosis Date    Anticoagulant long-term use     Arthritis     Asthma     Baker's cyst     Depression     [facility] V Leiden ([facility]     Factor V Leiden ([facility]     H/O blood clots     Heartburn     Hyperlipidemia     MRSA bacteremia 09/10/2020    Osteoarthritis        Past Surgical History:        Procedure Laterality Date    GASTROSTOMY TUBE PLACEMENT N/A 9/25/2020    ESOPHAGOGASTRODUODENOSCOPY PERCUTANEOUS ENDOSCOPIC GASTROSTOMY TUBE PLACEMENT performed by Andrae Patel MD at [address] [place] [address] N/A 10/9/2020    ESOPHAGOGASTRODUODENOSCOPY PERCUTANEOUS ENDOCOPIC GASTROSTOMY TUBE PLACEMENT performed by Sheyla Schuler DO at [address] [facility]  IR TUNNELED CATHETER PLACEMENT GREATER THAN 5 YEARS  9/29/2020    IR TUNNELED CATHETER PLACEMENT GREATER THAN 5 YEARS 9/29/2020 [facility] SPECIAL PROCEDURES    TUNNELED VENOUS CATHETER PLACEMENT Right 09/29/2020    Permacath ; RIJ; 19cm; Dr. Andrew Lyon       Allergies:  Ibuprofen    Past medical and surgical history reviewed. Any changes have been noted. PHYSICAL EXAM:  /77   Pulse 84   Temp 98.5 °F (36.9 °C) (Oral)   Resp 16   Ht 5' 7\" (1.702 m)   Wt 187 lb 13.3 oz (85.2 kg)   LMP 12/05/2018 (Exact Date)   SpO2 95%   BMI 29.42 kg/m²       Intake/Output Summary (Last 24 hours) at 10/14/2020 6173  Last data filed at 10/13/2020 1831  Gross per 24 hour   Intake 718.75 ml   Output --   Net 718.75 ml        General appearance:   drowsy, No apparent distress, appears stated age. Cooperative. HEENT:  Normocephalic, atraumatic. PERRLA.  EOMi.  Conjunctivae/corneas clear, no icterus, non-injected. Neck: Supple, with full range of motion.  No jugular venous distention. Trachea midline. Respiratory:  Normal respiratory effort. Clear to auscultation, bilaterally without Rales/Wheezes/Rhonchi. Cardiovascular:  Regular rate and rhythm without murmurs, rubs or gallops. Abdomen: abd binder in place; GTube unremarkable;  Soft, non-tender, non-distended, without rebound or guarding. Normal bowel sounds. Musculoskeletal:  No clubbing, cyanosis or edema bilaterally.  Full range of motion without deformity. Skin: right CW Port-a-cath; RUE  Dialysis catheter dressing intact; unrermarkable; Skin color, texture, turgor normal.  No rashes or lesions. Neurologic: aphasia;  Neurovascularly intact without any focal sensory/motor deficits. Cranial nerves: II-XII intact, grossly intact. No facial asymmetry, tongue midline. Psychiatric:  Alert and oriented, thought content appropriate  Capillary Refill: Brisk,< 3 seconds   Peripheral Pulses: +2 palpable, equal bilaterally          LABS:    Lab Results   Component Value Date    WBC 10.6 10/13/2020    HGB 8.4 (L) 10/13/2020    HCT 25.9 (L) 10/13/2020    MCV 86.9 10/13/2020     10/13/2020    LYMPHOPCT 18.7 10/12/2020    RBC 2.98 (L) 10/13/2020    MCH 28.3 10/13/2020    MCHC 32.6 10/13/2020    RDW 21.8 (H) 10/13/2020       Lab Results   Component Value Date    CREATININE 3.0 (H) 10/14/2020    BUN 26 (H) 10/14/2020     10/14/2020    K 3.8 10/14/2020     10/14/2020    CO2 26 10/14/2020       Lab Results   Component Value Date    MG 2.10 10/04/2020       Lab Results   Component Value Date    ALT 23 10/04/2020    AST 32 10/04/2020    ALKPHOS 131 (H) 10/04/2020    BILITOT <0.2 10/04/2020        No flowsheet data found.     Lab Results   Component Value Date    LABA1C 5.6 12/17/2018       Imaging:  No orders to display       Scheduled and prn Medications:    Scheduled Meds:   famotidine (PEPCID) injection  20 mg Intravenous Daily    carvedilol  12.5 mg Oral BID WC    levETIRAcetam  1,000 mg Oral BID    diazePAM  5 mg currently on hep gtt  - Consult pharm for hep; ok to resume coumadin per GI     HTN  - monitor  - home meds: amiodarone, carvedilol     CHF, systolic  - last echo EF 26%   - currently compensated     Hyperphosphatemia  - cont home sevelamer     Anemia  - monitor    Continue current regimen/therapies. Monitor. Adjust medical regimen as appropriate. Body mass index is 29.42 kg/m². The patient and / or the family were informed of the results of any tests, a time was given to answer questions, a plan was proposed and they agreed with plan.       DVT ppx: coumadin bridging with hep gtt      Diet: DIET TUBE FEED CONTINUOUS/CYCLIC NPO; Renal Formula; Gastrostomy; 20; 55    Consults:  IP CONSULT TO GI  PHARMACY TO DOSE WARFARIN  IP CONSULT TO DIETITIAN  IP CONSULT TO NEPHROLOGY    DISPO/placement plan: back to facility when ok with Nephrology    Code Status: Full Code      AGUSTIN Mae CNP  10/14/20

## 2020-10-14 NOTE — PROGRESS NOTES
Pt more agitated and restless in late afternoon, saying, \"I want to go home! \" TF continues to infuse at 55 mL/hr with minimal residual present when checked. She c/o intermittent nausea. PRN Phenergan administered with good results. PRN Imodium also administered for loose stools. Fall precautions in place, belongings within reach. Will continue to monitor.

## 2020-10-14 NOTE — FLOWSHEET NOTE
Treatment time: 3.5 hours  Net UF: 550 ml     Pre weight: 85.1 kg (bed scale)   Post weight: 83 kg (bed scale)  EDW: TBD     Access used: RIJ TDC  Access function: Good with  ml/min     Medications or blood products given: Albumin 12.5g     Regular outpatient schedule: MWF     Summary of response to treatment: Hypotensive relieved with fluid with SBP>100 after administration of albumin. Copy of dialysis treatment record placed in chart, to be scanned into EMR.        10/14/20 0820 10/14/20 1205   Treatment   Time On  --  0825   Time Off  --  1200   Vital Signs   BP (!) 150/67 (!) 119/57   Temp 98.6 °F (37 °C) 97.4 °F (36.3 °C)   Pulse 88 89   Resp 16 16   Weight 187 lb 9.8 oz (85.1 kg) 182 lb 15.7 oz (83 kg)   Weight Method Bed scale Bed scale

## 2020-10-14 NOTE — PROGRESS NOTES
Resting with eyes closed call light in reach. Vitals signs are stable.   Intake and output are being recorded, will continue to monitor

## 2020-10-14 NOTE — PLAN OF CARE
Problem: Skin Integrity:  Goal: Will show no infection signs and symptoms  Description: Will show no infection signs and symptoms  Outcome: Ongoing  Note: Pt has been afebrile this shift with no s/s of infection noted. Will monitor for changes. Goal: Absence of new skin breakdown  Description: Absence of new skin breakdown  Outcome: Ongoing  Note: No new areas of skin breakdown noted. Will monitor for changes. Problem: Pain:  Goal: Pain level will decrease  Description: Pain level will decrease  Outcome: Ongoing  Note: Pt has had no c/o pain so far this shift. Will monitor and medicate as needed. Goal: Control of acute pain  Description: Control of acute pain  Outcome: Ongoing  Note: Pt has had no c/o pain so far this shift. Will monitor and medicate as needed. Goal: Control of chronic pain  Description: Control of chronic pain  Outcome: Ongoing  Note: Pt has had no c/o pain so far this shift. Will monitor and medicate as needed. Problem: Physical Regulation:  Goal: Will remain free from infection  Description: Will remain free from infection  Outcome: Ongoing  Note: Pt has been afebrile with no s/s of infection noted. Problem: Respiratory:  Goal: Ability to maintain normal respiratory secretions will improve  Description: Ability to maintain normal respiratory secretions will improve  Outcome: Ongoing  Note: Pt's respirations have been WDL with no secretions noted. Problem: Falls - Risk of:  Goal: Will remain free from falls  Description: Will remain free from falls  Outcome: Ongoing  Note: Pt free from injury or falls at this time, fall precautions in place, bed in low position, side rail up x2, [place] Fall Risk: High (45 and higher), bed alarm on, reoriented to room and call light, reminded not to get up without assistance, call light in reach, will continue to monitor. Pt verbalizes understanding of fall risk procedures.     Goal: Absence of physical injury  Description: Absence of physical injury  Outcome: Ongoing  Note: Pt has not incurred any type of physical injury this shift. Problem: OXYGENATION/RESPIRATORY FUNCTION  Goal: Patient will maintain patent airway  Outcome: Ongoing  Note: Patient has maintained a patent airway, repositions self, lung sounds bilaterally clear/diminished, no cough noted, RT is involved in patient care and is providing care, patient has had adequate fluid intake, no need to suction airway at this time, educated patient to turn and cough and deep breathe every two hours and as needed, encouraged incentive spirometer and patient has been performing. Will continue to monitor and reassess. Goal: Patient will achieve/maintain normal respiratory rate/effort  Description: Respiratory rate and effort will be within normal limits for the patient  Outcome: Ongoing  Note: Vital signs stable, respiratory rate normal, heart rate is WNLs and regular on cardiac monitor, +2 pulses and less than 3 second cap refill noted in all extremities, body color appropriate for ethnicity and temperature warm, no edema noted, patient repositions  self, and daily weights are ordered and -3 lb change from yesterday's weight. MD notified of weight gain. Will continue to monitor and reassess. Problem: HEMODYNAMIC STATUS  Goal: Patient has stable vital signs and fluid balance  Outcome: Ongoing  Note: Vital signs stable, respiratory rate normal, heart rate is WNLs and regular on cardiac monitor, +2 pulses and less than 3 second cap refill noted in all extremities, body color appropriate for ethnicity and temperature warm, no edema noted, patient repositions  self, and daily weights are ordered and -3 lb change from yesterday's weight. MD notified of weight gain. Will continue to monitor and reassess.       Problem: FLUID AND ELECTROLYTE IMBALANCE  Goal: Fluid and electrolyte balance are achieved/maintained  Outcome: Ongoing  Note: Medications administered as ordered, abnormal lab values assessed and reported to physician if critical or pertinent to patient status, skin turgor, mucus membranes, and respiratory status assess this shift and LS clear/diminisihed. Patient and Family was included in plan of care. Will continue to monitor and reassess. Problem: ACTIVITY INTOLERANCE/IMPAIRED MOBILITY  Goal: Mobility/activity is maintained at optimum level for patient  Outcome: Ongoing  Note: Pt requires max assist for any transfers. She is able to move self around in bed.      Problem: Nutrition  Goal: Optimal nutrition therapy  Outcome: Ongoing  Note: Pt is NPO, but is receiving continuous TF.

## 2020-10-14 NOTE — PROGRESS NOTES
Clinical Pharmacy Note  Warfarin Consult    Mingo Enriquez is a 53 y.o. female receiving warfarin managed by pharmacy. Patient being bridged with weight based heparin. Warfarin Indication: Factor V leiden  Target INR range: 2-3   Dose prior to admission: 7.5 mg daily except 3.75 mg Monday and Thursday    Current warfarin drug-drug interactions: Amiodarone     Recent Labs     10/12/20  0508 10/12/20  1040 10/13/20  0438 10/14/20  0417   HGB  --  8.2* 8.4*  --    HCT  --  24.9* 25.9*  --    INR 1.09  --  1.49* 1.86*       Assessment/Plan:  INR bump 1.09--> 1.49 -> 1.86 today  Patient with continuous enteral tube feeding which may lead to less warfarin GI absorption  Warfarin 3.75 mg tonight. Daily PT/INR until stable within therapeutic range. Thank you for the consult. Will continue to follow.   Briseyda Stark, [facility]

## 2020-10-14 NOTE — PLAN OF CARE
Problem: Skin Integrity:  Goal: Will show no infection signs and symptoms  Description: Will show no infection signs and symptoms  10/13/2020 2319 by Arsenio Draper RN  Outcome: Ongoing  Note: Pt assessed for infection, No signs or symptoms of surgical site noted. VVS, WBC WNL. Reviewed information with pt and family, pt verbalized understanding     10/13/2020 1020 by Sesar Rowland RN  Outcome: Ongoing  Note: Pt has been afebrile this shift. Will monitor for changes. Goal: Absence of new skin breakdown  Description: Absence of new skin breakdown  10/13/2020 2319 by Arsenio Draper RN  Outcome: Ongoing  10/13/2020 1020 by Sesar Rowland RN  Outcome: Ongoing  Note: No new areas of skin breakdown noted. Will monitor for changes. Problem: Pain:  Goal: Pain level will decrease  Description: Pain level will decrease  10/13/2020 2319 by Arsenio Draper RN  Outcome: Ongoing  Note: Pain /discomfort being managed with PRN analgesics per MD orders. Patient able to express presence and absence of pain and rate pain appropriately using numerical scale. 10/13/2020 1020 by Sesar Rowland RN  Outcome: Ongoing  Note: Pt has had no c/o pain. Will monitor and medicate as needed. Goal: Control of acute pain  Description: Control of acute pain  10/13/2020 2319 by Arsenio Draper RN  Outcome: Ongoing  10/13/2020 1020 by Sesar Rowland RN  Outcome: Ongoing  Note: Pt has had no c/o pain. Will monitor and medicate as needed. Goal: Control of chronic pain  Description: Control of chronic pain  10/13/2020 2319 by Arsenio Draper RN  Outcome: Ongoing  10/13/2020 1020 by Sesar Rowland RN  Outcome: Ongoing  Note: Pt has had no c/o pain. Will monitor and medicate as needed.      Problem: Physical Regulation:  Goal: Will remain free from infection  Description: Will remain free from infection  10/13/2020 2319 by Arsenio Draper RN  Outcome: Ongoing  Note: Pt assessed for infection, No signs or symptoms airway  10/13/2020 2319 by Jose Roberto Mcgee RN  Outcome: Ongoing  Note: Patient remains free of significant airway secretions. Patient able to effectively cough and clear any secretions that need cleared. Will continue to monitor patient throughout shift. 10/13/2020 1020 by Nilam Heck RN  Outcome: Ongoing  Note: Patient has maintained a patent airway, repositions self, lung sounds bilaterally clear/diminished, no cough noted, RT is involved in patient care and is providing care, patient has had adequate fluid intake, no need to suction airway at this time, educated patient to turn and cough and deep breathe every two hours and as needed, encouraged incentive spirometer and patient has been performing. Will continue to monitor and reassess. Goal: Patient will achieve/maintain normal respiratory rate/effort  Description: Respiratory rate and effort will be within normal limits for the patient  10/13/2020 2319 by Jose Roberto Mcgee RN  Outcome: Ongoing  Note: Patients oxygen levels are within normal range. Educated on turning, coughing, deep breathing. Will continue to monitor throughout the shift. 10/13/2020 1020 by Nilam Heck RN  Outcome: Ongoing  Note: Vital signs stable, respiratory rate normal, heart rate is WNLs and regular on cardiac monitor, +2 pulses and less than 3 second cap refill noted in all extremities, body color appropriate for ethnicity and temperature warm, no edema noted, patient repositions  self, and daily weights are ordered and +3 lb change from yesterday's weight. MD notified of weight gain. Will continue to monitor and reassess. Problem: HEMODYNAMIC STATUS  Goal: Patient has stable vital signs and fluid balance  10/13/2020 2319 by Jose Roberto Mcgee RN  Outcome: Ongoing  Note: Vitals signs are stable.   Intake and output are being recorded, will continue to monitor    10/13/2020 1020 by Nilam Heck RN  Outcome: Ongoing  Note: Vital signs stable,

## 2020-10-14 NOTE — PROGRESS NOTES
2020    Permacath ; RIJ; 19cm; Dr. Markie Herrera        Allergies:  Ibuprofen      Social History     Occupational History    Not on file   Tobacco Use    Smoking status: Former Smoker     Packs/day: 0.50     Types: Cigarettes     Last attempt to quit: 2020     Years since quittin.0    Smokeless tobacco: Never Used   Substance and Sexual Activity    Alcohol use: No     Alcohol/week: 0.0 standard drinks    Drug use: Yes     Frequency: 14.0 times per week     Types: Marijuana    Sexual activity: Not on file          Family History   Problem Relation Age of Onset    Heart Attack Father          Physical exam:     Vitals:    10/14/20 1538   BP: 114/70   Pulse: 86   Resp: 16   Temp: 98.9 °F (37.2 °C)   SpO2: 97%    Temp (24hrs), Av.4 °F (36.9 °C), Min:97.4 °F (36.3 °C), Max:98.9 °F (37.2 °C)   & BP  Min: 109/63  Max: 150/67 Pulse  Av.4  Min: 80  Max: 94    24HR INTAKE/OUTPUT:      Intake/Output Summary (Last 24 hours) at 10/14/2020 1550  Last data filed at 10/13/2020 1831  Gross per 24 hour   Intake 718.75 ml   Output --   Net 718.75 ml         Gen: Resting in bed, in no acute distress confused  Head: NC , AT  Neck: supple, trachea in midline, no overt thyromegaly noted  CV: S1, S2 heard ,no peripheral edema, no JVD    Lungs: no increased WOB, no abdominal breathing pattern, +b/l air entry, no crackles  Abd: Soft, not tender , not distended , no guarding PEG in place   Skin: no rash/lesions on exposed skin of head and neck   R TDC in place    Database  Lab Results   Component Value Date    WBC 10.6 10/13/2020    HGB 8.4 (L) 10/13/2020    HCT 25.9 (L) 10/13/2020    MCV 86.9 10/13/2020     10/13/2020           No results found for: IRON, TIBC, FERRITIN    Lab Results   Component Value Date     10/14/2020    K 3.8 10/14/2020    K 4.3 10/08/2020     10/14/2020    CO2 26 10/14/2020    BUN 26 10/14/2020    CREATININE 3.0 10/14/2020    GLUCOSE 116 10/14/2020    CALCIUM 9.8 10/14/2020 Lab Results   Component Value Date    PTH <1.2 (L) 10/02/2020    CALCIUM 9.8 10/14/2020    CAION 1.11 (L) 09/15/2020    PHOS 4.8 10/04/2020       Lab Results   Component Value Date    LABALBU 3.2 (L) 10/04/2020       Labs Renal Latest Ref Rng & Units 10/14/2020 10/13/2020 10/12/2020 10/10/2020 10/8/2020   BUN 7 - 20 mg/dL 26(H) 18 24(H) 27(H) 10   Cr 0.6 - 1.1 mg/dL 3. 0(H) 2. 5(H) 3. 1(H) 3. 3(H) 1.8(H)   K 3.5 - 5.1 mmol/L 3.8 4.2 3.9 3.8 4.3   Na 136 - 145 mmol/L 140 135(L) 136 141 133(L)       Lab Results   Component Value Date    MG 2.10 10/04/2020       UA   Lab Results   Component Value Date    SPECGRAV >1.030 09/10/2020    BLOODU MODERATE 09/10/2020    LEUKOCYTESUR Negative 09/10/2020    WBCUA 3-5 09/10/2020    RBCUA 21-50 09/10/2020       RADIOLOGY     Lab Results   Component Value Date    LVEF 33 09/11/2020       Lab Results   Component Value Date    CREATININE 3.0 (H) 10/14/2020    CREATININE 2.5 (H) 10/13/2020    CREATININE 3.1 (H) 10/12/2020    CREATININE 3.3 (H) 10/10/2020    CREATININE 1.8 (H) 10/08/2020       Wt Readings from Last 3 Encounters:   10/14/20 182 lb 15.7 oz (83 kg)   10/04/20 188 lb 0.8 oz (85.3 kg)   12/17/18 229 lb (103.9 kg)       BP Readings from Last 3 Encounters:   10/14/20 114/70   10/09/20 (!) 141/69   10/04/20 139/71         Lab Results   Component Value Date    CALCIUM 9.8 10/14/2020    PHOS 4.8 10/04/2020         Lab Results   Component Value Date    LABALBU 3.2 (L) 10/04/2020         Scheduled Meds:   warfarin  3.75 mg Oral Once    famotidine (PEPCID) injection  20 mg Intravenous Daily    carvedilol  12.5 mg Oral BID WC    levETIRAcetam  1,000 mg Oral BID    diazePAM  5 mg Oral Q12H    sevelamer  0.8 g Per G Tube Daily    sodium chloride flush  10 mL Intravenous 2 times per day    amiodarone  200 mg Per NG tube BID    atorvastatin  10 mg Oral Daily    b complex-C-folic acid  1 capsule Oral Daily    lacosamide  200 mg Oral BID    warfarin (COUMADIN) daily dosing (placeholder)   Other RX Placeholder     Continuous Infusions:   heparin (Porcine) 7.1 mL/hr (10/14/20 1521)     PRN Meds:. Loperamide HCl, heparin (porcine), albumin human, diphenhydrAMINE, sodium chloride flush, acetaminophen **OR** acetaminophen, promethazine **OR** ondansetron, albuterol sulfate HFA        Assessment & Plan:    - Dialysis dependent MORELIA  - likely secondary to ischemia ATN. She had additional exposure to radiocontrast              - ZENON is neg, ANCA neg, C3 is low at 54  - Initiated on IHD on 9/11/20. Transitioned to CRRT on 9/12/20 which was stopped 9/15/20.   Restarted HD 09/17/20  - outpatient dialysis at [facility] MWF @ 5 PM  - [facility] placed by IR 09/29/20     PLAN:  HD completed 375 ml only removed Needed Albumin for low BP  Monitoring for possible recovery still - no signs of recovery so far      - hx of PEA arrest 9/11/20    - Essential HTN: controlled       - encephalopathy: since last admission, confused off and on    - CHF: systolic,  last Echo shows 35%, compensated at this point    - Hx of Factor V leiden deficiency with PE/coagulopathy/DIC:   On coumadin    - hyperphosphatemia : on sevelamer    - anemia :  [facility] protocol           Dominique Cortez

## 2020-10-14 NOTE — PROGRESS NOTES
Clinical Pharmacy Note  Heparin Dosing       Lab Results   Component Value Date    APTT 77.6 10/14/2020     Lab Results   Component Value Date    HGB 8.4 10/13/2020    HCT 25.9 10/13/2020     10/13/2020    INR 1.86 10/14/2020       Current Infusion Rate: 8.5 mL/hr    Plan:  Rate: decrease to 7.1 mL/hr  Next aPTT: 2030 10/14/20    Pharmacy will continue to monitor and adjust based on aPTT results.   Leonora Pelayo RPh 10/14/2020 2:14 PM

## 2020-10-14 NOTE — PROGRESS NOTES
Pt returned to room from dialysis. She is sleeping soundly. She had one episode of incontinence of bowel while in dialysis requiring brief change. She remains ST on telemetry. Fall precautions in place, belongings within reach. Will continue to monitor and assess.

## 2020-10-14 NOTE — CARE COORDINATION
Precert is good through tomorrow per [facility].     Electronically signed by Peter Valentine on 10/14/2020 at 3:16 PM

## 2020-10-15 LAB
ANION GAP SERPL CALCULATED.3IONS-SCNC: 12 MMOL/L (ref 3–16)
APTT: 52 SEC (ref 24.2–36.2)
APTT: 58.4 SEC (ref 24.2–36.2)
BILIRUBIN URINE: ABNORMAL
BLOOD, URINE: ABNORMAL
BUN BLDV-MCNC: 14 MG/DL (ref 7–20)
CALCIUM SERPL-MCNC: 9.2 MG/DL (ref 8.3–10.6)
CHLORIDE BLD-SCNC: 101 MMOL/L (ref 99–110)
CLARITY: ABNORMAL
CO2: 26 MMOL/L (ref 21–32)
COLOR: YELLOW
CREAT SERPL-MCNC: 2.7 MG/DL (ref 0.6–1.1)
CRYSTALS, UA: ABNORMAL /HPF
FINE CASTS, UA: ABNORMAL /LPF (ref 0–2)
GFR AFRICAN AMERICAN: 23
GFR NON-AFRICAN AMERICAN: 19
GLUCOSE BLD-MCNC: 107 MG/DL (ref 70–99)
GLUCOSE BLD-MCNC: 116 MG/DL (ref 70–99)
GLUCOSE URINE: NEGATIVE MG/DL
HCT VFR BLD CALC: 27 % (ref 36–48)
HEMOGLOBIN: 8.6 G/DL (ref 12–16)
INR BLD: 1.83 (ref 0.86–1.14)
KETONES, URINE: ABNORMAL MG/DL
LEUKOCYTE ESTERASE, URINE: ABNORMAL
MCH RBC QN AUTO: 27.3 PG (ref 26–34)
MCHC RBC AUTO-ENTMCNC: 31.7 G/DL (ref 31–36)
MCV RBC AUTO: 86 FL (ref 80–100)
MICROSCOPIC EXAMINATION: YES
NITRITE, URINE: NEGATIVE
PDW BLD-RTO: 21.3 % (ref 12.4–15.4)
PERFORMED ON: ABNORMAL
PH UA: 5 (ref 5–8)
PLATELET # BLD: 278 K/UL (ref 135–450)
PMV BLD AUTO: 7.5 FL (ref 5–10.5)
POTASSIUM SERPL-SCNC: 3.9 MMOL/L (ref 3.5–5.1)
PROTEIN UA: 100 MG/DL
PROTHROMBIN TIME: 21.4 SEC (ref 10–13.2)
RBC # BLD: 3.14 M/UL (ref 4–5.2)
RBC UA: ABNORMAL /HPF (ref 0–4)
SODIUM BLD-SCNC: 139 MMOL/L (ref 136–145)
SPECIFIC GRAVITY UA: 1.02 (ref 1–1.03)
URINE REFLEX TO CULTURE: YES
URINE TYPE: ABNORMAL
UROBILINOGEN, URINE: 0.2 E.U./DL
WBC # BLD: 12.1 K/UL (ref 4–11)
WBC UA: ABNORMAL /HPF (ref 0–5)

## 2020-10-15 PROCEDURE — 81001 URINALYSIS AUTO W/SCOPE: CPT

## 2020-10-15 PROCEDURE — 2580000003 HC RX 258: Performed by: INTERNAL MEDICINE

## 2020-10-15 PROCEDURE — 80048 BASIC METABOLIC PNL TOTAL CA: CPT

## 2020-10-15 PROCEDURE — 6370000000 HC RX 637 (ALT 250 FOR IP): Performed by: NURSE PRACTITIONER

## 2020-10-15 PROCEDURE — 2700000000 HC OXYGEN THERAPY PER DAY

## 2020-10-15 PROCEDURE — 51701 INSERT BLADDER CATHETER: CPT

## 2020-10-15 PROCEDURE — 2500000003 HC RX 250 WO HCPCS: Performed by: NURSE PRACTITIONER

## 2020-10-15 PROCEDURE — 85730 THROMBOPLASTIN TIME PARTIAL: CPT

## 2020-10-15 PROCEDURE — 6370000000 HC RX 637 (ALT 250 FOR IP): Performed by: INTERNAL MEDICINE

## 2020-10-15 PROCEDURE — 85610 PROTHROMBIN TIME: CPT

## 2020-10-15 PROCEDURE — 87086 URINE CULTURE/COLONY COUNT: CPT

## 2020-10-15 PROCEDURE — 87077 CULTURE AEROBIC IDENTIFY: CPT

## 2020-10-15 PROCEDURE — 87186 SC STD MICRODIL/AGAR DIL: CPT

## 2020-10-15 PROCEDURE — 85027 COMPLETE CBC AUTOMATED: CPT

## 2020-10-15 PROCEDURE — 36415 COLL VENOUS BLD VENIPUNCTURE: CPT

## 2020-10-15 PROCEDURE — 94761 N-INVAS EAR/PLS OXIMETRY MLT: CPT

## 2020-10-15 PROCEDURE — 1200000000 HC SEMI PRIVATE

## 2020-10-15 RX ORDER — DIPHENOXYLATE HYDROCHLORIDE AND ATROPINE SULFATE 2.5; .025 MG/1; MG/1
1 TABLET ORAL ONCE
Status: COMPLETED | OUTPATIENT
Start: 2020-10-15 | End: 2020-10-15

## 2020-10-15 RX ORDER — WARFARIN SODIUM 7.5 MG/1
7.5 TABLET ORAL
Status: COMPLETED | OUTPATIENT
Start: 2020-10-15 | End: 2020-10-15

## 2020-10-15 RX ADMIN — FAMOTIDINE 20 MG: 10 INJECTION INTRAVENOUS at 10:13

## 2020-10-15 RX ADMIN — ATORVASTATIN CALCIUM 10 MG: 10 TABLET, FILM COATED ORAL at 10:13

## 2020-10-15 RX ADMIN — DIPHENOXYLATE HYDROCHLORIDE AND ATROPINE SULFATE 1 TABLET: 2.5; .025 TABLET ORAL at 16:54

## 2020-10-15 RX ADMIN — LEVETIRACETAM 1000 MG: 500 SOLUTION ORAL at 10:12

## 2020-10-15 RX ADMIN — LACOSAMIDE 200 MG: 100 TABLET, FILM COATED ORAL at 10:12

## 2020-10-15 RX ADMIN — AMIODARONE HYDROCHLORIDE 200 MG: 200 TABLET ORAL at 20:08

## 2020-10-15 RX ADMIN — DIAZEPAM 5 MG: 5 TABLET ORAL at 10:13

## 2020-10-15 RX ADMIN — AMIODARONE HYDROCHLORIDE 200 MG: 200 TABLET ORAL at 10:13

## 2020-10-15 RX ADMIN — DIAZEPAM 5 MG: 5 TABLET ORAL at 20:08

## 2020-10-15 RX ADMIN — PROMETHAZINE HYDROCHLORIDE 12.5 MG: 25 TABLET ORAL at 20:08

## 2020-10-15 RX ADMIN — DIPHENHYDRAMINE HYDROCHLORIDE 50 MG: 25 SOLUTION ORAL at 19:17

## 2020-10-15 RX ADMIN — CARVEDILOL 12.5 MG: 12.5 TABLET, FILM COATED ORAL at 10:13

## 2020-10-15 RX ADMIN — LEVETIRACETAM 1000 MG: 500 SOLUTION ORAL at 20:08

## 2020-10-15 RX ADMIN — SODIUM CHLORIDE, PRESERVATIVE FREE 10 ML: 5 INJECTION INTRAVENOUS at 10:27

## 2020-10-15 RX ADMIN — CARVEDILOL 12.5 MG: 12.5 TABLET, FILM COATED ORAL at 16:54

## 2020-10-15 RX ADMIN — LACOSAMIDE 200 MG: 100 TABLET, FILM COATED ORAL at 20:08

## 2020-10-15 RX ADMIN — SEVELAMER CARBONATE 0.8 G: 2.4 POWDER, FOR SUSPENSION ORAL at 10:14

## 2020-10-15 RX ADMIN — WARFARIN SODIUM 7.5 MG: 7.5 TABLET ORAL at 10:13

## 2020-10-15 RX ADMIN — Medication 10 ML: at 20:09

## 2020-10-15 ASSESSMENT — PAIN SCALES - GENERAL
PAINLEVEL_OUTOF10: 0

## 2020-10-15 ASSESSMENT — PAIN SCALES - WONG BAKER
WONGBAKER_NUMERICALRESPONSE: 0
WONGBAKER_NUMERICALRESPONSE: 0

## 2020-10-15 NOTE — PROGRESS NOTES
[facility] Inpatient Nephrology Consult Note    Reason for Consult:  MORELIA on HD  Requesting Physician:  Dr. Mauricio Kc    History of Present Ilness:    53 y.o. female who  has a past medical history of Anticoagulant long-term use, Arthritis, Asthma, Baker's cyst, Depression, Factor V Leiden ([address] Factor V Leiden ([address] H/O blood clots, Heartburn, Hyperlipidemia, MRSA bacteremia (09/10/2020), and Osteoarthritis. who presented for PEG tube dislodgement. Patient had DIC with cardiogenic shock in September [facility] malnutrition s/p PEG tube placement per GI. Patient presented to ED for PEG tube dislodgement and was replaced by GI. The patient had an MORELIA and is on HD MWF at [facility] DR ANJEL PATEL, still monitoring for recovery. We are consulted for dialysis dependent MORELIA. Labs were stable, otherwise no reported fever/chills/N/V/D, no issues with HD.       Subjective:  More alert today Able to tell me she is in [place]  Agitated overnight  No family at bedside             Past Medical History:   Diagnosis Date    Anticoagulant long-term use     Arthritis     Asthma     Baker's cyst     Depression     Factor V Leiden ([address]     Factor V Leiden ([address]     H/O blood clots     Heartburn     Hyperlipidemia     MRSA bacteremia 09/10/2020    Osteoarthritis              Past Surgical History:   Procedure Laterality Date    GASTROSTOMY TUBE PLACEMENT N/A 9/25/2020    ESOPHAGOGASTRODUODENOSCOPY PERCUTANEOUS ENDOSCOPIC GASTROSTOMY TUBE PLACEMENT performed by Nini Jack MD at [facility] N/A 10/9/2020    ESOPHAGOGASTRODUODENOSCOPY PERCUTANEOUS ENDOCOPIC GASTROSTOMY TUBE PLACEMENT performed by Chloe Wolf DO at [facility]  IR TUNNELED CATHETER PLACEMENT GREATER THAN 5 YEARS  9/29/2020    IR TUNNELED CATHETER PLACEMENT GREATER THAN 5 YEARS 9/29/2020 [facility] SPECIAL PROCEDURES    TUNNELED VENOUS CATHETER PLACEMENT Right 09/29/2020 Permacatdaniela ; RIJ; 19cm; Dr. Deerck Lopez        Allergies:  Ibuprofen      Social History     Occupational History    Not on file   Tobacco Use    Smoking status: Former Smoker     Packs/day: 0.50     Types: Cigarettes     Last attempt to quit: 2020     Years since quittin.1    Smokeless tobacco: Never Used   Substance and Sexual Activity    Alcohol use: No     Alcohol/week: 0.0 standard drinks    Drug use: Yes     Frequency: 14.0 times per week     Types: Marijuana    Sexual activity: Not on file          Family History   Problem Relation Age of Onset    Heart Attack Father          Physical exam:     Vitals:    10/15/20 1156   BP: 111/78   Pulse: 87   Resp: 16   Temp: 98.5 °F (36.9 °C)   SpO2: 98%    Temp (24hrs), Av.8 °F (37.1 °C), Min:98.5 °F (36.9 °C), Max:99 °F (37.2 °C)   & BP  Min: 105/56  Max: 134/79 Pulse  Av  Min: 86  Max: 94    24HR INTAKE/OUTPUT:      Intake/Output Summary (Last 24 hours) at 10/15/2020 1219  Last data filed at 10/15/2020 1032  Gross per 24 hour   Intake 2.84 ml   Output --   Net 2.84 ml         Gen: Resting in bed, more alert answering simple questions  Head: NC , AT  Neck: supple, trachea in midline, no overt thyromegaly noted  CV: S1, S2 heard ,no peripheral edema, no JVD    Lungs: no increased WOB, no abdominal breathing pattern, +b/l air entry, no crackles  Abd: Soft, not tender , not distended , no guarding PEG in place   Skin: no rash/lesions on exposed skin of head and neck   R TDC in place    Database  Lab Results   Component Value Date    WBC 12.1 (H) 10/15/2020    HGB 8.6 (L) 10/15/2020    HCT 27.0 (L) 10/15/2020    MCV 86.0 10/15/2020     10/15/2020           No results found for: IRON, TIBC, FERRITIN    Lab Results   Component Value Date     10/15/2020    K 3.9 10/15/2020    K 4.3 10/08/2020     10/15/2020    CO2 26 10/15/2020    BUN 14 10/15/2020    CREATININE 2.7 10/15/2020    GLUCOSE 116 10/15/2020    CALCIUM 9.2 10/15/2020 Lab Results   Component Value Date    PTH <1.2 (L) 10/02/2020    CALCIUM 9.2 10/15/2020    CAION 1.11 (L) 09/15/2020    PHOS 4.8 10/04/2020       Lab Results   Component Value Date    LABALBU 3.2 (L) 10/04/2020       Labs Renal Latest Ref Rng & Units 10/15/2020 10/14/2020 10/13/2020 10/12/2020 10/10/2020   BUN 7 - 20 mg/dL 14 26(H) 18 24(H) 27(H)   Cr 0.6 - 1.1 mg/dL 2. 7(H) 3. 0(H) 2. 5(H) 3. 1(H) 3. 3(H)   K 3.5 - 5.1 mmol/L 3.9 3.8 4.2 3.9 3.8   Na 136 - 145 mmol/L 139 140 135(L) 136 141       Lab Results   Component Value Date    MG 2.10 10/04/2020       UA   Lab Results   Component Value Date    SPECGRAV >1.030 09/10/2020    BLOODU MODERATE 09/10/2020    LEUKOCYTESUR Negative 09/10/2020    WBCUA 3-5 09/10/2020    RBCUA 21-50 09/10/2020       RADIOLOGY     Lab Results   Component Value Date    LVEF 33 09/11/2020       Lab Results   Component Value Date    CREATININE 2.7 (H) 10/15/2020    CREATININE 3.0 (H) 10/14/2020    CREATININE 2.5 (H) 10/13/2020    CREATININE 3.1 (H) 10/12/2020    CREATININE 3.3 (H) 10/10/2020       Wt Readings from Last 3 Encounters:   10/15/20 180 lb 12.4 oz (82 kg)   10/04/20 188 lb 0.8 oz (85.3 kg)   12/17/18 229 lb (103.9 kg)       BP Readings from Last 3 Encounters:   10/15/20 111/78   10/09/20 (!) 141/69   10/04/20 139/71         Lab Results   Component Value Date    CALCIUM 9.2 10/15/2020    PHOS 4.8 10/04/2020         Lab Results   Component Value Date    LABALBU 3.2 (L) 10/04/2020         Scheduled Meds:   famotidine (PEPCID) injection  20 mg Intravenous Daily    carvedilol  12.5 mg Oral BID WC    levETIRAcetam  1,000 mg Oral BID    diazePAM  5 mg Oral Q12H    sevelamer  0.8 g Per G Tube Daily    sodium chloride flush  10 mL Intravenous 2 times per day    amiodarone  200 mg Per NG tube BID    atorvastatin  10 mg Oral Daily    b complex-C-folic acid  1 capsule Oral Daily    lacosamide  200 mg Oral BID    warfarin (COUMADIN) daily dosing (placeholder)   Other RX Placeholder

## 2020-10-15 NOTE — PROGRESS NOTES
heparin       Interval History/Subjective:  INR 1.83 down from 1.86 because pharm gave lesser dose due to her rapid jumping, but discussed with pharm goal to get her at her 2-3 for discharge to her NH     Dr. Lola Millan monitors INR on outpatient  Pharm will give a bigger dose of her warfarin today and see where she is tomorrow for hopefully d/c tomorrow. Cont HD on discharge. Review of Systems:       The patient denied headaches, visual changes, LOC, SOB, CP, ABD pain, N/V/D, skin changes, new or worsening weakness or neuromuscular deficits. Comprehensive ROS negative except as mentioned above. Past Medical History:        Diagnosis Date    Anticoagulant long-term use     Arthritis     Asthma     Baker's cyst     Depression     Factor V Leiden ([address]     Factor V Leiden ([address]     H/O blood clots     Heartburn     Hyperlipidemia     MRSA bacteremia 09/10/2020    Osteoarthritis        Past Surgical History:        Procedure Laterality Date    GASTROSTOMY TUBE PLACEMENT N/A 9/25/2020    ESOPHAGOGASTRODUODENOSCOPY PERCUTANEOUS ENDOSCOPIC GASTROSTOMY TUBE PLACEMENT performed by Yola Servin MD at [address] [place] [address] N/A 10/9/2020    ESOPHAGOGASTRODUODENOSCOPY PERCUTANEOUS ENDOCOPIC GASTROSTOMY TUBE PLACEMENT performed by Gibran Guerrero DO at [facility]  IR TUNNELED CATHETER PLACEMENT GREATER THAN 5 YEARS  9/29/2020    IR TUNNELED CATHETER PLACEMENT GREATER THAN 5 YEARS 9/29/2020 [facility] PROCEDURES    TUNNELED VENOUS CATHETER PLACEMENT Right 09/29/2020    Permacath ; RIJ; 19cm; Dr. Peter Bruno       Allergies:  Ibuprofen    Past medical and surgical history reviewed. Any changes have been noted.      PHYSICAL EXAM:  /75   Pulse 92   Temp 98.9 °F (37.2 °C) (Oral)   Resp 16   Ht 5' 7\" (1.702 m)   Wt 180 lb 12.4 oz (82 kg)   LMP 12/05/2018 (Exact Date)   SpO2 (!) 89%   BMI 28.31 kg/m²       Intake/Output Summary (Last 24 hours) at 10/15/2020 0848  Last data filed at 10/14/2020 1726  Gross per 24 hour   Intake 280.84 ml   Output --   Net 280.84 ml      General appearance:   drowsy, No apparent distress, appears stated age. Cooperative. HEENT:  Normocephalic, atraumatic. PERRLA.  EOMi.  Conjunctivae/corneas clear, no icterus, non-injected. Neck: Supple, with full range of motion. No jugular venous distention. Trachea midline. Respiratory:  Normal respiratory effort. Clear to auscultation, bilaterally without Rales/Wheezes/Rhonchi. Cardiovascular:  Regular rate and rhythm without murmurs, rubs or gallops. Abdomen: abd binder in place; GTube unremarkable;  Soft, non-tender, non-distended, without rebound or guarding. Normal bowel sounds. Musculoskeletal:  No clubbing, cyanosis or edema bilaterally.  Full range of motion without deformity. Skin: right CW Port-a-cath; RUE  Dialysis catheter dressing intact; unrermarkable; Skin color, texture, turgor normal.  No rashes or lesions. Neurologic: aphasia;  Neurovascularly intact without any focal sensory/motor deficits. Cranial nerves: II-XII intact, grossly intact. No facial asymmetry, tongue midline.    Psychiatric:  Alert and oriented, thought content appropriate  Capillary Refill: Brisk,< 3 seconds   Peripheral Pulses: +2 palpable, equal bilaterally       LABS:    Lab Results   Component Value Date    WBC 12.1 (H) 10/15/2020    HGB 8.6 (L) 10/15/2020    HCT 27.0 (L) 10/15/2020    MCV 86.0 10/15/2020     10/15/2020    LYMPHOPCT 18.7 10/12/2020    RBC 3.14 (L) 10/15/2020    MCH 27.3 10/15/2020    MCHC 31.7 10/15/2020    RDW 21.3 (H) 10/15/2020       Lab Results   Component Value Date    CREATININE 2.7 (H) 10/15/2020    BUN 14 10/15/2020     10/15/2020    K 3.9 10/15/2020     10/15/2020    CO2 26 10/15/2020       Lab Results   Component Value Date    MG 2.10 10/04/2020       Lab Results   Component Value Date    ALT 23 10/04/2020    AST 32 10/04/2020    ALKPHOS 131 (H) 10/04/2020 BILITOT <0.2 10/04/2020        No flowsheet data found. Lab Results   Component Value Date    LABA1C 5.6 12/17/2018       Imaging:  No orders to display       Scheduled and prn Medications:    Scheduled Meds:   famotidine (PEPCID) injection  20 mg Intravenous Daily    carvedilol  12.5 mg Oral BID WC    levETIRAcetam  1,000 mg Oral BID    diazePAM  5 mg Oral Q12H    sevelamer  0.8 g Per G Tube Daily    sodium chloride flush  10 mL Intravenous 2 times per day    amiodarone  200 mg Per NG tube BID    atorvastatin  10 mg Oral Daily    b complex-C-folic acid  1 capsule Oral Daily    lacosamide  200 mg Oral BID    warfarin (COUMADIN) daily dosing (placeholder)   Other RX Placeholder     Continuous Infusions:   heparin (Porcine) 7.1 mL/hr (10/14/20 1521)     PRN Meds:. Loperamide HCl, heparin (porcine), albumin human, diphenhydrAMINE, sodium chloride flush, acetaminophen **OR** acetaminophen, promethazine **OR** ondansetron, albuterol sulfate HFA    Assessment & Plan:      G-Tube malfunction  - NPO for now: restarted TFs renal formula: Goal 55mL/hr   -abd binder since pt at pull risk  - convert meds to liquid if possible and infuse through PEG; if no alternative, crush and mix in 20-30ml water and infuse through PEG  - keep outer pumper at 5cm  - keep HOB during tube feeds  - place dressing over top of PEG, do not place dressing between outer pumper and skin  - flush PEG with 30mL water tid and before and after each use. Encephalopathy s/p cardiogenic shock (initially in Sept.)  - PEA arrest 9/11/20  - at baseline now  - consult GI - Christiana Sorensen now signed off unless new needs re: PEG     ESRD, HD 2/2 MORELIA (ischemia ATN)  - Consult Nephrology  - cont HD schedule (MWF @ [facility])  - BMP trend  - per Nephrology: HD change to 3K bath UF goal minimum due to low BP . Use albumen prn for hypotension during HD  - Nephrology - Dr. Velia Almeida  Dialysis dependent MORELIA  - likely secondary to ischemia ATN.  She had additional exposure to radiocontrast              - ZENON is neg, ANCA neg, C3 is low at 54  - Initiated on IHD on 9/11/20. Transitioned to CRRT on 9/12/20 which was stopped 9/15/20.  Restarted HD 09/17/20  - outpatient dialysis at [facility] MWF @ 5 PM  - [facility] placed by IR 09/29/20      H/o PE 2/2Hx factor V leiden def w/PE   - anticoagulated on coumadin  - currently on hep gtt  - Consult pharm for hep; ok to resume coumadin per GI     HTN  - monitor  - home meds: amiodarone, carvedilol     CHF, systolic  - last echo EF 92%   - currently compensated     Hyperphosphatemia  - cont home sevelamer     Anemia  - monitor    Continue current regimen/therapies. Monitor. Adjust medical regimen as appropriate. Body mass index is 28.31 kg/m². The patient and / or the family were informed of the results of any tests, a time was given to answer questions, a plan was proposed and they agreed with plan.       DVT ppx: warfarin, hep bridge to therapeutic INR 2-3      Diet: DIET TUBE FEED CONTINUOUS/CYCLIC NPO; Renal Formula; Gastrostomy; 20; 55    Consults:  IP CONSULT TO GI  PHARMACY TO DOSE WARFARIN  IP CONSULT TO DIETITIAN  IP CONSULT TO NEPHROLOGY    DISPO/placement plan: hopefully d/c tomorrow to post-acute facility if INR closer to goal    Code Status: Full Code      Kera Leigh, APRN - CNP  10/15/20

## 2020-10-15 NOTE — CARE COORDINATION
Precert only good through today.  Asked that the facility restart one in the am.   Electronically signed by Mariela Stern on 10/15/2020 at 5:05 PM   #[id number]

## 2020-10-15 NOTE — PROGRESS NOTES
No falls noted this shift. Bed kept in low position. Safe environment maintained. Bedside table & call light in reach. Uses call light appropriately when needing assistance.

## 2020-10-15 NOTE — PLAN OF CARE
Problem: Skin Integrity:  Goal: Will show no infection signs and symptoms  Description: Will show no infection signs and symptoms  10/15/2020 1135 by Olman Maldonado RN  Outcome: Ongoing  Pt assessed for skin break down. Skin was warm and dry to touch. Pt cannot regulate head of bed without assistance. Pt being turned or repositioned at least every 2 hours to prevent skin breakdown. Will apply bilat heel protectors to both feet. Will continue to monitor and assess. 10/14/2020 2312 by Josef Amezquita RN  Outcome: Ongoing  Note: Pt assessed for infection, No signs or symptoms of surgical site noted. VVS, WBC WNL. Reviewed information with pt and family, pt verbalized understanding     Goal: Absence of new skin breakdown  Description: Absence of new skin breakdown  10/15/2020 1135 by Olman Maldonado RN  Outcome: Ongoing  10/14/2020 2312 by Josef Amezquita RN  Outcome: Ongoing     Problem: Pain:  Goal: Pain level will decrease  Description: Pain level will decrease  10/15/2020 1135 by Olman Maldonado RN  Outcome: Ongoing  10/14/2020 2312 by Josef Amezquita RN  Outcome: Ongoing  Note: Pain /discomfort being managed with PRN analgesics per MD orders. Patient able to express presence and absence of pain and rate pain appropriately using numerical scale.      Goal: Control of acute pain  Description: Control of acute pain  10/15/2020 1135 by Olman Maldonado RN  Outcome: Ongoing  10/14/2020 2312 by Josef Amezquita RN  Outcome: Ongoing  Goal: Control of chronic pain  Description: Control of chronic pain  10/15/2020 1135 by Olman Maldonado RN  Outcome: Ongoing  10/14/2020 2312 by Josef Amezquita RN  Outcome: Ongoing     Problem: Physical Regulation:  Goal: Will remain free from infection  Description: Will remain free from infection  10/15/2020 1135 by Olman Maldonado RN  Outcome: Ongoing  10/14/2020 2312 by Josef Amezquita RN  Outcome: Ongoing  Note: Pt assessed for infection, No signs or symptoms of surgical site noted. VVS, WBC WNL. Reviewed information with pt and family, pt verbalized understanding        Problem: Respiratory:  Goal: Ability to maintain normal respiratory secretions will improve  Description: Ability to maintain normal respiratory secretions will improve  10/15/2020 1135 by Adarsh Carr RN  Outcome: Ongoing  10/14/2020 2312 by Arlene Harkins RN  Outcome: Ongoing  Note: Patient remains free of significant airway secretions. Patient able to effectively cough and clear any secretions that need cleared. Will continue to monitor patient throughout shift. Problem: Falls - Risk of:  Goal: Will remain free from falls  Description: Will remain free from falls  10/15/2020 1135 by Adarsh Carr RN  Outcome: Ongoing  10/14/2020 2312 by Arlene Harkins RN  Outcome: Ongoing  Note: Fall risk assessment completed . Fall precautions in place, bed/ chair alarm on, side rails 2/4 up, call light in reach, educated pt on calling for assistance when needed, room clear of clutter. Pt verbalized understanding. Goal: Absence of physical injury  Description: Absence of physical injury  10/15/2020 1135 by Adrash Carr RN  Outcome: Ongoing  10/14/2020 2312 by Arlene Harkins RN  Outcome: Ongoing     Problem: OXYGENATION/RESPIRATORY FUNCTION  Goal: Patient will maintain patent airway  10/15/2020 1135 by Adarsh Carr RN  Outcome: Ongoing  10/14/2020 2312 by Arlene Harkins RN  Outcome: Ongoing  Note: Patient remains free of significant airway secretions. Patient able to effectively cough and clear any secretions that need cleared. Will continue to monitor patient throughout shift.      Goal: Patient will achieve/maintain normal respiratory rate/effort  Description: Respiratory rate and effort will be within normal limits for the patient  10/15/2020 1135 by Adarsh Carr RN  Outcome: Ongoing  10/14/2020 2312 by Arlene Harkins RN  Outcome: Ongoing     Problem: HEMODYNAMIC STATUS  Goal: Patient has stable vital signs and fluid balance  10/15/2020 1135 by Masha De Jesus RN  Outcome: Ongoing  10/14/2020 2312 by Janes Houston RN  Outcome: Ongoing  Note: Vitals signs are stable. Intake and output are being recorded, will continue to monitor       Problem: FLUID AND ELECTROLYTE IMBALANCE  Goal: Fluid and electrolyte balance are achieved/maintained  10/15/2020 1135 by Masha De Jesus RN  Outcome: Ongoing  10/14/2020 2312 by Janes Houston RN  Outcome: Ongoing  Note: Vitals signs are stable. Intake and output are being recorded, will continue to monitor       Problem: ACTIVITY INTOLERANCE/IMPAIRED MOBILITY  Goal: Mobility/activity is maintained at optimum level for patient  10/15/2020 1135 by Masha De Jesus RN  Outcome: Ongoing  10/14/2020 2312 by Janes Houston RN  Outcome: Ongoing  Note: Early and frequent ambulqtion encouraged. Educated patient on importance of early ambulation. Patient assisted with ambulation. Will continue to monitor.        Problem: Nutrition  Goal: Optimal nutrition therapy  10/15/2020 1135 by Masha De Jesus RN  Outcome: Ongoing  10/14/2020 2312 by Janes Houston RN  Outcome: Ongoing

## 2020-10-15 NOTE — PROGRESS NOTES
Clinical Pharmacy Note  Heparin Dosing       Lab Results   Component Value Date    APTT 59.6 10/14/2020     Lab Results   Component Value Date    HGB 8.4 10/13/2020    HCT 25.9 10/13/2020     10/13/2020    INR 1.86 10/14/2020       Current Infusion Rate: 7.1 mL/hr    Plan:  Rate: continue 7.1 mL/hr  Next aPTT: 0200  10-15-20    Pharmacy will continue to monitor and adjust based on aPTT results.   Luis Antonio Avila RPh 10/14/2020 9:19 PM

## 2020-10-15 NOTE — PROGRESS NOTES
Clinical Pharmacy Note  Heparin Dosing       Lab Results   Component Value Date    APTT 52.0 10/15/2020     Lab Results   Component Value Date    HGB 8.6 10/15/2020    HCT 27.0 10/15/2020     10/15/2020    INR 1.83 10/15/2020       Current Infusion Rate: 7.1 mL/hr    Plan:  Rate: Continue 7.1 mL/hr  Next aPTT: 0600  10/16/20    Pharmacy will continue to monitor and adjust based on aPTT results.     Blue Mello RPh  10/15/2020 10:26 AM

## 2020-10-15 NOTE — PLAN OF CARE
Problem: Skin Integrity:  Goal: Will show no infection signs and symptoms  Description: Will show no infection signs and symptoms  10/14/2020 2312 by Tosha Snyder RN  Outcome: Ongoing  Note: Pt assessed for infection, No signs or symptoms of surgical site noted. VVS, WBC WNL. Reviewed information with pt and family, pt verbalized understanding     10/14/2020 1338 by Sandie Mccall RN  Outcome: Ongoing  Note: Pt has been afebrile this shift with no s/s of infection noted. Will monitor for changes. Goal: Absence of new skin breakdown  Description: Absence of new skin breakdown  10/14/2020 2312 by Tosha Snyder RN  Outcome: Ongoing  10/14/2020 1338 by Sandie Mccall RN  Outcome: Ongoing  Note: No new areas of skin breakdown noted. Will monitor for changes. Problem: Pain:  Goal: Pain level will decrease  Description: Pain level will decrease  10/14/2020 2312 by Tosha Snyder RN  Outcome: Ongoing  Note: Pain /discomfort being managed with PRN analgesics per MD orders. Patient able to express presence and absence of pain and rate pain appropriately using numerical scale. 10/14/2020 1338 by Sandie Mccall RN  Outcome: Ongoing  Note: Pt has had no c/o pain so far this shift. Will monitor and medicate as needed. Goal: Control of acute pain  Description: Control of acute pain  10/14/2020 2312 by Tosha Snyder RN  Outcome: Ongoing  10/14/2020 1338 by Sandie Mccall RN  Outcome: Ongoing  Note: Pt has had no c/o pain so far this shift. Will monitor and medicate as needed. Goal: Control of chronic pain  Description: Control of chronic pain  10/14/2020 2312 by Tosha Snyder RN  Outcome: Ongoing  10/14/2020 1338 by Sandie Mccall RN  Outcome: Ongoing  Note: Pt has had no c/o pain so far this shift. Will monitor and medicate as needed.      Problem: Physical Regulation:  Goal: Will remain free from infection  Description: Will remain free from infection  10/14/2020 2312 by [place] Gisella La RN  Outcome: Ongoing  Note: Pt assessed for infection, No signs or symptoms of surgical site noted. VVS, WBC WNL. Reviewed information with pt and family, pt verbalized understanding     10/14/2020 1338 by Erick Ware RN  Outcome: Ongoing  Note: Pt has been afebrile with no s/s of infection noted. Problem: Respiratory:  Goal: Ability to maintain normal respiratory secretions will improve  Description: Ability to maintain normal respiratory secretions will improve  10/14/2020 2312 by Amber Castillo RN  Outcome: Ongoing  Note: Patient remains free of significant airway secretions. Patient able to effectively cough and clear any secretions that need cleared. Will continue to monitor patient throughout shift. 10/14/2020 1338 by Erick Ware RN  Outcome: Ongoing  Note: Pt's respirations have been WDL with no secretions noted. Problem: Falls - Risk of:  Goal: Will remain free from falls  Description: Will remain free from falls  10/14/2020 2312 by Amber Castillo RN  Outcome: Ongoing  Note: Fall risk assessment completed . Fall precautions in place, bed/ chair alarm on, side rails 2/4 up, call light in reach, educated pt on calling for assistance when needed, room clear of clutter. Pt verbalized understanding. 10/14/2020 1338 by Erick Ware RN  Outcome: Ongoing  Note: Pt free from injury or falls at this time, fall precautions in place, bed in low position, side rail up x2, [place] Fall Risk: High (45 and higher), bed alarm on, reoriented to room and call light, reminded not to get up without assistance, call light in reach, will continue to monitor. Pt verbalizes understanding of fall risk procedures. Goal: Absence of physical injury  Description: Absence of physical injury  10/14/2020 2312 by Amber Castillo RN  Outcome: Ongoing  10/14/2020 1338 by Erick Ware RN  Outcome: Ongoing  Note: Pt has not incurred any type of physical injury this shift.      Problem: OXYGENATION/RESPIRATORY FUNCTION  Goal: Patient will maintain patent airway  10/14/2020 2312 by Arsenio Draper RN  Outcome: Ongoing  Note: Patient remains free of significant airway secretions. Patient able to effectively cough and clear any secretions that need cleared. Will continue to monitor patient throughout shift. 10/14/2020 1338 by Sesar Rowland RN  Outcome: Ongoing  Note: Patient has maintained a patent airway, repositions self, lung sounds bilaterally clear/diminished, no cough noted, RT is involved in patient care and is providing care, patient has had adequate fluid intake, no need to suction airway at this time, educated patient to turn and cough and deep breathe every two hours and as needed, encouraged incentive spirometer and patient has been performing. Will continue to monitor and reassess. Goal: Patient will achieve/maintain normal respiratory rate/effort  Description: Respiratory rate and effort will be within normal limits for the patient  10/14/2020 2312 by Arsenio Draper RN  Outcome: Ongoing  10/14/2020 1338 by Sesar Rowland RN  Outcome: Ongoing  Note: Vital signs stable, respiratory rate normal, heart rate is WNLs and regular on cardiac monitor, +2 pulses and less than 3 second cap refill noted in all extremities, body color appropriate for ethnicity and temperature warm, no edema noted, patient repositions  self, and daily weights are ordered and -3 lb change from yesterday's weight. MD notified of weight gain. Will continue to monitor and reassess. Problem: HEMODYNAMIC STATUS  Goal: Patient has stable vital signs and fluid balance  10/14/2020 2312 by Arsenio Draper RN  Outcome: Ongoing  Note: Vitals signs are stable.   Intake and output are being recorded, will continue to monitor    10/14/2020 1338 by Sesar Rowland RN  Outcome: Ongoing  Note: Vital signs stable, respiratory rate normal, heart rate is WNLs and regular on cardiac monitor, +2 pulses and less

## 2020-10-15 NOTE — PROGRESS NOTES
Patient is alert & oriented x1 to self only, x2 max assist , 3/4 bed rails up, bed in lowest position, fall precautions in place, call light within reach. Peg tube leaking at hub site and reconnected, administered medications as ordered see eMAR, turning patient q2hr and prn. incotninent this morning and changed. Will cont to monitor and reassess.   Electronically signed by Nora Alcazar RN on 10/15/2020 at 11:36 AM

## 2020-10-15 NOTE — PROGRESS NOTES
Resting with eyes closed and call light in reach. Vitals signs are stable.   Intake and output are being recorded, will continue to monitor

## 2020-10-16 VITALS
OXYGEN SATURATION: 93 % | RESPIRATION RATE: 18 BRPM | HEIGHT: 67 IN | TEMPERATURE: 97.8 F | BODY MASS INDEX: 28.72 KG/M2 | HEART RATE: 68 BPM | SYSTOLIC BLOOD PRESSURE: 140 MMHG | DIASTOLIC BLOOD PRESSURE: 80 MMHG | WEIGHT: 182.98 LBS

## 2020-10-16 LAB
ANION GAP SERPL CALCULATED.3IONS-SCNC: 11 MMOL/L (ref 3–16)
BASOPHILS ABSOLUTE: 0 K/UL (ref 0–0.2)
BASOPHILS RELATIVE PERCENT: 0.2 %
BUN BLDV-MCNC: 27 MG/DL (ref 7–20)
CALCIUM SERPL-MCNC: 10.2 MG/DL (ref 8.3–10.6)
CHLORIDE BLD-SCNC: 99 MMOL/L (ref 99–110)
CO2: 29 MMOL/L (ref 21–32)
CREAT SERPL-MCNC: 3.4 MG/DL (ref 0.6–1.1)
EOSINOPHILS ABSOLUTE: 1.7 K/UL (ref 0–0.6)
EOSINOPHILS RELATIVE PERCENT: 15.1 %
GFR AFRICAN AMERICAN: 17
GFR NON-AFRICAN AMERICAN: 14
GLUCOSE BLD-MCNC: 112 MG/DL (ref 70–99)
HCT VFR BLD CALC: 34.7 % (ref 36–48)
HEMOGLOBIN: 9.9 G/DL (ref 12–16)
INR BLD: 2.2 (ref 0.86–1.14)
LYMPHOCYTES ABSOLUTE: 1.8 K/UL (ref 1–5.1)
LYMPHOCYTES RELATIVE PERCENT: 16 %
MCH RBC QN AUTO: 27.3 PG (ref 26–34)
MCHC RBC AUTO-ENTMCNC: 28.6 G/DL (ref 31–36)
MCV RBC AUTO: 95.5 FL (ref 80–100)
MONOCYTES ABSOLUTE: 1.2 K/UL (ref 0–1.3)
MONOCYTES RELATIVE PERCENT: 10.1 %
NEUTROPHILS ABSOLUTE: 6.7 K/UL (ref 1.7–7.7)
NEUTROPHILS RELATIVE PERCENT: 58.6 %
PDW BLD-RTO: 22.3 % (ref 12.4–15.4)
PLATELET # BLD: 255 K/UL (ref 135–450)
PMV BLD AUTO: 7.3 FL (ref 5–10.5)
POTASSIUM SERPL-SCNC: 4 MMOL/L (ref 3.5–5.1)
PROTHROMBIN TIME: 25.7 SEC (ref 10–13.2)
RBC # BLD: 3.63 M/UL (ref 4–5.2)
SARS-COV-2, NAAT: NOT DETECTED
SODIUM BLD-SCNC: 139 MMOL/L (ref 136–145)
WBC # BLD: 11.5 K/UL (ref 4–11)

## 2020-10-16 PROCEDURE — 94761 N-INVAS EAR/PLS OXIMETRY MLT: CPT

## 2020-10-16 PROCEDURE — U0002 COVID-19 LAB TEST NON-CDC: HCPCS

## 2020-10-16 PROCEDURE — 6370000000 HC RX 637 (ALT 250 FOR IP): Performed by: INTERNAL MEDICINE

## 2020-10-16 PROCEDURE — 90935 HEMODIALYSIS ONE EVALUATION: CPT

## 2020-10-16 PROCEDURE — P9047 ALBUMIN (HUMAN), 25%, 50ML: HCPCS | Performed by: INTERNAL MEDICINE

## 2020-10-16 PROCEDURE — 80048 BASIC METABOLIC PNL TOTAL CA: CPT

## 2020-10-16 PROCEDURE — 85025 COMPLETE CBC W/AUTO DIFF WBC: CPT

## 2020-10-16 PROCEDURE — U0003 INFECTIOUS AGENT DETECTION BY NUCLEIC ACID (DNA OR RNA); SEVERE ACUTE RESPIRATORY SYNDROME CORONAVIRUS 2 (SARS-COV-2) (CORONAVIRUS DISEASE [COVID-19]), AMPLIFIED PROBE TECHNIQUE, MAKING USE OF HIGH THROUGHPUT TECHNOLOGIES AS DESCRIBED BY CMS-2020-01-R: HCPCS

## 2020-10-16 PROCEDURE — 85610 PROTHROMBIN TIME: CPT

## 2020-10-16 PROCEDURE — 6360000002 HC RX W HCPCS: Performed by: INTERNAL MEDICINE

## 2020-10-16 PROCEDURE — 36415 COLL VENOUS BLD VENIPUNCTURE: CPT

## 2020-10-16 RX ORDER — WARFARIN SODIUM 7.5 MG/1
3.75 TABLET ORAL
Status: DISCONTINUED | OUTPATIENT
Start: 2020-10-16 | End: 2020-10-16 | Stop reason: HOSPADM

## 2020-10-16 RX ORDER — CEFUROXIME AXETIL 250 MG/1
250 TABLET ORAL 2 TIMES DAILY
Qty: 10 TABLET | Refills: 0 | Status: SHIPPED | OUTPATIENT
Start: 2020-10-16 | End: 2020-10-16 | Stop reason: HOSPADM

## 2020-10-16 RX ORDER — LOPERAMIDE HCL 1 MG/7.5ML
4 SOLUTION ORAL 4 TIMES DAILY PRN
Qty: 150 ML | Refills: 0 | Status: SHIPPED | OUTPATIENT
Start: 2020-10-16 | End: 2022-04-08 | Stop reason: ALTCHOICE

## 2020-10-16 RX ORDER — CEFUROXIME AXETIL 250 MG/1
500 TABLET ORAL 2 TIMES DAILY
Qty: 14 TABLET | Refills: 0 | Status: SHIPPED | OUTPATIENT
Start: 2020-10-16 | End: 2020-10-23

## 2020-10-16 RX ADMIN — DIAZEPAM 5 MG: 5 TABLET ORAL at 10:52

## 2020-10-16 RX ADMIN — LACOSAMIDE 200 MG: 100 TABLET, FILM COATED ORAL at 10:51

## 2020-10-16 RX ADMIN — SEVELAMER CARBONATE 0.8 G: 2.4 POWDER, FOR SUSPENSION ORAL at 10:51

## 2020-10-16 RX ADMIN — LEVETIRACETAM 1000 MG: 500 SOLUTION ORAL at 10:51

## 2020-10-16 RX ADMIN — ATORVASTATIN CALCIUM 10 MG: 10 TABLET, FILM COATED ORAL at 10:52

## 2020-10-16 RX ADMIN — ALBUMIN (HUMAN) 12.5 G: 0.25 INJECTION, SOLUTION INTRAVENOUS at 16:35

## 2020-10-16 RX ADMIN — ALBUMIN (HUMAN) 12.5 G: 0.25 INJECTION, SOLUTION INTRAVENOUS at 15:10

## 2020-10-16 RX ADMIN — AMIODARONE HYDROCHLORIDE 200 MG: 200 TABLET ORAL at 10:51

## 2020-10-16 ASSESSMENT — PAIN SCALES - GENERAL
PAINLEVEL_OUTOF10: 0
PAINLEVEL_OUTOF10: 0

## 2020-10-16 NOTE — DISCHARGE SUMMARY
Hospital Medicine Discharge Summary    Patient ID: Velasquez Salgado      Patient's PCP: No primary care provider on file. Admit Date: 10/8/2020     Discharge Date:   10/16/2020    Admitting Physician: Zaheer Liz DO     Discharge Physician: AGUSTIN Paulino - CNP       Discharge Diagnoses: Active Hospital Problems    Diagnosis Date Noted    Complication of gastrostomy tube [facility]) [K94.20] 10/08/2020    PEG tube malfunction [facility]) [K94.23] 10/08/2020       The patient was seen and examined on day of discharge and this discharge summary is in conjunction with any daily progress note from day of discharge. PCP/SNF to follow up: Warfarin Indication: Factor V leiden  Target INR range: 2-3   Dose prior to admission: 7.5 mg daily except 3.75 mg Monday and Thursday     outpatient dialysis at [facility] MWF @ 5 PM     Discharge Instructions/Follow-up:  Continue meds as previously prescribed. Hospital Course: 53 y.o. female who presented to [facility] with PEG malfunction, patient had DIC and cardiogenic shock back in September, on enteral tube feed, transferred to the hospital for new PEG, patient can not participate in history taking, discussed with nurse.      Had PEA arrest 9/11/2020    On 10/9/2020: patient underwent PEG replacement without difficulty. Her warfarin was held for this procedure. She was bridged back to warfarin with heparin gtt. Abdominal binder placed since patient is a pull risk. Converted meds to liquid as possible through PEG for administration. Crush and mis in 20-30 mL water. Keep HOB elevated during TFs. Keep outer pumper at 5cm. Place dressing over top PEG. Do not place dressing between skin and outer pumper. flug PEG with 30ml water tid and before and after each use. GI signed off. Call if any questions [phone]. (see VINCENT for TF goal)    She received HD while in-patient according to [facility] schedule.      Changed HD to 3K bath UF goal minimum due to low BPs. Use albumen in HD for low BP PRN. The patient has off and on confusion with communication and participation with exam per her baseline.      Per social work, her  room will be next to the [facility] office at [facility] when she returns and patient's mother has been notified per farhana Pruitt, for better watchful eye there.       Back on coumadin. (target 2-3) for factor V leiden, bilat PE, Acute CVA and afib. Was on 7.5mg daily except 3.75mg Mon and Thurs prior to admission previous adm.     Cr 3 today from 2.5 yesterday.     She is now medically ready for discharge    ------    G-Tube malfunction  - NPO for now: restarted TFs renal formula: Goal 55mL/hr   -abd binder since pt at pull risk  - convert meds to liquid if possible and infuse through PEG; if no alternative, crush and mix in 20-30ml water and infuse through PEG  - keep outer pumper at 5cm  - keep HOB during tube feeds  - place dressing over top of PEG, do not place dressing between outer pumper and skin  - flush PEG with 30mL water tid and before and after each use. Encephalopathy s/p cardiogenic shock (initially in Sept.)  - PEA arrest 9/11/20  - at baseline now  - consult GI - Danielle Ramirez now signed off unless new needs re: PEG     ESRD, HD 2/2 MORELIA (ischemia ATN)  - Consult Nephrology  - cont HD schedule (MWF @ hospitals)  - BMP trend  - per Nephrology: HD change to 3K bath UF goal minimum due to low BP . Use albumen prn for hypotension during HD  - Nephrology - Dr. Gaines Knows  Dialysis dependent MORELIA  - likely secondary to ischemia ATN. She had additional exposure to radiocontrast              - ZENON is neg, ANCA neg, C3 is low at 54  - Initiated on IHD on 9/11/20.  Transitioned to CRRT on 9/12/20 which was stopped 9/15/20.  Restarted HD 09/17/20  - outpatient dialysis at [facility] MWF @ 5 PM  - [facility] placed by IR 09/29/20      H/o PE 2/2Hx factor V leiden def w/PE   - anticoagulated on coumadin  - currently on hep gtt  - Consult pharm for hep; ok to resume coumadin per GI     HTN  - monitor  - home meds: amiodarone, carvedilol     CHF, systolic  - last echo EF 14%   - currently compensated     Hyperphosphatemia  - cont home sevelamer     Anemia  - monitor    Significant Diagnostic Studies:   n/a    Exam:     /74   Pulse 99   Temp 98.3 °F (36.8 °C) (Oral)   Resp 16   Ht 5' 7\" (1.702 m)   Wt 181 lb 14.1 oz (82.5 kg)   LMP 12/05/2018 (Exact Date)   SpO2 93%   BMI 28.49 kg/m²     General appearance:   drowsy, No apparent distress, appears stated age. Cooperative. HEENT:  Normocephalic, atraumatic. PERRLA.  EOMi.  Conjunctivae/corneas clear, no icterus, non-injected. Neck: Supple, with full range of motion. No jugular venous distention. Trachea midline. Respiratory:  Normal respiratory effort. Clear to auscultation, bilaterally without Rales/Wheezes/Rhonchi. Cardiovascular:  Regular rate and rhythm without murmurs, rubs or gallops. Abdomen: abd binder in place; GTube unremarkable;  Soft, non-tender, non-distended, without rebound or guarding. Normal bowel sounds. Musculoskeletal:  No clubbing, cyanosis or edema bilaterally.  Full range of motion without deformity. Skin: right CW Port-a-cath; RUE  Dialysis catheter dressing intact; unrermarkable; Skin color, texture, turgor normal.  No rashes or lesions. Neurologic: aphasia;  Neurovascularly intact without any focal sensory/motor deficits. Cranial nerves: II-XII intact, grossly intact. No facial asymmetry, tongue midline. Psychiatric:  Alert and oriented, thought content appropriate  Capillary Refill: Brisk,< 3 seconds   Peripheral Pulses: +2 palpable, equal bilaterally        Consults:     IP CONSULT TO GI  PHARMACY TO DOSE WARFARIN  IP CONSULT TO DIETITIAN  IP CONSULT TO NEPHROLOGY  PHARMACY TO DOSE WARFARIN    Disposition: To post acute facility      Code Status:  Full Code     Activity: activity as tolerated    Diet: TF Renal formula @ goal 55mL/hr.  Per GTube    Condition on Discharge: stable    Labs: For convenience and continuity at follow-up the following most recent labs are provided:      CBC:    Lab Results   Component Value Date    WBC 12.1 10/15/2020    HGB 8.6 10/15/2020    HCT 27.0 10/15/2020     10/15/2020       Renal:    Lab Results   Component Value Date     10/16/2020    K 4.0 10/16/2020    K 4.3 10/08/2020    CL 99 10/16/2020    CO2 29 10/16/2020    BUN 27 10/16/2020    CREATININE 3.4 10/16/2020    CALCIUM 10.2 10/16/2020    PHOS 4.8 10/04/2020       Discharge Medications:     Current Discharge Medication List           Details   Loperamide HCl (IMODIUM) 1 MG/7.5ML SOLN solution Take 30 mLs by mouth 4 times daily as needed for Diarrhea  Qty: 150 mL, Refills: 0      cefUROXime (CEFTIN) 250 MG tablet Take 1 tablet by mouth 2 times daily for 5 days  Qty: 10 tablet, Refills: 0              Details   warfarin (COUMADIN) 7.5 MG tablet Take 7.5 mg daily except 3.75 mg on Mondays and Thursdays. INR twice weekly with dialysis Tuesdays and Thursdays. Call MD at [facility] with results and for dosing instructions. Qty: 30 tablet, Refills: 3      amiodarone (CORDARONE) 200 MG tablet 1 tablet by Per NG tube route 2 times daily  Qty: 30 tablet, Refills: 3      lacosamide (VIMPAT) 200 MG tablet Take 1 tablet by mouth 2 times daily for 30 days.   Qty: 60 tablet, Refills: 0    Associated Diagnoses: Acute encephalopathy      levETIRAcetam (KEPPRA) 100 MG/ML solution Take 10 mLs by mouth 2 times daily  Qty: 300 mL, Refills: 3      carvedilol (COREG) 12.5 MG tablet Take 1 tablet by mouth 2 times daily (with meals)  Qty: 60 tablet, Refills: 3      calcitonin (MIACALCIN) 200 UNIT/ACT nasal spray 1 spray by Nasal route daily  Qty: 1 Bottle, Refills: 3      sevelamer (RENVELA) 2.4 g PACK packet 0.8 g by Per G Tube route Daily with supper  Qty: 90 each, Refills: 1      b complex-C-folic acid (NEPHROCAPS) 1 MG capsule Take 1 capsule by mouth daily  Qty: 30 capsule, Refills: 3 vitamin D (ERGOCALCIFEROL) 1.25 MG (85600 UT) CAPS capsule TAKE ONE CAPSULE BY MOUTH ONCE WEEKLY  Qty: 12 capsule, Refills: 0    Associated Diagnoses: Vitamin D deficiency      omeprazole (PRILOSEC) 20 MG delayed release capsule TAKE ONE CAPSULE BY MOUTH DAILY  Qty: 90 capsule, Refills: 6    Associated Diagnoses: Gastroesophageal reflux disease without esophagitis      atorvastatin (LIPITOR) 10 MG tablet TAKE ONE TABLET BY MOUTH DAILY  Qty: 90 tablet, Refills: 3      DULoxetine (CYMBALTA) 60 MG extended release capsule TAKE ONE CAPSULE BY MOUTH DAILY  Qty: 30 capsule, Refills: 11    Associated Diagnoses: Depression, unspecified depression type      BREO ELLIPTA 200-25 MCG/INH AEPB INHALE ONE DOSE BY MOUTH DAILY  Qty: 1 each, Refills: 11    Associated Diagnoses: Uncomplicated asthma      albuterol sulfate  (90 Base) MCG/ACT inhaler INHALE TWO PUFFS BY MOUTH EVERY 6 HOURS AS NEEDED FOR WHEEZING  Qty: 18 g, Refills: 0    Associated Diagnoses: Uncomplicated asthma             Time Spent on discharge is more than 45 minutes in the examination, evaluation, counseling and review of medications and discharge plan. Signed:    AGUSTIN Doss CNP   10/16/2020      Thank you No primary care provider on file. for the opportunity to be involved in this patient's care. If you have any questions or concerns please feel free to contact me at [phone].

## 2020-10-16 NOTE — PROGRESS NOTES
Stuck patient many times for IV access with success, but patient keeps pulling them out. Notified hospitalist of non compliance OK to leave IV out and dc'd the hep gtt.

## 2020-10-16 NOTE — CARE COORDINATION
DISCHARGE SUMMARY     DATE OF DISCHARGE: 10/16/2020    DISCHARGE DESTINATION: 10/16/2020    FACILITY  · Name: [facility]  · Address: [address], [address] [address]  · Phone:  [phone] (5th floor)  · Fax: [phone]    Dialysis Facility  · [address]  · Address: [address] [facility] [address] [facility]  · Dialysis Schedule: MWF 5pm.  · Phone: [phone]  · Fax: [phone]    TRANSPORTATION:   Company Name:  [facility]   Time: 6pm  Phone Number: [phone] number to [facility] transportation. Confirmation number [id number] to follow up regarding ride assistance. COMMENTS: SW spoke to patient's mother regarding discharge plans. She is in agreement. No further needs noted unless indicated by patient, family and/or medical staff. Respectfully submitted,    EUGENIA Collazo  [facility]   [phone]    Electronically signed by EUGENIA Pack on 10/16/2020 at 9:17 AM

## 2020-10-16 NOTE — PROGRESS NOTES
by nutrition support - enteral nutrition      Nutrition Interventions:   Food and/or Nutrient Delivery:  Continue NPO, Continue Current Tube Feeding  Nutrition Education/Counseling:  No recommendation at this time   Coordination of Nutrition Care:  Continued Inpatient Monitoring    Goals: Tolerate most appropriate form of nutrition.        Nutrition Monitoring and Evaluation:   Behavioral-Environmental Outcomes:  (N/a)   Food/Nutrient Intake Outcomes:  Enteral Nutrition Intake/Tolerance  Physical Signs/Symptoms Outcomes:  Biochemical Data, Weight, Nutrition Focused Physical Findings, Fluid Status or Edema, Skin, GI Status, Nausea or Vomiting, Diarrhea, Constipation     Discharge Planning:    Enteral Nutrition     Electronically signed by Peter Mead RD, JONNY on 10/16/20 at 8:31 AM EDT    Contact: [phone]

## 2020-10-16 NOTE — PROGRESS NOTES
Clinical Pharmacy Note  Warfarin Consult    Jud Lund is a 53 y.o. female receiving warfarin managed by pharmacy. Warfarin Indication: Factor V leiden  Target INR range: 2-3   Dose prior to admission: 7.5 mg daily except 3.75 mg Monday and Thursday    Current warfarin drug-drug interactions: Amiodarone    Recent Labs     10/14/20  0417 10/15/20  0449 10/16/20  0516   HGB  --  8.6*  --    HCT  --  27.0*  --    INR 1.86* 1.83* 2.20*       Assessment/Plan:    Patient with continuous enteral tube feeding which may lead to less warfarin GI absorption. Warfarin 3.75 mg tonight. Daily PT/INR until stable within therapeutic range. Thank you for the consult. Will continue to follow.     Pippa Matos, PharmD, BCPS  10/16/2020  7:55 AM

## 2020-10-16 NOTE — PROGRESS NOTES
Retrieved pt from dialysis as transport here to take pt back to [address] [facility]. She returned to unit and is being discharged back to SNF via [address] [place] [address] Road.

## 2020-10-16 NOTE — PROGRESS NOTES
Resting with eyes closed call light in reach. No falls noted this shift. Bed kept in low position. Safe environment maintained. Bedside table & call light in reach. Uses call light appropriately when needing assistance.

## 2020-10-16 NOTE — PLAN OF CARE
Problem: Nutrition  Goal: Optimal nutrition therapy  10/16/2020 0833 by Hemanth Lopez RD, LD  Outcome: Ongoing  10/16/2020 0024 by Karlene Moffett RN  Outcome: Ongoing     Nutrition Problem #1: Inadequate oral intake  Intervention: Food and/or Nutrient Delivery: Continue NPO, Continue Current Tube Feeding  Nutritional Goals: Tolerate most appropriate form of nutrition.

## 2020-10-16 NOTE — PROGRESS NOTES
Hospital Medicine Progress Note      Admit Date: 10/8/2020       CC: F/U for PEG malfunction    HPI:  53 y.o. female who presented to [facility] with PEG malfunction, patient had DIC and cardiogenic shock back in September, on enteral tube feed, transferred to the hospital for new PEG, patient can not participate in history taking, discussed with nurse.      Had PEA arrest 3/51/2394     10/12: precert restarted for return to [facility] post-acute when ready. Pending neprhology plan. HD today. Changed HD to 3K bath UF goal minimum due to low BPs. Use albumen in HD for low BP PRN. Not ready to d/c yet.      HD was stopped early due to nausea and low BP      Per the patient, she does not want to return to that facility she came from. will discuss with social work. Not sure what is reasonable discharge for her, because it has to be some place that can accomodate her HD. But her room will be next to the [facility] office at [facility] and patient's mother has been notified per farhana Pruitt, for better watchful eye there.      10/13: sleeping when I enter. Does not engage much today.  reportedly anxious overnight, pulling at things constantly. TFs at 55mL/hr. She is on scheduled valium.      Pre-cert completed at [facility]. Awaiting readiness per nephrology to go back to facility . /62 last night.      Back on coumadin. (target 2-3) for factor V leiden, bilat PE, Acute CVA and afib. Was on 7.5mg daily except 3.75mg Mon and Thurs prior to admission previous adm. On hep gtt bridge to therapeutic INR.     3 lb wt gain on bed scale overnight per RN.      10/14:  feels tired after HD today.  this is her usual. Had changed HD \"monitoring for possible recovery still - no signs of recovery so far. \" Nephrology. Had changed dialysis bath 2 days ago. Received albumin 12.5g albumen for hypotension in HD.  UF 550mL x 3.5 hrs.      Cr 3 today from 2.5 yesterday.     INR 1.68 with bridging to coumadin with heparin      10/15:   INR 1.83 down from 1.86 because pharm gave lesser dose due to her rapid jumping, but discussed with pharm goal to get her at her 2-3 for discharge to her NH      Dr. Kelly Sena monitors INR on outpatient  Pharm will give a bigger dose of her warfarin today and see where she is tomorrow for hopefully d/c tomorrow.     Cont HD on discharge.       Interval History/Subjective: ready to discharge to facility after HD today. Urine culture with klebsiella . Awaiting sensitivities. On Rocephin. Review of Systems:       The patient denied headaches, visual changes, LOC, SOB, CP, ABD pain, N/V/D, skin changes, new or worsening weakness or neuromuscular deficits. Comprehensive ROS negative except as mentioned above. Past Medical History:        Diagnosis Date    Anticoagulant long-term use     Arthritis     Asthma     Baker's cyst     Depression     Factor V Leiden ([address]     [facility] Leiden [address] [address]     H/O blood clots     Heartburn     Hyperlipidemia     MRSA bacteremia 09/10/2020    Osteoarthritis        Past Surgical History:        Procedure Laterality Date    GASTROSTOMY TUBE PLACEMENT N/A 9/25/2020    ESOPHAGOGASTRODUODENOSCOPY PERCUTANEOUS ENDOSCOPIC GASTROSTOMY TUBE PLACEMENT performed by Marcus Beckman MD [place] [facility] [place] N/A 10/9/2020    ESOPHAGOGASTRODUODENOSCOPY PERCUTANEOUS ENDOCOPIC GASTROSTOMY TUBE PLACEMENT performed by Amalia Velazquez DO at [address] IR TUNNELED CATHETER PLACEMENT GREATER THAN 5 YEARS  9/29/2020    IR TUNNELED CATHETER PLACEMENT GREATER THAN 5 YEARS 9/29/2020 [facility] PROCED[facility]    TUNNELED VENOUS CATHETER PLACEMENT Right 09/29/2020    Permacath ; RIJ; 19cm; Dr. Davida Borja       Allergies:  Ibuprofen    Past medical and surgical history reviewed. Any changes have been noted.      PHYSICAL EXAM:  /74   Pulse 99   Temp 98.3 °F (36.8 °C) (Oral)   Resp 16   Ht 5' 7\" (1.702 m)   Wt 181 lb 14.1 oz (82.5 kg)   LMP 12/05/2018 (Exact Date)   SpO2 93%   BMI 28.49 kg/m²       Intake/Output Summary (Last 24 hours) at 10/16/2020 1451  Last data filed at 10/16/2020 1214  Gross per 24 hour   Intake 286 ml   Output --   Net 286 ml      General appearance:   drowsy, No apparent distress, appears stated age. Cooperative. HEENT:  Normocephalic, atraumatic. PERRLA.  EOMi.  Conjunctivae/corneas clear, no icterus, non-injected. Neck: Supple, with full range of motion. No jugular venous distention. Trachea midline. Respiratory:  Normal respiratory effort. Clear to auscultation, bilaterally without Rales/Wheezes/Rhonchi. Cardiovascular:  Regular rate and rhythm without murmurs, rubs or gallops. Abdomen: abd binder in place; GTube unremarkable;  Soft, non-tender, non-distended, without rebound or guarding. Normal bowel sounds. Musculoskeletal:  No clubbing, cyanosis or edema bilaterally.  Full range of motion without deformity. Skin: right CW Port-a-cath; RUE  Dialysis catheter dressing intact; unrermarkable; Skin color, texture, turgor normal.  No rashes or lesions. Neurologic: aphasia;  Neurovascularly intact without any focal sensory/motor deficits. Cranial nerves: II-XII intact, grossly intact. No facial asymmetry, tongue midline.    Psychiatric:  Alert and oriented, thought content appropriate  Capillary Refill: Brisk,< 3 seconds   Peripheral Pulses: +2 palpable, equal bilaterally        LABS:    Lab Results   Component Value Date    WBC 11.5 (H) 10/16/2020    HGB 9.9 (L) 10/16/2020    HCT 34.7 (L) 10/16/2020    MCV 95.5 10/16/2020     10/16/2020    LYMPHOPCT 16.0 10/16/2020    RBC 3.63 (L) 10/16/2020    MCH 27.3 10/16/2020    MCHC 28.6 (L) 10/16/2020    RDW 22.3 (H) 10/16/2020       Lab Results   Component Value Date    CREATININE 3.4 (H) 10/16/2020    BUN 27 (H) 10/16/2020     10/16/2020    K 4.0 10/16/2020    CL 99 10/16/2020    CO2 29 10/16/2020       Lab Results   Component Value Date    MG 2.10 10/04/2020       Lab Results   Component Value Date    ALT 23 10/04/2020    AST 32 10/04/2020    ALKPHOS 131 (H) 10/04/2020    BILITOT <0.2 10/04/2020        No flowsheet data found. Lab Results   Component Value Date    LABA1C 5.6 12/17/2018       Imaging:  No orders to display       Scheduled and prn Medications:    Scheduled Meds:   warfarin  3.75 mg Oral Once    cefTRIAXone (ROCEPHIN) IV  1 g Intravenous Q24H    famotidine (PEPCID) injection  20 mg Intravenous Daily    carvedilol  12.5 mg Oral BID WC    levETIRAcetam  1,000 mg Oral BID    diazePAM  5 mg Oral Q12H    sevelamer  0.8 g Per G Tube Daily    sodium chloride flush  10 mL Intravenous 2 times per day    amiodarone  200 mg Per NG tube BID    atorvastatin  10 mg Oral Daily    b complex-C-folic acid  1 capsule Oral Daily    lacosamide  200 mg Oral BID    warfarin (COUMADIN) daily dosing (placeholder)   Other RX Placeholder     Continuous Infusions:  PRN Meds:. Loperamide HCl, heparin (porcine), albumin human, diphenhydrAMINE, sodium chloride flush, acetaminophen **OR** acetaminophen, promethazine **OR** ondansetron, albuterol sulfate HFA    Assessment & Plan:        G-Tube malfunction  - NPO for now: restarted TFs renal formula: Goal 55mL/hr   -abd binder since pt at pull risk  - convert meds to liquid if possible and infuse through PEG; if no alternative, crush and mix in 20-30ml water and infuse through PEG  - keep outer pumper at 5cm  - keep HOB during tube feeds  - place dressing over top of PEG, do not place dressing between outer pumper and skin  - flush PEG with 30mL water tid and before and after each use.    Encephalopathy s/p cardiogenic shock (initially in Sept.)  - PEA arrest 9/11/20  - at baseline now  - consult GI - Berhane Biswas now signed off unless new needs re: PEG     ESRD, HD 2/2 MORELIA (ischemia ATN) - cont on d/c   - Consult Nephrology  - cont HD schedule (MWF @ [facility])  - BMP trend  - per Nephrology: HD change to 3K bath UF goal minimum due to low BP . Use albumen prn for hypotension during HD  - Nephrology - Dr. Augusta Donald  Dialysis dependent MORELIA  - likely secondary to ischemia ATN. She had additional exposure to radiocontrast              - ZENON is neg, ANCA neg, C3 is low at 54  - Initiated on IHD on 9/11/20. Transitioned to CRRT on 9/12/20 which was stopped 9/15/20.  Restarted HD 09/17/20  - outpatient dialysis at [facility] MWF @ 5 PM  - [facility] placed by IR 09/29/20      H/o PE 2/2Hx factor V leiden def w/PE   - anticoagulated on coumadin  - currently on hep gtt  - Consult pharm: patient is back on regular regimen of coumadin     HTN  - monitor  - home meds: amiodarone, carvedilol     CHF, systolic  - last echo EF 49%   - currently compensated     Hyperphosphatemia  - cont home sevelamer     Anemia  - monitor    UTI  - klebsiella pneumoniae on culture  - await sensitivities  - on rocephin    Continue current regimen/therapies. Monitor. Adjust medical regimen as appropriate. Body mass index is 28.49 kg/m². The patient and / or the family were informed of the results of any tests, a time was given to answer questions, a plan was proposed and they agreed with plan.       DVT ppx: warfarin      Diet: DIET TUBE FEED CONTINUOUS/CYCLIC NPO; Renal Formula; Gastrostomy; 20; 55    Consults:  IP CONSULT TO GI  PHARMACY TO DOSE WARFARIN  IP CONSULT TO DIETITIAN  IP CONSULT TO NEPHROLOGY  PHARMACY TO DOSE WARFARIN    DISPO/placement plan:back to post-acute facility after HD    Code Status: Full Code      AGUSTIN Dean - HANDY  10/16/20

## 2020-10-16 NOTE — PLAN OF CARE
Problem: Skin Integrity:  Goal: Will show no infection signs and symptoms  Description: Will show no infection signs and symptoms  10/16/2020 0024 by Lxey Gonzalez RN  Outcome: Ongoing  Note: Pt assessed for infection, No signs or symptoms of surgical site noted. VVS, WBC WNL. Reviewed information with pt and family, pt verbalized understanding     10/15/2020 1135 by Yesenia England RN  Outcome: Ongoing  Goal: Absence of new skin breakdown  Description: Absence of new skin breakdown  10/16/2020 0024 by Lexy Gonzalez RN  Outcome: Ongoing  10/15/2020 1135 by Yesenia England RN  Outcome: Ongoing     Problem: Pain:  Goal: Pain level will decrease  Description: Pain level will decrease  10/16/2020 0024 by Lexy Gonzalez RN  Outcome: Ongoing  Note: Pain /discomfort being managed with PRN analgesics per MD orders. Patient able to express presence and absence of pain and rate pain appropriately using numerical scale. 10/15/2020 1135 by Yesenia England RN  Outcome: Ongoing  Goal: Control of acute pain  Description: Control of acute pain  10/16/2020 0024 by Lexy Gonzalez RN  Outcome: Ongoing  10/15/2020 1135 by Yesneia England RN  Outcome: Ongoing  Goal: Control of chronic pain  Description: Control of chronic pain  10/16/2020 0024 by Lexy Gonzalez RN  Outcome: Ongoing  10/15/2020 1135 by Yesenia England RN  Outcome: Ongoing     Problem: Physical Regulation:  Goal: Will remain free from infection  Description: Will remain free from infection  10/16/2020 0024 by Lexy Gonzalez RN  Outcome: Ongoing  Note: Pt assessed for infection, No signs or symptoms of surgical site noted. VVS, WBC WNL.  Reviewed information with pt and family, pt verbalized understanding     10/15/2020 1135 by Yesenia England RN  Outcome: Ongoing     Problem: Respiratory:  Goal: Ability to maintain normal respiratory secretions will improve  Description: Ability to maintain normal respiratory secretions will improve  10/16/2020 0024 by Arlene Harkins RN  Outcome: Ongoing  Note: Patient remains free of significant airway secretions. Patient able to effectively cough and clear any secretions that need cleared. Will continue to monitor patient throughout shift. 10/15/2020 1135 by Adarsh Carr RN  Outcome: Ongoing     Problem: Falls - Risk of:  Goal: Will remain free from falls  Description: Will remain free from falls  10/16/2020 0024 by Arlene Harkins RN  Outcome: Ongoing  Note: Fall risk assessment completed . Fall precautions in place, bed/ chair alarm on, side rails 2/4 up, call light in reach, educated pt on calling for assistance when needed, room clear of clutter. Pt verbalized understanding. 10/15/2020 1135 by Adarsh Carr RN  Outcome: Ongoing  Goal: Absence of physical injury  Description: Absence of physical injury  10/16/2020 0024 by Arlene Harkins RN  Outcome: Ongoing  10/15/2020 1135 by Adarsh Carr RN  Outcome: Ongoing     Problem: OXYGENATION/RESPIRATORY FUNCTION  Goal: Patient will maintain patent airway  10/16/2020 0024 by Arlene Harkins RN  Outcome: Ongoing  Note: Patients oxygen levels are within normal range. Educated on turning, coughing, deep breathing. Will continue to monitor throughout the shift. 10/15/2020 1135 by Adarsh Carr RN  Outcome: Ongoing  Goal: Patient will achieve/maintain normal respiratory rate/effort  Description: Respiratory rate and effort will be within normal limits for the patient  10/16/2020 0024 by Arlene Harkins RN  Outcome: Ongoing  Note: Patient remains free of significant airway secretions. Patient able to effectively cough and clear any secretions that need cleared. Will continue to monitor patient throughout shift.        10/15/2020 1135 by Adarsh Carr RN  Outcome: Ongoing     Problem: HEMODYNAMIC STATUS  Goal: Patient has stable vital signs and fluid balance  10/16/2020 0024 by Arlene Harkins RN  Outcome: Ongoing  Note: Vitals signs are stable. Intake and output are being recorded, will continue to monitor    10/15/2020 1135 by Anita Grimes RN  Outcome: Ongoing     Problem: FLUID AND ELECTROLYTE IMBALANCE  Goal: Fluid and electrolyte balance are achieved/maintained  10/16/2020 0024 by Avis Bedolla RN  Outcome: Ongoing  Note: Vitals signs are stable. Intake and output are being recorded, will continue to monitor    10/15/2020 1135 by Anita Grimes RN  Outcome: Ongoing     Problem: ACTIVITY INTOLERANCE/IMPAIRED MOBILITY  Goal: Mobility/activity is maintained at optimum level for patient  10/16/2020 0024 by Avis Bedolla RN  Outcome: Ongoing  Note: Patient assisted with ambulation. Will continue to monitor.     10/15/2020 1135 by Anita Grimes RN  Outcome: Ongoing     Problem: Nutrition  Goal: Optimal nutrition therapy  10/16/2020 0024 by Avis Bedolla RN  Outcome: Ongoing  10/15/2020 1135 by Anita Grimes RN  Outcome: Ongoing

## 2020-10-16 NOTE — PROGRESS NOTES
Pt resting in bed, sleeping soundly. TF infusing at 55 mL/hr, pt tolerating well. Schedule to have dialysis this afternoon. COVID test obtained and sent to lab. Unable to administer IV medications as pt pulled out multiple IV's overnight. Fall precautions in place, belongings within reach. Will continue to monitor and assess.

## 2020-10-16 NOTE — FLOWSHEET NOTE
Treatment time: 3.5 hours  Net UF: 2000 ml     Pre weight: 85 kg   Post weight: 83 kg  EDW: 83 kg     Access used: RCW CVC  Access function: Good with  ml/min     Medications or blood products given: Albumin x2     Regular outpatient schedule: ADRIANE Delgado     Summary of response to treatment: Tolerated tx well. No HD related complaints or complications.      Copy of dialysis treatment record placed in chart, to be scanned into EMR.        10/16/20 1407 10/16/20 1750   Treatment   Time On  --  1425   Time Off  --  1747   Vital Signs   BP (!) 140/73 (!) 140/80   Temp 98.5 °F (36.9 °C) 97.8 °F (36.6 °C)   Pulse 98 68   Resp 18 18   Dialysis Bath   K+ (Potassium) 3  --    Ca+ (Calcium) 2.5  --    Na+ (Sodium) 138  --    HCO3 (Bicarb) 30  --

## 2020-10-16 NOTE — PLAN OF CARE
Problem: Skin Integrity:  Goal: Will show no infection signs and symptoms  Description: Will show no infection signs and symptoms  10/16/2020 1222 by Naren Kam RN  Outcome: Ongoing  Note: Pt has been afebrile with no s/s of infection noted. Will monitor for changes. 10/16/2020 0024 by Janes Houston RN  Outcome: Ongoing  Note: Pt assessed for infection, No signs or symptoms of surgical site noted. VVS, WBC WNL. Reviewed information with pt and family, pt verbalized understanding     Goal: Absence of new skin breakdown  Description: Absence of new skin breakdown  10/16/2020 1222 by Naren Kam RN  Outcome: Ongoing  Note: No new areas of skin breakdown noted. Will monitor for changes. 10/16/2020 0024 by Janes Houston RN  Outcome: Ongoing     Problem: Pain:  Goal: Pain level will decrease  Description: Pain level will decrease  10/16/2020 1222 by Naren Kam RN  Outcome: Ongoing  Note: Pt has had no c/o pain so far this shift. Will monitor and medicate as needed. 10/16/2020 0024 by Janes Houston RN  Outcome: Ongoing  Note: Pain /discomfort being managed with PRN analgesics per MD orders. Patient able to express presence and absence of pain and rate pain appropriately using numerical scale. Goal: Control of acute pain  Description: Control of acute pain  10/16/2020 1222 by Naren Kam RN  Outcome: Ongoing  Note: Pt has had no c/o pain so far this shift. Will monitor and medicate as needed. 10/16/2020 0024 by Janes Houston RN  Outcome: Ongoing  Goal: Control of chronic pain  Description: Control of chronic pain  10/16/2020 1222 by Naren Kam RN  Outcome: Ongoing  Note: Pt has had no c/o pain so far this shift. Will monitor and medicate as needed.   10/16/2020 0024 by Janes Houston RN  Outcome: Ongoing     Problem: Physical Regulation:  Goal: Will remain free from infection  Description: Will remain free from infection  10/16/2020 1222 by Naren Kam RN  Outcome: Ongoing  Note: Pt has been afebrile this shift. Will monitor for changes. 10/16/2020 0024 by Janes Houston RN  Outcome: Ongoing  Note: Pt assessed for infection, No signs or symptoms of surgical site noted. VVS, WBC WNL. Reviewed information with pt and family, pt verbalized understanding        Problem: Respiratory:  Goal: Ability to maintain normal respiratory secretions will improve  Description: Ability to maintain normal respiratory secretions will improve  10/16/2020 1222 by Naren Kam RN  Outcome: Ongoing  Note: No respiratory secretions, coughing, or SOB noted. 10/16/2020 0024 by Janes Houston RN  Outcome: Ongoing  Note: Patient remains free of significant airway secretions. Patient able to effectively cough and clear any secretions that need cleared. Will continue to monitor patient throughout shift. Problem: Falls - Risk of:  Goal: Will remain free from falls  Description: Will remain free from falls  10/16/2020 1222 by Naren Kam RN  Outcome: Ongoing  Note: Pt free from injury or falls at this time, fall precautions in place, bed in low position, side rail up x2, [place] Fall Risk: High (45 and higher), bed alarm on, reoriented to room and call light, reminded not to get up without assistance, call light in reach, will continue to monitor. Pt verbalizes understanding of fall risk procedures. 10/16/2020 0024 by Janes Houston RN  Outcome: Ongoing  Note: Fall risk assessment completed . Fall precautions in place, bed/ chair alarm on, side rails 2/4 up, call light in reach, educated pt on calling for assistance when needed, room clear of clutter. Pt verbalized understanding. Goal: Absence of physical injury  Description: Absence of physical injury  10/16/2020 1222 by Naren Kam RN  Outcome: Ongoing  Note: Pt has not incurred any type of physical injury this shift.   10/16/2020 0024 by Janes Houston RN  Outcome: Ongoing     Problem: OXYGENATION/RESPIRATORY FUNCTION  Goal: Patient will maintain patent airway  10/16/2020 1222 by Miriam Hewitt RN  Outcome: Ongoing  Note: Patient has maintained a patent airway, repositions self, lung sounds bilaterally clear/diminished, no cough noted, RT is involved in patient care and is providing care, patient has had adequate fluid intake via continuous TF, no need to suction airway at this time, educated patient to turn and cough and deep breathe every two hours and as needed, encouraged incentive spirometer and patient has been performing. Will continue to monitor and reassess. 10/16/2020 0024 by Quincy Simms RN  Outcome: Ongoing  Note: Patients oxygen levels are within normal range. Educated on turning, coughing, deep breathing. Will continue to monitor throughout the shift. Goal: Patient will achieve/maintain normal respiratory rate/effort  Description: Respiratory rate and effort will be within normal limits for the patient  10/16/2020 1222 by Miriam Hewitt RN  Outcome: Ongoing  Note: Vital signs stable, respiratory rate normal, heart rate is WNLs and regular on cardiac monitor, +2 pulses and less than 3 second cap refill noted in all extremities, body color appropriate for ethnicity and temperature warm, generalized edema noted, patient repositions  self, and daily weights are ordered and +1 lb change from yesterday's weight. Will continue to monitor and reassess. 10/16/2020 0024 by Quincy Simms RN  Outcome: Ongoing  Note: Patient remains free of significant airway secretions. Patient able to effectively cough and clear any secretions that need cleared. Will continue to monitor patient throughout shift.           Problem: HEMODYNAMIC STATUS  Goal: Patient has stable vital signs and fluid balance  10/16/2020 1222 by Miriam Hewitt RN  Outcome: Ongoing  Note: Vital signs stable, respiratory rate normal, heart rate is WNLs and regular on cardiac monitor, +2 pulses and less than 3 second cap refill noted in all extremities, body color appropriate for ethnicity and temperature warm, generalized edema noted, patient repositions  self, and daily weights are ordered and +1 lb change from yesterday's weight. Will continue to monitor and reassess. 10/16/2020 0024 by Brayan Clement RN  Outcome: Ongoing  Note: Vitals signs are stable. Intake and output are being recorded, will continue to monitor       Problem: FLUID AND ELECTROLYTE IMBALANCE  Goal: Fluid and electrolyte balance are achieved/maintained  10/16/2020 1222 by Denver Sor, RN  Outcome: Ongoing  Note: Pt receiving fluids via continuous TF.  10/16/2020 0024 by Brayan Clement RN  Outcome: Ongoing  Note: Vitals signs are stable. Intake and output are being recorded, will continue to monitor       Problem: ACTIVITY INTOLERANCE/IMPAIRED MOBILITY  Goal: Mobility/activity is maintained at optimum level for patient  10/16/2020 1222 by Denver Sor, RN  Outcome: Ongoing  Note: Pt dependent with ambulation, utilizing maximove for transfers. Pt able to move self around in bed without difficulty. 10/16/2020 0024 by Brayan Clement RN  Outcome: Ongoing  Note: Patient assisted with ambulation. Will continue to monitor. Problem: Nutrition  Goal: Optimal nutrition therapy  10/16/2020 1222 by Denver Sor, RN  Outcome: Ongoing  Note: Pt receiving continuous TF at 55 mL/hr which is goal, tolerating well.    10/16/2020 0833 by Callie Jarquin RD, LD  Outcome: Ongoing  10/16/2020 0024 by Brayan Clement RN  Outcome: Ongoing

## 2020-10-17 LAB
ORGANISM: ABNORMAL
URINE CULTURE, ROUTINE: ABNORMAL

## 2020-10-20 ENCOUNTER — OFFICE VISIT (OUTPATIENT)
Dept: CARDIOLOGY CLINIC | Age: 50
End: 2020-10-20
Payer: COMMERCIAL

## 2020-10-20 VITALS
WEIGHT: 179 LBS | DIASTOLIC BLOOD PRESSURE: 72 MMHG | SYSTOLIC BLOOD PRESSURE: 118 MMHG | BODY MASS INDEX: 28.04 KG/M2 | OXYGEN SATURATION: 94 % | HEART RATE: 80 BPM | TEMPERATURE: 96.9 F

## 2020-10-20 PROCEDURE — G8484 FLU IMMUNIZE NO ADMIN: HCPCS | Performed by: NURSE PRACTITIONER

## 2020-10-20 PROCEDURE — 99214 OFFICE O/P EST MOD 30 MIN: CPT | Performed by: NURSE PRACTITIONER

## 2020-10-20 PROCEDURE — G8427 DOCREV CUR MEDS BY ELIG CLIN: HCPCS | Performed by: NURSE PRACTITIONER

## 2020-10-20 PROCEDURE — 1111F DSCHRG MED/CURRENT MED MERGE: CPT | Performed by: NURSE PRACTITIONER

## 2020-10-20 PROCEDURE — 1036F TOBACCO NON-USER: CPT | Performed by: NURSE PRACTITIONER

## 2020-10-20 PROCEDURE — G8419 CALC BMI OUT NRM PARAM NOF/U: HCPCS | Performed by: NURSE PRACTITIONER

## 2020-10-20 NOTE — PATIENT INSTRUCTIONS
Instructions:   1. Medications: Continue current meds  2. Labs: CMP, BNP, CBC  3. Follow up: 3 months  4. Daily weight: Call for increase 3 lbs/day or 5 lbs/week. 5. 2 gm sodium diet: -getting TF  6. Fluid Restriction: 64 oz.  - getting TF

## 2020-10-20 NOTE — PROGRESS NOTES
The [address] [facility], [address] [facility] [place], [address] [place] [address] Rip Kolb [facility]  [phone]    PrimaryCare Doctor:  No primary care provider on file. Primary Cardiologist: Dr. Barbara Morgan    Type of visit: Phone call, no physical exam completed. Chief Complaint   Patient presents with    Follow-Up from Hospital     No concerns     History of Present Illness:  Brynn Ramirez is a 53 y.o. female with PMH HTN, SHF Factor V Leiden (on chronic warfarin goal 2-3), HLP, asthma.      Admitted to [facility] 9/10/20 - 10/4/20 with bilateral PE, MRSA bacteremia and PNA, DIC, new AF (multiple cardioversions) and SHF. PEA arrest on 9/10 with rescusitation. Developed ARF initially on CRRT now HD>[facility] as outpatient. Dr. Kaylene Burt following + seizures with multiple infarcts/acute CVA on MRI. + cardiogenic shock. +shock liver and coagulopathy-Hem/Onc consulted. .  GI consulted for PEG placement. Re admit 10/8/20-10/16/20 for PEG tube malfunction. Underwent PEG tube replacement 10/9. Patient presents to [facility] cardiology for telephone follow up for [facility] and AF. She is unable to speak OTC D/T weak voice and encephalopathy. ROS given by Rogelio Olvera, nurse at [address] [place] [address]. Patient is alert but remains confused. Daily weights have been stable- 179lbs. She continues to get HD 3X/week and has not had any issues with that. She has not had SOB, orthopnea, CP, abd distention, edema. She is mostly bed bound, needs lift to get OOB. She is not requiring any O2. She has not had any reported irregular heart rhythms. Her BP has been stable. INRs have been therapeutic between 2-3. She has not had any other recent blood work. Getting TF via PEG.      Review of Systems: Limited- per nursing report  General: Denies fever, chills + generalized weakness  RESP: Denies cough, sputum, dyspnea, wheeze, snoring  CARD: Denies palpitations,  Murmur, chest pain  GI:Denies nausea, vomiting, change in bowels  VASC: Denies claudication, leg cramps, clots  MUSC/SKEL: Denies pain  NEURO: encephalopathy  HEME: Denies abn bruising, bleeding, anemia  ENDO: Denies intolerance to heat, cold, excessive thirst or hunger, hx thyroid disease      /72   Pulse 80   Temp 96.9 °F (36.1 °C)   Wt 179 lb (81.2 kg)   LMP 12/05/2018 (Exact Date)   SpO2 94%   BMI 28.04 kg/m²   Wt Readings from Last 3 Encounters:   10/20/20 179 lb (81.2 kg)   10/16/20 182 lb 15.7 oz (83 kg)   10/04/20 188 lb 0.8 oz (85.3 kg)     Past Medical History:   has a past medical history of Anticoagulant long-term use, Arthritis, Asthma, Baker's cyst, Depression, Factor V Leiden ([address] Factor V Leiden ([address] H/O blood clots, Heartburn, Hyperlipidemia, MRSA bacteremia, and Osteoarthritis. Surgical History:   has a past surgical history that includes Gastrostomy tube placement (N/A, 9/25/2020); Tunneled venous catheter placement (Right, 09/29/2020); IR TUNNELED CVC PLACE WO SQ PORT/PUMP > 5 YEARS (9/29/2020); and Gastrostomy tube placement (N/A, 10/9/2020). Social History:   reports that she quit smoking about 6 weeks ago. Her smoking use included cigarettes. She smoked 0.50 packs per day. She has never used smokeless tobacco. She reports current drug use. Frequency: 14.00 times per week. Drug: Marijuana. She reports that she does not drink alcohol. Family History:   Family History   Problem Relation Age of Onset    Heart Attack Father        HomeMedications:  Prior to Admission medications    Medication Sig Start Date End Date Taking? Authorizing Provider   Loperamide HCl (IMODIUM) 1 MG/7.5ML SOLN solution Take 30 mLs by mouth 4 times daily as needed for Diarrhea 10/16/20   AGUSTIN Doss - CNP   cefUROXime (CEFTIN) 250 MG tablet Take 2 tablets by mouth 2 times daily for 7 days 10/16/20 10/23/20  AGUSTIN Doss CNP   warfarin (COUMADIN) 7.5 MG tablet Take 7.5 mg daily except 3.75 mg on Mondays and Thursdays.   INR twice weekly HGB 8.4 10/13/2020    HCT 34.7 10/16/2020    HCT 27.0 10/15/2020    HCT 25.9 10/13/2020    MCV 95.5 10/16/2020    MCV 86.0 10/15/2020    MCV 86.9 10/13/2020    RDW 22.3 10/16/2020    RDW 21.3 10/15/2020    RDW 21.8 10/13/2020     10/16/2020     10/15/2020     10/13/2020     BMP:  Lab Results   Component Value Date     10/16/2020     10/15/2020     10/14/2020    K 4.0 10/16/2020    K 3.9 10/15/2020    K 3.8 10/14/2020    K 4.3 10/08/2020    K 4.1 09/10/2020    CL 99 10/16/2020     10/15/2020     10/14/2020    CO2 29 10/16/2020    CO2 26 10/15/2020    CO2 26 10/14/2020    PHOS 4.8 10/04/2020    PHOS 3.4 10/03/2020    PHOS 4.9 10/02/2020    BUN 27 10/16/2020    BUN 14 10/15/2020    BUN 26 10/14/2020    CREATININE 3.4 10/16/2020    CREATININE 2.7 10/15/2020    CREATININE 3.0 10/14/2020     BNP:   Lab Results   Component Value Date    PROBNP 4,851 09/10/2020       Parameters:   > 450 pg/mL under age 48  > 900 pg/mL ages 54-65  > 1800 pg/mL over age 76    Iron Studies:  No results found for: TIBC, FERRITIN  GLUCOSE:   No results for input(s): GLUCOSE in the last 72 hours. LIVER PROFILE:   Lab Results   Component Value Date    AST 32 10/04/2020    ALT 23 10/04/2020    LIPASE 45.0 03/16/2016    LABALBU 3.2 10/04/2020    BILIDIR <0.2 10/04/2020    BILITOT <0.2 10/04/2020    ALKPHOS 131 10/04/2020     PT/INR:   Lab Results   Component Value Date    PROTIME 25.7 10/16/2020    PROTIME 67.9 01/29/2016    INR 2.20 10/16/2020     Cardiac Enzymes:  Lab Results   Component Value Date    CKTOTAL 449 09/11/2020    TROPONINI 0.02 09/11/2020     FASTING LIPID PANEL:  Lab Results   Component Value Date    CHOL 154 08/10/2017    HDL 52 08/10/2017    HDL 35 02/02/2012    LDLCALC 86 08/10/2017    TRIG 79 08/10/2017       Cardiac Imaging: Reports reviewed with patient today.    EKG: Atrial flutter with variable A-V blockConfirmed by CHIKI LONG, David Galindo ([id number]) on 9/16/2020 2:34:04 PM    ECHO:  Echo: 9/11/20  Ejection fraction is visually estimated to be 30%-35%. Severe tricuspid regurgitation. Moderate pulmonary HTN  There is severe diffuse hypokinesis. Diastolic filling parameters suggest grade III diastolic dysfunction. Moderate calcification of the posterior leaflet of the mitral valve. Mitral annular calcification is present. The mitral valve leaflets appear myxomatous. No evidence of mitral valve stenosis. Severe mitral regurgitation is present. Systolic flow reversal in pulmonary veins. The right ventricle is enlarged. Right ventricular systolic function is moderate to severely reduced     LEVY: 9/16/20  Conclusions   Summary   Overall left ventricular systolic function appears moderately reduced.   But difficult to assess given atrial fibrillation.   Moderate to moderate mitral regurgitation is present.   The aortic valve is structurally normal. There is no significant aortic   valve regurgitation or stenosis.   Mild to moderate tricuspid regurgitation.   no WINNIE thrombus        Assessment/Plan:    1. 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 Most likely from cardiac arrest and critical illness with bilateral PE, PNA and sepsis. Ischemia is a possibility but patient is not a candidate for ischemic work up at this time - ESRD and encephalopathy. Appears euvolemic. Gettig HD for fluid management. NYHA Class III              Stage 3  Diuretic: HD  Beta Blocker: Coreg  ACEi/ARB/ARNI: none MORELIA  Aldosterone Antagonist: none MORELIA  SGLT2 Inhibitor: none  2gm Na diet, daily weight, 64 oz fluid restriction  Avoid NSAIDS and other nephrotoxic meds  Cardiac Rehab: NA- at [facility]  ICD: after 3 mos GDMT  CMP, BNP, CBC- to be done with ECF lab draws on Thursday (2 days) - results will be forwarded to I.       2. ) Paroxysmal Atrial Fib: Converted to NSR  CHADSVASC- 5    HASBLED-  Thromboembolic risk reduction: Warfarin - INR goal 2-3,

## 2020-11-03 PROBLEM — J18.9 PNEUMONIA DUE TO ORGANISM: Status: RESOLVED | Noted: 2020-11-03 | Resolved: 2020-11-03

## 2020-11-03 PROBLEM — N17.9 AKI (ACUTE KIDNEY INJURY) (HCC): Status: RESOLVED | Noted: 2020-11-03 | Resolved: 2020-11-03

## 2020-12-21 NOTE — PROGRESS NOTES
[facility]  Cardiology Consult    Belia Wood  1970 December 22, 2020      Reason for Referral:     CC: none available     Phone Evaluation- Telephone (During [facility])    Subjective:     History of Present Illness:    Belia Wood is a 53 y.o. patient with a PMH significant for factor V Leiden deficiency, DVT, and hyperlipidemia. She presented to [facility] emergency room on 9/10/2020 with increasing shortness of breath and chest tightness. She had a PEA arrest and was intubated. Today, she is being evaluated by phone with the nurse from facility. Patient is unable to speak due to neurological status. Patient denies exertional chest pain/pressure, dyspnea at rest, DAVIS, PND, orthopnea, palpitations, lightheadedness, weight changes, changes in LE edema, and syncope. Past Medical History:   has a past medical history of Anticoagulant long-term use, Arthritis, Asthma, Baker's cyst, Depression, Factor V Leiden ([address] Factor V Leiden ([address] H/O blood clots, Heartburn, Hyperlipidemia, MRSA bacteremia, and Osteoarthritis. Surgical History:   has a past surgical history that includes Gastrostomy tube placement (N/A, 9/25/2020); Tunneled venous catheter placement (Right, 09/29/2020); IR TUNNELED CVC PLACE WO SQ PORT/PUMP > 5 YEARS (9/29/2020); and Gastrostomy tube placement (N/A, 10/9/2020). Social History:   reports that she quit smoking about 3 months ago. Her smoking use included cigarettes. She smoked 0.50 packs per day. She has never used smokeless tobacco. She reports current drug use. Frequency: 14.00 times per week. Drug: Marijuana. She reports that she does not drink alcohol. Family History:  family history includes Heart Attack in her father. Home Medications:  Were reviewed and are listed in nursing record and/or below  Prior to Admission medications    Medication Sig Start Date End Date Taking?  Authorizing Provider Loperamide HCl (IMODIUM) 1 MG/7.5ML SOLN solution Take 30 mLs by mouth 4 times daily as needed for Diarrhea 10/16/20  Yes AGUSTIN Morales CNP   warfarin (COUMADIN) 7.5 MG tablet Take 7.5 mg daily except 3.75 mg on Mondays and Thursdays. INR twice weekly with dialysis Tuesdays and Thursdays. Call MD at [facility] with results and for dosing instructions. Patient taking differently: Take 5 mg by mouth daily Take 7.5 mg daily except 3.75 mg on Mondays and Thursdays. INR twice weekly with dialysis Tuesdays and Thursdays. Call MD at [facility] with results and for dosing instructions. 10/4/20  Yes Abimbola Hugo MD   amiodarone (CORDARONE) 200 MG tablet 1 tablet by Per NG tube route 2 times daily 10/2/20  Yes Rachana Whelan MD   lacosamide (VIMPAT) 200 MG tablet Take 1 tablet by mouth 2 times daily for 30 days.  10/2/20 12/22/20 Yes Rachana Whelan MD   levETIRAcetam (KEPPRA) 100 MG/ML solution Take 10 mLs by mouth 2 times daily 10/2/20  Yes Rachana Whelan MD   carvedilol (COREG) 12.5 MG tablet Take 1 tablet by mouth 2 times daily (with meals) 10/2/20  Yes Rachana Whelan MD   calcitonin (MIACALCIN) 200 UNIT/ACT nasal spray 1 spray by Nasal route daily 10/2/20  Yes Rachana Whelan MD   sevelamer (RENVELA) 2.4 g PACK packet 0.8 g by Per G Tube route Daily with supper 10/2/20  Yes Rachana Whelan MD   b complex-C-folic acid (NEPHROCAPS) 1 MG capsule Take 1 capsule by mouth daily 10/3/20  Yes Rachana Whelan MD   albuterol sulfate  (90 Base) MCG/ACT inhaler INHALE TWO PUFFS BY MOUTH EVERY 6 HOURS AS NEEDED FOR WHEEZING 8/25/20  Yes AGUSTIN Guerrero CNP   vitamin D (ERGOCALCIFEROL) 1.25 MG (71465 UT) CAPS capsule TAKE ONE CAPSULE BY MOUTH ONCE WEEKLY 8/13/20  Yes Nahomy Bonhaus, DO   omeprazole (PRILOSEC) 20 MG delayed release capsule TAKE ONE CAPSULE BY MOUTH DAILY 6/10/20  Yes Shayna Earl MD Patient identification was verified at the start of the visit: Yes    Total Time: minutes: 5-10 minutes    Note: not billable if this call serves to triage the patient into an appointment for the relevant concern      Farhan Erwin

## 2020-12-22 ENCOUNTER — OFFICE VISIT (OUTPATIENT)
Dept: CARDIOLOGY CLINIC | Age: 50
End: 2020-12-22
Payer: COMMERCIAL

## 2020-12-22 VITALS
HEART RATE: 62 BPM | OXYGEN SATURATION: 97 % | BODY MASS INDEX: 28.28 KG/M2 | WEIGHT: 180.2 LBS | DIASTOLIC BLOOD PRESSURE: 61 MMHG | SYSTOLIC BLOOD PRESSURE: 108 MMHG | TEMPERATURE: 97.3 F | HEIGHT: 67 IN

## 2020-12-22 PROCEDURE — 99421 OL DIG E/M SVC 5-10 MIN: CPT | Performed by: INTERNAL MEDICINE

## 2020-12-23 ENCOUNTER — APPOINTMENT (OUTPATIENT)
Dept: GENERAL RADIOLOGY | Age: 50
End: 2020-12-23
Payer: COMMERCIAL

## 2020-12-23 ENCOUNTER — HOSPITAL ENCOUNTER (EMERGENCY)
Age: 50
Discharge: HOME | End: 2020-12-24
Attending: EMERGENCY MEDICINE
Payer: COMMERCIAL

## 2020-12-23 LAB
A/G RATIO: 1.2 (ref 1.1–2.2)
ALBUMIN SERPL-MCNC: 3.9 G/DL (ref 3.4–5)
ALP BLD-CCNC: 82 U/L (ref 40–129)
ALT SERPL-CCNC: 24 U/L (ref 10–40)
ANION GAP SERPL CALCULATED.3IONS-SCNC: 9 MMOL/L (ref 3–16)
AST SERPL-CCNC: 24 U/L (ref 15–37)
BASOPHILS ABSOLUTE: 0 K/UL (ref 0–0.2)
BASOPHILS RELATIVE PERCENT: 0.5 %
BILIRUB SERPL-MCNC: <0.2 MG/DL (ref 0–1)
BILIRUBIN URINE: NEGATIVE
BLOOD, URINE: ABNORMAL
BUN BLDV-MCNC: 47 MG/DL (ref 7–20)
CALCIUM SERPL-MCNC: 9.8 MG/DL (ref 8.3–10.6)
CHLORIDE BLD-SCNC: 108 MMOL/L (ref 99–110)
CLARITY: CLEAR
CO2: 26 MMOL/L (ref 21–32)
COLOR: YELLOW
CREAT SERPL-MCNC: 1.4 MG/DL (ref 0.6–1.1)
EOSINOPHILS ABSOLUTE: 0.3 K/UL (ref 0–0.6)
EOSINOPHILS RELATIVE PERCENT: 5.4 %
EPITHELIAL CELLS, UA: 1 /HPF (ref 0–5)
GFR AFRICAN AMERICAN: 48
GFR NON-AFRICAN AMERICAN: 40
GLOBULIN: 3.2 G/DL
GLUCOSE BLD-MCNC: 97 MG/DL (ref 70–99)
GLUCOSE URINE: NEGATIVE MG/DL
HCT VFR BLD CALC: 34.5 % (ref 36–48)
HEMOGLOBIN: 11 G/DL (ref 12–16)
HYALINE CASTS: 0 /LPF (ref 0–8)
INR BLD: 3.8 (ref 0.86–1.14)
KETONES, URINE: NEGATIVE MG/DL
LACTIC ACID: 0.6 MMOL/L (ref 0.4–2)
LEUKOCYTE ESTERASE, URINE: NEGATIVE
LYMPHOCYTES ABSOLUTE: 1.6 K/UL (ref 1–5.1)
LYMPHOCYTES RELATIVE PERCENT: 25.5 %
MCH RBC QN AUTO: 29.1 PG (ref 26–34)
MCHC RBC AUTO-ENTMCNC: 32 G/DL (ref 31–36)
MCV RBC AUTO: 91 FL (ref 80–100)
MICROSCOPIC EXAMINATION: YES
MONOCYTES ABSOLUTE: 0.4 K/UL (ref 0–1.3)
MONOCYTES RELATIVE PERCENT: 6 %
NEUTROPHILS ABSOLUTE: 3.9 K/UL (ref 1.7–7.7)
NEUTROPHILS RELATIVE PERCENT: 62.6 %
NITRITE, URINE: NEGATIVE
PDW BLD-RTO: 18 % (ref 12.4–15.4)
PH UA: 5 (ref 5–8)
PLATELET # BLD: 156 K/UL (ref 135–450)
PMV BLD AUTO: 8.4 FL (ref 5–10.5)
POTASSIUM REFLEX MAGNESIUM: 4.4 MMOL/L (ref 3.5–5.1)
PROTEIN UA: NEGATIVE MG/DL
PROTHROMBIN TIME: 44.7 SEC (ref 10–13.2)
RBC # BLD: 3.79 M/UL (ref 4–5.2)
RBC UA: 5 /HPF (ref 0–4)
SODIUM BLD-SCNC: 143 MMOL/L (ref 136–145)
SPECIFIC GRAVITY UA: 1.02 (ref 1–1.03)
TOTAL PROTEIN: 7.1 G/DL (ref 6.4–8.2)
TROPONIN: <0.01 NG/ML
URINE REFLEX TO CULTURE: ABNORMAL
URINE TYPE: ABNORMAL
UROBILINOGEN, URINE: 0.2 E.U./DL
WBC # BLD: 6.3 K/UL (ref 4–11)
WBC UA: 4 /HPF (ref 0–5)

## 2020-12-23 PROCEDURE — 99283 EMERGENCY DEPT VISIT LOW MDM: CPT

## 2020-12-23 PROCEDURE — 71045 X-RAY EXAM CHEST 1 VIEW: CPT

## 2020-12-23 PROCEDURE — 83605 ASSAY OF LACTIC ACID: CPT

## 2020-12-23 PROCEDURE — 93005 ELECTROCARDIOGRAM TRACING: CPT | Performed by: EMERGENCY MEDICINE

## 2020-12-23 PROCEDURE — 81001 URINALYSIS AUTO W/SCOPE: CPT

## 2020-12-23 PROCEDURE — 84484 ASSAY OF TROPONIN QUANT: CPT

## 2020-12-23 PROCEDURE — 2580000003 HC RX 258: Performed by: EMERGENCY MEDICINE

## 2020-12-23 PROCEDURE — 85610 PROTHROMBIN TIME: CPT

## 2020-12-23 PROCEDURE — 85025 COMPLETE CBC W/AUTO DIFF WBC: CPT

## 2020-12-23 PROCEDURE — 80053 COMPREHEN METABOLIC PANEL: CPT

## 2020-12-23 RX ORDER — 0.9 % SODIUM CHLORIDE 0.9 %
1000 INTRAVENOUS SOLUTION INTRAVENOUS ONCE
Status: COMPLETED | OUTPATIENT
Start: 2020-12-23 | End: 2020-12-23

## 2020-12-23 RX ADMIN — SODIUM CHLORIDE 1000 ML: 9 INJECTION, SOLUTION INTRAVENOUS at 21:45

## 2020-12-24 VITALS
WEIGHT: 189.82 LBS | TEMPERATURE: 98.6 F | OXYGEN SATURATION: 96 % | BODY MASS INDEX: 29.73 KG/M2 | RESPIRATION RATE: 16 BRPM | HEART RATE: 63 BPM | DIASTOLIC BLOOD PRESSURE: 62 MMHG | SYSTOLIC BLOOD PRESSURE: 125 MMHG

## 2020-12-24 LAB
EKG ATRIAL RATE: 65 BPM
EKG DIAGNOSIS: NORMAL
EKG P AXIS: 63 DEGREES
EKG P-R INTERVAL: 176 MS
EKG Q-T INTERVAL: 424 MS
EKG QRS DURATION: 86 MS
EKG QTC CALCULATION (BAZETT): 440 MS
EKG R AXIS: 51 DEGREES
EKG T AXIS: 57 DEGREES
EKG VENTRICULAR RATE: 65 BPM

## 2020-12-24 PROCEDURE — 93010 ELECTROCARDIOGRAM REPORT: CPT | Performed by: INTERNAL MEDICINE

## 2020-12-24 NOTE — ED NOTES
Spoke with Chauncey Garnett RN at [address] [place] [address] and updated her on the pt condition. Will call back once transportation ETA is known.

## 2020-12-24 NOTE — ED NOTES
Straight cath performed per sterile protocol. Urine specimen obtained and sent to lab. Call light within reach. Will continue to monitor patient.           Josh Amezquita RN  12/23/20 [id number]

## 2020-12-24 NOTE — ED NOTES
Bed: A17  Expected date: 12/23/20  Expected time: 7:27 PM  Means of arrival: [facility]  Comments:  53 y.  F - AMS but A/Ox3     Lois Fang RN  12/23/20 1943

## 2020-12-24 NOTE — ED PROVIDER NOTES
Number of children: Not on file    Years of education: Not on file    Highest education level: Not on file   Occupational History    Not on file   Social Needs    Financial resource strain: Not on file    Food insecurity     Worry: Not on file     Inability: Not on file    Transportation needs     Medical: Not on file     Non-medical: Not on file   Tobacco Use    Smoking status: Former Smoker     Packs/day: 0.50     Types: Cigarettes     Quit date: 2020     Years since quittin.2    Smokeless tobacco: Never Used   Substance and Sexual Activity    Alcohol use: No     Alcohol/week: 0.0 standard drinks    Drug use: Yes     Frequency: 14.0 times per week     Types: Marijuana    Sexual activity: Not on file   Lifestyle    Physical activity     Days per week: Not on file     Minutes per session: Not on file    Stress: Not on file   Relationships    Social connections     Talks on phone: Not on file     Gets together: Not on file     Attends Sabianism service: Not on file     Active member of club or organization: Not on file     Attends meetings of clubs or organizations: Not on file     Relationship status: Not on file    Intimate partner violence     Fear of current or ex partner: Not on file     Emotionally abused: Not on file     Physically abused: Not on file     Forced sexual activity: Not on file   Other Topics Concern    Not on file   Social History Narrative    Not on file     No current facility-administered medications for this encounter. Current Outpatient Medications   Medication Sig Dispense Refill    Loperamide HCl (IMODIUM) 1 MG/7.5ML SOLN solution Take 30 mLs by mouth 4 times daily as needed for Diarrhea 150 mL 0    warfarin (COUMADIN) 7.5 MG tablet Take 7.5 mg daily except 3.75 mg on  and . INR twice weekly with dialysis  and . Call MD at [facility] with results and for dosing instructions.  (Patient taking differently: Take 5 mg by mouth daily Take 7.5 mg daily except 3.75 mg on Mondays and Thursdays. INR twice weekly with dialysis Tuesdays and Thursdays. Call MD at [facility] with results and for dosing instructions. ) 30 tablet 3    amiodarone (CORDARONE) 200 MG tablet 1 tablet by Per NG tube route 2 times daily 30 tablet 3    lacosamide (VIMPAT) 200 MG tablet Take 1 tablet by mouth 2 times daily for 30 days.  60 tablet 0    levETIRAcetam (KEPPRA) 100 MG/ML solution Take 10 mLs by mouth 2 times daily 300 mL 3    carvedilol (COREG) 12.5 MG tablet Take 1 tablet by mouth 2 times daily (with meals) 60 tablet 3    calcitonin (MIACALCIN) 200 UNIT/ACT nasal spray 1 spray by Nasal route daily 1 Bottle 3    sevelamer (RENVELA) 2.4 g PACK packet 0.8 g by Per G Tube route Daily with supper 90 each 1    b complex-C-folic acid (NEPHROCAPS) 1 MG capsule Take 1 capsule by mouth daily 30 capsule 3    albuterol sulfate  (90 Base) MCG/ACT inhaler INHALE TWO PUFFS BY MOUTH EVERY 6 HOURS AS NEEDED FOR WHEEZING 18 g 0    vitamin D (ERGOCALCIFEROL) 1.25 MG (11961 UT) CAPS capsule TAKE ONE CAPSULE BY MOUTH ONCE WEEKLY 12 capsule 0    omeprazole (PRILOSEC) 20 MG delayed release capsule TAKE ONE CAPSULE BY MOUTH DAILY 90 capsule 6    atorvastatin (LIPITOR) 10 MG tablet TAKE ONE TABLET BY MOUTH DAILY 90 tablet 3    DULoxetine (CYMBALTA) 60 MG extended release capsule TAKE ONE CAPSULE BY MOUTH DAILY 30 capsule 11     Allergies   Allergen Reactions    Ibuprofen          REVIEW OF SYSTEMS  10 systems reviewed, pertinent positives per HPI otherwise noted to be negative    PHYSICAL EXAM  BP (!) 127/58   Pulse 63   Temp 98.6 °F (37 °C) (Oral)   Resp 14   Wt 189 lb 13.1 oz (86.1 kg)   LMP 12/05/2018 (Exact Date)   SpO2 94%   BMI 29.73 kg/m²      CONSTITUTIONAL:  Alert to person place and time, cooperative with exam, afebrile   HEAD: normocephalic, atraumatic   EYES: PERRL, EOMI, anicteric sclera   ENT: Moist mucous membranes, uvula midline, TMs normal bilaterally NECK: Supple, symmetric, trachea midline, no meningismus   LUNGS: Bilateral breath sounds, CTAB, no rales/ronchi/wheezes   CARDIOVASCULAR: RRR, normal S1/S2, no m/r/g, 2+ pulses throughout   ABDOMEN: Soft, non-tender, non-distended, +BS   NEUROLOGIC:  MAEx4, 5/5 strength throughout; fine touch sensation intact throughout; tremor in the upper extremities bilaterally, GCS 15, cranial nerves II through XII intact   MUSCULOSKELETAL: No clubbing, cyanosis or edema   SKIN: No rash, pallor or wounds on exposed surfaces         RADIOLOGY  X-RAYS:  I have reviewed radiologic plain film image(s). ALL OTHER NON-PLAIN FILM IMAGES SUCH AS CT, ULTRASOUND AND MRI HAVE BEEN READ BY THE RADIOLOGIST. XR CHEST PORTABLE   Final Result   No acute abnormality detected. EKG INTERPRETATION  Normal sinus rhythm with a rate of 65, normal axis, , QRS 86, QTc 440, no ST elevations, rhythm changed from a flutter with variable block to normal sinus when compared EKG from 9/16/2020    PROCEDURES    ED COURSE/MDM  UTI, anemia, dehydration, electrode abnormality, URI, pneumonia  Patient seen and evaluated. History and physical as above. Nontoxic, afebrile. Patient presents for concerns for possible confusion at her nursing facility. Patient is alert and oriented x3 on arrival.  Denies any complaints. Does have urinary frequency. Mild abdominal tenderness in the suprapubic region. No focal deficits on exam.  Does have previous tremor from CVA which is unchanged. EKG normal sinus. Troponin negative. Routine labs unremarkable. Urinalysis negative for infection. There is some blood in the urine but patient was straight cath that she did not pee voluntarily. Plan for discharge back to patient's nursing facility. Patient tolerating oral intake. Alert and oriented x3. Denies any complaints during my reassessment. Return instructions provided. All questions answered prior to discharge.        I estimate there is

## 2021-03-08 ENCOUNTER — HOSPITAL ENCOUNTER (OUTPATIENT)
Dept: INTERVENTIONAL RADIOLOGY/VASCULAR | Age: 51
Discharge: HOME | End: 2021-03-08
Payer: COMMERCIAL

## 2021-03-08 DIAGNOSIS — Z45.2 ENCOUNTER FOR REMOVAL OF PERIPHERALLY INSERTED CENTRAL CATHETER: ICD-10-CM

## 2021-03-08 PROCEDURE — 77001 FLUOROGUIDE FOR VEIN DEVICE: CPT

## 2021-03-08 PROCEDURE — 36589 REMOVAL TUNNELED CV CATH: CPT

## 2021-03-08 NOTE — BRIEF OP NOTE
Brief Postoperative Note    Tre Hunter  YOB: 1970  [id number]    Pre-operative Diagnosis: no longer needs HD catheter    Post-operative Diagnosis: Same    Procedure: Tunneled HD catheter removal    Anesthesia: Local    Surgeons: Vimal Sommers MD    Estimated Blood Loss: Less than 5 mL    Complications: None    Specimens: Was Not Obtained    Findings: Successful removal of the right IJ tunneled HD catheter.     Electronically signed by Vimal Sommers MD on 3/8/2021 at 8:24 AM

## 2022-04-08 RX ORDER — CALCIUM CARBONATE 750 MG/1
1 TABLET, CHEWABLE ORAL DAILY
COMMUNITY

## 2022-04-08 RX ORDER — ESCITALOPRAM OXALATE 5 MG/5ML
20 SOLUTION ORAL DAILY
COMMUNITY

## 2022-04-08 RX ORDER — FERROUS SULFATE 325(65) MG
325 TABLET ORAL
COMMUNITY

## 2022-04-08 RX ORDER — CLONAZEPAM 0.5 MG/1
0.5 TABLET ORAL NIGHTLY PRN
COMMUNITY

## 2022-04-08 RX ORDER — LANOLIN ALCOHOL/MO/W.PET/CERES
3 CREAM (GRAM) TOPICAL NIGHTLY PRN
COMMUNITY

## 2022-04-08 RX ORDER — ACETAMINOPHEN 325 MG/1
650 TABLET ORAL EVERY 6 HOURS PRN
COMMUNITY

## 2022-04-08 NOTE — PROGRESS NOTES
[address] [facility] time___0800_________        Surgery time___0930_________    Take the following medications with a sip of water: Follow your MD/Surgeons pre-procedure instructions regarding your medications     Do not eat or drink anything after 12:00 midnight prior to your surgery. This includes water chewing gum, mints and ice chips. You may brush your teeth and gargle the morning of your surgery, but do not swallow the water     Please see your family doctor/pediatrician for a history and physical and/or concerning medications. Bring any test results/reports from your physicians office. If you are under the care of a heart doctor or specialist doctor, please be aware that you may be asked to them for clearance    You may be asked to stop blood thinners such as Coumadin, Plavix, Fragmin, Lovenox, etc., or any anti-inflammatories such as:  Aspirin, Ibuprofen, Advil, Naproxen prior to your surgery. We also ask that you stop any OTC medications such as fish oil, vitamin E, glucosamine, garlic, Multivitamins, COQ 10, etc.    We ask that you do not smoke 24 hours prior to surgery  We ask that you do not  drink any alcoholic beverages 24 hours prior to surgery     You must make arrangements for a responsible adult to take you home after your surgery. For your safety you will not be allowed to leave alone or drive yourself home. Your surgery will be cancelled if you do not have a ride home. Also for your safety, it is strongly suggested that someone stay with you the first 24 hours after your surgery. A parent or legal guardian must accompany a child scheduled for surgery and plan to stay at the hospital until the child is discharged. Please do not bring other children with you. For your comfort, please wear simple loose fitting clothing to the hospital.  Please do not bring valuables.     Do not wear any make-up or nail polish on your fingers or toes      For your safety, please do not wear any jewelry or body piercing's on the day of surgery. All jewelry must be removed. If you have dentures, they will be removed before going to operating room. For your convenience, we will provide you with a container. If you wear contact lenses or glasses, they will be removed, please bring a case for them. If you have a living will and a durable power of  for healthcare, please bring in a copy. As part of our patient safety program to minimize surgical site infections, we ask you to do the following:    · Please notify your surgeon if you develop any illness between         now and the  day of your surgery. · This includes a cough, cold, fever, sore throat, nausea,         or vomiting, and diarrhea, etc.  ·  Please notify your surgeon if you experience dizziness, shortness         of breath or blurred vision between now and the time of your surgery. Do not shave your operative site 96 hours prior to surgery. For face and neck surgery, men may use an electric razor 48 hours   prior to surgery. You may shower the night before surgery or the morning of   your surgery with an antibacterial soap. You will need to bring a photo ID and insurance card    [facility] has an onsite pharmacy, would you like to utilize our pharmacy     If you will be staying overnight and use a C-pap machine, please bring   your C-pap to hospital     Our goal is to provide you with excellent care, therefore, visitors will be limited to two(2) in the room at a time so that we may focus on providing this care for you. Please contact pre-admission testing if you have any further questions. [facility] phone number:  [phone] PAT fax number:  [phone]  Please note these are generalized instructions for all surgical cases, you may be provided with more specific instructions according to your surgery.     C-Difficile admission screening and protocol:       * Admitted with diarrhea? [] YES    []  NO     *Prior history of C-Diff. In last 3 months? [] YES    []  NO     *Antibiotic use in the past 6-8 weeks? []  NO    []  YES                 If yes, which ANTIBIOTIC AND REASON______     *Prior hospitalization or nursing home in the last month? []  YES    []  NO        SAFETY FIRST. .call before you fall

## 2022-04-11 ENCOUNTER — ANESTHESIA EVENT (OUTPATIENT)
Dept: ENDOSCOPY | Age: 52
End: 2022-04-11
Payer: COMMERCIAL

## 2022-04-12 ENCOUNTER — ANESTHESIA (OUTPATIENT)
Dept: ENDOSCOPY | Age: 52
End: 2022-04-12
Payer: COMMERCIAL

## 2022-04-12 ENCOUNTER — HOSPITAL ENCOUNTER (OUTPATIENT)
Age: 52
Setting detail: OUTPATIENT SURGERY
Discharge: HOME | End: 2022-04-12
Attending: INTERNAL MEDICINE | Admitting: INTERNAL MEDICINE
Payer: COMMERCIAL

## 2022-04-12 VITALS
RESPIRATION RATE: 6 BRPM | OXYGEN SATURATION: 100 % | DIASTOLIC BLOOD PRESSURE: 70 MMHG | SYSTOLIC BLOOD PRESSURE: 135 MMHG

## 2022-04-12 VITALS
HEIGHT: 68 IN | SYSTOLIC BLOOD PRESSURE: 131 MMHG | DIASTOLIC BLOOD PRESSURE: 74 MMHG | OXYGEN SATURATION: 100 % | HEART RATE: 58 BPM | RESPIRATION RATE: 16 BRPM | WEIGHT: 268 LBS | BODY MASS INDEX: 40.62 KG/M2 | TEMPERATURE: 97.4 F

## 2022-04-12 PROCEDURE — 2500000003 HC RX 250 WO HCPCS: Performed by: NURSE ANESTHETIST, CERTIFIED REGISTERED

## 2022-04-12 PROCEDURE — 2720000010 HC SURG SUPPLY STERILE: Performed by: INTERNAL MEDICINE

## 2022-04-12 PROCEDURE — 6360000002 HC RX W HCPCS: Performed by: NURSE ANESTHETIST, CERTIFIED REGISTERED

## 2022-04-12 PROCEDURE — 2580000003 HC RX 258: Performed by: ANESTHESIOLOGY

## 2022-04-12 PROCEDURE — 7100000011 HC PHASE II RECOVERY - ADDTL 15 MIN: Performed by: INTERNAL MEDICINE

## 2022-04-12 PROCEDURE — 3700000001 HC ADD 15 MINUTES (ANESTHESIA): Performed by: INTERNAL MEDICINE

## 2022-04-12 PROCEDURE — 7100000010 HC PHASE II RECOVERY - FIRST 15 MIN: Performed by: INTERNAL MEDICINE

## 2022-04-12 PROCEDURE — 6360000002 HC RX W HCPCS: Performed by: ANESTHESIOLOGY

## 2022-04-12 PROCEDURE — 2709999900 HC NON-CHARGEABLE SUPPLY: Performed by: INTERNAL MEDICINE

## 2022-04-12 PROCEDURE — 3700000000 HC ANESTHESIA ATTENDED CARE: Performed by: INTERNAL MEDICINE

## 2022-04-12 PROCEDURE — 3609013300 HC EGD TUBE PLACEMENT: Performed by: INTERNAL MEDICINE

## 2022-04-12 PROCEDURE — 7100000000 HC PACU RECOVERY - FIRST 15 MIN: Performed by: INTERNAL MEDICINE

## 2022-04-12 PROCEDURE — 7100000001 HC PACU RECOVERY - ADDTL 15 MIN: Performed by: INTERNAL MEDICINE

## 2022-04-12 RX ORDER — SODIUM CHLORIDE 0.9 % (FLUSH) 0.9 %
5-40 SYRINGE (ML) INJECTION PRN
Status: DISCONTINUED | OUTPATIENT
Start: 2022-04-12 | End: 2022-04-12 | Stop reason: HOSPADM

## 2022-04-12 RX ORDER — OXYCODONE HYDROCHLORIDE 5 MG/1
5 TABLET ORAL PRN
Status: DISCONTINUED | OUTPATIENT
Start: 2022-04-12 | End: 2022-04-12 | Stop reason: HOSPADM

## 2022-04-12 RX ORDER — ONDANSETRON 2 MG/ML
4 INJECTION INTRAMUSCULAR; INTRAVENOUS
Status: DISCONTINUED | OUTPATIENT
Start: 2022-04-12 | End: 2022-04-12 | Stop reason: HOSPADM

## 2022-04-12 RX ORDER — SODIUM CHLORIDE 0.9 % (FLUSH) 0.9 %
5-40 SYRINGE (ML) INJECTION EVERY 12 HOURS SCHEDULED
Status: DISCONTINUED | OUTPATIENT
Start: 2022-04-12 | End: 2022-04-12 | Stop reason: HOSPADM

## 2022-04-12 RX ORDER — MEPERIDINE HYDROCHLORIDE 25 MG/ML
12.5 INJECTION INTRAMUSCULAR; INTRAVENOUS; SUBCUTANEOUS EVERY 5 MIN PRN
Status: DISCONTINUED | OUTPATIENT
Start: 2022-04-12 | End: 2022-04-12 | Stop reason: HOSPADM

## 2022-04-12 RX ORDER — FENTANYL CITRATE 50 UG/ML
25 INJECTION, SOLUTION INTRAMUSCULAR; INTRAVENOUS EVERY 5 MIN PRN
Status: DISCONTINUED | OUTPATIENT
Start: 2022-04-12 | End: 2022-04-12 | Stop reason: HOSPADM

## 2022-04-12 RX ORDER — PROPOFOL 10 MG/ML
INJECTION, EMULSION INTRAVENOUS PRN
Status: DISCONTINUED | OUTPATIENT
Start: 2022-04-12 | End: 2022-04-12 | Stop reason: SDUPTHER

## 2022-04-12 RX ORDER — FENTANYL CITRATE 50 UG/ML
50 INJECTION, SOLUTION INTRAMUSCULAR; INTRAVENOUS EVERY 5 MIN PRN
Status: DISCONTINUED | OUTPATIENT
Start: 2022-04-12 | End: 2022-04-12 | Stop reason: HOSPADM

## 2022-04-12 RX ORDER — SODIUM CHLORIDE 9 MG/ML
INJECTION, SOLUTION INTRAVENOUS PRN
Status: DISCONTINUED | OUTPATIENT
Start: 2022-04-12 | End: 2022-04-12 | Stop reason: HOSPADM

## 2022-04-12 RX ORDER — OXYCODONE HYDROCHLORIDE 10 MG/1
10 TABLET ORAL PRN
Status: DISCONTINUED | OUTPATIENT
Start: 2022-04-12 | End: 2022-04-12 | Stop reason: HOSPADM

## 2022-04-12 RX ORDER — SODIUM CHLORIDE 9 MG/ML
25 INJECTION, SOLUTION INTRAVENOUS PRN
Status: DISCONTINUED | OUTPATIENT
Start: 2022-04-12 | End: 2022-04-12 | Stop reason: HOSPADM

## 2022-04-12 RX ORDER — LIDOCAINE HYDROCHLORIDE 20 MG/ML
INJECTION, SOLUTION EPIDURAL; INFILTRATION; INTRACAUDAL; PERINEURAL PRN
Status: DISCONTINUED | OUTPATIENT
Start: 2022-04-12 | End: 2022-04-12 | Stop reason: SDUPTHER

## 2022-04-12 RX ADMIN — FENTANYL CITRATE 25 MCG: 50 INJECTION, SOLUTION INTRAMUSCULAR; INTRAVENOUS at 11:32

## 2022-04-12 RX ADMIN — PROPOFOL 70 MG: 10 INJECTION, EMULSION INTRAVENOUS at 10:52

## 2022-04-12 RX ADMIN — LIDOCAINE HYDROCHLORIDE 80 MG: 20 INJECTION, SOLUTION EPIDURAL; INFILTRATION; INTRACAUDAL; PERINEURAL at 10:52

## 2022-04-12 RX ADMIN — PROPOFOL 20 MG: 10 INJECTION, EMULSION INTRAVENOUS at 10:58

## 2022-04-12 RX ADMIN — SODIUM CHLORIDE 25 ML: 9 INJECTION, SOLUTION INTRAVENOUS at 09:58

## 2022-04-12 RX ADMIN — PROPOFOL 30 MG: 10 INJECTION, EMULSION INTRAVENOUS at 10:55

## 2022-04-12 ASSESSMENT — PULMONARY FUNCTION TESTS
PIF_VALUE: 1
PIF_VALUE: 0
PIF_VALUE: 1
PIF_VALUE: 0
PIF_VALUE: 1

## 2022-04-12 ASSESSMENT — PAIN DESCRIPTION - LOCATION
LOCATION: ABDOMEN
LOCATION: ABDOMEN

## 2022-04-12 ASSESSMENT — PAIN DESCRIPTION - FREQUENCY
FREQUENCY: CONTINUOUS
FREQUENCY: CONTINUOUS

## 2022-04-12 ASSESSMENT — PAIN SCALES - GENERAL
PAINLEVEL_OUTOF10: 0
PAINLEVEL_OUTOF10: 5
PAINLEVEL_OUTOF10: 2
PAINLEVEL_OUTOF10: 0

## 2022-04-12 ASSESSMENT — PAIN DESCRIPTION - DESCRIPTORS
DESCRIPTORS: SORE
DESCRIPTORS: SORE;TENDER

## 2022-04-12 ASSESSMENT — PAIN - FUNCTIONAL ASSESSMENT
PAIN_FUNCTIONAL_ASSESSMENT: 0-10
PAIN_FUNCTIONAL_ASSESSMENT: PREVENTS OR INTERFERES SOME ACTIVE ACTIVITIES AND ADLS
PAIN_FUNCTIONAL_ASSESSMENT: PREVENTS OR INTERFERES SOME ACTIVE ACTIVITIES AND ADLS

## 2022-04-12 ASSESSMENT — PAIN DESCRIPTION - PAIN TYPE
TYPE: SURGICAL PAIN
TYPE: SURGICAL PAIN

## 2022-04-12 ASSESSMENT — PAIN DESCRIPTION - ORIENTATION
ORIENTATION: MID
ORIENTATION: MID

## 2022-04-12 ASSESSMENT — PAIN DESCRIPTION - ONSET
ONSET: ON-GOING
ONSET: GRADUAL

## 2022-04-12 ASSESSMENT — ENCOUNTER SYMPTOMS: SHORTNESS OF BREATH: 1

## 2022-04-12 ASSESSMENT — PAIN DESCRIPTION - PROGRESSION
CLINICAL_PROGRESSION: GRADUALLY WORSENING
CLINICAL_PROGRESSION: GRADUALLY IMPROVING

## 2022-04-12 NOTE — H&P
Pre-operative History and Physical    Patient: Lee Cedillo  : 1970  Acct#:     HISTORY OF PRESENT ILLNESS:    The patient is a 49 y.o. female who presents with malfunctioning PEG tube. On eliquis so planning endoscopic replacement. Past Medical History:        Diagnosis Date    Arthritis     Asthma     At risk for falls     Baker's cyst     [facility]congestive heart failure) ([facility])     Dysphagia     Epilepsy ([address]     Factor V Leiden ([address]     on eliquis    GERD (gastroesophageal reflux disease)     Hx of blood clots     Hyperlipidemia     Hypertension     MRSA bacteremia 09/10/2020    Myoclonus     Need for assistance with personal care     Osteoarthritis     Paroxysmal atrial fibrillation ([address]     Schizoaffective disorder ([address]     Sudden cardiac arrest ([address]       Past Surgical History:        Procedure Laterality Date    GASTROSTOMY TUBE PLACEMENT N/A 2020    ESOPHAGOGASTRODUODENOSCOPY PERCUTANEOUS ENDOSCOPIC GASTROSTOMY TUBE PLACEMENT performed by Jaycee Beavers MD at [address] [facility] N/A 10/9/2020    ESOPHAGOGASTRODUODENOSCOPY PERCUTANEOUS ENDOCOPIC GASTROSTOMY TUBE PLACEMENT performed by Tyler Crawford DO at [address] [place] TUNNELED CATHETER PLACEMENT GREATER THAN 5 YEARS  2020    IR TUNNELED CATHETER PLACEMENT GREATER THAN 5 YEARS 2020 [facility]    TUNNELED VENOUS CATHETER PLACEMENT Right 2020    Permacath ; RIJ; 19cm; Dr. Wendy Adhikari      Medications Prior to Admission:   No current facility-administered medications on file prior to encounter.      Current Outpatient Medications on File Prior to Encounter   Medication Sig Dispense Refill    ACIDOPHILUS LACTOBACILLUS PO Take by mouth daily      apixaban (ELIQUIS) 5 MG TABS tablet 5 mg by Per G Tube route 2 times daily      calcium carbonate (TUMS EX) 750 MG chewable tablet Take 1 tablet by mouth daily      clonazePAM (KLONOPIN) 0.5 MG tablet Take 0.5 mg by mouth nightly as needed.  ferrous sulfate (IRON 325) 325 (65 Fe) MG tablet 325 mg by PEG Tube route daily (with breakfast)      escitalopram (LEXAPRO) 5 MG/5ML solution 20 mg by PEG Tube route daily      melatonin 3 MG TABS tablet 3 mg by PEG Tube route nightly as needed      acetaminophen (TYLENOL) 325 MG tablet 650 mg by PEG Tube route every 6 hours as needed for Pain      amiodarone (CORDARONE) 200 MG tablet 1 tablet by Per NG tube route 2 times daily 30 tablet 3    lacosamide (VIMPAT) 200 MG tablet Take 1 tablet by mouth 2 times daily for 30 days.  (Patient taking differently: 200 mg by PEG Tube route 2 times daily. ) 60 tablet 0    levETIRAcetam (KEPPRA) 100 MG/ML solution Take 10 mLs by mouth 2 times daily (Patient taking differently: 20 mg/kg by PEG Tube route 2 times daily ) 300 mL 3    b complex-C-folic acid (NEPHROCAPS) 1 MG capsule Take 1 capsule by mouth daily (Patient taking differently: 1 capsule daily ) 30 capsule 3    albuterol sulfate  (90 Base) MCG/ACT inhaler INHALE TWO PUFFS BY MOUTH EVERY 6 HOURS AS NEEDED FOR WHEEZING 18 g 0    vitamin D (ERGOCALCIFEROL) 1.25 MG (98530 UT) CAPS capsule TAKE ONE CAPSULE BY MOUTH ONCE WEEKLY 12 capsule 0    omeprazole (PRILOSEC) 20 MG delayed release capsule TAKE ONE CAPSULE BY MOUTH DAILY (Patient taking differently: Take 20 mg by mouth in the morning and at bedtime Via PEG tube) 90 capsule 6    atorvastatin (LIPITOR) 10 MG tablet TAKE ONE TABLET BY MOUTH DAILY (Patient taking differently: 10 mg by PEG Tube route at bedtime ) 90 tablet 3        Allergies:  Aspirin and Ibuprofen    Social History:   Social History     Socioeconomic History    Marital status:      Spouse name: Not on file    Number of children: Not on file    Years of education: Not on file    Highest education level: Not on file   Occupational History    Not on file   Tobacco Use    Smoking status: Former Smoker     Packs/day: 0.50     Types: Cigarettes     Quit date: 2020     Years since quittin.5    Smokeless tobacco: Never Used   Vaping Use    Vaping Use: Never used   Substance and Sexual Activity    Alcohol use: No     Alcohol/week: 0.0 standard drinks    Drug use: Yes     Frequency: 14.0 times per week     Types: Marijuana Oma Patricio)    Sexual activity: Not Currently   Other Topics Concern    Not on file   Social History Narrative    Not on file     Social Determinants of Health     Financial Resource Strain:     Difficulty of Paying Living Expenses: Not on file   Food Insecurity:     Worried About Running Out of Food in the Last Year: Not on file    [place] of Food in the Last Year: Not on file   Transportation Needs:     Lack of Transportation (Medical): Not on file    Lack of Transportation (Non-Medical):  Not on file   Physical Activity:     Days of Exercise per Week: Not on file    Minutes of Exercise per Session: Not on file   Stress:     Feeling of Stress : Not on file   Social Connections:     Frequency of Communication with Friends and Family: Not on file    Frequency of Social Gatherings with Friends and Family: Not on file    Attends Gnosticism Services: Not on file    Active Member of [address] [place] [address] [employer] or Organizations: Not on file    Attends Club or Organization Meetings: Not on file    Marital Status: Not on file   Intimate Partner Violence:     Fear of Current or Ex-Partner: Not on file    Emotionally Abused: Not on file    Physically Abused: Not on file    Sexually Abused: Not on file   Housing Stability:     Unable to Pay for Housing in the Last Year: Not on file    Number of [place] in the Last Year: Not on file    Unstable Housing in the Last Year: Not on file      Family History:       Problem Relation Age of Onset    Heart Attack Father         PHYSICAL EXAM:      BP (!) 149/71   Pulse 59   Temp 96.4 °F (35.8 °C) (Temporal)   Resp 16   Ht 5' 8\" (1.727 m)   Wt 268 lb (121.6 kg)   LMP 2018 (Exact Date)   SpO2 100%   BMI 40.75 kg/m²  I        Heart:  RRR    Lungs:  CTA b    Abdomen:  S/NT/ND/+BS      ASSESSMENT AND PLAN:  ASA: per anesthesia  Mallampati: per anesthesia  1. Patient is a 49 y.o. female here for EGD with PEG change. 2.  Procedure options, risks and benefits reviewed with the patient. The patient expresses understanding.     Irvin Richter

## 2022-04-12 NOTE — PROGRESS NOTES
Patient resting more comfortably. Resp easy unlabored on room air O2 with SAO2 97%. Abdomen rounded soft. PEG intact and clamped. Abdominal discomfort tolerable at 2 of 10 per patient. VSS. IV patent. Patient stable to transfer to ACU for phase II.

## 2022-04-12 NOTE — PROGRESS NOTES
Patient C/O abdominal soreness at PEG site. Patient requesting pain medicine. Patient medicated per order. See MAR. VSS. IV patent.

## 2022-04-12 NOTE — ANESTHESIA POSTPROCEDURE EVALUATION
Department of Anesthesiology  Postprocedure Note    Patient: Mitzy Matthew  MRN: [id number]  YOB: 1970  Date of evaluation: 4/12/2022  Time:  12:35 PM     Procedure Summary     Date: 04/12/22 Room / Location: [address] [place] [address]    Anesthesia Start: 1046 Anesthesia Stop: 5008    Procedure: ESOPHAGOGASTRODUODENOSCOPY WITH REPLACEMENT OF PERCUTANEOUS ENDOSCOPIC GASTROSTOMY TUBE (N/A ) Diagnosis:       Malfunction of gastrostomy tube ([address]      (PERCUTANEOUS ENDOSCOPIC GASTROSTOMY TUBE MALFUNCTION)    Surgeons: Tarik Edmondson MD Responsible Provider: Ashutosh Garcia MD    Anesthesia Type: MAC ASA Status: 4          Anesthesia Type: MAC    Miguel Phase I: Miguel Score: 9    Miguel Phase II:      Last vitals: Reviewed and per EMR flowsheets.        Anesthesia Post Evaluation    Patient location during evaluation: PACU  Patient participation: complete - patient participated  Level of consciousness: awake and alert  Pain score: 0  Airway patency: patent  Nausea & Vomiting: no nausea and no vomiting  Complications: no  Cardiovascular status: blood pressure returned to baseline  Respiratory status: acceptable  Hydration status: euvolemic

## 2022-04-12 NOTE — PROGRESS NOTES
Patient admitted to PACU from OR. Patient opens eyes to name, drowsy. Resp easy unlabored on room air O2 with SaO2 100%. Monitor in SB. PEG tube intact to abdomen and clamped. Patient denies C/O pain or nausea. Right forearm IV patent. Patient moving extremities to command. VSS.

## 2022-04-12 NOTE — PROGRESS NOTES
Medications administered and monitored by CRNA, see anesthesia record.     ,Electronically signed by Jeannie English RN on 4/12/2022 at 10:52 AM

## 2022-04-12 NOTE — ANESTHESIA PRE PROCEDURE
[facility] Department of Anesthesiology  Pre-Anesthesia Evaluation/Consultation       Name:  Jud Lund  : 1970  Age:  49 y.o.                                            MRN:  [id number]  Date: 2022           Surgeon: Surgeon(s):  Lauren Reardon MD    Procedure: Procedure(s):  ESOPHAGOGASTRODUODENOSCOPY WITH REPLACEMENT OF PERCUTANEOUS ENDOSCOPIC GASTROSTOMY TUBE     Allergies   Allergen Reactions    Aspirin Other (See Comments)     Unable to recall reaction      Ibuprofen Other (See Comments)     Unable to recall reaction     Patient Active Problem List   Diagnosis    Factor V Leiden mutation ([address]    Pulmonary embolus (HCC)    Lactic acidosis    CAP (community acquired pneumonia)    Acute CHF (congestive heart failure) ([address]    Bilateral pulmonary embolism (HCC)    Cardiac arrest ([address]    Seizures ([address]    Acute respiratory failure with hypoxia ([address]    Metabolic acidosis    Congestive heart failure (HCC)    Pulmonary hypertension ([address]    Multifocal pneumonia    Septic shock (HCC)    Elevated LFTs    Thrombocytopenia (HCC)    Coagulopathy (HCC)    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)    Acute encephalopathy    Bacteremia    Pleural effusion, bilateral    Elevated lactic acid level    Bacteremia due to methicillin susceptible Staphylococcus aureus (MSSA)    Pneumonia due to methicillin susceptible Staphylococcus aureus (MSSA) (HCC)    Anemia    Acute renal failure (HCC)    Bilateral pleural effusion    Mitral valve insufficiency    Tricuspid valve insufficiency    Class 2 obesity due to excess calories with body mass index (BMI) of 39.0 to 39.9 in adult    Cardiogenic shock (HCC)    Staphylococcal pneumonia (HCC)    Acute on chronic systolic heart failure (HCC)    Paroxysmal atrial fibrillation ([address]    Essential hypertension    Complication of gastrostomy tube (HCC)    PEG tube malfunction [facility])     Past Medical History:   Diagnosis Date    Arthritis     Asthma     At risk for falls     Baker's cyst     [facility]estive [facility]) [facility]     Dysphagia     Epilepsy ([facility]     Factor V Leiden ([facility]     on eliquis    GERD (gastroesophageal reflux disease)     Hx of blood clots     Hyperlipidemia     Hypertension     MRSA bacteremia 09/10/2020    Myoclonus     Need for assistance with personal care     Osteoarthritis     Paroxysmal atrial fibrillation ([facility]     Schizoaffective disorder ([facility]     Sudden cardiac arrest ([facility]      Past Surgical History:   Procedure Laterality Date    GASTROSTOMY TUBE PLACEMENT N/A 2020    ESOPHAGOGASTRODUODENOSCOPY PERCUTANEOUS ENDOSCOPIC GASTROSTOMY TUBE PLACEMENT performed by Radha Herrera MD at [address] [facility] N/A 10/9/2020    ESOPHAGOGASTRODUODENOSCOPY PERCUTANEOUS ENDOCOPIC GASTROSTOMY TUBE PLACEMENT performed by [facility] DO at [facility]  IR TUNNELED [address] 5 YEARS  2020    IR TUNNELED CATHETER PLACEMENT GREATER THAN 5 YEARS 2020 [facility]    TUNNELED VENOUS CATHETER PLACEMENT Right 2020    Permacath ; RIJ; 19cm; Dr. Mak Cid History     Tobacco Use    Smoking status: Former Smoker     Packs/day: 0.50     Types: Cigarettes     Quit date: 2020     Years since quittin.5    Smokeless tobacco: Never Used   Vaping Use    Vaping Use: Never used   Substance Use Topics    Alcohol use: No     Alcohol/week: 0.0 standard drinks    Drug use: Yes     Frequency: 14.0 times per week     Types: Marijuana (Weed)     Medications  Current Outpatient Medications on File Prior to Visit   Medication Sig Dispense Refill    ACIDOPHILUS LACTOBACILLUS PO Take by mouth daily      apixaban (ELIQUIS) 5 MG TABS tablet 5 mg by Per G Tube route 2 times daily      calcium carbonate (TUMS EX) 750 MG chewable tablet Take 1 tablet by mouth daily      clonazePAM (KLONOPIN) 0.5 MG tablet Take 0.5 mg by mouth nightly as needed.  ferrous sulfate (IRON 325) 325 (65 Fe) MG tablet 325 mg by PEG Tube route daily (with breakfast)      escitalopram (LEXAPRO) 5 MG/5ML solution 20 mg by PEG Tube route daily      melatonin 3 MG TABS tablet 3 mg by PEG Tube route nightly as needed      acetaminophen (TYLENOL) 325 MG tablet 650 mg by PEG Tube route every 6 hours as needed for Pain      amiodarone (CORDARONE) 200 MG tablet 1 tablet by Per NG tube route 2 times daily 30 tablet 3    lacosamide (VIMPAT) 200 MG tablet Take 1 tablet by mouth 2 times daily for 30 days. (Patient taking differently: 200 mg by PEG Tube route 2 times daily. ) 60 tablet 0    levETIRAcetam (KEPPRA) 100 MG/ML solution Take 10 mLs by mouth 2 times daily (Patient taking differently: 20 mg/kg by PEG Tube route 2 times daily ) 300 mL 3    b complex-C-folic acid (NEPHROCAPS) 1 MG capsule Take 1 capsule by mouth daily (Patient taking differently: 1 capsule daily ) 30 capsule 3    albuterol sulfate  (90 Base) MCG/ACT inhaler INHALE TWO PUFFS BY MOUTH EVERY 6 HOURS AS NEEDED FOR WHEEZING 18 g 0    vitamin D (ERGOCALCIFEROL) 1.25 MG (63817 UT) CAPS capsule TAKE ONE CAPSULE BY MOUTH ONCE WEEKLY 12 capsule 0    omeprazole (PRILOSEC) 20 MG delayed release capsule TAKE ONE CAPSULE BY MOUTH DAILY (Patient taking differently: Take 20 mg by mouth in the morning and at bedtime Via PEG tube) 90 capsule 6    atorvastatin (LIPITOR) 10 MG tablet TAKE ONE TABLET BY MOUTH DAILY (Patient taking differently: 10 mg by PEG Tube route at bedtime ) 90 tablet 3     No current facility-administered medications on file prior to visit.      Current Outpatient Medications   Medication Sig Dispense Refill    ACIDOPHILUS LACTOBACILLUS PO Take by mouth daily      apixaban (ELIQUIS) 5 MG TABS tablet 5 mg by Per G Tube route 2 times daily      calcium carbonate (TUMS EX) 750 MG chewable tablet Take 1 tablet by mouth daily      clonazePAM (KLONOPIN) 0.5 MG tablet Take 0.5 mg by mouth nightly as needed.  ferrous sulfate (IRON 325) 325 (65 Fe) MG tablet 325 mg by PEG Tube route daily (with breakfast)      escitalopram (LEXAPRO) 5 MG/5ML solution 20 mg by PEG Tube route daily      melatonin 3 MG TABS tablet 3 mg by PEG Tube route nightly as needed      acetaminophen (TYLENOL) 325 MG tablet 650 mg by PEG Tube route every 6 hours as needed for Pain      amiodarone (CORDARONE) 200 MG tablet 1 tablet by Per NG tube route 2 times daily 30 tablet 3    lacosamide (VIMPAT) 200 MG tablet Take 1 tablet by mouth 2 times daily for 30 days. (Patient taking differently: 200 mg by PEG Tube route 2 times daily. ) 60 tablet 0    levETIRAcetam (KEPPRA) 100 MG/ML solution Take 10 mLs by mouth 2 times daily (Patient taking differently: 20 mg/kg by PEG Tube route 2 times daily ) 300 mL 3    b complex-C-folic acid (NEPHROCAPS) 1 MG capsule Take 1 capsule by mouth daily (Patient taking differently: 1 capsule daily ) 30 capsule 3    albuterol sulfate  (90 Base) MCG/ACT inhaler INHALE TWO PUFFS BY MOUTH EVERY 6 HOURS AS NEEDED FOR WHEEZING 18 g 0    vitamin D (ERGOCALCIFEROL) 1.25 MG (29257 UT) CAPS capsule TAKE ONE CAPSULE BY MOUTH ONCE WEEKLY 12 capsule 0    omeprazole (PRILOSEC) 20 MG delayed release capsule TAKE ONE CAPSULE BY MOUTH DAILY (Patient taking differently: Take 20 mg by mouth in the morning and at bedtime Via PEG tube) 90 capsule 6    atorvastatin (LIPITOR) 10 MG tablet TAKE ONE TABLET BY MOUTH DAILY (Patient taking differently: 10 mg by PEG Tube route at bedtime ) 90 tablet 3     No current facility-administered medications for this visit. Vital Signs (Current)   There were no vitals filed for this visit.   Vital Signs Statistics (for past 48 hrs)     No data recorded    BP Readings from Last 3 Encounters:   12/24/20 125/62   12/22/20 108/61   10/20/20 118/72     BMI  There is no height or weight on file to calculate BMI.  Estimated body mass index is 40.75 kg/m² as calculated from the following:    Height as of 4/8/22: 5' 8\" (1.727 m). Weight as of 4/8/22: 268 lb (121.6 kg). CBC   Lab Results   Component Value Date    WBC 6.3 12/23/2020    RBC 3.79 12/23/2020    HGB 11.0 12/23/2020    HCT 34.5 12/23/2020    MCV 91.0 12/23/2020    RDW 18.0 12/23/2020     12/23/2020     CMP    Lab Results   Component Value Date     12/23/2020    K 4.4 12/23/2020     12/23/2020    CO2 26 12/23/2020    BUN 47 12/23/2020    CREATININE 1.4 12/23/2020    GFRAA 48 12/23/2020    GFRAA >60 07/25/2012    AGRATIO 1.2 12/23/2020    LABGLOM 40 12/23/2020    GLUCOSE 97 12/23/2020    PROT 7.1 12/23/2020    PROT 7.0 10/19/2011    CALCIUM 9.8 12/23/2020    BILITOT <0.2 12/23/2020    ALKPHOS 82 12/23/2020    AST 24 12/23/2020    ALT 24 12/23/2020     BMP    Lab Results   Component Value Date     12/23/2020    K 4.4 12/23/2020     12/23/2020    CO2 26 12/23/2020    BUN 47 12/23/2020    CREATININE 1.4 12/23/2020    CALCIUM 9.8 12/23/2020    GFRAA 48 12/23/2020    GFRAA >60 07/25/2012    LABGLOM 40 12/23/2020    GLUCOSE 97 12/23/2020     POCGlucose  No results for input(s): GLUCOSE in the last 72 hours.    Coags    Lab Results   Component Value Date    PROTIME 44.7 12/23/2020    PROTIME 67.9 01/29/2016    INR 3.80 12/23/2020    APTT 52.0 64/51/9224     HCG (If Applicable)   Lab Results   Component Value Date    PREGTESTUR Negative 03/16/2016      ABGs   Lab Results   Component Value Date    PHART 7.451 09/19/2020    PO2ART 116.0 09/19/2020    QTE9GET 44.4 09/19/2020    PZD9CPV 30.9 09/19/2020    BEART 6.3 09/19/2020    N3ZGJJLM 99.8 09/19/2020      Type & Screen (If Applicable)  No results found for: LABABO, LABRH                         BMI: Wt Readings from Last 3 Encounters:       NPO Status:                          Anesthesia Evaluation  Patient summary reviewed no history of anesthetic complications:   Airway: Mallampati: III  TM distance: >3 FB   Neck ROM: full   Dental:    (+) edentulous      Pulmonary:normal exam    (+) shortness of breath:  asthma:                            Cardiovascular:  Exercise tolerance: poor (<4 METS), Can not walk. Wheel chair bound  (+) hypertension:, valvular problems/murmurs: MR, past MI (PEA arrest s/p recovery and extubatio):, dysrhythmias: atrial fibrillation, CHF (EF 30):, pulmonary hypertension:, hyperlipidemia      ECG reviewed  Rhythm: regular  Rate: normal  Echocardiogram reviewed         Beta Blocker:  Not on Beta Blocker      ROS comment: 2020 echo: Overall left ventricular systolic function appears moderately reduced. But difficult to assess given atrial fibrillation. Moderate to moderate mitral regurgitation is present. The aortic valve is structurally normal. There is no significant aortic   valve regurgitation or stenosis. Mild to moderate tricuspid regurgitation. Neuro/Psych:   (+) seizures:, psychiatric history:depression/anxiety             GI/Hepatic/Renal:   (+) GERD:, morbid obesity          Endo/Other:    (+) blood dyscrasia ([place]): Factor V and anticoagulation therapy:., .                 Abdominal:   (+) obese,           Vascular: negative vascular ROS. Other Findings:               Anesthesia Plan      MAC     ASA 4     (  )  Induction: intravenous. MIPS: Prophylactic antiemetics administered. Anesthetic plan and risks discussed with patient. Plan discussed with CRNA. This pre-anesthesia assessment may be used as a history and physical.    DOS STAFF ADDENDUM:    Pt seen and examined, chart reviewed (including anesthesia, drug and allergy history). No interval changes to history and physical examination. Anesthetic plan, risks, benefits, alternatives, and personnel involved discussed with patient. Questions and concerns addressed. Patient(family) verbalized an understanding and agrees to proceed.       Mango Mcwilliams MD  April 12, 2022  7:24 AM

## 2022-04-12 NOTE — PROGRESS NOTES
Patient to phase 2 from pacu, vss on room air, lift used to transfer patient from stretcher to personal wheelchair, caregiver at side. Call light within reach, will continue to monitor. 1400: Patient discharged with caregiver.

## 2022-04-12 NOTE — OP NOTE
gastrostomy tube and guidewire though the gastrostomy site until the intragastric bumper was snug against the intragastric wall. A second external bumper was advance over the external portion of the gastrostomy tube and fixed at the 3.5 cm martinez at the skin. A Y connector was attached to the end of the gastrostomy tube. The scope was then withdrawn back into the stomach, it was decompressed, and the scope was completely withdrawn. The patient tolerated the procedure well and was taken to the post anesthesia care unit in good condition. Estimated blood loss: minimal  Specimens taken: none    Impression:    1. Successful PEG placement    Recommendations:   1.  OK to use PEG for meds and tube feeding. 2.  Convert medications to liquid if possible and infuse through PEG. If no liquid alternative, then crush and mix in 20-30 cc of water and infuse through PEG. 3.  Keep head of bed elevated during tube feeds. 4.  Keep outer bumper at 3.5 cm.  5.  Can place dressing over top of PEG. Do not place dressing between outer bumper and skin as it can cause the inner bumper to erode through the stomach wall. 6.  Please call with any problems with the [phone].     Lyric Baires MD  [address] [facility]

## 2023-06-02 ENCOUNTER — HOSPITAL ENCOUNTER (INPATIENT)
Age: 53
LOS: 5 days | Discharge: OTHER INSTITUTION | End: 2023-06-08
Attending: STUDENT IN AN ORGANIZED HEALTH CARE EDUCATION/TRAINING PROGRAM | Admitting: STUDENT IN AN ORGANIZED HEALTH CARE EDUCATION/TRAINING PROGRAM
Payer: COMMERCIAL

## 2023-06-02 ENCOUNTER — APPOINTMENT (OUTPATIENT)
Dept: CT IMAGING | Age: 53
End: 2023-06-02
Payer: COMMERCIAL

## 2023-06-02 ENCOUNTER — APPOINTMENT (OUTPATIENT)
Dept: GENERAL RADIOLOGY | Age: 53
End: 2023-06-02
Payer: COMMERCIAL

## 2023-06-02 DIAGNOSIS — G89.29 OTHER CHRONIC PAIN: ICD-10-CM

## 2023-06-02 DIAGNOSIS — J18.9 MULTIFOCAL PNEUMONIA: ICD-10-CM

## 2023-06-02 DIAGNOSIS — J96.01 ACUTE RESPIRATORY FAILURE WITH HYPOXIA (HCC): Primary | ICD-10-CM

## 2023-06-02 DIAGNOSIS — F41.9 ANXIETY: ICD-10-CM

## 2023-06-02 LAB
ALBUMIN SERPL-MCNC: 3.8 G/DL (ref 3.4–5)
ALBUMIN/GLOB SERPL: 1.3 {RATIO} (ref 1.1–2.2)
ALP SERPL-CCNC: 95 U/L (ref 40–129)
ALT SERPL-CCNC: 76 U/L (ref 10–40)
ANION GAP SERPL CALCULATED.3IONS-SCNC: 13 MMOL/L (ref 3–16)
AST SERPL-CCNC: 47 U/L (ref 15–37)
BASE EXCESS BLDV CALC-SCNC: 1.8 MMOL/L
BASOPHILS # BLD: 0 K/UL (ref 0–0.2)
BASOPHILS NFR BLD: 0.4 %
BILIRUB SERPL-MCNC: <0.2 MG/DL (ref 0–1)
BUN SERPL-MCNC: 20 MG/DL (ref 7–20)
CALCIUM SERPL-MCNC: 8.6 MG/DL (ref 8.3–10.6)
CHLORIDE SERPL-SCNC: 103 MMOL/L (ref 99–110)
CO2 BLDV-SCNC: 30 MMOL/L
CO2 SERPL-SCNC: 25 MMOL/L (ref 21–32)
COHGB MFR BLDV: 1.4 %
CREAT SERPL-MCNC: 1.1 MG/DL (ref 0.6–1.1)
DEPRECATED RDW RBC AUTO: 16.1 % (ref 12.4–15.4)
EOSINOPHIL # BLD: 0.1 K/UL (ref 0–0.6)
EOSINOPHIL NFR BLD: 1.4 %
FLUAV RNA UPPER RESP QL NAA+PROBE: NEGATIVE
FLUBV AG NPH QL: NEGATIVE
GFR SERPLBLD CREATININE-BSD FMLA CKD-EPI: >60 ML/MIN/{1.73_M2}
GLUCOSE SERPL-MCNC: 101 MG/DL (ref 70–99)
HCO3 BLDV-SCNC: 28 MMOL/L (ref 23–29)
HCT VFR BLD AUTO: 36.7 % (ref 36–48)
HGB BLD-MCNC: 11.8 G/DL (ref 12–16)
LACTATE BLDV-SCNC: 1.9 MMOL/L (ref 0.4–2)
LYMPHOCYTES # BLD: 2.1 K/UL (ref 1–5.1)
LYMPHOCYTES NFR BLD: 36.6 %
MCH RBC QN AUTO: 29.9 PG (ref 26–34)
MCHC RBC AUTO-ENTMCNC: 32.2 G/DL (ref 31–36)
MCV RBC AUTO: 92.8 FL (ref 80–100)
METHGB MFR BLDV: 0.5 %
MONOCYTES # BLD: 0.4 K/UL (ref 0–1.3)
MONOCYTES NFR BLD: 7 %
NEUTROPHILS # BLD: 3.1 K/UL (ref 1.7–7.7)
NEUTROPHILS NFR BLD: 54.6 %
NT-PROBNP SERPL-MCNC: 492 PG/ML (ref 0–124)
O2 THERAPY: ABNORMAL
PCO2 BLDV: 51.3 MMHG (ref 40–50)
PH BLDV: 7.35 [PH] (ref 7.35–7.45)
PLATELET # BLD AUTO: 139 K/UL (ref 135–450)
PMV BLD AUTO: 8.5 FL (ref 5–10.5)
PO2 BLDV: 35 MMHG
POTASSIUM SERPL-SCNC: 4.3 MMOL/L (ref 3.5–5.1)
PROT SERPL-MCNC: 6.8 G/DL (ref 6.4–8.2)
RBC # BLD AUTO: 3.95 M/UL (ref 4–5.2)
SAO2 % BLDV: 63 %
SARS-COV-2 RDRP RESP QL NAA+PROBE: NOT DETECTED
SODIUM SERPL-SCNC: 141 MMOL/L (ref 136–145)
TROPONIN, HIGH SENSITIVITY: 11 NG/L (ref 0–14)
WBC # BLD AUTO: 5.7 K/UL (ref 4–11)

## 2023-06-02 PROCEDURE — 94660 CPAP INITIATION&MGMT: CPT

## 2023-06-02 PROCEDURE — 87081 CULTURE SCREEN ONLY: CPT

## 2023-06-02 PROCEDURE — 85025 COMPLETE CBC W/AUTO DIFF WBC: CPT

## 2023-06-02 PROCEDURE — 94761 N-INVAS EAR/PLS OXIMETRY MLT: CPT

## 2023-06-02 PROCEDURE — 36415 COLL VENOUS BLD VENIPUNCTURE: CPT

## 2023-06-02 PROCEDURE — 83880 ASSAY OF NATRIURETIC PEPTIDE: CPT

## 2023-06-02 PROCEDURE — 87040 BLOOD CULTURE FOR BACTERIA: CPT

## 2023-06-02 PROCEDURE — 84484 ASSAY OF TROPONIN QUANT: CPT

## 2023-06-02 PROCEDURE — 87880 STREP A ASSAY W/OPTIC: CPT

## 2023-06-02 PROCEDURE — 87804 INFLUENZA ASSAY W/OPTIC: CPT

## 2023-06-02 PROCEDURE — 2700000000 HC OXYGEN THERAPY PER DAY

## 2023-06-02 PROCEDURE — 93005 ELECTROCARDIOGRAM TRACING: CPT | Performed by: EMERGENCY MEDICINE

## 2023-06-02 PROCEDURE — 87635 SARS-COV-2 COVID-19 AMP PRB: CPT

## 2023-06-02 PROCEDURE — 71045 X-RAY EXAM CHEST 1 VIEW: CPT

## 2023-06-02 PROCEDURE — 83605 ASSAY OF LACTIC ACID: CPT

## 2023-06-02 PROCEDURE — 99285 EMERGENCY DEPT VISIT HI MDM: CPT

## 2023-06-02 PROCEDURE — 82803 BLOOD GASES ANY COMBINATION: CPT

## 2023-06-02 PROCEDURE — 6370000000 HC RX 637 (ALT 250 FOR IP): Performed by: PHYSICIAN ASSISTANT

## 2023-06-02 PROCEDURE — 80053 COMPREHEN METABOLIC PANEL: CPT

## 2023-06-02 RX ORDER — ACETAMINOPHEN 500 MG
500 TABLET ORAL ONCE
Status: COMPLETED | OUTPATIENT
Start: 2023-06-02 | End: 2023-06-02

## 2023-06-02 RX ADMIN — ACETAMINOPHEN 500 MG: 500 TABLET ORAL at 23:44

## 2023-06-03 ENCOUNTER — APPOINTMENT (OUTPATIENT)
Dept: CT IMAGING | Age: 53
End: 2023-06-03
Payer: COMMERCIAL

## 2023-06-03 PROBLEM — R93.89 ABNORMAL CT OF THE CHEST: Status: ACTIVE | Noted: 2023-06-03

## 2023-06-03 PROBLEM — J81.1 PULMONARY EDEMA: Status: ACTIVE | Noted: 2023-06-03

## 2023-06-03 PROBLEM — J18.9 PNEUMONIA DUE TO ORGANISM: Status: ACTIVE | Noted: 2023-06-03

## 2023-06-03 PROBLEM — D68.59 PROTEIN C DEFICIENCY (HCC): Status: ACTIVE | Noted: 2023-06-03

## 2023-06-03 LAB
ALBUMIN SERPL-MCNC: 3.8 G/DL (ref 3.4–5)
ANION GAP SERPL CALCULATED.3IONS-SCNC: 16 MMOL/L (ref 3–16)
BASOPHILS # BLD: 0 K/UL (ref 0–0.2)
BASOPHILS NFR BLD: 0.2 %
BUN SERPL-MCNC: 19 MG/DL (ref 7–20)
CALCIUM SERPL-MCNC: 8.5 MG/DL (ref 8.3–10.6)
CHLORIDE SERPL-SCNC: 103 MMOL/L (ref 99–110)
CO2 SERPL-SCNC: 22 MMOL/L (ref 21–32)
CREAT SERPL-MCNC: 1.1 MG/DL (ref 0.6–1.1)
CRP SERPL-MCNC: 5.3 MG/L (ref 0–5.1)
DEPRECATED RDW RBC AUTO: 15.9 % (ref 12.4–15.4)
EOSINOPHIL # BLD: 0 K/UL (ref 0–0.6)
EOSINOPHIL NFR BLD: 0 %
ERYTHROCYTE [SEDIMENTATION RATE] IN BLOOD BY WESTERGREN METHOD: 37 MM/HR (ref 0–30)
GFR SERPLBLD CREATININE-BSD FMLA CKD-EPI: >60 ML/MIN/{1.73_M2}
GLUCOSE SERPL-MCNC: 157 MG/DL (ref 70–99)
HCT VFR BLD AUTO: 34.3 % (ref 36–48)
HGB BLD-MCNC: 11.2 G/DL (ref 12–16)
LYMPHOCYTES # BLD: 0.3 K/UL (ref 1–5.1)
LYMPHOCYTES NFR BLD: 3.6 %
MAGNESIUM SERPL-MCNC: 1.6 MG/DL (ref 1.8–2.4)
MCH RBC QN AUTO: 29.5 PG (ref 26–34)
MCHC RBC AUTO-ENTMCNC: 32.6 G/DL (ref 31–36)
MCV RBC AUTO: 90.5 FL (ref 80–100)
MONOCYTES # BLD: 0.1 K/UL (ref 0–1.3)
MONOCYTES NFR BLD: 0.8 %
NEUTROPHILS # BLD: 6.8 K/UL (ref 1.7–7.7)
NEUTROPHILS NFR BLD: 95.4 %
PHOSPHATE SERPL-MCNC: 4 MG/DL (ref 2.5–4.9)
PLATELET # BLD AUTO: 147 K/UL (ref 135–450)
PMV BLD AUTO: 8.4 FL (ref 5–10.5)
POTASSIUM SERPL-SCNC: 4.6 MMOL/L (ref 3.5–5.1)
PROCALCITONIN SERPL IA-MCNC: 0.04 NG/ML (ref 0–0.15)
RBC # BLD AUTO: 3.79 M/UL (ref 4–5.2)
S PYO AG THROAT QL: NEGATIVE
SODIUM SERPL-SCNC: 141 MMOL/L (ref 136–145)
WBC # BLD AUTO: 7.2 K/UL (ref 4–11)

## 2023-06-03 PROCEDURE — 6360000002 HC RX W HCPCS: Performed by: STUDENT IN AN ORGANIZED HEALTH CARE EDUCATION/TRAINING PROGRAM

## 2023-06-03 PROCEDURE — 6370000000 HC RX 637 (ALT 250 FOR IP)

## 2023-06-03 PROCEDURE — 85652 RBC SED RATE AUTOMATED: CPT

## 2023-06-03 PROCEDURE — 6370000000 HC RX 637 (ALT 250 FOR IP): Performed by: STUDENT IN AN ORGANIZED HEALTH CARE EDUCATION/TRAINING PROGRAM

## 2023-06-03 PROCEDURE — 6370000000 HC RX 637 (ALT 250 FOR IP): Performed by: PHYSICIAN ASSISTANT

## 2023-06-03 PROCEDURE — 85025 COMPLETE CBC W/AUTO DIFF WBC: CPT

## 2023-06-03 PROCEDURE — 71260 CT THORAX DX C+: CPT

## 2023-06-03 PROCEDURE — 6370000000 HC RX 637 (ALT 250 FOR IP): Performed by: INTERNAL MEDICINE

## 2023-06-03 PROCEDURE — 2580000003 HC RX 258: Performed by: STUDENT IN AN ORGANIZED HEALTH CARE EDUCATION/TRAINING PROGRAM

## 2023-06-03 PROCEDURE — 36415 COLL VENOUS BLD VENIPUNCTURE: CPT

## 2023-06-03 PROCEDURE — 6360000004 HC RX CONTRAST MEDICATION: Performed by: PHYSICIAN ASSISTANT

## 2023-06-03 PROCEDURE — 2060000000 HC ICU INTERMEDIATE R&B

## 2023-06-03 PROCEDURE — 94660 CPAP INITIATION&MGMT: CPT

## 2023-06-03 PROCEDURE — 99255 IP/OBS CONSLTJ NEW/EST HI 80: CPT | Performed by: INTERNAL MEDICINE

## 2023-06-03 PROCEDURE — 94761 N-INVAS EAR/PLS OXIMETRY MLT: CPT

## 2023-06-03 PROCEDURE — 6360000002 HC RX W HCPCS: Performed by: PHYSICIAN ASSISTANT

## 2023-06-03 PROCEDURE — 2580000003 HC RX 258: Performed by: PHYSICIAN ASSISTANT

## 2023-06-03 PROCEDURE — 80069 RENAL FUNCTION PANEL: CPT

## 2023-06-03 PROCEDURE — 83735 ASSAY OF MAGNESIUM: CPT

## 2023-06-03 PROCEDURE — 84145 PROCALCITONIN (PCT): CPT

## 2023-06-03 PROCEDURE — 87040 BLOOD CULTURE FOR BACTERIA: CPT

## 2023-06-03 PROCEDURE — 87641 MR-STAPH DNA AMP PROBE: CPT

## 2023-06-03 PROCEDURE — 86140 C-REACTIVE PROTEIN: CPT

## 2023-06-03 PROCEDURE — 2700000000 HC OXYGEN THERAPY PER DAY

## 2023-06-03 RX ORDER — ESCITALOPRAM OXALATE 10 MG/1
20 TABLET ORAL DAILY
Status: DISCONTINUED | OUTPATIENT
Start: 2023-06-03 | End: 2023-06-08 | Stop reason: HOSPADM

## 2023-06-03 RX ORDER — ACETAMINOPHEN 325 MG/1
650 TABLET ORAL EVERY 6 HOURS PRN
Status: DISCONTINUED | OUTPATIENT
Start: 2023-06-03 | End: 2023-06-08 | Stop reason: HOSPADM

## 2023-06-03 RX ORDER — FERROUS SULFATE TAB EC 324 MG (65 MG FE EQUIVALENT) 324 (65 FE) MG
324 TABLET DELAYED RESPONSE ORAL
Status: DISCONTINUED | OUTPATIENT
Start: 2023-06-03 | End: 2023-06-08 | Stop reason: HOSPADM

## 2023-06-03 RX ORDER — LEVETIRACETAM 1000 MG/1
2000 TABLET ORAL 2 TIMES DAILY
COMMUNITY

## 2023-06-03 RX ORDER — ACETAMINOPHEN 650 MG/1
650 SUPPOSITORY RECTAL EVERY 6 HOURS PRN
Status: DISCONTINUED | OUTPATIENT
Start: 2023-06-03 | End: 2023-06-08 | Stop reason: HOSPADM

## 2023-06-03 RX ORDER — FUROSEMIDE 10 MG/ML
20 INJECTION INTRAMUSCULAR; INTRAVENOUS 2 TIMES DAILY
Status: DISCONTINUED | OUTPATIENT
Start: 2023-06-03 | End: 2023-06-07

## 2023-06-03 RX ORDER — ONDANSETRON 2 MG/ML
4 INJECTION INTRAMUSCULAR; INTRAVENOUS EVERY 6 HOURS PRN
Status: DISCONTINUED | OUTPATIENT
Start: 2023-06-03 | End: 2023-06-08 | Stop reason: HOSPADM

## 2023-06-03 RX ORDER — OXYCODONE HYDROCHLORIDE 5 MG/1
5 TABLET ORAL EVERY 6 HOURS PRN
Status: DISCONTINUED | OUTPATIENT
Start: 2023-06-03 | End: 2023-06-08 | Stop reason: HOSPADM

## 2023-06-03 RX ORDER — LACOSAMIDE 100 MG/1
200 TABLET ORAL 2 TIMES DAILY
Status: DISCONTINUED | OUTPATIENT
Start: 2023-06-03 | End: 2023-06-08 | Stop reason: HOSPADM

## 2023-06-03 RX ORDER — LORAZEPAM 2 MG/ML
1 INJECTION INTRAMUSCULAR
Status: DISCONTINUED | OUTPATIENT
Start: 2023-06-03 | End: 2023-06-08 | Stop reason: HOSPADM

## 2023-06-03 RX ORDER — LEVETIRACETAM 500 MG/1
1000 TABLET ORAL 2 TIMES DAILY
Status: DISCONTINUED | OUTPATIENT
Start: 2023-06-03 | End: 2023-06-03

## 2023-06-03 RX ORDER — VANCOMYCIN 1.75 G/350ML
1250 INJECTION, SOLUTION INTRAVENOUS EVERY 12 HOURS
Status: DISCONTINUED | OUTPATIENT
Start: 2023-06-03 | End: 2023-06-04

## 2023-06-03 RX ORDER — LEVETIRACETAM 500 MG/1
TABLET ORAL
Status: COMPLETED
Start: 2023-06-03 | End: 2023-06-03

## 2023-06-03 RX ORDER — NALOXONE HYDROCHLORIDE 0.4 MG/ML
0.4 INJECTION, SOLUTION INTRAMUSCULAR; INTRAVENOUS; SUBCUTANEOUS PRN
Status: DISCONTINUED | OUTPATIENT
Start: 2023-06-03 | End: 2023-06-08 | Stop reason: HOSPADM

## 2023-06-03 RX ORDER — GUAIFENESIN/DEXTROMETHORPHAN 100-10MG/5
5 SYRUP ORAL EVERY 4 HOURS PRN
Status: DISCONTINUED | OUTPATIENT
Start: 2023-06-03 | End: 2023-06-08 | Stop reason: HOSPADM

## 2023-06-03 RX ORDER — POLYETHYLENE GLYCOL 3350 17 G/17G
17 POWDER, FOR SOLUTION ORAL DAILY PRN
Status: DISCONTINUED | OUTPATIENT
Start: 2023-06-03 | End: 2023-06-08 | Stop reason: HOSPADM

## 2023-06-03 RX ORDER — LACOSAMIDE 200 MG/1
200 TABLET ORAL 2 TIMES DAILY
COMMUNITY

## 2023-06-03 RX ORDER — SODIUM CHLORIDE 0.9 % (FLUSH) 0.9 %
5-40 SYRINGE (ML) INJECTION PRN
Status: DISCONTINUED | OUTPATIENT
Start: 2023-06-03 | End: 2023-06-08 | Stop reason: HOSPADM

## 2023-06-03 RX ORDER — LEVETIRACETAM 500 MG/1
2000 TABLET ORAL 2 TIMES DAILY
Status: DISCONTINUED | OUTPATIENT
Start: 2023-06-03 | End: 2023-06-08 | Stop reason: HOSPADM

## 2023-06-03 RX ORDER — PANTOPRAZOLE SODIUM 40 MG/1
40 TABLET, DELAYED RELEASE ORAL
Status: DISCONTINUED | OUTPATIENT
Start: 2023-06-03 | End: 2023-06-08 | Stop reason: HOSPADM

## 2023-06-03 RX ORDER — PRAZOSIN HYDROCHLORIDE 1 MG/1
1 CAPSULE ORAL NIGHTLY
Status: ON HOLD | COMMUNITY
End: 2023-06-06 | Stop reason: HOSPADM

## 2023-06-03 RX ORDER — CLONAZEPAM 0.5 MG/1
0.5 TABLET ORAL NIGHTLY
Status: DISCONTINUED | OUTPATIENT
Start: 2023-06-03 | End: 2023-06-08 | Stop reason: HOSPADM

## 2023-06-03 RX ORDER — MAGNESIUM SULFATE IN WATER 40 MG/ML
2000 INJECTION, SOLUTION INTRAVENOUS ONCE
Status: DISCONTINUED | OUTPATIENT
Start: 2023-06-03 | End: 2023-06-03

## 2023-06-03 RX ORDER — PRAZOSIN HYDROCHLORIDE 1 MG/1
1 CAPSULE ORAL NIGHTLY
Status: DISCONTINUED | OUTPATIENT
Start: 2023-06-03 | End: 2023-06-08 | Stop reason: HOSPADM

## 2023-06-03 RX ORDER — IPRATROPIUM BROMIDE AND ALBUTEROL SULFATE 2.5; .5 MG/3ML; MG/3ML
1 SOLUTION RESPIRATORY (INHALATION) EVERY 4 HOURS PRN
Status: DISCONTINUED | OUTPATIENT
Start: 2023-06-03 | End: 2023-06-08 | Stop reason: HOSPADM

## 2023-06-03 RX ORDER — LANOLIN ALCOHOL/MO/W.PET/CERES
400 CREAM (GRAM) TOPICAL
Status: DISCONTINUED | OUTPATIENT
Start: 2023-06-03 | End: 2023-06-08 | Stop reason: HOSPADM

## 2023-06-03 RX ORDER — DANTROLENE SODIUM 25 MG/1
25 CAPSULE ORAL DAILY
COMMUNITY

## 2023-06-03 RX ORDER — OXYCODONE HYDROCHLORIDE 5 MG/1
5 TABLET ORAL EVERY 6 HOURS PRN
Status: ON HOLD | COMMUNITY
End: 2023-06-06 | Stop reason: SDUPTHER

## 2023-06-03 RX ORDER — ATORVASTATIN CALCIUM 10 MG/1
10 TABLET, FILM COATED ORAL NIGHTLY
Status: DISCONTINUED | OUTPATIENT
Start: 2023-06-03 | End: 2023-06-08 | Stop reason: HOSPADM

## 2023-06-03 RX ORDER — SODIUM CHLORIDE 0.9 % (FLUSH) 0.9 %
5-40 SYRINGE (ML) INJECTION EVERY 12 HOURS SCHEDULED
Status: DISCONTINUED | OUTPATIENT
Start: 2023-06-03 | End: 2023-06-08 | Stop reason: HOSPADM

## 2023-06-03 RX ORDER — SODIUM CHLORIDE 9 MG/ML
INJECTION, SOLUTION INTRAVENOUS PRN
Status: DISCONTINUED | OUTPATIENT
Start: 2023-06-03 | End: 2023-06-08 | Stop reason: HOSPADM

## 2023-06-03 RX ADMIN — LACOSAMIDE 200 MG: 100 TABLET, FILM COATED ORAL at 19:53

## 2023-06-03 RX ADMIN — CEFEPIME HYDROCHLORIDE 2000 MG: 2 INJECTION, POWDER, FOR SOLUTION INTRAVENOUS at 02:00

## 2023-06-03 RX ADMIN — CEFEPIME 2000 MG: 2 INJECTION, POWDER, FOR SOLUTION INTRAVENOUS at 20:05

## 2023-06-03 RX ADMIN — LACOSAMIDE 200 MG: 100 TABLET, FILM COATED ORAL at 08:15

## 2023-06-03 RX ADMIN — ACETAMINOPHEN 650 MG: 325 TABLET ORAL at 23:12

## 2023-06-03 RX ADMIN — Medication 400 MG: at 15:20

## 2023-06-03 RX ADMIN — ESCITALOPRAM OXALATE 20 MG: 10 TABLET ORAL at 08:15

## 2023-06-03 RX ADMIN — PRAZOSIN HYDROCHLORIDE 1 MG: 1 CAPSULE ORAL at 19:53

## 2023-06-03 RX ADMIN — OXYCODONE 5 MG: 5 TABLET ORAL at 19:54

## 2023-06-03 RX ADMIN — ATORVASTATIN CALCIUM 10 MG: 10 TABLET, FILM COATED ORAL at 19:53

## 2023-06-03 RX ADMIN — CLONAZEPAM 0.5 MG: 0.5 TABLET ORAL at 19:54

## 2023-06-03 RX ADMIN — PANTOPRAZOLE SODIUM 40 MG: 40 TABLET, DELAYED RELEASE ORAL at 05:37

## 2023-06-03 RX ADMIN — VANCOMYCIN 1250 MG: 1.75 INJECTION, SOLUTION INTRAVENOUS at 15:19

## 2023-06-03 RX ADMIN — ACETAMINOPHEN 650 MG: 325 TABLET ORAL at 16:35

## 2023-06-03 RX ADMIN — SODIUM CHLORIDE, PRESERVATIVE FREE 10 ML: 5 INJECTION INTRAVENOUS at 08:40

## 2023-06-03 RX ADMIN — FUROSEMIDE 20 MG: 10 INJECTION, SOLUTION INTRAMUSCULAR; INTRAVENOUS at 08:16

## 2023-06-03 RX ADMIN — Medication 1 LOZENGE: at 01:00

## 2023-06-03 RX ADMIN — VANCOMYCIN HYDROCHLORIDE 1500 MG: 1.5 INJECTION, POWDER, LYOPHILIZED, FOR SOLUTION INTRAVENOUS at 02:40

## 2023-06-03 RX ADMIN — FUROSEMIDE 20 MG: 10 INJECTION, SOLUTION INTRAMUSCULAR; INTRAVENOUS at 17:46

## 2023-06-03 RX ADMIN — LEVETIRACETAM: 500 TABLET ORAL at 02:30

## 2023-06-03 RX ADMIN — AZITHROMYCIN MONOHYDRATE 500 MG: 500 INJECTION, POWDER, LYOPHILIZED, FOR SOLUTION INTRAVENOUS at 05:37

## 2023-06-03 RX ADMIN — GUAIFENESIN SYRUP AND DEXTROMETHORPHAN 5 ML: 100; 10 SYRUP ORAL at 23:12

## 2023-06-03 RX ADMIN — OXYCODONE 5 MG: 5 TABLET ORAL at 08:15

## 2023-06-03 RX ADMIN — APIXABAN 5 MG: 5 TABLET, FILM COATED ORAL at 19:53

## 2023-06-03 RX ADMIN — LEVETIRACETAM 2000 MG: 500 TABLET, FILM COATED ORAL at 08:16

## 2023-06-03 RX ADMIN — LEVETIRACETAM 2000 MG: 500 TABLET, FILM COATED ORAL at 19:55

## 2023-06-03 RX ADMIN — FERROUS SULFATE TAB EC 324 MG (65 MG FE EQUIVALENT) 324 MG: 324 (65 FE) TABLET DELAYED RESPONSE at 08:15

## 2023-06-03 RX ADMIN — IOPAMIDOL 75 ML: 755 INJECTION, SOLUTION INTRAVENOUS at 00:13

## 2023-06-03 RX ADMIN — CEFEPIME 2000 MG: 2 INJECTION, POWDER, FOR SOLUTION INTRAVENOUS at 13:07

## 2023-06-03 ASSESSMENT — PAIN DESCRIPTION - DESCRIPTORS
DESCRIPTORS: ACHING
DESCRIPTORS: ACHING

## 2023-06-03 ASSESSMENT — ENCOUNTER SYMPTOMS
COLOR CHANGE: 0
ABDOMINAL PAIN: 0
VOMITING: 0
SHORTNESS OF BREATH: 1
CHEST TIGHTNESS: 1
COUGH: 1

## 2023-06-03 ASSESSMENT — PAIN - FUNCTIONAL ASSESSMENT
PAIN_FUNCTIONAL_ASSESSMENT: ACTIVITIES ARE NOT PREVENTED
PAIN_FUNCTIONAL_ASSESSMENT: ACTIVITIES ARE NOT PREVENTED

## 2023-06-03 ASSESSMENT — PAIN DESCRIPTION - LOCATION
LOCATION: BACK
LOCATION: HEAD
LOCATION: HEAD

## 2023-06-03 ASSESSMENT — PAIN SCALES - GENERAL
PAINLEVEL_OUTOF10: 3
PAINLEVEL_OUTOF10: 9

## 2023-06-04 PROBLEM — I51.89 GRADE III DIASTOLIC DYSFUNCTION: Status: ACTIVE | Noted: 2023-06-04

## 2023-06-04 PROBLEM — Z86.69 HISTORY OF EPILEPSY: Status: ACTIVE | Noted: 2023-06-04

## 2023-06-04 LAB
ALBUMIN SERPL-MCNC: 3.3 G/DL (ref 3.4–5)
ANION GAP SERPL CALCULATED.3IONS-SCNC: 10 MMOL/L (ref 3–16)
BASOPHILS # BLD: 0 K/UL (ref 0–0.2)
BASOPHILS NFR BLD: 0.3 %
BUN SERPL-MCNC: 30 MG/DL (ref 7–20)
CALCIUM SERPL-MCNC: 8.4 MG/DL (ref 8.3–10.6)
CHLORIDE SERPL-SCNC: 101 MMOL/L (ref 99–110)
CO2 SERPL-SCNC: 28 MMOL/L (ref 21–32)
CREAT SERPL-MCNC: 1.1 MG/DL (ref 0.6–1.1)
DEPRECATED RDW RBC AUTO: 15.5 % (ref 12.4–15.4)
EOSINOPHIL # BLD: 0 K/UL (ref 0–0.6)
EOSINOPHIL NFR BLD: 0.1 %
GFR SERPLBLD CREATININE-BSD FMLA CKD-EPI: >60 ML/MIN/{1.73_M2}
GLUCOSE SERPL-MCNC: 100 MG/DL (ref 70–99)
HCT VFR BLD AUTO: 32.2 % (ref 36–48)
HGB BLD-MCNC: 10.9 G/DL (ref 12–16)
LYMPHOCYTES # BLD: 0.8 K/UL (ref 1–5.1)
LYMPHOCYTES NFR BLD: 13.2 %
MAGNESIUM SERPL-MCNC: 1.9 MG/DL (ref 1.8–2.4)
MCH RBC QN AUTO: 30.3 PG (ref 26–34)
MCHC RBC AUTO-ENTMCNC: 33.8 G/DL (ref 31–36)
MCV RBC AUTO: 89.6 FL (ref 80–100)
MONOCYTES # BLD: 0.4 K/UL (ref 0–1.3)
MONOCYTES NFR BLD: 6.7 %
MRSA DNA SPEC QL NAA+PROBE: ABNORMAL
NEUTROPHILS # BLD: 4.6 K/UL (ref 1.7–7.7)
NEUTROPHILS NFR BLD: 79.7 %
ORGANISM: ABNORMAL
PHOSPHATE SERPL-MCNC: 4.5 MG/DL (ref 2.5–4.9)
PLATELET # BLD AUTO: 143 K/UL (ref 135–450)
PMV BLD AUTO: 8.4 FL (ref 5–10.5)
POTASSIUM SERPL-SCNC: 4.2 MMOL/L (ref 3.5–5.1)
RBC # BLD AUTO: 3.59 M/UL (ref 4–5.2)
SODIUM SERPL-SCNC: 139 MMOL/L (ref 136–145)
WBC # BLD AUTO: 5.8 K/UL (ref 4–11)

## 2023-06-04 PROCEDURE — 6370000000 HC RX 637 (ALT 250 FOR IP): Performed by: INTERNAL MEDICINE

## 2023-06-04 PROCEDURE — 6370000000 HC RX 637 (ALT 250 FOR IP): Performed by: STUDENT IN AN ORGANIZED HEALTH CARE EDUCATION/TRAINING PROGRAM

## 2023-06-04 PROCEDURE — 83735 ASSAY OF MAGNESIUM: CPT

## 2023-06-04 PROCEDURE — 36415 COLL VENOUS BLD VENIPUNCTURE: CPT

## 2023-06-04 PROCEDURE — 94761 N-INVAS EAR/PLS OXIMETRY MLT: CPT

## 2023-06-04 PROCEDURE — 99231 SBSQ HOSP IP/OBS SF/LOW 25: CPT | Performed by: NURSE PRACTITIONER

## 2023-06-04 PROCEDURE — 6360000002 HC RX W HCPCS: Performed by: STUDENT IN AN ORGANIZED HEALTH CARE EDUCATION/TRAINING PROGRAM

## 2023-06-04 PROCEDURE — 85025 COMPLETE CBC W/AUTO DIFF WBC: CPT

## 2023-06-04 PROCEDURE — 2580000003 HC RX 258: Performed by: STUDENT IN AN ORGANIZED HEALTH CARE EDUCATION/TRAINING PROGRAM

## 2023-06-04 PROCEDURE — 2060000000 HC ICU INTERMEDIATE R&B

## 2023-06-04 PROCEDURE — 80069 RENAL FUNCTION PANEL: CPT

## 2023-06-04 RX ADMIN — ATORVASTATIN CALCIUM 10 MG: 10 TABLET, FILM COATED ORAL at 20:05

## 2023-06-04 RX ADMIN — OXYCODONE 5 MG: 5 TABLET ORAL at 08:18

## 2023-06-04 RX ADMIN — APIXABAN 5 MG: 5 TABLET, FILM COATED ORAL at 08:18

## 2023-06-04 RX ADMIN — AZITHROMYCIN MONOHYDRATE 500 MG: 500 INJECTION, POWDER, LYOPHILIZED, FOR SOLUTION INTRAVENOUS at 06:40

## 2023-06-04 RX ADMIN — LEVETIRACETAM 2000 MG: 500 TABLET, FILM COATED ORAL at 20:04

## 2023-06-04 RX ADMIN — CEFEPIME 2000 MG: 2 INJECTION, POWDER, FOR SOLUTION INTRAVENOUS at 06:36

## 2023-06-04 RX ADMIN — PRAZOSIN HYDROCHLORIDE 1 MG: 1 CAPSULE ORAL at 20:03

## 2023-06-04 RX ADMIN — FERROUS SULFATE TAB EC 324 MG (65 MG FE EQUIVALENT) 324 MG: 324 (65 FE) TABLET DELAYED RESPONSE at 08:20

## 2023-06-04 RX ADMIN — VANCOMYCIN 1250 MG: 1.75 INJECTION, SOLUTION INTRAVENOUS at 03:11

## 2023-06-04 RX ADMIN — FUROSEMIDE 20 MG: 10 INJECTION, SOLUTION INTRAMUSCULAR; INTRAVENOUS at 08:20

## 2023-06-04 RX ADMIN — LACOSAMIDE 200 MG: 100 TABLET, FILM COATED ORAL at 08:17

## 2023-06-04 RX ADMIN — Medication 400 MG: at 08:18

## 2023-06-04 RX ADMIN — PANTOPRAZOLE SODIUM 40 MG: 40 TABLET, DELAYED RELEASE ORAL at 08:17

## 2023-06-04 RX ADMIN — APIXABAN 5 MG: 5 TABLET, FILM COATED ORAL at 20:04

## 2023-06-04 RX ADMIN — FUROSEMIDE 20 MG: 10 INJECTION, SOLUTION INTRAMUSCULAR; INTRAVENOUS at 17:32

## 2023-06-04 RX ADMIN — LEVETIRACETAM 2000 MG: 500 TABLET, FILM COATED ORAL at 08:18

## 2023-06-04 RX ADMIN — ESCITALOPRAM OXALATE 20 MG: 10 TABLET ORAL at 08:17

## 2023-06-04 RX ADMIN — LACOSAMIDE 200 MG: 100 TABLET, FILM COATED ORAL at 20:04

## 2023-06-04 RX ADMIN — SODIUM CHLORIDE, PRESERVATIVE FREE 10 ML: 5 INJECTION INTRAVENOUS at 20:05

## 2023-06-04 RX ADMIN — CLONAZEPAM 0.5 MG: 0.5 TABLET ORAL at 20:04

## 2023-06-04 ASSESSMENT — PAIN SCALES - GENERAL: PAINLEVEL_OUTOF10: 8

## 2023-06-04 ASSESSMENT — PAIN DESCRIPTION - LOCATION: LOCATION: ARM

## 2023-06-05 ENCOUNTER — APPOINTMENT (OUTPATIENT)
Dept: GENERAL RADIOLOGY | Age: 53
End: 2023-06-05
Payer: COMMERCIAL

## 2023-06-05 LAB
ALBUMIN SERPL-MCNC: 3.5 G/DL (ref 3.4–5)
ALP SERPL-CCNC: 68 U/L (ref 40–129)
ALT SERPL-CCNC: 59 U/L (ref 10–40)
ANION GAP SERPL CALCULATED.3IONS-SCNC: 11 MMOL/L (ref 3–16)
AST SERPL-CCNC: 27 U/L (ref 15–37)
BASOPHILS # BLD: 0 K/UL (ref 0–0.2)
BASOPHILS NFR BLD: 0.7 %
BILIRUB DIRECT SERPL-MCNC: <0.2 MG/DL (ref 0–0.3)
BILIRUB INDIRECT SERPL-MCNC: ABNORMAL MG/DL (ref 0–1)
BILIRUB SERPL-MCNC: 0.3 MG/DL (ref 0–1)
BUN SERPL-MCNC: 39 MG/DL (ref 7–20)
CALCIUM SERPL-MCNC: 8.5 MG/DL (ref 8.3–10.6)
CHLORIDE SERPL-SCNC: 101 MMOL/L (ref 99–110)
CO2 SERPL-SCNC: 28 MMOL/L (ref 21–32)
CREAT SERPL-MCNC: 1.3 MG/DL (ref 0.6–1.1)
DEPRECATED RDW RBC AUTO: 16 % (ref 12.4–15.4)
EKG ATRIAL RATE: 80 BPM
EKG DIAGNOSIS: NORMAL
EKG P AXIS: 44 DEGREES
EKG P-R INTERVAL: 196 MS
EKG Q-T INTERVAL: 368 MS
EKG QRS DURATION: 90 MS
EKG QTC CALCULATION (BAZETT): 424 MS
EKG R AXIS: 41 DEGREES
EKG T AXIS: 53 DEGREES
EKG VENTRICULAR RATE: 80 BPM
EOSINOPHIL # BLD: 0 K/UL (ref 0–0.6)
EOSINOPHIL NFR BLD: 0.7 %
GFR SERPLBLD CREATININE-BSD FMLA CKD-EPI: 49 ML/MIN/{1.73_M2}
GLUCOSE SERPL-MCNC: 89 MG/DL (ref 70–99)
HCT VFR BLD AUTO: 32.6 % (ref 36–48)
HGB BLD-MCNC: 10.9 G/DL (ref 12–16)
LV EF: 55 %
LVEF MODALITY: NORMAL
LYMPHOCYTES # BLD: 0.9 K/UL (ref 1–5.1)
LYMPHOCYTES NFR BLD: 25.4 %
MAGNESIUM SERPL-MCNC: 1.8 MG/DL (ref 1.8–2.4)
MCH RBC QN AUTO: 30.1 PG (ref 26–34)
MCHC RBC AUTO-ENTMCNC: 33.4 G/DL (ref 31–36)
MCV RBC AUTO: 90.3 FL (ref 80–100)
MONOCYTES # BLD: 0.3 K/UL (ref 0–1.3)
MONOCYTES NFR BLD: 8.3 %
NEUTROPHILS # BLD: 2.2 K/UL (ref 1.7–7.7)
NEUTROPHILS NFR BLD: 64.9 %
PHOSPHATE SERPL-MCNC: 4.2 MG/DL (ref 2.5–4.9)
PLATELET # BLD AUTO: 131 K/UL (ref 135–450)
PMV BLD AUTO: 9 FL (ref 5–10.5)
POTASSIUM SERPL-SCNC: 3.9 MMOL/L (ref 3.5–5.1)
PROT SERPL-MCNC: 5.9 G/DL (ref 6.4–8.2)
RBC # BLD AUTO: 3.61 M/UL (ref 4–5.2)
S PYO THROAT QL CULT: NORMAL
SODIUM SERPL-SCNC: 140 MMOL/L (ref 136–145)
WBC # BLD AUTO: 3.5 K/UL (ref 4–11)

## 2023-06-05 PROCEDURE — 71045 X-RAY EXAM CHEST 1 VIEW: CPT

## 2023-06-05 PROCEDURE — 6370000000 HC RX 637 (ALT 250 FOR IP): Performed by: INTERNAL MEDICINE

## 2023-06-05 PROCEDURE — 2580000003 HC RX 258: Performed by: HOSPITALIST

## 2023-06-05 PROCEDURE — 94760 N-INVAS EAR/PLS OXIMETRY 1: CPT

## 2023-06-05 PROCEDURE — 6360000004 HC RX CONTRAST MEDICATION: Performed by: STUDENT IN AN ORGANIZED HEALTH CARE EDUCATION/TRAINING PROGRAM

## 2023-06-05 PROCEDURE — 6360000002 HC RX W HCPCS: Performed by: STUDENT IN AN ORGANIZED HEALTH CARE EDUCATION/TRAINING PROGRAM

## 2023-06-05 PROCEDURE — 83735 ASSAY OF MAGNESIUM: CPT

## 2023-06-05 PROCEDURE — C8929 TTE W OR WO FOL WCON,DOPPLER: HCPCS

## 2023-06-05 PROCEDURE — 80069 RENAL FUNCTION PANEL: CPT

## 2023-06-05 PROCEDURE — 36415 COLL VENOUS BLD VENIPUNCTURE: CPT

## 2023-06-05 PROCEDURE — 6370000000 HC RX 637 (ALT 250 FOR IP): Performed by: STUDENT IN AN ORGANIZED HEALTH CARE EDUCATION/TRAINING PROGRAM

## 2023-06-05 PROCEDURE — 2060000000 HC ICU INTERMEDIATE R&B

## 2023-06-05 PROCEDURE — 85025 COMPLETE CBC W/AUTO DIFF WBC: CPT

## 2023-06-05 PROCEDURE — 2580000003 HC RX 258: Performed by: STUDENT IN AN ORGANIZED HEALTH CARE EDUCATION/TRAINING PROGRAM

## 2023-06-05 PROCEDURE — 80076 HEPATIC FUNCTION PANEL: CPT

## 2023-06-05 PROCEDURE — 93010 ELECTROCARDIOGRAM REPORT: CPT | Performed by: INTERNAL MEDICINE

## 2023-06-05 RX ORDER — AZITHROMYCIN 500 MG/1
500 TABLET, FILM COATED ORAL DAILY
Status: COMPLETED | OUTPATIENT
Start: 2023-06-06 | End: 2023-06-07

## 2023-06-05 RX ORDER — 0.9 % SODIUM CHLORIDE 0.9 %
250 INTRAVENOUS SOLUTION INTRAVENOUS ONCE
Status: COMPLETED | OUTPATIENT
Start: 2023-06-05 | End: 2023-06-05

## 2023-06-05 RX ADMIN — LACOSAMIDE 200 MG: 100 TABLET, FILM COATED ORAL at 20:36

## 2023-06-05 RX ADMIN — AZITHROMYCIN MONOHYDRATE 500 MG: 500 INJECTION, POWDER, LYOPHILIZED, FOR SOLUTION INTRAVENOUS at 05:29

## 2023-06-05 RX ADMIN — ACETAMINOPHEN 650 MG: 325 TABLET ORAL at 09:37

## 2023-06-05 RX ADMIN — OXYCODONE 5 MG: 5 TABLET ORAL at 12:42

## 2023-06-05 RX ADMIN — LEVETIRACETAM 2000 MG: 500 TABLET, FILM COATED ORAL at 20:36

## 2023-06-05 RX ADMIN — ESCITALOPRAM OXALATE 20 MG: 10 TABLET ORAL at 09:36

## 2023-06-05 RX ADMIN — GUAIFENESIN SYRUP AND DEXTROMETHORPHAN 5 ML: 100; 10 SYRUP ORAL at 11:22

## 2023-06-05 RX ADMIN — APIXABAN 5 MG: 5 TABLET, FILM COATED ORAL at 09:36

## 2023-06-05 RX ADMIN — GUAIFENESIN SYRUP AND DEXTROMETHORPHAN 5 ML: 100; 10 SYRUP ORAL at 22:26

## 2023-06-05 RX ADMIN — LACOSAMIDE 200 MG: 100 TABLET, FILM COATED ORAL at 09:35

## 2023-06-05 RX ADMIN — PANTOPRAZOLE SODIUM 40 MG: 40 TABLET, DELAYED RELEASE ORAL at 05:24

## 2023-06-05 RX ADMIN — ATORVASTATIN CALCIUM 10 MG: 10 TABLET, FILM COATED ORAL at 20:36

## 2023-06-05 RX ADMIN — SODIUM CHLORIDE 250 ML: 9 INJECTION, SOLUTION INTRAVENOUS at 19:04

## 2023-06-05 RX ADMIN — PERFLUTREN 1.5 ML: 6.52 INJECTION, SUSPENSION INTRAVENOUS at 16:35

## 2023-06-05 RX ADMIN — CLONAZEPAM 0.5 MG: 0.5 TABLET ORAL at 20:36

## 2023-06-05 RX ADMIN — OXYCODONE 5 MG: 5 TABLET ORAL at 20:41

## 2023-06-05 RX ADMIN — SODIUM CHLORIDE, PRESERVATIVE FREE 10 ML: 5 INJECTION INTRAVENOUS at 20:37

## 2023-06-05 RX ADMIN — Medication 400 MG: at 09:37

## 2023-06-05 RX ADMIN — OXYCODONE 5 MG: 5 TABLET ORAL at 00:01

## 2023-06-05 RX ADMIN — FERROUS SULFATE TAB EC 324 MG (65 MG FE EQUIVALENT) 324 MG: 324 (65 FE) TABLET DELAYED RESPONSE at 09:36

## 2023-06-05 RX ADMIN — SODIUM CHLORIDE, PRESERVATIVE FREE 10 ML: 5 INJECTION INTRAVENOUS at 09:39

## 2023-06-05 RX ADMIN — LEVETIRACETAM 2000 MG: 500 TABLET, FILM COATED ORAL at 09:35

## 2023-06-05 RX ADMIN — GUAIFENESIN SYRUP AND DEXTROMETHORPHAN 5 ML: 100; 10 SYRUP ORAL at 17:57

## 2023-06-05 RX ADMIN — OXYCODONE 5 MG: 5 TABLET ORAL at 06:07

## 2023-06-05 RX ADMIN — APIXABAN 5 MG: 5 TABLET, FILM COATED ORAL at 20:36

## 2023-06-05 ASSESSMENT — PAIN - FUNCTIONAL ASSESSMENT
PAIN_FUNCTIONAL_ASSESSMENT: ACTIVITIES ARE NOT PREVENTED
PAIN_FUNCTIONAL_ASSESSMENT: PREVENTS OR INTERFERES SOME ACTIVE ACTIVITIES AND ADLS
PAIN_FUNCTIONAL_ASSESSMENT: ACTIVITIES ARE NOT PREVENTED

## 2023-06-05 ASSESSMENT — PAIN DESCRIPTION - DESCRIPTORS
DESCRIPTORS: ACHING
DESCRIPTORS: ACHING
DESCRIPTORS: ACHING;DISCOMFORT
DESCRIPTORS: ACHING
DESCRIPTORS: ACHING

## 2023-06-05 ASSESSMENT — PAIN DESCRIPTION - FREQUENCY: FREQUENCY: CONTINUOUS

## 2023-06-05 ASSESSMENT — PAIN DESCRIPTION - ORIENTATION
ORIENTATION: MID;ANTERIOR
ORIENTATION: LEFT
ORIENTATION: MID;ANTERIOR
ORIENTATION: RIGHT

## 2023-06-05 ASSESSMENT — PAIN DESCRIPTION - LOCATION
LOCATION: ABDOMEN;HEAD
LOCATION: BACK
LOCATION: HEAD;ABDOMEN
LOCATION: KNEE
LOCATION: ABDOMEN

## 2023-06-05 ASSESSMENT — PAIN DESCRIPTION - ONSET: ONSET: ON-GOING

## 2023-06-05 ASSESSMENT — PAIN SCALES - GENERAL
PAINLEVEL_OUTOF10: 9
PAINLEVEL_OUTOF10: 6
PAINLEVEL_OUTOF10: 3
PAINLEVEL_OUTOF10: 9
PAINLEVEL_OUTOF10: 8

## 2023-06-05 ASSESSMENT — PAIN DESCRIPTION - PAIN TYPE: TYPE: CHRONIC PAIN

## 2023-06-05 NOTE — ACP (ADVANCE CARE PLANNING)
individual to Provider for additional questions/concerns   [] Advised patient/agent/surrogate to review completed ACP document and update if needed with changes in condition, patient preferences or care setting    [] This note routed to one or more involved healthcare providers  Electronically signed by Kathi Leonardo RN Case Management on 6/5/2023 at 10:39 AM

## 2023-06-05 NOTE — DISCHARGE INSTR - COC
Subjective   Patient ID: Mal[place]ian is a 79 year old male.    Chief Complaint   Patient presents with   • Other     high BP, review kidney results, stomach pain for 15 days      HPI    [facility]  Service used for communication   Patient's daughter present but unable to speak English     Since last visit:     #HTN  -was here on Saturday (10/3) for eval   -has been high since his surgery in Aug   -taking lisinopril 40mg qd and propranolol 20mg TID   -was taking only once a day propranolol until Saturday - told to inc to TID   -about 4-5yrs on lisinopril - was recently started on propranolol   -no BP monitor at home   -was having some dizziness earlier today - felt like was going to pass out   -denies chest pain, dyspnea, headache, visual changes, n/v, fever, chills     #Abd pain   -was given miralax - took once daily for 3 days but not helping - feels like it's actually getting worse   -it feels heavy \"as if I ate some rocks\"   -Always feels constipated   -Took tramadol until about a week ago   -hurts when changing positions   -No n/v, fever, chills, diarrhea, bloody stools, black tarry stools   -Patient in general is a poor historian and unable to quantify or characterize abd pain very well - unable to state how much BM each day but does state hard stools and pebble like   -KUB done on Saturday   -Liver US performed on 10/1 d/t h/o cirrhosis - no suspicious lesion w/ evidence of some fibrosis   -------------------------------------------------  Last visit 10/3/20    HTN follow up  -Taking his lisinopril 40mg daily   -Was started on propanolol 20mg 3 times a day however patient only taking once a day  -Not checking BP at home, does not have machine  -BP elevated in office, no associated symptoms reported  -Denies HA, visual changes, SOB, CP, palpitations        #Abdominal pain  -Has been ongoing for the past 1 week  -Saw Dr. Michele 3 days ago and was started on Bentyl  -States that he takes fiber supplement  daily  -Pain is reported as constant, 6-7/10 pain currently  -Feels bloated  -No nausea, vomiting, diarrhea or constipation  -Does report occasional hard stools for which he states he drinks more water  -Denies any blood in stool  -Denies any dysuria  -Patient unsure of exactly all the medications he is taking.  He is unable to confirm if he is taking tramadol, Protonix, sucralfate.  -Has seen Dr. Gonzalez in the past for his chronic cirrhosis  -Last EGD 02/2019 showed no esophageal varices, showed portal hypertensive gastropathy and gastritis   -Supposed to be on pantoprazole 40 mg daily  -No fevers or chills        Kris has a past medical history of History of fall (2014), Hypercholesterolemia, Hyperlipidemia, Hypertension, Leukemia (CMS/HCC), Prostatic hypertrophy, Spinal stenosis, and Thyroid disease.  Kris has a current medication list which includes the following prescription(s): propranolol, ciprofloxacin, polyethylene glycol, dicyclomine, tramadol, diazepam, lisinopril, pantoprazole, levothyroxine, gabapentin, bisacodyl, blood pressure kit, imbruvica, acetaminophen, azelastine, sucralfate, and pravastatin.  Kris is allergic to bactrim ds and gemfibrozil.    Review of Systems   All other systems reviewed and are negative.      Objective   Physical Exam  Vitals signs and nursing note reviewed.   Constitutional:       General: He is not in acute distress.     Appearance: Normal appearance. He is not ill-appearing or toxic-appearing.   HENT:      Head: Normocephalic.      Nose: Nose normal.      Mouth/Throat:      Mouth: Mucous membranes are moist.      Pharynx: Oropharynx is clear. No oropharyngeal exudate or posterior oropharyngeal erythema.   Eyes:      Extraocular Movements: Extraocular movements intact.      Conjunctiva/sclera: Conjunctivae normal.      Pupils: Pupils are equal, round, and reactive to light.   Cardiovascular:      Rate and Rhythm: Normal rate and regular rhythm.      Pulses: Normal  pulses.      Heart sounds: Normal heart sounds. No murmur. No friction rub. No gallop.    Pulmonary:      Effort: Pulmonary effort is normal.      Breath sounds: Normal breath sounds. No stridor. No wheezing, rhonchi or rales.   Abdominal:      General: Abdomen is flat. Bowel sounds are normal. There is no distension.      Palpations: Abdomen is soft.      Tenderness: There is abdominal tenderness. There is no guarding or rebound.      Comments: Patient w/ moderate tenderness along epigastric, RUQ, and LLQ region w/ superficial palpation.    Skin:     General: Skin is warm.      Capillary Refill: Capillary refill takes less than 2 seconds.   Neurological:      General: No focal deficit present.      Mental Status: He is alert and oriented to person, place, and time.         Assessment   Problem List Items Addressed This Visit        Circulatory    Essential hypertension     -Blood pressure improved today although still mildly elevated  -Patient having some symptoms with mild bradycardia -will decrease frequency of propranolol to twice daily   -Advised patient to continue lisinopril 40 mg and take propranolol 20 mg twice daily  -Also advised patient to obtain blood pressure monitor, however, patient states he cannot afford it -recommended pt to visit nearby [facility] or [facility] and have blood pressure measured at least once within the next week and call if persistently elevated   -Some component of abd pain also contributing to HTN possibly   -Follow up in 1 month for BP              Digestive    Generalized abdominal pain     -Patient has been having generalized abdominal pain for past 1 to 2 weeks.  -KUB performed on 10/3 showing large amount of stool in colon  -Pain seems to be 2/2 constipation especially considering that he has been taking tramadol the last week without stool softener  -We will try Dulcolax suppository daily with MiraLAX until regular bowel movement   -Patient to stop taking if large amount of  stool output or diarrhea  -Will also get CT A/P to rule out any intra-abdominal pathologies if no improvement in symptoms -order placed but advised patient not to schedule if improvement after suppository           Relevant Orders    CT ABDOMEN PELVIS W CONTRAST      Other Visit Diagnoses     Constipation, unspecified constipation type    -  Primary    Relevant Medications    bisacodyl (DULCOLAX) 10 MG suppository      Kvng Brooks, DO  PGY-2 Family Medicine       Certification: I certify the above information and transfer of Katia Caban  is necessary for the continuing treatment of the diagnosis listed and that she requires [facility] for greater 30 days.      Update Admission H&P: No change in H&P    PHYSICIAN SIGNATURE:  Electronically signed by Major Osorio MD on 6/7/23 at 12:59 PM EDT

## 2023-06-05 NOTE — PLAN OF CARE
Problem: Discharge Planning  Goal: Discharge to home or other facility with appropriate resources  Outcome: Progressing  Flowsheets (Taken 6/5/2023 0800)  Discharge to home or other facility with appropriate resources: Identify barriers to discharge with patient and caregiver     Problem: Skin/Tissue Integrity  Goal: Absence of new skin breakdown  Description: 1. Monitor for areas of redness and/or skin breakdown  2. Assess vascular access sites hourly  3. Every 4-6 hours minimum:  Change oxygen saturation probe site  4. Every 4-6 hours:  If on nasal continuous positive airway pressure, respiratory therapy assess nares and determine need for appliance change or resting period.   Outcome: Progressing     Problem: Safety - Adult  Goal: Free from fall injury  Outcome: Progressing     Problem: Chronic Conditions and Co-morbidities  Goal: Patient's chronic conditions and co-morbidity symptoms are monitored and maintained or improved  Outcome: Progressing  Flowsheets (Taken 6/5/2023 0800)  Care Plan - Patient's Chronic Conditions and Co-Morbidity Symptoms are Monitored and Maintained or Improved:   Monitor and assess patient's chronic conditions and comorbid symptoms for stability, deterioration, or improvement   Collaborate with multidisciplinary team to address chronic and comorbid conditions and prevent exacerbation or deterioration     Problem: Respiratory - Adult  Goal: Achieves optimal ventilation and oxygenation  Outcome: Progressing     Problem: Cardiovascular - Adult  Goal: Maintains optimal cardiac output and hemodynamic stability  Outcome: Progressing  Goal: Absence of cardiac dysrhythmias or at baseline  Outcome: Progressing     Problem: Metabolic/Fluid and Electrolytes - Adult  Goal: Electrolytes maintained within normal limits  Outcome: Progressing  Flowsheets (Taken 6/5/2023 0800)  Electrolytes maintained within normal limits:   Monitor labs and assess patient for signs and symptoms of electrolyte

## 2023-06-05 NOTE — DISCHARGE INSTRUCTIONS
Extra Heart Failure sites:     https://[employer]/   --- this is American Heart Association interactive Healthier Living with Heart Failure guidebook. Please click hyperlink or copy / paste link into search bar. Use your mouse to scroll through the pages. Lots of information about weight monitoring, diet tips, activity, meds, etc    HF Hallock manish  -- this is a free smart phone manish available for iPhone and Android download. Use your phone to track sodium / fluid intake, zone tool symptom tracking, weights, medications, etc. Click on this hyperlink  HF Hallock Manish   for QR code for easy download. DASH (Dietary Approach to Stop Hypertension) diet --  [employer]no -- this diet is a flexible eating plan that promotes heart healthy eating style. Click on hyperlink or copy / paste link into search bar. Lots of low sodium recipes and tips.     [employer]  -- more free recipes

## 2023-06-06 LAB
ANION GAP SERPL CALCULATED.3IONS-SCNC: 10 MMOL/L (ref 3–16)
BASOPHILS # BLD: 0 K/UL (ref 0–0.2)
BASOPHILS NFR BLD: 0.7 %
BUN SERPL-MCNC: 32 MG/DL (ref 7–20)
CALCIUM SERPL-MCNC: 8.7 MG/DL (ref 8.3–10.6)
CHLORIDE SERPL-SCNC: 102 MMOL/L (ref 99–110)
CO2 SERPL-SCNC: 28 MMOL/L (ref 21–32)
CREAT SERPL-MCNC: 1.1 MG/DL (ref 0.6–1.1)
DEPRECATED RDW RBC AUTO: 15.7 % (ref 12.4–15.4)
EOSINOPHIL # BLD: 0.1 K/UL (ref 0–0.6)
EOSINOPHIL NFR BLD: 3.8 %
GFR SERPLBLD CREATININE-BSD FMLA CKD-EPI: >60 ML/MIN/{1.73_M2}
GLUCOSE SERPL-MCNC: 97 MG/DL (ref 70–99)
HCT VFR BLD AUTO: 37.5 % (ref 36–48)
HGB BLD-MCNC: 12.3 G/DL (ref 12–16)
LYMPHOCYTES # BLD: 0.8 K/UL (ref 1–5.1)
LYMPHOCYTES NFR BLD: 22 %
MCH RBC QN AUTO: 29.4 PG (ref 26–34)
MCHC RBC AUTO-ENTMCNC: 32.9 G/DL (ref 31–36)
MCV RBC AUTO: 89.3 FL (ref 80–100)
MONOCYTES # BLD: 0.3 K/UL (ref 0–1.3)
MONOCYTES NFR BLD: 7.6 %
NEUTROPHILS # BLD: 2.5 K/UL (ref 1.7–7.7)
NEUTROPHILS NFR BLD: 65.9 %
PLATELET # BLD AUTO: 159 K/UL (ref 135–450)
PMV BLD AUTO: 8.4 FL (ref 5–10.5)
POTASSIUM SERPL-SCNC: 4 MMOL/L (ref 3.5–5.1)
RBC # BLD AUTO: 4.2 M/UL (ref 4–5.2)
SODIUM SERPL-SCNC: 140 MMOL/L (ref 136–145)
WBC # BLD AUTO: 3.8 K/UL (ref 4–11)

## 2023-06-06 PROCEDURE — 94760 N-INVAS EAR/PLS OXIMETRY 1: CPT

## 2023-06-06 PROCEDURE — 36415 COLL VENOUS BLD VENIPUNCTURE: CPT

## 2023-06-06 PROCEDURE — 94640 AIRWAY INHALATION TREATMENT: CPT

## 2023-06-06 PROCEDURE — 6370000000 HC RX 637 (ALT 250 FOR IP): Performed by: STUDENT IN AN ORGANIZED HEALTH CARE EDUCATION/TRAINING PROGRAM

## 2023-06-06 PROCEDURE — 80048 BASIC METABOLIC PNL TOTAL CA: CPT

## 2023-06-06 PROCEDURE — 6370000000 HC RX 637 (ALT 250 FOR IP): Performed by: INTERNAL MEDICINE

## 2023-06-06 PROCEDURE — 6370000000 HC RX 637 (ALT 250 FOR IP): Performed by: HOSPITALIST

## 2023-06-06 PROCEDURE — 2060000000 HC ICU INTERMEDIATE R&B

## 2023-06-06 PROCEDURE — 2580000003 HC RX 258: Performed by: STUDENT IN AN ORGANIZED HEALTH CARE EDUCATION/TRAINING PROGRAM

## 2023-06-06 PROCEDURE — 85025 COMPLETE CBC W/AUTO DIFF WBC: CPT

## 2023-06-06 RX ORDER — GUAIFENESIN/DEXTROMETHORPHAN 100-10MG/5
5 SYRUP ORAL EVERY 4 HOURS PRN
Qty: 120 ML | Refills: 0 | DISCHARGE
Start: 2023-06-06 | End: 2023-06-16

## 2023-06-06 RX ORDER — OXYCODONE HYDROCHLORIDE 5 MG/1
5 TABLET ORAL EVERY 6 HOURS PRN
Qty: 12 TABLET | Refills: 0 | Status: SHIPPED | OUTPATIENT
Start: 2023-06-06 | End: 2023-06-09

## 2023-06-06 RX ORDER — FUROSEMIDE 20 MG/1
20 TABLET ORAL DAILY
Qty: 60 TABLET | Refills: 3 | DISCHARGE
Start: 2023-06-06

## 2023-06-06 RX ORDER — AZITHROMYCIN 500 MG/1
500 TABLET, FILM COATED ORAL DAILY
Qty: 1 TABLET | Refills: 0 | DISCHARGE
Start: 2023-06-07 | End: 2023-06-07 | Stop reason: HOSPADM

## 2023-06-06 RX ORDER — CLONAZEPAM 0.5 MG/1
0.5 TABLET ORAL NIGHTLY
Qty: 5 TABLET | Refills: 0 | Status: SHIPPED | OUTPATIENT
Start: 2023-06-06 | End: 2023-06-11

## 2023-06-06 RX ADMIN — LACOSAMIDE 200 MG: 100 TABLET, FILM COATED ORAL at 08:55

## 2023-06-06 RX ADMIN — POLYETHYLENE GLYCOL 3350 17 G: 17 POWDER, FOR SOLUTION ORAL at 16:19

## 2023-06-06 RX ADMIN — SODIUM CHLORIDE, PRESERVATIVE FREE 10 ML: 5 INJECTION INTRAVENOUS at 20:59

## 2023-06-06 RX ADMIN — PANTOPRAZOLE SODIUM 40 MG: 40 TABLET, DELAYED RELEASE ORAL at 05:45

## 2023-06-06 RX ADMIN — LEVETIRACETAM 2000 MG: 500 TABLET, FILM COATED ORAL at 20:56

## 2023-06-06 RX ADMIN — IPRATROPIUM BROMIDE AND ALBUTEROL SULFATE 1 DOSE: .5; 2.5 SOLUTION RESPIRATORY (INHALATION) at 21:24

## 2023-06-06 RX ADMIN — ATORVASTATIN CALCIUM 10 MG: 10 TABLET, FILM COATED ORAL at 20:57

## 2023-06-06 RX ADMIN — CLONAZEPAM 0.5 MG: 0.5 TABLET ORAL at 20:57

## 2023-06-06 RX ADMIN — AZITHROMYCIN MONOHYDRATE 500 MG: 500 TABLET ORAL at 08:55

## 2023-06-06 RX ADMIN — Medication 400 MG: at 08:55

## 2023-06-06 RX ADMIN — LACOSAMIDE 200 MG: 100 TABLET, FILM COATED ORAL at 20:57

## 2023-06-06 RX ADMIN — ESCITALOPRAM OXALATE 20 MG: 10 TABLET ORAL at 08:55

## 2023-06-06 RX ADMIN — SODIUM CHLORIDE, PRESERVATIVE FREE 10 ML: 5 INJECTION INTRAVENOUS at 08:57

## 2023-06-06 RX ADMIN — GUAIFENESIN SYRUP AND DEXTROMETHORPHAN 5 ML: 100; 10 SYRUP ORAL at 02:34

## 2023-06-06 RX ADMIN — OXYCODONE 5 MG: 5 TABLET ORAL at 22:10

## 2023-06-06 RX ADMIN — APIXABAN 5 MG: 5 TABLET, FILM COATED ORAL at 20:57

## 2023-06-06 RX ADMIN — FERROUS SULFATE TAB EC 324 MG (65 MG FE EQUIVALENT) 324 MG: 324 (65 FE) TABLET DELAYED RESPONSE at 08:55

## 2023-06-06 RX ADMIN — APIXABAN 5 MG: 5 TABLET, FILM COATED ORAL at 08:54

## 2023-06-06 RX ADMIN — OXYCODONE 5 MG: 5 TABLET ORAL at 08:55

## 2023-06-06 RX ADMIN — GUAIFENESIN SYRUP AND DEXTROMETHORPHAN 5 ML: 100; 10 SYRUP ORAL at 19:11

## 2023-06-06 RX ADMIN — LEVETIRACETAM 2000 MG: 500 TABLET, FILM COATED ORAL at 08:55

## 2023-06-06 RX ADMIN — GUAIFENESIN SYRUP AND DEXTROMETHORPHAN 5 ML: 100; 10 SYRUP ORAL at 13:54

## 2023-06-06 ASSESSMENT — PAIN DESCRIPTION - ORIENTATION
ORIENTATION: LEFT
ORIENTATION: LEFT

## 2023-06-06 ASSESSMENT — PAIN DESCRIPTION - DESCRIPTORS
DESCRIPTORS: ACHING
DESCRIPTORS: ACHING

## 2023-06-06 ASSESSMENT — PAIN - FUNCTIONAL ASSESSMENT: PAIN_FUNCTIONAL_ASSESSMENT: PREVENTS OR INTERFERES SOME ACTIVE ACTIVITIES AND ADLS

## 2023-06-06 ASSESSMENT — PAIN DESCRIPTION - PAIN TYPE: TYPE: CHRONIC PAIN

## 2023-06-06 ASSESSMENT — PAIN SCALES - GENERAL
PAINLEVEL_OUTOF10: 7
PAINLEVEL_OUTOF10: 8

## 2023-06-06 ASSESSMENT — PAIN DESCRIPTION - FREQUENCY: FREQUENCY: INTERMITTENT

## 2023-06-06 ASSESSMENT — PAIN DESCRIPTION - ONSET: ONSET: GRADUAL

## 2023-06-06 ASSESSMENT — PAIN DESCRIPTION - LOCATION
LOCATION: KNEE
LOCATION: KNEE

## 2023-06-06 NOTE — PLAN OF CARE
Problem: Discharge Planning  Goal: Discharge to home or other facility with appropriate resources  6/6/2023 0304 by Brutus Scheuermann, RN  Outcome: Progressing     Problem: Skin/Tissue Integrity  Goal: Absence of new skin breakdown  Description: 1. Monitor for areas of redness and/or skin breakdown  2. Assess vascular access sites hourly  3. Every 4-6 hours minimum:  Change oxygen saturation probe site  4. Every 4-6 hours:  If on nasal continuous positive airway pressure, respiratory therapy assess nares and determine need for appliance change or resting period.   6/6/2023 0304 by Brutus Scheuermann, RN  Outcome: Progressing     Problem: Safety - Adult  Goal: Free from fall injury  6/6/2023 0304 by Brutus Scheuermann, RN  Outcome: Progressing     Problem: Chronic Conditions and Co-morbidities  Goal: Patient's chronic conditions and co-morbidity symptoms are monitored and maintained or improved  6/6/2023 0304 by Brutus Scheuermann, RN  Outcome: Progressing     Problem: Respiratory - Adult  Goal: Achieves optimal ventilation and oxygenation  6/6/2023 0304 by Brutus Scheuermann, RN  Outcome: Progressing

## 2023-06-07 LAB
BACTERIA BLD CULT ORG #2: NORMAL
BACTERIA BLD CULT: NORMAL

## 2023-06-07 PROCEDURE — 2060000000 HC ICU INTERMEDIATE R&B

## 2023-06-07 PROCEDURE — 6370000000 HC RX 637 (ALT 250 FOR IP): Performed by: HOSPITALIST

## 2023-06-07 PROCEDURE — 2580000003 HC RX 258: Performed by: STUDENT IN AN ORGANIZED HEALTH CARE EDUCATION/TRAINING PROGRAM

## 2023-06-07 PROCEDURE — 6370000000 HC RX 637 (ALT 250 FOR IP): Performed by: STUDENT IN AN ORGANIZED HEALTH CARE EDUCATION/TRAINING PROGRAM

## 2023-06-07 PROCEDURE — 6370000000 HC RX 637 (ALT 250 FOR IP): Performed by: INTERNAL MEDICINE

## 2023-06-07 RX ORDER — FUROSEMIDE 20 MG/1
20 TABLET ORAL DAILY
Status: DISCONTINUED | OUTPATIENT
Start: 2023-06-07 | End: 2023-06-08 | Stop reason: HOSPADM

## 2023-06-07 RX ADMIN — CLONAZEPAM 0.5 MG: 0.5 TABLET ORAL at 20:44

## 2023-06-07 RX ADMIN — LEVETIRACETAM 2000 MG: 500 TABLET, FILM COATED ORAL at 08:37

## 2023-06-07 RX ADMIN — ATORVASTATIN CALCIUM 10 MG: 10 TABLET, FILM COATED ORAL at 20:44

## 2023-06-07 RX ADMIN — LEVETIRACETAM 2000 MG: 500 TABLET, FILM COATED ORAL at 20:44

## 2023-06-07 RX ADMIN — APIXABAN 5 MG: 5 TABLET, FILM COATED ORAL at 08:37

## 2023-06-07 RX ADMIN — GUAIFENESIN SYRUP AND DEXTROMETHORPHAN 5 ML: 100; 10 SYRUP ORAL at 20:43

## 2023-06-07 RX ADMIN — OXYCODONE 5 MG: 5 TABLET ORAL at 18:38

## 2023-06-07 RX ADMIN — APIXABAN 5 MG: 5 TABLET, FILM COATED ORAL at 20:43

## 2023-06-07 RX ADMIN — LACOSAMIDE 200 MG: 100 TABLET, FILM COATED ORAL at 20:44

## 2023-06-07 RX ADMIN — AZITHROMYCIN MONOHYDRATE 500 MG: 500 TABLET ORAL at 08:37

## 2023-06-07 RX ADMIN — SODIUM CHLORIDE, PRESERVATIVE FREE 10 ML: 5 INJECTION INTRAVENOUS at 20:44

## 2023-06-07 RX ADMIN — SODIUM CHLORIDE, PRESERVATIVE FREE 10 ML: 5 INJECTION INTRAVENOUS at 08:38

## 2023-06-07 RX ADMIN — LACOSAMIDE 200 MG: 100 TABLET, FILM COATED ORAL at 08:37

## 2023-06-07 RX ADMIN — PANTOPRAZOLE SODIUM 40 MG: 40 TABLET, DELAYED RELEASE ORAL at 05:08

## 2023-06-07 RX ADMIN — GUAIFENESIN SYRUP AND DEXTROMETHORPHAN 5 ML: 100; 10 SYRUP ORAL at 08:37

## 2023-06-07 RX ADMIN — FUROSEMIDE 20 MG: 20 TABLET ORAL at 12:12

## 2023-06-07 RX ADMIN — Medication 400 MG: at 08:37

## 2023-06-07 RX ADMIN — ESCITALOPRAM OXALATE 20 MG: 10 TABLET ORAL at 08:37

## 2023-06-07 RX ADMIN — FERROUS SULFATE TAB EC 324 MG (65 MG FE EQUIVALENT) 324 MG: 324 (65 FE) TABLET DELAYED RESPONSE at 08:37

## 2023-06-07 ASSESSMENT — PAIN DESCRIPTION - ORIENTATION
ORIENTATION: LEFT
ORIENTATION: LEFT

## 2023-06-07 ASSESSMENT — PAIN SCALES - GENERAL
PAINLEVEL_OUTOF10: 8
PAINLEVEL_OUTOF10: 0
PAINLEVEL_OUTOF10: 8

## 2023-06-07 ASSESSMENT — PAIN DESCRIPTION - LOCATION
LOCATION: KNEE
LOCATION: KNEE

## 2023-06-07 ASSESSMENT — PAIN DESCRIPTION - DESCRIPTORS: DESCRIPTORS: SHARP

## 2023-06-07 ASSESSMENT — PAIN DESCRIPTION - PAIN TYPE: TYPE: CHRONIC PAIN

## 2023-06-07 NOTE — PLAN OF CARE
Problem: Skin/Tissue Integrity  Goal: Absence of new skin breakdown  Description: 1. Monitor for areas of redness and/or skin breakdown  2. Assess vascular access sites hourly  3. Every 4-6 hours minimum:  Change oxygen saturation probe site  4. Every 4-6 hours:  If on nasal continuous positive airway pressure, respiratory therapy assess nares and determine need for appliance change or resting period. 6/7/2023 1306 by Oral Valdovinos RN  Outcome: Progressing   Patient's skin has been assessed per unit protocol and the patient is being repositioned or encouraged to turn every two hours to prevent skin breakdown and promote healing. Problem: Safety - Adult  Goal: Free from fall injury  6/7/2023 1306 by Oral Valdovinos RN  Outcome: Progressing   Fall risk assessment completed per unit protocol. Patient's bed is in the lowest position, call light is within reach and the patient's room is free of clutter. The patient has been instructed to call for assistance before getting out of bed or the chair. Problem: Pain  Goal: Verbalizes/displays adequate comfort level or baseline comfort level  Outcome: Progressing   Pain/discomfort being managed with PRN analgesics per MD orders. Pt able to express presence and absence of pain and rate pain appropriately using numerical scale.

## 2023-06-08 VITALS
OXYGEN SATURATION: 96 % | RESPIRATION RATE: 19 BRPM | HEIGHT: 69 IN | WEIGHT: 247.8 LBS | BODY MASS INDEX: 36.7 KG/M2 | SYSTOLIC BLOOD PRESSURE: 141 MMHG | TEMPERATURE: 97.7 F | DIASTOLIC BLOOD PRESSURE: 72 MMHG | HEART RATE: 57 BPM

## 2023-06-08 PROCEDURE — 6370000000 HC RX 637 (ALT 250 FOR IP): Performed by: STUDENT IN AN ORGANIZED HEALTH CARE EDUCATION/TRAINING PROGRAM

## 2023-06-08 PROCEDURE — 2580000003 HC RX 258: Performed by: STUDENT IN AN ORGANIZED HEALTH CARE EDUCATION/TRAINING PROGRAM

## 2023-06-08 PROCEDURE — 6370000000 HC RX 637 (ALT 250 FOR IP): Performed by: HOSPITALIST

## 2023-06-08 PROCEDURE — 94660 CPAP INITIATION&MGMT: CPT

## 2023-06-08 PROCEDURE — 6370000000 HC RX 637 (ALT 250 FOR IP): Performed by: INTERNAL MEDICINE

## 2023-06-08 RX ORDER — AMIODARONE HYDROCHLORIDE 200 MG/1
200 TABLET ORAL DAILY
Qty: 30 TABLET | Refills: 3 | DISCHARGE
Start: 2023-06-08 | End: 2023-06-08 | Stop reason: HOSPADM

## 2023-06-08 RX ORDER — AMIODARONE HYDROCHLORIDE 200 MG/1
200 TABLET ORAL 2 TIMES DAILY
Qty: 30 TABLET | Refills: 3 | Status: SHIPPED | OUTPATIENT
Start: 2023-06-08 | End: 2023-06-08 | Stop reason: SDUPTHER

## 2023-06-08 RX ADMIN — FUROSEMIDE 20 MG: 20 TABLET ORAL at 09:47

## 2023-06-08 RX ADMIN — LACOSAMIDE 200 MG: 100 TABLET, FILM COATED ORAL at 09:46

## 2023-06-08 RX ADMIN — SODIUM CHLORIDE, PRESERVATIVE FREE 10 ML: 5 INJECTION INTRAVENOUS at 09:56

## 2023-06-08 RX ADMIN — OXYCODONE 5 MG: 5 TABLET ORAL at 09:48

## 2023-06-08 RX ADMIN — PANTOPRAZOLE SODIUM 40 MG: 40 TABLET, DELAYED RELEASE ORAL at 05:52

## 2023-06-08 RX ADMIN — APIXABAN 5 MG: 5 TABLET, FILM COATED ORAL at 09:47

## 2023-06-08 RX ADMIN — Medication 400 MG: at 09:55

## 2023-06-08 RX ADMIN — FERROUS SULFATE TAB EC 324 MG (65 MG FE EQUIVALENT) 324 MG: 324 (65 FE) TABLET DELAYED RESPONSE at 09:55

## 2023-06-08 RX ADMIN — LEVETIRACETAM 2000 MG: 500 TABLET, FILM COATED ORAL at 09:47

## 2023-06-08 RX ADMIN — ESCITALOPRAM OXALATE 20 MG: 10 TABLET ORAL at 09:47

## 2023-06-08 ASSESSMENT — PAIN DESCRIPTION - LOCATION: LOCATION: KNEE

## 2023-06-08 ASSESSMENT — PAIN SCALES - GENERAL
PAINLEVEL_OUTOF10: 0
PAINLEVEL_OUTOF10: 8

## 2023-06-08 ASSESSMENT — PAIN DESCRIPTION - DESCRIPTORS: DESCRIPTORS: ACHING

## 2023-06-08 ASSESSMENT — PAIN DESCRIPTION - ORIENTATION: ORIENTATION: LEFT

## 2023-06-08 NOTE — PROGRESS NOTES
MD Jannet messaged via [employer] this AM regarding patient's automatic BP of 94/50, manual 90/50, and scheduled IV Lasix this AM. MD aware and held AM Lasix. MD also made aware of low BP this afternoon. MD will continue to monitor.
Patient discharged at [phone] with first care via stretcher to [facility]. Tele strips, IV's and pure wick removed before patient discharged.  Sent with cell phone and shirt patient came with
Physician Progress Note      Irma Pollock  CSN #:                  [id number]  :                       1970  ADMIT DATE:       2023 9:38 PM  [address] [place] DATE:  Jose J Uzair  PROVIDER #:        Nishi Spring MD          QUERY TEXT:    Pt admitted with Pneumonia and has diastolic heart failure documented. If   possible, please document in progress notes and discharge summary further   specificity regarding the type and acuity of CHF:    The medical record reflects the following:  Risk Factors: HTN A fib  Clinical Indicators: per PN 23 \"Grade III diastolic heart failure on   furosemide 20 mg IV bid. \" \" ECHO 2020 \" Ejection fraction is visually   estimated to be 30%-35%. Severe tricuspid regurgitation. moderate pulmonary   HTN There is severe diffuse hypokinesis. Diastolic filling parameters suggest   grade III diastolic dysfunction\" CT \"Mild interlobular smooth septal   thickening with patchy ground-glass changes seen throughout the lungs   bilaterally. This pattern can be seen in the setting of multilobar pneumonia,   pulmonary edema as well as alveolar hemorrhage. Trace right-eldon  Treatment: IV Lasix BID  Options provided:  -- Acute on Chronic Systolic and Diastolic CHF  -- Acute on Chronic Diastolic CHF/HFpEF  -- Chronic Systolic CHF/HFrEF  -- Chronic Diastolic CHF/HFpEF  -- Chronic Systolic and Diastolic CHF  -- Other - I will add my own diagnosis  -- Disagree - Not applicable / Not valid  -- Disagree - Clinically unable to determine / Unknown  -- Refer to Clinical Documentation Reviewer    PROVIDER RESPONSE TEXT:    This patient is in acute on chronic diastolic CHF/HFpEF. Query created by: Javier Valadez on 2023 6:08 AM      Electronically signed by:   Nishi Spring MD 2023 3:09 PM
Pt educated on importance of turning q2h to prevent pressure injuries. Pt refusing to be turned this shift. Pt moved on to speciality bed.     Electronically signed by Lloyd Lauren RN on 6/6/2023 at 6:32 AM
Pt has not used bipap since 6/3
Pt refusing BiPAP at this time. SpO2 on room air is 97%.
Pt requested PRN HHN Duoneb treatment for wheezing.
Pt. Refused bipap for tonight
RN Ana Paula Bruno MD via [employer] regarding patient's urine output of 250 mL with 1 unmeasured urine output. Bladder scan showed a volume of 78 mL. MD instructed RN to give 250 mL NS bolus and to encourage oral intake. RN to order. Left hip wound dressed per wound care orders. PRN Robitussin given for occasional cough. Patient also complained of wheezing - RN contacted RT to possibly give PRN Duoneb. Patient resting comfortably on left side, HOB elevated >30 degrees. Bed alarm on, call light within reach. Seizure precautions active. Care ongoing.
Report called to [facility].  All questions answered at 1700
V2.0    [facility] [place]      Name:  Miguel Blas /Age/Sex: 1970  (47 y.o. female)   MRN & CSN:  [id number] & [id number] Encounter Date/Time: 2023 9:13 AM EDT   Location:  [id number] PCP: Perla Tracey DO     Attending:William Cam MD       Hospital Day: 7    Assessment and Recommendations   Miguel Blas is a 47 y.o. female with pmh of CHF, A-fib, who presents with Pneumonia due to organism        Multifocal lung infiltrates on CT--due to atypical pneumonia versus ILD vs pulmonary edema. .. Procalcitonin normal limits, proBNP 400. .. Infiltrates not obvious on chest x-ray. .. Completed course of azithromycin. .  c/w Lasix   --repeat CT chest in 6-8 weeks, f/u with pulm outpatient  . Acute on chronic diastolic heart failure. .  Patient with history of diastolic and systolic heart failure Echo  showed grade 3 diastolic dysfunction, EF 30 to 35%- echo in  at [facility] was read as normal--repeat 2D echo  shows grade 2 DD,EF 55%- c/w lasix, added jardiance on dc  Dyslipidemia on atorvastatin 10 mg nightly. Anxious depression:  Continues on escitalopram 20 mg daily and clonazepam 0.5 mg nightly. Epilepsy on lacosamide 200 mg BID and levetiracetam 2000 mg BID. Hypercoagulable state with Factor V Leiden mutation and protein C deficiency with history of bilateral PE, paroxysmal of A-fib. Continue Eliquis  Gastric reflux and stress ulcer prophylaxis with pantoprazole (Protonix) 40 mg daily. Elevated liver enzymes:  continue to monitor  Hypomagnesemia:  Repleted  Hypotension-held prazosin-BP improved    Discharge to SNF once pre-CERT obtained    Diet ADULT DIET; Dysphagia - Soft and Bite Sized; 4 carb choices (60 gm/meal);  Low Sodium (2 gm); 1500 ml   DVT Prophylaxis [] Lovenox, []  Heparin, [] SCDs, [] Ambulation,  [] Eliquis, [] Xarelto  [] Coumadin   Code Status Full Code   Disposition From: Home  Expected Disposition: SNF  Estimated Date of Discharge: dc'd pending
Decision Maker/ POA Edema     Personally reviewed Lab Studies and Imaging     Discussed management of the case with case management    EKG interpreted personally and results     Imaging that was interpreted personally includes CT chest with multifocal infiltrates, chest x-ray is clear            Subjective:     Chief Complaint: Shortness of breath    Wojciech Schwarz is a 47 y.o. female who presents with shortness of breath    Subjective  Patient feels better today. Shortness of breath is improved. No chest pain, fevers chills or rigors      Review of Systems:      Pertinent positives and negatives discussed in HPI    Objective: Intake/Output Summary (Last 24 hours) at 6/5/2023 0913  Last data filed at 6/4/2023 2010  Gross per 24 hour   Intake 240 ml   Output 1400 ml   Net -1160 ml      Vitals:   Vitals:    06/05/23 0423 06/05/23 0637 06/05/23 0800 06/05/23 0808   BP: 99/63  (!) 94/50 (!) 90/50   Pulse: 77  55    Resp: 14 16 16    Temp: 97.6 °F (36.4 °C)  98 °F (36.7 °C)    TempSrc: Oral  Oral    SpO2: 95%  93%    Weight: 263 lb 10.7 oz (119.6 kg)      Height:             Physical Exam:      General: NAD  Eyes: EOMI  ENT: neck supple  Cardiovascular: Regular rate. Respiratory: Clear to auscultation  Gastrointestinal: Soft, non tender  Genitourinary: no suprapubic tenderness  Musculoskeletal: No edema  Skin: warm, dry  Neuro: Alert. Psych: Mood appropriate.          Medications:   Medications:    sodium chloride flush  5-40 mL IntraVENous 2 times per day    azithromycin  500 mg IntraVENous Q24H    [Held by provider] furosemide  20 mg IntraVENous BID    atorvastatin  10 mg Oral Nightly    clonazePAM  0.5 mg Oral Nightly    ferrous sulfate  324 mg Oral Daily with breakfast    escitalopram  20 mg Oral Daily    [Held by provider] prazosin  1 mg Oral Nightly    pantoprazole  40 mg Oral QAM AC    lacosamide  200 mg Oral BID    levETIRAcetam  2,000 mg Oral BID    magnesium oxide  400 mg Oral Daily with breakfast
Value Date/Time    TSH 1.67 02/02/2012 12:15 PM     Troponin: No results found for: TROPONINT  Lactic Acid:   No results for input(s): LACTA in the last 72 hours. BNP:   No results for input(s): PROBNP in the last 72 hours. UA:  Lab Results   Component Value Date/Time    NITRU Negative 12/23/2020 10:46 AM    COLORU YELLOW 12/23/2020 10:46 AM    PHUR 5.0 12/23/2020 10:46 AM    WBCUA 4 12/23/2020 10:46 AM    RBCUA 5 12/23/2020 10:46 AM    YEAST Present 09/10/2020 10:07 PM    BACTERIA 2+ 09/10/2020 10:07 PM    CLARITYU Clear 12/23/2020 10:46 AM    SPECGRAV 1.020 12/23/2020 10:46 AM    LEUKOCYTESUR Negative 12/23/2020 10:46 AM    UROBILINOGEN 0.2 12/23/2020 10:46 AM    BILIRUBINUR Negative 12/23/2020 10:46 AM    BLOODU MODERATE 12/23/2020 10:46 AM    GLUCOSEU Negative 12/23/2020 10:46 AM    KETUA Negative 12/23/2020 10:46 AM     Urine Cultures:   Lab Results   Component Value Date/Time    LABURIN 50,000 CFU/ml 10/15/2020 04:27 PM     Blood Cultures:   Lab Results   Component Value Date/Time    BC No Growth after 4 days of incubation. 06/02/2023 11:34 PM     Lab Results   Component Value Date/Time    BLOODCULT2 No Growth after 4 days of incubation.  06/03/2023 04:51 AM     Organism:   Lab Results   Component Value Date/Time    ORG Staph aureus MRSA 06/03/2023 01:19 PM         Electronically signed by Chrissy Babb MD on 6/7/2023 at 11:59 AM

## 2023-06-08 NOTE — CARE COORDINATION
CASE MANAGEMENT DISCHARGE SUMMARY:    DISCHARGE DATE: 6/8/23    DISCHARGED TO:   Name: [facility]  Address:  [facility] [address] [place]   Report Number:  [phone] 2nd floor, if no answer try ext 315  Fax:  [phone]     TRANSPORTATION: [facility]             TIME: 5:30pm    INSURANCE PRECERT OBTAINED: yes    HENS/PASAAR COMPLETED: n/a, return long term care    COMMENTS: Patient agrees to discharge back to [place] today. Spoke to Lilliam Barraza, tells me pre cert obtained, AVS faxed. FRANCHESCA Bagley aware of dc plan.     Electronically signed by Erika Arenas RN Case Management on 6/8/2023 at 3:57 PM
Patient does require pre cert to return LTC to [place]. LUCY galdamez/ Liana Sexton at facility requesting she submit for pre cert.      Electronically signed by La Paulson RN Case Management on 6/6/2023 at 12:59 PM      Confirmed with Liana Sexton that she was able to submit for pre cert and she will reach out to [employer] directly as well in hopes this will result in a faster approval.    Electronically signed by La Paulson RN Case Management on 6/6/2023 at 3:09 PM
Spoke to Kike Gavin at [place], [address] [place] [address] remains pending at this time.    Will arrange transport when receive approval.    Electronically signed by Nancy Swanson RN Case Management on 6/7/2023 at 12:32 PM
Spoke to Love Reese, tells me pre cert is still pending to return long term care.     Electronically signed by Oscar Marin RN Case Management on 6/8/2023 at 9:56 AM
Representative Name:       The Patient and/or Patient Representative Agree with the Discharge Plan?  Yes    Susana Perry RN  Case Management Department  Ph: [phone]
Wound Surface Area (cm^2) 2.5 cm^2 06/05/23 1214   Wound Volume (cm^3) 0.5 cm^3 06/05/23 1214   Wound Assessment Pink/red;Slough 06/05/23 1214   Drainage Amount Scant 06/05/23 1214   Drainage Description Serosanguinous 06/05/23 1214   Odor None 06/05/23 1214   Kelsea-wound Assessment Intact 06/05/23 1214   Margins Defined edges 06/05/23 1214   Number of days: 2          Pt awake and alert in bed. L hip wound pink/red with slough. Small amount of serosanginous drainage. Specialty bed ordered. Plan   Plan of Care:   L hip wound- aquacel ag ribbon cut to fit (dampened), silicone border; change q3 days  Will not continue to follow. Continue with prevention measures. -moisture barrier to buttocks  -sacral border to sacral area, peel back each shift to assess skin, change every 3 days and as needed  -turn and reposition every 2 hours  -chair cushion, encourage to reposition self frequently while in chair  -elevate heels, apply liquid barrier film twice daily    Specialty Bed Required : Yes   [x] Low Air Loss   [] Pressure Redistribution  [] Fluid Immersion  [] Bariatric  [] Total Pressure Relief  [] Other:     Current Diet: ADULT DIET; Dysphagia - Soft and Bite Sized; 4 carb choices (60 gm/meal);  Low Sodium (2 gm); 1500 ml  Dietician consult:  Yes    Discharge Plan:  Placement for patient upon discharge: skilled nursing    Patient appropriate for Outpatient [address]: No    Referrals:  []   [] [facility]  [] Supplies  [] Other    Patient/Caregiver Teaching:  Level of patient/caregiver understanding able to:   [] Indicates understanding       [] Needs reinforcement  [] Unsuccessful      [x] Verbal Understanding  [] Demonstrated understanding       [] No evidence of learning  [] Refused teaching         [] N/A       Electronically signed by Jossue Bonner RN, CWOCN on 6/5/2023 at 12:21 PM

## 2023-06-08 NOTE — DISCHARGE SUMMARY
Imaging   XR CHEST PORTABLE    Result Date: 6/5/2023  EXAMINATION: ONE XRAY VIEW OF THE CHEST 6/5/2023 10:11 am COMPARISON: None. HISTORY: ORDERING SYSTEM PROVIDED HISTORY: bliateral lung infiltrates TECHNOLOGIST PROVIDED HISTORY: Reason for exam:->bliateral lung infiltrates Reason for Exam: bliateral lung infiltrates FINDINGS: Normal cardiomediastinal silhouette. No acute airspace infiltrate. No pneumothorax or pleural effusion     No acute cardiopulmonary findings     XR CHEST PORTABLE    Result Date: 6/2/2023  EXAMINATION: ONE XRAY VIEW OF THE CHEST 6/2/2023 10:31 pm COMPARISON: 12/23/2020 HISTORY: ORDERING SYSTEM PROVIDED HISTORY: sob TECHNOLOGIST PROVIDED HISTORY: Reason for exam:->sob Reason for Exam: SOB FINDINGS: The heart is normal.  The pulmonary vessels are normal.  The lungs are mildly hyperinflated. No consolidation or effusion is seen. There are mild increased interstitial markings throughout which is increased. Mild increased interstitial markings throughout which is increased and could be due to pulmonary interstitial fibrosis or early interstitial pneumonitis which is more apparent. Recommend follow-up. CT CHEST PULMONARY EMBOLISM W CONTRAST    Result Date: 6/3/2023  EXAMINATION: CTA OF THE CHEST 6/2/2023 11:06 pm TECHNIQUE: CTA of the chest was performed after the administration of intravenous contrast.  Multiplanar reformatted images are provided for review. MIP images are provided for review. Automated exposure control, iterative reconstruction, and/or weight based adjustment of the mA/kV was utilized to reduce the radiation dose to as low as reasonably achievable.  COMPARISON: 09/10/2020 HISTORY: ORDERING SYSTEM PROVIDED HISTORY: sob, hemoptysis TECHNOLOGIST PROVIDED HISTORY: Reason for exam:->sob, hemoptysis Decision Support Exception - unselect if not a suspected or confirmed emergency medical condition->Emergency Medical Condition (MA) Reason for Exam: sob; hemoptysis Additional
Component Value Date/Time    LABURIN 50,000 CFU/ml 10/15/2020 04:27 PM     Blood Cultures:   Lab Results   Component Value Date/Time    [facility]  06/02/2023 11:34 PM     No Growth to date. Any change in status will be called. Lab Results   Component Value Date/Time    BLOODCULT2  06/03/2023 04:51 AM     No Growth to date. Any change in status will be called.      Organism:   Lab Results   Component Value Date/Time    [facility] Staph aureus MRSA 06/03/2023 01:19 PM       Time Spent Discharging patient 45 minutes    Electronically signed by Murtaza De Luna MD on 6/6/2023 at 1:02 PM

## 2023-06-08 NOTE — PLAN OF CARE
Problem: Discharge Planning  Goal: Discharge to home or other facility with appropriate resources  6/8/2023 1607 by Justo Shelton RN  Outcome: Progressing     Problem: Skin/Tissue Integrity  Goal: Absence of new skin breakdown  Description: 1. Monitor for areas of redness and/or skin breakdown  2. Assess vascular access sites hourly  3. Every 4-6 hours minimum:  Change oxygen saturation probe site  4. Every 4-6 hours:  If on nasal continuous positive airway pressure, respiratory therapy assess nares and determine need for appliance change or resting period.   6/8/2023 1607 by Justo Shelton RN  Outcome: Progressing     Problem: Safety - Adult  Goal: Free from fall injury  6/8/2023 1607 by Justo Shelton RN  Outcome: Progressing     Problem: Chronic Conditions and Co-morbidities  Goal: Patient's chronic conditions and co-morbidity symptoms are monitored and maintained or improved  6/8/2023 1607 by Justo Shelton RN  Outcome: Progressing     Problem: Respiratory - Adult  Goal: Achieves optimal ventilation and oxygenation  6/8/2023 1607 by Justo Shelton RN  Outcome: Progressing     Problem: Cardiovascular - Adult  Goal: Maintains optimal cardiac output and hemodynamic stability  Outcome: Progressing     Problem: Cardiovascular - Adult  Goal: Absence of cardiac dysrhythmias or at baseline  Outcome: Progressing     Problem: Metabolic/Fluid and Electrolytes - Adult  Goal: Electrolytes maintained within normal limits  Outcome: Progressing     Problem: Metabolic/Fluid and Electrolytes - Adult  Goal: Hemodynamic stability and optimal renal function maintained  Outcome: Progressing     Problem: Pain  Goal: Verbalizes/displays adequate comfort level or baseline comfort level  Outcome: Progressing

## 2023-07-26 ENCOUNTER — OFFICE VISIT (OUTPATIENT)
Dept: PULMONOLOGY | Age: 53
End: 2023-07-26
Payer: COMMERCIAL

## 2023-07-26 VITALS
TEMPERATURE: 98 F | BODY MASS INDEX: 37.04 KG/M2 | SYSTOLIC BLOOD PRESSURE: 140 MMHG | HEART RATE: 60 BPM | HEIGHT: 67 IN | WEIGHT: 236 LBS | RESPIRATION RATE: 18 BRPM | DIASTOLIC BLOOD PRESSURE: 70 MMHG | OXYGEN SATURATION: 95 %

## 2023-07-26 DIAGNOSIS — I26.99 BILATERAL PULMONARY EMBOLISM (HCC): ICD-10-CM

## 2023-07-26 DIAGNOSIS — J18.9 ATYPICAL PNEUMONIA: Primary | ICD-10-CM

## 2023-07-26 DIAGNOSIS — Z09 HOSPITAL DISCHARGE FOLLOW-UP: ICD-10-CM

## 2023-07-26 DIAGNOSIS — I50.22 CHRONIC SYSTOLIC (CONGESTIVE) HEART FAILURE (HCC): ICD-10-CM

## 2023-07-26 PROCEDURE — 1111F DSCHRG MED/CURRENT MED MERGE: CPT | Performed by: INTERNAL MEDICINE

## 2023-07-26 PROCEDURE — 1036F TOBACCO NON-USER: CPT | Performed by: INTERNAL MEDICINE

## 2023-07-26 PROCEDURE — G8427 DOCREV CUR MEDS BY ELIG CLIN: HCPCS | Performed by: INTERNAL MEDICINE

## 2023-07-26 PROCEDURE — 3017F COLORECTAL CA SCREEN DOC REV: CPT | Performed by: INTERNAL MEDICINE

## 2023-07-26 PROCEDURE — 99204 OFFICE O/P NEW MOD 45 MIN: CPT | Performed by: INTERNAL MEDICINE

## 2023-07-26 PROCEDURE — 3077F SYST BP >= 140 MM HG: CPT | Performed by: INTERNAL MEDICINE

## 2023-07-26 PROCEDURE — 3078F DIAST BP <80 MM HG: CPT | Performed by: INTERNAL MEDICINE

## 2023-07-26 PROCEDURE — G8417 CALC BMI ABV UP PARAM F/U: HCPCS | Performed by: INTERNAL MEDICINE

## 2023-07-26 NOTE — PROGRESS NOTES
Pulmonary Consult           REASON FOR CONSULTATION:  Chief Complaint   Patient presents with    New Patient    Follow-Up from Hospital    Pneumonia        Consult at request of Jarad Fischer DO     PCP: Jarad Fischer DO        Assessment and Plan:   Diagnosis Orders   1. Atypical pneumonia  CT CHEST WO CONTRAST      2. Bilateral pulmonary embolism ([address])        3. Chronic systolic (congestive) heart failure ([address])        4. Hospital discharge follow-up  AL DISCHARGE MEDS RECONCILED W/ CURRENT OUTPATIENT MED LIST          Plan:  CT chest finding more consistent with pulmonary edema, will repeat ct in one month to monitor progression, she in on 20 mg po daily of lasix, and she is on amiodarone which is currently on hold due to her lung disease as well as bradycardia.               HISTORY OF PRESENT ILLNESS: Denisse Bowens is very pleasant 47y.o. year old lady nursing home resident, with medical history stated below significant for history of atrial fibrillation on anticoagulation, and was on amiodarone that was recently held due to lung disease, history of asthma, systolic heart failure with a EF of 35% and grade 3 diastolic failure, epilepsy, factor V Leyden, history of bilateral PEs,      Past Medical History:   Diagnosis Date    Arthritis     Asthma     At risk for falls     Baker's cyst     CHF (congestive heart failure) ([address])     Dysphagia     Epilepsy ([address])     Factor V Leiden ([address])     on eliquis    GERD (gastroesophageal reflux disease)     Hx of blood clots     Hyperlipidemia     Hypertension     MRSA bacteremia 09/10/2020    Myoclonus     Need for assistance with personal care     Osteoarthritis     Paroxysmal atrial fibrillation ([address])     Schizoaffective disorder ([address])     Sudden cardiac arrest ([address]) 2021       Past Surgical History:   Procedure Laterality Date    GASTROSTOMY TUBE PLACEMENT

## 2023-10-20 ENCOUNTER — HOSPITAL ENCOUNTER (INPATIENT)
Age: 53
LOS: 13 days | Discharge: HOME HEALTH | DRG: 385 | End: 2023-11-02
Attending: INTERNAL MEDICINE | Admitting: INTERNAL MEDICINE
Payer: COMMERCIAL

## 2023-10-20 ENCOUNTER — APPOINTMENT (OUTPATIENT)
Dept: CT IMAGING | Age: 53
DRG: 385 | End: 2023-10-20
Payer: COMMERCIAL

## 2023-10-20 DIAGNOSIS — N61.1 LEFT BREAST ABSCESS: ICD-10-CM

## 2023-10-20 DIAGNOSIS — N61.1 ABSCESS OF LEFT BREAST: ICD-10-CM

## 2023-10-20 DIAGNOSIS — N61.0 CELLULITIS OF LEFT BREAST: Primary | ICD-10-CM

## 2023-10-20 LAB
ALBUMIN SERPL-MCNC: 3.8 G/DL (ref 3.4–5)
ALBUMIN/GLOB SERPL: 1 {RATIO} (ref 1.1–2.2)
ALP SERPL-CCNC: 128 U/L (ref 40–129)
ALT SERPL-CCNC: 24 U/L (ref 10–40)
ANION GAP SERPL CALCULATED.3IONS-SCNC: 11 MMOL/L (ref 3–16)
AST SERPL-CCNC: 21 U/L (ref 15–37)
BASOPHILS # BLD: 0.1 K/UL (ref 0–0.2)
BASOPHILS NFR BLD: 0.8 %
BILIRUB SERPL-MCNC: 0.4 MG/DL (ref 0–1)
BUN SERPL-MCNC: 20 MG/DL (ref 7–20)
CALCIUM SERPL-MCNC: 9 MG/DL (ref 8.3–10.6)
CHLORIDE SERPL-SCNC: 100 MMOL/L (ref 99–110)
CO2 SERPL-SCNC: 27 MMOL/L (ref 21–32)
CREAT SERPL-MCNC: 1.2 MG/DL (ref 0.6–1.1)
DEPRECATED RDW RBC AUTO: 14.7 % (ref 12.4–15.4)
EOSINOPHIL # BLD: 0.1 K/UL (ref 0–0.6)
EOSINOPHIL NFR BLD: 0.8 %
GFR SERPLBLD CREATININE-BSD FMLA CKD-EPI: 54 ML/MIN/{1.73_M2}
GLUCOSE SERPL-MCNC: 95 MG/DL (ref 70–99)
HCT VFR BLD AUTO: 41.5 % (ref 36–48)
HGB BLD-MCNC: 13.9 G/DL (ref 12–16)
LACTATE BLDV-SCNC: 0.7 MMOL/L (ref 0.4–1.9)
LYMPHOCYTES # BLD: 0.9 K/UL (ref 1–5.1)
LYMPHOCYTES NFR BLD: 11.7 %
MCH RBC QN AUTO: 30.2 PG (ref 26–34)
MCHC RBC AUTO-ENTMCNC: 33.5 G/DL (ref 31–36)
MCV RBC AUTO: 90.2 FL (ref 80–100)
MONOCYTES # BLD: 0.8 K/UL (ref 0–1.3)
MONOCYTES NFR BLD: 9.8 %
NEUTROPHILS # BLD: 6.2 K/UL (ref 1.7–7.7)
NEUTROPHILS NFR BLD: 76.9 %
PLATELET # BLD AUTO: 192 K/UL (ref 135–450)
PMV BLD AUTO: 7.8 FL (ref 5–10.5)
POTASSIUM SERPL-SCNC: 4.2 MMOL/L (ref 3.5–5.1)
PROT SERPL-MCNC: 7.7 G/DL (ref 6.4–8.2)
RBC # BLD AUTO: 4.61 M/UL (ref 4–5.2)
SODIUM SERPL-SCNC: 138 MMOL/L (ref 136–145)
WBC # BLD AUTO: 8.1 K/UL (ref 4–11)

## 2023-10-20 PROCEDURE — 87186 SC STD MICRODIL/AGAR DIL: CPT

## 2023-10-20 PROCEDURE — 83605 ASSAY OF LACTIC ACID: CPT

## 2023-10-20 PROCEDURE — 99285 EMERGENCY DEPT VISIT HI MDM: CPT

## 2023-10-20 PROCEDURE — 6360000004 HC RX CONTRAST MEDICATION: Performed by: PHYSICIAN ASSISTANT

## 2023-10-20 PROCEDURE — 6360000002 HC RX W HCPCS: Performed by: PHYSICIAN ASSISTANT

## 2023-10-20 PROCEDURE — 96365 THER/PROPH/DIAG IV INF INIT: CPT

## 2023-10-20 PROCEDURE — 71260 CT THORAX DX C+: CPT

## 2023-10-20 PROCEDURE — 96367 TX/PROPH/DG ADDL SEQ IV INF: CPT

## 2023-10-20 PROCEDURE — 87205 SMEAR GRAM STAIN: CPT

## 2023-10-20 PROCEDURE — 2580000003 HC RX 258: Performed by: PHYSICIAN ASSISTANT

## 2023-10-20 PROCEDURE — 87077 CULTURE AEROBIC IDENTIFY: CPT

## 2023-10-20 PROCEDURE — 2580000003 HC RX 258: Performed by: INTERNAL MEDICINE

## 2023-10-20 PROCEDURE — 87070 CULTURE OTHR SPECIMN AEROBIC: CPT

## 2023-10-20 PROCEDURE — 6370000000 HC RX 637 (ALT 250 FOR IP): Performed by: PHYSICIAN ASSISTANT

## 2023-10-20 PROCEDURE — 80053 COMPREHEN METABOLIC PANEL: CPT

## 2023-10-20 PROCEDURE — 1200000000 HC SEMI PRIVATE

## 2023-10-20 PROCEDURE — 85025 COMPLETE CBC W/AUTO DIFF WBC: CPT

## 2023-10-20 PROCEDURE — 6370000000 HC RX 637 (ALT 250 FOR IP): Performed by: INTERNAL MEDICINE

## 2023-10-20 PROCEDURE — 96375 TX/PRO/DX INJ NEW DRUG ADDON: CPT

## 2023-10-20 PROCEDURE — 87040 BLOOD CULTURE FOR BACTERIA: CPT

## 2023-10-20 RX ORDER — SODIUM CHLORIDE 0.9 % (FLUSH) 0.9 %
5-40 SYRINGE (ML) INJECTION PRN
Status: DISCONTINUED | OUTPATIENT
Start: 2023-10-20 | End: 2023-10-30 | Stop reason: SDUPTHER

## 2023-10-20 RX ORDER — HYDROCODONE BITARTRATE AND ACETAMINOPHEN 5; 325 MG/1; MG/1
1 TABLET ORAL ONCE
Status: DISCONTINUED | OUTPATIENT
Start: 2023-10-20 | End: 2023-10-20

## 2023-10-20 RX ORDER — CALCIUM CARBONATE 500 MG/1
750 TABLET, CHEWABLE ORAL DAILY
Status: DISCONTINUED | OUTPATIENT
Start: 2023-10-21 | End: 2023-11-02 | Stop reason: HOSPADM

## 2023-10-20 RX ORDER — SODIUM CHLORIDE 0.9 % (FLUSH) 0.9 %
5-40 SYRINGE (ML) INJECTION EVERY 12 HOURS SCHEDULED
Status: DISCONTINUED | OUTPATIENT
Start: 2023-10-20 | End: 2023-10-30 | Stop reason: SDUPTHER

## 2023-10-20 RX ORDER — POTASSIUM CHLORIDE 7.45 MG/ML
10 INJECTION INTRAVENOUS PRN
Status: DISCONTINUED | OUTPATIENT
Start: 2023-10-20 | End: 2023-11-02 | Stop reason: HOSPADM

## 2023-10-20 RX ORDER — POTASSIUM CHLORIDE 20 MEQ/1
40 TABLET, EXTENDED RELEASE ORAL PRN
Status: DISCONTINUED | OUTPATIENT
Start: 2023-10-20 | End: 2023-11-02 | Stop reason: HOSPADM

## 2023-10-20 RX ORDER — ACETAMINOPHEN 650 MG/1
650 SUPPOSITORY RECTAL EVERY 6 HOURS PRN
Status: DISCONTINUED | OUTPATIENT
Start: 2023-10-20 | End: 2023-11-02 | Stop reason: HOSPADM

## 2023-10-20 RX ORDER — LEVETIRACETAM 500 MG/1
2000 TABLET ORAL 2 TIMES DAILY
Status: DISCONTINUED | OUTPATIENT
Start: 2023-10-20 | End: 2023-11-02 | Stop reason: HOSPADM

## 2023-10-20 RX ORDER — VANCOMYCIN 1.75 G/350ML
1250 INJECTION, SOLUTION INTRAVENOUS EVERY 12 HOURS
Status: DISCONTINUED | OUTPATIENT
Start: 2023-10-21 | End: 2023-10-22

## 2023-10-20 RX ORDER — SODIUM CHLORIDE 9 MG/ML
INJECTION, SOLUTION INTRAVENOUS PRN
Status: DISCONTINUED | OUTPATIENT
Start: 2023-10-20 | End: 2023-10-30 | Stop reason: SDUPTHER

## 2023-10-20 RX ORDER — HYDROCODONE BITARTRATE AND ACETAMINOPHEN 5; 325 MG/1; MG/1
1 TABLET ORAL ONCE
Status: COMPLETED | OUTPATIENT
Start: 2023-10-20 | End: 2023-10-20

## 2023-10-20 RX ORDER — ACETAMINOPHEN 325 MG/1
650 TABLET ORAL EVERY 6 HOURS PRN
Status: DISCONTINUED | OUTPATIENT
Start: 2023-10-20 | End: 2023-11-02 | Stop reason: HOSPADM

## 2023-10-20 RX ORDER — LANOLIN ALCOHOL/MO/W.PET/CERES
3 CREAM (GRAM) TOPICAL NIGHTLY
Status: DISCONTINUED | OUTPATIENT
Start: 2023-10-20 | End: 2023-11-02 | Stop reason: HOSPADM

## 2023-10-20 RX ORDER — LACTOBACILLUS RHAMNOSUS GG 10B CELL
1 CAPSULE ORAL DAILY
Status: DISCONTINUED | OUTPATIENT
Start: 2023-10-21 | End: 2023-11-02 | Stop reason: HOSPADM

## 2023-10-20 RX ORDER — PANTOPRAZOLE SODIUM 40 MG/1
40 TABLET, DELAYED RELEASE ORAL
Status: DISCONTINUED | OUTPATIENT
Start: 2023-10-21 | End: 2023-11-02 | Stop reason: HOSPADM

## 2023-10-20 RX ORDER — FERROUS SULFATE 324(65)MG
324 TABLET, DELAYED RELEASE (ENTERIC COATED) ORAL
Status: DISCONTINUED | OUTPATIENT
Start: 2023-10-21 | End: 2023-11-02 | Stop reason: HOSPADM

## 2023-10-20 RX ORDER — ONDANSETRON 4 MG/1
4 TABLET, ORALLY DISINTEGRATING ORAL EVERY 8 HOURS PRN
Status: DISCONTINUED | OUTPATIENT
Start: 2023-10-20 | End: 2023-11-02 | Stop reason: HOSPADM

## 2023-10-20 RX ORDER — ACETAMINOPHEN 325 MG/1
650 TABLET ORAL EVERY 4 HOURS PRN
Status: DISCONTINUED | OUTPATIENT
Start: 2023-10-20 | End: 2023-10-20

## 2023-10-20 RX ORDER — LACOSAMIDE 100 MG/1
200 TABLET ORAL 2 TIMES DAILY
Status: DISCONTINUED | OUTPATIENT
Start: 2023-10-20 | End: 2023-11-02 | Stop reason: HOSPADM

## 2023-10-20 RX ORDER — IBUPROFEN 200 MG
TABLET ORAL 3 TIMES DAILY
COMMUNITY

## 2023-10-20 RX ORDER — ATORVASTATIN CALCIUM 10 MG/1
10 TABLET, FILM COATED ORAL NIGHTLY
Status: DISCONTINUED | OUTPATIENT
Start: 2023-10-20 | End: 2023-11-02 | Stop reason: HOSPADM

## 2023-10-20 RX ORDER — NEPHROCAP 1 MG
1 CAP ORAL DAILY
Status: DISCONTINUED | OUTPATIENT
Start: 2023-10-21 | End: 2023-11-02 | Stop reason: HOSPADM

## 2023-10-20 RX ORDER — 0.9 % SODIUM CHLORIDE 0.9 %
500 INTRAVENOUS SOLUTION INTRAVENOUS ONCE
Status: COMPLETED | OUTPATIENT
Start: 2023-10-20 | End: 2023-10-20

## 2023-10-20 RX ORDER — FUROSEMIDE 20 MG/1
20 TABLET ORAL DAILY
Status: DISCONTINUED | OUTPATIENT
Start: 2023-10-21 | End: 2023-11-02 | Stop reason: HOSPADM

## 2023-10-20 RX ORDER — MAGNESIUM HYDROXIDE/ALUMINUM HYDROXICE/SIMETHICONE 120; 1200; 1200 MG/30ML; MG/30ML; MG/30ML
30 SUSPENSION ORAL EVERY 6 HOURS PRN
Status: DISCONTINUED | OUTPATIENT
Start: 2023-10-20 | End: 2023-11-02 | Stop reason: HOSPADM

## 2023-10-20 RX ORDER — DIPHENHYDRAMINE HYDROCHLORIDE 50 MG/ML
25 INJECTION INTRAMUSCULAR; INTRAVENOUS ONCE
Status: COMPLETED | OUTPATIENT
Start: 2023-10-20 | End: 2023-10-20

## 2023-10-20 RX ORDER — SODIUM CHLORIDE 9 MG/ML
INJECTION, SOLUTION INTRAVENOUS CONTINUOUS
Status: ACTIVE | OUTPATIENT
Start: 2023-10-20 | End: 2023-10-21

## 2023-10-20 RX ORDER — ESCITALOPRAM OXALATE 10 MG/1
20 TABLET ORAL DAILY
Status: DISCONTINUED | OUTPATIENT
Start: 2023-10-21 | End: 2023-11-02 | Stop reason: HOSPADM

## 2023-10-20 RX ORDER — POLYETHYLENE GLYCOL 3350 17 G/17G
17 POWDER, FOR SOLUTION ORAL DAILY PRN
Status: DISCONTINUED | OUTPATIENT
Start: 2023-10-20 | End: 2023-11-02 | Stop reason: HOSPADM

## 2023-10-20 RX ORDER — DANTROLENE SODIUM 25 MG/1
25 CAPSULE ORAL DAILY
Status: DISCONTINUED | OUTPATIENT
Start: 2023-10-21 | End: 2023-11-02 | Stop reason: HOSPADM

## 2023-10-20 RX ORDER — ONDANSETRON 2 MG/ML
4 INJECTION INTRAMUSCULAR; INTRAVENOUS EVERY 6 HOURS PRN
Status: DISCONTINUED | OUTPATIENT
Start: 2023-10-20 | End: 2023-11-02 | Stop reason: HOSPADM

## 2023-10-20 RX ADMIN — APIXABAN 5 MG: 5 TABLET, FILM COATED ORAL at 23:49

## 2023-10-20 RX ADMIN — LACOSAMIDE 200 MG: 100 TABLET, FILM COATED ORAL at 23:49

## 2023-10-20 RX ADMIN — DIPHENHYDRAMINE HYDROCHLORIDE 25 MG: 50 INJECTION INTRAMUSCULAR; INTRAVENOUS at 18:35

## 2023-10-20 RX ADMIN — Medication 3 MG: at 23:51

## 2023-10-20 RX ADMIN — HYDROCODONE BITARTRATE AND ACETAMINOPHEN 1 TABLET: 5; 325 TABLET ORAL at 18:34

## 2023-10-20 RX ADMIN — SODIUM CHLORIDE, PRESERVATIVE FREE 10 ML: 5 INJECTION INTRAVENOUS at 23:50

## 2023-10-20 RX ADMIN — SODIUM CHLORIDE: 9 INJECTION, SOLUTION INTRAVENOUS at 23:45

## 2023-10-20 RX ADMIN — CEFEPIME 1000 MG: 1 INJECTION, POWDER, FOR SOLUTION INTRAMUSCULAR; INTRAVENOUS at 16:47

## 2023-10-20 RX ADMIN — LEVETIRACETAM 2000 MG: 500 TABLET, FILM COATED ORAL at 23:49

## 2023-10-20 RX ADMIN — IOPAMIDOL 75 ML: 755 INJECTION, SOLUTION INTRAVENOUS at 17:22

## 2023-10-20 RX ADMIN — VANCOMYCIN HYDROCHLORIDE 1000 MG: 1 INJECTION, POWDER, LYOPHILIZED, FOR SOLUTION INTRAVENOUS at 17:34

## 2023-10-20 RX ADMIN — ATORVASTATIN CALCIUM 10 MG: 10 TABLET, FILM COATED ORAL at 23:49

## 2023-10-20 RX ADMIN — SODIUM CHLORIDE 500 ML: 9 INJECTION, SOLUTION INTRAVENOUS at 16:45

## 2023-10-20 ASSESSMENT — PAIN SCALES - GENERAL
PAINLEVEL_OUTOF10: 9
PAINLEVEL_OUTOF10: 9

## 2023-10-20 ASSESSMENT — ENCOUNTER SYMPTOMS
DIARRHEA: 0
VOMITING: 0
ABDOMINAL PAIN: 0
BACK PAIN: 0
CHEST TIGHTNESS: 0
NAUSEA: 0
SHORTNESS OF BREATH: 0
COLOR CHANGE: 1

## 2023-10-20 ASSESSMENT — PAIN DESCRIPTION - ORIENTATION: ORIENTATION: LEFT

## 2023-10-20 ASSESSMENT — LIFESTYLE VARIABLES
HOW MANY STANDARD DRINKS CONTAINING ALCOHOL DO YOU HAVE ON A TYPICAL DAY: PATIENT DOES NOT DRINK
HOW OFTEN DO YOU HAVE A DRINK CONTAINING ALCOHOL: NEVER

## 2023-10-20 ASSESSMENT — PAIN - FUNCTIONAL ASSESSMENT: PAIN_FUNCTIONAL_ASSESSMENT: 0-10

## 2023-10-20 ASSESSMENT — PAIN DESCRIPTION - LOCATION: LOCATION: BREAST

## 2023-10-20 NOTE — ED NOTES
Report given to Shayla Chambers; all questions answered; receiving RN acknowledged and had no further questions at this time     Nik Torres RN  10/20/23 1910

## 2023-10-20 NOTE — ED PROVIDER NOTES
[facility] [address] [address]        Pt Name: Windy Connor  MRN: [id number]  [address] [facility] [place] 1970  Date of evaluation: 10/20/2023  Provider: Maggie Lord PA-C  PCP: Kamla Roberts DO  Note Started: 3:30 PM EDT 10/20/23      LOKESH. I have evaluated this patient. CHIEF COMPLAINT       Chief Complaint   Patient presents with    Wound Infection     Pt states she noticed what she thought was a boil last week and it has continued to get worse       HISTORY OF PRESENT ILLNESS: 1 or more Elements     History from : Patient    Limitations to history : None    Windy Connor is a 47 y.o. female who presents to the ED for further evaluation of left breast abscess. Patient reports 1 week ago she noticed a \"boil\" in the upper inner quadrant of the left breast.  States the abscess started as a quarter sized spot, and has since been enlarging. She notes the size of the wound has increased over the past 2 to 3 days, and has been draining blood/pus. Patient currently lives in a nursing home, and states they had been putting topical antibiotic ointment on the wound. Patient reports she was started on oral antibiotics yesterday. Patient reports she was sent here by the nursing home due to the worsening of the abscess. Patient denies any fevers, chills, or vomiting. She does report pain at abscess site. Patient denies any history of MRSA, patient's past medical history indicates history of MRSA on 6/3/2023. Patient states her last mammogram was when she was approximately 37years old. Nursing Notes were all reviewed and agreed with or any disagreements were addressed in the HPI. REVIEW OF SYSTEMS :      Review of Systems   Constitutional:  Negative for chills and fever. Respiratory:  Negative for chest tightness and shortness of breath. Cardiovascular:  Negative for chest pain.    Gastrointestinal:  Negative for abdominal pain, diarrhea, are mis-transcribed.)    Felipe Moore PA-C (electronically signed)          Felipe Moore PA-C  10/20/23 2022

## 2023-10-20 NOTE — ED TRIAGE NOTES
Pt arrived to ED via EMS from [address] [place] [address] with c/o a would infection on her left breast. Pt states she noticed what she though was a boil last week and it has continued to worsen. Pt reports she started taking antibiotics yesterday for the wound. Respirations even and unlabored;  Pt is alert and oriented; vitals stable

## 2023-10-21 LAB
ANION GAP SERPL CALCULATED.3IONS-SCNC: 8 MMOL/L (ref 3–16)
BASOPHILS # BLD: 0 K/UL (ref 0–0.2)
BASOPHILS NFR BLD: 0.5 %
BUN SERPL-MCNC: 19 MG/DL (ref 7–20)
CALCIUM SERPL-MCNC: 8.2 MG/DL (ref 8.3–10.6)
CHLORIDE SERPL-SCNC: 109 MMOL/L (ref 99–110)
CO2 SERPL-SCNC: 26 MMOL/L (ref 21–32)
CREAT SERPL-MCNC: 1.1 MG/DL (ref 0.6–1.1)
DEPRECATED RDW RBC AUTO: 14.4 % (ref 12.4–15.4)
EOSINOPHIL # BLD: 0.1 K/UL (ref 0–0.6)
EOSINOPHIL NFR BLD: 1.2 %
GFR SERPLBLD CREATININE-BSD FMLA CKD-EPI: >60 ML/MIN/{1.73_M2}
GLUCOSE SERPL-MCNC: 128 MG/DL (ref 70–99)
HCT VFR BLD AUTO: 34.2 % (ref 36–48)
HGB BLD-MCNC: 11.4 G/DL (ref 12–16)
LYMPHOCYTES # BLD: 0.8 K/UL (ref 1–5.1)
LYMPHOCYTES NFR BLD: 11.6 %
MCH RBC QN AUTO: 29.8 PG (ref 26–34)
MCHC RBC AUTO-ENTMCNC: 33.2 G/DL (ref 31–36)
MCV RBC AUTO: 89.8 FL (ref 80–100)
MONOCYTES # BLD: 0.8 K/UL (ref 0–1.3)
MONOCYTES NFR BLD: 11.4 %
NEUTROPHILS # BLD: 5.2 K/UL (ref 1.7–7.7)
NEUTROPHILS NFR BLD: 75.3 %
PLATELET # BLD AUTO: 199 K/UL (ref 135–450)
PMV BLD AUTO: 8 FL (ref 5–10.5)
POTASSIUM SERPL-SCNC: 4.1 MMOL/L (ref 3.5–5.1)
RBC # BLD AUTO: 3.81 M/UL (ref 4–5.2)
SODIUM SERPL-SCNC: 143 MMOL/L (ref 136–145)
WBC # BLD AUTO: 6.8 K/UL (ref 4–11)

## 2023-10-21 PROCEDURE — 6360000002 HC RX W HCPCS: Performed by: NURSE PRACTITIONER

## 2023-10-21 PROCEDURE — 6370000000 HC RX 637 (ALT 250 FOR IP): Performed by: INTERNAL MEDICINE

## 2023-10-21 PROCEDURE — 80048 BASIC METABOLIC PNL TOTAL CA: CPT

## 2023-10-21 PROCEDURE — 2580000003 HC RX 258: Performed by: INTERNAL MEDICINE

## 2023-10-21 PROCEDURE — 94760 N-INVAS EAR/PLS OXIMETRY 1: CPT

## 2023-10-21 PROCEDURE — 97530 THERAPEUTIC ACTIVITIES: CPT

## 2023-10-21 PROCEDURE — 1200000000 HC SEMI PRIVATE

## 2023-10-21 PROCEDURE — 6360000002 HC RX W HCPCS: Performed by: INTERNAL MEDICINE

## 2023-10-21 PROCEDURE — 97535 SELF CARE MNGMENT TRAINING: CPT

## 2023-10-21 PROCEDURE — 85025 COMPLETE CBC W/AUTO DIFF WBC: CPT

## 2023-10-21 PROCEDURE — 97162 PT EVAL MOD COMPLEX 30 MIN: CPT

## 2023-10-21 PROCEDURE — 97166 OT EVAL MOD COMPLEX 45 MIN: CPT

## 2023-10-21 PROCEDURE — 36415 COLL VENOUS BLD VENIPUNCTURE: CPT

## 2023-10-21 RX ORDER — GUAIFENESIN/DEXTROMETHORPHAN 100-10MG/5
5 SYRUP ORAL EVERY 4 HOURS PRN
COMMUNITY

## 2023-10-21 RX ORDER — OXYCODONE HYDROCHLORIDE 5 MG/1
5 TABLET ORAL EVERY 6 HOURS PRN
Status: DISCONTINUED | OUTPATIENT
Start: 2023-10-21 | End: 2023-11-02 | Stop reason: HOSPADM

## 2023-10-21 RX ORDER — OXYCODONE HYDROCHLORIDE 5 MG/1
5 TABLET ORAL EVERY 6 HOURS PRN
Status: ON HOLD | COMMUNITY
End: 2023-11-01 | Stop reason: SDUPTHER

## 2023-10-21 RX ORDER — CLONAZEPAM 0.5 MG/1
0.5 TABLET ORAL NIGHTLY
Status: DISCONTINUED | OUTPATIENT
Start: 2023-10-21 | End: 2023-11-02 | Stop reason: HOSPADM

## 2023-10-21 RX ORDER — CLONAZEPAM 0.5 MG/1
0.5 TABLET ORAL NIGHTLY
COMMUNITY

## 2023-10-21 RX ADMIN — ESCITALOPRAM OXALATE 20 MG: 10 TABLET ORAL at 10:08

## 2023-10-21 RX ADMIN — APIXABAN 5 MG: 5 TABLET, FILM COATED ORAL at 20:48

## 2023-10-21 RX ADMIN — DANTROLENE SODIUM 25 MG: 25 CAPSULE ORAL at 10:08

## 2023-10-21 RX ADMIN — OXYCODONE HYDROCHLORIDE 5 MG: 5 TABLET ORAL at 06:17

## 2023-10-21 RX ADMIN — CEFEPIME 2000 MG: 2 INJECTION, POWDER, FOR SOLUTION INTRAVENOUS at 03:22

## 2023-10-21 RX ADMIN — ANTACID TABLETS 750 MG: 500 TABLET, CHEWABLE ORAL at 10:10

## 2023-10-21 RX ADMIN — LEVETIRACETAM 2000 MG: 500 TABLET, FILM COATED ORAL at 20:48

## 2023-10-21 RX ADMIN — LACOSAMIDE 200 MG: 100 TABLET, FILM COATED ORAL at 10:09

## 2023-10-21 RX ADMIN — FERROUS SULFATE TAB EC 324 MG (65 MG FE EQUIVALENT) 324 MG: 324 (65 FE) TABLET DELAYED RESPONSE at 10:10

## 2023-10-21 RX ADMIN — SODIUM CHLORIDE, PRESERVATIVE FREE 10 ML: 5 INJECTION INTRAVENOUS at 20:52

## 2023-10-21 RX ADMIN — Medication 3 MG: at 20:51

## 2023-10-21 RX ADMIN — VANCOMYCIN 1250 MG: 1.75 INJECTION, SOLUTION INTRAVENOUS at 21:03

## 2023-10-21 RX ADMIN — ATORVASTATIN CALCIUM 10 MG: 10 TABLET, FILM COATED ORAL at 20:48

## 2023-10-21 RX ADMIN — VANCOMYCIN 1250 MG: 1.75 INJECTION, SOLUTION INTRAVENOUS at 10:20

## 2023-10-21 RX ADMIN — LEVETIRACETAM 2000 MG: 500 TABLET, FILM COATED ORAL at 10:10

## 2023-10-21 RX ADMIN — EMPAGLIFLOZIN 10 MG: 10 TABLET, FILM COATED ORAL at 10:11

## 2023-10-21 RX ADMIN — APIXABAN 5 MG: 5 TABLET, FILM COATED ORAL at 10:08

## 2023-10-21 RX ADMIN — LACOSAMIDE 200 MG: 100 TABLET, FILM COATED ORAL at 20:48

## 2023-10-21 RX ADMIN — CEFEPIME 2000 MG: 2 INJECTION, POWDER, FOR SOLUTION INTRAVENOUS at 15:13

## 2023-10-21 RX ADMIN — CLONAZEPAM 0.5 MG: 0.5 TABLET ORAL at 20:48

## 2023-10-21 RX ADMIN — OXYCODONE HYDROCHLORIDE 5 MG: 5 TABLET ORAL at 20:48

## 2023-10-21 RX ADMIN — PANTOPRAZOLE SODIUM 40 MG: 40 TABLET, DELAYED RELEASE ORAL at 06:17

## 2023-10-21 RX ADMIN — Medication 1 CAPSULE: at 10:11

## 2023-10-21 ASSESSMENT — PAIN DESCRIPTION - ONSET: ONSET: ON-GOING

## 2023-10-21 ASSESSMENT — PAIN DESCRIPTION - LOCATION
LOCATION: BREAST
LOCATION: BREAST

## 2023-10-21 ASSESSMENT — PAIN DESCRIPTION - PAIN TYPE: TYPE: ACUTE PAIN

## 2023-10-21 ASSESSMENT — PAIN DESCRIPTION - ORIENTATION
ORIENTATION: LEFT
ORIENTATION: LEFT

## 2023-10-21 ASSESSMENT — PAIN SCALES - GENERAL
PAINLEVEL_OUTOF10: 2
PAINLEVEL_OUTOF10: 8
PAINLEVEL_OUTOF10: 9
PAINLEVEL_OUTOF10: 0

## 2023-10-21 ASSESSMENT — PAIN DESCRIPTION - DESCRIPTORS
DESCRIPTORS: BURNING;DULL
DESCRIPTORS: ACHING

## 2023-10-21 ASSESSMENT — PAIN - FUNCTIONAL ASSESSMENT: PAIN_FUNCTIONAL_ASSESSMENT: PREVENTS OR INTERFERES SOME ACTIVE ACTIVITIES AND ADLS

## 2023-10-21 ASSESSMENT — PAIN DESCRIPTION - FREQUENCY: FREQUENCY: INTERMITTENT

## 2023-10-21 NOTE — ED NOTES
Report given to Shanda [place] RN. She verbalized understanding of patient condition condition and has no further questions.      Brooks Xiong, [address] [place] [address]  10/20/23 0533

## 2023-10-21 NOTE — PROGRESS NOTES
[employer] BA Scanner Results  Pt was scanned according to 's recommendations. Daily      Site Reading Redness noted? Y/N   Right heel 0.5 N   Left heel  0.8 N   Sacrum 0.8 N       []  BA Delta score < 0.6 indicates normal, healthy tissue at this time. Continue to assess daily and implement routine pressure injury prevention measures using the Cash Order Set. Scan weekly on Wednesday. [x] BA Delta score > or = to 0.6 indicates at risk tissue. Implement target prevention interventions below and scan daily.     [] Cash orders reviewed and updated if indicated  [] Educated patient/family on importance of offloading/repositioning  [] Patient agreeable to plan for pressure offloading/repositioning    Liquid barrier film wipes, Extra pillows, and Other Foam protectant applied to coccyx      [] Nutrition consult made  [x] Nutrition consult not indicated at this time     [x] Sacral foam border in place  [] Sacral foam border not in place, barrier cream applied    [x] Low air loss mattress (or Isoflex blue/orange mattress) ordered/placed   [x] For heels, apply liquid skin barrier twice daily and ensure offloading with pillows or boots

## 2023-10-21 NOTE — PROGRESS NOTES
Patient arrived to unit at 930pm for Left breast cellulitis. Currently is resting in bed. Alert and oriented X 4. Complaining of pain, PRN pain medication given per order (see MAR). PM medications administered as ordered without complaint (see eMAR). IV infusing with NS and ATB. Assessment complete. Patient is bedbound. VSS stable at this time. All patient needs are met at this time. Fall precautions are in place. Call light is in reach. Specialty mattress ordered.     BA Scanner  L Heel 0.3  R Heel 0.2  Sacrum 0.7

## 2023-10-21 NOTE — PROGRESS NOTES
Pharmacy Medication Reconciliation Note     List of medications patient is currently taking is complete. Allergies:  Aspirin and Ibuprofen    Source of information:   1. Med list from [address] [place] [address] and rehab    Notes regarding home medications:   1. Updated med list to match list provided by nursing facility  2.  Was started on Doxycycline 100 mg BID x 10 days on 10/19/23    Denies any other OTC/herbal meds    Betty Morris PharmMASTER  10/21/2023  12:04 AM

## 2023-10-21 NOTE — PROGRESS NOTES
Patient alert and oriented. No s/s of distress. Dressing on left breast saturated per patient and removed per patient. Fresh gauze applied. Heels elevated and skin prep applied. Sacral boarder applied. Pure wick in place. Call light in reach and bed in low position.

## 2023-10-21 NOTE — PROGRESS NOTES
4 Eyes Admission Assessment      I agree as the admission nurse that 2 RN's have performed a thorough Head to Toe Skin Assessment on the patient. ALL assessment sites listed below have been assessed on admission. Areas assessed by both nurses:   [x]   Head, Face, and Ears   [x]   Shoulders, Back, and Chest  [x]   Arms, Elbows, and Hands   [x]   Coccyx, Sacrum, and Ischum  [x]   Legs, Feet, and Heels                        Does the Patient have Skin Breakdown? Yes on Left Breast  Left breast is warm to touch, red and has drainage.         Cash Prevention initiated:  Yes   Wound Care Orders initiated:  NA      [facility] nurse consulted for Pressure Injury (Stage 3,4, Unstageable, DTI, NWPT, and Complex wounds):  NA       Nurse 1 eSignature: Electronically signed by Dianna Melara RN on 10/21/2023 at 3:43 AM     **SHARE this note so that the co-signing nurse is able to place an eSignature**     Nurse 2 eSignature: {Esignature:[id number]}

## 2023-10-21 NOTE — PROGRESS NOTES
Occupational Therapy  Facility/Department: [address] [facility]  Occupational Therapy Initial Assessment    Name: Reji Villafana  : 1970  MRN: [id number]  Date of Service: 10/21/2023    Discharge Recommendations:  [facility] [address] [facility] [address] [place] [address] with OT  OT Equipment Recommendations  Other: Pt may benefit from OT at facility as determined by facility. Reji Villafana scored a  on the AM-PAC ADL Inpatient form. Current research shows that an AM-PAC score of 17 or less is typically not associated with a discharge to the patient's home setting. Based on the patient's AM-PAC score and their current ADL deficits, it is recommended that the patient have 3-5 sessions per week of Occupational Therapy at d/c to increase the patient's independence. Please see assessment section for further patient specific details. If patient discharges prior to next session this note will serve as a discharge summary. Please see below for the latest assessment towards goals. Patient Diagnosis(es): The primary encounter diagnosis was Cellulitis of left breast. A diagnosis of Abscess of left breast was also pertinent to this visit. Past Medical History:  has a past medical history of Arthritis, Asthma, At risk for falls, Baker's cyst, CHF (congestive heart failure) ([address]), Dysphagia, Epilepsy ([address]), Factor V Leiden ([address]), GERD (gastroesophageal reflux disease), Hx of blood clots, Hyperlipidemia, Hypertension, MRSA bacteremia, Myoclonus, Need for assistance with personal care, Osteoarthritis, Paroxysmal atrial fibrillation ([address]), Schizoaffective disorder ([address]), and Sudden cardiac arrest ([address]). Past Surgical History:  has a past surgical history that includes Gastrostomy tube placement (N/A, 2020); Tunneled venous catheter placement (Right, 2020); IR TUNNELED CVC PLACE WO SQ PORT/PUMP > 5 YEARS (2020); Gastrostomy tube placement (N/A, 10/9/2020); and Upper gastrointestinal endoscopy (N/A, 2022).     Treatment

## 2023-10-21 NOTE — PLAN OF CARE
Problem: Discharge Planning  Goal: Discharge to home or other facility with appropriate resources  Outcome: Progressing  Flowsheets (Taken 10/21/2023 1000)  Discharge to home or other facility with appropriate resources: Identify barriers to discharge with patient and caregiver     Problem: Pain  Goal: Verbalizes/displays adequate comfort level or baseline comfort level  10/21/2023 1100 by Ailin Clements RN  Outcome: Progressing  10/21/2023 0345 by Antonieta Kauffman  Outcome: Progressing     Problem: Skin/Tissue Integrity  Goal: Absence of new skin breakdown  Description: 1. Monitor for areas of redness and/or skin breakdown  2. Assess vascular access sites hourly  3. Every 4-6 hours minimum:  Change oxygen saturation probe site  4. Every 4-6 hours:  If on nasal continuous positive airway pressure, respiratory therapy assess nares and determine need for appliance change or resting period.   10/21/2023 1100 by Ailin Clements RN  Outcome: Progressing  10/21/2023 0345 by Antonieta Kauffman  Outcome: Progressing     Problem: Safety - Adult  Goal: Free from fall injury  10/21/2023 1100 by Ailin Clements RN  Outcome: Progressing  Flowsheets (Taken 10/21/2023 1057)  Free From Fall Injury: Instruct family/caregiver on patient safety  10/21/2023 0345 by Antonieta Kauffman  Outcome: Progressing  Flowsheets (Taken 10/20/2023 2214)  Free From Fall Injury: Instruct family/caregiver on patient safety     Problem: ABCDS Injury Assessment  Goal: Absence of physical injury  Outcome: Progressing

## 2023-10-21 NOTE — PROGRESS NOTES
Patient alert and oriented. No s/s of distress. Continues with dressing on left breast, serosanguineous drainage. Patient with bilateral lower leg tremors- stated per her baseline. Call light in reach and bed in low position.

## 2023-10-21 NOTE — PROGRESS NOTES
Hospitalist Progress Note  10/21/2023 9:44 AM    PCP: No primary care provider on file.    [phone]     Date of Admission: 10/20/2023                                                                                                                     HOSPITAL COURSE    Patient demographics:  The patient  Breanne Alatorre is a 53 y.o. female     Significant past medical history:   Patient Active Problem List   Diagnosis    Factor V Leiden mutation (HCC)    Pulmonary embolus (HCC)    Lactic acidosis    CAP (community acquired pneumonia)    Acute CHF (congestive heart failure) (HCC)    Bilateral pulmonary embolism (HCC)    Cardiac arrest (HCC)    Seizures (HCC)    Acute respiratory failure with hypoxia (HCC)    Metabolic acidosis    Congestive heart failure (HCC)    Pulmonary hypertension (HCC)    Multifocal pneumonia    Septic shock (HCC)    Elevated LFTs    Thrombocytopenia (HCC)    Coagulopathy (HCC)    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)    Acute encephalopathy    Bacteremia    Pleural effusion, bilateral    Elevated lactic acid level    Bacteremia due to methicillin susceptible Staphylococcus aureus (MSSA)    Pneumonia due to methicillin susceptible Staphylococcus aureus (MSSA) (HCC)    Anemia    Acute renal failure (HCC)    Bilateral pleural effusion    Mitral valve insufficiency    Tricuspid valve insufficiency    Class 2 obesity due to excess calories with body mass index (BMI) of 39.0 to 39.9 in adult    Cardiogenic shock (HCC)    Staphylococcal pneumonia (HCC)    Acute on chronic systolic heart failure (HCC)    Paroxysmal atrial fibrillation (HCC)    Essential hypertension    Complication of gastrostomy tube (HCC)    PEG tube malfunction (HCC)    Pneumonia due to organism    Pulmonary edema    Protein C deficiency (HCC)    Abnormal CT of the chest    Grade III diastolic dysfunction    History of epilepsy    Breast abscess         Presenting symptoms:  Wound  Date/Time    LABA1C 5.6 12/17/2018 03:00 PM     Left ventricular cavity size is normal. Normal left ventricular wall   thickness. Overall left ventricular systolic function appears normal. with   an ejection fraction of 55%.(6/2/2023)    ASSESSMENT AND PLAN  Left breast abscess with cellulitis:   Currently has some drainage. No evidence of abscess on CT chest  Continue patient on antibiotics  Consult to general surgery     Prior history of MRSA. Follow cultures. History of CVA with right-sided weakness,  currently bedbound. Standard decubitus precautions    Acute kidney injury,   likely secondary to dehydration/diuretics. Improving    History of bilateral pulm embolism,   factor V Leyden deficiency-on anticoagulation. Depression and anxiety:   Patient is tearful this evening. We will continue patient's SSRI      Code Status: Full Code        Dispo - cc        The patient and / or the family were informed of the results of any tests, a time was given to answer questions, a plan was proposed and they agreed with plan. Shelton Ortiz MD    This note was transcribed using [facility]. Please disregard any translational errors.

## 2023-10-21 NOTE — PROGRESS NOTES
Physical Therapy    Facility/Department: [address] ORTHOPEDICS    Physical Therapy Initial Assessment      Name: Ian Lane    : 1970    MRN: [id number]    Date of Service: 10/21/2023    Assessment / Discharge Recommendations:    -dependent at baseline  -recommend Maxi move to recliner as patient would like - in context of nursing care needs here  -anticipate return to LTC at discharge with PT OT and they determine     Ian Lane scored a 8/24 on the AM-PAC short mobility form. Patient Diagnosis(es): The primary encounter diagnosis was Cellulitis of left breast. A diagnosis of Abscess of left breast was also pertinent to this visit. Past Medical History:  has a past medical history of Arthritis, Asthma, At risk for falls, Baker's cyst, CHF (congestive heart failure) ([address]), Dysphagia, Epilepsy ([address]), Factor V Leiden ([address]), GERD (gastroesophageal reflux disease), Hx of blood clots, Hyperlipidemia, Hypertension, MRSA bacteremia, Myoclonus, Need for assistance with personal care, Osteoarthritis, Paroxysmal atrial fibrillation ([address]), Schizoaffective disorder ([address]), and Sudden cardiac arrest ([address]). Past Surgical History:  has a past surgical history that includes Gastrostomy tube placement (N/A, 2020); Tunneled venous catheter placement (Right, 2020); IR TUNNELED CVC PLACE WO SQ PORT/PUMP > 5 YEARS (2020); Gastrostomy tube placement (N/A, 10/9/2020); and Upper gastrointestinal endoscopy (N/A, 2022). Body Structures, Functions, Activity Limitations Requiring Skilled Therapeutic Intervention: Decreased ADL status; Decreased ROM; Decreased strength;Decreased balance;Decreased cognition  Requires PT Follow-Up: No  Activity Tolerance  Activity Tolerance: Patient tolerated treatment well     Plan   Physcial Therapy Plan  Additional Comments: no acute PT planned here at acute stay  Safety Devices  Type of Devices:  All fall risk precautions in place, Bed alarm in place, Call

## 2023-10-22 LAB
ANION GAP SERPL CALCULATED.3IONS-SCNC: 10 MMOL/L (ref 3–16)
BUN SERPL-MCNC: 18 MG/DL (ref 7–20)
CALCIUM SERPL-MCNC: 8.5 MG/DL (ref 8.3–10.6)
CHLORIDE SERPL-SCNC: 109 MMOL/L (ref 99–110)
CO2 SERPL-SCNC: 22 MMOL/L (ref 21–32)
CREAT SERPL-MCNC: 1 MG/DL (ref 0.6–1.1)
DEPRECATED RDW RBC AUTO: 14.2 % (ref 12.4–15.4)
GFR SERPLBLD CREATININE-BSD FMLA CKD-EPI: >60 ML/MIN/{1.73_M2}
GLUCOSE SERPL-MCNC: 99 MG/DL (ref 70–99)
HCT VFR BLD AUTO: 34.1 % (ref 36–48)
HGB BLD-MCNC: 11.3 G/DL (ref 12–16)
MCH RBC QN AUTO: 29.6 PG (ref 26–34)
MCHC RBC AUTO-ENTMCNC: 33.1 G/DL (ref 31–36)
MCV RBC AUTO: 89.5 FL (ref 80–100)
PLATELET # BLD AUTO: 180 K/UL (ref 135–450)
PMV BLD AUTO: 8.3 FL (ref 5–10.5)
POTASSIUM SERPL-SCNC: 3.9 MMOL/L (ref 3.5–5.1)
RBC # BLD AUTO: 3.81 M/UL (ref 4–5.2)
SODIUM SERPL-SCNC: 141 MMOL/L (ref 136–145)
VANCOMYCIN SERPL-MCNC: 18 UG/ML
VANCOMYCIN TROUGH SERPL-MCNC: 24.9 UG/ML (ref 10–20)
WBC # BLD AUTO: 4.7 K/UL (ref 4–11)

## 2023-10-22 PROCEDURE — 6360000002 HC RX W HCPCS: Performed by: INTERNAL MEDICINE

## 2023-10-22 PROCEDURE — 2580000003 HC RX 258: Performed by: INTERNAL MEDICINE

## 2023-10-22 PROCEDURE — 6360000002 HC RX W HCPCS: Performed by: NURSE PRACTITIONER

## 2023-10-22 PROCEDURE — 36415 COLL VENOUS BLD VENIPUNCTURE: CPT

## 2023-10-22 PROCEDURE — 6370000000 HC RX 637 (ALT 250 FOR IP): Performed by: INTERNAL MEDICINE

## 2023-10-22 PROCEDURE — 85027 COMPLETE CBC AUTOMATED: CPT

## 2023-10-22 PROCEDURE — 80048 BASIC METABOLIC PNL TOTAL CA: CPT

## 2023-10-22 PROCEDURE — 2580000003 HC RX 258: Performed by: NURSE PRACTITIONER

## 2023-10-22 PROCEDURE — 80202 ASSAY OF VANCOMYCIN: CPT

## 2023-10-22 PROCEDURE — 1200000000 HC SEMI PRIVATE

## 2023-10-22 PROCEDURE — 94760 N-INVAS EAR/PLS OXIMETRY 1: CPT

## 2023-10-22 RX ADMIN — LEVETIRACETAM 2000 MG: 500 TABLET, FILM COATED ORAL at 09:39

## 2023-10-22 RX ADMIN — Medication 3 MG: at 21:20

## 2023-10-22 RX ADMIN — CLONAZEPAM 0.5 MG: 0.5 TABLET ORAL at 21:20

## 2023-10-22 RX ADMIN — DANTROLENE SODIUM 25 MG: 25 CAPSULE ORAL at 09:34

## 2023-10-22 RX ADMIN — LEVETIRACETAM 2000 MG: 500 TABLET, FILM COATED ORAL at 21:20

## 2023-10-22 RX ADMIN — OXYCODONE HYDROCHLORIDE 5 MG: 5 TABLET ORAL at 23:31

## 2023-10-22 RX ADMIN — LACOSAMIDE 200 MG: 100 TABLET, FILM COATED ORAL at 09:36

## 2023-10-22 RX ADMIN — CEFEPIME 2000 MG: 2 INJECTION, POWDER, FOR SOLUTION INTRAVENOUS at 14:48

## 2023-10-22 RX ADMIN — OXYCODONE HYDROCHLORIDE 5 MG: 5 TABLET ORAL at 09:58

## 2023-10-22 RX ADMIN — FERROUS SULFATE TAB EC 324 MG (65 MG FE EQUIVALENT) 324 MG: 324 (65 FE) TABLET DELAYED RESPONSE at 09:37

## 2023-10-22 RX ADMIN — NEPHROCAP 1 MG: 1 CAP ORAL at 12:13

## 2023-10-22 RX ADMIN — SODIUM CHLORIDE, PRESERVATIVE FREE 10 ML: 5 INJECTION INTRAVENOUS at 21:22

## 2023-10-22 RX ADMIN — LACOSAMIDE 200 MG: 100 TABLET, FILM COATED ORAL at 21:20

## 2023-10-22 RX ADMIN — SODIUM CHLORIDE: 9 INJECTION, SOLUTION INTRAVENOUS at 00:53

## 2023-10-22 RX ADMIN — PANTOPRAZOLE SODIUM 40 MG: 40 TABLET, DELAYED RELEASE ORAL at 06:32

## 2023-10-22 RX ADMIN — FUROSEMIDE 20 MG: 20 TABLET ORAL at 09:38

## 2023-10-22 RX ADMIN — EMPAGLIFLOZIN 10 MG: 10 TABLET, FILM COATED ORAL at 09:35

## 2023-10-22 RX ADMIN — ACETAMINOPHEN 650 MG: 325 TABLET ORAL at 15:32

## 2023-10-22 RX ADMIN — APIXABAN 5 MG: 5 TABLET, FILM COATED ORAL at 09:38

## 2023-10-22 RX ADMIN — VANCOMYCIN HYDROCHLORIDE 1500 MG: 1.5 INJECTION, POWDER, LYOPHILIZED, FOR SOLUTION INTRAVENOUS at 21:56

## 2023-10-22 RX ADMIN — OXYCODONE HYDROCHLORIDE 5 MG: 5 TABLET ORAL at 17:33

## 2023-10-22 RX ADMIN — ESCITALOPRAM OXALATE 20 MG: 10 TABLET ORAL at 09:37

## 2023-10-22 RX ADMIN — CEFEPIME 2000 MG: 2 INJECTION, POWDER, FOR SOLUTION INTRAVENOUS at 02:07

## 2023-10-22 RX ADMIN — ANTACID TABLETS 750 MG: 500 TABLET, CHEWABLE ORAL at 09:35

## 2023-10-22 RX ADMIN — ATORVASTATIN CALCIUM 10 MG: 10 TABLET, FILM COATED ORAL at 21:20

## 2023-10-22 RX ADMIN — APIXABAN 5 MG: 5 TABLET, FILM COATED ORAL at 21:20

## 2023-10-22 RX ADMIN — Medication 1 CAPSULE: at 09:37

## 2023-10-22 ASSESSMENT — PAIN DESCRIPTION - LOCATION
LOCATION: BREAST
LOCATION: BREAST
LOCATION: BREAST;HEAD
LOCATION: BREAST

## 2023-10-22 ASSESSMENT — PAIN DESCRIPTION - DESCRIPTORS
DESCRIPTORS: THROBBING
DESCRIPTORS: ITCHING;SHARP
DESCRIPTORS: THROBBING

## 2023-10-22 ASSESSMENT — PAIN DESCRIPTION - ORIENTATION
ORIENTATION: LEFT

## 2023-10-22 ASSESSMENT — PAIN SCALES - GENERAL
PAINLEVEL_OUTOF10: 9
PAINLEVEL_OUTOF10: 8
PAINLEVEL_OUTOF10: 9
PAINLEVEL_OUTOF10: 0
PAINLEVEL_OUTOF10: 9
PAINLEVEL_OUTOF10: 9
PAINLEVEL_OUTOF10: 8
PAINLEVEL_OUTOF10: 6

## 2023-10-22 ASSESSMENT — PAIN - FUNCTIONAL ASSESSMENT
PAIN_FUNCTIONAL_ASSESSMENT: ACTIVITIES ARE NOT PREVENTED
PAIN_FUNCTIONAL_ASSESSMENT: ACTIVITIES ARE NOT PREVENTED
PAIN_FUNCTIONAL_ASSESSMENT: PREVENTS OR INTERFERES SOME ACTIVE ACTIVITIES AND ADLS
PAIN_FUNCTIONAL_ASSESSMENT: ACTIVITIES ARE NOT PREVENTED

## 2023-10-22 ASSESSMENT — PAIN DESCRIPTION - FREQUENCY: FREQUENCY: INTERMITTENT

## 2023-10-22 ASSESSMENT — PAIN DESCRIPTION - PAIN TYPE: TYPE: ACUTE PAIN

## 2023-10-22 ASSESSMENT — PAIN DESCRIPTION - ONSET: ONSET: ON-GOING

## 2023-10-22 NOTE — PROGRESS NOTES
Left breast with redness spread to the left side and bottom of breast. Seen by Dr. Ana Chaparro. Left breast elevated on pillow.

## 2023-10-22 NOTE — PROGRESS NOTES
Patient alert and oriented. C/o pain in left breast. Gown and gauze saturated with serosanguineous drainage. Left breast with large black scab like area. Spoke to breast surgeon who stated this must go through general surgery. General surgery was notified by  staff and breast surgeon manager.

## 2023-10-22 NOTE — DISCHARGE INSTR - COC
Continuity of Care Form    Patient Name: Uche Valencia   :  1970  MRN:  [id number]    Admit date:  10/20/2023  Discharge date:      Code Status Order: Full Code   Advance Directives:     Admitting Physician:  Carlie Arredondo MD  PCP: No primary care provider on file. Discharging Nurse:  [facility] Unit/Room#: [facility] Unit Phone Number: [phone]    Emergency Contact:   Extended Emergency Contact Information  Primary Emergency Contact: Estelita Avery  Home Phone: [phone]  Relation: Parent  Secondary Emergency Contact: [address] [place] [address] Phone: [phone]  Mobile Phone: [phone]  Relation: Spouse    Past Surgical History:  Past Surgical History:   Procedure Laterality Date    GASTROSTOMY TUBE PLACEMENT N/A 2020    ESOPHAGOGASTRODUODENOSCOPY PERCUTANEOUS ENDOSCOPIC GASTROSTOMY TUBE PLACEMENT performed by Lashay Mckeon MD at [address] [place] [address] N/A 10/9/2020    ESOPHAGOGASTRODUODENOSCOPY PERCUTANEOUS ENDOCOPIC GASTROSTOMY TUBE PLACEMENT performed by Gio Lockhart DO at [facility]    IR TUNNELED C/Casia 10 5 YEARS  2020    IR TUNNELED CATHETER PLACEMENT GREATER THAN 5 YEARS 2020 [facility]    TUNNELED VENOUS CATHETER PLACEMENT Right 2020    Permacath ; RIJ; 19cm; Dr. Librado Lynne N/A 2022    ESOPHAGOGASTRODUODENOSCOPY WITH REPLACEMENT OF PERCUTANEOUS ENDOSCOPIC GASTROSTOMY TUBE performed by Sebastien Chacon MD at [facility] ENDOSCOPY       Immunization History:   Immunization History   Administered Date(s) Administered    COVID-19, PFIZER PURPLE top, DILUTE for use, (age 15 y+), 30mcg/0.3mL 2020, 2021    Pneumococcal, PPSV23, PNEUMOVAX 21, (age 2y+), SC/IM, 0.5mL 2007       Active Problems:  Patient Active Problem List   Diagnosis Code    Factor V Leiden mutation ([address]) D68.51    Pulmonary embolus ([address]) I26.99 Routing refill request to provider for review/approval because:  Drug not on the [facility] refill protocol   Medication is reported/historical    Next 5 appointments (look out 90 days)    Mar 14, 2022  1:00 PM  GERTRUDIS Ramos with Lauren Turner Bloch, RPH M [facility] ([facility] ) [address] [place] [id number] [phone]

## 2023-10-22 NOTE — PROGRESS NOTES
[facility] BA Scanner Results  Pt was scanned according to 's recommendations. Daily      Site Reading Redness noted? Y/N   Right heel 0.7 N   Left heel  0.7 N   Sacrum 1.2 N       []  BA Delta score < 0.6 indicates normal, healthy tissue at this time. Continue to assess daily and implement routine pressure injury prevention measures using the Cash Order Set. Scan weekly on Wednesday. [x] BA Delta score > or = to 0.6 indicates at risk tissue. Implement target prevention interventions below and scan daily.     [] Cash orders reviewed and updated if indicated  [x] Educated patient/family on importance of offloading/repositioning  [x] Patient agreeable to plan for pressure offloading/repositioning    Liquid barrier film wipes and Other speciality matress      [] Nutrition consult made  [x] Nutrition consult not indicated at this time     [x] Sacral foam border in place  [] Sacral foam border not in place, barrier cream applied    [x] Low air loss mattress (or Isoflex blue/orange mattress) ordered/placed   [x] For heels, apply liquid skin barrier twice daily and ensure offloading with pillows or boots

## 2023-10-22 NOTE — PLAN OF CARE
Problem: Discharge Planning  Goal: Discharge to home or other facility with appropriate resources  10/22/2023 1104 by Joleen Iverson RN  Outcome: Progressing  Flowsheets (Taken 10/22/2023 1000)  Discharge to home or other facility with appropriate resources: Identify barriers to discharge with patient and caregiver  10/21/2023 2307 by Rebeca Ramirez RN  Outcome: Progressing     Problem: Pain  Goal: Verbalizes/displays adequate comfort level or baseline comfort level  10/22/2023 1104 by Joleen Iverson RN  Outcome: Progressing  10/21/2023 2307 by Rebeca Ramirez RN  Outcome: Progressing     Problem: Skin/Tissue Integrity  Goal: Absence of new skin breakdown  Description: 1. Monitor for areas of redness and/or skin breakdown  2. Assess vascular access sites hourly  3. Every 4-6 hours minimum:  Change oxygen saturation probe site  4. Every 4-6 hours:  If on nasal continuous positive airway pressure, respiratory therapy assess nares and determine need for appliance change or resting period.   10/22/2023 1104 by Joleen Iverson RN  Outcome: Progressing  10/21/2023 2307 by Rebeca Ramirez RN  Outcome: Progressing     Problem: Safety - Adult  Goal: Free from fall injury  10/22/2023 1104 by Joleen Iverson RN  Outcome: Progressing  10/21/2023 2307 by Rebeca Ramirez RN  Outcome: Progressing     Problem: ABCDS Injury Assessment  Goal: Absence of physical injury  10/22/2023 1104 by Joleen Iverson RN  Outcome: Mariia Wilcox (Taken 10/21/2023 2309 by Rebeca Ramirez RN)  Absence of Physical Injury: Implement safety measures based on patient assessment  10/21/2023 2307 by Rebeca Ramirez RN  Outcome: Progressing

## 2023-10-22 NOTE — PLAN OF CARE
Problem: Discharge Planning  Goal: Discharge to home or other facility with appropriate resources  10/21/2023 2307 by Evert Grossman RN  Outcome: Progressing     Problem: Pain  Goal: Verbalizes/displays adequate comfort level or baseline comfort level  10/21/2023 2307 by Evert Grossman RN  Outcome: Progressing     Problem: Skin/Tissue Integrity  Goal: Absence of new skin breakdown  Description: 1. Monitor for areas of redness and/or skin breakdown  2. Assess vascular access sites hourly  3. Every 4-6 hours minimum:  Change oxygen saturation probe site  4. Every 4-6 hours:  If on nasal continuous positive airway pressure, respiratory therapy assess nares and determine need for appliance change or resting period.   10/21/2023 2307 by Evert Grossman RN  Outcome: Progressing     Problem: Safety - Adult  Goal: Free from fall injury  10/21/2023 2307 by Evert Grossman RN  Outcome: Progressing     Problem: ABCDS Injury Assessment  Goal: Absence of physical injury  10/21/2023 2307 by Evert Grossman RN  Outcome: Progressing

## 2023-10-22 NOTE — PROGRESS NOTES
Hospitalist Progress Note  10/22/2023 8:15 AM    PCP: No primary care provider on file.     [phone]     Date of Admission: 10/20/2023                                                                                                                     HOSPITAL COURSE    Patient demographics:  The patient  Earnest Cam is a 47 y.o. female     Significant past medical history:   Patient Active Problem List   Diagnosis    Factor V Leiden mutation ([address])    Pulmonary embolus (HCC)    Lactic acidosis    CAP (community acquired pneumonia)    Acute CHF (congestive heart failure) ([address])    Bilateral pulmonary embolism (HCC)    Cardiac arrest ([address])    Seizures ([address])    Acute respiratory failure with hypoxia (HCC)    Metabolic acidosis    Congestive heart failure (HCC)    Pulmonary hypertension (HCC)    Multifocal pneumonia    Septic shock (HCC)    Elevated LFTs    Thrombocytopenia (HCC)    Coagulopathy (HCC)    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)    Acute encephalopathy    Bacteremia    Pleural effusion, bilateral    Elevated lactic acid level    Bacteremia due to methicillin susceptible Staphylococcus aureus (MSSA)    Pneumonia due to methicillin susceptible Staphylococcus aureus (MSSA) (HCC)    Anemia    Acute renal failure (HCC)    Bilateral pleural effusion    Mitral valve insufficiency    Tricuspid valve insufficiency    Class 2 obesity due to excess calories with body mass index (BMI) of 39.0 to 39.9 in adult    Cardiogenic shock (HCC)    Staphylococcal pneumonia (HCC)    Acute on chronic systolic heart failure (HCC)    Paroxysmal atrial fibrillation (HCC)    Essential hypertension    Complication of gastrostomy tube (HCC)    PEG tube malfunction ([address])    Pneumonia due to organism    Pulmonary edema    Protein C deficiency (HCC)    Abnormal CT of the chest    Grade III diastolic dysfunction    History of epilepsy    Breast abscess         Presenting symptoms:  Wound

## 2023-10-22 NOTE — PROGRESS NOTES
Pt is alert and oriented x 4. Vital, assessment, daily care completed. Education and care plan done. Pain medication and other medication given as per order. Fall precaution initiated, bed in low position, Call light within reach, wheels locked. Addressed current pt's need . Continue to monitor.     Electronically signed by Allan Grimes RN on 10/21/2023 at 11:55 PM

## 2023-10-23 ENCOUNTER — ANESTHESIA EVENT (OUTPATIENT)
Dept: OPERATING ROOM | Age: 53
End: 2023-10-23
Payer: COMMERCIAL

## 2023-10-23 ENCOUNTER — ANESTHESIA (OUTPATIENT)
Dept: OPERATING ROOM | Age: 53
End: 2023-10-23
Payer: COMMERCIAL

## 2023-10-23 PROBLEM — N61.0 CELLULITIS OF LEFT BREAST: Status: ACTIVE | Noted: 2023-10-23

## 2023-10-23 LAB
ANION GAP SERPL CALCULATED.3IONS-SCNC: 11 MMOL/L (ref 3–16)
BACTERIA SPEC AEROBE CULT: ABNORMAL
BUN SERPL-MCNC: 20 MG/DL (ref 7–20)
CALCIUM SERPL-MCNC: 8 MG/DL (ref 8.3–10.6)
CHLORIDE SERPL-SCNC: 109 MMOL/L (ref 99–110)
CO2 SERPL-SCNC: 21 MMOL/L (ref 21–32)
CREAT SERPL-MCNC: 1.2 MG/DL (ref 0.6–1.1)
DEPRECATED RDW RBC AUTO: 14.6 % (ref 12.4–15.4)
GFR SERPLBLD CREATININE-BSD FMLA CKD-EPI: 54 ML/MIN/{1.73_M2}
GLUCOSE SERPL-MCNC: 93 MG/DL (ref 70–99)
GRAM STN SPEC: ABNORMAL
HCT VFR BLD AUTO: 34.1 % (ref 36–48)
HGB BLD-MCNC: 11 G/DL (ref 12–16)
MCH RBC QN AUTO: 29 PG (ref 26–34)
MCHC RBC AUTO-ENTMCNC: 32.3 G/DL (ref 31–36)
MCV RBC AUTO: 89.8 FL (ref 80–100)
ORGANISM: ABNORMAL
PLATELET # BLD AUTO: 211 K/UL (ref 135–450)
PMV BLD AUTO: 8.3 FL (ref 5–10.5)
POTASSIUM SERPL-SCNC: 3.6 MMOL/L (ref 3.5–5.1)
RBC # BLD AUTO: 3.8 M/UL (ref 4–5.2)
SODIUM SERPL-SCNC: 141 MMOL/L (ref 136–145)
WBC # BLD AUTO: 4.3 K/UL (ref 4–11)

## 2023-10-23 PROCEDURE — 11045 DBRDMT SUBQ TISS EACH ADDL: CPT | Performed by: SURGERY

## 2023-10-23 PROCEDURE — A4217 STERILE WATER/SALINE, 500 ML: HCPCS | Performed by: SURGERY

## 2023-10-23 PROCEDURE — 6370000000 HC RX 637 (ALT 250 FOR IP): Performed by: INTERNAL MEDICINE

## 2023-10-23 PROCEDURE — 6360000002 HC RX W HCPCS: Performed by: SURGERY

## 2023-10-23 PROCEDURE — 99254 IP/OBS CNSLTJ NEW/EST MOD 60: CPT | Performed by: SURGERY

## 2023-10-23 PROCEDURE — 3600000002 HC SURGERY LEVEL 2 BASE: Performed by: SURGERY

## 2023-10-23 PROCEDURE — 2580000003 HC RX 258: Performed by: INTERNAL MEDICINE

## 2023-10-23 PROCEDURE — 3700000001 HC ADD 15 MINUTES (ANESTHESIA): Performed by: SURGERY

## 2023-10-23 PROCEDURE — 2580000003 HC RX 258: Performed by: SURGERY

## 2023-10-23 PROCEDURE — APPSS15 APP SPLIT SHARED TIME 0-15 MINUTES: Performed by: PHYSICIAN ASSISTANT

## 2023-10-23 PROCEDURE — 19020 MASTOTOMY EXPL DRG ABSC DP: CPT | Performed by: SURGERY

## 2023-10-23 PROCEDURE — 3700000000 HC ANESTHESIA ATTENDED CARE: Performed by: SURGERY

## 2023-10-23 PROCEDURE — 11042 DBRDMT SUBQ TIS 1ST 20SQCM/<: CPT | Performed by: SURGERY

## 2023-10-23 PROCEDURE — 6370000000 HC RX 637 (ALT 250 FOR IP): Performed by: SURGERY

## 2023-10-23 PROCEDURE — APPNB15 APP NON BILLABLE TIME 0-15 MINS: Performed by: PHYSICIAN ASSISTANT

## 2023-10-23 PROCEDURE — 6360000002 HC RX W HCPCS: Performed by: NURSE ANESTHETIST, CERTIFIED REGISTERED

## 2023-10-23 PROCEDURE — 6370000000 HC RX 637 (ALT 250 FOR IP): Performed by: NURSE PRACTITIONER

## 2023-10-23 PROCEDURE — 80048 BASIC METABOLIC PNL TOTAL CA: CPT

## 2023-10-23 PROCEDURE — 1200000000 HC SEMI PRIVATE

## 2023-10-23 PROCEDURE — 87205 SMEAR GRAM STAIN: CPT

## 2023-10-23 PROCEDURE — 0JB60ZZ EXCISION OF CHEST SUBCUTANEOUS TISSUE AND FASCIA, OPEN APPROACH: ICD-10-PCS | Performed by: SURGERY

## 2023-10-23 PROCEDURE — 2500000003 HC RX 250 WO HCPCS: Performed by: NURSE ANESTHETIST, CERTIFIED REGISTERED

## 2023-10-23 PROCEDURE — 3600000012 HC SURGERY LEVEL 2 ADDTL 15MIN: Performed by: SURGERY

## 2023-10-23 PROCEDURE — 36415 COLL VENOUS BLD VENIPUNCTURE: CPT

## 2023-10-23 PROCEDURE — 6360000002 HC RX W HCPCS: Performed by: ANESTHESIOLOGY

## 2023-10-23 PROCEDURE — 94760 N-INVAS EAR/PLS OXIMETRY 1: CPT

## 2023-10-23 PROCEDURE — 7100000000 HC PACU RECOVERY - FIRST 15 MIN: Performed by: SURGERY

## 2023-10-23 PROCEDURE — 7100000001 HC PACU RECOVERY - ADDTL 15 MIN: Performed by: SURGERY

## 2023-10-23 PROCEDURE — 85027 COMPLETE CBC AUTOMATED: CPT

## 2023-10-23 PROCEDURE — 87070 CULTURE OTHR SPECIMN AEROBIC: CPT

## 2023-10-23 PROCEDURE — 87075 CULTR BACTERIA EXCEPT BLOOD: CPT

## 2023-10-23 PROCEDURE — 2709999900 HC NON-CHARGEABLE SUPPLY: Performed by: SURGERY

## 2023-10-23 PROCEDURE — 6360000002 HC RX W HCPCS: Performed by: INTERNAL MEDICINE

## 2023-10-23 RX ORDER — MAGNESIUM HYDROXIDE 1200 MG/15ML
LIQUID ORAL CONTINUOUS PRN
Status: COMPLETED | OUTPATIENT
Start: 2023-10-23 | End: 2023-10-23

## 2023-10-23 RX ORDER — SODIUM CHLORIDE 9 MG/ML
INJECTION, SOLUTION INTRAVENOUS PRN
Status: DISCONTINUED | OUTPATIENT
Start: 2023-10-23 | End: 2023-10-23 | Stop reason: HOSPADM

## 2023-10-23 RX ORDER — ONDANSETRON 2 MG/ML
INJECTION INTRAMUSCULAR; INTRAVENOUS PRN
Status: DISCONTINUED | OUTPATIENT
Start: 2023-10-23 | End: 2023-10-23 | Stop reason: SDUPTHER

## 2023-10-23 RX ORDER — SODIUM CHLORIDE 0.9 % (FLUSH) 0.9 %
5-40 SYRINGE (ML) INJECTION PRN
Status: DISCONTINUED | OUTPATIENT
Start: 2023-10-23 | End: 2023-10-23 | Stop reason: HOSPADM

## 2023-10-23 RX ORDER — FENTANYL CITRATE 50 UG/ML
25 INJECTION, SOLUTION INTRAMUSCULAR; INTRAVENOUS EVERY 5 MIN PRN
Status: DISCONTINUED | OUTPATIENT
Start: 2023-10-23 | End: 2023-10-23 | Stop reason: HOSPADM

## 2023-10-23 RX ORDER — SODIUM CHLORIDE 0.9 % (FLUSH) 0.9 %
5-40 SYRINGE (ML) INJECTION EVERY 12 HOURS SCHEDULED
Status: DISCONTINUED | OUTPATIENT
Start: 2023-10-23 | End: 2023-10-23 | Stop reason: HOSPADM

## 2023-10-23 RX ORDER — FENTANYL CITRATE 50 UG/ML
50 INJECTION, SOLUTION INTRAMUSCULAR; INTRAVENOUS EVERY 5 MIN PRN
Status: DISCONTINUED | OUTPATIENT
Start: 2023-10-23 | End: 2023-10-23 | Stop reason: HOSPADM

## 2023-10-23 RX ORDER — MEPERIDINE HYDROCHLORIDE 25 MG/ML
12.5 INJECTION INTRAMUSCULAR; INTRAVENOUS; SUBCUTANEOUS
Status: DISCONTINUED | OUTPATIENT
Start: 2023-10-23 | End: 2023-10-23 | Stop reason: HOSPADM

## 2023-10-23 RX ORDER — LIDOCAINE HYDROCHLORIDE 20 MG/ML
INJECTION, SOLUTION EPIDURAL; INFILTRATION; INTRACAUDAL; PERINEURAL PRN
Status: DISCONTINUED | OUTPATIENT
Start: 2023-10-23 | End: 2023-10-23 | Stop reason: SDUPTHER

## 2023-10-23 RX ORDER — SUCCINYLCHOLINE/SOD CL,ISO/PF 200MG/10ML
SYRINGE (ML) INTRAVENOUS PRN
Status: DISCONTINUED | OUTPATIENT
Start: 2023-10-23 | End: 2023-10-23 | Stop reason: SDUPTHER

## 2023-10-23 RX ORDER — PROPOFOL 10 MG/ML
INJECTION, EMULSION INTRAVENOUS PRN
Status: DISCONTINUED | OUTPATIENT
Start: 2023-10-23 | End: 2023-10-23 | Stop reason: SDUPTHER

## 2023-10-23 RX ORDER — DEXAMETHASONE SODIUM PHOSPHATE 4 MG/ML
INJECTION, SOLUTION INTRA-ARTICULAR; INTRALESIONAL; INTRAMUSCULAR; INTRAVENOUS; SOFT TISSUE PRN
Status: DISCONTINUED | OUTPATIENT
Start: 2023-10-23 | End: 2023-10-23 | Stop reason: SDUPTHER

## 2023-10-23 RX ORDER — FENTANYL CITRATE 50 UG/ML
INJECTION, SOLUTION INTRAMUSCULAR; INTRAVENOUS PRN
Status: DISCONTINUED | OUTPATIENT
Start: 2023-10-23 | End: 2023-10-23 | Stop reason: SDUPTHER

## 2023-10-23 RX ORDER — ONDANSETRON 2 MG/ML
4 INJECTION INTRAMUSCULAR; INTRAVENOUS
Status: DISCONTINUED | OUTPATIENT
Start: 2023-10-23 | End: 2023-10-23 | Stop reason: HOSPADM

## 2023-10-23 RX ORDER — DIPHENHYDRAMINE HCL 25 MG
25 TABLET ORAL EVERY 6 HOURS PRN
Status: DISCONTINUED | OUTPATIENT
Start: 2023-10-23 | End: 2023-11-02 | Stop reason: HOSPADM

## 2023-10-23 RX ADMIN — FUROSEMIDE 20 MG: 20 TABLET ORAL at 08:59

## 2023-10-23 RX ADMIN — PROPOFOL 120 MG: 10 INJECTION, EMULSION INTRAVENOUS at 17:04

## 2023-10-23 RX ADMIN — OXYCODONE HYDROCHLORIDE 5 MG: 5 TABLET ORAL at 20:48

## 2023-10-23 RX ADMIN — POLYETHYLENE GLYCOL 3350 17 G: 17 POWDER, FOR SOLUTION ORAL at 20:57

## 2023-10-23 RX ADMIN — DANTROLENE SODIUM 25 MG: 25 CAPSULE ORAL at 08:58

## 2023-10-23 RX ADMIN — LACOSAMIDE 200 MG: 100 TABLET, FILM COATED ORAL at 08:59

## 2023-10-23 RX ADMIN — NEPHROCAP 1 MG: 1 CAP ORAL at 08:58

## 2023-10-23 RX ADMIN — DIPHENHYDRAMINE HCL 25 MG: 25 TABLET ORAL at 00:57

## 2023-10-23 RX ADMIN — ANTACID TABLETS 750 MG: 500 TABLET, CHEWABLE ORAL at 08:59

## 2023-10-23 RX ADMIN — CEFEPIME 2000 MG: 2 INJECTION, POWDER, FOR SOLUTION INTRAVENOUS at 13:56

## 2023-10-23 RX ADMIN — VANCOMYCIN HYDROCHLORIDE 1500 MG: 1.5 INJECTION, POWDER, LYOPHILIZED, FOR SOLUTION INTRAVENOUS at 20:56

## 2023-10-23 RX ADMIN — LIDOCAINE HYDROCHLORIDE 100 MG: 20 INJECTION, SOLUTION EPIDURAL; INFILTRATION; INTRACAUDAL; PERINEURAL at 17:04

## 2023-10-23 RX ADMIN — FERROUS SULFATE TAB EC 324 MG (65 MG FE EQUIVALENT) 324 MG: 324 (65 FE) TABLET DELAYED RESPONSE at 08:59

## 2023-10-23 RX ADMIN — Medication 3 MG: at 20:48

## 2023-10-23 RX ADMIN — PANTOPRAZOLE SODIUM 40 MG: 40 TABLET, DELAYED RELEASE ORAL at 05:28

## 2023-10-23 RX ADMIN — ONDANSETRON 4 MG: 2 INJECTION INTRAMUSCULAR; INTRAVENOUS at 17:07

## 2023-10-23 RX ADMIN — CLONAZEPAM 0.5 MG: 0.5 TABLET ORAL at 20:48

## 2023-10-23 RX ADMIN — APIXABAN 5 MG: 5 TABLET, FILM COATED ORAL at 20:48

## 2023-10-23 RX ADMIN — Medication 1 CAPSULE: at 08:58

## 2023-10-23 RX ADMIN — OXYCODONE HYDROCHLORIDE 5 MG: 5 TABLET ORAL at 05:28

## 2023-10-23 RX ADMIN — LEVETIRACETAM 2000 MG: 500 TABLET, FILM COATED ORAL at 08:58

## 2023-10-23 RX ADMIN — ESCITALOPRAM OXALATE 20 MG: 10 TABLET ORAL at 08:59

## 2023-10-23 RX ADMIN — SODIUM CHLORIDE, PRESERVATIVE FREE 10 ML: 5 INJECTION INTRAVENOUS at 20:48

## 2023-10-23 RX ADMIN — DEXAMETHASONE SODIUM PHOSPHATE 8 MG: 4 INJECTION, SOLUTION INTRAMUSCULAR; INTRAVENOUS at 17:07

## 2023-10-23 RX ADMIN — FENTANYL CITRATE 25 MCG: 50 INJECTION, SOLUTION INTRAMUSCULAR; INTRAVENOUS at 17:44

## 2023-10-23 RX ADMIN — OXYCODONE HYDROCHLORIDE 5 MG: 5 TABLET ORAL at 12:29

## 2023-10-23 RX ADMIN — EMPAGLIFLOZIN 10 MG: 10 TABLET, FILM COATED ORAL at 08:58

## 2023-10-23 RX ADMIN — LACOSAMIDE 200 MG: 100 TABLET, FILM COATED ORAL at 20:48

## 2023-10-23 RX ADMIN — FENTANYL CITRATE 50 MCG: 50 INJECTION INTRAMUSCULAR; INTRAVENOUS at 17:00

## 2023-10-23 RX ADMIN — CEFEPIME 2000 MG: 2 INJECTION, POWDER, FOR SOLUTION INTRAVENOUS at 01:55

## 2023-10-23 RX ADMIN — Medication 120 MG: at 17:04

## 2023-10-23 RX ADMIN — ACETAMINOPHEN 650 MG: 325 TABLET ORAL at 01:52

## 2023-10-23 RX ADMIN — FENTANYL CITRATE 25 MCG: 50 INJECTION, SOLUTION INTRAMUSCULAR; INTRAVENOUS at 17:51

## 2023-10-23 RX ADMIN — ATORVASTATIN CALCIUM 10 MG: 10 TABLET, FILM COATED ORAL at 20:48

## 2023-10-23 RX ADMIN — LEVETIRACETAM 2000 MG: 500 TABLET, FILM COATED ORAL at 20:48

## 2023-10-23 RX ADMIN — DIPHENHYDRAMINE HCL 25 MG: 25 TABLET ORAL at 12:29

## 2023-10-23 ASSESSMENT — PAIN DESCRIPTION - ORIENTATION
ORIENTATION: LEFT

## 2023-10-23 ASSESSMENT — PAIN DESCRIPTION - LOCATION
LOCATION: BREAST
LOCATION: BREAST
LOCATION: HEAD
LOCATION: BREAST

## 2023-10-23 ASSESSMENT — PAIN DESCRIPTION - DESCRIPTORS
DESCRIPTORS: THROBBING
DESCRIPTORS: ACHING
DESCRIPTORS: ITCHING;SHARP
DESCRIPTORS: THROBBING
DESCRIPTORS: ACHING
DESCRIPTORS: ACHING
DESCRIPTORS: DULL

## 2023-10-23 ASSESSMENT — PAIN DESCRIPTION - FREQUENCY
FREQUENCY: INTERMITTENT
FREQUENCY: CONTINUOUS
FREQUENCY: INTERMITTENT
FREQUENCY: CONTINUOUS
FREQUENCY: CONTINUOUS

## 2023-10-23 ASSESSMENT — PAIN - FUNCTIONAL ASSESSMENT
PAIN_FUNCTIONAL_ASSESSMENT: PREVENTS OR INTERFERES SOME ACTIVE ACTIVITIES AND ADLS
PAIN_FUNCTIONAL_ASSESSMENT: ACTIVITIES ARE NOT PREVENTED
PAIN_FUNCTIONAL_ASSESSMENT: PREVENTS OR INTERFERES SOME ACTIVE ACTIVITIES AND ADLS

## 2023-10-23 ASSESSMENT — PAIN DESCRIPTION - PAIN TYPE
TYPE: SURGICAL PAIN
TYPE: SURGICAL PAIN
TYPE: ACUTE PAIN
TYPE: SURGICAL PAIN
TYPE: ACUTE PAIN;SURGICAL PAIN
TYPE: ACUTE PAIN

## 2023-10-23 ASSESSMENT — PAIN SCALES - GENERAL
PAINLEVEL_OUTOF10: 6
PAINLEVEL_OUTOF10: 6
PAINLEVEL_OUTOF10: 0
PAINLEVEL_OUTOF10: 7
PAINLEVEL_OUTOF10: 0
PAINLEVEL_OUTOF10: 8
PAINLEVEL_OUTOF10: 5
PAINLEVEL_OUTOF10: 0
PAINLEVEL_OUTOF10: 9
PAINLEVEL_OUTOF10: 0
PAINLEVEL_OUTOF10: 3
PAINLEVEL_OUTOF10: 0

## 2023-10-23 ASSESSMENT — PAIN DESCRIPTION - ONSET
ONSET: ON-GOING
ONSET: GRADUAL

## 2023-10-23 ASSESSMENT — ENCOUNTER SYMPTOMS: SHORTNESS OF BREATH: 1

## 2023-10-23 ASSESSMENT — PAIN SCALES - WONG BAKER: WONGBAKER_NUMERICALRESPONSE: 0

## 2023-10-23 NOTE — PROGRESS NOTES
Got report from [place] in PACU. Pt had an I&D on the left breast. Pt returned to floor at 1806.    Electronically signed by Sajan Gastelum RN on 10/23/2023 at 6:21 PM

## 2023-10-23 NOTE — PROGRESS NOTES
Patient admitted to PACU # 9 from OR at [address] LEFT BREAST ABSCESS - Left per Dr King Teran. Attached to PACU monitoring system and report received from anesthesia provider. Patient was reported to be hemodynamically stable during procedure. Patient drowsy on admission and rating pain 6/10.

## 2023-10-23 NOTE — CONSULTS
Surgery Consult Note     Frank Becerra PA-C  Pt Name: Connie Foley  MRN: [id number]  YOB: 1970  Date of evaluation: 10/23/2023  Primary Care Physician: No primary care provider on file. Referring Physician. Dr. Bharati Crnoin  Reason for Consultation: Left breast ulceration and thickening of the skin with cellulitis  Chief Complaint: Left breast pain  IMPRESSIONS:   Left breast abscess with eschar   WBC count WNL: 4.3  On Eliquis. Last received last night at 9:20. Being held this AM  Factor V Leiden  BMI: 39.43  PLANS:   Incision, drainage, and debridement of left breast abscess/eschar in OR today  Continue to hold Eliquis  NPO  IVF  IV antibiotics  Treatment consent  SUBJECTIVE:   History of Chief Complaint:    Connie Foley is a 47 y.o. female who presented with a left breast abscess on 10/20/23. She stated that she has had this abscess for over a week and noticed that it started to drain 2-3 days before coming to the hospital. A CT scan was performed on 10/20/23 and that showed thickened superficial subcutaneous left breast tissue as well as skin thickening with mild ulceration likely representing severe cellulitis with no evidence of well-formed abscess. The abscess site is painful when palpated and is covered by eschar. There is surrounding erythema, but no fluctuance, induration, or mass was noted. She denies having any other pain. She does have Factor V Leiden and is on Eliquis at home. It has been held this AM for surgical intervention.    Past Medical History  Reviewed  has a past medical history of Arthritis, Asthma, At risk for falls, Baker's cyst, CHF (congestive heart failure) ([address]), Dysphagia, Epilepsy ([address]), Factor V Leiden ([address]), GERD (gastroesophageal reflux disease), Hx of blood clots, Hyperlipidemia, Hypertension, MRSA bacteremia, Myoclonus, Need for assistance with personal care, Osteoarthritis, Paroxysmal atrial fibrillation ([address]), Schizoaffective disorder ([address]), and Sudden cardiac   escitalopram (LEXAPRO) 20 MG tablet Take 1 tablet by mouth daily    ProviderSue MD   melatonin 3 MG TABS tablet Take 1 tablet by mouth at bedtime    Provider, MD ermelinda Rogers complex-C-folic acid (NEPHROCAPS) 1 MG capsule Take 1 capsule by mouth daily 10/3/20   Adam Hirsch MD   omeprazole (PRILOSEC) 20 MG delayed release capsule TAKE ONE CAPSULE BY MOUTH DAILY  Patient taking differently: Take 1 capsule by mouth Daily 6/10/20   Handy Rea MD   atorvastatin (LIPITOR) 10 MG tablet TAKE ONE TABLET BY MOUTH DAILY  Patient taking differently: Take 1 tablet by mouth at bedtime 1/8/20   Handy Rea MD    Scheduled Meds:   vancomycin  1,500 mg IntraVENous Q24H    clonazePAM  0.5 mg Oral Nightly    atorvastatin  10 mg Oral Nightly    b complex-C-folic acid  1 capsule Oral Daily    calcium carbonate  750 mg Oral Daily    ferrous sulfate  324 mg Oral Daily with breakfast    escitalopram  20 mg Oral Daily    melatonin  3 mg Oral Nightly    dantrolene  25 mg Oral Daily    levETIRAcetam  2,000 mg Oral BID    lacosamide  200 mg Oral BID    empagliflozin  10 mg Oral Daily    furosemide  20 mg Oral Daily    apixaban  5 mg Oral BID    pantoprazole  40 mg Oral QAM AC    lactobacillus  1 capsule Oral Daily    sodium chloride flush  5-40 mL IntraVENous 2 times per day    cefepime  2,000 mg IntraVENous Q12H    vancomycin (VANCOCIN) intermittent dosing (placeholder)   Other RX Placeholder     Continuous Infusions:   sodium chloride 100 mL/hr at 10/22/23 0053     PRN Meds:.diphenhydrAMINE, oxyCODONE, sodium chloride flush, sodium chloride, potassium chloride **OR** potassium alternative oral replacement **OR** potassium chloride, ondansetron **OR** ondansetron, polyethylene glycol, aluminum & magnesium hydroxide-simethicone, acetaminophen **OR** acetaminophen  Allergies  is allergic to aspirin and ibuprofen.  Family History  Reviewed. Noncontributory to current situation  Social History   reports that she quit

## 2023-10-23 NOTE — PROGRESS NOTES
Pt AAO x4. No c/o pain at this time. Assessment completed. Left breast red, swollen, large scabbed area with serosanguinous drainage noted. Breast elevated on a pillow. Pt denies any needs at this time. Call light in reach. Will monitor.

## 2023-10-23 NOTE — PLAN OF CARE
Problem: Discharge Planning  Goal: Discharge to home or other facility with appropriate resources  10/22/2023 2308 by Pamela Snyder RN  Outcome: Progressing  Flowsheets (Taken 10/22/2023 2004)  Discharge to home or other facility with appropriate resources:   Arrange for needed discharge resources and transportation as appropriate   Identify barriers to discharge with patient and caregiver     Problem: Pain  Goal: Verbalizes/displays adequate comfort level or baseline comfort level  10/22/2023 2308 by Pamela Snyder RN  Outcome: Progressing     Problem: Skin/Tissue Integrity  Goal: Absence of new skin breakdown  Description: 1. Monitor for areas of redness and/or skin breakdown  2. Assess vascular access sites hourly  3. Every 4-6 hours minimum:  Change oxygen saturation probe site  4. Every 4-6 hours:  If on nasal continuous positive airway pressure, respiratory therapy assess nares and determine need for appliance change or resting period.   10/22/2023 2308 by Pamela Snyder RN  Outcome: Progressing     Problem: Safety - Adult  Goal: Free from fall injury  10/22/2023 2308 by Pamela Snyder RN  Outcome: Progressing     Problem: ABCDS Injury Assessment  Goal: Absence of physical injury  10/22/2023 2308 by Pamela Snyder RN  Outcome: Progressing  Flowsheets  Taken 10/22/2023 2254 by Pamela Snyder RN  Absence of Physical Injury: Implement safety measures based on patient assessment

## 2023-10-23 NOTE — ANESTHESIA POSTPROCEDURE EVALUATION
Department of Anesthesiology  Postprocedure Note    Patient: Gil Castillo  MRN: [id number]  YOB: 1970  Date of evaluation: 10/23/2023      Procedure Summary       Date: 10/23/23 Room / Location: [address]    Anesthesia Start: 1658 Anesthesia Stop: 1741    Procedure: INCISION AND DRAINAGE AND DEBRIDEMENT LEFT BREAST ABSCESS (Left: Breast) Diagnosis:       Left breast abscess      (Left breast abscess [N61.1])    Surgeons: Azael Nair MD Responsible Provider: Ruth Gore MD    Anesthesia Type: general ASA Status: 4            Anesthesia Type: No value filed.     Miguel Phase I: Miguel Score: 10    Miguel Phase II:        Anesthesia Post Evaluation    Patient location during evaluation: PACU  Patient participation: complete - patient participated  Level of consciousness: awake and alert  Pain score: 2  Airway patency: patent  Nausea & Vomiting: no nausea and no vomiting  Complications: no  Cardiovascular status: blood pressure returned to baseline  Respiratory status: acceptable  Hydration status: euvolemic  Pain management: adequate

## 2023-10-23 NOTE — PROGRESS NOTES
Hospitalist Progress Note  10/23/2023 8:01 AM    PCP: No primary care provider on file.     [id number]     Date of Admission: 10/20/2023                                                                                                                     HOSPITAL COURSE    Patient demographics:  The patient  Cornelius Gracia is a 47 y.o. female     Significant past medical history:   Patient Active Problem List   Diagnosis    Factor V Leiden mutation ([address])    Pulmonary embolus (HCC)    Lactic acidosis    CAP (community acquired pneumonia)    Acute CHF (congestive heart failure) ([address])    Bilateral pulmonary embolism (HCC)    Cardiac arrest ([address])    Seizures ([address])    Acute respiratory failure with hypoxia (HCC)    Metabolic acidosis    Congestive heart failure (HCC)    Pulmonary hypertension (HCC)    Multifocal pneumonia    Septic shock (HCC)    Elevated LFTs    Thrombocytopenia (HCC)    Coagulopathy (HCC)    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)    Acute encephalopathy    Bacteremia    Pleural effusion, bilateral    Elevated lactic acid level    Bacteremia due to methicillin susceptible Staphylococcus aureus (MSSA)    Pneumonia due to methicillin susceptible Staphylococcus aureus (MSSA) (HCC)    Anemia    Acute renal failure (HCC)    Bilateral pleural effusion    Mitral valve insufficiency    Tricuspid valve insufficiency    Class 2 obesity due to excess calories with body mass index (BMI) of 39.0 to 39.9 in adult    Cardiogenic shock (HCC)    Staphylococcal pneumonia (HCC)    Acute on chronic systolic heart failure (HCC)    Paroxysmal atrial fibrillation (HCC)    Essential hypertension    Complication of gastrostomy tube (HCC)    PEG tube malfunction ([address])    Pneumonia due to organism    Pulmonary edema    Protein C deficiency (HCC)    Abnormal CT of the chest    Grade III diastolic dysfunction    History of epilepsy    Breast abscess         Presenting symptoms:  Wound

## 2023-10-23 NOTE — PROGRESS NOTES
Report called to [facility] on [address] [place] [address] Street. All questions answered. Pt to be transported to [address] [place] [address]. Will monitor.

## 2023-10-24 LAB
ANION GAP SERPL CALCULATED.3IONS-SCNC: 8 MMOL/L (ref 3–16)
BUN SERPL-MCNC: 22 MG/DL (ref 7–20)
CALCIUM SERPL-MCNC: 8.5 MG/DL (ref 8.3–10.6)
CHLORIDE SERPL-SCNC: 107 MMOL/L (ref 99–110)
CO2 SERPL-SCNC: 25 MMOL/L (ref 21–32)
CREAT SERPL-MCNC: 1.1 MG/DL (ref 0.6–1.1)
DEPRECATED RDW RBC AUTO: 14.1 % (ref 12.4–15.4)
GFR SERPLBLD CREATININE-BSD FMLA CKD-EPI: >60 ML/MIN/{1.73_M2}
GLUCOSE SERPL-MCNC: 160 MG/DL (ref 70–99)
HCT VFR BLD AUTO: 37.3 % (ref 36–48)
HGB BLD-MCNC: 12.2 G/DL (ref 12–16)
MCH RBC QN AUTO: 29.5 PG (ref 26–34)
MCHC RBC AUTO-ENTMCNC: 32.8 G/DL (ref 31–36)
MCV RBC AUTO: 89.9 FL (ref 80–100)
PLATELET # BLD AUTO: 209 K/UL (ref 135–450)
PMV BLD AUTO: 7.6 FL (ref 5–10.5)
POTASSIUM SERPL-SCNC: 4.3 MMOL/L (ref 3.5–5.1)
RBC # BLD AUTO: 4.14 M/UL (ref 4–5.2)
SODIUM SERPL-SCNC: 140 MMOL/L (ref 136–145)
VANCOMYCIN SERPL-MCNC: 21.4 UG/ML
WBC # BLD AUTO: 5.2 K/UL (ref 4–11)

## 2023-10-24 PROCEDURE — 2580000003 HC RX 258: Performed by: SURGERY

## 2023-10-24 PROCEDURE — 99024 POSTOP FOLLOW-UP VISIT: CPT | Performed by: PHYSICIAN ASSISTANT

## 2023-10-24 PROCEDURE — 6360000002 HC RX W HCPCS: Performed by: HOSPITALIST

## 2023-10-24 PROCEDURE — 80202 ASSAY OF VANCOMYCIN: CPT

## 2023-10-24 PROCEDURE — 94760 N-INVAS EAR/PLS OXIMETRY 1: CPT

## 2023-10-24 PROCEDURE — 85027 COMPLETE CBC AUTOMATED: CPT

## 2023-10-24 PROCEDURE — 36415 COLL VENOUS BLD VENIPUNCTURE: CPT

## 2023-10-24 PROCEDURE — 6360000002 HC RX W HCPCS: Performed by: SURGERY

## 2023-10-24 PROCEDURE — APPNB15 APP NON BILLABLE TIME 0-15 MINS: Performed by: PHYSICIAN ASSISTANT

## 2023-10-24 PROCEDURE — APPSS15 APP SPLIT SHARED TIME 0-15 MINUTES: Performed by: PHYSICIAN ASSISTANT

## 2023-10-24 PROCEDURE — 80048 BASIC METABOLIC PNL TOTAL CA: CPT

## 2023-10-24 PROCEDURE — 6370000000 HC RX 637 (ALT 250 FOR IP): Performed by: SURGERY

## 2023-10-24 PROCEDURE — 1200000000 HC SEMI PRIVATE

## 2023-10-24 PROCEDURE — 6370000000 HC RX 637 (ALT 250 FOR IP): Performed by: HOSPITALIST

## 2023-10-24 RX ORDER — POLYETHYLENE GLYCOL 3350 17 G/17G
17 POWDER, FOR SOLUTION ORAL 2 TIMES DAILY
Status: DISCONTINUED | OUTPATIENT
Start: 2023-10-24 | End: 2023-11-02 | Stop reason: HOSPADM

## 2023-10-24 RX ORDER — VANCOMYCIN 1.75 G/350ML
1250 INJECTION, SOLUTION INTRAVENOUS EVERY 24 HOURS
Status: DISCONTINUED | OUTPATIENT
Start: 2023-10-24 | End: 2023-10-26

## 2023-10-24 RX ADMIN — LACOSAMIDE 200 MG: 100 TABLET, FILM COATED ORAL at 20:05

## 2023-10-24 RX ADMIN — APIXABAN 5 MG: 5 TABLET, FILM COATED ORAL at 08:20

## 2023-10-24 RX ADMIN — LACOSAMIDE 200 MG: 100 TABLET, FILM COATED ORAL at 08:20

## 2023-10-24 RX ADMIN — EMPAGLIFLOZIN 10 MG: 10 TABLET, FILM COATED ORAL at 08:20

## 2023-10-24 RX ADMIN — LEVETIRACETAM 2000 MG: 500 TABLET, FILM COATED ORAL at 20:04

## 2023-10-24 RX ADMIN — PANTOPRAZOLE SODIUM 40 MG: 40 TABLET, DELAYED RELEASE ORAL at 06:30

## 2023-10-24 RX ADMIN — CEFEPIME 2000 MG: 2 INJECTION, POWDER, FOR SOLUTION INTRAVENOUS at 14:38

## 2023-10-24 RX ADMIN — DANTROLENE SODIUM 25 MG: 25 CAPSULE ORAL at 08:20

## 2023-10-24 RX ADMIN — FERROUS SULFATE TAB EC 324 MG (65 MG FE EQUIVALENT) 324 MG: 324 (65 FE) TABLET DELAYED RESPONSE at 08:20

## 2023-10-24 RX ADMIN — Medication 3 MG: at 20:05

## 2023-10-24 RX ADMIN — LEVETIRACETAM 2000 MG: 500 TABLET, FILM COATED ORAL at 08:19

## 2023-10-24 RX ADMIN — FUROSEMIDE 20 MG: 20 TABLET ORAL at 08:20

## 2023-10-24 RX ADMIN — POLYETHYLENE GLYCOL 3350 17 G: 17 POWDER, FOR SOLUTION ORAL at 20:04

## 2023-10-24 RX ADMIN — ESCITALOPRAM OXALATE 20 MG: 10 TABLET ORAL at 08:20

## 2023-10-24 RX ADMIN — VANCOMYCIN 1250 MG: 1.75 INJECTION, SOLUTION INTRAVENOUS at 23:03

## 2023-10-24 RX ADMIN — OXYCODONE HYDROCHLORIDE 5 MG: 5 TABLET ORAL at 22:59

## 2023-10-24 RX ADMIN — OXYCODONE HYDROCHLORIDE 5 MG: 5 TABLET ORAL at 02:48

## 2023-10-24 RX ADMIN — OXYCODONE HYDROCHLORIDE 5 MG: 5 TABLET ORAL at 14:31

## 2023-10-24 RX ADMIN — DIPHENHYDRAMINE HCL 25 MG: 25 TABLET ORAL at 08:20

## 2023-10-24 RX ADMIN — ATORVASTATIN CALCIUM 10 MG: 10 TABLET, FILM COATED ORAL at 20:05

## 2023-10-24 RX ADMIN — NEPHROCAP 1 MG: 1 CAP ORAL at 12:06

## 2023-10-24 RX ADMIN — CLONAZEPAM 0.5 MG: 0.5 TABLET ORAL at 20:05

## 2023-10-24 RX ADMIN — ANTACID TABLETS 750 MG: 500 TABLET, CHEWABLE ORAL at 08:21

## 2023-10-24 RX ADMIN — CEFEPIME 2000 MG: 2 INJECTION, POWDER, FOR SOLUTION INTRAVENOUS at 02:35

## 2023-10-24 RX ADMIN — OXYCODONE HYDROCHLORIDE 5 MG: 5 TABLET ORAL at 08:20

## 2023-10-24 RX ADMIN — Medication 1 CAPSULE: at 08:20

## 2023-10-24 RX ADMIN — APIXABAN 5 MG: 5 TABLET, FILM COATED ORAL at 20:05

## 2023-10-24 ASSESSMENT — PAIN DESCRIPTION - DESCRIPTORS
DESCRIPTORS: ACHING;TENDER
DESCRIPTORS: SHARP
DESCRIPTORS: THROBBING

## 2023-10-24 ASSESSMENT — PAIN SCALES - GENERAL
PAINLEVEL_OUTOF10: 8
PAINLEVEL_OUTOF10: 0
PAINLEVEL_OUTOF10: 8
PAINLEVEL_OUTOF10: 7
PAINLEVEL_OUTOF10: 0
PAINLEVEL_OUTOF10: 7

## 2023-10-24 ASSESSMENT — PAIN DESCRIPTION - PAIN TYPE: TYPE: ACUTE PAIN;SURGICAL PAIN

## 2023-10-24 ASSESSMENT — PAIN - FUNCTIONAL ASSESSMENT: PAIN_FUNCTIONAL_ASSESSMENT: PREVENTS OR INTERFERES WITH ALL ACTIVE AND SOME PASSIVE ACTIVITIES

## 2023-10-24 ASSESSMENT — PAIN DESCRIPTION - LOCATION
LOCATION: BREAST

## 2023-10-24 ASSESSMENT — PAIN DESCRIPTION - ORIENTATION
ORIENTATION: RIGHT
ORIENTATION: LEFT

## 2023-10-24 ASSESSMENT — PAIN SCALES - WONG BAKER
WONGBAKER_NUMERICALRESPONSE: 0
WONGBAKER_NUMERICALRESPONSE: 0

## 2023-10-24 ASSESSMENT — PAIN DESCRIPTION - ONSET: ONSET: ON-GOING

## 2023-10-24 ASSESSMENT — PAIN DESCRIPTION - FREQUENCY: FREQUENCY: CONTINUOUS

## 2023-10-24 NOTE — PROGRESS NOTES
Hospitalist Progress Note  10/24/2023 10:13 AM    PCP: No primary care provider on file.    [id number]     Date of Admission: 10/20/2023                                                                                                                     HOSPITAL COURSE    Patient demographics:  The patient  Breanne Alatorre is a 53 y.o. female     Significant past medical history:   Patient Active Problem List   Diagnosis    Factor V Leiden mutation (HCC)    Pulmonary embolus (HCC)    Lactic acidosis    CAP (community acquired pneumonia)    Acute CHF (congestive heart failure) (HCC)    Bilateral pulmonary embolism (HCC)    Cardiac arrest (HCC)    Seizures (HCC)    Acute respiratory failure with hypoxia (HCC)    Metabolic acidosis    Congestive heart failure (HCC)    Pulmonary hypertension (HCC)    Multifocal pneumonia    Septic shock (HCC)    Elevated LFTs    Thrombocytopenia (HCC)    Coagulopathy (HCC)    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)    Acute encephalopathy    Bacteremia    Pleural effusion, bilateral    Elevated lactic acid level    Bacteremia due to methicillin susceptible Staphylococcus aureus (MSSA)    Pneumonia due to methicillin susceptible Staphylococcus aureus (MSSA) (HCC)    Anemia    Acute renal failure (HCC)    Bilateral pleural effusion    Mitral valve insufficiency    Tricuspid valve insufficiency    Class 2 obesity due to excess calories with body mass index (BMI) of 39.0 to 39.9 in adult    Cardiogenic shock (HCC)    Staphylococcal pneumonia (HCC)    Acute on chronic systolic heart failure (HCC)    Paroxysmal atrial fibrillation (HCC)    Essential hypertension    Complication of gastrostomy tube (HCC)    PEG tube malfunction (HCC)    Pneumonia due to organism    Pulmonary edema    Protein C deficiency (HCC)    Abnormal CT of the chest    Grade III diastolic dysfunction    History of epilepsy    Abscess of left breast    Cellulitis of left breast  Presenting symptoms:  Wound Infection    Diagnostic workup:      CONSULTS DURING ADMISSION :   IP CONSULT TO CASE MANAGEMENT  IP CONSULT TO SOCIAL WORK  PHARMACY TO DOSE VANCOMYCIN  IP CONSULT TO GENERAL SURGERY      Patient was diagnosed with:  Arthritis  Asthma  CHF  epilepsy    Treatment while inpatient:   Jaylene Perez is a 47 y.o. female with past medical history significant for     Asthma, CHF, epilepsy and Dysphagia. patient presented to the ER from the [facility] where she resides because of worsening \"boil\" on her left breast.  Patient was diagnosed with left breast abscess with cellulitis. Patient was started on antibiotics and general surgery was consulted. Patient underwent incision and drainage and wound VAC placement.                                                                                       ----------------------------------------------------------      SUBJECTIVE COMPLAINTS- Wound Infection    Diet: ADULT DIET;  Regular; 4 carb choices (60 gm/meal)      OBJECTIVE:   Patient Active Problem List   Diagnosis    Factor V Leiden mutation ([address])    Pulmonary embolus (HCC)    Lactic acidosis    CAP (community acquired pneumonia)    Acute CHF (congestive heart failure) ([address])    Bilateral pulmonary embolism (HCC)    Cardiac arrest (HCC)    Seizures ([address])    Acute respiratory failure with hypoxia (HCC)    Metabolic acidosis    Congestive heart failure (HCC)    Pulmonary hypertension (HCC)    Multifocal pneumonia    Septic shock (HCC)    Elevated LFTs    Thrombocytopenia (HCC)    Coagulopathy (HCC)    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)    Acute encephalopathy    Bacteremia    Pleural effusion, bilateral    Elevated lactic acid level    Bacteremia due to methicillin susceptible Staphylococcus aureus (MSSA)    Pneumonia due to methicillin susceptible Staphylococcus aureus (MSSA) (HCC)    Anemia    Acute renal failure (HCC)    Bilateral pleural effusion    Mitral

## 2023-10-24 NOTE — PROGRESS NOTES
Pt AAO x4. C/o pain in left breast 7/10 on pain scale. Medicated with PRN Oxycodone. Assessment completed. Took all PM meds without any difficulty. Dressing to left breast CDI. Pillow placed under left breast to help with elevation. Ice pack also in place. Pt has not had a BM since Friday. Active BS noted. PRN Miralax given. Pt is passing flatus. Denies any needs. Call light in reach. Will monitor.

## 2023-10-24 NOTE — PROGRESS NOTES
Perfect serve sent to MD this shift regarding not having BM since admission, bowel sounds remain present and pt passing gas. Pt got Daily PRN miralax previous shift with no outcome, NNO at this time

## 2023-10-24 NOTE — PLAN OF CARE
Problem: Discharge Planning  Goal: Discharge to home or other facility with appropriate resources  Outcome: Progressing  Flowsheets (Taken 10/23/2023 2050)  Discharge to home or other facility with appropriate resources:   Identify barriers to discharge with patient and caregiver   Arrange for needed discharge resources and transportation as appropriate     Problem: Pain  Goal: Verbalizes/displays adequate comfort level or baseline comfort level  Outcome: Progressing  Flowsheets (Taken 10/23/2023 2048)  Verbalizes/displays adequate comfort level or baseline comfort level:   Encourage patient to monitor pain and request assistance   Assess pain using appropriate pain scale   Administer analgesics based on type and severity of pain and evaluate response   Implement non-pharmacological measures as appropriate and evaluate response     Problem: Skin/Tissue Integrity  Goal: Absence of new skin breakdown  Description: 1. Monitor for areas of redness and/or skin breakdown  2. Assess vascular access sites hourly  3. Every 4-6 hours minimum:  Change oxygen saturation probe site  4. Every 4-6 hours:  If on nasal continuous positive airway pressure, respiratory therapy assess nares and determine need for appliance change or resting period.   Outcome: Progressing     Problem: Safety - Adult  Goal: Free from fall injury  Outcome: Progressing     Problem: ABCDS Injury Assessment  Goal: Absence of physical injury  Outcome: Progressing  Flowsheets (Taken 10/23/2023 2241)  Absence of Physical Injury: Implement safety measures based on patient assessment     Problem: Chronic Conditions and Co-morbidities  Goal: Patient's chronic conditions and co-morbidity symptoms are monitored and maintained or improved  Outcome: Progressing  Flowsheets (Taken 10/23/2023 2050)  Care Plan - Patient's Chronic Conditions and Co-Morbidity Symptoms are Monitored and Maintained or Improved: Monitor and assess patient's chronic conditions and

## 2023-10-24 NOTE — OP NOTE
[address], [facility] [address]                                OPERATIVE REPORT    PATIENT NAME: Windy Connor                     :        1970  MED REC NO:   [id number]                          ROOM:       3109  ACCOUNT NO:   [de-identified]                           ADMIT DATE: 10/20/2023  PROVIDER:     Anuradha Costa MD    DATE OF PROCEDURE:  10/23/2023    PREOPERATIVE DIAGNOSIS:  Left breast abscess. POSTOPERATIVE DIAGNOSIS:  Left breast abscess. OPERATION PERFORMED:  1. Incision and drainage with debridement of left breast abscess. 2.  Sharp excisional debridement of necrotic skin and subcutaneous  tissues. Final wound 10 cm x 3 cm x 2 cm depth. SURGEON:  Anuradha Costa MD    ANESTHESIA:  General endotracheal anesthesia. ASA CLASS:  IV.    ANTIBIOTICS:  Cefepime, vancomycin. DVT PROPHYLAXIS:  Bilateral pneumatic compression devices. ESTIMATED BLOOD LOSS:  Minimal.    SPECIMEN:  Fluid for culture. INDICATIONS:  The patient is a 81-year-old female with multiple medical  conditions who presents with a week history of pain and swelling in her  left breast.  Exam showed a necrotic area of skin medial and superior to  the left nipple-areolar complex. CT did not show an abscess, but there  was obvious necrotic skin consistent with underlying abscess. As a  result, I recommended drainage and debridement of this in the operating  room. Risks of bleeding, infection, and poor wound healing were  discussed at length and she was agreeable to proceed. OPERATIVE PROCEDURE:  The patient was brought to the operating suite and  placed in the supine position on the operating room table. Anesthesia  was induced that she tolerated well. The left breast was prepped and  draped in the usual sterile fashion and a time-out performed. We began  by sharply excising the necrotic skin.   Just below this, was a

## 2023-10-24 NOTE — PLAN OF CARE
Problem: Skin/Tissue Integrity  Goal: Absence of new skin breakdown  Description: 1. Monitor for areas of redness and/or skin breakdown  2. Assess vascular access sites hourly  3. Every 4-6 hours minimum:  Change oxygen saturation probe site  4. Every 4-6 hours:  If on nasal continuous positive airway pressure, respiratory therapy assess nares and determine need for appliance change or resting period.   10/24/2023 0800 by Harsh Seo RN  Outcome: Progressing  10/23/2023 2250 by Jinny Huang RN  Outcome: Progressing

## 2023-10-24 NOTE — PROGRESS NOTES
Pt A&OX4 this shift, wound vac applied this shift via wound RN, vac working as desired at this time . Pt C/o pain multiple times to Left breast, PRN medications given per order with desirable outcomes. Pt educated on turning every 2 hrs due to decreased mobility, pt remains on speciality mattress as well as purewick due to incontinence. Pt currently resting in bed, IV ABX infusing at this time with no s/s of adverse reactions noted. Pt denies any additional needs at this time, call light and bedside table within reach, fall precautions remain in place.

## 2023-10-25 PROBLEM — N61.1 LEFT BREAST ABSCESS: Status: ACTIVE | Noted: 2023-10-25

## 2023-10-25 PROBLEM — B36.9 FUNGAL DERMATITIS: Status: ACTIVE | Noted: 2023-10-25

## 2023-10-25 PROBLEM — M79.89 NECROTIZING SOFT TISSUE INFECTION: Status: ACTIVE | Noted: 2023-10-25

## 2023-10-25 PROBLEM — Z86.73 H/O: CVA (CEREBROVASCULAR ACCIDENT): Status: ACTIVE | Noted: 2023-10-25

## 2023-10-25 PROBLEM — Z79.01 CHRONIC ANTICOAGULATION: Status: ACTIVE | Noted: 2023-10-25

## 2023-10-25 PROBLEM — A49.02 MRSA (METHICILLIN RESISTANT STAPHYLOCOCCUS AUREUS) INFECTION: Status: ACTIVE | Noted: 2023-10-25

## 2023-10-25 PROBLEM — E66.01 MORBID OBESITY WITH BMI OF 40.0-44.9, ADULT (HCC): Status: ACTIVE | Noted: 2023-10-25

## 2023-10-25 LAB
BACTERIA BLD CULT ORG #2: NORMAL
BACTERIA BLD CULT: NORMAL
BACTERIA FLD AEROBE CULT: ABNORMAL
GRAM STN SPEC: ABNORMAL
ORGANISM: ABNORMAL

## 2023-10-25 PROCEDURE — 94760 N-INVAS EAR/PLS OXIMETRY 1: CPT

## 2023-10-25 PROCEDURE — 6360000002 HC RX W HCPCS: Performed by: INTERNAL MEDICINE

## 2023-10-25 PROCEDURE — 6370000000 HC RX 637 (ALT 250 FOR IP): Performed by: SURGERY

## 2023-10-25 PROCEDURE — 6360000002 HC RX W HCPCS: Performed by: SURGERY

## 2023-10-25 PROCEDURE — 6370000000 HC RX 637 (ALT 250 FOR IP): Performed by: HOSPITALIST

## 2023-10-25 PROCEDURE — 99255 IP/OBS CONSLTJ NEW/EST HI 80: CPT | Performed by: INTERNAL MEDICINE

## 2023-10-25 PROCEDURE — APPNB15 APP NON BILLABLE TIME 0-15 MINS: Performed by: PHYSICIAN ASSISTANT

## 2023-10-25 PROCEDURE — 2580000003 HC RX 258: Performed by: SURGERY

## 2023-10-25 PROCEDURE — 6360000002 HC RX W HCPCS: Performed by: HOSPITALIST

## 2023-10-25 PROCEDURE — 1200000000 HC SEMI PRIVATE

## 2023-10-25 PROCEDURE — APPSS15 APP SPLIT SHARED TIME 0-15 MINUTES: Performed by: PHYSICIAN ASSISTANT

## 2023-10-25 RX ORDER — CLINDAMYCIN PHOSPHATE 900 MG/50ML
900 INJECTION, SOLUTION INTRAVENOUS EVERY 8 HOURS
Status: DISCONTINUED | OUTPATIENT
Start: 2023-10-25 | End: 2023-10-27

## 2023-10-25 RX ORDER — FLUCONAZOLE 100 MG/1
100 TABLET ORAL DAILY
Status: COMPLETED | OUTPATIENT
Start: 2023-10-26 | End: 2023-11-01

## 2023-10-25 RX ADMIN — SODIUM CHLORIDE, PRESERVATIVE FREE 10 ML: 5 INJECTION INTRAVENOUS at 10:52

## 2023-10-25 RX ADMIN — LACOSAMIDE 200 MG: 100 TABLET, FILM COATED ORAL at 20:28

## 2023-10-25 RX ADMIN — CEFEPIME 2000 MG: 2 INJECTION, POWDER, FOR SOLUTION INTRAVENOUS at 02:18

## 2023-10-25 RX ADMIN — APIXABAN 5 MG: 5 TABLET, FILM COATED ORAL at 20:28

## 2023-10-25 RX ADMIN — Medication 1 CAPSULE: at 10:51

## 2023-10-25 RX ADMIN — OXYCODONE HYDROCHLORIDE 5 MG: 5 TABLET ORAL at 06:51

## 2023-10-25 RX ADMIN — APIXABAN 5 MG: 5 TABLET, FILM COATED ORAL at 10:52

## 2023-10-25 RX ADMIN — FERROUS SULFATE TAB EC 324 MG (65 MG FE EQUIVALENT) 324 MG: 324 (65 FE) TABLET DELAYED RESPONSE at 10:52

## 2023-10-25 RX ADMIN — PANTOPRAZOLE SODIUM 40 MG: 40 TABLET, DELAYED RELEASE ORAL at 05:39

## 2023-10-25 RX ADMIN — EMPAGLIFLOZIN 10 MG: 10 TABLET, FILM COATED ORAL at 10:51

## 2023-10-25 RX ADMIN — ANTACID TABLETS 750 MG: 500 TABLET, CHEWABLE ORAL at 10:52

## 2023-10-25 RX ADMIN — ATORVASTATIN CALCIUM 10 MG: 10 TABLET, FILM COATED ORAL at 20:28

## 2023-10-25 RX ADMIN — POLYETHYLENE GLYCOL 3350 17 G: 17 POWDER, FOR SOLUTION ORAL at 20:29

## 2023-10-25 RX ADMIN — Medication 3 MG: at 20:29

## 2023-10-25 RX ADMIN — LACOSAMIDE 200 MG: 100 TABLET, FILM COATED ORAL at 10:51

## 2023-10-25 RX ADMIN — DANTROLENE SODIUM 25 MG: 25 CAPSULE ORAL at 10:50

## 2023-10-25 RX ADMIN — CLONAZEPAM 0.5 MG: 0.5 TABLET ORAL at 20:28

## 2023-10-25 RX ADMIN — OXYCODONE HYDROCHLORIDE 5 MG: 5 TABLET ORAL at 13:38

## 2023-10-25 RX ADMIN — CEFEPIME 2000 MG: 2 INJECTION, POWDER, FOR SOLUTION INTRAVENOUS at 14:33

## 2023-10-25 RX ADMIN — POLYETHYLENE GLYCOL 3350 17 G: 17 POWDER, FOR SOLUTION ORAL at 10:52

## 2023-10-25 RX ADMIN — CLINDAMYCIN PHOSPHATE 900 MG: 900 INJECTION, SOLUTION INTRAVENOUS at 18:36

## 2023-10-25 RX ADMIN — FUROSEMIDE 20 MG: 20 TABLET ORAL at 10:50

## 2023-10-25 RX ADMIN — VANCOMYCIN 1250 MG: 1.75 INJECTION, SOLUTION INTRAVENOUS at 23:24

## 2023-10-25 RX ADMIN — ESCITALOPRAM OXALATE 20 MG: 10 TABLET ORAL at 10:50

## 2023-10-25 RX ADMIN — LEVETIRACETAM 2000 MG: 500 TABLET, FILM COATED ORAL at 20:28

## 2023-10-25 RX ADMIN — NEPHROCAP 1 MG: 1 CAP ORAL at 12:04

## 2023-10-25 RX ADMIN — OXYCODONE HYDROCHLORIDE 5 MG: 5 TABLET ORAL at 20:28

## 2023-10-25 RX ADMIN — LEVETIRACETAM 2000 MG: 500 TABLET, FILM COATED ORAL at 10:50

## 2023-10-25 ASSESSMENT — PAIN DESCRIPTION - LOCATION
LOCATION: BREAST

## 2023-10-25 ASSESSMENT — PAIN DESCRIPTION - FREQUENCY: FREQUENCY: CONTINUOUS

## 2023-10-25 ASSESSMENT — PAIN DESCRIPTION - ORIENTATION
ORIENTATION: RIGHT
ORIENTATION: LEFT

## 2023-10-25 ASSESSMENT — PAIN SCALES - GENERAL
PAINLEVEL_OUTOF10: 8
PAINLEVEL_OUTOF10: 8
PAINLEVEL_OUTOF10: 5
PAINLEVEL_OUTOF10: 7
PAINLEVEL_OUTOF10: 5

## 2023-10-25 ASSESSMENT — PAIN SCALES - WONG BAKER
WONGBAKER_NUMERICALRESPONSE: 0

## 2023-10-25 ASSESSMENT — PAIN DESCRIPTION - DESCRIPTORS
DESCRIPTORS: SHARP
DESCRIPTORS: DISCOMFORT

## 2023-10-25 ASSESSMENT — PAIN DESCRIPTION - PAIN TYPE
TYPE: ACUTE PAIN
TYPE: ACUTE PAIN;SURGICAL PAIN

## 2023-10-25 ASSESSMENT — PAIN DESCRIPTION - ONSET
ONSET: ON-GOING
ONSET: GRADUAL

## 2023-10-25 NOTE — PROGRESS NOTES
Hospitalist Progress Note  10/25/2023 7:45 AM    PCP: No primary care provider on file.     [phone]     Date of Admission: 10/20/2023                                                                                                                     HOSPITAL COURSE    Patient demographics:  The patient  Jax Martinez is a 47 y.o. female     Significant past medical history:   Patient Active Problem List   Diagnosis    Factor V Leiden mutation ([address])    Pulmonary embolus (HCC)    Lactic acidosis    CAP (community acquired pneumonia)    Acute CHF (congestive heart failure) ([address])    Bilateral pulmonary embolism (HCC)    Cardiac arrest ([address])    Seizures ([address])    Acute respiratory failure with hypoxia (HCC)    Metabolic acidosis    Congestive heart failure (HCC)    Pulmonary hypertension (HCC)    Multifocal pneumonia    Septic shock (HCC)    Elevated LFTs    Thrombocytopenia (HCC)    Coagulopathy (HCC)    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)    Acute encephalopathy    Bacteremia    Pleural effusion, bilateral    Elevated lactic acid level    Bacteremia due to methicillin susceptible Staphylococcus aureus (MSSA)    Pneumonia due to methicillin susceptible Staphylococcus aureus (MSSA) (HCC)    Anemia    Acute renal failure (HCC)    Bilateral pleural effusion    Mitral valve insufficiency    Tricuspid valve insufficiency    Class 2 obesity due to excess calories with body mass index (BMI) of 39.0 to 39.9 in adult    Cardiogenic shock (HCC)    Staphylococcal pneumonia (HCC)    Acute on chronic systolic heart failure (HCC)    Paroxysmal atrial fibrillation ([address])    Essential hypertension    Complication of gastrostomy tube (HCC)    PEG tube malfunction ([address])    Pneumonia due to organism    Pulmonary edema    Protein C deficiency (HCC)    Abnormal CT of the chest    Grade III diastolic dysfunction    History of epilepsy    Abscess of left breast    Cellulitis of left breast         Presenting symptoms:  Wound Infection    Diagnostic workup:      CONSULTS DURING ADMISSION :   IP CONSULT TO CASE MANAGEMENT  IP CONSULT TO SOCIAL WORK  PHARMACY TO DOSE VANCOMYCIN  IP CONSULT TO GENERAL SURGERY      Patient was diagnosed with:  Arthritis  Asthma  CHF  epilepsy    Treatment while inpatient:   Breanne Alatorre is a 53 y.o. female with past medical history significant for     Asthma, CHF, epilepsy and Dysphagia.     patient presented to the ER from the [facility] where she resides because of worsening \"boil\" on her left breast.  Patient was diagnosed with left breast abscess with cellulitis.  Patient was started on antibiotics and general surgery was consulted.  Patient underwent incision and drainage and wound VAC placement.                                                                                       ----------------------------------------------------------      SUBJECTIVE COMPLAINTS- Wound Infection    Diet: ADULT DIET; Regular; 4 carb choices (60 gm/meal)      OBJECTIVE:   Patient Active Problem List   Diagnosis    Factor V Leiden mutation (HCC)    Pulmonary embolus (HCC)    Lactic acidosis    CAP (community acquired pneumonia)    Acute CHF (congestive heart failure) (HCC)    Bilateral pulmonary embolism (HCC)    Cardiac arrest (HCC)    Seizures (HCC)    Acute respiratory failure with hypoxia (HCC)    Metabolic acidosis    Congestive heart failure (HCC)    Pulmonary hypertension (HCC)    Multifocal pneumonia    Septic shock (HCC)    Elevated LFTs    Thrombocytopenia (HCC)    Coagulopathy (HCC)    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)    Acute encephalopathy    Bacteremia    Pleural effusion, bilateral    Elevated lactic acid level    Bacteremia due to methicillin susceptible Staphylococcus aureus (MSSA)    Pneumonia due to methicillin susceptible Staphylococcus aureus (MSSA) (HCC)    Anemia    Acute renal failure (HCC)    Bilateral pleural effusion    Mitral

## 2023-10-25 NOTE — PROGRESS NOTES
Patient is alert & oriented x4, resting in bed, on room air. Right AC IV normal saline locked, flushed easily. Wound vac in place to L breast. SCDs in place and on. 2/4 bed rails up, bed in lowest position, fall precautions in place, bed alarm on, call light within reach. Morning medications given as ordered, tolerated well. Denies further needs at this time. Will cont to monitor and reassess.   Electronically signed by Al Lock RN on 10/25/2023 at 5:49 PM

## 2023-10-25 NOTE — PLAN OF CARE
Problem: Discharge Planning  Goal: Discharge to home or other facility with appropriate resources  Outcome: Progressing  Flowsheets (Taken 10/23/2023 2050 by Godfrey Xavier RN)  Discharge to home or other facility with appropriate resources:   Identify barriers to discharge with patient and caregiver   Arrange for needed discharge resources and transportation as appropriate     Problem: Pain  Goal: Verbalizes/displays adequate comfort level or baseline comfort level  Outcome: Progressing  Flowsheets (Taken 10/23/2023 2048 by Godfrey Xavier RN)  Verbalizes/displays adequate comfort level or baseline comfort level:   Encourage patient to monitor pain and request assistance   Assess pain using appropriate pain scale   Administer analgesics based on type and severity of pain and evaluate response   Implement non-pharmacological measures as appropriate and evaluate response     Problem: Skin/Tissue Integrity  Goal: Absence of new skin breakdown  Description: 1. Monitor for areas of redness and/or skin breakdown  2. Assess vascular access sites hourly  3. Every 4-6 hours minimum:  Change oxygen saturation probe site  4. Every 4-6 hours:  If on nasal continuous positive airway pressure, respiratory therapy assess nares and determine need for appliance change or resting period.   10/24/2023 2124 by Jeral Castleman, RN  Outcome: Progressing  10/24/2023 0800 by Jamison Llanes RN  Outcome: Progressing     Problem: Safety - Adult  Goal: Free from fall injury  Outcome: Progressing  Flowsheets (Taken 10/22/2023 1106 by Aleyda Bourgeois RN)  Free From Fall Injury: Instruct family/caregiver on patient safety     Problem: ABCDS Injury Assessment  Goal: Absence of physical injury  Outcome: Progressing  Flowsheets (Taken 10/23/2023 2241 by Godfrey Xavier RN)  Absence of Physical Injury: Implement safety measures based on patient assessment     Problem: Chronic Conditions and Co-morbidities  Goal: Patient's

## 2023-10-25 NOTE — CONSULTS
10/20/2023 04:33 PM    PROT 7.0 10/19/2011 01:30 PM    BILITOT 0.4 10/20/2023 04:33 PM    BILIDIR <0.2 06/05/2023 05:57 AM    IBILI see below 06/05/2023 05:57 AM    LABALBU 3.8 10/20/2023 04:33 PM     UA:  Lab Results   Component Value Date/Time    COLORU YELLOW 12/23/2020 10:46 AM    CLARITYU Clear 12/23/2020 10:46 AM    GLUCOSEU Negative 12/23/2020 10:46 AM    BILIRUBINUR Negative 12/23/2020 10:46 AM    KETUA Negative 12/23/2020 10:46 AM    SPECGRAV 1.020 12/23/2020 10:46 AM    BLOODU MODERATE 12/23/2020 10:46 AM    PHUR 5.0 12/23/2020 10:46 AM    PROTEINU Negative 12/23/2020 10:46 AM    UROBILINOGEN 0.2 12/23/2020 10:46 AM    NITRU Negative 12/23/2020 10:46 AM    LEUKOCYTESUR Negative 12/23/2020 10:46 AM    URINETYPE NotGiven 12/23/2020 10:46 AM      Urine Microscopic:   Lab Results   Component Value Date/Time    BACTERIA 2+ 09/10/2020 10:07 PM    COMU see below 09/10/2020 10:07 PM    HYALCAST 0 12/23/2020 10:46 AM    WBCUA 4 12/23/2020 10:46 AM    RBCUA 5 12/23/2020 10:46 AM    EPIU 1 12/23/2020 10:46 AM     Urine Reflex to Culture:   Lab Results   Component Value Date/Time    URRFLXCULT Not Indicated 12/23/2020 10:46 AM         MICRO: cultures reviewed and updated by me        Micro results (current encounter only)    Procedure Component Value Units Date/Time   Culture, Anaerobic [[id number]] Collected: 10/23/23 1714   Order Status: Completed Specimen:  Body Fluid Updated: 10/25/23 1018    Anaerobic Culture --    Anaerobic culture further report to follow   No anaerobes isolated so far, Further report to follow    Narrative:     ORDER#: [id number]                          ORDERED BY: Tova Code   SOURCE: Fluid                              COLLECTED:  10/23/23 17:14   ANTIBIOTICS AT [facility]GAN.:                      RECEIVED :  10/23/23 17:41   Left breast abscess fluid swab   Culture, Blood 2 [[id number]] Collected: 10/20/23 1655   Order Status: Completed Specimen: Blood Updated: 10/25/23 1016    Culture, Blood 2 I46.9    Seizures ([facility]) R56.9    Acute respiratory failure with hypoxia ([facility]) J96.01    Metabolic acidosis E87.20    Congestive heart failure ([facility]) I50.9    Pulmonary hypertension ([facility]) I27.20    Multifocal pneumonia J18.9    Septic shock ([facility]) A41.9, R65.21    Elevated LFTs R79.89    Thrombocytopenia ([facility]) D69.6    Coagulopathy ([facility]) D68.9    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) ([facility]) D65    Acute encephalopathy G93.40    Bacteremia R78.81    Pleural effusion, bilateral J90    Elevated lactic acid level R79.89    Bacteremia due to methicillin susceptible Staphylococcus aureus (MSSA) R78.81, B95.61    Pneumonia due to methicillin susceptible Staphylococcus aureus (MSSA) ([facility]) J15.211    Anemia D64.9    Acute renal failure ([facility]) N17.9    Bilateral pleural effusion J90    Mitral valve insufficiency I34.0    Tricuspid valve insufficiency I07.1    Class 2 obesity due to excess calories with body mass index (BMI) of 39.0 to 39.9 in adult E66.09, Z68.39    Cardiogenic shock ([facility]) R57.0    Staphylococcal pneumonia ([facility]) J15.20    Acute on chronic systolic heart failure ([facility]) I50.23    Paroxysmal atrial fibrillation ([facility]) I48.0    Essential hypertension I10    Complication of gastrostomy tube ([facility]) K94.20    PEG tube malfunction ([facility]) K94.23    Pneumonia due to organism J18.9    Pulmonary edema J81.1    Protein C deficiency ([facility]) D68.59    Abnormal CT of the chest R93.89    Grade III diastolic dysfunction I51.89    History of epilepsy Z86.69    Abscess of left breast N61.1    Cellulitis of left breast N61.0        ICD-10-CM    1. Cellulitis of left breast  N61.0       2. Abscess of left breast  N61.1       3. Left breast abscess  N61.1 Culture, Anaerobic     Culture, Anaerobic     Culture, Body Fluid     Culture, Body Fluid           Large Left breast abscess  Left breast necrotic infection  Left breast cellulitis  S/p ID and debridement of the abscess  Wound vac in place  MRSA infection  H/o

## 2023-10-25 NOTE — PLAN OF CARE
Problem: Discharge Planning  Goal: Discharge to home or other facility with appropriate resources  10/25/2023 0848 by Lillian Santiago RN  Outcome: Progressing  Flowsheets (Taken 10/23/2023 2050 by Vanessa Norwood RN)  Discharge to home or other facility with appropriate resources:   Identify barriers to discharge with patient and caregiver   Arrange for needed discharge resources and transportation as appropriate     Problem: Pain  Goal: Verbalizes/displays adequate comfort level or baseline comfort level  10/25/2023 0848 by Lillian Santiago RN  Outcome: Progressing     Problem: Skin/Tissue Integrity  Goal: Absence of new skin breakdown  Description: 1. Monitor for areas of redness and/or skin breakdown  2. Assess vascular access sites hourly  3. Every 4-6 hours minimum:  Change oxygen saturation probe site  4. Every 4-6 hours:  If on nasal continuous positive airway pressure, respiratory therapy assess nares and determine need for appliance change or resting period.   10/25/2023 0848 by Lillian Santiago RN  Outcome: Progressing     Problem: Safety - Adult  Goal: Free from fall injury  10/25/2023 0848 by Lillian Santiago RN  Outcome: Progressing     Problem: ABCDS Injury Assessment  Goal: Absence of physical injury  10/25/2023 0848 by Lillian Santiago RN  Outcome: Progressing

## 2023-10-26 PROBLEM — N17.9 AKI (ACUTE KIDNEY INJURY) (HCC): Status: ACTIVE | Noted: 2023-10-26

## 2023-10-26 LAB
ANION GAP SERPL CALCULATED.3IONS-SCNC: 19 MMOL/L (ref 3–16)
BUN SERPL-MCNC: 34 MG/DL (ref 7–20)
CALCIUM SERPL-MCNC: 7.9 MG/DL (ref 8.3–10.6)
CHLORIDE SERPL-SCNC: 105 MMOL/L (ref 99–110)
CO2 SERPL-SCNC: 15 MMOL/L (ref 21–32)
CREAT SERPL-MCNC: 1.3 MG/DL (ref 0.6–1.1)
CRP SERPL-MCNC: 10.6 MG/L (ref 0–5.1)
ERYTHROCYTE [SEDIMENTATION RATE] IN BLOOD BY WESTERGREN METHOD: 35 MM/HR (ref 0–30)
EST. AVERAGE GLUCOSE BLD GHB EST-MCNC: 131.2 MG/DL
GFR SERPLBLD CREATININE-BSD FMLA CKD-EPI: 49 ML/MIN/{1.73_M2}
GLUCOSE SERPL-MCNC: 116 MG/DL (ref 70–99)
HBA1C MFR BLD: 6.2 %
POTASSIUM SERPL-SCNC: 5.3 MMOL/L (ref 3.5–5.1)
SODIUM SERPL-SCNC: 139 MMOL/L (ref 136–145)
VANCOMYCIN SERPL-MCNC: 35.2 UG/ML

## 2023-10-26 PROCEDURE — 99233 SBSQ HOSP IP/OBS HIGH 50: CPT | Performed by: INTERNAL MEDICINE

## 2023-10-26 PROCEDURE — 80202 ASSAY OF VANCOMYCIN: CPT

## 2023-10-26 PROCEDURE — 6370000000 HC RX 637 (ALT 250 FOR IP): Performed by: NURSE PRACTITIONER

## 2023-10-26 PROCEDURE — 83036 HEMOGLOBIN GLYCOSYLATED A1C: CPT

## 2023-10-26 PROCEDURE — 6370000000 HC RX 637 (ALT 250 FOR IP): Performed by: INTERNAL MEDICINE

## 2023-10-26 PROCEDURE — 85652 RBC SED RATE AUTOMATED: CPT

## 2023-10-26 PROCEDURE — 6370000000 HC RX 637 (ALT 250 FOR IP): Performed by: SURGERY

## 2023-10-26 PROCEDURE — 1200000000 HC SEMI PRIVATE

## 2023-10-26 PROCEDURE — 6370000000 HC RX 637 (ALT 250 FOR IP): Performed by: HOSPITALIST

## 2023-10-26 PROCEDURE — 80048 BASIC METABOLIC PNL TOTAL CA: CPT

## 2023-10-26 PROCEDURE — APPNB15 APP NON BILLABLE TIME 0-15 MINS: Performed by: PHYSICIAN ASSISTANT

## 2023-10-26 PROCEDURE — 2580000003 HC RX 258: Performed by: INTERNAL MEDICINE

## 2023-10-26 PROCEDURE — 6360000002 HC RX W HCPCS: Performed by: INTERNAL MEDICINE

## 2023-10-26 PROCEDURE — APPSS15 APP SPLIT SHARED TIME 0-15 MINUTES: Performed by: PHYSICIAN ASSISTANT

## 2023-10-26 PROCEDURE — 86140 C-REACTIVE PROTEIN: CPT

## 2023-10-26 PROCEDURE — 94760 N-INVAS EAR/PLS OXIMETRY 1: CPT

## 2023-10-26 PROCEDURE — 99024 POSTOP FOLLOW-UP VISIT: CPT | Performed by: SURGERY

## 2023-10-26 PROCEDURE — 36415 COLL VENOUS BLD VENIPUNCTURE: CPT

## 2023-10-26 PROCEDURE — 99024 POSTOP FOLLOW-UP VISIT: CPT | Performed by: PHYSICIAN ASSISTANT

## 2023-10-26 RX ORDER — MIDODRINE HYDROCHLORIDE 10 MG/1
10 TABLET ORAL ONCE
Status: COMPLETED | OUTPATIENT
Start: 2023-10-26 | End: 2023-10-26

## 2023-10-26 RX ADMIN — CLINDAMYCIN PHOSPHATE 900 MG: 900 INJECTION, SOLUTION INTRAVENOUS at 10:31

## 2023-10-26 RX ADMIN — Medication 3 MG: at 20:28

## 2023-10-26 RX ADMIN — DAPTOMYCIN 500 MG: 500 INJECTION, POWDER, LYOPHILIZED, FOR SOLUTION INTRAVENOUS at 16:41

## 2023-10-26 RX ADMIN — EMPAGLIFLOZIN 10 MG: 10 TABLET, FILM COATED ORAL at 08:52

## 2023-10-26 RX ADMIN — LEVETIRACETAM 2000 MG: 500 TABLET, FILM COATED ORAL at 08:52

## 2023-10-26 RX ADMIN — FLUCONAZOLE 100 MG: 100 TABLET ORAL at 08:52

## 2023-10-26 RX ADMIN — PANTOPRAZOLE SODIUM 40 MG: 40 TABLET, DELAYED RELEASE ORAL at 07:16

## 2023-10-26 RX ADMIN — NEPHROCAP 1 MG: 1 CAP ORAL at 10:37

## 2023-10-26 RX ADMIN — OXYCODONE HYDROCHLORIDE 5 MG: 5 TABLET ORAL at 09:10

## 2023-10-26 RX ADMIN — LACOSAMIDE 200 MG: 100 TABLET, FILM COATED ORAL at 08:52

## 2023-10-26 RX ADMIN — LACOSAMIDE 200 MG: 100 TABLET, FILM COATED ORAL at 20:27

## 2023-10-26 RX ADMIN — POLYETHYLENE GLYCOL 3350 17 G: 17 POWDER, FOR SOLUTION ORAL at 20:28

## 2023-10-26 RX ADMIN — FERROUS SULFATE TAB EC 324 MG (65 MG FE EQUIVALENT) 324 MG: 324 (65 FE) TABLET DELAYED RESPONSE at 08:52

## 2023-10-26 RX ADMIN — Medication 1 CAPSULE: at 08:52

## 2023-10-26 RX ADMIN — OXYCODONE HYDROCHLORIDE 5 MG: 5 TABLET ORAL at 03:35

## 2023-10-26 RX ADMIN — DIPHENHYDRAMINE HCL 25 MG: 25 TABLET ORAL at 09:10

## 2023-10-26 RX ADMIN — FUROSEMIDE 20 MG: 20 TABLET ORAL at 08:53

## 2023-10-26 RX ADMIN — ANTACID TABLETS 750 MG: 500 TABLET, CHEWABLE ORAL at 08:52

## 2023-10-26 RX ADMIN — CLINDAMYCIN PHOSPHATE 900 MG: 900 INJECTION, SOLUTION INTRAVENOUS at 02:28

## 2023-10-26 RX ADMIN — OXYCODONE HYDROCHLORIDE 5 MG: 5 TABLET ORAL at 16:39

## 2023-10-26 RX ADMIN — CLINDAMYCIN PHOSPHATE 900 MG: 900 INJECTION, SOLUTION INTRAVENOUS at 17:52

## 2023-10-26 RX ADMIN — MIDODRINE HYDROCHLORIDE 10 MG: 10 TABLET ORAL at 00:47

## 2023-10-26 RX ADMIN — DANTROLENE SODIUM 25 MG: 25 CAPSULE ORAL at 08:51

## 2023-10-26 RX ADMIN — APIXABAN 5 MG: 5 TABLET, FILM COATED ORAL at 20:27

## 2023-10-26 RX ADMIN — APIXABAN 5 MG: 5 TABLET, FILM COATED ORAL at 08:52

## 2023-10-26 RX ADMIN — LEVETIRACETAM 2000 MG: 500 TABLET, FILM COATED ORAL at 20:27

## 2023-10-26 RX ADMIN — POLYETHYLENE GLYCOL 3350 17 G: 17 POWDER, FOR SOLUTION ORAL at 08:53

## 2023-10-26 RX ADMIN — CLONAZEPAM 0.5 MG: 0.5 TABLET ORAL at 20:27

## 2023-10-26 RX ADMIN — ESCITALOPRAM OXALATE 20 MG: 10 TABLET ORAL at 08:52

## 2023-10-26 RX ADMIN — ACETAMINOPHEN 650 MG: 325 TABLET ORAL at 02:25

## 2023-10-26 ASSESSMENT — PAIN DESCRIPTION - PAIN TYPE
TYPE: ACUTE PAIN
TYPE: ACUTE PAIN;SURGICAL PAIN

## 2023-10-26 ASSESSMENT — PAIN SCALES - GENERAL
PAINLEVEL_OUTOF10: 8
PAINLEVEL_OUTOF10: 8
PAINLEVEL_OUTOF10: 6
PAINLEVEL_OUTOF10: 8

## 2023-10-26 ASSESSMENT — PAIN DESCRIPTION - DESCRIPTORS
DESCRIPTORS: SHARP
DESCRIPTORS: SHARP;ACHING

## 2023-10-26 ASSESSMENT — PAIN DESCRIPTION - LOCATION
LOCATION: BREAST

## 2023-10-26 ASSESSMENT — PAIN DESCRIPTION - FREQUENCY
FREQUENCY: CONTINUOUS
FREQUENCY: CONTINUOUS

## 2023-10-26 ASSESSMENT — PAIN DESCRIPTION - ORIENTATION
ORIENTATION: LEFT

## 2023-10-26 ASSESSMENT — PAIN DESCRIPTION - ONSET
ONSET: ON-GOING
ONSET: ON-GOING

## 2023-10-26 NOTE — PROGRESS NOTES
[facility] BA Scanner Results  Pt was scanned according to 's recommendations. Daily      Site Reading Redness noted? Y/N   Right heel 1.1    Left heel  1.0    Sacrum 3.3        []  BA Delta score < 0.6 indicates normal, healthy tissue at this time. Continue to assess daily and implement routine pressure injury prevention measures using the Cash Order Set. Scan weekly on Wednesday. [x] BA Delta score > or = to 0.6 indicates at risk tissue. Implement target prevention interventions below and scan daily.     [x] Cash orders reviewed and updated if indicated  [x] Educated patient/family on importance of offloading/repositioning  [x] Patient agreeable to plan for pressure offloading/repositioning    Extra pillows, wedge pillow      [] Nutrition consult made  [x] Nutrition consult not indicated at this time none    [x] Sacral foam border in place  [] Sacral foam border not in place, barrier cream applied    [x] Low air loss mattress (or Isoflex blue/orange mattress) ordered/placed   [] For heels, apply liquid skin barrier twice daily and ensure offloading with pillows or boots  Electronically signed by Kiah Solis RN on 10/26/2023 at 6:03 PM

## 2023-10-26 NOTE — PROGRESS NOTES
Pt in bed eyes closed easy to arouse. Pt alert and oriented x 4. Pt complaining of pain in left breast current pain level 8 administered prn oral pain medication. Morning medications administered pt tolerated well. Pt states she has no other needs at this time. Call light placed within reach.  Electronically signed by Viktoria Machado RN on 10/26/2023 at 6:22 PM

## 2023-10-26 NOTE — PROGRESS NOTES
hypertension ([facility]) I27.20    Multifocal pneumonia J18.9    Septic shock ([facility]) A41.9, R65.21    Elevated LFTs R79.89    Thrombocytopenia ([facility]) D69.6    Coagulopathy ([facility]) D68.9    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) ([facility]) D65    Acute encephalopathy G93.40    Bacteremia R78.81    Pleural effusion, bilateral J90    Elevated lactic acid level R79.89    Bacteremia due to methicillin susceptible Staphylococcus aureus (MSSA) R78.81, B95.61    Pneumonia due to methicillin susceptible Staphylococcus aureus (MSSA) ([facility]) J15.211    Anemia D64.9    Acute renal failure ([facility]) N17.9    Bilateral pleural effusion J90    Mitral valve insufficiency I34.0    Tricuspid valve insufficiency I07.1    Class 2 obesity due to excess calories with body mass index (BMI) of 39.0 to 39.9 in adult E66.09, Z68.39    Cardiogenic shock ([facility]) R57.0    Staphylococcal pneumonia ([facility]) J15.20    Acute on chronic systolic heart failure ([facility]) I50.23    Paroxysmal atrial fibrillation ([facility]) I48.0    Essential hypertension I10    Complication of gastrostomy tube ([facility]) K94.20    PEG tube malfunction ([facility]) K94.23    Pneumonia due to organism J18.9    Pulmonary edema J81.1    Protein C deficiency ([facility]) D68.59    Abnormal CT of the chest R93.89    Grade III diastolic dysfunction I51.89    History of epilepsy Z86.69    Abscess of left breast N61.1    Cellulitis of left breast N61.0    Necrotizing soft tissue infection M79.89    MRSA (methicillin resistant Staphylococcus aureus) infection A49.02    Morbid obesity with BMI of 40.0-44.9, adult ([facility]) E66.01, Z68.41    Chronic anticoagulation Z79.01    H/O: CVA (cerebrovascular accident) Z86.73    Fungal dermatitis B36.9    Left breast abscess N61.1        ICD-10-CM    1. Cellulitis of left breast  N61.0       2. Abscess of left breast  N61.1       3. Left breast abscess  N61.1 Culture, Anaerobic     Culture, Anaerobic     Culture, Body Fluid     Culture, Body Fluid           Large Left  [phone]   Fax : [phone]

## 2023-10-26 NOTE — PROGRESS NOTES
Hospitalist Progress Note  10/26/2023 8:43 AM    PCP: No primary care provider on file.    [id number]     Date of Admission: 10/20/2023                                                                                                                     HOSPITAL COURSE    Patient demographics:  The patient  Breanne Alatorre is a 53 y.o. female     Significant past medical history:   Patient Active Problem List   Diagnosis    Factor V Leiden mutation (HCC)    Pulmonary embolus (HCC)    Lactic acidosis    CAP (community acquired pneumonia)    Acute CHF (congestive heart failure) (HCC)    Bilateral pulmonary embolism (HCC)    Cardiac arrest (HCC)    Seizures (HCC)    Acute respiratory failure with hypoxia (HCC)    Metabolic acidosis    Congestive heart failure (HCC)    Pulmonary hypertension (HCC)    Multifocal pneumonia    Septic shock (HCC)    Elevated LFTs    Thrombocytopenia (HCC)    Coagulopathy (HCC)    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)    Acute encephalopathy    Bacteremia    Pleural effusion, bilateral    Elevated lactic acid level    Bacteremia due to methicillin susceptible Staphylococcus aureus (MSSA)    Pneumonia due to methicillin susceptible Staphylococcus aureus (MSSA) (HCC)    Anemia    Acute renal failure (HCC)    Bilateral pleural effusion    Mitral valve insufficiency    Tricuspid valve insufficiency    Class 2 obesity due to excess calories with body mass index (BMI) of 39.0 to 39.9 in adult    Cardiogenic shock (HCC)    Staphylococcal pneumonia (HCC)    Acute on chronic systolic heart failure (HCC)    Paroxysmal atrial fibrillation (HCC)    Essential hypertension    Complication of gastrostomy tube (HCC)    PEG tube malfunction (HCC)    Pneumonia due to organism    Pulmonary edema    Protein C deficiency (HCC)    Abnormal CT of the chest    Grade III diastolic dysfunction    History of epilepsy    Abscess of left breast    Cellulitis of left breast    Necrotizing  input(s): \"APTT\" in the last 72 hours. CBC:   Recent Labs     10/24/23  0438   WBC 5.2   HGB 12.2   HCT 37.3   MCV 89.9          BMP:   Recent Labs     10/24/23  0438      K 4.3      CO2 25   BUN 22*   CREATININE 1.1       LIVER PROFILE:   No results for input(s): \"ALKPHOS\", \"AST\", \"ALT\", \"ALB\", \"BILIDIR\", \"BILITOT\" in the last 72 hours. No results for input(s): \"AMYLASE\" in the last 72 hours. No results for input(s): \"LIPASE\" in the last 72 hours. UA:No results for input(s): \"NITRITE\", \"LABCAST\", \"WBCUA\", \"RBCUA\", \"MUCUS\" in the last 72 hours. TROPONIN: No results for input(s): \"CKTOTAL\", \"TROPONINI\" in the last 72 hours. Lab Results   Component Value Date/Time    URRFLXCULT Not Indicated 12/23/2020 10:46 AM       No results for input(s): \"TSHREFLEX\" in the last 72 hours. No components found for: \"QPZ4534\"  POC GLUCOSE:  No results for input(s): \"POCGLU\" in the last 72 hours. No results for input(s): \"LABA1C\" in the last 72 hours. Lab Results   Component Value Date/Time    LABA1C 5.6 12/17/2018 03:00 PM     Left ventricular cavity size is normal. Normal left ventricular wall   thickness. Overall left ventricular systolic function appears normal. with   an ejection fraction of 55%.(6/2/2023)    ASSESSMENT AND PLAN  Left breast abscess with cellulitis:   Continue patient on antibiotics  10/23-Incision, drainage, and debridement of left breast abscess/eschar   10/24- Incision and drainage with debridement of left breast abscess. Wound VAC is in place  Continue patient on vancomycin and clindamycin  Oral fluconazole for 1 week  Infectious disease consult is appreciated     MRSA. On wound culture  Continue on vancomycin       History of CVA with right-sided weakness,  currently bedbound. Standard decubitus precautions    Acute kidney injury,  Improved    History of bilateral pulm embolism,   factor V Leyden deficiency-on anticoagulation.     Depression and anxiety:

## 2023-10-26 NOTE — PLAN OF CARE
Problem: Discharge Planning  Goal: Discharge to home or other facility with appropriate resources  10/26/2023 1627 by Vikki Schwartz, RN  Outcome: Progressing  Flowsheets (Taken 10/26/2023 1627)  Discharge to home or other facility with appropriate resources:   Identify barriers to discharge with patient and caregiver   Arrange for needed discharge resources and transportation as appropriate   Identify discharge learning needs (meds, wound care, etc)   Refer to discharge planning if patient needs post-hospital services based on physician order or complex needs related to functional status, cognitive ability or social support system  10/26/2023 0243 by Roma Jaime, RN  Outcome: Progressing  Flowsheets (Taken 10/25/2023 2035)  Discharge to home or other facility with appropriate resources:   Identify barriers to discharge with patient and caregiver   Arrange for needed discharge resources and transportation as appropriate     Problem: Pain  Goal: Verbalizes/displays adequate comfort level or baseline comfort level  10/26/2023 1627 by Vikki Schwartz RN  Outcome: Progressing  Flowsheets (Taken 10/26/2023 1627)  Verbalizes/displays adequate comfort level or baseline comfort level:   Encourage patient to monitor pain and request assistance   Assess pain using appropriate pain scale   Administer analgesics based on type and severity of pain and evaluate response   Implement non-pharmacological measures as appropriate and evaluate response   Consider cultural and social influences on pain and pain management   Notify Licensed Independent Practitioner if interventions unsuccessful or patient reports new pain  10/26/2023 0243 by Roma Jaime, RN  Outcome: Progressing  Flowsheets (Taken 10/25/2023 2058)  Verbalizes/displays adequate comfort level or baseline comfort level:   Assess pain using appropriate pain scale   Encourage patient to monitor pain and request assistance   Administer analgesics based on type  and severity of pain and evaluate response   Implement non-pharmacological measures as appropriate and evaluate response     Problem: Skin/Tissue Integrity  Goal: Absence of new skin breakdown  Description: 1. Monitor for areas of redness and/or skin breakdown  2. Assess vascular access sites hourly  3. Every 4-6 hours minimum:  Change oxygen saturation probe site  4. Every 4-6 hours:  If on nasal continuous positive airway pressure, respiratory therapy assess nares and determine need for appliance change or resting period.   10/26/2023 1627 by Iman Aquino RN  Outcome: Progressing  10/26/2023 0243 by Jen Cason RN  Outcome: Progressing     Problem: Safety - Adult  Goal: Free from fall injury  10/26/2023 1627 by Iman Aquino RN  Outcome: Progressing  Flowsheets (Taken 10/26/2023 1627)  Free From Fall Injury: Instruct family/caregiver on patient safety  10/26/2023 0243 by Jen Cason RN  Outcome: Progressing  Flowsheets (Taken 10/26/2023 0242)  Free From Fall Injury: Instruct family/caregiver on patient safety     Problem: ABCDS Injury Assessment  Goal: Absence of physical injury  10/26/2023 1627 by Iman Aquino RN  Outcome: Progressing  Flowsheets (Taken 10/26/2023 1627)  Absence of Physical Injury: Implement safety measures based on patient assessment  10/26/2023 0243 by Jen Cason RN  Outcome: Progressing  Flowsheets (Taken 10/26/2023 0242)  Absence of Physical Injury: Implement safety measures based on patient assessment     Problem: Chronic Conditions and Co-morbidities  Goal: Patient's chronic conditions and co-morbidity symptoms are monitored and maintained or improved  10/26/2023 1627 by Iman Aquino RN  Outcome: Progressing  10/26/2023 0243 by Jen Cason RN  Outcome: Progressing  [address] (Taken 10/25/2023 2035)  Care Plan - Patient's Chronic Conditions and Co-Morbidity Symptoms are Monitored and Maintained or Improved: Monitor and assess patient's chronic

## 2023-10-26 NOTE — PLAN OF CARE
Problem: Discharge Planning  Goal: Discharge to home or other facility with appropriate resources  Outcome: Progressing  Flowsheets (Taken 10/25/2023 2035)  Discharge to home or other facility with appropriate resources:   Identify barriers to discharge with patient and caregiver   Arrange for needed discharge resources and transportation as appropriate     Problem: Pain  Goal: Verbalizes/displays adequate comfort level or baseline comfort level  Outcome: Progressing  Flowsheets (Taken 10/25/2023 2058)  Verbalizes/displays adequate comfort level or baseline comfort level:   Assess pain using appropriate pain scale   Encourage patient to monitor pain and request assistance   Administer analgesics based on type and severity of pain and evaluate response   Implement non-pharmacological measures as appropriate and evaluate response     Problem: Skin/Tissue Integrity  Goal: Absence of new skin breakdown  Description: 1. Monitor for areas of redness and/or skin breakdown  2. Assess vascular access sites hourly  3. Every 4-6 hours minimum:  Change oxygen saturation probe site  4. Every 4-6 hours:  If on nasal continuous positive airway pressure, respiratory therapy assess nares and determine need for appliance change or resting period.   Outcome: Progressing     Problem: Safety - Adult  Goal: Free from fall injury  Outcome: Progressing  Flowsheets (Taken 10/26/2023 0242)  Free From Fall Injury: Instruct family/caregiver on patient safety     Problem: ABCDS Injury Assessment  Goal: Absence of physical injury  Outcome: Progressing  Flowsheets (Taken 10/26/2023 0242)  Absence of Physical Injury: Implement safety measures based on patient assessment     Problem: Chronic Conditions and Co-morbidities  Goal: Patient's chronic conditions and co-morbidity symptoms are monitored and maintained or improved  Outcome: Progressing  Flowsheets (Taken 10/25/2023 2035)  Care Plan - Patient's Chronic Conditions and Co-Morbidity

## 2023-10-27 LAB
ANION GAP SERPL CALCULATED.3IONS-SCNC: 8 MMOL/L (ref 3–16)
BACTERIA SPEC ANAEROBE CULT: NORMAL
BUN SERPL-MCNC: 32 MG/DL (ref 7–20)
CALCIUM SERPL-MCNC: 7.9 MG/DL (ref 8.3–10.6)
CHLORIDE SERPL-SCNC: 106 MMOL/L (ref 99–110)
CO2 SERPL-SCNC: 28 MMOL/L (ref 21–32)
CREAT SERPL-MCNC: 1.3 MG/DL (ref 0.6–1.1)
GFR SERPLBLD CREATININE-BSD FMLA CKD-EPI: 49 ML/MIN/{1.73_M2}
GLUCOSE SERPL-MCNC: 117 MG/DL (ref 70–99)
POTASSIUM SERPL-SCNC: 3.7 MMOL/L (ref 3.5–5.1)
SODIUM SERPL-SCNC: 142 MMOL/L (ref 136–145)

## 2023-10-27 PROCEDURE — 36415 COLL VENOUS BLD VENIPUNCTURE: CPT

## 2023-10-27 PROCEDURE — 1200000000 HC SEMI PRIVATE

## 2023-10-27 PROCEDURE — 2580000003 HC RX 258: Performed by: INTERNAL MEDICINE

## 2023-10-27 PROCEDURE — APPNB15 APP NON BILLABLE TIME 0-15 MINS: Performed by: PHYSICIAN ASSISTANT

## 2023-10-27 PROCEDURE — APPSS15 APP SPLIT SHARED TIME 0-15 MINUTES: Performed by: PHYSICIAN ASSISTANT

## 2023-10-27 PROCEDURE — 80048 BASIC METABOLIC PNL TOTAL CA: CPT

## 2023-10-27 PROCEDURE — 99024 POSTOP FOLLOW-UP VISIT: CPT | Performed by: PHYSICIAN ASSISTANT

## 2023-10-27 PROCEDURE — 6370000000 HC RX 637 (ALT 250 FOR IP): Performed by: INTERNAL MEDICINE

## 2023-10-27 PROCEDURE — 94760 N-INVAS EAR/PLS OXIMETRY 1: CPT

## 2023-10-27 PROCEDURE — 99233 SBSQ HOSP IP/OBS HIGH 50: CPT | Performed by: INTERNAL MEDICINE

## 2023-10-27 PROCEDURE — 6370000000 HC RX 637 (ALT 250 FOR IP): Performed by: HOSPITALIST

## 2023-10-27 PROCEDURE — 6360000002 HC RX W HCPCS: Performed by: INTERNAL MEDICINE

## 2023-10-27 PROCEDURE — 99024 POSTOP FOLLOW-UP VISIT: CPT | Performed by: SURGERY

## 2023-10-27 PROCEDURE — 6370000000 HC RX 637 (ALT 250 FOR IP): Performed by: SURGERY

## 2023-10-27 RX ORDER — CLINDAMYCIN HYDROCHLORIDE 150 MG/1
300 CAPSULE ORAL EVERY 8 HOURS SCHEDULED
Status: DISCONTINUED | OUTPATIENT
Start: 2023-10-28 | End: 2023-10-29

## 2023-10-27 RX ADMIN — Medication 1 CAPSULE: at 08:09

## 2023-10-27 RX ADMIN — OXYCODONE HYDROCHLORIDE 5 MG: 5 TABLET ORAL at 20:06

## 2023-10-27 RX ADMIN — OXYCODONE HYDROCHLORIDE 5 MG: 5 TABLET ORAL at 11:48

## 2023-10-27 RX ADMIN — EMPAGLIFLOZIN 10 MG: 10 TABLET, FILM COATED ORAL at 08:08

## 2023-10-27 RX ADMIN — ESCITALOPRAM OXALATE 20 MG: 10 TABLET ORAL at 08:09

## 2023-10-27 RX ADMIN — APIXABAN 5 MG: 5 TABLET, FILM COATED ORAL at 21:09

## 2023-10-27 RX ADMIN — LACOSAMIDE 200 MG: 100 TABLET, FILM COATED ORAL at 21:10

## 2023-10-27 RX ADMIN — FLUCONAZOLE 100 MG: 100 TABLET ORAL at 08:09

## 2023-10-27 RX ADMIN — FERROUS SULFATE TAB EC 324 MG (65 MG FE EQUIVALENT) 324 MG: 324 (65 FE) TABLET DELAYED RESPONSE at 08:09

## 2023-10-27 RX ADMIN — CLINDAMYCIN PHOSPHATE 900 MG: 900 INJECTION, SOLUTION INTRAVENOUS at 11:50

## 2023-10-27 RX ADMIN — FUROSEMIDE 20 MG: 20 TABLET ORAL at 08:09

## 2023-10-27 RX ADMIN — DANTROLENE SODIUM 25 MG: 25 CAPSULE ORAL at 08:08

## 2023-10-27 RX ADMIN — LEVETIRACETAM 2000 MG: 500 TABLET, FILM COATED ORAL at 08:09

## 2023-10-27 RX ADMIN — POLYETHYLENE GLYCOL 3350 17 G: 17 POWDER, FOR SOLUTION ORAL at 08:06

## 2023-10-27 RX ADMIN — LACOSAMIDE 200 MG: 100 TABLET, FILM COATED ORAL at 08:08

## 2023-10-27 RX ADMIN — CLONAZEPAM 0.5 MG: 0.5 TABLET ORAL at 21:10

## 2023-10-27 RX ADMIN — CLINDAMYCIN PHOSPHATE 900 MG: 900 INJECTION, SOLUTION INTRAVENOUS at 02:04

## 2023-10-27 RX ADMIN — OXYCODONE HYDROCHLORIDE 5 MG: 5 TABLET ORAL at 02:04

## 2023-10-27 RX ADMIN — NEPHROCAP 1 MG: 1 CAP ORAL at 11:48

## 2023-10-27 RX ADMIN — CLINDAMYCIN PHOSPHATE 900 MG: 900 INJECTION, SOLUTION INTRAVENOUS at 18:01

## 2023-10-27 RX ADMIN — LEVETIRACETAM 2000 MG: 500 TABLET, FILM COATED ORAL at 21:09

## 2023-10-27 RX ADMIN — PANTOPRAZOLE SODIUM 40 MG: 40 TABLET, DELAYED RELEASE ORAL at 06:21

## 2023-10-27 RX ADMIN — APIXABAN 5 MG: 5 TABLET, FILM COATED ORAL at 08:09

## 2023-10-27 RX ADMIN — Medication 3 MG: at 21:10

## 2023-10-27 RX ADMIN — ANTACID TABLETS 750 MG: 500 TABLET, CHEWABLE ORAL at 08:06

## 2023-10-27 RX ADMIN — DAPTOMYCIN 500 MG: 500 INJECTION, POWDER, LYOPHILIZED, FOR SOLUTION INTRAVENOUS at 16:47

## 2023-10-27 ASSESSMENT — PAIN DESCRIPTION - PAIN TYPE
TYPE: ACUTE PAIN;SURGICAL PAIN
TYPE: ACUTE PAIN;SURGICAL PAIN

## 2023-10-27 ASSESSMENT — PAIN DESCRIPTION - ORIENTATION
ORIENTATION: LEFT

## 2023-10-27 ASSESSMENT — PAIN DESCRIPTION - FREQUENCY
FREQUENCY: CONTINUOUS
FREQUENCY: CONTINUOUS

## 2023-10-27 ASSESSMENT — PAIN DESCRIPTION - LOCATION
LOCATION: BREAST

## 2023-10-27 ASSESSMENT — PAIN SCALES - GENERAL
PAINLEVEL_OUTOF10: 8
PAINLEVEL_OUTOF10: 2
PAINLEVEL_OUTOF10: 9
PAINLEVEL_OUTOF10: 8
PAINLEVEL_OUTOF10: 6

## 2023-10-27 ASSESSMENT — PAIN DESCRIPTION - ONSET
ONSET: ON-GOING
ONSET: ON-GOING

## 2023-10-27 ASSESSMENT — PAIN DESCRIPTION - DESCRIPTORS
DESCRIPTORS: ACHING;THROBBING
DESCRIPTORS: ACHING;THROBBING

## 2023-10-27 NOTE — PROGRESS NOTES
Pt in bed awake. Pt alert and oriented x 4. Pt denies pain at this time. Morning medications administered pt tolerated well. Pt shows no signs of respiratory distress. Pt states she has no other needs at this time. Call light placed within reach.  Electronically signed by Patrick Gonzales RN on 10/27/2023 at 5:46 PM

## 2023-10-27 NOTE — PROGRESS NOTES
Hospitalist Progress Note  10/27/2023 9:16 AM    PCP: No primary care provider on file.     [id number]     Date of Admission: 10/20/2023                                                                                                                     HOSPITAL COURSE    Patient demographics:  The patient  Mark Cedeno is a 47 y.o. female     Significant past medical history:   Patient Active Problem List   Diagnosis    Factor V Leiden mutation ([address])    Pulmonary embolus (HCC)    Lactic acidosis    CAP (community acquired pneumonia)    Acute CHF (congestive heart failure) ([address])    Bilateral pulmonary embolism (HCC)    Cardiac arrest ([address])    Seizures ([address])    Acute respiratory failure with hypoxia (HCC)    Metabolic acidosis    Congestive heart failure (HCC)    Pulmonary hypertension (HCC)    Multifocal pneumonia    Septic shock (HCC)    Elevated LFTs    Thrombocytopenia (HCC)    Coagulopathy (HCC)    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)    Acute encephalopathy    Bacteremia    Pleural effusion, bilateral    Elevated lactic acid level    Bacteremia due to methicillin susceptible Staphylococcus aureus (MSSA)    Pneumonia due to methicillin susceptible Staphylococcus aureus (MSSA) (HCC)    Anemia    Acute renal failure (HCC)    Bilateral pleural effusion    Mitral valve insufficiency    Tricuspid valve insufficiency    Class 2 obesity due to excess calories with body mass index (BMI) of 39.0 to 39.9 in adult    Cardiogenic shock (HCC)    Staphylococcal pneumonia (HCC)    Acute on chronic systolic heart failure (HCC)    Paroxysmal atrial fibrillation ([address])    Essential hypertension    Complication of gastrostomy tube (HCC)    PEG tube malfunction ([address])    Pneumonia due to organism    Pulmonary edema    Protein C deficiency (HCC)    Abnormal CT of the chest    Grade III diastolic dysfunction    History of epilepsy    Abscess of left breast    Cellulitis of left breast    Necrotizing soft tissue infection    MRSA (methicillin resistant Staphylococcus aureus) infection    Morbid obesity with BMI of 40.0-44.9, adult ([facility])    Chronic anticoagulation    H/O: CVA (cerebrovascular accident)    Fungal dermatitis    Left breast abscess    MORELIA (acute kidney injury) ([facility])         Presenting symptoms:  Wound Infection    Diagnostic workup:      CONSULTS DURING ADMISSION :   IP CONSULT TO CASE MANAGEMENT  IP CONSULT TO SOCIAL WORK  PHARMACY TO DOSE VANCOMYCIN  IP CONSULT TO GENERAL SURGERY  IP CONSULT TO INFECTIOUS DISEASES      Patient was diagnosed with:  Arthritis  Asthma  CHF  epilepsy    Treatment while inpatient:   Breanne Alatorre is a 53 y.o. female with past medical history significant for     Asthma, CHF, epilepsy and Dysphagia.     patient presented to the ER from the [facility] where she resides because of worsening \"boil\" on her left breast.  Patient was diagnosed with left breast abscess with cellulitis.  Patient was started on antibiotics and general surgery was consulted.  Patient underwent incision and drainage and wound VAC placement.                                                                                       ----------------------------------------------------------      SUBJECTIVE COMPLAINTS- Wound Infection    Diet: ADULT DIET; Regular; 4 carb choices (60 gm/meal)      OBJECTIVE:   Patient Active Problem List   Diagnosis    Factor V Leiden mutation (HCC)    Pulmonary embolus (HCC)    Lactic acidosis    CAP (community acquired pneumonia)    Acute CHF (congestive heart failure) ([facility])    Bilateral pulmonary embolism (HCC)    Cardiac arrest (HCC)    Seizures (HCC)    Acute respiratory failure with hypoxia (HCC)    Metabolic acidosis    Congestive heart failure (HCC)    Pulmonary hypertension (HCC)    Multifocal pneumonia    Septic shock (HCC)    Elevated LFTs    Thrombocytopenia (HCC)    Coagulopathy (HCC)    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) ([facility])    Acute

## 2023-10-27 NOTE — PLAN OF CARE
Problem: Discharge Planning  Goal: Discharge to home or other facility with appropriate resources  Outcome: Progressing  Flowsheets (Taken 10/27/2023 1742)  Discharge to home or other facility with appropriate resources:   Identify barriers to discharge with patient and caregiver   Arrange for needed discharge resources and transportation as appropriate   Identify discharge learning needs (meds, wound care, etc)   Refer to discharge planning if patient needs post-hospital services based on physician order or complex needs related to functional status, cognitive ability or social support system     Problem: Pain  Goal: Verbalizes/displays adequate comfort level or baseline comfort level  Outcome: Progressing  Flowsheets (Taken 10/27/2023 1742)  Verbalizes/displays adequate comfort level or baseline comfort level:   Encourage patient to monitor pain and request assistance   Assess pain using appropriate pain scale   Administer analgesics based on type and severity of pain and evaluate response   Implement non-pharmacological measures as appropriate and evaluate response   Consider cultural and social influences on pain and pain management   Notify Licensed Independent Practitioner if interventions unsuccessful or patient reports new pain     Problem: Skin/Tissue Integrity  Goal: Absence of new skin breakdown  Description: 1. Monitor for areas of redness and/or skin breakdown  2. Assess vascular access sites hourly  3. Every 4-6 hours minimum:  Change oxygen saturation probe site  4. Every 4-6 hours:  If on nasal continuous positive airway pressure, respiratory therapy assess nares and determine need for appliance change or resting period.   Outcome: Progressing     Problem: Safety - Adult  Goal: Free from fall injury  Outcome: Progressing  Flowsheets (Taken 10/27/2023 1742)  Free From Fall Injury: Instruct family/caregiver on patient safety     Problem: ABCDS Injury Assessment  Goal: Absence of physical

## 2023-10-27 NOTE — PROGRESS NOTES
Infectious Diseases Inpatient Progress Note      CHIEF COMPLAINT:     MRSA left breast abscess  Cellulitis  Necrotic infection   H/p PE    MORELIA     HISTORY OF PRESENT ILLNESS:  47 y.o. woman with a  morbid obesity, history of CVA in the past, right-sided weakness, nursing home resident, admitted to hospital secondary to left breast swelling pain redness with abscess formation concern for infection. She underwent incision and drainage and debridement of left breast abscess by Dr. Eric Carrillo on  10/23/23 and wound cx MRSA noted. She has wound vac in place and wound images reviewed.   Creat 1.2, WBC She is on chronic anticoagulation for h/o PE and factor V leiden-  Location :Left breast swelling and pain        Quality :  aching       Severity :  10/10   Duration :4 days       Timing :constant   Context :   Left breast abscess  Modifying factors : none   Associated signs and symptoms: Left breast swelling and pain +     Interval History : on going Left breast redness SLOW improvement and on going skin changes+ wound vac in place OPAT d/w pt      Past Medical History:    Past Medical History:   Diagnosis Date    Arthritis     Asthma     At risk for falls     Baker's cyst     CHF (congestive heart failure) ([facility])     Dysphagia     Epilepsy ([address])     Factor V Leiden ([address])     on eliquis    GERD (gastroesophageal reflux disease)     Hx of blood clots     Hyperlipidemia     Hypertension     MRSA bacteremia 09/10/2020    Myoclonus     Need for assistance with personal care     Osteoarthritis     Paroxysmal atrial fibrillation ([address])     Schizoaffective disorder ([address])     Sudden cardiac arrest ([address]) 2021       Past Surgical History:    Past Surgical History:   Procedure Laterality Date    BREAST SURGERY Left 10/23/2023    INCISION AND DRAINAGE AND DEBRIDEMENT LEFT BREAST ABSCESS performed by Elvira Kent MD at [facility] N/A 9/25/2020    ESOPHAGOGASTRODUODENOSCOPY PERCUTANEOUS ENDOSCOPIC

## 2023-10-27 NOTE — PLAN OF CARE
Problem: Discharge Planning  Goal: Discharge to home or other facility with appropriate resources  10/27/2023 0231 by Carmen Linares RN  Outcome: Progressing  Flowsheets (Taken 10/26/2023 2348)  Discharge to home or other facility with appropriate resources:   Identify barriers to discharge with patient and caregiver   Arrange for needed discharge resources and transportation as appropriate   Identify discharge learning needs (meds, wound care, etc)  10/26/2023 1627 by Sabrina Edward RN  Outcome: Progressing  Flowsheets (Taken 10/26/2023 1627)  Discharge to home or other facility with appropriate resources:   Identify barriers to discharge with patient and caregiver   Arrange for needed discharge resources and transportation as appropriate   Identify discharge learning needs (meds, wound care, etc)   Refer to discharge planning if patient needs post-hospital services based on physician order or complex needs related to functional status, cognitive ability or social support system     Problem: Pain  Goal: Verbalizes/displays adequate comfort level or baseline comfort level  10/27/2023 0231 by Carmen Linares RN  Outcome: Progressing  Flowsheets (Taken 10/26/2023 2034)  Verbalizes/displays adequate comfort level or baseline comfort level:   Encourage patient to monitor pain and request assistance   Assess pain using appropriate pain scale   Administer analgesics based on type and severity of pain and evaluate response   Implement non-pharmacological measures as appropriate and evaluate response  10/26/2023 1627 by Vikki Schwartz RN  Outcome: Progressing  Flowsheets (Taken 10/26/2023 1627)  Verbalizes/displays adequate comfort level or baseline comfort level:   Encourage patient to monitor pain and request assistance   Assess pain using appropriate pain scale   Administer analgesics based on type and severity of pain and evaluate response   Implement non-pharmacological measures as appropriate and evaluate

## 2023-10-28 LAB
ANION GAP SERPL CALCULATED.3IONS-SCNC: 8 MMOL/L (ref 3–16)
BUN SERPL-MCNC: 30 MG/DL (ref 7–20)
CALCIUM SERPL-MCNC: 8.2 MG/DL (ref 8.3–10.6)
CHLORIDE SERPL-SCNC: 104 MMOL/L (ref 99–110)
CK SERPL-CCNC: 33 U/L (ref 26–192)
CO2 SERPL-SCNC: 28 MMOL/L (ref 21–32)
CREAT SERPL-MCNC: 1.1 MG/DL (ref 0.6–1.1)
GFR SERPLBLD CREATININE-BSD FMLA CKD-EPI: >60 ML/MIN/{1.73_M2}
GLUCOSE SERPL-MCNC: 124 MG/DL (ref 70–99)
POTASSIUM SERPL-SCNC: 3.8 MMOL/L (ref 3.5–5.1)
SODIUM SERPL-SCNC: 140 MMOL/L (ref 136–145)

## 2023-10-28 PROCEDURE — 6370000000 HC RX 637 (ALT 250 FOR IP): Performed by: SURGERY

## 2023-10-28 PROCEDURE — 80048 BASIC METABOLIC PNL TOTAL CA: CPT

## 2023-10-28 PROCEDURE — 94760 N-INVAS EAR/PLS OXIMETRY 1: CPT

## 2023-10-28 PROCEDURE — 82550 ASSAY OF CK (CPK): CPT

## 2023-10-28 PROCEDURE — 99232 SBSQ HOSP IP/OBS MODERATE 35: CPT | Performed by: INTERNAL MEDICINE

## 2023-10-28 PROCEDURE — 6360000002 HC RX W HCPCS: Performed by: INTERNAL MEDICINE

## 2023-10-28 PROCEDURE — 36415 COLL VENOUS BLD VENIPUNCTURE: CPT

## 2023-10-28 PROCEDURE — 2580000003 HC RX 258: Performed by: INTERNAL MEDICINE

## 2023-10-28 PROCEDURE — 1200000000 HC SEMI PRIVATE

## 2023-10-28 PROCEDURE — 6370000000 HC RX 637 (ALT 250 FOR IP): Performed by: INTERNAL MEDICINE

## 2023-10-28 PROCEDURE — 2580000003 HC RX 258: Performed by: SURGERY

## 2023-10-28 RX ADMIN — OXYCODONE HYDROCHLORIDE 5 MG: 5 TABLET ORAL at 20:40

## 2023-10-28 RX ADMIN — FERROUS SULFATE TAB EC 324 MG (65 MG FE EQUIVALENT) 324 MG: 324 (65 FE) TABLET DELAYED RESPONSE at 10:35

## 2023-10-28 RX ADMIN — CLINDAMYCIN HYDROCHLORIDE 300 MG: 150 CAPSULE ORAL at 06:22

## 2023-10-28 RX ADMIN — ACETAMINOPHEN 650 MG: 325 TABLET ORAL at 10:36

## 2023-10-28 RX ADMIN — ESCITALOPRAM OXALATE 20 MG: 10 TABLET ORAL at 10:34

## 2023-10-28 RX ADMIN — Medication 1 CAPSULE: at 10:35

## 2023-10-28 RX ADMIN — LEVETIRACETAM 2000 MG: 500 TABLET, FILM COATED ORAL at 20:30

## 2023-10-28 RX ADMIN — DANTROLENE SODIUM 25 MG: 25 CAPSULE ORAL at 10:36

## 2023-10-28 RX ADMIN — SODIUM CHLORIDE, PRESERVATIVE FREE 10 ML: 5 INJECTION INTRAVENOUS at 20:33

## 2023-10-28 RX ADMIN — CLINDAMYCIN HYDROCHLORIDE 300 MG: 150 CAPSULE ORAL at 14:05

## 2023-10-28 RX ADMIN — NEPHROCAP 1 MG: 1 CAP ORAL at 10:35

## 2023-10-28 RX ADMIN — ANTACID TABLETS 750 MG: 500 TABLET, CHEWABLE ORAL at 10:35

## 2023-10-28 RX ADMIN — LEVETIRACETAM 2000 MG: 500 TABLET, FILM COATED ORAL at 10:34

## 2023-10-28 RX ADMIN — PANTOPRAZOLE SODIUM 40 MG: 40 TABLET, DELAYED RELEASE ORAL at 06:22

## 2023-10-28 RX ADMIN — LACOSAMIDE 200 MG: 100 TABLET, FILM COATED ORAL at 20:30

## 2023-10-28 RX ADMIN — APIXABAN 5 MG: 5 TABLET, FILM COATED ORAL at 20:30

## 2023-10-28 RX ADMIN — CLONAZEPAM 0.5 MG: 0.5 TABLET ORAL at 20:30

## 2023-10-28 RX ADMIN — FUROSEMIDE 20 MG: 20 TABLET ORAL at 10:35

## 2023-10-28 RX ADMIN — FLUCONAZOLE 100 MG: 100 TABLET ORAL at 10:35

## 2023-10-28 RX ADMIN — APIXABAN 5 MG: 5 TABLET, FILM COATED ORAL at 10:34

## 2023-10-28 RX ADMIN — Medication 3 MG: at 20:30

## 2023-10-28 RX ADMIN — LACOSAMIDE 200 MG: 100 TABLET, FILM COATED ORAL at 10:35

## 2023-10-28 RX ADMIN — OXYCODONE HYDROCHLORIDE 5 MG: 5 TABLET ORAL at 13:20

## 2023-10-28 RX ADMIN — DAPTOMYCIN 500 MG: 500 INJECTION, POWDER, LYOPHILIZED, FOR SOLUTION INTRAVENOUS at 16:21

## 2023-10-28 RX ADMIN — EMPAGLIFLOZIN 10 MG: 10 TABLET, FILM COATED ORAL at 10:36

## 2023-10-28 RX ADMIN — CLINDAMYCIN HYDROCHLORIDE 300 MG: 150 CAPSULE ORAL at 20:31

## 2023-10-28 ASSESSMENT — PAIN DESCRIPTION - LOCATION
LOCATION: GENERALIZED
LOCATION: BREAST

## 2023-10-28 ASSESSMENT — PAIN DESCRIPTION - ORIENTATION
ORIENTATION: LEFT

## 2023-10-28 ASSESSMENT — PAIN DESCRIPTION - PAIN TYPE
TYPE: ACUTE PAIN;SURGICAL PAIN
TYPE: ACUTE PAIN;SURGICAL PAIN

## 2023-10-28 ASSESSMENT — PAIN DESCRIPTION - DESCRIPTORS
DESCRIPTORS: ACHING;THROBBING
DESCRIPTORS: ACHING

## 2023-10-28 ASSESSMENT — PAIN DESCRIPTION - FREQUENCY
FREQUENCY: CONTINUOUS
FREQUENCY: CONTINUOUS

## 2023-10-28 ASSESSMENT — PAIN SCALES - GENERAL
PAINLEVEL_OUTOF10: 5
PAINLEVEL_OUTOF10: 8
PAINLEVEL_OUTOF10: 5
PAINLEVEL_OUTOF10: 7

## 2023-10-28 ASSESSMENT — PAIN SCALES - WONG BAKER
WONGBAKER_NUMERICALRESPONSE: 0
WONGBAKER_NUMERICALRESPONSE: 0

## 2023-10-28 ASSESSMENT — PAIN DESCRIPTION - ONSET
ONSET: ON-GOING
ONSET: ON-GOING

## 2023-10-28 NOTE — PROGRESS NOTES
General Surgery    Stable, asleep    VAC in place, no issues noted    OK for discharge from our standpoint    Will be available for questions or change in clinical status    Electronically signed by Kenton Willard MD on 10/28/2023 at 11:09 AM

## 2023-10-28 NOTE — PROGRESS NOTES
Patient awake in bed, alert and oriented x4. IV clean dry and intact. No complaints of pain at this time. Dressing clean dry and intact. No other concerns at this time. Call light within reach. Able to make needs known.      Electronically signed by Naila Hinton RN on 10/28/2023 at 2:34 AM

## 2023-10-28 NOTE — PROGRESS NOTES
16:55   [facility]ICS AT [facility].:                      RECEIVED :  10/20/23 16:59   If child <=2 yrs old please draw pediatric bottle.~Blood Culture #2   Culture, Blood 1 [[id number]] Collected: 10/20/23 1633   Order Status: Completed Specimen: Blood Updated: 10/25/23 1016    Blood Culture, Routine No Growth after 4 days of incubation.   Narrative:     ORDER#: [id number]                          ORDERED BY: PAL NARVAEZ   SOURCE: Blood                              COLLECTED:  10/20/23 16:33   ANTIBIOTICS AT [facility].:                      RECEIVED :  10/20/23 16:41   If child <=2 yrs old please draw pediatric bottle.~Blood Culture 1   Culture, Body Fluid [[id number]] (Abnormal) Collected: 10/23/23 1714   Order Status: Completed Specimen: Body Fluid Updated: 10/25/23 0909    Gram Stain Result 4+ WBC's (Polymorphonuclear)   1+ Gram positive cocci   No Epithelial Cells seen    Abnormal     Organism Staph aureus MRSA Abnormal     Body Fluid Culture, Sterile -- Abnormal     Heavy growth   No further workup   CONTACT PRECAUTIONS INDICATED   Refer to culture [id number] for sensitivity results    Abnormal    Narrative:     ORDER#: [id number]                          ORDERED BY: RAMON VINES   SOURCE: Fluid Left breast abscess          COLLECTED:  10/23/23 17:14   ANTIBIOTICS AT [facility].:                      RECEIVED :  10/23/23 17:41   Left breast abscess fluid swab   Culture, Wound Aerobic Only [[id number]] (Abnormal)  Collected: 10/20/23 1520   Order Status: Completed Specimen: Skin Updated: 10/23/23 0655    Gram Stain Result 2+ Gram positive cocci   2+ WBC's   1+ Epithelial Cells    Abnormal     Organism Staph aureus MRSA Abnormal     WOUND/ABSCESS -- Abnormal     Moderate growth   CONTACT PRECAUTIONS INDICATED    Abnormal    Narrative:     ORDER#: [id number]                          ORDERED BY: PAL NARVAEZ   SOURCE: Skin                               COLLECTED:  10/20/23 15:20      Susceptibility    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    Multifocal pneumonia J18.9    Septic shock ([facility]) A41.9, R65.21    Elevated LFTs R79.89    Thrombocytopenia ([facility]) D69.6    Coagulopathy ([facility]) D68.9    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) ([facility]) D65    Acute encephalopathy G93.40    Bacteremia R78.81    Pleural effusion, bilateral J90    Elevated lactic acid level R79.89    Bacteremia due to methicillin susceptible Staphylococcus aureus (MSSA) R78.81, B95.61    Pneumonia due to methicillin susceptible Staphylococcus aureus (MSSA) ([facility]) J15.211    Anemia D64.9    Acute renal failure ([facility]) N17.9    Bilateral pleural effusion J90    Mitral valve insufficiency I34.0    Tricuspid valve insufficiency I07.1    Class 2 obesity due to excess calories with body mass index (BMI) of 39.0 to 39.9 in adult E66.09, Z68.39    Cardiogenic shock ([facility]) R57.0    Staphylococcal pneumonia ([facility]) J15.20    Acute on chronic systolic heart failure ([facility]) I50.23    Paroxysmal atrial fibrillation ([facility]) I48.0    Essential hypertension K10    Complication of gastrostomy tube ([facility]) K94.20    PEG tube malfunction ([address]) K94.23    Pneumonia due to organism J18.9    Pulmonary edema J81.1    Protein C deficiency ([facility]) D68.59    Abnormal CT of the chest R93.89    Grade III diastolic dysfunction U15.06    History of epilepsy Z86.69    Abscess of left breast N61.1    Cellulitis of left breast N61.0    Necrotizing soft tissue infection M79.89    MRSA (methicillin resistant Staphylococcus aureus) infection A49.02    Morbid obesity with BMI of 40.0-44.9, adult ([facility]) E66.01, Z68.41    Chronic anticoagulation Z79.01    H/O: CVA (cerebrovascular accident) Z86.73    Fungal dermatitis B36.9    Left breast abscess N61.1    MORELIA (acute kidney injury) ([address]) N17.9        ICD-10-CM    1. Cellulitis of left breast  N61.0       2. Abscess of left breast  N61.1       3.  Left breast abscess  N61.1 Culture, Anaerobic     Culture, Anaerobic     Culture, Body Fluid

## 2023-10-28 NOTE — PLAN OF CARE
Problem: Discharge Planning  Goal: Discharge to home or other facility with appropriate resources  10/28/2023 0228 by Mallory Beverly RN  Outcome: Progressing  Flowsheets (Taken 10/27/2023 2112)  Discharge to home or other facility with appropriate resources:   Identify barriers to discharge with patient and caregiver   Arrange for needed discharge resources and transportation as appropriate   Identify discharge learning needs (meds, wound care, etc)  10/27/2023 1742 by Jose Winters RN  Outcome: Progressing  Flowsheets (Taken 10/27/2023 1742)  Discharge to home or other facility with appropriate resources:   Identify barriers to discharge with patient and caregiver   Arrange for needed discharge resources and transportation as appropriate   Identify discharge learning needs (meds, wound care, etc)   Refer to discharge planning if patient needs post-hospital services based on physician order or complex needs related to functional status, cognitive ability or social support system     Problem: Pain  Goal: Verbalizes/displays adequate comfort level or baseline comfort level  10/28/2023 0228 by Mallory Beverly RN  Outcome: Progressing  Flowsheets (Taken 10/28/2023 0228)  Verbalizes/displays adequate comfort level or baseline comfort level:   Encourage patient to monitor pain and request assistance   Assess pain using appropriate pain scale   Administer analgesics based on type and severity of pain and evaluate response   Implement non-pharmacological measures as appropriate and evaluate response  10/27/2023 1742 by Vikki Schwartz RN  Outcome: Progressing  Flowsheets (Taken 10/27/2023 1742)  Verbalizes/displays adequate comfort level or baseline comfort level:   Encourage patient to monitor pain and request assistance   Assess pain using appropriate pain scale   Administer analgesics based on type and severity of pain and evaluate response   Implement non-pharmacological measures as appropriate and evaluate

## 2023-10-28 NOTE — PROGRESS NOTES
Hospitalist Progress Note  10/28/2023 8:00 AM    PCP: No primary care provider on file.     [id number]     Date of Admission: 10/20/2023                                                                                                                     HOSPITAL COURSE    Patient demographics:  The patient  Simi Maxwell is a 47 y.o. female     Significant past medical history:   Patient Active Problem List   Diagnosis    Factor V Leiden mutation ([address])    Pulmonary embolus (HCC)    Lactic acidosis    CAP (community acquired pneumonia)    Acute CHF (congestive heart failure) ([address])    Bilateral pulmonary embolism (HCC)    Cardiac arrest ([address])    Seizures ([address])    Acute respiratory failure with hypoxia (HCC)    Metabolic acidosis    Congestive heart failure (HCC)    Pulmonary hypertension (HCC)    Multifocal pneumonia    Septic shock (HCC)    Elevated LFTs    Thrombocytopenia (HCC)    Coagulopathy (HCC)    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)    Acute encephalopathy    Bacteremia    Pleural effusion, bilateral    Elevated lactic acid level    Bacteremia due to methicillin susceptible Staphylococcus aureus (MSSA)    Pneumonia due to methicillin susceptible Staphylococcus aureus (MSSA) (HCC)    Anemia    Acute renal failure (HCC)    Bilateral pleural effusion    Mitral valve insufficiency    Tricuspid valve insufficiency    Class 2 obesity due to excess calories with body mass index (BMI) of 39.0 to 39.9 in adult    Cardiogenic shock (HCC)    Staphylococcal pneumonia (HCC)    Acute on chronic systolic heart failure (HCC)    Paroxysmal atrial fibrillation ([address])    Essential hypertension    Complication of gastrostomy tube (HCC)    PEG tube malfunction ([address])    Pneumonia due to organism    Pulmonary edema    Protein C deficiency (HCC)    Abnormal CT of the chest    Grade III diastolic dysfunction    History of epilepsy    Abscess of left breast    Cellulitis of left breast    Necrotizing

## 2023-10-28 NOTE — PROGRESS NOTES
Wound vac alarming stating leak noted. Dressing checked. Transparent dressing reinforced and wound vac resumed suction to 125 mmhg. No further issues noted. Instructed patient to call staff if wound vac alarms. Patient states understanding.

## 2023-10-29 LAB
ANION GAP SERPL CALCULATED.3IONS-SCNC: 10 MMOL/L (ref 3–16)
BUN SERPL-MCNC: 28 MG/DL (ref 7–20)
CALCIUM SERPL-MCNC: 8.5 MG/DL (ref 8.3–10.6)
CHLORIDE SERPL-SCNC: 103 MMOL/L (ref 99–110)
CO2 SERPL-SCNC: 28 MMOL/L (ref 21–32)
CREAT SERPL-MCNC: 1 MG/DL (ref 0.6–1.1)
GFR SERPLBLD CREATININE-BSD FMLA CKD-EPI: >60 ML/MIN/{1.73_M2}
GLUCOSE SERPL-MCNC: 136 MG/DL (ref 70–99)
POTASSIUM SERPL-SCNC: 3.8 MMOL/L (ref 3.5–5.1)
SODIUM SERPL-SCNC: 141 MMOL/L (ref 136–145)

## 2023-10-29 PROCEDURE — 36415 COLL VENOUS BLD VENIPUNCTURE: CPT

## 2023-10-29 PROCEDURE — 99232 SBSQ HOSP IP/OBS MODERATE 35: CPT | Performed by: INTERNAL MEDICINE

## 2023-10-29 PROCEDURE — 2580000003 HC RX 258: Performed by: SURGERY

## 2023-10-29 PROCEDURE — 6370000000 HC RX 637 (ALT 250 FOR IP): Performed by: INTERNAL MEDICINE

## 2023-10-29 PROCEDURE — 6360000002 HC RX W HCPCS: Performed by: INTERNAL MEDICINE

## 2023-10-29 PROCEDURE — 80048 BASIC METABOLIC PNL TOTAL CA: CPT

## 2023-10-29 PROCEDURE — 2580000003 HC RX 258: Performed by: INTERNAL MEDICINE

## 2023-10-29 PROCEDURE — 6370000000 HC RX 637 (ALT 250 FOR IP): Performed by: SURGERY

## 2023-10-29 PROCEDURE — 94760 N-INVAS EAR/PLS OXIMETRY 1: CPT

## 2023-10-29 PROCEDURE — 1200000000 HC SEMI PRIVATE

## 2023-10-29 RX ADMIN — FUROSEMIDE 20 MG: 20 TABLET ORAL at 10:02

## 2023-10-29 RX ADMIN — CLINDAMYCIN HYDROCHLORIDE 300 MG: 150 CAPSULE ORAL at 05:12

## 2023-10-29 RX ADMIN — ACETAMINOPHEN 650 MG: 325 TABLET ORAL at 10:02

## 2023-10-29 RX ADMIN — SODIUM CHLORIDE, PRESERVATIVE FREE 10 ML: 5 INJECTION INTRAVENOUS at 20:00

## 2023-10-29 RX ADMIN — NEPHROCAP 1 MG: 1 CAP ORAL at 10:02

## 2023-10-29 RX ADMIN — APIXABAN 5 MG: 5 TABLET, FILM COATED ORAL at 19:59

## 2023-10-29 RX ADMIN — LEVETIRACETAM 2000 MG: 500 TABLET, FILM COATED ORAL at 19:59

## 2023-10-29 RX ADMIN — OXYCODONE HYDROCHLORIDE 5 MG: 5 TABLET ORAL at 05:12

## 2023-10-29 RX ADMIN — SODIUM CHLORIDE, PRESERVATIVE FREE 10 ML: 5 INJECTION INTRAVENOUS at 10:03

## 2023-10-29 RX ADMIN — FERROUS SULFATE TAB EC 324 MG (65 MG FE EQUIVALENT) 324 MG: 324 (65 FE) TABLET DELAYED RESPONSE at 10:03

## 2023-10-29 RX ADMIN — OXYCODONE HYDROCHLORIDE 5 MG: 5 TABLET ORAL at 11:51

## 2023-10-29 RX ADMIN — EMPAGLIFLOZIN 10 MG: 10 TABLET, FILM COATED ORAL at 10:02

## 2023-10-29 RX ADMIN — Medication 3 MG: at 19:58

## 2023-10-29 RX ADMIN — LACOSAMIDE 200 MG: 100 TABLET, FILM COATED ORAL at 19:59

## 2023-10-29 RX ADMIN — Medication 1 CAPSULE: at 10:03

## 2023-10-29 RX ADMIN — LEVETIRACETAM 2000 MG: 500 TABLET, FILM COATED ORAL at 10:03

## 2023-10-29 RX ADMIN — OXYCODONE HYDROCHLORIDE 5 MG: 5 TABLET ORAL at 19:59

## 2023-10-29 RX ADMIN — LACOSAMIDE 200 MG: 100 TABLET, FILM COATED ORAL at 10:02

## 2023-10-29 RX ADMIN — APIXABAN 5 MG: 5 TABLET, FILM COATED ORAL at 10:02

## 2023-10-29 RX ADMIN — DAPTOMYCIN 500 MG: 500 INJECTION, POWDER, LYOPHILIZED, FOR SOLUTION INTRAVENOUS at 16:10

## 2023-10-29 RX ADMIN — ESCITALOPRAM OXALATE 20 MG: 10 TABLET ORAL at 10:03

## 2023-10-29 RX ADMIN — DANTROLENE SODIUM 25 MG: 25 CAPSULE ORAL at 10:02

## 2023-10-29 RX ADMIN — FLUCONAZOLE 100 MG: 100 TABLET ORAL at 10:02

## 2023-10-29 RX ADMIN — PANTOPRAZOLE SODIUM 40 MG: 40 TABLET, DELAYED RELEASE ORAL at 05:12

## 2023-10-29 RX ADMIN — ANTACID TABLETS 750 MG: 500 TABLET, CHEWABLE ORAL at 10:03

## 2023-10-29 RX ADMIN — CLONAZEPAM 0.5 MG: 0.5 TABLET ORAL at 19:59

## 2023-10-29 ASSESSMENT — PAIN DESCRIPTION - DESCRIPTORS
DESCRIPTORS: DISCOMFORT;ACHING
DESCRIPTORS: ACHING
DESCRIPTORS: ACHING;THROBBING
DESCRIPTORS: ACHING;DISCOMFORT
DESCRIPTORS: ACHING;THROBBING
DESCRIPTORS: ACHING;DISCOMFORT

## 2023-10-29 ASSESSMENT — PAIN SCALES - GENERAL
PAINLEVEL_OUTOF10: 7
PAINLEVEL_OUTOF10: 0
PAINLEVEL_OUTOF10: 7
PAINLEVEL_OUTOF10: 4
PAINLEVEL_OUTOF10: 7
PAINLEVEL_OUTOF10: 5
PAINLEVEL_OUTOF10: 0
PAINLEVEL_OUTOF10: 0
PAINLEVEL_OUTOF10: 6

## 2023-10-29 ASSESSMENT — PAIN - FUNCTIONAL ASSESSMENT
PAIN_FUNCTIONAL_ASSESSMENT: PREVENTS OR INTERFERES SOME ACTIVE ACTIVITIES AND ADLS
PAIN_FUNCTIONAL_ASSESSMENT: ACTIVITIES ARE NOT PREVENTED
PAIN_FUNCTIONAL_ASSESSMENT: ACTIVITIES ARE NOT PREVENTED

## 2023-10-29 ASSESSMENT — PAIN DESCRIPTION - ONSET
ONSET: ON-GOING

## 2023-10-29 ASSESSMENT — PAIN SCALES - WONG BAKER
WONGBAKER_NUMERICALRESPONSE: 0

## 2023-10-29 ASSESSMENT — PAIN DESCRIPTION - LOCATION
LOCATION: BREAST

## 2023-10-29 ASSESSMENT — PAIN DESCRIPTION - PAIN TYPE
TYPE: SURGICAL PAIN

## 2023-10-29 ASSESSMENT — PAIN DESCRIPTION - ORIENTATION
ORIENTATION: LEFT

## 2023-10-29 ASSESSMENT — PAIN DESCRIPTION - FREQUENCY
FREQUENCY: CONTINUOUS

## 2023-10-29 NOTE — PROGRESS NOTES
Infectious Diseases Inpatient Progress Note      CHIEF COMPLAINT:     MRSA left breast abscess  Cellulitis  Necrotic infection   H/p PE    MORELIA     HISTORY OF PRESENT ILLNESS:  47 y.o. woman with a  morbid obesity, history of CVA in the past, right-sided weakness, nursing home resident, admitted to hospital secondary to left breast swelling pain redness with abscess formation concern for infection. She underwent incision and drainage and debridement of left breast abscess by Dr. King Teran on  10/23/23 and wound cx MRSA noted. She has wound vac in place and wound images reviewed.   Creat 1.2, WBC She is on chronic anticoagulation for h/o PE and factor V leiden-  Location :Left breast swelling and pain        Quality :  aching       Severity :  10/10   Duration :4 days       Timing :constant   Context :   Left breast abscess  Modifying factors : none   Associated signs and symptoms: Left breast swelling and pain +     Interval History : c/o Left breast pain and swelling,slowly improving and wound vac in place RN at bed side     Past Medical History:    Past Medical History:   Diagnosis Date    Arthritis     Asthma     At risk for falls     Baker's cyst     CHF (congestive heart failure) ([facility])     Dysphagia     Epilepsy ([address])     Factor V Leiden ([address])     on eliquis    GERD (gastroesophageal reflux disease)     Hx of blood clots     Hyperlipidemia     Hypertension     MRSA bacteremia 09/10/2020    Myoclonus     Need for assistance with personal care     Osteoarthritis     Paroxysmal atrial fibrillation ([address])     Schizoaffective disorder ([address])     Sudden cardiac arrest ([address]) 2021       Past Surgical History:    Past Surgical History:   Procedure Laterality Date    BREAST SURGERY Left 10/23/2023    INCISION AND DRAINAGE AND DEBRIDEMENT LEFT BREAST ABSCESS performed by Rod Welch MD at [facility] 9/25/2020    ESOPHAGOGASTRODUODENOSCOPY PERCUTANEOUS ENDOSCOPIC GASTROSTOMY TUBE PLACEMENT

## 2023-10-29 NOTE — PLAN OF CARE
Problem: Discharge Planning  Goal: Discharge to home or other facility with appropriate resources  Outcome: Progressing  Flowsheets (Taken 10/28/2023 1030 by John Blank RN)  Discharge to home or other facility with appropriate resources: Identify barriers to discharge with patient and caregiver     Problem: Pain  Goal: Verbalizes/displays adequate comfort level or baseline comfort level  10/29/2023 0130 by Rafi Harris RN  Outcome: Progressing  Flowsheets (Taken 10/28/2023 0228 by Leena Gilbert RN)  Verbalizes/displays adequate comfort level or baseline comfort level:   Encourage patient to monitor pain and request assistance   Assess pain using appropriate pain scale   Administer analgesics based on type and severity of pain and evaluate response   Implement non-pharmacological measures as appropriate and evaluate response  10/28/2023 1729 by John Blank RN  Outcome: Progressing     Problem: Skin/Tissue Integrity  Goal: Absence of new skin breakdown  Description: 1. Monitor for areas of redness and/or skin breakdown  2. Assess vascular access sites hourly  3. Every 4-6 hours minimum:  Change oxygen saturation probe site  4. Every 4-6 hours:  If on nasal continuous positive airway pressure, respiratory therapy assess nares and determine need for appliance change or resting period. 10/29/2023 0130 by Rafi Harris RN  Outcome: Progressing  Note: Cash score assessed. Patient refused Repositioned Q2H. Skin Assessed. Educated patient on importance of repositioning to prevent skin issues.      10/28/2023 1729 by John Blank RN  Outcome: Progressing     Problem: Safety - Adult  Goal: Free from fall injury  Outcome: Progressing  Flowsheets (Taken 10/28/2023 0228 by Leena Gilebrt RN)  Free From Fall Injury: Instruct family/caregiver on patient safety     Problem: ABCDS Injury Assessment  Goal: Absence of physical injury  Outcome: Progressing  Flowsheets (Taken 10/28/2023

## 2023-10-29 NOTE — PLAN OF CARE
Problem: Skin/Tissue Integrity  Goal: Absence of new skin breakdown  Outcome: Progressing     Problem: Safety - Adult  Goal: Free from fall injury  Recent Flowsheet Documentation  Taken 10/29/2023 1005 by Kevin Gamez, FRANCHESCA  Free From Fall Injury: Instruct family/caregiver on patient safety

## 2023-10-29 NOTE — PROGRESS NOTES
Comprehensive Nutrition Assessment    Type and Reason for Visit:  Initial    Nutrition Recommendations/Plan:   Continue 4 carb diet, add kristofer BID to order     Malnutrition Assessment:  Malnutrition Status:  No malnutrition (10/29/23 1827)    Context:  Acute Illness       Nutrition Assessment:    Length of stay. Pt presents w/ abscess of left breast. Pt w/ no recent hx of wt loss, reported decreased intake pta. Pt w/ improved intake since admission. Mostly % of meals eaten. Meeting energy and protein needs at this time. Nutrition related labs reviewed. Pt w/ surgical wound, negative pressure wound therapy. Will send kristofer wound healing ONS BID. Nutrition Related Findings:    136 glucose, 10/29 BM Wound Type: Surgical Incision       Current Nutrition Intake & Therapies:    Average Meal Intake: %  Average Supplements Intake: None Ordered  ADULT DIET; Regular; 4 carb choices (60 gm/meal)    Anthropometric Measures:  Height: 170.2 cm (5' 7\")  Ideal Body Weight (IBW): 135 lbs (61 kg)       Current Body Weight: 125.9 kg (277 lb 9 oz),   IBW.     Current BMI (kg/m2): 43.5                               Estimated Daily Nutrient Needs:  Energy Requirements Based On: Kcal/kg  Weight Used for Energy Requirements: Current  Energy (kcal/day): 1511 - 1889 (12-15 kcal/kg)  Weight Used for Protein Requirements: Ideal  Protein (g/day): 74 - 123 (1.2 - 2 g/kg IBW)  Method Used for Fluid Requirements: 1 ml/kcal  Fluid (ml/day): 7603 - 1889 (1ml/kcal) or per provider    Nutrition Diagnosis:   Increased nutrient needs related to increase demand for energy/nutrients as evidenced by wounds    Nutrition Interventions:   Food and/or Nutrient Delivery: Continue Current Diet, Start Oral Nutrition Supplement  Nutrition Education/Counseling: Education not indicated  Coordination of Nutrition Care: Continue to monitor while inpatient       Goals:     Goals: Meet at least 75% of estimated needs, by next RD assessment       Nutrition

## 2023-10-30 LAB
DEPRECATED RDW RBC AUTO: 14.6 % (ref 12.4–15.4)
HCT VFR BLD AUTO: 34.5 % (ref 36–48)
HGB BLD-MCNC: 11.2 G/DL (ref 12–16)
MCH RBC QN AUTO: 29.3 PG (ref 26–34)
MCHC RBC AUTO-ENTMCNC: 32.5 G/DL (ref 31–36)
MCV RBC AUTO: 90 FL (ref 80–100)
PLATELET # BLD AUTO: 204 K/UL (ref 135–450)
PMV BLD AUTO: 7.8 FL (ref 5–10.5)
RBC # BLD AUTO: 3.83 M/UL (ref 4–5.2)
WBC # BLD AUTO: 4.3 K/UL (ref 4–11)

## 2023-10-30 PROCEDURE — APPSS15 APP SPLIT SHARED TIME 0-15 MINUTES: Performed by: PHYSICIAN ASSISTANT

## 2023-10-30 PROCEDURE — 1200000000 HC SEMI PRIVATE

## 2023-10-30 PROCEDURE — 85027 COMPLETE CBC AUTOMATED: CPT

## 2023-10-30 PROCEDURE — 36415 COLL VENOUS BLD VENIPUNCTURE: CPT

## 2023-10-30 PROCEDURE — 99024 POSTOP FOLLOW-UP VISIT: CPT | Performed by: PHYSICIAN ASSISTANT

## 2023-10-30 PROCEDURE — 6370000000 HC RX 637 (ALT 250 FOR IP): Performed by: SURGERY

## 2023-10-30 PROCEDURE — 94760 N-INVAS EAR/PLS OXIMETRY 1: CPT

## 2023-10-30 PROCEDURE — 2580000003 HC RX 258: Performed by: INTERNAL MEDICINE

## 2023-10-30 PROCEDURE — APPNB15 APP NON BILLABLE TIME 0-15 MINS: Performed by: PHYSICIAN ASSISTANT

## 2023-10-30 PROCEDURE — 99232 SBSQ HOSP IP/OBS MODERATE 35: CPT | Performed by: INTERNAL MEDICINE

## 2023-10-30 PROCEDURE — 6370000000 HC RX 637 (ALT 250 FOR IP): Performed by: INTERNAL MEDICINE

## 2023-10-30 PROCEDURE — 6360000002 HC RX W HCPCS: Performed by: INTERNAL MEDICINE

## 2023-10-30 PROCEDURE — 2580000003 HC RX 258: Performed by: SURGERY

## 2023-10-30 RX ORDER — SODIUM CHLORIDE 0.9 % (FLUSH) 0.9 %
5-40 SYRINGE (ML) INJECTION EVERY 12 HOURS SCHEDULED
Status: DISCONTINUED | OUTPATIENT
Start: 2023-10-30 | End: 2023-10-30 | Stop reason: SDUPTHER

## 2023-10-30 RX ORDER — SODIUM CHLORIDE 0.9 % (FLUSH) 0.9 %
5-40 SYRINGE (ML) INJECTION PRN
Status: DISCONTINUED | OUTPATIENT
Start: 2023-10-30 | End: 2023-11-02 | Stop reason: HOSPADM

## 2023-10-30 RX ORDER — SODIUM CHLORIDE 9 MG/ML
25 INJECTION, SOLUTION INTRAVENOUS PRN
Status: DISCONTINUED | OUTPATIENT
Start: 2023-10-30 | End: 2023-10-30 | Stop reason: SDUPTHER

## 2023-10-30 RX ORDER — SODIUM CHLORIDE 0.9 % (FLUSH) 0.9 %
5-40 SYRINGE (ML) INJECTION PRN
Status: DISCONTINUED | OUTPATIENT
Start: 2023-10-30 | End: 2023-10-30 | Stop reason: SDUPTHER

## 2023-10-30 RX ORDER — SODIUM CHLORIDE 9 MG/ML
25 INJECTION, SOLUTION INTRAVENOUS PRN
Status: DISCONTINUED | OUTPATIENT
Start: 2023-10-30 | End: 2023-11-02 | Stop reason: HOSPADM

## 2023-10-30 RX ORDER — LIDOCAINE HYDROCHLORIDE 10 MG/ML
5 INJECTION, SOLUTION EPIDURAL; INFILTRATION; INTRACAUDAL; PERINEURAL ONCE
Status: DISCONTINUED | OUTPATIENT
Start: 2023-10-30 | End: 2023-10-30 | Stop reason: SDUPTHER

## 2023-10-30 RX ORDER — SODIUM CHLORIDE 0.9 % (FLUSH) 0.9 %
5-40 SYRINGE (ML) INJECTION EVERY 12 HOURS SCHEDULED
Status: DISCONTINUED | OUTPATIENT
Start: 2023-10-30 | End: 2023-11-02 | Stop reason: HOSPADM

## 2023-10-30 RX ORDER — LIDOCAINE HYDROCHLORIDE 10 MG/ML
5 INJECTION, SOLUTION EPIDURAL; INFILTRATION; INTRACAUDAL; PERINEURAL ONCE
Status: DISCONTINUED | OUTPATIENT
Start: 2023-10-30 | End: 2023-11-02 | Stop reason: HOSPADM

## 2023-10-30 RX ADMIN — LEVETIRACETAM 2000 MG: 500 TABLET, FILM COATED ORAL at 08:33

## 2023-10-30 RX ADMIN — Medication 1 CAPSULE: at 08:33

## 2023-10-30 RX ADMIN — ACETAMINOPHEN 650 MG: 325 TABLET ORAL at 08:33

## 2023-10-30 RX ADMIN — FUROSEMIDE 20 MG: 20 TABLET ORAL at 08:33

## 2023-10-30 RX ADMIN — FERROUS SULFATE TAB EC 324 MG (65 MG FE EQUIVALENT) 324 MG: 324 (65 FE) TABLET DELAYED RESPONSE at 08:33

## 2023-10-30 RX ADMIN — FLUCONAZOLE 100 MG: 100 TABLET ORAL at 08:33

## 2023-10-30 RX ADMIN — LEVETIRACETAM 2000 MG: 500 TABLET, FILM COATED ORAL at 20:35

## 2023-10-30 RX ADMIN — LACOSAMIDE 200 MG: 100 TABLET, FILM COATED ORAL at 20:35

## 2023-10-30 RX ADMIN — OXYCODONE HYDROCHLORIDE 5 MG: 5 TABLET ORAL at 06:34

## 2023-10-30 RX ADMIN — CLONAZEPAM 0.5 MG: 0.5 TABLET ORAL at 20:36

## 2023-10-30 RX ADMIN — Medication 10 ML: at 20:40

## 2023-10-30 RX ADMIN — Medication 3 MG: at 20:35

## 2023-10-30 RX ADMIN — APIXABAN 5 MG: 5 TABLET, FILM COATED ORAL at 20:35

## 2023-10-30 RX ADMIN — EMPAGLIFLOZIN 10 MG: 10 TABLET, FILM COATED ORAL at 08:32

## 2023-10-30 RX ADMIN — DANTROLENE SODIUM 25 MG: 25 CAPSULE ORAL at 08:32

## 2023-10-30 RX ADMIN — PANTOPRAZOLE SODIUM 40 MG: 40 TABLET, DELAYED RELEASE ORAL at 05:34

## 2023-10-30 RX ADMIN — DAPTOMYCIN 500 MG: 500 INJECTION, POWDER, LYOPHILIZED, FOR SOLUTION INTRAVENOUS at 16:24

## 2023-10-30 RX ADMIN — OXYCODONE HYDROCHLORIDE 5 MG: 5 TABLET ORAL at 20:36

## 2023-10-30 RX ADMIN — ESCITALOPRAM OXALATE 20 MG: 10 TABLET ORAL at 08:32

## 2023-10-30 RX ADMIN — SODIUM CHLORIDE, PRESERVATIVE FREE 10 ML: 5 INJECTION INTRAVENOUS at 08:33

## 2023-10-30 RX ADMIN — ANTACID TABLETS 750 MG: 500 TABLET, CHEWABLE ORAL at 08:32

## 2023-10-30 RX ADMIN — LACOSAMIDE 200 MG: 100 TABLET, FILM COATED ORAL at 08:33

## 2023-10-30 RX ADMIN — NEPHROCAP 1 MG: 1 CAP ORAL at 08:32

## 2023-10-30 RX ADMIN — APIXABAN 5 MG: 5 TABLET, FILM COATED ORAL at 08:33

## 2023-10-30 ASSESSMENT — PAIN SCALES - WONG BAKER
WONGBAKER_NUMERICALRESPONSE: 0

## 2023-10-30 ASSESSMENT — PAIN DESCRIPTION - LOCATION
LOCATION: BREAST
LOCATION: BREAST

## 2023-10-30 ASSESSMENT — PAIN SCALES - GENERAL
PAINLEVEL_OUTOF10: 3
PAINLEVEL_OUTOF10: 7
PAINLEVEL_OUTOF10: 0
PAINLEVEL_OUTOF10: 3
PAINLEVEL_OUTOF10: 9

## 2023-10-30 ASSESSMENT — PAIN DESCRIPTION - ORIENTATION
ORIENTATION: LEFT
ORIENTATION: LEFT

## 2023-10-30 ASSESSMENT — PAIN DESCRIPTION - DESCRIPTORS
DESCRIPTORS: ACHING
DESCRIPTORS: ACHING;DISCOMFORT

## 2023-10-30 ASSESSMENT — PAIN DESCRIPTION - PAIN TYPE: TYPE: SURGICAL PAIN

## 2023-10-30 ASSESSMENT — PAIN DESCRIPTION - ONSET: ONSET: ON-GOING

## 2023-10-30 ASSESSMENT — PAIN DESCRIPTION - FREQUENCY: FREQUENCY: CONTINUOUS

## 2023-10-30 NOTE — PROGRESS NOTES
Arrived to place PICC line with bedside RN Johnathan Chacon  Pre-procedure and timeout done with RN, discussed limitations of placement and allergies. Pt's basilic, brachial, cephalic are all easily collapsible with no indication for a clot. There is no Vein large enough for 4 F catheter in either arm. Images of all vessels below. 20G PIV placed in RUE     Post procedure - reorganized pt table, placed pt in lowest position, with call light and educated on line care. Reported off to bedside RN. Please call if you have any questions about the PICC or ML. The  will direct you to the PICC RN that is on call.       [phone]      Left basilic     Left cephalic    Left brachial      Right basilic       Right cephalic        Right brachial

## 2023-10-30 NOTE — PROGRESS NOTES
Went to pt room to offered positioning and check patient for BM/urine incontinent. When I turned patient to one side patient starting to have a BM, I rolled her on her back and said that I will return to finish cleaning her up, and explained to her that she was actively having a BM. Nurse returned to room after cleaning up another patient, Nurse over heard a phone conversation patient was telling the person on the phone that nurse is refusing tp cleaning her up, that when she was having a BM Nurse was making fun of her, making comments like \"EWWWW! \"     PCA was also at the bedside when this supposed incident happened. Nurse addressed this untrue statement with the patient and she proceed to tell the Nurse that she didn't say it. Then became argumentative with nurse stating that she was not having a BM during this incident. Nurse proceeded to show patient the pad where she had an extra large BM. They she replied, \"well I didn't know. \" Nurse finished cleaning her up, got her ata crackers and water gave her a call light pt denied needing anything else.

## 2023-10-30 NOTE — PROGRESS NOTES
[employer] message sent to Dr. Ronald López to inform that case management spoke with [facility] and they will take patient with PICC and antibiotic therapy if he chooses to order for discharge.

## 2023-10-30 NOTE — PROGRESS NOTES
Infectious Diseases Inpatient Progress Note      CHIEF COMPLAINT:     MRSA left breast abscess  Cellulitis  Necrotic infection   H/p PE    [place]IA     HISTORY OF PRESENT ILLNESS:  47 y.o. woman with a  morbid obesity, history of CVA in the past, right-sided weakness, nursing home resident, admitted to hospital secondary to left breast swelling pain redness with abscess formation concern for infection. She underwent incision and drainage and debridement of left breast abscess by Dr. Elvia Bran on  10/23/23 and wound cx MRSA noted. She has wound vac in place and wound images reviewed.   Creat 1.2, WBC She is on chronic anticoagulation for h/o PE and factor V leiden-  Location :Left breast swelling and pain        Quality :  aching       Severity :  10/10   Duration :4 days       Timing :constant   Context :   Left breast abscess  Modifying factors : none   Associated signs and symptoms: Left breast swelling and pain +     Interval History : c/o Left breast pain and swelling,slowly improving and wound vac in place RN at bed side and redness improving oPAT d/w pt PICC line and IV abx     Past Medical History:    Past Medical History:   Diagnosis Date    Arthritis     Asthma     At risk for falls     Baker's cyst     CHF (congestive heart failure) ([facility])     Dysphagia     Epilepsy ([address])     Factor V Leiden ([address])     on eliquis    GERD (gastroesophageal reflux disease)     Hx of blood clots     Hyperlipidemia     Hypertension     MRSA bacteremia 09/10/2020    Myoclonus     Need for assistance with personal care     Osteoarthritis     Paroxysmal atrial fibrillation ([address])     Schizoaffective disorder ([address])     Sudden cardiac arrest ([address]) 2021       Past Surgical History:    Past Surgical History:   Procedure Laterality Date    BREAST SURGERY Left 10/23/2023    INCISION AND DRAINAGE AND DEBRIDEMENT LEFT BREAST ABSCESS performed by Ada Sam MD at [facility] N/A 9/25/2020

## 2023-10-30 NOTE — PLAN OF CARE
Problem: Discharge Planning  Goal: Discharge to home or other facility with appropriate resources  Outcome: Progressing  Flowsheets (Taken 10/28/2023 1030 by Todd Vaca RN)  Discharge to home or other facility with appropriate resources: Identify barriers to discharge with patient and caregiver     Problem: Pain  Goal: Verbalizes/displays adequate comfort level or baseline comfort level  10/30/2023 0010 by Kat Chao RN  Outcome: Progressing  Flowsheets (Taken 10/28/2023 0228 by Reshma Cox RN)  Verbalizes/displays adequate comfort level or baseline comfort level:   Encourage patient to monitor pain and request assistance   Assess pain using appropriate pain scale   Administer analgesics based on type and severity of pain and evaluate response   Implement non-pharmacological measures as appropriate and evaluate response  10/29/2023 1642 by Todd Vaca RN  Outcome: Progressing     Problem: Skin/Tissue Integrity  Goal: Absence of new skin breakdown  Description: 1. Monitor for areas of redness and/or skin breakdown  2. Assess vascular access sites hourly  3. Every 4-6 hours minimum:  Change oxygen saturation probe site  4. Every 4-6 hours:  If on nasal continuous positive airway pressure, respiratory therapy assess nares and determine need for appliance change or resting period. 10/30/2023 0010 by Kat Chao RN  Outcome: Progressing  Note: Cash score assessed. Repositioned patient Q2H and assessed skin. Educated patient on importance of repositioning to prevent skin issues.      10/29/2023 1642 by Todd Vaca RN  Outcome: Progressing     Problem: Safety - Adult  Goal: Free from fall injury  Outcome: Progressing  Flowsheets (Taken 10/29/2023 1005 by Todd Vaca RN)  Free From Fall Injury: Instruct family/caregiver on patient safety     Problem: ABCDS Injury Assessment  Goal: Absence of physical injury  Outcome: Progressing  Flowsheets (Taken 10/29/2023

## 2023-10-30 NOTE — PLAN OF CARE
Problem: Pain  Goal: Verbalizes/displays adequate comfort level or baseline comfort level  Outcome: Progressing     Problem: Skin/Tissue Integrity  Goal: Absence of new skin breakdown  Outcome: Progressing     Problem: Safety - Adult  Goal: Free from fall injury  Outcome: Progressing  Flowsheets (Taken 10/30/2023 9731)  Free From Fall Injury: Instruct family/caregiver on patient safety

## 2023-10-30 NOTE — PROGRESS NOTES
[employer] message sent to Dr. Bettina Ma covering for Dr. Mariah Carney to notify of PICC RN unable to place a line due to patient having no vein large enough to support line.

## 2023-10-31 LAB
ANION GAP SERPL CALCULATED.3IONS-SCNC: 5 MMOL/L (ref 3–16)
BUN SERPL-MCNC: 32 MG/DL (ref 7–20)
CALCIUM SERPL-MCNC: 8.7 MG/DL (ref 8.3–10.6)
CHLORIDE SERPL-SCNC: 105 MMOL/L (ref 99–110)
CO2 SERPL-SCNC: 31 MMOL/L (ref 21–32)
CREAT SERPL-MCNC: 1.1 MG/DL (ref 0.6–1.1)
DEPRECATED RDW RBC AUTO: 15 % (ref 12.4–15.4)
GFR SERPLBLD CREATININE-BSD FMLA CKD-EPI: >60 ML/MIN/{1.73_M2}
GLUCOSE SERPL-MCNC: 112 MG/DL (ref 70–99)
HCT VFR BLD AUTO: 34.4 % (ref 36–48)
HGB BLD-MCNC: 11.4 G/DL (ref 12–16)
MCH RBC QN AUTO: 29.7 PG (ref 26–34)
MCHC RBC AUTO-ENTMCNC: 33.2 G/DL (ref 31–36)
MCV RBC AUTO: 89.6 FL (ref 80–100)
PLATELET # BLD AUTO: 182 K/UL (ref 135–450)
PMV BLD AUTO: 7.8 FL (ref 5–10.5)
POTASSIUM SERPL-SCNC: 4.2 MMOL/L (ref 3.5–5.1)
RBC # BLD AUTO: 3.84 M/UL (ref 4–5.2)
SODIUM SERPL-SCNC: 141 MMOL/L (ref 136–145)
WBC # BLD AUTO: 4.3 K/UL (ref 4–11)

## 2023-10-31 PROCEDURE — 6360000002 HC RX W HCPCS: Performed by: INTERNAL MEDICINE

## 2023-10-31 PROCEDURE — 6370000000 HC RX 637 (ALT 250 FOR IP): Performed by: HOSPITALIST

## 2023-10-31 PROCEDURE — 80048 BASIC METABOLIC PNL TOTAL CA: CPT

## 2023-10-31 PROCEDURE — 85027 COMPLETE CBC AUTOMATED: CPT

## 2023-10-31 PROCEDURE — 6370000000 HC RX 637 (ALT 250 FOR IP): Performed by: INTERNAL MEDICINE

## 2023-10-31 PROCEDURE — 1200000000 HC SEMI PRIVATE

## 2023-10-31 PROCEDURE — 6370000000 HC RX 637 (ALT 250 FOR IP): Performed by: SURGERY

## 2023-10-31 PROCEDURE — 36415 COLL VENOUS BLD VENIPUNCTURE: CPT

## 2023-10-31 PROCEDURE — 99232 SBSQ HOSP IP/OBS MODERATE 35: CPT | Performed by: INTERNAL MEDICINE

## 2023-10-31 PROCEDURE — 94760 N-INVAS EAR/PLS OXIMETRY 1: CPT

## 2023-10-31 PROCEDURE — 2580000003 HC RX 258: Performed by: INTERNAL MEDICINE

## 2023-10-31 RX ADMIN — ESCITALOPRAM OXALATE 20 MG: 10 TABLET ORAL at 08:35

## 2023-10-31 RX ADMIN — DAPTOMYCIN 500 MG: 500 INJECTION, POWDER, LYOPHILIZED, FOR SOLUTION INTRAVENOUS at 17:11

## 2023-10-31 RX ADMIN — FERROUS SULFATE TAB EC 324 MG (65 MG FE EQUIVALENT) 324 MG: 324 (65 FE) TABLET DELAYED RESPONSE at 08:35

## 2023-10-31 RX ADMIN — PANTOPRAZOLE SODIUM 40 MG: 40 TABLET, DELAYED RELEASE ORAL at 06:08

## 2023-10-31 RX ADMIN — LEVETIRACETAM 2000 MG: 500 TABLET, FILM COATED ORAL at 21:09

## 2023-10-31 RX ADMIN — LACOSAMIDE 200 MG: 100 TABLET, FILM COATED ORAL at 21:08

## 2023-10-31 RX ADMIN — APIXABAN 5 MG: 5 TABLET, FILM COATED ORAL at 21:08

## 2023-10-31 RX ADMIN — Medication 10 ML: at 21:09

## 2023-10-31 RX ADMIN — DANTROLENE SODIUM 25 MG: 25 CAPSULE ORAL at 08:36

## 2023-10-31 RX ADMIN — FUROSEMIDE 20 MG: 20 TABLET ORAL at 08:35

## 2023-10-31 RX ADMIN — ANTACID TABLETS 750 MG: 500 TABLET, CHEWABLE ORAL at 08:36

## 2023-10-31 RX ADMIN — LACOSAMIDE 200 MG: 100 TABLET, FILM COATED ORAL at 11:32

## 2023-10-31 RX ADMIN — APIXABAN 5 MG: 5 TABLET, FILM COATED ORAL at 08:35

## 2023-10-31 RX ADMIN — Medication 3 MG: at 21:09

## 2023-10-31 RX ADMIN — LEVETIRACETAM 2000 MG: 500 TABLET, FILM COATED ORAL at 08:39

## 2023-10-31 RX ADMIN — NEPHROCAP 1 MG: 1 CAP ORAL at 08:35

## 2023-10-31 RX ADMIN — CLONAZEPAM 0.5 MG: 0.5 TABLET ORAL at 21:08

## 2023-10-31 RX ADMIN — OXYCODONE HYDROCHLORIDE 5 MG: 5 TABLET ORAL at 08:52

## 2023-10-31 RX ADMIN — OXYCODONE HYDROCHLORIDE 5 MG: 5 TABLET ORAL at 21:53

## 2023-10-31 RX ADMIN — EMPAGLIFLOZIN 10 MG: 10 TABLET, FILM COATED ORAL at 08:36

## 2023-10-31 RX ADMIN — Medication 10 ML: at 08:36

## 2023-10-31 RX ADMIN — FLUCONAZOLE 100 MG: 100 TABLET ORAL at 08:35

## 2023-10-31 RX ADMIN — Medication 1 CAPSULE: at 08:36

## 2023-10-31 RX ADMIN — OXYCODONE HYDROCHLORIDE 5 MG: 5 TABLET ORAL at 15:48

## 2023-10-31 ASSESSMENT — PAIN SCALES - GENERAL
PAINLEVEL_OUTOF10: 0
PAINLEVEL_OUTOF10: 9
PAINLEVEL_OUTOF10: 7
PAINLEVEL_OUTOF10: 9
PAINLEVEL_OUTOF10: 9
PAINLEVEL_OUTOF10: 0

## 2023-10-31 ASSESSMENT — PAIN DESCRIPTION - ORIENTATION
ORIENTATION: LEFT

## 2023-10-31 ASSESSMENT — PAIN DESCRIPTION - DESCRIPTORS
DESCRIPTORS: STABBING
DESCRIPTORS: ACHING

## 2023-10-31 ASSESSMENT — PAIN DESCRIPTION - LOCATION
LOCATION: BREAST
LOCATION: KNEE;BREAST
LOCATION: BREAST

## 2023-10-31 ASSESSMENT — PAIN DESCRIPTION - PAIN TYPE: TYPE: ACUTE PAIN

## 2023-10-31 NOTE — PROGRESS NOTES
Patient up in bed and aaox4. Patient reporting 7/10 pain in left breast. Oxci administered for pain. Head tot toe assessment complete with changes documented. Morning medications administered with no complications. Wound vac in place and draining. Fall precautions in place.   Electronically signed by Anitha Cortes RN on 10/31/2023 at 9:41 AM

## 2023-10-31 NOTE — PLAN OF CARE
Problem: Discharge Planning  Goal: Discharge to home or other facility with appropriate resources  Outcome: Progressing  Flowsheets (Taken 10/30/2023 0835 by Sandra Negron, RN)  Discharge to home or other facility with appropriate resources: Identify barriers to discharge with patient and caregiver     Problem: Pain  Goal: Verbalizes/displays adequate comfort level or baseline comfort level  10/30/2023 2013 by Olga Sanders RN  Outcome: Progressing     Problem: Skin/Tissue Integrity  Goal: Absence of new skin breakdown  Description: 1. Monitor for areas of redness and/or skin breakdown  2. Assess vascular access sites hourly  3. Every 4-6 hours minimum:  Change oxygen saturation probe site  4. Every 4-6 hours:  If on nasal continuous positive airway pressure, respiratory therapy assess nares and determine need for appliance change or resting period.   10/30/2023 2013 by Olga Sanders RN  Outcome: Progressing     Problem: Safety - Adult  Goal: Free from fall injury  10/30/2023 2013 by Olga Sanders RN  Outcome: Progressing     Problem: ABCDS Injury Assessment  Goal: Absence of physical injury  Outcome: Progressing  Flowsheets (Taken 10/30/2023 0835 by Sandra Negron, RN)  Absence of Physical Injury: Implement safety measures based on patient assessment     Problem: Chronic Conditions and Co-morbidities  Goal: Patient's chronic conditions and co-morbidity symptoms are monitored and maintained or improved  Outcome: Progressing  Flowsheets (Taken 10/30/2023 0835 by Sandra Negron, RN)  Care Plan - Patient's Chronic Conditions and Co-Morbidity Symptoms are Monitored and Maintained or Improved: Monitor and assess patient's chronic conditions and comorbid symptoms for stability, deterioration, or improvement     Problem: Nutrition Deficit:  Goal: Optimize nutritional status  Outcome: Progressing

## 2023-10-31 NOTE — PROGRESS NOTES
Infectious Diseases Inpatient Progress Note      CHIEF COMPLAINT:     MRSA left breast abscess  Cellulitis  Necrotic infection   H/p PE    MORELIA     HISTORY OF PRESENT ILLNESS:  53 y.o. woman with a  morbid obesity, history of CVA in the past, right-sided weakness, nursing home resident, admitted to hospital secondary to left breast swelling pain redness with abscess formation concern for infection.  She underwent incision and drainage and debridement of left breast abscess by Dr. Larson on  10/23/23 and wound cx MRSA noted. She has wound vac in place and wound images reviewed.  Creat 1.2, WBC She is on chronic anticoagulation for h/o PE and factor V leiden-  Location :Left breast swelling and pain        Quality :  aching       Severity :  10/10   Duration :4 days       Timing :constant   Context :   Left breast abscess  Modifying factors : none   Associated signs and symptoms: Left breast swelling and pain +     Interval History : PICC could not be placed and wound vac working ok and LEFT Breast redness improving and has pain but better RN in the room dw her about wound vac change and images tomorrow      Past Medical History:    Past Medical History:   Diagnosis Date    Arthritis     Asthma     At risk for falls     Baker's cyst     CHF (congestive heart failure) (HCC)     Dysphagia     Epilepsy (HCC)     Factor V Leiden (HCC)     on eliquis    GERD (gastroesophageal reflux disease)     Hx of blood clots     Hyperlipidemia     Hypertension     MRSA bacteremia 09/10/2020    Myoclonus     Need for assistance with personal care     Osteoarthritis     Paroxysmal atrial fibrillation (HCC)     Schizoaffective disorder (HCC)     Sudden cardiac arrest (HCC) 2021       Past Surgical History:    Past Surgical History:   Procedure Laterality Date    BREAST SURGERY Left 10/23/2023    INCISION AND DRAINAGE AND DEBRIDEMENT LEFT BREAST ABSCESS performed by Aden Larson MD at [facility] OR    GASTROSTOMY TUBE PLACEMENT N/A  Culture, Anaerobic     Culture, Body Fluid     Culture, Body Fluid           Large Left breast abscess  Left breast necrotic infection  Left breast cellulitis  S/p ID and debridement of the abscess  Wound vac in place  MRSA infection  H/o CVA  H/O PE  Hypercoagulable on anticoagulation   HTN  A fib  NH residenT  Morbid obesity bMI at 41   Fungal dermatitis    Given the deep infection with abscess formation will need IV abx and local wound will adjust IV abx and suspect MRSA is the primary culprit   Creat rising and vanco level high will d/c and change to IV Daptomycin for once a day dosing      She is on Chronic anticoagulation already and will plan PICC as per  she can receive IV Daptomycin after d/c     PICC unable to place due to poor venous access and will check wound tomorrow with dressing changes and may choose oral abx for d/c if doing better d/w pt          Labs, Microbiology, Radiology and pertinent results from current hospitalization and care every where were reviewed by me as a part of the consultation. PLAN :  IV Daptomycin x 500 mg Q 24 hrs as in patient   Cannot use Linezolid due to drug interactions  ESR, CRP 10.6  Cont local wound care  PICC   Fluconazole for fungal dermatitis x 100 mg x 7 days  Contact isolation   She on chronic anticoagulation with Eliquis  Creat now trend down     Hold Atorvastatin while on IV abx  PICC cannot be placed   Avoiding chest line given Hypercoagulable state will see if we can choose oral abx for d/c planning    Dalvance one dose is an option as this will be once a week     Discussed with patient/Family and Nursing d/w      Medical Decision Making:   The following items were considered in medical decision making:  Discussion of patient care with other providers  Reviewed clinical lab tests  Reviewed radiology tests  Reviewed other diagnostic tests/interventions  Independent review of radiologic images  Independent review of

## 2023-10-31 NOTE — PLAN OF CARE
Problem: Discharge Planning  Goal: Discharge to home or other facility with appropriate resources  10/31/2023 0942 by Quynh Vargas RN  Outcome: Progressing  Flowsheets (Taken 10/30/2023 2100 by Emeli Malhotra RN)  Discharge to home or other facility with appropriate resources: Identify barriers to discharge with patient and caregiver  10/30/2023 2013 by Emeli Malhotra RN  Outcome: Progressing  Flowsheets (Taken 10/30/2023 4639 by Ramon Khoury RN)  Discharge to home or other facility with appropriate resources: Identify barriers to discharge with patient and caregiver     Problem: Pain  Goal: Verbalizes/displays adequate comfort level or baseline comfort level  10/31/2023 0942 by Quynh Vargas RN  Outcome: Progressing  Flowsheets (Taken 10/28/2023 0228 by Nitin Patel RN)  Verbalizes/displays adequate comfort level or baseline comfort level:   Encourage patient to monitor pain and request assistance   Assess pain using appropriate pain scale   Administer analgesics based on type and severity of pain and evaluate response   Implement non-pharmacological measures as appropriate and evaluate response  10/30/2023 2013 by Emeli Malhotra RN  Outcome: Progressing     Problem: Skin/Tissue Integrity  Goal: Absence of new skin breakdown  Description: 1. Monitor for areas of redness and/or skin breakdown  2. Assess vascular access sites hourly  3. Every 4-6 hours minimum:  Change oxygen saturation probe site  4. Every 4-6 hours:  If on nasal continuous positive airway pressure, respiratory therapy assess nares and determine need for appliance change or resting period.   10/31/2023 0942 by Quynh Vargas RN  Outcome: Progressing  10/30/2023 2013 by Emeli Malhotra RN  Outcome: Progressing     Problem: Safety - Adult  Goal: Free from fall injury  10/31/2023 0942 by Quynh Vargas RN  Outcome: Progressing  Flowsheets (Taken 10/30/2023 0835 by Ramon Khoury RN)  Free From Fall Injury:

## 2023-11-01 LAB
ANION GAP SERPL CALCULATED.3IONS-SCNC: 9 MMOL/L (ref 3–16)
BUN SERPL-MCNC: 37 MG/DL (ref 7–20)
CALCIUM SERPL-MCNC: 8.7 MG/DL (ref 8.3–10.6)
CHLORIDE SERPL-SCNC: 104 MMOL/L (ref 99–110)
CO2 SERPL-SCNC: 29 MMOL/L (ref 21–32)
CREAT SERPL-MCNC: 1.1 MG/DL (ref 0.6–1.1)
DEPRECATED RDW RBC AUTO: 15.7 % (ref 12.4–15.4)
GFR SERPLBLD CREATININE-BSD FMLA CKD-EPI: >60 ML/MIN/{1.73_M2}
GLUCOSE BLD-MCNC: 110 MG/DL (ref 70–99)
GLUCOSE SERPL-MCNC: 119 MG/DL (ref 70–99)
HCT VFR BLD AUTO: 34.8 % (ref 36–48)
HGB BLD-MCNC: 11.3 G/DL (ref 12–16)
MCH RBC QN AUTO: 29.2 PG (ref 26–34)
MCHC RBC AUTO-ENTMCNC: 32.5 G/DL (ref 31–36)
MCV RBC AUTO: 90.1 FL (ref 80–100)
PERFORMED ON: ABNORMAL
PLATELET # BLD AUTO: 191 K/UL (ref 135–450)
PMV BLD AUTO: 8.1 FL (ref 5–10.5)
POTASSIUM SERPL-SCNC: 4.3 MMOL/L (ref 3.5–5.1)
RBC # BLD AUTO: 3.86 M/UL (ref 4–5.2)
SODIUM SERPL-SCNC: 142 MMOL/L (ref 136–145)
WBC # BLD AUTO: 4.7 K/UL (ref 4–11)

## 2023-11-01 PROCEDURE — 99232 SBSQ HOSP IP/OBS MODERATE 35: CPT | Performed by: INTERNAL MEDICINE

## 2023-11-01 PROCEDURE — 1200000000 HC SEMI PRIVATE

## 2023-11-01 PROCEDURE — 6360000002 HC RX W HCPCS: Performed by: INTERNAL MEDICINE

## 2023-11-01 PROCEDURE — 2580000003 HC RX 258: Performed by: INTERNAL MEDICINE

## 2023-11-01 PROCEDURE — 94760 N-INVAS EAR/PLS OXIMETRY 1: CPT

## 2023-11-01 PROCEDURE — 36415 COLL VENOUS BLD VENIPUNCTURE: CPT

## 2023-11-01 PROCEDURE — 80048 BASIC METABOLIC PNL TOTAL CA: CPT

## 2023-11-01 PROCEDURE — 6370000000 HC RX 637 (ALT 250 FOR IP): Performed by: HOSPITALIST

## 2023-11-01 PROCEDURE — 6370000000 HC RX 637 (ALT 250 FOR IP): Performed by: INTERNAL MEDICINE

## 2023-11-01 PROCEDURE — 85027 COMPLETE CBC AUTOMATED: CPT

## 2023-11-01 PROCEDURE — 6370000000 HC RX 637 (ALT 250 FOR IP): Performed by: SURGERY

## 2023-11-01 RX ORDER — OXYCODONE HYDROCHLORIDE 5 MG/1
5 TABLET ORAL EVERY 6 HOURS PRN
Qty: 12 TABLET | Refills: 0 | Status: SHIPPED | OUTPATIENT
Start: 2023-11-01 | End: 2023-11-04

## 2023-11-01 RX ORDER — DOXYCYCLINE HYCLATE 100 MG
100 TABLET ORAL 2 TIMES DAILY
Qty: 42 TABLET | Refills: 0 | Status: SHIPPED | OUTPATIENT
Start: 2023-11-01 | End: 2023-11-22

## 2023-11-01 RX ADMIN — LACOSAMIDE 200 MG: 100 TABLET, FILM COATED ORAL at 20:48

## 2023-11-01 RX ADMIN — OXYCODONE HYDROCHLORIDE 5 MG: 5 TABLET ORAL at 14:47

## 2023-11-01 RX ADMIN — ANTACID TABLETS 750 MG: 500 TABLET, CHEWABLE ORAL at 07:54

## 2023-11-01 RX ADMIN — POLYETHYLENE GLYCOL 3350 17 G: 17 POWDER, FOR SOLUTION ORAL at 07:55

## 2023-11-01 RX ADMIN — Medication 3 MG: at 20:49

## 2023-11-01 RX ADMIN — FUROSEMIDE 20 MG: 20 TABLET ORAL at 07:55

## 2023-11-01 RX ADMIN — EMPAGLIFLOZIN 10 MG: 10 TABLET, FILM COATED ORAL at 07:54

## 2023-11-01 RX ADMIN — APIXABAN 5 MG: 5 TABLET, FILM COATED ORAL at 07:54

## 2023-11-01 RX ADMIN — OXYCODONE HYDROCHLORIDE 5 MG: 5 TABLET ORAL at 20:49

## 2023-11-01 RX ADMIN — FLUCONAZOLE 100 MG: 100 TABLET ORAL at 07:55

## 2023-11-01 RX ADMIN — FERROUS SULFATE TAB EC 324 MG (65 MG FE EQUIVALENT) 324 MG: 324 (65 FE) TABLET DELAYED RESPONSE at 07:54

## 2023-11-01 RX ADMIN — Medication 10 ML: at 20:49

## 2023-11-01 RX ADMIN — Medication 10 ML: at 07:56

## 2023-11-01 RX ADMIN — LEVETIRACETAM 2000 MG: 500 TABLET, FILM COATED ORAL at 07:54

## 2023-11-01 RX ADMIN — LEVETIRACETAM 2000 MG: 500 TABLET, FILM COATED ORAL at 20:47

## 2023-11-01 RX ADMIN — PANTOPRAZOLE SODIUM 40 MG: 40 TABLET, DELAYED RELEASE ORAL at 08:04

## 2023-11-01 RX ADMIN — Medication 1 CAPSULE: at 07:54

## 2023-11-01 RX ADMIN — CLONAZEPAM 0.5 MG: 0.5 TABLET ORAL at 20:49

## 2023-11-01 RX ADMIN — DANTROLENE SODIUM 25 MG: 25 CAPSULE ORAL at 07:54

## 2023-11-01 RX ADMIN — ESCITALOPRAM OXALATE 20 MG: 10 TABLET ORAL at 07:54

## 2023-11-01 RX ADMIN — APIXABAN 5 MG: 5 TABLET, FILM COATED ORAL at 20:59

## 2023-11-01 RX ADMIN — DAPTOMYCIN 500 MG: 500 INJECTION, POWDER, LYOPHILIZED, FOR SOLUTION INTRAVENOUS at 16:43

## 2023-11-01 RX ADMIN — OXYCODONE HYDROCHLORIDE 5 MG: 5 TABLET ORAL at 07:55

## 2023-11-01 RX ADMIN — LACOSAMIDE 200 MG: 100 TABLET, FILM COATED ORAL at 07:54

## 2023-11-01 RX ADMIN — NEPHROCAP 1 MG: 1 CAP ORAL at 07:54

## 2023-11-01 ASSESSMENT — PAIN DESCRIPTION - LOCATION
LOCATION: BREAST

## 2023-11-01 ASSESSMENT — PAIN SCALES - GENERAL
PAINLEVEL_OUTOF10: 9
PAINLEVEL_OUTOF10: 9
PAINLEVEL_OUTOF10: 8
PAINLEVEL_OUTOF10: 0
PAINLEVEL_OUTOF10: 7
PAINLEVEL_OUTOF10: 0

## 2023-11-01 ASSESSMENT — PAIN DESCRIPTION - PAIN TYPE: TYPE: SURGICAL PAIN

## 2023-11-01 ASSESSMENT — PAIN - FUNCTIONAL ASSESSMENT
PAIN_FUNCTIONAL_ASSESSMENT: ACTIVITIES ARE NOT PREVENTED

## 2023-11-01 ASSESSMENT — PAIN DESCRIPTION - DESCRIPTORS
DESCRIPTORS: ACHING
DESCRIPTORS: ACHING

## 2023-11-01 ASSESSMENT — PAIN DESCRIPTION - ORIENTATION
ORIENTATION: LEFT

## 2023-11-01 NOTE — PROGRESS NOTES
breast necrotic infection  Left breast cellulitis  S/p ID and debridement of the abscess  Wound vac in place  MRSA infection  H/o CVA  H/O PE  Hypercoagulable on anticoagulation   HTN  A fib  NH residenT  Morbid obesity bMI at 41   Fungal dermatitis    Given the deep infection with abscess formation will need IV abx and local wound will adjust IV abx and suspect MRSA is the primary culprit   Creat rising and vanco level high will d/c and change to IV Daptomycin for once a day dosing      She is on Chronic anticoagulation already     PICC unable to place due to poor venous access and will check wound tomorrow with dressing changes and may choose oral abx for d/c if doing better d/w pt          Labs, Microbiology, Radiology and pertinent results from current hospitalization and care every where were reviewed by me as a part of the consultation. PLAN :  IV Daptomycin x 500 mg Q 24 hrs as in patient   Cannot use Linezolid due to drug interactions  ESR, CRP 10.6  Cont local wound care  Fluconazole for fungal dermatitis x 100 mg x 7 days  Contact isolation   She on chronic anticoagulation with Eliquis  Creat now trend down     Hold Atorvastatin while on IV abx  PICC cannot be placed   Avoiding chest line given Hypercoagulable state will see if we can choose oral abx for d/c planning    IV Dalvance X 1 GM  one dose LIKELY to be given in [address] [place] [address] in AM    Oral Doxycycline x 100 mg Q 12 hr x 21 days  D/c when ok with primary        Discussed with patient/Family and Nursing d/w      Medical Decision Making:   The following items were considered in medical decision making:  Discussion of patient care with other providers  Reviewed clinical lab tests  Reviewed radiology tests  Reviewed other diagnostic tests/interventions  Independent review of radiologic images  Independent review of  Microbiology cultures and other micro tests reviewed     Risk of Complications/Morbidity: High      Illness(es)/ Infection present that pose threat to bodily function.   There is potential for severe exacerbation of infection/side effects of treatment.  Therapy requires intensive monitoring for antimicrobial agent toxicity.     Thanks for allowing me to participate in your patient's care please call me with any questions or concerns.    Dr. Joao Cardona MD  Infectious Disease  [facility] Physician  Phone: [phone]   Fax : [phone]      Yes

## 2023-11-01 NOTE — PLAN OF CARE
Problem: Discharge Planning  Goal: Discharge to home or other facility with appropriate resources  Outcome: Progressing  Flowsheets (Taken 10/31/2023 2112)  Discharge to home or other facility with appropriate resources: Identify barriers to discharge with patient and caregiver     Problem: Pain  Goal: Verbalizes/displays adequate comfort level or baseline comfort level  Outcome: Progressing  Flowsheets (Taken 10/31/2023 2153)  Verbalizes/displays adequate comfort level or baseline comfort level:   Encourage patient to monitor pain and request assistance   Assess pain using appropriate pain scale   Administer analgesics based on type and severity of pain and evaluate response   Implement non-pharmacological measures as appropriate and evaluate response     Problem: Skin/Tissue Integrity  Goal: Absence of new skin breakdown  Description: 1. Monitor for areas of redness and/or skin breakdown  2. Assess vascular access sites hourly  3. Every 4-6 hours minimum:  Change oxygen saturation probe site  4. Every 4-6 hours:  If on nasal continuous positive airway pressure, respiratory therapy assess nares and determine need for appliance change or resting period.   Outcome: Progressing     Problem: Safety - Adult  Goal: Free from fall injury  Outcome: Progressing  Flowsheets (Taken 11/1/2023 0349)  Free From Fall Injury: Instruct family/caregiver on patient safety     Problem: ABCDS Injury Assessment  Goal: Absence of physical injury  Outcome: Progressing  Flowsheets (Taken 11/1/2023 0349)  Absence of Physical Injury: Implement safety measures based on patient assessment     Problem: Chronic Conditions and Co-morbidities  Goal: Patient's chronic conditions and co-morbidity symptoms are monitored and maintained or improved  Outcome: Progressing  Flowsheets (Taken 10/31/2023 2112)  Care Plan - Patient's Chronic Conditions and Co-Morbidity Symptoms are Monitored and Maintained or Improved: Monitor and assess patient's

## 2023-11-01 NOTE — PLAN OF CARE
Problem: Discharge Planning  Goal: Discharge to home or other facility with appropriate resources  11/1/2023 0716 by Michelle Masters RN  Outcome: Progressing  Flowsheets (Taken 10/31/2023 2112 by Oksana Buitrago RN)  Discharge to home or other facility with appropriate resources: Identify barriers to discharge with patient and caregiver  11/1/2023 0351 by Oksana Buitrago RN  Outcome: Progressing  Flowsheets (Taken 10/31/2023 2112)  Discharge to home or other facility with appropriate resources: Identify barriers to discharge with patient and caregiver     Problem: Pain  Goal: Verbalizes/displays adequate comfort level or baseline comfort level  11/1/2023 0716 by Michelle Masters RN  Outcome: Progressing  Flowsheets (Taken 10/31/2023 2153 by Oksana Buitrago RN)  Verbalizes/displays adequate comfort level or baseline comfort level:   Encourage patient to monitor pain and request assistance   Assess pain using appropriate pain scale   Administer analgesics based on type and severity of pain and evaluate response   Implement non-pharmacological measures as appropriate and evaluate response  11/1/2023 0351 by Oksana Buitrago RN  Outcome: Progressing  Flowsheets (Taken 10/31/2023 2153)  Verbalizes/displays adequate comfort level or baseline comfort level:   Encourage patient to monitor pain and request assistance   Assess pain using appropriate pain scale   Administer analgesics based on type and severity of pain and evaluate response   Implement non-pharmacological measures as appropriate and evaluate response     Problem: Skin/Tissue Integrity  Goal: Absence of new skin breakdown  Description: 1. Monitor for areas of redness and/or skin breakdown  2. Assess vascular access sites hourly  3. Every 4-6 hours minimum:  Change oxygen saturation probe site  4.   Every 4-6 hours:  If on nasal continuous positive airway pressure, respiratory therapy assess nares and determine need for appliance change or resting

## 2023-11-01 NOTE — PROGRESS NOTES
Central supply called 14:34 to  wound vac. Wound vac placed in soiled linen. Room.   Electronically signed by Walker Lewis RN on 11/1/2023 at 2:35 PM

## 2023-11-02 ENCOUNTER — HOSPITAL ENCOUNTER (OUTPATIENT)
Dept: INFUSION THERAPY | Age: 53
Setting detail: INFUSION SERIES
Discharge: HOME | DRG: 385 | End: 2023-11-02
Payer: COMMERCIAL

## 2023-11-02 VITALS
SYSTOLIC BLOOD PRESSURE: 144 MMHG | HEIGHT: 67 IN | RESPIRATION RATE: 16 BRPM | HEART RATE: 65 BPM | OXYGEN SATURATION: 96 % | TEMPERATURE: 97.7 F | WEIGHT: 276.68 LBS | BODY MASS INDEX: 43.43 KG/M2 | DIASTOLIC BLOOD PRESSURE: 77 MMHG

## 2023-11-02 DIAGNOSIS — A49.02 MRSA (METHICILLIN RESISTANT STAPHYLOCOCCUS AUREUS) INFECTION: ICD-10-CM

## 2023-11-02 DIAGNOSIS — M79.89 NECROTIZING SOFT TISSUE INFECTION: Primary | ICD-10-CM

## 2023-11-02 LAB
ANION GAP SERPL CALCULATED.3IONS-SCNC: 7 MMOL/L (ref 3–16)
BUN SERPL-MCNC: 37 MG/DL (ref 7–20)
CALCIUM SERPL-MCNC: 8.7 MG/DL (ref 8.3–10.6)
CHLORIDE SERPL-SCNC: 103 MMOL/L (ref 99–110)
CO2 SERPL-SCNC: 30 MMOL/L (ref 21–32)
CREAT SERPL-MCNC: 1.1 MG/DL (ref 0.6–1.1)
DEPRECATED RDW RBC AUTO: 15.4 % (ref 12.4–15.4)
GFR SERPLBLD CREATININE-BSD FMLA CKD-EPI: >60 ML/MIN/{1.73_M2}
GLUCOSE SERPL-MCNC: 105 MG/DL (ref 70–99)
HCT VFR BLD AUTO: 35.6 % (ref 36–48)
HGB BLD-MCNC: 11.5 G/DL (ref 12–16)
MCH RBC QN AUTO: 29.1 PG (ref 26–34)
MCHC RBC AUTO-ENTMCNC: 32.3 G/DL (ref 31–36)
MCV RBC AUTO: 90.1 FL (ref 80–100)
PLATELET # BLD AUTO: 176 K/UL (ref 135–450)
PMV BLD AUTO: 8.2 FL (ref 5–10.5)
POTASSIUM SERPL-SCNC: 4.5 MMOL/L (ref 3.5–5.1)
RBC # BLD AUTO: 3.95 M/UL (ref 4–5.2)
SODIUM SERPL-SCNC: 140 MMOL/L (ref 136–145)
WBC # BLD AUTO: 5 K/UL (ref 4–11)

## 2023-11-02 PROCEDURE — 6360000002 HC RX W HCPCS: Performed by: INTERNAL MEDICINE

## 2023-11-02 PROCEDURE — 85027 COMPLETE CBC AUTOMATED: CPT

## 2023-11-02 PROCEDURE — 6370000000 HC RX 637 (ALT 250 FOR IP): Performed by: SURGERY

## 2023-11-02 PROCEDURE — 2580000003 HC RX 258: Performed by: INTERNAL MEDICINE

## 2023-11-02 PROCEDURE — 80048 BASIC METABOLIC PNL TOTAL CA: CPT

## 2023-11-02 PROCEDURE — 36415 COLL VENOUS BLD VENIPUNCTURE: CPT

## 2023-11-02 PROCEDURE — 94760 N-INVAS EAR/PLS OXIMETRY 1: CPT

## 2023-11-02 PROCEDURE — 96365 THER/PROPH/DIAG IV INF INIT: CPT

## 2023-11-02 RX ORDER — SODIUM CHLORIDE 0.9 % (FLUSH) 0.9 %
5-40 SYRINGE (ML) INJECTION PRN
Status: DISCONTINUED | OUTPATIENT
Start: 2023-11-02 | End: 2023-11-03 | Stop reason: HOSPADM

## 2023-11-02 RX ORDER — SODIUM CHLORIDE 0.9 % (FLUSH) 0.9 %
5-40 SYRINGE (ML) INJECTION PRN
Start: 2023-11-02

## 2023-11-02 RX ADMIN — ANTACID TABLETS 750 MG: 500 TABLET, CHEWABLE ORAL at 08:20

## 2023-11-02 RX ADMIN — OXYCODONE HYDROCHLORIDE 5 MG: 5 TABLET ORAL at 03:15

## 2023-11-02 RX ADMIN — Medication 10 ML: at 10:28

## 2023-11-02 RX ADMIN — DALBAVANCIN 1000 MG: 500 INJECTION, POWDER, FOR SOLUTION INTRAVENOUS at 10:29

## 2023-11-02 RX ADMIN — PANTOPRAZOLE SODIUM 40 MG: 40 TABLET, DELAYED RELEASE ORAL at 05:29

## 2023-11-02 RX ADMIN — LACOSAMIDE 200 MG: 100 TABLET, FILM COATED ORAL at 08:19

## 2023-11-02 RX ADMIN — FUROSEMIDE 20 MG: 20 TABLET ORAL at 08:20

## 2023-11-02 RX ADMIN — LEVETIRACETAM 2000 MG: 500 TABLET, FILM COATED ORAL at 08:19

## 2023-11-02 RX ADMIN — Medication 1 CAPSULE: at 08:20

## 2023-11-02 RX ADMIN — FERROUS SULFATE TAB EC 324 MG (65 MG FE EQUIVALENT) 324 MG: 324 (65 FE) TABLET DELAYED RESPONSE at 08:20

## 2023-11-02 RX ADMIN — Medication 10 ML: at 11:02

## 2023-11-02 RX ADMIN — ESCITALOPRAM OXALATE 20 MG: 10 TABLET ORAL at 08:20

## 2023-11-02 RX ADMIN — NEPHROCAP 1 MG: 1 CAP ORAL at 08:19

## 2023-11-02 RX ADMIN — DANTROLENE SODIUM 25 MG: 25 CAPSULE ORAL at 08:25

## 2023-11-02 RX ADMIN — APIXABAN 5 MG: 5 TABLET, FILM COATED ORAL at 08:20

## 2023-11-02 RX ADMIN — EMPAGLIFLOZIN 10 MG: 10 TABLET, FILM COATED ORAL at 08:25

## 2023-11-02 ASSESSMENT — PAIN DESCRIPTION - ONSET: ONSET: ON-GOING

## 2023-11-02 ASSESSMENT — PAIN SCALES - GENERAL: PAINLEVEL_OUTOF10: 9

## 2023-11-02 ASSESSMENT — PAIN DESCRIPTION - LOCATION: LOCATION: BREAST

## 2023-11-02 ASSESSMENT — PAIN DESCRIPTION - ORIENTATION: ORIENTATION: LEFT

## 2023-11-02 NOTE — PROGRESS NOTES
[facility]    IV Antibiotic Visit    NAME:  Jaylene Perez  YOB: 1970  MEDICAL RECORD NUMBER:  [id number]  DATE:  11/2/2023    Patient arrived to [address] [place] [address]   [] per wheelchair   [x] In bed     Itching: No  Rash: No  Mouth Sores: No  Nausea: No  Vomiting: No  Diarrhea: No  Night Sweats: No  Fever: No    Administered: [x] Peripheral access    [] PICC access    [] Port access    Allergies   Allergen Reactions    Aspirin Other (See Comments)     Unable to recall reaction      Ibuprofen Other (See Comments)     Unable to recall reaction       Name of Antibiotic Administered: Dalvance  Dose of Antibiotic Administered: 1000 mg. Indication for Antibiotic: breast abscess      Response to treatment:  Well tolerated by patient.        Scheduled to return for next antibiotic dose na     Electronically signed by Анна Liriano RN on 11/2/2023 at 10:37 AM

## 2023-11-02 NOTE — PLAN OF CARE
Problem: Discharge Planning  Goal: Discharge to home or other facility with appropriate resources  Outcome: Progressing  Flowsheets (Taken 11/1/2023 2100)  Discharge to home or other facility with appropriate resources:   Identify barriers to discharge with patient and caregiver   Refer to discharge planning if patient needs post-hospital services based on physician order or complex needs related to functional status, cognitive ability or social support system     Problem: Pain  Goal: Verbalizes/displays adequate comfort level or baseline comfort level  Outcome: Progressing  Flowsheets (Taken 11/1/2023 2049)  Verbalizes/displays adequate comfort level or baseline comfort level:   Encourage patient to monitor pain and request assistance   Assess pain using appropriate pain scale   Administer analgesics based on type and severity of pain and evaluate response   Implement non-pharmacological measures as appropriate and evaluate response     Problem: Skin/Tissue Integrity  Goal: Absence of new skin breakdown  Description: 1. Monitor for areas of redness and/or skin breakdown  2. Assess vascular access sites hourly  3. Every 4-6 hours minimum:  Change oxygen saturation probe site  4. Every 4-6 hours:  If on nasal continuous positive airway pressure, respiratory therapy assess nares and determine need for appliance change or resting period.   Outcome: Progressing     Problem: Safety - Adult  Goal: Free from fall injury  Outcome: Progressing  Flowsheets (Taken 11/2/2023 0213)  Free From Fall Injury: Instruct family/caregiver on patient safety     Problem: ABCDS Injury Assessment  Goal: Absence of physical injury  Outcome: Progressing  Flowsheets (Taken 11/2/2023 0213)  Absence of Physical Injury: Implement safety measures based on patient assessment     Problem: Chronic Conditions and Co-morbidities  Goal: Patient's chronic conditions and co-morbidity symptoms are monitored and maintained or improved  Outcome:

## 2023-11-04 NOTE — DISCHARGE SUMMARY
Hospital Medicine Discharge Summary    Patient ID: Connie Garber      Patient's PCP: No primary care provider on file. Admit Date: 10/20/2023     Discharge Date: 11/2/2023      Admitting Physician: Jasmina Rudolph MD     Discharge Physician: Glenn Liu MD     Discharge Diagnoses: Active Hospital Problems    Diagnosis     MORELIA (acute kidney injury) ([facility]) [N17.9]     Necrotizing soft tissue infection [M79.89]     MRSA (methicillin resistant Staphylococcus aureus) infection [A49.02]     Morbid obesity with BMI of 40.0-44.9, adult ([facility]) [E66.01, Z68.41]     Chronic anticoagulation [Z79.01]     H/O: CVA (cerebrovascular accident) [Z86.73]     Fungal dermatitis [B36.9]     Left breast abscess [N61.1]     Cellulitis of left breast [N61.0]     Abscess of left breast [N61.1]        The patient was seen and examined on day of discharge and this discharge summary is in conjunction with any daily progress note from day of discharge. Hospital Course:     Connie Garber is a 47 y.o. female with PMH as below presented to the ER from the [facility] where she resides because of worsening \"boil\" on her left breast.  Patient states that at first she thought she had a palpable about 3 days ago. She states gradually, it got larger and the breast became painful. She states that today, her home health noticed that it was no longer a \"boil \", because some of her skin had sloughed off and was more painful. She states that she has felt sick in the past few days and has had poor appetite.     Large Left breast abscess  Left breast necrotic infection  Left breast cellulitis  S/p ID and debridement of the abscess  Wound vac in place  MRSA infection  H/o CVA  H/O PE  Hypercoagulable on anticoagulation   HTN  A fib  NH residenT  Morbid obesity bMI at 41   Fungal dermatitis     PLAN :  IV Daptomycin x 500 mg Q 24 hrs as in patient   Cannot use Linezolid due to drug interactions  ESR, CRP 10.6  Cont local wound

## 2023-11-13 ENCOUNTER — OFFICE VISIT (OUTPATIENT)
Dept: PULMONOLOGY | Age: 53
End: 2023-11-13
Payer: COMMERCIAL

## 2023-11-13 VITALS
WEIGHT: 276 LBS | RESPIRATION RATE: 18 BRPM | HEART RATE: 68 BPM | SYSTOLIC BLOOD PRESSURE: 133 MMHG | TEMPERATURE: 98 F | BODY MASS INDEX: 43.32 KG/M2 | HEIGHT: 67 IN | OXYGEN SATURATION: 96 % | DIASTOLIC BLOOD PRESSURE: 74 MMHG

## 2023-11-13 DIAGNOSIS — I26.99 BILATERAL PULMONARY EMBOLISM (HCC): ICD-10-CM

## 2023-11-13 DIAGNOSIS — I50.22 CHRONIC SYSTOLIC (CONGESTIVE) HEART FAILURE (HCC): Primary | ICD-10-CM

## 2023-11-13 DIAGNOSIS — L03.313 CELLULITIS OF CHEST WALL: ICD-10-CM

## 2023-11-13 PROCEDURE — G8427 DOCREV CUR MEDS BY ELIG CLIN: HCPCS | Performed by: INTERNAL MEDICINE

## 2023-11-13 PROCEDURE — G8417 CALC BMI ABV UP PARAM F/U: HCPCS | Performed by: INTERNAL MEDICINE

## 2023-11-13 PROCEDURE — 3078F DIAST BP <80 MM HG: CPT | Performed by: INTERNAL MEDICINE

## 2023-11-13 PROCEDURE — 99213 OFFICE O/P EST LOW 20 MIN: CPT | Performed by: INTERNAL MEDICINE

## 2023-11-13 PROCEDURE — 1111F DSCHRG MED/CURRENT MED MERGE: CPT | Performed by: INTERNAL MEDICINE

## 2023-11-13 PROCEDURE — 3075F SYST BP GE 130 - 139MM HG: CPT | Performed by: INTERNAL MEDICINE

## 2023-11-13 PROCEDURE — 1036F TOBACCO NON-USER: CPT | Performed by: INTERNAL MEDICINE

## 2023-11-13 PROCEDURE — 3017F COLORECTAL CA SCREEN DOC REV: CPT | Performed by: INTERNAL MEDICINE

## 2023-11-13 PROCEDURE — G8484 FLU IMMUNIZE NO ADMIN: HCPCS | Performed by: INTERNAL MEDICINE

## 2023-11-13 NOTE — PROGRESS NOTES
well as alveolar  hemorrhage. 3. Trace right-sided pleural effusion with adjacent compressive atelectasis. CXR PA/LAT: Results for orders placed during the hospital encounter of 12/14/18    XR CHEST STANDARD (2 VW)    Narrative  EXAMINATION:  TWO VIEWS OF THE CHEST    12/14/2018 12:49 pm    COMPARISON:  04/18/2007    HISTORY:  ORDERING SYSTEM PROVIDED HISTORY: cough  TECHNOLOGIST PROVIDED HISTORY:  Reason for exam:->cough  Ordering Physician Provided Reason for Exam: cough; hot flashes  Acuity: Acute  Type of Exam: Initial    FINDINGS:  Retrocardiac opacity seen on the lateral view, less well seen on the frontal  view. No evidence of pleural effusion. No evidence of pneumothorax. Cardiac and mediastinal silhouettes are unchanged. Impression  Retrocardiac atelectasis or pneumonitis. This note was transcribed using [facility]. Please disregard any translational errors.     Kirk Zamora MD  [facility] Pulmonary, Sleep and Critical Care

## 2024-05-22 ENCOUNTER — OFFICE VISIT (OUTPATIENT)
Dept: PULMONOLOGY | Age: 54
End: 2024-05-22
Payer: COMMERCIAL

## 2024-05-22 VITALS
HEIGHT: 67 IN | TEMPERATURE: 98.4 F | BODY MASS INDEX: 43.22 KG/M2 | RESPIRATION RATE: 18 BRPM | DIASTOLIC BLOOD PRESSURE: 70 MMHG | OXYGEN SATURATION: 96 % | SYSTOLIC BLOOD PRESSURE: 126 MMHG | HEART RATE: 76 BPM

## 2024-05-22 DIAGNOSIS — I50.22 CHRONIC SYSTOLIC (CONGESTIVE) HEART FAILURE (HCC): Primary | ICD-10-CM

## 2024-05-22 PROCEDURE — 1036F TOBACCO NON-USER: CPT | Performed by: INTERNAL MEDICINE

## 2024-05-22 PROCEDURE — G8417 CALC BMI ABV UP PARAM F/U: HCPCS | Performed by: INTERNAL MEDICINE

## 2024-05-22 PROCEDURE — 3017F COLORECTAL CA SCREEN DOC REV: CPT | Performed by: INTERNAL MEDICINE

## 2024-05-22 PROCEDURE — G8427 DOCREV CUR MEDS BY ELIG CLIN: HCPCS | Performed by: INTERNAL MEDICINE

## 2024-05-22 PROCEDURE — 3078F DIAST BP <80 MM HG: CPT | Performed by: INTERNAL MEDICINE

## 2024-05-22 PROCEDURE — 99214 OFFICE O/P EST MOD 30 MIN: CPT | Performed by: INTERNAL MEDICINE

## 2024-05-22 PROCEDURE — 3074F SYST BP LT 130 MM HG: CPT | Performed by: INTERNAL MEDICINE

## 2024-05-22 NOTE — PROGRESS NOTES
Pulmonary progress note           REASON FOR CONSULTATION:  Chief Complaint   Patient presents with    Follow-up        Consult at request of No primary care provider on file.     PCP: No primary care provider on file.        Assessment and Plan:   Diagnosis Orders   1. Chronic systolic (congestive) heart failure (HCC)                Plan:  Advised to increase lasix to 40 mg daily x 3 days then go back to 20 mg daily  6 months follow up                 HISTORY OF PRESENT ILLNESS: Breanne Alatorre is very pleasant 53 y.o. year old lady nursing home resident, with medical history stated below significant for history of atrial fibrillation on anticoagulation, and was on amiodarone that was recently held due to lung disease, history of asthma, systolic heart failure with a EF of 35% and grade 3 diastolic failure, epilepsy, factor V Leyden, history of bilateral PEs,  Currently being treated for left breast cellulitis, wound VAC in place, on doxycycline.  She is on 20 mg p.o. daily of Lasix.  She is not currently on amiodarone    C/o orthopnea  Sob  LE edema    Past Medical History:   Diagnosis Date    Arthritis     Asthma     At risk for falls     Baker's cyst     CHF (congestive heart failure) (HCC)     Dysphagia     Epilepsy (HCC)     Factor V Leiden (HCC)     on eliquis    GERD (gastroesophageal reflux disease)     Hx of blood clots     Hyperlipidemia     Hypertension     MRSA bacteremia 09/10/2020    Myoclonus     Need for assistance with personal care     Osteoarthritis     Paroxysmal atrial fibrillation (HCC)     Schizoaffective disorder (HCC)     Sudden cardiac arrest (HCC) 2021       Past Surgical History:   Procedure Laterality Date    BREAST SURGERY Left 10/23/2023    INCISION AND DRAINAGE AND DEBRIDEMENT LEFT BREAST ABSCESS performed by Aden Larson MD at [facility] OR    GASTROSTOMY TUBE PLACEMENT N/A 9/25/2020    ESOPHAGOGASTRODUODENOSCOPY PERCUTANEOUS

## 2024-12-02 ENCOUNTER — OFFICE VISIT (OUTPATIENT)
Dept: PULMONOLOGY | Age: 54
End: 2024-12-02
Payer: COMMERCIAL

## 2024-12-02 VITALS
WEIGHT: 276 LBS | OXYGEN SATURATION: 93 % | HEIGHT: 67 IN | BODY MASS INDEX: 43.32 KG/M2 | SYSTOLIC BLOOD PRESSURE: 128 MMHG | TEMPERATURE: 97.6 F | HEART RATE: 76 BPM | DIASTOLIC BLOOD PRESSURE: 90 MMHG | RESPIRATION RATE: 18 BRPM

## 2024-12-02 DIAGNOSIS — I50.22 CHRONIC SYSTOLIC (CONGESTIVE) HEART FAILURE (HCC): ICD-10-CM

## 2024-12-02 DIAGNOSIS — I26.99 BILATERAL PULMONARY EMBOLISM (HCC): Primary | ICD-10-CM

## 2024-12-02 PROCEDURE — 3074F SYST BP LT 130 MM HG: CPT | Performed by: INTERNAL MEDICINE

## 2024-12-02 PROCEDURE — G8417 CALC BMI ABV UP PARAM F/U: HCPCS | Performed by: INTERNAL MEDICINE

## 2024-12-02 PROCEDURE — 3080F DIAST BP >= 90 MM HG: CPT | Performed by: INTERNAL MEDICINE

## 2024-12-02 PROCEDURE — 1036F TOBACCO NON-USER: CPT | Performed by: INTERNAL MEDICINE

## 2024-12-02 PROCEDURE — 3017F COLORECTAL CA SCREEN DOC REV: CPT | Performed by: INTERNAL MEDICINE

## 2024-12-02 PROCEDURE — G8484 FLU IMMUNIZE NO ADMIN: HCPCS | Performed by: INTERNAL MEDICINE

## 2024-12-02 PROCEDURE — G8427 DOCREV CUR MEDS BY ELIG CLIN: HCPCS | Performed by: INTERNAL MEDICINE

## 2024-12-02 PROCEDURE — 99213 OFFICE O/P EST LOW 20 MIN: CPT | Performed by: INTERNAL MEDICINE

## 2024-12-02 NOTE — PROGRESS NOTES
Pulmonary progress note           REASON FOR CONSULTATION:  Chief Complaint   Patient presents with    Follow-up        Consult at request of No primary care provider on file.     PCP: No primary care provider on file.        Assessment and Plan:   Diagnosis Orders   1. Bilateral pulmonary embolism (HCC)        2. Chronic systolic (congestive) heart failure (HCC)                  Plan:  Continue current treatment   6 months follow up                 HISTORY OF PRESENT ILLNESS: Breanne Alatorre is very pleasant 54 y.o. year old lady nursing home resident, with medical history stated below significant for history of atrial fibrillation on anticoagulation, and was on amiodarone that was recently held due to lung disease, history of asthma, systolic heart failure with a EF of 35% and grade 3 diastolic failure, epilepsy, factor V Leyden, history of bilateral PEs,        Compliant with her medications     Past Medical History:   Diagnosis Date    Arthritis     Asthma     At risk for falls     Baker's cyst     CHF (congestive heart failure) (HCC)     Dysphagia     Epilepsy (HCC)     Factor V Leiden (HCC)     on eliquis    GERD (gastroesophageal reflux disease)     Hx of blood clots     Hyperlipidemia     Hypertension     MRSA bacteremia 09/10/2020    Myoclonus     Need for assistance with personal care     Osteoarthritis     Paroxysmal atrial fibrillation (HCC)     Schizoaffective disorder (HCC)     Sudden cardiac arrest 2021       Past Surgical History:   Procedure Laterality Date    BREAST SURGERY Left 10/23/2023    INCISION AND DRAINAGE AND DEBRIDEMENT LEFT BREAST ABSCESS performed by Aden Larson MD at [facility] OR    GASTROSTOMY TUBE PLACEMENT N/A 9/25/2020    ESOPHAGOGASTRODUODENOSCOPY PERCUTANEOUS ENDOSCOPIC GASTROSTOMY TUBE PLACEMENT performed by Petesron Raymond MD at [facility] ENDOSCOPY    GASTROSTOMY TUBE PLACEMENT N/A 10/9/2020    ESOPHAGOGASTRODUODENOSCOPY

## 2025-02-07 NOTE — PROGRESS NOTES
4 Eyes Skin Assessment     The patient is being assess for  Shift Handoff    I agree that 2 RN's have performed a thorough Head to Toe Skin Assessment on the patient. ALL assessment sites listed below have been assessed. Areas assessed by both nurses: oscar/mandi  [x]   Head, Face, and Ears   [x]   Shoulders, Back, and Chest  [x]   Arms, Elbows, and Hands   [x]   Coccyx, Sacrum, and IschIum  [x]   Legs, Feet, and Heels        Does the Patient have Skin Breakdown?   Yes LDA WOUND CARE was Initiated documentation include the Kelsea-wound, Wound Assessment, Measurements, Dressing Treatment, Drainage, and Color\",         Cash Prevention initiated:  Yes   Wound Care Orders initiated:  NA      WOC nurse consulted for Pressure Injury (Stage 3,4, Unstageable, DTI, NWPT, and Complex wounds), New and Established Ostomies:  NA      Nurse 1 eSignature: Electronically signed by Nasreen Martinez RN on 9/16/20 at 7:02 PM EDT    **SHARE this note so that the co-signing nurse is able to place an eSignature**    Nurse 2 eSignature: {Esignature:[id number]} Detail Level: Detailed

## 2025-04-23 NOTE — TELEPHONE ENCOUNTER
Kathleen states the nursing home doctor went to see Gautam Rodriguez this morning and advised her to go to dialysis and see if she goes down on weight afterwards. Doctor did not notice edema or SOB on exam. The physician advised the staff to call her if her weight has not improved. no

## 2025-06-10 ENCOUNTER — OFFICE VISIT (OUTPATIENT)
Dept: PULMONOLOGY | Age: 55
End: 2025-06-10
Payer: COMMERCIAL

## 2025-06-10 VITALS
DIASTOLIC BLOOD PRESSURE: 88 MMHG | SYSTOLIC BLOOD PRESSURE: 130 MMHG | TEMPERATURE: 98.6 F | HEIGHT: 67 IN | WEIGHT: 276 LBS | BODY MASS INDEX: 43.32 KG/M2 | RESPIRATION RATE: 18 BRPM | HEART RATE: 94 BPM | OXYGEN SATURATION: 95 %

## 2025-06-10 DIAGNOSIS — I50.22 CHRONIC SYSTOLIC (CONGESTIVE) HEART FAILURE (HCC): ICD-10-CM

## 2025-06-10 DIAGNOSIS — I26.99 BILATERAL PULMONARY EMBOLISM (HCC): Primary | ICD-10-CM

## 2025-06-10 PROCEDURE — 99214 OFFICE O/P EST MOD 30 MIN: CPT | Performed by: INTERNAL MEDICINE

## 2025-06-10 PROCEDURE — G2211 COMPLEX E/M VISIT ADD ON: HCPCS | Performed by: INTERNAL MEDICINE

## 2025-06-10 PROCEDURE — 3017F COLORECTAL CA SCREEN DOC REV: CPT | Performed by: INTERNAL MEDICINE

## 2025-06-10 PROCEDURE — 1036F TOBACCO NON-USER: CPT | Performed by: INTERNAL MEDICINE

## 2025-06-10 PROCEDURE — G8417 CALC BMI ABV UP PARAM F/U: HCPCS | Performed by: INTERNAL MEDICINE

## 2025-06-10 PROCEDURE — 3075F SYST BP GE 130 - 139MM HG: CPT | Performed by: INTERNAL MEDICINE

## 2025-06-10 PROCEDURE — 3079F DIAST BP 80-89 MM HG: CPT | Performed by: INTERNAL MEDICINE

## 2025-06-10 PROCEDURE — G8427 DOCREV CUR MEDS BY ELIG CLIN: HCPCS | Performed by: INTERNAL MEDICINE

## 2025-06-10 RX ORDER — OXYCODONE HYDROCHLORIDE 5 MG/1
5 TABLET ORAL EVERY 12 HOURS
COMMUNITY
Start: 2025-06-06

## 2025-06-10 RX ORDER — PANTOPRAZOLE SODIUM 40 MG/1
40 TABLET, DELAYED RELEASE ORAL DAILY
COMMUNITY
Start: 2025-05-13

## 2025-06-10 RX ORDER — LORATADINE 10 MG/1
10 TABLET ORAL NIGHTLY
COMMUNITY

## 2025-06-10 RX ORDER — DULOXETIN HYDROCHLORIDE 60 MG/1
60 CAPSULE, DELAYED RELEASE ORAL DAILY
COMMUNITY
Start: 2025-06-03

## 2025-06-10 NOTE — PROGRESS NOTES
Physician Provided Reason for Exam: cough; hot flashes  Acuity: Acute  Type of Exam: Initial    FINDINGS:  Retrocardiac opacity seen on the lateral view, less well seen on the frontal  view.  No evidence of pleural effusion.  No evidence of pneumothorax.  Cardiac and mediastinal silhouettes are unchanged.    Impression  Retrocardiac atelectasis or pneumonitis.    This note was transcribed using Dragon Dictation software. Please disregard any translational errors.    Yousef Al Ahwel, MD  [facility]

## 2025-06-11 NOTE — PROGRESS NOTES
Pharmacy Medication Reconciliation Note     List of medications patient is currently taking is complete. Source of information:   1. Med List obtained from [facility]. Reviewed and updated   2. Warfarin dose confirmed     Allergies   Allergen Reactions    Ibuprofen        Notes regarding home medications:   1. Warfarin 7.5 mg daily EXCEPT 3.75 mg on Mon + Thurs for hx PE.  Goal 2-3    Brooklyn Johanna[address]  10/8/2020  6:16 AM in [facility]:

## (undated) DEVICE — MIC* SAFETY PERCUTANEOUS ENDOSCOPIC GASTROSTOMY PEG KIT - 20 FR - PULL: Brand: MIC PEG TUBE

## (undated) DEVICE — ENDOSCOPY KIT: Brand: MEDLINE INDUSTRIES, INC.

## (undated) DEVICE — INTENDED FOR TISSUE SEPARATION, AND OTHER PROCEDURES THAT REQUIRE A SHARP SURGICAL BLADE TO PUNCTURE OR CUT.: Brand: BARD-PARKER ® STAINLESS STEEL BLADES

## (undated) DEVICE — BITE BLOCK ENDOSCP AD 60 FR W/ ADJ STRP PLAS GRN BLOX

## (undated) DEVICE — GLOVE ORANGE PI 7   MSG9070

## (undated) DEVICE — Device

## (undated) DEVICE — DRAPE,T,LAPARO,TRANS,STERILE: Brand: MEDLINE

## (undated) DEVICE — LIQUIBAND RAPID ADHESIVE 36/CS 0.8ML: Brand: MEDLINE

## (undated) DEVICE — BINDER ABD UNISX 4 PNL PREM 6INX6INX12IN L XL 4

## (undated) DEVICE — TUBING, SUCTION, 1/4" X 12', STRAIGHT: Brand: MEDLINE

## (undated) DEVICE — GLOVE ORANGE PI 7 1/2   MSG9075

## (undated) DEVICE — MINOR SET UP: Brand: MEDLINE INDUSTRIES, INC.

## (undated) DEVICE — SOLUTION IRRIG 1000ML 0.9% SOD CHL USP POUR PLAS BTL

## (undated) DEVICE — BINDER ABD 4 PANEL LG 12 IN 46-62 IN UNISX LINING ELASTIC

## (undated) DEVICE — MERCY HEALTH WEST TURNOVER: Brand: MEDLINE INDUSTRIES, INC.

## (undated) DEVICE — GOWN SIRUS NONREIN LG W/TWL: Brand: MEDLINE INDUSTRIES, INC.